# Patient Record
Sex: MALE | Race: WHITE | NOT HISPANIC OR LATINO | Employment: FULL TIME | URBAN - NONMETROPOLITAN AREA
[De-identification: names, ages, dates, MRNs, and addresses within clinical notes are randomized per-mention and may not be internally consistent; named-entity substitution may affect disease eponyms.]

---

## 2017-02-06 DIAGNOSIS — M25.562 ACUTE PAIN OF BOTH KNEES: Primary | ICD-10-CM

## 2017-02-06 DIAGNOSIS — M25.561 ACUTE PAIN OF BOTH KNEES: Primary | ICD-10-CM

## 2017-02-07 ENCOUNTER — CONSULT (OUTPATIENT)
Dept: ORTHOPEDIC SURGERY | Facility: CLINIC | Age: 52
End: 2017-02-07

## 2017-02-07 VITALS
WEIGHT: 227 LBS | DIASTOLIC BLOOD PRESSURE: 84 MMHG | HEIGHT: 74 IN | SYSTOLIC BLOOD PRESSURE: 126 MMHG | BODY MASS INDEX: 29.13 KG/M2

## 2017-02-07 DIAGNOSIS — M17.12 PRIMARY OSTEOARTHRITIS OF LEFT KNEE: Primary | ICD-10-CM

## 2017-02-07 PROCEDURE — 99213 OFFICE O/P EST LOW 20 MIN: CPT | Performed by: ORTHOPAEDIC SURGERY

## 2017-02-07 RX ORDER — MELOXICAM 15 MG/1
15 TABLET ORAL DAILY
Qty: 30 TABLET | Refills: 3 | Status: SHIPPED | OUTPATIENT
Start: 2017-02-07 | End: 2018-01-31

## 2017-02-07 RX ORDER — MELOXICAM 7.5 MG/1
15 TABLET ORAL DAILY
Status: DISCONTINUED | OUTPATIENT
Start: 2017-02-07 | End: 2017-02-07

## 2017-02-07 NOTE — PROGRESS NOTES
"Subjective   Lalo Jones is a 51 y.o. male. With a history of left knee arthritis.      History of Present Illness patient has a long standing history of osteoarthritis of the left knee with history of previous ACL reconstruction in [place] 12  Years ago .  He has had increased pain and  Difficulty walking.    The following portions of the patient's history were reviewed and updated as appropriate:   He  has no past medical history on file.  He  does not have any pertinent problems on file.  He  has a past surgical history that includes Tennis Elbow Release (Right, 04/14/2011) and Anterior cruciate ligament repair (Left).  His family history is not on file.  He  reports that he has been smoking Cigarettes.  He started smoking about 30 years ago. He has a 15.00 pack-year smoking history. He does not have any smokeless tobacco history on file. His alcohol and drug histories are not on file.  Current Outpatient Prescriptions   Medication Sig Dispense Refill   • amoxicillin (AMOXIL) 500 MG capsule Take 500 mg by mouth 2 (two) times a day.     • Hydrocodone-Acetaminophen (LORTAB PO) Take 10 mg by mouth as needed.       No current facility-administered medications for this visit.      Current Outpatient Prescriptions on File Prior to Visit   Medication Sig   • amoxicillin (AMOXIL) 500 MG capsule Take 500 mg by mouth 2 (two) times a day.   • Hydrocodone-Acetaminophen (LORTAB PO) Take 10 mg by mouth as needed.     No current facility-administered medications on file prior to visit.      He is allergic to contrast dye..    Review of Systems  Visit Vitals   • /84   • Ht 74\" (188 cm)   • Wt 227 lb (103 kg)   • BMI 29.15 kg/m2       Social History     Social History   • Marital status:      Spouse name: N/A   • Number of children: N/A   • Years of education: N/A     Occupational History   • Not on file.     Social History Main Topics   • Smoking status: Current Every Day Smoker     Packs/day: 1.00    "  Years: 15.00     Types: Cigarettes     Start date: 2/7/1987   • Smokeless tobacco: Not on file   • Alcohol use Not on file   • Drug use: Not on file   • Sexual activity: Not on file     Other Topics Concern   • Not on file     Social History Narrative       HEENT: Normocephalic.  PERRLA.  TM's clear bilaterally.  Oropharynx: Clear.  Neck: Supple, with no adenopathy.  Chest: Equal bilateral expansion.  Clear to auscultation and percussion.  Heart: Regular sinus rhythm, S1 and S2 normal.  No murmurs or extra heart sounds heard.  Abdomen: Soft, nontender, and no organomegaly.  Neurological: cranial nerves II-XII normal Vascular: pulses are present  Dermatological: no rashes  or blemishes, or any abnormality of the skin.    REVIEW OF SYSTEMS:  Negative, other than presenting complaint.  HEENT: No headaches, diplopia, blurred vision, tinnitus, vertigo, epistaxis, hoarseness or sore throat.  Pulmonary: No cough, sputum, hemoptysis, dyspnea, wheezing, or chest pain.  Cardiac: No chest pain, palpitations, orthopnea, paroxysmal nocturnal dyspnea, shortness of breath, or pedal edema.  Gastrointestinal: No diarrhea, melena, or constipation.  Genitourinary: No dysuria, hematuria, nocturia, frequency, bladder or bowel incontinence.  Hematology: No history of any anemia, fatigue, fever, or chills or night sweats.  Dermatology: No rashes, pruritus, or increased pigmentation changes of the skin.   Objective   Physical Exam x rays of the left knee  Show severe  Narrowing of the medial knee joint the tract of the previous  ACL reconstruction  Can be seen on the x rays . Positive  lachman sign .  Quads:4-/5 hamstrings 4/5 neuro intact.      Assessment/Plan  osteoarthritis of the left knee  With  Weak quads and ACL insufficiency.  Plan: PT synvisc injection and meloxicam , see after pre-cert for synvisc.    Lalo was seen today for pain.    Diagnoses and all orders for this visit:    Primary osteoarthritis of left knee

## 2017-02-09 DIAGNOSIS — M17.12 PRIMARY OSTEOARTHRITIS OF LEFT KNEE: Primary | ICD-10-CM

## 2017-02-21 ENCOUNTER — CLINICAL SUPPORT (OUTPATIENT)
Dept: ORTHOPEDIC SURGERY | Facility: CLINIC | Age: 52
End: 2017-02-21

## 2017-02-21 VITALS
SYSTOLIC BLOOD PRESSURE: 128 MMHG | BODY MASS INDEX: 28.88 KG/M2 | DIASTOLIC BLOOD PRESSURE: 84 MMHG | WEIGHT: 225 LBS | HEIGHT: 74 IN

## 2017-02-21 DIAGNOSIS — M17.12 PRIMARY OSTEOARTHRITIS OF LEFT KNEE: Primary | ICD-10-CM

## 2017-02-21 PROCEDURE — 20610 DRAIN/INJ JOINT/BURSA W/O US: CPT | Performed by: ORTHOPAEDIC SURGERY

## 2017-02-21 NOTE — PROGRESS NOTES
Subjective   Lalo Jones is a 51 y.o. male. 1965- Synvisc ONE injection to the left knee- APPROVED      History of Present Illness   Patient is here for a Synvisc ONE injection to the left knee. Patient has been approved with his insurance to receive the injection. Patient states that he can tell that the Meloxicam does help relieve the pain slightly. Patient states that regardless of placing the PT orders during his last office visit, he has yet to have been contacted. Our office will schedule him with [facility] before the end of his office visit. Patient is ready to proceed with the injection to the left knee.     The following portions of the patient's history were reviewed and updated as appropriate:   He  has a past medical history of S/P ACL reconstruction.  He  does not have any pertinent problems on file.  He  has a past surgical history that includes Tennis Elbow Release (Right, 04/14/2011) and Anterior cruciate ligament repair (Left).  His family history is not on file.  He  reports that he has been smoking Cigarettes.  He started smoking about 30 years ago. He has a 15.00 pack-year smoking history. He has never used smokeless tobacco. He reports that he does not drink alcohol or use illicit drugs.  Current Outpatient Prescriptions   Medication Sig Dispense Refill   • amoxicillin (AMOXIL) 500 MG capsule Take 500 mg by mouth 2 (two) times a day.     • Hydrocodone-Acetaminophen (LORTAB PO) Take 10 mg by mouth as needed.     • meloxicam (MOBIC) 15 MG tablet Take 1 tablet by mouth Daily. Take with meal daily 30 tablet 3     No current facility-administered medications for this visit.      Current Outpatient Prescriptions on File Prior to Visit   Medication Sig   • amoxicillin (AMOXIL) 500 MG capsule Take 500 mg by mouth 2 (two) times a day.   • Hydrocodone-Acetaminophen (LORTAB PO) Take 10 mg by mouth as needed.   • meloxicam (MOBIC) 15 MG tablet Take 1 tablet by mouth  "Daily. Take with meal daily     No current facility-administered medications on file prior to visit.      He is allergic to contrast dye..    Review of Systems  Visit Vitals   • /84 (BP Location: Left arm, Patient Position: Sitting)   • Ht 74\" (188 cm)   • Wt 225 lb (102 kg)   • BMI 28.89 kg/m2       Social History     Social History   • Marital status:      Spouse name: N/A   • Number of children: N/A   • Years of education: N/A     Occupational History   • Not on file.     Social History Main Topics   • Smoking status: Current Every Day Smoker     Packs/day: 1.00     Years: 15.00     Types: Cigarettes     Start date: 2/7/1987   • Smokeless tobacco: Never Used   • Alcohol use No   • Drug use: No   • Sexual activity: Defer     Other Topics Concern   • Not on file     Social History Narrative       HEENT: Normocephalic.  PERRLA.  TM's clear bilaterally.  Oropharynx: Clear.  Neck: Supple, with no adenopathy.  Chest: Equal bilateral expansion.  Clear to auscultation and percussion.  Heart: Regular sinus rhythm, S1 and S2 normal.  No murmurs or extra heart sounds heard.  Abdomen: Soft, nontender, and no organomegaly.  Neurological: cranial nerves II-XII normal Vascular: pulses are present  Dermatological: no rashes  or blemishes, or any abnormality of the skin.        Objective   Physical Exam  REVIEW OF SYSTEMS:  Negative, other than presenting complaint.  HEENT: No headaches, diplopia, blurred vision, tinnitus, vertigo, epistaxis, hoarseness or sore throat.  Pulmonary: No cough, sputum, hemoptysis, dyspnea, wheezing, or chest pain.  Cardiac: No chest pain, palpitations, orthopnea, paroxysmal nocturnal dyspnea, shortness of breath, or pedal edema.  Gastrointestinal: No diarrhea, melena, or constipation.  Genitourinary: No dysuria, hematuria, nocturia, frequency, bladder or bowel incontinence.  Hematology: No history of any anemia, fatigue, fever, or chills or night sweats.  Dermatology: No rashes, " pruritus, or increased pigmentation changes of the skin.       Patient is ready to proceed with a SYNVISC ONE injection to his left knee.     Patient was placed in exam shorts. Allergies were verified. The left knee was prepped in a sterile manner. SYNVISC ONE was injected into the left knee without difficulty. Patient was instructed to ice and elevate his left knee for the next 48 hours. Patient will return to my office in 3 months.     Assessment/Plan   Problems Addressed this Visit        Musculoskeletal and Integument    Primary osteoarthritis of left knee - Primary    Relevant Medications    hylan (SYNVISC ONE) injection 48 mg (Completed) (Start on 2/21/2017  3:00 PM)

## 2017-04-13 ENCOUNTER — OFFICE VISIT (OUTPATIENT)
Dept: ORTHOPEDIC SURGERY | Facility: CLINIC | Age: 52
End: 2017-04-13

## 2017-04-13 VITALS — WEIGHT: 227 LBS | HEIGHT: 74 IN | BODY MASS INDEX: 29.13 KG/M2

## 2017-04-13 DIAGNOSIS — M17.11 PRIMARY OSTEOARTHRITIS OF RIGHT KNEE: ICD-10-CM

## 2017-04-13 DIAGNOSIS — M25.561 ANTERIOR KNEE PAIN, RIGHT: Primary | ICD-10-CM

## 2017-04-13 PROCEDURE — 99213 OFFICE O/P EST LOW 20 MIN: CPT | Performed by: ORTHOPAEDIC SURGERY

## 2017-04-13 RX ORDER — GABAPENTIN 800 MG/1
800 TABLET ORAL 3 TIMES DAILY
COMMUNITY

## 2017-04-13 NOTE — PROGRESS NOTES
"Subjective   Lalo Jones is a 51 y.o. male.     History of Present Illness Patient here today for right knee pain.  He had xrays done here today.    The following portions of the patient's history were reviewed and updated as appropriate: allergies, current medications, past family history, past medical history, past social history, past surgical history and problem list.    Review of Systems REVIEW OF SYSTEMS:  Negative, other than presenting complaint.  HEENT: No headaches, diplopia, blurred vision, tinnitus, vertigo, epistaxis, hoarseness or sore throat.  Pulmonary: No cough, sputum, hemoptysis, dyspnea, wheezing, or chest pain.  Cardiac: No chest pain, palpitations, orthopnea, paroxysmal nocturnal dyspnea, shortness of breath, or pedal edema.  Gastrointestinal: No diarrhea, melena, or constipation.  Genitourinary: No dysuria, hematuria, nocturia, frequency, bladder or bowel incontinence.  Hematology: No history of any anemia, fatigue, fever, or chills or night sweats.  Dermatology: No rashes, pruritus, or increased pigmentation changes of the skin.       Objective   Physical Exam   Ht 74\" (188 cm)  Wt 227 lb (103 kg)  BMI 29.15 kg/m2    Social History     Social History   • Marital status:      Spouse name: N/A   • Number of children: N/A   • Years of education: N/A     Occupational History   • Not on file.     Social History Main Topics   • Smoking status: Current Every Day Smoker     Packs/day: 1.00     Years: 15.00     Types: Cigarettes     Start date: 2/7/1987   • Smokeless tobacco: Never Used   • Alcohol use No   • Drug use: No   • Sexual activity: Defer     Other Topics Concern   • Not on file     Social History Narrative       HEENT: Normocephalic.  PERRLA.  TM's clear bilaterally.  Oropharynx: Clear.  Neck: Supple, with no adenopathy.  Chest: Equal bilateral expansion.  Clear to auscultation and percussion.  Heart: Regular sinus rhythm, S1 and S2 normal.  No murmurs or extra heart " sounds heard.  Abdomen: Soft, nontender, and no organomegaly.  Neurological: cranial nerves II-XII normal Vascular: pulses are present  Dermatological: no rashes  or blemishes, or any abnormality of the skin.     exam shows tenderness of medial joint line crepitus negative [facility]. Negative lachman. Quads4/5  Neuro intact. s rays show arthritis of medial joint line and patella.       Assessment/Plan  osteoarthritis of the rt knee.  Acute  Exacerbation. Plan: synvisc injection , ice  Elevation ibuprofen see back  After pre-certing of synvisc injection  Problems Addressed this Visit        Musculoskeletal and Integument    Primary osteoarthritis of right knee      Other Visit Diagnoses     Anterior knee pain, right    -  Primary    Relevant Orders    XR Knee 4+ View Right    XR Knee Standing Right

## 2017-04-21 DIAGNOSIS — M17.11 PRIMARY OSTEOARTHRITIS OF RIGHT KNEE: Primary | ICD-10-CM

## 2017-04-21 DIAGNOSIS — M25.561 ACUTE PAIN OF RIGHT KNEE: ICD-10-CM

## 2018-01-31 ENCOUNTER — OFFICE VISIT (OUTPATIENT)
Dept: ORTHOPEDIC SURGERY | Facility: CLINIC | Age: 53
End: 2018-01-31

## 2018-01-31 VITALS — WEIGHT: 227 LBS | HEIGHT: 74 IN | BODY MASS INDEX: 29.13 KG/M2

## 2018-01-31 DIAGNOSIS — M25.561 CHRONIC PAIN OF BOTH KNEES: ICD-10-CM

## 2018-01-31 DIAGNOSIS — M17.12 PRIMARY OSTEOARTHRITIS OF LEFT KNEE: ICD-10-CM

## 2018-01-31 DIAGNOSIS — M25.562 CHRONIC PAIN OF BOTH KNEES: ICD-10-CM

## 2018-01-31 DIAGNOSIS — G89.29 CHRONIC PAIN OF BOTH KNEES: ICD-10-CM

## 2018-01-31 DIAGNOSIS — M17.11 PRIMARY OSTEOARTHRITIS OF RIGHT KNEE: Primary | ICD-10-CM

## 2018-01-31 PROCEDURE — 99214 OFFICE O/P EST MOD 30 MIN: CPT | Performed by: NURSE PRACTITIONER

## 2018-01-31 PROCEDURE — 20610 DRAIN/INJ JOINT/BURSA W/O US: CPT | Performed by: NURSE PRACTITIONER

## 2018-01-31 RX ORDER — TRIAMCINOLONE ACETONIDE 40 MG/ML
40 INJECTION, SUSPENSION INTRA-ARTICULAR; INTRAMUSCULAR
Status: COMPLETED | OUTPATIENT
Start: 2018-01-31 | End: 2018-01-31

## 2018-01-31 RX ORDER — IBUPROFEN 800 MG/1
800 TABLET ORAL EVERY 8 HOURS PRN
COMMUNITY

## 2018-01-31 RX ORDER — MELOXICAM 15 MG/1
15 TABLET ORAL DAILY
Qty: 30 TABLET | Refills: 1 | Status: SHIPPED | OUTPATIENT
Start: 2018-01-31 | End: 2018-03-02

## 2018-01-31 RX ORDER — LIDOCAINE HYDROCHLORIDE 20 MG/ML
2 INJECTION, SOLUTION INFILTRATION; PERINEURAL
Status: COMPLETED | OUTPATIENT
Start: 2018-01-31 | End: 2018-01-31

## 2018-01-31 RX ADMIN — TRIAMCINOLONE ACETONIDE 40 MG: 40 INJECTION, SUSPENSION INTRA-ARTICULAR; INTRAMUSCULAR at 10:35

## 2018-01-31 RX ADMIN — LIDOCAINE HYDROCHLORIDE 2 ML: 20 INJECTION, SOLUTION INFILTRATION; PERINEURAL at 10:34

## 2018-01-31 RX ADMIN — LIDOCAINE HYDROCHLORIDE 2 ML: 20 INJECTION, SOLUTION INFILTRATION; PERINEURAL at 10:35

## 2018-01-31 RX ADMIN — TRIAMCINOLONE ACETONIDE 40 MG: 40 INJECTION, SUSPENSION INTRA-ARTICULAR; INTRAMUSCULAR at 10:34

## 2018-01-31 NOTE — PROGRESS NOTES
"Lalo Jones is a 52 y.o. male returns for     Chief Complaint   Patient presents with   • Left Knee - Follow-up   • Right Knee - Follow-up       HISTORY OF PRESENT ILLNESS: Patient presents to office for follow-up of her bilateral knee pain related to osteoarthritis.  Patient was previously seen by Dr. Bangura for his chronic knee pain, with last office visit being on 4/13/2017.  Patient reports that his chronic knee pain continues to progressively worsen.  Patient describes pain as aching and grinding in nature with intermittent swelling of his knees.  Patient reports pain is worse with deep knee bends, both climbing and descending stairs and walking.  Patient received Synvisc one injection into the left knee per Dr. Bangura on 2/21/2017 with some improvement in his pain.  Patient is interested in repeating Synvisc one injections.    CONCURRENT MEDICAL HISTORY:    Past Medical History:   Diagnosis Date   • S/P ACL reconstruction        Allergies   Allergen Reactions   • Contrast Dye Diarrhea and Nausea And Vomiting         Current Outpatient Prescriptions:   •  gabapentin (NEURONTIN) 600 MG tablet, Take 600 mg by mouth 3 (Three) Times a Day., Disp: , Rfl:   •  ibuprofen (ADVIL,MOTRIN) 600 MG tablet, Take 600 mg by mouth Every 6 (Six) Hours As Needed for Mild Pain ., Disp: , Rfl:   •  meloxicam (MOBIC) 15 MG tablet, Take 1 tablet by mouth Daily for 30 days., Disp: 30 tablet, Rfl: 1    Past Surgical History:   Procedure Laterality Date   • KNEE ACL RECONSTRUCTION Left    • TENNIS ELBOW RELEASE Right 04/14/2011       ROS  No fevers or chills.  No chest pain or shortness of air.  No GI or  disturbances. Left knee pain. Right knee pain.     PHYSICAL EXAMINATION:       Ht 188 cm (74\")  Wt 103 kg (227 lb)  BMI 29.15 kg/m2    Physical Exam   Constitutional: He is oriented to person, place, and time. Vital signs are normal. He appears well-developed and well-nourished.   HENT:   Head: Normocephalic. "   Pulmonary/Chest: Effort normal. No respiratory distress.   Abdominal: Soft. He exhibits no distension.   Musculoskeletal:        Right knee: He exhibits no effusion.        Left knee: He exhibits no effusion.   Neurological: He is alert and oriented to person, place, and time. GCS eye subscore is 4. GCS verbal subscore is 5. GCS motor subscore is 6.   Skin: Skin is warm, dry and intact.   Psychiatric: He has a normal mood and affect. His speech is normal and behavior is normal. Judgment and thought content normal. Cognition and memory are normal.   Vitals reviewed.      GAIT:     []  Normal  [x]  Antalgic    Assistive device: [x]  None  []  Walker     []  Crutches  []  Cane     []  Wheelchair  []  Stretcher    Right Knee Exam     Tenderness   The patient is experiencing tenderness in the medial joint line and patella.    Range of Motion   Extension: 0   Flexion: 120     Muscle Strength     The patient has normal right knee strength.    Tests   Shari:  Medial - negative Lateral - negative  Varus: negative  Valgus: negative    Other   Erythema: absent  Sensation: normal  Pulse: present  Swelling: none  Other tests: no effusion present    Comments:  Pain with range of motion.       Left Knee Exam     Tenderness   The patient is experiencing tenderness in the medial joint line and patella.    Range of Motion   Extension: 0   Flexion: 120     Muscle Strength     The patient has normal left knee strength.    Tests   Shari:  Medial - negative Lateral - negative  Varus: negative  Valgus: negative    Other   Erythema: absent  Sensation: normal  Pulse: present  Swelling: none  Effusion: no effusion present    Comments:  Pain with range of motion.               ASSESSMENT:Large Joint Arthrocentesis  Date/Time: 1/31/2018 10:34 AM  Consent given by: patient  Timeout: Immediately prior to procedure a time out was called to verify the correct patient, procedure, equipment, support staff and site/side marked as required    Supporting Documentation  Indications: pain   Procedure Details  Location: knee - R knee  Needle size: 22 G  Approach: anterolateral  Medications administered: 2 mL lidocaine 2%; 40 mg triamcinolone acetonide 40 MG/ML  Patient tolerance: patient tolerated the procedure well with no immediate complications    Large Joint Arthrocentesis  Date/Time: 1/31/2018 10:35 AM  Consent given by: patient  Timeout: Immediately prior to procedure a time out was called to verify the correct patient, procedure, equipment, support staff and site/side marked as required   Supporting Documentation  Indications: pain and diagnostic evaluation   Procedure Details  Location: knee - L knee  Needle size: 22 G  Approach: anterolateral  Medications administered: 2 mL lidocaine 2%; 40 mg triamcinolone acetonide 40 MG/ML  Patient tolerance: patient tolerated the procedure well with no immediate complications        Diagnoses and all orders for this visit:    Primary osteoarthritis of right knee  -     Large Joint Arthrocentesis  -     Large Joint Arthrocentesis    Primary osteoarthritis of left knee  -     Large Joint Arthrocentesis  -     Large Joint Arthrocentesis    Chronic pain of both knees  -     Large Joint Arthrocentesis  -     Large Joint Arthrocentesis    Other orders  -     meloxicam (MOBIC) 15 MG tablet; Take 1 tablet by mouth Daily for 30 days.    PLAN    Recommend intra-articular injections of bilateral knees today with steroid for pain.  Recommend Synvisc one injections bilaterally.  I will order these today and start the pre-certifcation process.  Discussed with patient changing his medication to a prescription strength NSAID that he can take once daily.  Meloxicam is prescribed today.  Patient reports he takes a significant amount of Ibuprofen for pain control, sometimes up to 3200 mg daily.  Discussed with patient the risk of overuse of NSAIDs, including renal injury, hypertension and increased for cardiovascular events.   Discussed with patient that he cannot take Ibuprofen and Meloxicam together.  Instructed patient to start the Meloxicam and if it is not sufficiently helping his joint pain, he may go back to Ibuprofen but should be taking 800 mg 3 times daily.  Recommend activity modification as tolerated based on his pain.  Recommend ice therapy to bilateral knees intermittently 3 times daily for 20 minutes at a time as needed for pain/swelling. Follow up in 1-2 weeks for Synvisc One injections to bilateral knees, pending insurance approval.     Return 1-2 weeks for Synvisc One injections.      This document has been electronically signed by MADDY Vides on January 31, 2018 12:19 PM      MADDY Vides

## 2019-10-10 NOTE — PROGRESS NOTES
Subjective    Mr. Jones is 53 y.o. male    Chief Complaint: Prostate cancer    History of Present Illness   52yo male established patient followup for prostate cancer found on biopsy done 2/25/19 for rise of PSA to 9.1 on 2/11/19. path showed 4/12 cores positive, all on right. Consuelo 3+4=7 in 35% of 1 core, other 3 cores low volume [place] 6. trus vol= 21cc. denies luts, hematuria, stones, or family history of prostate cancer. Onctoype DX returned unfavorable intermediate risk with GPS of 24. PSA done 7/22/19 down to 6.6 from 7.0 on 4/19/19 but PSA done 10/17/2019 up slightly to 7.8.  He has new complaints today of erectile dysfunction not previously treated.  Quality painless.  Severity loss of function.  Timing constant.  Onset gradual.  Associated symptoms no penile pain or curvature.    The following portions of the patient's history were reviewed and updated as appropriate: allergies, current medications, past family history, past medical history, past social history, past surgical history and problem list.    Review of Systems   Constitutional: Negative for chills and fever.   Gastrointestinal: Negative for abdominal pain, anal bleeding and blood in stool.   Genitourinary: Negative for dysuria, hematuria and urgency.   All other systems reviewed and are negative.        Current Outpatient Medications:   •  gabapentin (NEURONTIN) 600 MG tablet, Take 600 mg by mouth 3 (Three) Times a Day., Disp: , Rfl:   •  ibuprofen (ADVIL,MOTRIN) 600 MG tablet, Take 600 mg by mouth Every 6 (Six) Hours As Needed for Mild Pain ., Disp: , Rfl:   •  sildenafil (VIAGRA) 100 MG tablet, Take 1/2 to 1 tab by mouth 1 hour prior to intercourse, Disp: 10 tablet, Rfl: 11    Past Medical History:   Diagnosis Date   • S/P ACL reconstruction        Past Surgical History:   Procedure Laterality Date   • KNEE ACL RECONSTRUCTION Left    • TENNIS ELBOW RELEASE Right 04/14/2011       Social History     Socioeconomic History   • Marital status:  "     Spouse name: Not on file   • Number of children: Not on file   • Years of education: Not on file   • Highest education level: Not on file   Tobacco Use   • Smoking status: Current Every Day Smoker     Packs/day: 1.00     Years: 15.00     Pack years: 15.00     Types: Cigarettes     Start date: 2/7/1987   • Smokeless tobacco: Never Used   Substance and Sexual Activity   • Alcohol use: No   • Drug use: No   • Sexual activity: Defer       History reviewed. No pertinent family history.    Objective    Temp 98.1 °F (36.7 °C)   Ht 188 cm (74\")   Wt 95.7 kg (211 lb)   BMI 27.09 kg/m²     Physical Exam  Constitutional: Well nourished, Well developed; No apparent distress; Vital reviewed as above  Psychiatric: Appropriate affect; Alert and oriented  Eyes: Unremarkable  Musculoskeletal: Normal gait and station  GI: Abdomen is soft, non-tender  Respiratory: No distress; Unlabored movement; No accessory musculature needed with symmetric movements  Skin: No pallor or diaphoresis  Lymphatic: No adenopathy neck or groin      Results for orders placed or performed in visit on 10/21/19   POC Urinalysis Dipstick, Multipro   Result Value Ref Range    Color Yellow Yellow, Straw, Dark Yellow, Beryl    Clarity, UA Clear Clear    Glucose, UA Negative Negative, 1000 mg/dL (3+) mg/dL    Bilirubin Negative Negative    Ketones, UA Negative Negative    Specific Gravity  1.005 1.005 - 1.030    Blood, UA Negative Negative    pH, Urine 6.0 5.0 - 8.0    Protein, POC Negative Negative mg/dL    Urobilinogen, UA Normal Normal    Nitrite, UA Negative Negative    Leukocytes Negative Negative     Patient's Body mass index is 27.09 kg/m². BMI is above normal parameters. Recommendations include: educational material.    Assessment and Plan    Diagnoses and all orders for this visit:    Prostate cancer (CMS/[facility])  -     POC Urinalysis Dipstick, Multipro  -     PSA DIAGNOSTIC; Future    Erectile dysfunction due to diseases classified " elsewhere  -     sildenafil (VIAGRA) 100 MG tablet; Take 1/2 to 1 tab by mouth 1 hour prior to intercourse      Doing well with overall stable PSA.  He would like to remain on active surveillance for his clinically localized prostate cancer.  We discussed options for his erectile dysfunction and he would like a trial of PDE inhibitor therapy.  He understands no nitrates.  Follow-up with me in 3 months with pre-clinic PSA at which time will discuss timing of his surveillance biopsy.

## 2019-10-17 DIAGNOSIS — R97.20 ELEVATED PROSTATE SPECIFIC ANTIGEN (PSA): Primary | ICD-10-CM

## 2019-10-17 DIAGNOSIS — C61 PROSTATE CANCER (HCC): Primary | ICD-10-CM

## 2019-10-21 ENCOUNTER — OFFICE VISIT (OUTPATIENT)
Dept: UROLOGY | Facility: CLINIC | Age: 54
End: 2019-10-21

## 2019-10-21 VITALS — WEIGHT: 211 LBS | TEMPERATURE: 98.1 F | HEIGHT: 74 IN | BODY MASS INDEX: 27.08 KG/M2

## 2019-10-21 DIAGNOSIS — C61 PROSTATE CANCER (HCC): Primary | ICD-10-CM

## 2019-10-21 DIAGNOSIS — N52.1 ERECTILE DYSFUNCTION DUE TO DISEASES CLASSIFIED ELSEWHERE: ICD-10-CM

## 2019-10-21 LAB
BILIRUB BLD-MCNC: NEGATIVE MG/DL
CLARITY, POC: CLEAR
COLOR UR: YELLOW
GLUCOSE UR STRIP-MCNC: NEGATIVE MG/DL
KETONES UR QL: NEGATIVE
LEUKOCYTE EST, POC: NEGATIVE
NITRITE UR-MCNC: NEGATIVE MG/ML
PH UR: 6 [PH] (ref 5–8)
PROT UR STRIP-MCNC: NEGATIVE MG/DL
RBC # UR STRIP: NEGATIVE /UL
SP GR UR: 1 (ref 1–1.03)
UROBILINOGEN UR QL: NORMAL

## 2019-10-21 PROCEDURE — 99214 OFFICE O/P EST MOD 30 MIN: CPT | Performed by: UROLOGY

## 2019-10-21 RX ORDER — SILDENAFIL 100 MG/1
TABLET, FILM COATED ORAL
Qty: 10 TABLET | Refills: 11 | Status: SHIPPED | OUTPATIENT
Start: 2019-10-21 | End: 2020-07-17 | Stop reason: SDUPTHER

## 2019-10-21 NOTE — PATIENT INSTRUCTIONS

## 2019-10-22 ENCOUNTER — RESULTS ENCOUNTER (OUTPATIENT)
Dept: UROLOGY | Facility: CLINIC | Age: 54
End: 2019-10-22

## 2019-10-22 DIAGNOSIS — C61 PROSTATE CANCER (HCC): ICD-10-CM

## 2020-01-13 DIAGNOSIS — C61 PROSTATE CANCER (HCC): ICD-10-CM

## 2020-01-14 ENCOUNTER — APPOINTMENT (OUTPATIENT)
Dept: LAB | Facility: HOSPITAL | Age: 55
End: 2020-01-14

## 2020-01-14 LAB — PSA SERPL-MCNC: 8.39 NG/ML (ref 0–4)

## 2020-01-14 PROCEDURE — 84153 ASSAY OF PSA TOTAL: CPT | Performed by: UROLOGY

## 2020-01-17 NOTE — PROGRESS NOTES
Subjective    Mr. Jones is 54 y.o. male    Chief Complaint: Prostate Cancer    History of Present Illness    55yo male established patient followup for prostate cancer.  Severity PSA on 1/14/2020 overall stable at 8.39.  Context found on biopsy done 2/25/19 for rise of PSA to 9.1 on 2/11/19. path showed 4/12 cores positive, all on right. Evans 3+4=7 in 35% of 1 core, other 3 cores low volume Evans 6. trus vol= 21cc. denies luts, hematuria, stones, or family history of prostate cancer. Onctoype DX returned unfavorable intermediate risk with GPS of 24. PSA done 7/22/19 down to 6.6 from 7.0 on 4/19/19 but PSA done 10/17/2019 up slightly to 7.8.    Associated symptoms he is using sildenafil for ED.  Quality painless.  Timing constant.  Onset gradual.      Lab Results   Component Value Date    PSA 8.390 (H) 01/14/2020       The following portions of the patient's history were reviewed and updated as appropriate: allergies, current medications, past family history, past medical history, past social history, past surgical history and problem list.    Review of Systems   Constitutional: Negative for chills and fever.   Gastrointestinal: Negative for abdominal pain, anal bleeding and blood in stool.   Genitourinary: Negative for dysuria, frequency, hematuria and urgency.   All other systems reviewed and are negative.        Current Outpatient Medications:   •  gabapentin (NEURONTIN) 600 MG tablet, Take 600 mg by mouth 3 (Three) Times a Day., Disp: , Rfl:   •  ibuprofen (ADVIL,MOTRIN) 600 MG tablet, Take 600 mg by mouth Every 6 (Six) Hours As Needed for Mild Pain ., Disp: , Rfl:   •  levoFLOXacin (LEVAQUIN) 500 MG tablet, 1 tab by mouth the night prior to biopsy, Disp: 1 tablet, Rfl: 0  •  sildenafil (VIAGRA) 100 MG tablet, Take 1/2 to 1 tab by mouth 1 hour prior to intercourse, Disp: 10 tablet, Rfl: 11  •  sodium phosphate (FLEET) 7-19 GM/118ML enema, Use rectally the evening prior to biopsy, Disp: 1 enema, Rfl:  "0    Past Medical History:   Diagnosis Date   • S/P ACL reconstruction        Past Surgical History:   Procedure Laterality Date   • KNEE ACL RECONSTRUCTION Left    • TENNIS ELBOW RELEASE Right 04/14/2011       Social History     Socioeconomic History   • Marital status:      Spouse name: Not on file   • Number of children: Not on file   • Years of education: Not on file   • Highest education level: Not on file   Tobacco Use   • Smoking status: Current Every Day Smoker     Packs/day: 1.00     Years: 15.00     Pack years: 15.00     Types: Cigarettes     Start date: 2/7/1987   • Smokeless tobacco: Never Used   Substance and Sexual Activity   • Alcohol use: No   • Drug use: No   • Sexual activity: Defer       No family history on file.    Objective    Temp 98.2 °F (36.8 °C)   Ht 182.9 cm (72\")   Wt 97.5 kg (215 lb)   BMI 29.16 kg/m²     Physical Exam  Constitutional: Well nourished, Well developed; No apparent distress; Vital reviewed as above  Psychiatric: Appropriate affect; Alert and oriented  Eyes: Unremarkable  Musculoskeletal: Normal gait and station  GI: Abdomen is soft, non-tender  Respiratory: No distress; Unlabored movement; No accessory musculature needed with symmetric movements  Skin: No pallor or diaphoresis  Lymphatic: No adenopathy neck or groin      Results for orders placed or performed in visit on 01/23/20   POC Urinalysis Dipstick, Multipro   Result Value Ref Range    Color Yellow Yellow, Straw, Dark Yellow, Beryl    Clarity, UA Clear Clear    Glucose, UA Negative Negative, 1000 mg/dL (3+) mg/dL    Bilirubin Negative Negative    Ketones, UA Negative Negative    Specific Gravity  1.025 1.005 - 1.030    Blood, UA Negative Negative    pH, Urine 7.0 5.0 - 8.0    Protein, POC Negative Negative mg/dL    Urobilinogen, UA Normal Normal    Nitrite, UA Negative Negative    Leukocytes Negative Negative     Patient's Body mass index is 29.16 kg/m². BMI is above normal parameters. Recommendations " include: educational material.    Assessment and Plan    Diagnoses and all orders for this visit:    Prostate cancer (CMS/HCC)  -     POC Urinalysis Dipstick, Multipro  -     MRI Pelvis With & Without Contrast; Future  -     levoFLOXacin (LEVAQUIN) 500 MG tablet; 1 tab by mouth the night prior to biopsy  -     sodium phosphate (FLEET) 7-19 GM/118ML enema; Use rectally the evening prior to biopsy  -     Case Request; Standing  -     ECG 12 Lead; Future  -     Case Request    Erectile dysfunction due to diseases classified elsewhere    Other orders  -     Follow Anesthesia Guidelines / Standing Orders; Future  -     Obtain Informed Consent; Future  -     Provide NPO Instructions to Patient; Future  -     Chlorhexidine Skin Prep; Future      Active surveillance for intermediate risk prostate cancer.  He is due for a surveillance biopsy.  Recommended he get a preoperative prostate MRI and schedule for [facility] MRI ultrasound fusion biopsy on 2/12/2020.  We discussed risk of biopsy including infection, bleeding, possible finding/not finding cancer, need for additional procedures, complications of anesthesia.  Patient voiced understanding and provided informed consent to proceed.

## 2020-01-23 ENCOUNTER — OFFICE VISIT (OUTPATIENT)
Dept: UROLOGY | Facility: CLINIC | Age: 55
End: 2020-01-23

## 2020-01-23 VITALS — HEIGHT: 72 IN | WEIGHT: 215 LBS | BODY MASS INDEX: 29.12 KG/M2 | TEMPERATURE: 98.2 F

## 2020-01-23 DIAGNOSIS — N52.1 ERECTILE DYSFUNCTION DUE TO DISEASES CLASSIFIED ELSEWHERE: ICD-10-CM

## 2020-01-23 DIAGNOSIS — C61 PROSTATE CANCER (HCC): Primary | ICD-10-CM

## 2020-01-23 LAB
BILIRUB BLD-MCNC: NEGATIVE MG/DL
CLARITY, POC: CLEAR
COLOR UR: YELLOW
GLUCOSE UR STRIP-MCNC: NEGATIVE MG/DL
KETONES UR QL: NEGATIVE
LEUKOCYTE EST, POC: NEGATIVE
NITRITE UR-MCNC: NEGATIVE MG/ML
PH UR: 7 [PH] (ref 5–8)
PROT UR STRIP-MCNC: NEGATIVE MG/DL
RBC # UR STRIP: NEGATIVE /UL
SP GR UR: 1.02 (ref 1–1.03)
UROBILINOGEN UR QL: NORMAL

## 2020-01-23 PROCEDURE — 99214 OFFICE O/P EST MOD 30 MIN: CPT | Performed by: UROLOGY

## 2020-01-23 RX ORDER — LEVOFLOXACIN 500 MG/1
TABLET, FILM COATED ORAL
Qty: 1 TABLET | Refills: 0 | Status: SHIPPED | OUTPATIENT
Start: 2020-01-23 | End: 2020-02-05

## 2020-01-23 RX ORDER — MAGNESIUM HYDROXIDE 1200 MG/15ML
LIQUID ORAL
Qty: 1 ENEMA | Refills: 0 | Status: ON HOLD | OUTPATIENT
Start: 2020-01-23 | End: 2020-06-07

## 2020-01-23 NOTE — PATIENT INSTRUCTIONS

## 2020-02-05 ENCOUNTER — APPOINTMENT (OUTPATIENT)
Dept: PREADMISSION TESTING | Facility: HOSPITAL | Age: 55
End: 2020-02-05

## 2020-02-05 ENCOUNTER — HOSPITAL ENCOUNTER (OUTPATIENT)
Dept: MRI IMAGING | Facility: HOSPITAL | Age: 55
Discharge: HOME | End: 2020-02-05
Admitting: UROLOGY

## 2020-02-05 VITALS
RESPIRATION RATE: 18 BRPM | HEART RATE: 71 BPM | OXYGEN SATURATION: 95 % | HEIGHT: 74 IN | BODY MASS INDEX: 27.93 KG/M2 | DIASTOLIC BLOOD PRESSURE: 78 MMHG | SYSTOLIC BLOOD PRESSURE: 136 MMHG | WEIGHT: 217.59 LBS

## 2020-02-05 DIAGNOSIS — C61 PROSTATE CANCER (HCC): ICD-10-CM

## 2020-02-05 LAB
ANION GAP SERPL CALCULATED.3IONS-SCNC: 8 MMOL/L (ref 5–15)
BUN BLD-MCNC: 10 MG/DL (ref 6–20)
BUN/CREAT SERPL: 10.2 (ref 7–25)
CALCIUM SPEC-SCNC: 9.5 MG/DL (ref 8.6–10.5)
CHLORIDE SERPL-SCNC: 104 MMOL/L (ref 98–107)
CO2 SERPL-SCNC: 31 MMOL/L (ref 22–29)
CREAT BLD-MCNC: 0.98 MG/DL (ref 0.76–1.27)
DEPRECATED RDW RBC AUTO: 44.7 FL (ref 37–54)
ERYTHROCYTE [DISTWIDTH] IN BLOOD BY AUTOMATED COUNT: 14 % (ref 12.3–15.4)
GFR SERPL CREATININE-BSD FRML MDRD: 80 ML/MIN/1.73
GLUCOSE BLD-MCNC: 99 MG/DL (ref 65–99)
HCT VFR BLD AUTO: 45.8 % (ref 37.5–51)
HGB BLD-MCNC: 15.4 G/DL (ref 13–17.7)
MCH RBC QN AUTO: 29.2 PG (ref 26.6–33)
MCHC RBC AUTO-ENTMCNC: 33.6 G/DL (ref 31.5–35.7)
MCV RBC AUTO: 86.7 FL (ref 79–97)
PLATELET # BLD AUTO: 178 10*3/MM3 (ref 140–450)
PMV BLD AUTO: 9.9 FL (ref 6–12)
POTASSIUM BLD-SCNC: 4.3 MMOL/L (ref 3.5–5.2)
RBC # BLD AUTO: 5.28 10*6/MM3 (ref 4.14–5.8)
SODIUM BLD-SCNC: 143 MMOL/L (ref 136–145)
WBC NRBC COR # BLD: 6.36 10*3/MM3 (ref 3.4–10.8)

## 2020-02-05 PROCEDURE — 72197 MRI PELVIS W/O & W/DYE: CPT

## 2020-02-05 PROCEDURE — 80048 BASIC METABOLIC PNL TOTAL CA: CPT | Performed by: UROLOGY

## 2020-02-05 PROCEDURE — 82565 ASSAY OF CREATININE: CPT

## 2020-02-05 PROCEDURE — 36415 COLL VENOUS BLD VENIPUNCTURE: CPT

## 2020-02-05 PROCEDURE — 93010 ELECTROCARDIOGRAM REPORT: CPT | Performed by: INTERNAL MEDICINE

## 2020-02-05 PROCEDURE — 93005 ELECTROCARDIOGRAM TRACING: CPT

## 2020-02-05 PROCEDURE — A9577 INJ MULTIHANCE: HCPCS | Performed by: UROLOGY

## 2020-02-05 PROCEDURE — 85027 COMPLETE CBC AUTOMATED: CPT | Performed by: UROLOGY

## 2020-02-05 PROCEDURE — 0 GADOBENATE DIMEGLUMINE 529 MG/ML SOLUTION: Performed by: UROLOGY

## 2020-02-05 RX ADMIN — GADOBENATE DIMEGLUMINE 20 ML: 529 INJECTION, SOLUTION INTRAVENOUS at 16:23

## 2020-02-05 NOTE — DISCHARGE INSTRUCTIONS
DAY OF SURGERY INSTRUCTIONS        YOUR SURGEON: LYNDSAY CHILDRESS    PROCEDURE: PROSTATE ULTRASOUND BIOPSY MRI FUSION WITH URONAV POSSIBLE ULTRASOUND GUIDED PROSTATE BIOPSY    DATE OF SURGERY: 2/12/20    ARRIVAL TIME: AS DIRECTED BY OFFICE    YOU MAY TAKE THE FOLLOWING MEDICATION(S) THE MORNING OF SURGERY WITH A SIP OF WATER: 0    ALL OTHER HOME MEDICATIONS CHECK WITH YOUR DOCTOR              MANAGING PAIN AFTER SURGERY    We know you are probably wondering what your pain will be like after surgery.  Following surgery it is unrealistic to expect you will not have pain.   Pain is how our bodies let us know that something is wrong or cautions us to be careful.  That said, our goal is to make your pain tolerable.    Methods we may use to treat your pain include (oral or IV medications, PCAs, epidurals, nerve blocks, etc.)   While some procedures require IV pain medications for a short time after surgery, transitioning to pain medications by mouth allows for better management of pain.   Your nurse will encourage you to take oral pain medications whenever possible.  IV medications work almost immediately, but only last a short while.  Taking medications by mouth allows for a more constant level of medication in your blood stream for a longer period of time.      Once your pain is out of control it is harder to get back under control.  It is important you are aware when your next dose of pain medication is due.  If you are admitted, your nurse may write the time of your next dose on the white board in your room to help you remember.      We are interested in your pain and encourage you to inform us about aggravating factors during your visit.   Many times a simple repositioning every few hours can make a big difference.    If your physician says it is okay, do not let your pain prevent you from getting out of bed. Be sure to call your nurse for assistance prior to getting up so you do not fall.      Before surgery, please  decide your tolerable pain goal.  These faces help describe the pain ratings we use on a 0-10 scale.   Be prepared to tell us your goal and whether or not you take pain or anxiety medications at home.      BEFORE YOU COME TO THE HOSPITAL  (Pre-op instructions)  • Do not eat, drink, smoke or chew gum after midnight the night before surgery.  This also includes no mints.  • Morning of surgery take only the medicines you have been instructed with a sip of water unless otherwise instructed  by your physician.  • Do not shave, wear makeup or dark nail polish.  • Remove all jewelry including rings.  • Leave anything you consider valuable at home.  • Leave your suitcase in the car until after your surgery.  • Bring the following with you if applicable:  o Picture ID and insurance, Medicare or Medicaid cards  o Co-pay/deductible required by insurance (cash, check, credit card)  o Copy of advance directive, living will or power-of- documents if not brought to PAT  o CPAP or BIPAP mask and tubing  o Relaxation aids ( book, magazine), etc.  o Hearing aids                                 ON THE DAY OF SURGERY  · On the day of surgery check in at registration located at the main entrance of the hospital.   ? You will be registered and given a beeper with instructions where to wait in the main lobby.  ? When your beeper lights up and vibrates a member of the Outpatient Surgery staff will meet you at the double doors under the stair steps and escort you to your preoperative room.   · You may have cloth compression devices placed on your legs. These help to prevent blood clots and reduce swelling in your legs.  · An IV may be inserted into one of your veins.  · In the operating room, you may be given one or more of the following:  ? A medicine to help you relax (sedative).  ? A medicine to numb the area (local anesthetic).  ? A medicine to make you fall asleep (general anesthetic).  ? A medicine that is injected into an  "area of your body to numb everything below the injection site (regional anesthetic).  · Your surgical site will be marked or identified.  · You may be given an antibiotic through your IV to help prevent infection.  Contact a health care provider if you:  · Develop a fever of more than 100.4°F (38°C) or other feelings of illness during the 48 hours before your surgery.  · Have symptoms that get worse.  Have questions or concerns about your surgery    General Anesthesia/Surgery, Adult  General anesthesia is the use of medicines to make a person \"go to sleep\" (unconscious) for a medical procedure. General anesthesia must be used for certain procedures, and is often recommended for procedures that:  · Last a long time.  · Require you to be still or in an unusual position.  · Are major and can cause blood loss.  The medicines used for general anesthesia are called general anesthetics. As well as making you unconscious for a certain amount of time, these medicines:  · Prevent pain.  · Control your blood pressure.  · Relax your muscles.  Tell a health care provider about:  · Any allergies you have.  · All medicines you are taking, including vitamins, herbs, eye drops, creams, and over-the-counter medicines.  · Any problems you or family members have had with anesthetic medicines.  · Types of anesthetics you have had in the past.  · Any blood disorders you have.  · Any surgeries you have had.  · Any medical conditions you have.  · Any recent upper respiratory, chest, or ear infections.  · Any history of:  ? Heart or lung conditions, such as heart failure, sleep apnea, asthma, or chronic obstructive pulmonary disease (COPD).  ?  service.  ? Depression or anxiety.  · Any tobacco or drug use, including marijuana or alcohol use.  · Whether you are pregnant or may be pregnant.  What are the risks?  Generally, this is a safe procedure. However, problems may occur, including:  · Allergic reaction.  · Lung and heart " problems.  · Inhaling food or liquid from the stomach into the lungs (aspiration).  · Nerve injury.  · Air in the bloodstream, which can lead to stroke.  · Extreme agitation or confusion (delirium) when you wake up from the anesthetic.  · Waking up during your procedure and being unable to move. This is rare.  These problems are more likely to develop if you are having a major surgery or if you have an advanced or serious medical condition. You can prevent some of these complications by answering all of your health care provider's questions thoroughly and by following all instructions before your procedure.  General anesthesia can cause side effects, including:  · Nausea or vomiting.  · A sore throat from the breathing tube.  · Hoarseness.  · Wheezing or coughing.  · Shaking chills.  · Tiredness.  · Body aches.  · Anxiety.  · Sleepiness or drowsiness.  · Confusion or agitation.  RISKS AND COMPLICATIONS OF SURGERY  Your health care provider will discuss possible risks and complications with you before surgery. Common risks and complications include:    · Problems due to the use of anesthetics.  · Blood loss and replacement (does not apply to minor surgical procedures).  · Temporary increase in pain due to surgery.  · Uncorrected pain or problems that the surgery was meant to correct.  · Infection.  · New damage.    What happens before the procedure?    Medicines  Ask your health care provider about:  · Changing or stopping your regular medicines. This is especially important if you are taking diabetes medicines or blood thinners.  · Taking medicines such as aspirin and ibuprofen. These medicines can thin your blood. Do not take these medicines unless your health care provider tells you to take them.  · Taking over-the-counter medicines, vitamins, herbs, and supplements. Do not take these during the week before your procedure unless your health care provider approves them.  General instructions  · Starting 3-6 weeks  before the procedure, do not use any products that contain nicotine or tobacco, such as cigarettes and e-cigarettes. If you need help quitting, ask your health care provider.  · If you brush your teeth on the morning of the procedure, make sure to spit out all of the toothpaste.  · Tell your health care provider if you become ill or develop a cold, cough, or fever.  · If instructed by your health care provider, bring your sleep apnea device with you on the day of your surgery (if applicable).  · Ask your health care provider if you will be going home the same day, the following day, or after a longer hospital stay.  ? Plan to have someone take you home from the hospital or clinic.  ? Plan to have a responsible adult care for you for at least 24 hours after you leave the hospital or clinic. This is important.  What happens during the procedure?  · You will be given anesthetics through both of the following:  ? A mask placed over your nose and mouth.  ? An IV in one of your veins.  · You may receive a medicine to help you relax (sedative).  · After you are unconscious, a breathing tube may be inserted down your throat to help you breathe. This will be removed before you wake up.  · An anesthesia specialist will stay with you throughout your procedure. He or she will:  ? Keep you comfortable and safe by continuing to give you medicines and adjusting the amount of medicine that you get.  ? Monitor your blood pressure, pulse, and oxygen levels to make sure that the anesthetics do not cause any problems.  The procedure may vary among health care providers and hospitals.  What happens after the procedure?  · Your blood pressure, temperature, heart rate, breathing rate, and blood oxygen level will be monitored until the medicines you were given have worn off.  · You will wake up in a recovery area. You may wake up slowly.  · If you feel anxious or agitated, you may be given medicine to help you calm down.  · If you will be  going home the same day, your health care provider may check to make sure you can walk, drink, and urinate.  · Your health care provider will treat any pain or side effects you have before you go home.  · Do not drive for 24 hours if you were given a sedative.  Summary  · General anesthesia is used to keep you still and prevent pain during a procedure.  · It is important to tell your healthcare provider about your medical history and any surgeries you have had, and previous experience with anesthesia.  · Follow your healthcare provider’s instructions about when to stop eating, drinking, or taking certain medicines before your procedure.  · Plan to have someone take you home from the hospital or clinic.  This information is not intended to replace advice given to you by your health care provider. Make sure you discuss any questions you have with your health care provider.  Document Released: 03/26/2009 Document Revised: 08/03/2018 Document Reviewed: 08/03/2018  [employer] Patient Education © 2019 [employer]      Fall Prevention in Hospitals, Adult  As a hospital patient, your condition and the treatments you receive can increase your risk for falls. Some additional risk factors for falls in a hospital include:  · Being in an unfamiliar environment.  · Being on bed rest.  · Your surgery.  · Taking certain medicines.  · Your tubing requirements, such as intravenous (IV) therapy or catheters.  It is important that you learn how to decrease fall risks while at the hospital. Below are important tips that can help prevent falls.  SAFETY TIPS FOR PREVENTING FALLS  Talk about your risk of falling.  · Ask your health care provider why you are at risk for falling. Is it your medicine, illness, tubing placement, or something else?  · Make a plan with your health care provider to keep you safe from falls.  · Ask your health care provider or pharmacist about side effects of your medicines. Some medicines can make you  dizzy or affect your coordination.  Ask for help.  · Ask for help before getting out of bed. You may need to press your call button.  · Ask for assistance in getting safely to the toilet.  · Ask for a walker or cane to be put at your bedside. Ask that most of the side rails on your bed be placed up before your health care provider leaves the room.  · Ask family or friends to sit with you.  · Ask for things that are out of your reach, such as your glasses, hearing aids, telephone, bedside table, or call button.  Follow these tips to avoid falling:  · Stay lying or seated, rather than standing, while waiting for help.  · Wear rubber-soled slippers or shoes whenever you walk in the hospital.  · Avoid quick, sudden movements.  ¨ Change positions slowly.  ¨ Sit on the side of your bed before standing.  ¨ Stand up slowly and wait before you start to walk.  · Let your health care provider know if there is a spill on the floor.  · Pay careful attention to the medical equipment, electrical cords, and tubes around you.  · When you need help, use your call button by your bed or in the bathroom. Wait for one of your health care providers to help you.  · If you feel dizzy or unsure of your footing, return to bed and wait for assistance.  · Avoid being distracted by the TV, telephone, or another person in your room.  · Do not lean or support yourself on rolling objects, such as IV poles or bedside tables.     This information is not intended to replace advice given to you by your health care provider. Make sure you discuss any questions you have with your health care provider.     Document Released: 12/15/2001 Document Revised: 01/08/2016 Document Reviewed: 08/25/2013  [employer] Patient Education ©2016 [employer]            PATIENT/FAMILY/RESPONSIBLE PARTY VERBALIZES UNDERSTANDING OF ABOVE EDUCATION.  COPY OF PAIN SCALE GIVEN AND REVIEWED WITH VERBALIZED UNDERSTANDING.

## 2020-02-06 LAB — CREAT BLDA-MCNC: 0.5 MG/DL (ref 0.6–1.3)

## 2020-02-12 ENCOUNTER — PREP FOR SURGERY (OUTPATIENT)
Dept: OTHER | Facility: HOSPITAL | Age: 55
End: 2020-02-12

## 2020-02-12 ENCOUNTER — ANESTHESIA EVENT (OUTPATIENT)
Dept: PERIOP | Facility: HOSPITAL | Age: 55
End: 2020-02-12

## 2020-02-12 ENCOUNTER — HOSPITAL ENCOUNTER (OUTPATIENT)
Facility: HOSPITAL | Age: 55
Setting detail: HOSPITAL OUTPATIENT SURGERY
Discharge: HOME | End: 2020-02-12
Attending: UROLOGY | Admitting: UROLOGY

## 2020-02-12 ENCOUNTER — ANESTHESIA (OUTPATIENT)
Dept: PERIOP | Facility: HOSPITAL | Age: 55
End: 2020-02-12

## 2020-02-12 ENCOUNTER — TELEPHONE (OUTPATIENT)
Dept: UROLOGY | Facility: CLINIC | Age: 55
End: 2020-02-12

## 2020-02-12 VITALS
DIASTOLIC BLOOD PRESSURE: 82 MMHG | SYSTOLIC BLOOD PRESSURE: 126 MMHG | RESPIRATION RATE: 16 BRPM | HEART RATE: 85 BPM | OXYGEN SATURATION: 94 % | TEMPERATURE: 97.6 F

## 2020-02-12 DIAGNOSIS — C61 PROSTATE CANCER (HCC): ICD-10-CM

## 2020-02-12 DIAGNOSIS — C61 PROSTATE CANCER (HCC): Primary | ICD-10-CM

## 2020-02-12 PROCEDURE — G0463 HOSPITAL OUTPT CLINIC VISIT: HCPCS | Performed by: UROLOGY

## 2020-02-12 RX ORDER — LEVOFLOXACIN 500 MG/1
TABLET, FILM COATED ORAL
Qty: 1 TABLET | Refills: 0 | Status: ON HOLD | OUTPATIENT
Start: 2020-02-12 | End: 2020-02-18

## 2020-02-12 RX ORDER — SODIUM CHLORIDE, SODIUM LACTATE, POTASSIUM CHLORIDE, CALCIUM CHLORIDE 600; 310; 30; 20 MG/100ML; MG/100ML; MG/100ML; MG/100ML
1000 INJECTION, SOLUTION INTRAVENOUS CONTINUOUS
Status: DISCONTINUED | OUTPATIENT
Start: 2020-02-12 | End: 2020-02-12 | Stop reason: HOSPADM

## 2020-02-12 RX ORDER — SODIUM CHLORIDE 0.9 % (FLUSH) 0.9 %
3 SYRINGE (ML) INJECTION AS NEEDED
Status: DISCONTINUED | OUTPATIENT
Start: 2020-02-12 | End: 2020-02-12 | Stop reason: HOSPADM

## 2020-02-12 RX ORDER — LIDOCAINE HYDROCHLORIDE 10 MG/ML
0.5 INJECTION, SOLUTION EPIDURAL; INFILTRATION; INTRACAUDAL; PERINEURAL ONCE AS NEEDED
Status: DISCONTINUED | OUTPATIENT
Start: 2020-02-12 | End: 2020-02-12 | Stop reason: HOSPADM

## 2020-02-12 RX ORDER — MAGNESIUM HYDROXIDE 1200 MG/15ML
LIQUID ORAL
Qty: 1 ENEMA | Refills: 0 | Status: SHIPPED | OUTPATIENT
Start: 2020-02-12 | End: 2020-02-17 | Stop reason: SDUPTHER

## 2020-02-12 RX ADMIN — SODIUM CHLORIDE, POTASSIUM CHLORIDE, SODIUM LACTATE AND CALCIUM CHLORIDE 1000 ML: 600; 310; 30; 20 INJECTION, SOLUTION INTRAVENOUS at 06:45

## 2020-02-12 NOTE — TELEPHONE ENCOUNTER
Was scheduled for a uronav biopsy 02/12/2020.  He was sent to the office to reschedule per patient.   unavailable told patient we would call him with a new appointment.

## 2020-02-12 NOTE — ANESTHESIA PREPROCEDURE EVALUATION
Anesthesia Evaluation     Patient summary reviewed   no history of anesthetic complications:  NPO Solid Status: > 8 hours  NPO Liquid Status: > 4 hours           Airway   Dental      Pulmonary    (+) a smoker Current Smoked day of surgery,   (-) COPD, asthma, sleep apnea  Cardiovascular   Exercise tolerance: good (4-7 METS)    (-) hypertension, past MI, CAD, cardiac stents, hyperlipidemia      Neuro/Psych- negative ROS  (-) seizures, TIA, CVA  GI/Hepatic/Renal/Endo    (-) liver disease, no renal disease, diabetes    Musculoskeletal     Abdominal    Substance History      OB/GYN          Other      history of cancer (prostate)                    Anesthesia Plan    ASA 2     general   (Case cancelled as patient had taken viagra 10 hours prior to presenting to OR)  intravenous induction     Anesthetic plan, all risks, benefits, and alternatives have been provided, discussed and informed consent has been obtained with: patient.

## 2020-02-12 NOTE — INTERVAL H&P NOTE
H&P updated. The patient was examined and the following changes are noted:  Patient took Viagra last night.  Anesthesia  [place] it was safest to cancel his case today.  He will be rescheduled.

## 2020-02-17 RX ORDER — ALBUTEROL SULFATE 90 UG/1
2 AEROSOL, METERED RESPIRATORY (INHALATION) EVERY 4 HOURS PRN
COMMUNITY
End: 2023-04-04 | Stop reason: SDUPTHER

## 2020-02-18 ENCOUNTER — ANESTHESIA EVENT (OUTPATIENT)
Dept: PERIOP | Facility: HOSPITAL | Age: 55
End: 2020-02-18

## 2020-02-18 ENCOUNTER — HOSPITAL ENCOUNTER (OUTPATIENT)
Facility: HOSPITAL | Age: 55
Setting detail: HOSPITAL OUTPATIENT SURGERY
Discharge: HOME | End: 2020-02-18
Attending: UROLOGY | Admitting: UROLOGY

## 2020-02-18 ENCOUNTER — ANESTHESIA (OUTPATIENT)
Dept: PERIOP | Facility: HOSPITAL | Age: 55
End: 2020-02-18

## 2020-02-18 VITALS
HEIGHT: 74 IN | HEART RATE: 81 BPM | TEMPERATURE: 97.9 F | SYSTOLIC BLOOD PRESSURE: 116 MMHG | OXYGEN SATURATION: 95 % | RESPIRATION RATE: 18 BRPM | DIASTOLIC BLOOD PRESSURE: 74 MMHG | BODY MASS INDEX: 27.59 KG/M2 | WEIGHT: 214.95 LBS

## 2020-02-18 DIAGNOSIS — C61 PROSTATE CANCER (HCC): ICD-10-CM

## 2020-02-18 PROCEDURE — 76942 ECHO GUIDE FOR BIOPSY: CPT | Performed by: UROLOGY

## 2020-02-18 PROCEDURE — 25010000002 MIDAZOLAM PER 1 MG: Performed by: ANESTHESIOLOGY

## 2020-02-18 PROCEDURE — G0416 PROSTATE BIOPSY, ANY MTHD: HCPCS | Performed by: UROLOGY

## 2020-02-18 PROCEDURE — 55700 PR PROSTATE NEEDLE BIOPSY ANY APPROACH: CPT | Performed by: UROLOGY

## 2020-02-18 PROCEDURE — 25010000002 DEXAMETHASONE PER 1 MG: Performed by: ANESTHESIOLOGY

## 2020-02-18 PROCEDURE — 25010000002 GENTAMICIN PER 80 MG: Performed by: UROLOGY

## 2020-02-18 PROCEDURE — 25010000002 FENTANYL CITRATE (PF) 100 MCG/2ML SOLUTION: Performed by: NURSE ANESTHETIST, CERTIFIED REGISTERED

## 2020-02-18 PROCEDURE — 25010000002 PROPOFOL 10 MG/ML EMULSION: Performed by: NURSE ANESTHETIST, CERTIFIED REGISTERED

## 2020-02-18 RX ORDER — MIDAZOLAM HYDROCHLORIDE 1 MG/ML
2 INJECTION INTRAMUSCULAR; INTRAVENOUS
Status: DISCONTINUED | OUTPATIENT
Start: 2020-02-18 | End: 2020-02-18 | Stop reason: HOSPADM

## 2020-02-18 RX ORDER — NALOXONE HCL 0.4 MG/ML
0.4 VIAL (ML) INJECTION AS NEEDED
Status: DISCONTINUED | OUTPATIENT
Start: 2020-02-18 | End: 2020-02-18 | Stop reason: HOSPADM

## 2020-02-18 RX ORDER — LABETALOL HYDROCHLORIDE 5 MG/ML
5 INJECTION, SOLUTION INTRAVENOUS
Status: DISCONTINUED | OUTPATIENT
Start: 2020-02-18 | End: 2020-02-18 | Stop reason: HOSPADM

## 2020-02-18 RX ORDER — DEXTROSE MONOHYDRATE 25 G/50ML
12.5 INJECTION, SOLUTION INTRAVENOUS AS NEEDED
Status: DISCONTINUED | OUTPATIENT
Start: 2020-02-18 | End: 2020-02-18 | Stop reason: HOSPADM

## 2020-02-18 RX ORDER — MIDAZOLAM HYDROCHLORIDE 1 MG/ML
1 INJECTION INTRAMUSCULAR; INTRAVENOUS
Status: DISCONTINUED | OUTPATIENT
Start: 2020-02-18 | End: 2020-02-18 | Stop reason: HOSPADM

## 2020-02-18 RX ORDER — SODIUM CHLORIDE 0.9 % (FLUSH) 0.9 %
10 SYRINGE (ML) INJECTION AS NEEDED
Status: DISCONTINUED | OUTPATIENT
Start: 2020-02-18 | End: 2020-02-18 | Stop reason: HOSPADM

## 2020-02-18 RX ORDER — IPRATROPIUM BROMIDE AND ALBUTEROL SULFATE 2.5; .5 MG/3ML; MG/3ML
3 SOLUTION RESPIRATORY (INHALATION) ONCE AS NEEDED
Status: DISCONTINUED | OUTPATIENT
Start: 2020-02-18 | End: 2020-02-18 | Stop reason: HOSPADM

## 2020-02-18 RX ORDER — IBUPROFEN 600 MG/1
600 TABLET ORAL ONCE AS NEEDED
Status: DISCONTINUED | OUTPATIENT
Start: 2020-02-18 | End: 2020-02-18 | Stop reason: HOSPADM

## 2020-02-18 RX ORDER — DEXAMETHASONE SODIUM PHOSPHATE 4 MG/ML
4 INJECTION, SOLUTION INTRA-ARTICULAR; INTRALESIONAL; INTRAMUSCULAR; INTRAVENOUS; SOFT TISSUE ONCE AS NEEDED
Status: COMPLETED | OUTPATIENT
Start: 2020-02-18 | End: 2020-02-18

## 2020-02-18 RX ORDER — SODIUM CHLORIDE 0.9 % (FLUSH) 0.9 %
10 SYRINGE (ML) INJECTION EVERY 12 HOURS SCHEDULED
Status: DISCONTINUED | OUTPATIENT
Start: 2020-02-18 | End: 2020-02-18 | Stop reason: HOSPADM

## 2020-02-18 RX ORDER — SODIUM CHLORIDE, SODIUM LACTATE, POTASSIUM CHLORIDE, CALCIUM CHLORIDE 600; 310; 30; 20 MG/100ML; MG/100ML; MG/100ML; MG/100ML
9 INJECTION, SOLUTION INTRAVENOUS CONTINUOUS
Status: DISCONTINUED | OUTPATIENT
Start: 2020-02-18 | End: 2020-02-18 | Stop reason: HOSPADM

## 2020-02-18 RX ORDER — FENTANYL CITRATE 50 UG/ML
25 INJECTION, SOLUTION INTRAMUSCULAR; INTRAVENOUS AS NEEDED
Status: DISCONTINUED | OUTPATIENT
Start: 2020-02-18 | End: 2020-02-18 | Stop reason: HOSPADM

## 2020-02-18 RX ORDER — ONDANSETRON 2 MG/ML
4 INJECTION INTRAMUSCULAR; INTRAVENOUS ONCE AS NEEDED
Status: DISCONTINUED | OUTPATIENT
Start: 2020-02-18 | End: 2020-02-18 | Stop reason: HOSPADM

## 2020-02-18 RX ORDER — OXYCODONE AND ACETAMINOPHEN 10; 325 MG/1; MG/1
1 TABLET ORAL ONCE AS NEEDED
Status: COMPLETED | OUTPATIENT
Start: 2020-02-18 | End: 2020-02-18

## 2020-02-18 RX ORDER — SODIUM CHLORIDE, SODIUM LACTATE, POTASSIUM CHLORIDE, CALCIUM CHLORIDE 600; 310; 30; 20 MG/100ML; MG/100ML; MG/100ML; MG/100ML
1000 INJECTION, SOLUTION INTRAVENOUS CONTINUOUS
Status: DISCONTINUED | OUTPATIENT
Start: 2020-02-18 | End: 2020-02-18 | Stop reason: HOSPADM

## 2020-02-18 RX ORDER — LIDOCAINE HYDROCHLORIDE 10 MG/ML
0.5 INJECTION, SOLUTION EPIDURAL; INFILTRATION; INTRACAUDAL; PERINEURAL ONCE AS NEEDED
Status: DISCONTINUED | OUTPATIENT
Start: 2020-02-18 | End: 2020-02-18 | Stop reason: HOSPADM

## 2020-02-18 RX ORDER — FENTANYL CITRATE 50 UG/ML
INJECTION, SOLUTION INTRAMUSCULAR; INTRAVENOUS AS NEEDED
Status: DISCONTINUED | OUTPATIENT
Start: 2020-02-18 | End: 2020-02-18 | Stop reason: SURG

## 2020-02-18 RX ORDER — OXYCODONE AND ACETAMINOPHEN 7.5; 325 MG/1; MG/1
2 TABLET ORAL EVERY 4 HOURS PRN
Status: DISCONTINUED | OUTPATIENT
Start: 2020-02-18 | End: 2020-02-18 | Stop reason: HOSPADM

## 2020-02-18 RX ORDER — ULTRASOUND COUPLING MEDIUM
GEL (GRAM) TOPICAL AS NEEDED
Status: DISCONTINUED | OUTPATIENT
Start: 2020-02-18 | End: 2020-02-18 | Stop reason: HOSPADM

## 2020-02-18 RX ORDER — SODIUM CHLORIDE 0.9 % (FLUSH) 0.9 %
3 SYRINGE (ML) INJECTION AS NEEDED
Status: DISCONTINUED | OUTPATIENT
Start: 2020-02-18 | End: 2020-02-18 | Stop reason: HOSPADM

## 2020-02-18 RX ORDER — FENTANYL CITRATE 50 UG/ML
25 INJECTION, SOLUTION INTRAMUSCULAR; INTRAVENOUS
Status: DISCONTINUED | OUTPATIENT
Start: 2020-02-18 | End: 2020-02-18 | Stop reason: HOSPADM

## 2020-02-18 RX ORDER — VARENICLINE TARTRATE 1 MG/1
1 TABLET, FILM COATED ORAL 2 TIMES DAILY
Status: ON HOLD | COMMUNITY
End: 2020-06-07

## 2020-02-18 RX ADMIN — OXYCODONE HYDROCHLORIDE AND ACETAMINOPHEN 1 TABLET: 10; 325 TABLET ORAL at 13:01

## 2020-02-18 RX ADMIN — MIDAZOLAM 2 MG: 1 INJECTION INTRAMUSCULAR; INTRAVENOUS at 11:37

## 2020-02-18 RX ADMIN — GENTAMICIN SULFATE 410 MG: 40 INJECTION, SOLUTION INTRAMUSCULAR; INTRAVENOUS at 11:27

## 2020-02-18 RX ADMIN — PROPOFOL 150 MCG/KG/MIN: 10 INJECTION, EMULSION INTRAVENOUS at 11:59

## 2020-02-18 RX ADMIN — LIDOCAINE HYDROCHLORIDE 100 MG: 20 INJECTION, SOLUTION INTRAVENOUS at 11:59

## 2020-02-18 RX ADMIN — FENTANYL CITRATE 100 MCG: 50 INJECTION, SOLUTION INTRAMUSCULAR; INTRAVENOUS at 11:59

## 2020-02-18 RX ADMIN — DEXAMETHASONE SODIUM PHOSPHATE 4 MG: 4 INJECTION, SOLUTION INTRAMUSCULAR; INTRAVENOUS at 11:29

## 2020-02-18 RX ADMIN — SODIUM CHLORIDE, POTASSIUM CHLORIDE, SODIUM LACTATE AND CALCIUM CHLORIDE 1000 ML: 600; 310; 30; 20 INJECTION, SOLUTION INTRAVENOUS at 09:38

## 2020-02-18 NOTE — ANESTHESIA PREPROCEDURE EVALUATION
Anesthesia Evaluation     Patient summary reviewed   no history of anesthetic complications:  NPO Solid Status: > 8 hours             Airway   Mallampati: II  TM distance: >3 FB  Neck ROM: full  Dental          Pulmonary    (+) a smoker,   (-) COPD, asthma, sleep apnea  Cardiovascular   Exercise tolerance: excellent (>7 METS)    ECG reviewed    (-) pacemaker, past MI, angina, cardiac stents      Neuro/Psych  (-) seizures, TIA, CVA  GI/Hepatic/Renal/Endo    (-) GERD, liver disease, no renal disease, diabetes    Musculoskeletal     Abdominal    Substance History      OB/GYN          Other                        Anesthesia Plan    ASA 2     general     intravenous induction     Anesthetic plan, all risks, benefits, and alternatives have been provided, discussed and informed consent has been obtained with: patient.

## 2020-02-18 NOTE — DISCHARGE INSTRUCTIONS
YOUR NEXT PAIN MEDICATION IS DUE AT______________        Moderate Conscious Sedation, Adult, Care After  Refer to this sheet in the next few weeks. These instructions provide you with information on caring for yourself after your procedure. Your health care provider may also give you more specific instructions. Your treatment has been planned according to current medical practices, but problems sometimes occur. Call your health care provider if you have any problems or questions after your procedure.  WHAT TO EXPECT AFTER THE PROCEDURE    After your procedure:  · You may feel sleepy, clumsy, and have poor balance for several hours.  · Vomiting may occur if you eat too soon after the procedure.  HOME CARE INSTRUCTIONS  · Do not participate in any activities where you could become injured for at least 24 hours. Do not:  ¨ Drive.  ¨ Swim.  ¨ Ride a bicycle.  ¨ Operate heavy machinery.  ¨ Cook.  ¨ Use power tools.  ¨ Climb ladders.  ¨ Work from a high place.  · Do not make important decisions or sign legal documents until you are improved.  · If you vomit, drink water, juice, or soup when you can drink without vomiting. Make sure you have little or no nausea before eating solid foods.  · Only take over-the-counter or prescription medicines for pain, discomfort, or fever as directed by your health care provider.  · Make sure you and your family fully understand everything about the medicines given to you, including what side effects may occur.  · You should not drink alcohol, take sleeping pills, or take medicines that cause drowsiness for at least 24 hours.  · If you smoke, do not smoke without supervision.  · If you are feeling better, you may resume normal activities 24 hours after you were sedated.  · Keep all appointments with your health care provider.  SEEK MEDICAL CARE IF:  · Your skin is pale or bluish in color.  · You continue to feel nauseous or vomit.  · Your pain is getting worse and is not helped by  medicine.  · You have bleeding or swelling.  · You are still sleepy or feeling clumsy after 24 hours.  SEEK IMMEDIATE MEDICAL CARE IF:  · You develop a rash.  · You have difficulty breathing.  · You develop any type of allergic problem.  · You have a fever.  MAKE SURE YOU:  · Understand these instructions.  · Will watch your condition.  · Will get help right away if you are not doing well or get worse.     This information is not intended to replace advice given to you by your health care provider. Make sure you discuss any questions you have with your health care provider.     Document Released: 10/08/2014 Document Revised: 01/08/2016 Document Reviewed: 10/08/2014  [employer] Interactive Patient Education ©2016 [employer]         CALL YOUR PHYSICIAN IF YOU EXPERIENCE  INCREASED PAIN NOT HELPED BY YOUR PAIN MEDICATION.        Fall Prevention in the Home      Falls can cause injuries. They can happen to people of all ages. There are many things you can do to make your home safe and to help prevent falls.    WHAT CAN I DO ON THE OUTSIDE OF MY HOME?  · Regularly fix the edges of walkways and driveways and fix any cracks.  · Remove anything that might make you trip as you walk through a door, such as a raised step or threshold.  · Trim any bushes or trees on the path to your home.  · Use bright outdoor lighting.  · Clear any walking paths of anything that might make someone trip, such as rocks or tools.  · Regularly check to see if handrails are loose or broken. Make sure that both sides of any steps have handrails.  · Any raised decks and porches should have guardrails on the edges.  · Have any leaves, snow, or ice cleared regularly.  · Use sand or salt on walking paths during winter.  · Clean up any spills in your garage right away. This includes oil or grease spills.  WHAT CAN I DO IN THE BATHROOM?    · Use night lights.  · Install grab bars by the toilet and in the tub and shower. Do not use towel bars as grab  bars.  · Use non-skid mats or decals in the tub or shower.  · If you need to sit down in the shower, use a plastic, non-slip stool.  · Keep the floor dry. Clean up any water that spills on the floor as soon as it happens.  · Remove soap buildup in the tub or shower regularly.  · Attach bath mats securely with double-sided non-slip rug tape.  · Do not have throw rugs and other things on the floor that can make you trip.  WHAT CAN I DO IN THE BEDROOM?  · Use night lights.  · Make sure that you have a light by your bed that is easy to reach.  · Do not use any sheets or blankets that are too big for your bed. They should not hang down onto the floor.  · Have a firm chair that has side arms. You can use this for support while you get dressed.  · Do not have throw rugs and other things on the floor that can make you trip.  WHAT CAN I DO IN THE KITCHEN?  · Clean up any spills right away.  · Avoid walking on wet floors.  · Keep items that you use a lot in easy-to-reach places.  · If you need to reach something above you, use a strong step stool that has a grab bar.  · Keep electrical cords out of the way.  · Do not use floor polish or wax that makes floors slippery. If you must use wax, use non-skid floor wax.  · Do not have throw rugs and other things on the floor that can make you trip.  WHAT CAN I DO WITH MY STAIRS?  · Do not leave any items on the stairs.  · Make sure that there are handrails on both sides of the stairs and use them. Fix handrails that are broken or loose. Make sure that handrails are as long as the stairways.  · Check any carpeting to make sure that it is firmly attached to the stairs. Fix any carpet that is loose or worn.  · Avoid having throw rugs at the top or bottom of the stairs. If you do have throw rugs, attach them to the floor with carpet tape.  · Make sure that you have a light switch at the top of the stairs and the bottom of the stairs. If you do not have them, ask someone to add them for  you.  WHAT ELSE CAN I DO TO HELP PREVENT FALLS?  · Wear shoes that:  ¨ Do not have high heels.  ¨ Have rubber bottoms.  ¨ Are comfortable and fit you well.  ¨ Are closed at the toe. Do not wear sandals.  · If you use a stepladder:  ¨ Make sure that it is fully opened. Do not climb a closed stepladder.  ¨ Make sure that both sides of the stepladder are locked into place.  ¨ Ask someone to hold it for you, if possible.  · Clearly goldie and make sure that you can see:  ¨ Any grab bars or handrails.  ¨ First and last steps.  ¨ Where the edge of each step is.  · Use tools that help you move around (mobility aids) if they are needed. These include:  ¨ Canes.  ¨ Walkers.  ¨ Scooters.  ¨ Crutches.  · Turn on the lights when you go into a dark area. Replace any light bulbs as soon as they burn out.  · Set up your furniture so you have a clear path. Avoid moving your furniture around.  · If any of your floors are uneven, fix them.  · If there are any pets around you, be aware of where they are.  · Review your medicines with your doctor. Some medicines can make you feel dizzy. This can increase your chance of falling.  Ask your doctor what other things that you can do to help prevent falls.     This information is not intended to replace advice given to you by your health care provider. Make sure you discuss any questions you have with your health care provider.     Document Released: 10/14/2010 Document Revised: 05/03/2016 Document Reviewed: 01/22/2016  [employer]vier Interactive Patient Education ©2016 [employer]     PATIENT/FAMILY/RESPONSIBLE PARTY VERBALIZES UNDERSTANDING OF ABOVE EDUCATION.  COPY OF PAIN SCALE GIVEN AND REVIEWED WITH VERBALIZED UNDERSTANDING.

## 2020-02-18 NOTE — OP NOTE
"Operative Summary    Lalo Jones  Date of Procedure: 2/18/2020    Pre-op Diagnosis:   Prostate cancer (CMS/HCC) [C61]    Post-op Diagnosis:     Post-Op Diagnosis Codes:     * Prostate cancer (CMS/HCC) [C61]    Procedure/CPT® Codes:      Procedure(s):  PROSTATE  BIOPSY    Surgeon(s):  Trae Clark MD    Anesthesia: Monitored Anesthesia Care with [facility]    Staff:   Circulator: Carissa Enciso RN  Scrub Person: Siddharth More; Bar Cabrrea    Indications for procedure:  54-year-old male on active surveillance of prostate cancer presenting for his annual surveillance biopsy    Findings:   Height (mm): 25.1  Width (mm): 30.5  Length (mm): 43  Volume (cc):  22.2  PSA density:  0.38        Procedure details:  Patient is taken the operating room were he is given IV sedation.  He is then placed in left lateral decubitus position.  The prostate was visualized both the sagittal and axial plane and measurements were taken.  Prostate measured 22.2 cm³.  Ultrasound-guided \"random \"biopsies were performed again using ultrasound guidance with the saggital setting of the multiplanar probe.  I used a standard 12 core template and labeled the biopsy with respect to location.  Specimens were then placed in formalin.    The systematic of both the random biopsies and those of the region of interest(s) were reviewed and felt to be appropriate sampling of each.  The probe was removed.    Estimated Blood Loss: minimal    Specimens:                Specimens     ID Source Type Tests Collected By Collected At Frozen?      A Prostate Tissue · TISSUE PATHOLOGY EXAM   Trae Clark MD 2/18/20 1146      Description: RIGHT APEX     B Prostate Tissue · TISSUE PATHOLOGY EXAM   Trae Clark MD 2/18/20 1146      Description: RIGHT BASE    C Prostate Tissue · TISSUE PATHOLOGY EXAM   Trae Clark MD 2/18/20 1147      Description: RIGHT MID    D Prostate Tissue · TISSUE PATHOLOGY EXAM   Trae Clark MD " 2/18/20 1147      Description: LEFT APEX    E Prostate Tissue · TISSUE PATHOLOGY EXAM   Trae Clark MD 2/18/20 1147      Description: LEFT BASE    F Prostate Tissue · TISSUE PATHOLOGY EXAM   Trae Clark MD 2/18/20 1147      Description: LEFT MID            Drains: * No LDAs found *    Complications: none    Plan: Follow-up with me in clinic next week for results.    Trae Clark MD     Date: 2/18/2020  Time: 12:20 PM

## 2020-02-18 NOTE — ANESTHESIA POSTPROCEDURE EVALUATION
"Patient: Lalo Jones    Procedure Summary     Date:  02/18/20 Room / Location:   [facility] 02 /  [facility]    Anesthesia Start:  1157 Anesthesia Stop:  1213    Procedure:  PROSTATE  BIOPSY (N/A ) Diagnosis:       Prostate cancer (CMS/HCC)      (Prostate cancer (CMS/HCC) [C61])    Surgeon:  Trae Clark MD Provider:  Damian Vargas CRNA    Anesthesia Type:  general ASA Status:  2          Anesthesia Type: general    Vitals  Vitals Value Taken Time   BP 96/61 2/18/2020 12:36 PM   Temp 97.9 °F (36.6 °C) 2/18/2020 12:35 PM   Pulse 69 2/18/2020 12:38 PM   Resp 14 2/18/2020 12:35 PM   SpO2 95 % 2/18/2020 12:38 PM   Vitals shown include unvalidated device data.        Post Anesthesia Care and Evaluation    Patient location during evaluation: PACU  Patient participation: complete - patient participated  Level of consciousness: awake and alert  Pain management: adequate  Airway patency: patent  Anesthetic complications: No anesthetic complications  PONV Status: controlled  Cardiovascular status: acceptable and hemodynamically stable  Respiratory status: acceptable  Hydration status: acceptable    Comments: Patient discharged from PACU prior to anesthesia evaluation based on Artemio Score.  For details, see RN note.     /74   Pulse 81   Temp 97.9 °F (36.6 °C) (Temporal)   Resp 18   Ht 188 cm (74.02\")   Wt 97.5 kg (214 lb 15.2 oz)   SpO2 95%   BMI 27.59 kg/m²       "

## 2020-02-19 LAB
LAB AP CASE REPORT: NORMAL
PATH REPORT.FINAL DX SPEC: NORMAL
PATH REPORT.GROSS SPEC: NORMAL

## 2020-02-20 ENCOUNTER — DOCUMENTATION (OUTPATIENT)
Dept: UROLOGY | Facility: CLINIC | Age: 55
End: 2020-02-20

## 2020-02-20 ENCOUNTER — APPOINTMENT (OUTPATIENT)
Dept: GENERAL RADIOLOGY | Facility: HOSPITAL | Age: 55
End: 2020-02-20

## 2020-02-20 ENCOUNTER — HOSPITAL ENCOUNTER (EMERGENCY)
Facility: HOSPITAL | Age: 55
Discharge: HOME | End: 2020-02-20
Admitting: EMERGENCY MEDICINE

## 2020-02-20 ENCOUNTER — TELEPHONE (OUTPATIENT)
Dept: UROLOGY | Facility: CLINIC | Age: 55
End: 2020-02-20

## 2020-02-20 ENCOUNTER — APPOINTMENT (OUTPATIENT)
Dept: CT IMAGING | Facility: HOSPITAL | Age: 55
End: 2020-02-20

## 2020-02-20 VITALS
HEART RATE: 78 BPM | DIASTOLIC BLOOD PRESSURE: 77 MMHG | OXYGEN SATURATION: 93 % | BODY MASS INDEX: 27.98 KG/M2 | RESPIRATION RATE: 14 BRPM | SYSTOLIC BLOOD PRESSURE: 112 MMHG | WEIGHT: 218 LBS | TEMPERATURE: 98 F | HEIGHT: 74 IN

## 2020-02-20 DIAGNOSIS — N41.0 ACUTE PROSTATITIS: Primary | ICD-10-CM

## 2020-02-20 DIAGNOSIS — R50.9 FEVER AND CHILLS: Primary | ICD-10-CM

## 2020-02-20 LAB
ALBUMIN SERPL-MCNC: 3.9 G/DL (ref 3.5–5.2)
ALBUMIN/GLOB SERPL: 1.4 G/DL
ALP SERPL-CCNC: 101 U/L (ref 39–117)
ALT SERPL W P-5'-P-CCNC: 19 U/L (ref 1–41)
ANION GAP SERPL CALCULATED.3IONS-SCNC: 11 MMOL/L (ref 5–15)
AST SERPL-CCNC: 18 U/L (ref 1–40)
BACTERIA UR QL AUTO: ABNORMAL /HPF
BASOPHILS # BLD AUTO: 0.05 10*3/MM3 (ref 0–0.2)
BASOPHILS NFR BLD AUTO: 0.5 % (ref 0–1.5)
BILIRUB SERPL-MCNC: 0.3 MG/DL (ref 0.2–1.2)
BILIRUB UR QL STRIP: NEGATIVE
BUN BLD-MCNC: 15 MG/DL (ref 6–20)
BUN/CREAT SERPL: 15.2 (ref 7–25)
CALCIUM SPEC-SCNC: 9.2 MG/DL (ref 8.6–10.5)
CHLORIDE SERPL-SCNC: 105 MMOL/L (ref 98–107)
CLARITY UR: CLEAR
CO2 SERPL-SCNC: 25 MMOL/L (ref 22–29)
COLOR UR: YELLOW
CREAT BLD-MCNC: 0.99 MG/DL (ref 0.76–1.27)
D-LACTATE SERPL-SCNC: 0.9 MMOL/L (ref 0.5–2)
DEPRECATED RDW RBC AUTO: 44.9 FL (ref 37–54)
EOSINOPHIL # BLD AUTO: 0.14 10*3/MM3 (ref 0–0.4)
EOSINOPHIL NFR BLD AUTO: 1.4 % (ref 0.3–6.2)
ERYTHROCYTE [DISTWIDTH] IN BLOOD BY AUTOMATED COUNT: 14.2 % (ref 12.3–15.4)
GFR SERPL CREATININE-BSD FRML MDRD: 79 ML/MIN/1.73
GLOBULIN UR ELPH-MCNC: 2.7 GM/DL
GLUCOSE BLD-MCNC: 108 MG/DL (ref 65–99)
GLUCOSE UR STRIP-MCNC: NEGATIVE MG/DL
HCT VFR BLD AUTO: 43.9 % (ref 37.5–51)
HGB BLD-MCNC: 14.9 G/DL (ref 13–17.7)
HGB UR QL STRIP.AUTO: ABNORMAL
HYALINE CASTS UR QL AUTO: ABNORMAL /LPF
IMM GRANULOCYTES # BLD AUTO: 0.05 10*3/MM3 (ref 0–0.05)
IMM GRANULOCYTES NFR BLD AUTO: 0.5 % (ref 0–0.5)
KETONES UR QL STRIP: NEGATIVE
LEUKOCYTE ESTERASE UR QL STRIP.AUTO: ABNORMAL
LYMPHOCYTES # BLD AUTO: 1.52 10*3/MM3 (ref 0.7–3.1)
LYMPHOCYTES NFR BLD AUTO: 14.7 % (ref 19.6–45.3)
MCH RBC QN AUTO: 29.2 PG (ref 26.6–33)
MCHC RBC AUTO-ENTMCNC: 33.9 G/DL (ref 31.5–35.7)
MCV RBC AUTO: 86.1 FL (ref 79–97)
MONOCYTES # BLD AUTO: 0.98 10*3/MM3 (ref 0.1–0.9)
MONOCYTES NFR BLD AUTO: 9.5 % (ref 5–12)
NEUTROPHILS # BLD AUTO: 7.63 10*3/MM3 (ref 1.7–7)
NEUTROPHILS NFR BLD AUTO: 73.4 % (ref 42.7–76)
NITRITE UR QL STRIP: NEGATIVE
NRBC BLD AUTO-RTO: 0 /100 WBC (ref 0–0.2)
PH UR STRIP.AUTO: 5.5 [PH] (ref 5–8)
PLATELET # BLD AUTO: 182 10*3/MM3 (ref 140–450)
PMV BLD AUTO: 9.6 FL (ref 6–12)
POTASSIUM BLD-SCNC: 4 MMOL/L (ref 3.5–5.2)
PROCALCITONIN SERPL-MCNC: 0.26 NG/ML (ref 0.1–0.25)
PROT SERPL-MCNC: 6.6 G/DL (ref 6–8.5)
PROT UR QL STRIP: NEGATIVE
RBC # BLD AUTO: 5.1 10*6/MM3 (ref 4.14–5.8)
RBC # UR: ABNORMAL /HPF
REF LAB TEST METHOD: ABNORMAL
SODIUM BLD-SCNC: 141 MMOL/L (ref 136–145)
SP GR UR STRIP: 1.01 (ref 1–1.03)
SQUAMOUS #/AREA URNS HPF: ABNORMAL /HPF
UROBILINOGEN UR QL STRIP: ABNORMAL
WBC NRBC COR # BLD: 10.37 10*3/MM3 (ref 3.4–10.8)
WBC UR QL AUTO: ABNORMAL /HPF

## 2020-02-20 PROCEDURE — 96365 THER/PROPH/DIAG IV INF INIT: CPT

## 2020-02-20 PROCEDURE — 84145 PROCALCITONIN (PCT): CPT | Performed by: PHYSICIAN ASSISTANT

## 2020-02-20 PROCEDURE — 81001 URINALYSIS AUTO W/SCOPE: CPT | Performed by: PHYSICIAN ASSISTANT

## 2020-02-20 PROCEDURE — 87086 URINE CULTURE/COLONY COUNT: CPT | Performed by: PHYSICIAN ASSISTANT

## 2020-02-20 PROCEDURE — 71046 X-RAY EXAM CHEST 2 VIEWS: CPT

## 2020-02-20 PROCEDURE — 99283 EMERGENCY DEPT VISIT LOW MDM: CPT | Performed by: UROLOGY

## 2020-02-20 PROCEDURE — 80053 COMPREHEN METABOLIC PANEL: CPT | Performed by: PHYSICIAN ASSISTANT

## 2020-02-20 PROCEDURE — 96367 TX/PROPH/DG ADDL SEQ IV INF: CPT

## 2020-02-20 PROCEDURE — 99284 EMERGENCY DEPT VISIT MOD MDM: CPT

## 2020-02-20 PROCEDURE — 74176 CT ABD & PELVIS W/O CONTRAST: CPT

## 2020-02-20 PROCEDURE — 83605 ASSAY OF LACTIC ACID: CPT | Performed by: PHYSICIAN ASSISTANT

## 2020-02-20 PROCEDURE — 36415 COLL VENOUS BLD VENIPUNCTURE: CPT

## 2020-02-20 PROCEDURE — 87040 BLOOD CULTURE FOR BACTERIA: CPT | Performed by: PHYSICIAN ASSISTANT

## 2020-02-20 PROCEDURE — 25010000002 GENTAMICIN PER 80 MG: Performed by: PHYSICIAN ASSISTANT

## 2020-02-20 PROCEDURE — 25010000002 CEFTRIAXONE PER 250 MG: Performed by: PHYSICIAN ASSISTANT

## 2020-02-20 PROCEDURE — 85025 COMPLETE CBC W/AUTO DIFF WBC: CPT | Performed by: PHYSICIAN ASSISTANT

## 2020-02-20 RX ORDER — SODIUM CHLORIDE 9 MG/ML
125 INJECTION, SOLUTION INTRAVENOUS CONTINUOUS
Status: DISCONTINUED | OUTPATIENT
Start: 2020-02-20 | End: 2020-02-20 | Stop reason: HOSPADM

## 2020-02-20 RX ORDER — SULFAMETHOXAZOLE AND TRIMETHOPRIM 800; 160 MG/1; MG/1
1 TABLET ORAL 2 TIMES DAILY
Qty: 60 TABLET | Refills: 0 | Status: SHIPPED | OUTPATIENT
Start: 2020-02-20 | End: 2020-03-21

## 2020-02-20 RX ADMIN — SODIUM CHLORIDE 1000 ML: 9 INJECTION, SOLUTION INTRAVENOUS at 10:19

## 2020-02-20 RX ADMIN — CEFTRIAXONE SODIUM 2 G: 2 INJECTION, POWDER, FOR SOLUTION INTRAMUSCULAR; INTRAVENOUS at 11:24

## 2020-02-20 RX ADMIN — GENTAMICIN SULFATE 440 MG: 40 INJECTION, SOLUTION INTRAMUSCULAR; INTRAVENOUS at 12:46

## 2020-02-20 RX ADMIN — SODIUM CHLORIDE 125 ML/HR: 9 INJECTION, SOLUTION INTRAVENOUS at 11:24

## 2020-02-20 NOTE — TELEPHONE ENCOUNTER
Er called and said this pt called urology yesterday and spoke to someone about post op problems and the person he spoke with told him to go to the er and the er nurse called and said the pt was told to have them call dr laureano to let him know that he is in the er at this time.

## 2020-02-20 NOTE — ED PROVIDER NOTES
Subjective   History of Present Illness    Patient is a pleasant 54-year-old male who presents to ED with wife.  Chief complaint is fever and not feeling well.  The patient describes a history of known prostate cancer diagnosed 1 year ago.  He is monitored closely by his urologist.  Recently, he completed an MRI of the pelvis and later completed a biopsy of his prostate 2 days ago.  Suddenly last evening at about 3 AM, he spiked a temperature measured at 102.5. He had taken an ibuprofen at 6:00 this morning.  He has not felt well.  He is complained of chills.  He contacted his urologist and was advised come to the ER to be further evaluated.    Patient denies any recent cough or congestion.  He did have left-sided pneumonia several months ago.  He denies any leg pain or swelling.  He denies any urinary complaints whatsoever.  He has had minimal bowel movement since his biopsy 2 days ago.  His last normal bowel movements prior to surgery.  He denies any increased pain or swelling in his abdomen or pelvic region.  He denies any rash.  He denies any neck pain or stiffness.  He does complain of a headache.  He denies any respiratory complaints.      Review of Systems   Constitutional: Positive for activity change, chills and fever.   HENT: Negative.    Eyes: Negative.    Respiratory: Negative.    Cardiovascular: Negative.    Gastrointestinal: Positive for constipation. Negative for nausea and vomiting.   Genitourinary: Negative.  Negative for decreased urine volume, difficulty urinating, discharge, dysuria, hematuria, penile pain, penile swelling and scrotal swelling.   Musculoskeletal: Negative.  Negative for back pain, neck pain and neck stiffness.   Skin: Negative.  Negative for rash.   Neurological: Negative.    Psychiatric/Behavioral: Negative.        Past Medical History:   Diagnosis Date   • Arthritis    • Bronchitis    • Cancer (CMS/HCC)     PROSTATE   • S/P ACL reconstruction        Allergies   Allergen  Reactions   • Contrast Dye Diarrhea and Nausea And Vomiting       Past Surgical History:   Procedure Laterality Date   • ANKLE SURGERY Right    • BACK SURGERY      X2   • KNEE ACL RECONSTRUCTION Left    • NASAL SEPTUM SURGERY     • PROSTATE ULTRASOUND BIOPSY N/A 2/18/2020    Procedure: PROSTATE  BIOPSY;  Surgeon: Trae Clark MD;  Location: [facility] OR;  Service: Urology;  Laterality: N/A;   • SHOULDER SURGERY Left     X 4   • TENNIS ELBOW RELEASE Right 04/14/2011       History reviewed. No pertinent family history.    Social History     Socioeconomic History   • Marital status:      Spouse name: Not on file   • Number of children: Not on file   • Years of education: Not on file   • Highest education level: Not on file   Tobacco Use   • Smoking status: Current Every Day Smoker     Packs/day: 1.00     Years: 15.00     Pack years: 15.00     Types: Cigarettes     Start date: 2/7/1987   • Smokeless tobacco: Never Used   • Tobacco comment: chnatix trying to quit   Substance and Sexual Activity   • Alcohol use: No   • Drug use: No   • Sexual activity: Defer       Prior to Admission medications    Medication Sig Start Date End Date Taking? Authorizing Provider   albuterol sulfate  (90 Base) MCG/ACT inhaler Inhale 2 puffs Every 4 (Four) Hours As Needed for Wheezing or Shortness of Air.    ProviderAmber MD   gabapentin (NEURONTIN) 800 MG tablet Take 800 mg by mouth 3 (Three) Times a Day.    Amber Taylor MD   ibuprofen (ADVIL,MOTRIN) 800 MG tablet Take 800 mg by mouth Every 6 (Six) Hours As Needed for Mild Pain .    Amber Taylor MD   sildenafil (VIAGRA) 100 MG tablet Take 1/2 to 1 tab by mouth 1 hour prior to intercourse 10/21/19   Trae Clark MD   sodium phosphate (FLEET) 7-19 GM/118ML enema Use rectally the evening prior to biopsy 1/23/20   Trae Clark MD   varenicline (CHANTIX) 1 MG tablet Take 1 mg by mouth 2 (Two) Times a Day.    Amber Taylor MD  "      Medications   sodium chloride 0.9 % bolus 1,000 mL (0 mL Intravenous Stopped 2/20/20 1123)   cefTRIAXone (ROCEPHIN) 2 g/100 mL 0.9% NS VTB (TG) (0 g Intravenous Stopped 2/20/20 1248)   gentamicin (GARAMYCIN) 440 mg in sodium chloride 0.9 % IVPB (0 mg/kg × 88.9 kg (Adjusted) Intravenous Stopped 2/20/20 1316)       /77   Pulse 78   Temp 98 °F (36.7 °C)   Resp 14   Ht 188 cm (74\")   Wt 98.9 kg (218 lb)   SpO2 93%   BMI 27.99 kg/m²       Objective   Physical Exam   Constitutional: He is oriented to person, place, and time. He appears well-developed and well-nourished.   HENT:   Head: Normocephalic and atraumatic.   Right Ear: External ear normal.   Left Ear: External ear normal.   Nose: Nose normal.   Mouth/Throat: Oropharynx is clear and moist.   Eyes: Pupils are equal, round, and reactive to light. Conjunctivae and EOM are normal.   Neck: Trachea normal, normal range of motion and full passive range of motion without pain. Neck supple. No spinous process tenderness and no muscular tenderness present. No tracheal deviation and normal range of motion present.   Cardiovascular: Normal rate, regular rhythm, normal heart sounds and intact distal pulses. Exam reveals no gallop and no friction rub.   No murmur heard.  Pulmonary/Chest: Effort normal and breath sounds normal. No respiratory distress. He has no wheezes. He has no rales. He exhibits no tenderness.   Abdominal: Soft. Bowel sounds are normal. He exhibits no distension and no mass. There is no tenderness. There is no rebound and no guarding.   Musculoskeletal: Normal range of motion. He exhibits no edema, tenderness or deformity.   Lymphadenopathy:     He has no cervical adenopathy.   Neurological: He is alert and oriented to person, place, and time. He has normal reflexes. He exhibits normal muscle tone. Coordination normal.   Skin: Skin is warm and dry. Capillary refill takes more than 3 seconds. No rash noted. No erythema. No pallor. "   Psychiatric: He has a normal mood and affect. His behavior is normal. Judgment and thought content normal.   Vitals reviewed.      Procedures         Lab Results (last 24 hours)     ** No results found for the last 24 hours. **          Ct Abdomen Pelvis Without Contrast    Result Date: 2/20/2020  Narrative: EXAM: CT ABDOMEN PELVIS WO CONTRAST- - 2/20/2020 11:36 AM CST  HISTORY: fever post prostate biopsy.   COMPARISON: 02/05/2020.  DOSE LENGTH PRODUCT: 478 mGy cm. Automatic exposure control was utilized to make radiation dose as low as reasonably achievable.  TECHNIQUE: Unenhanced axial images of the abdomen and pelvis obtained with coronal and sagittal reformats.  FINDINGS: Evaluation limited secondary to lack of intravenous contrast agent.  VISUALIZED CHEST: Minimal dependent groundglass opacities, likely microatelectasis. No pleural or pericardial effusion.  LIVER: Normal hepatic contour.  BILIARY: No calcified gallstone. No intrahepatic or extrahepatic bile duct dilation.  PANCREAS: Normal pancreas contour.  SPLEEN: Normal size and contour.  ADRENAL: Normal appearance of the bilateral adrenal glands.  GENITOURINARY: No hydronephrosis or urolithiasis. Urinary bladder collapsed, which limits evaluation of the bladder wall. Normal prostate size. The left seminal vesicle is slightly larger than the right, which is stable compared to prebiopsy MRI pelvis 02/05/2020.  PERITONEUM: No free air or ascites.  GI TRACT: Normal configuration of the stomach and duodenum.  Numerous colonic diverticula. No evidence of acute diverticulitis. Normal appendix on axial images 64-76.  VESSELS: Aorta normal in course and caliber with calcified atherosclerosis. Limited evaluation of vasculature without contrast.  RETROPERITONEUM: No lymphadenopathy.  SOFT TISSUES: Small bilateral fat-containing groin hernias. The overlying soft tissue otherwise appears within normal limits.  BONES: There are 6 nonrib-bearing vertebral bodies.  Fixation hardware at the lower lumbar spine with discectomy. No focal acute or suspicious bony finding.       Impression: 1. No evidence of complication after prostate biopsy. 2. Numerous colonic diverticula. No evidence of acute diverticulitis. 3. Small bilateral fat-containing groin hernias. This report was finalized on 02/20/2020 12:04 by Dr Roberta Napier MD.    Xr Chest 2 View    Result Date: 2/20/2020  Narrative: XR CHEST 2 VW- 2/20/2020 9:45 AM CST  HISTORY: fever rhonchi   COMPARISON: None.  FINDINGS: Upright frontal and lateral radiographs of the chest were obtained.  The lungs are clear. The cardiomediastinal silhouette and pulmonary vascularity are within normal limits. The osseous structures and surrounding soft tissues demonstrate no acute abnormality.      Impression: 1. No radiographic evidence of acute cardiopulmonary process.   This report was finalized on 02/20/2020 10:56 by Dr. Panchito Palafox MD.    Mri Pelvis With & Without Contrast    Result Date: 2/5/2020  Narrative: Multisequence multiplanar MRI of the pelvis without and with IV contrast utilizing prostate protocol. 3-D postprocessing was performed on a separate workstation.  Indication: 54-year-old with PSA of 8. Biopsy-proven tumor Micro Focus in the right lateral prostate base (too small to moderate [place] grade)  Comparison: None available  Findings:  Prostate volume: 22.3 cc  The prostate was assessed using the PI-RADS 2 scoring system (http://www.acr.org/Quality-Safety/Resources/PIRADS).  There are no suspicious lesions in the prostate (PI-RADS 3 or greater).  Slight atrophy of the right seminal vesicle is likely sequela of prior infectious/inflammatory insult. No suspicious focal bone lesion the pelvis. Partially imaged multilevel degenerative change of the lumbar spine and posterior fusion hardware. No pelvic lymphadenopathy. Small bilateral fat-containing inguinal hernias. Sigmoid diverticulosis without evidence of diverticulitis.  No ascites or free pelvic fluid. No pelvic mass or organized pelvic collection. Major vasculature of the pelvis appears unremarkable.      Impression:  1. No suspicious lesions in the prostate (PI-RADS 3 or greater). 2. No pelvic lymphadenopathy or suspicious pelvic bone lesions.  This report was finalized on 02/05/2020 16:42 by Dr. Harpreet Koenig MD.      ED Course  ED Course as of Feb 24 0712   Thu Feb 20, 2020   1119 Dr. Clark, the patient's urologist, was notified that the patient is here.  He has called back and will come to the ER to see the patient.  He requested patient be given Rocephin 2 g as well as gentamicin 5mg/kg IV.    [TK]   1324 Dr. Clark, the patient's urologist, came to see the patient.  The patient requests to go home.  Dr. Clark has called in Bactrim for the patient.  He will follow-up in his office.    [TK]      ED Course User Index  [TK] Erma Deng PA          [facility]    Final diagnoses:   Fever and chills          Erma Deng PA  02/24/20 2613

## 2020-02-20 NOTE — CONSULTS
Urology    Mr. Jones is 54 y.o. male    REASON FOR CONSULT/CHIEF COMPLAINT: Fever after prostate biopsy    HPI  54-year-old male patient on whom I performed a prostate biopsy 2/18/2020 as part of his active surveillance for prostate cancer.  He was given prebiopsy prophylaxis consisting of Levaquin the night before and IV gentamicin day of the biopsy.  He called this morning reporting fever to 102 and malaise.  He was instructed go to the emergency room this morning.  His fever has resolved after ibuprofen.  He was found to be hemodynamically normal with normal white count.  Cultures were taken.  He has been given Rocephin and another dose of gentamicin 5 mg/kg.  He denies nausea.  No abdominal pain.  No dysuria.  No bothersome LUTS.  Severity improved.  Quality is achy.  Location prostate and head.  Context as above.    The following portions of the patient's history were reviewed and updated as appropriate: allergies, current medications, past family history, past medical history, past social history, past surgical history and problem list.    Review of Systems   Constitutional: Positive for appetite change and fever.   All other systems reviewed and are negative.        (Not in a hospital admission)      Current Facility-Administered Medications:   •  sodium chloride 0.9 % infusion, 125 mL/hr, Intravenous, Continuous, Erma Deng PA, Last Rate: 125 mL/hr at 02/20/20 1124, 125 mL/hr at 02/20/20 1124    Current Outpatient Medications:   •  albuterol sulfate  (90 Base) MCG/ACT inhaler, Inhale 2 puffs Every 4 (Four) Hours As Needed for Wheezing or Shortness of Air., Disp: , Rfl:   •  gabapentin (NEURONTIN) 800 MG tablet, Take 800 mg by mouth 3 (Three) Times a Day., Disp: , Rfl:   •  ibuprofen (ADVIL,MOTRIN) 800 MG tablet, Take 800 mg by mouth Every 6 (Six) Hours As Needed for Mild Pain ., Disp: , Rfl:   •  sildenafil (VIAGRA) 100 MG tablet, Take 1/2 to 1 tab by mouth 1 hour prior to intercourse,  "Disp: 10 tablet, Rfl: 11  •  sodium phosphate (FLEET) 7-19 GM/118ML enema, Use rectally the evening prior to biopsy, Disp: 1 enema, Rfl: 0  •  sulfamethoxazole-trimethoprim (BACTRIM DS,SEPTRA DS) 800-160 MG per tablet, Take 1 tablet by mouth 2 (Two) Times a Day for 30 days., Disp: 60 tablet, Rfl: 0  •  varenicline (CHANTIX) 1 MG tablet, Take 1 mg by mouth 2 (Two) Times a Day., Disp: , Rfl:     Past Medical History:   Diagnosis Date   • Arthritis    • Bronchitis    • Cancer (CMS/HCC)     PROSTATE   • S/P ACL reconstruction        Past Surgical History:   Procedure Laterality Date   • ANKLE SURGERY Right    • BACK SURGERY      X2   • KNEE ACL RECONSTRUCTION Left    • NASAL SEPTUM SURGERY     • PROSTATE ULTRASOUND BIOPSY N/A 2/18/2020    Procedure: PROSTATE  BIOPSY;  Surgeon: Trae Clark MD;  Location: [facility];  Service: Urology;  Laterality: N/A;   • SHOULDER SURGERY Left     X 4   • TENNIS ELBOW RELEASE Right 04/14/2011       Social History     Socioeconomic History   • Marital status:      Spouse name: Not on file   • Number of children: Not on file   • Years of education: Not on file   • Highest education level: Not on file   Tobacco Use   • Smoking status: Current Every Day Smoker     Packs/day: 1.00     Years: 15.00     Pack years: 15.00     Types: Cigarettes     Start date: 2/7/1987   • Smokeless tobacco: Never Used   • Tobacco comment: chnatix trying to quit   Substance and Sexual Activity   • Alcohol use: No   • Drug use: No   • Sexual activity: Defer       History reviewed. No pertinent family history.    /75   Pulse 76   Temp 98.1 °F (36.7 °C) (Oral)   Resp 14   Ht 188 cm (74\")   Wt 98.9 kg (218 lb)   SpO2 93%   BMI 27.99 kg/m²     Physical Exam  Constitutional: Well nourished, Well developed; No apparent distress; Vital reviewed as above  Psychiatric: Appropriate affect; Alert and oriented  Eyes: Unremarkable  Musculoskeletal: Normal gait and station  GI: Abdomen is soft, " non-tender  Respiratory: No distress; Unlabored movement; No accessory musculature needed with symmetric movements  Skin: No pallor or diaphoresis  Lymphatic: No adenopathy neck or groin    Lab Results   Component Value Date    GLUCOSE 108 (H) 02/20/2020    BUN 15 02/20/2020    CREATININE 0.99 02/20/2020    EGFRIFNONA 79 02/20/2020    BCR 15.2 02/20/2020    CO2 25.0 02/20/2020    CALCIUM 9.2 02/20/2020    ALBUMIN 3.90 02/20/2020    AST 18 02/20/2020    ALT 19 02/20/2020     Lab Results   Component Value Date    GLUCOSE 108 (H) 02/20/2020    CALCIUM 9.2 02/20/2020     02/20/2020    K 4.0 02/20/2020    CO2 25.0 02/20/2020     02/20/2020    BUN 15 02/20/2020    CREATININE 0.99 02/20/2020    EGFRIFNONA 79 02/20/2020    BCR 15.2 02/20/2020    ANIONGAP 11.0 02/20/2020     Lab Results   Component Value Date    WBC 10.37 02/20/2020    HGB 14.9 02/20/2020    HCT 43.9 02/20/2020    MCV 86.1 02/20/2020     02/20/2020     Lab Results   Component Value Date    PSA 8.390 (H) 01/14/2020     No results found for: URINECX  Brief Urine Lab Results  (Last result in the past 365 days)      Color   Clarity   Blood   Leuk Est   Nitrite   Protein   CREAT   Urine HCG        02/20/20 1009 Yellow Clear Moderate (2+) Trace Negative Negative               Imaging Results (Last 7 Days)     Procedure Component Value Units Date/Time    CT Abdomen Pelvis Without Contrast [[id number]] Collected:  02/20/20 1159     Updated:  02/20/20 1215    Narrative:       EXAM: CT ABDOMEN PELVIS WO CONTRAST- - 2/20/2020 11:36 AM CST     HISTORY: fever post prostate biopsy.       COMPARISON: 02/05/2020.      DOSE LENGTH PRODUCT: 478 mGy cm. Automatic exposure control was utilized  to make radiation dose as low as reasonably achievable.     TECHNIQUE: Unenhanced axial images of the abdomen and pelvis obtained  with coronal and sagittal reformats.     FINDINGS: Evaluation limited secondary to lack of intravenous contrast  agent.      VISUALIZED  CHEST: Minimal dependent groundglass opacities, likely  microatelectasis. No pleural or pericardial effusion.     LIVER: Normal hepatic contour.     BILIARY: No calcified gallstone. No intrahepatic or extrahepatic bile  duct dilation.      PANCREAS: Normal pancreas contour.     SPLEEN: Normal size and contour.      ADRENAL: Normal appearance of the bilateral adrenal glands.     GENITOURINARY:   No hydronephrosis or urolithiasis.   Urinary bladder collapsed, which limits evaluation of the bladder wall.   Normal prostate size. The left seminal vesicle is slightly larger than  the right, which is stable compared to prebiopsy MRI pelvis 02/05/2020.     PERITONEUM: No free air or ascites.     GI TRACT: Normal configuration of the stomach and duodenum.  Numerous  colonic diverticula. No evidence of acute diverticulitis. Normal  appendix on axial images 64-76.     VESSELS: Aorta normal in course and caliber with calcified  atherosclerosis. Limited evaluation of vasculature without contrast.     RETROPERITONEUM: No lymphadenopathy.     SOFT TISSUES: Small bilateral fat-containing groin hernias. The  overlying soft tissue otherwise appears within normal limits.     BONES: There are 6 nonrib-bearing vertebral bodies. Fixation hardware at  the lower lumbar spine with discectomy. No focal acute or suspicious  bony finding.          Impression:       1. No evidence of complication after prostate biopsy.  2. Numerous colonic diverticula. No evidence of acute diverticulitis.  3. Small bilateral fat-containing groin hernias.  This report was finalized on 02/20/2020 12:04 by Dr Roberta Napier MD.    XR Chest 2 View [[id number]] Collected:  02/20/20 1056     Updated:  02/20/20 1059    Narrative:       XR CHEST 2 VW- 2/20/2020 9:45 AM CST     HISTORY: fever rhonchi       COMPARISON: None.     FINDINGS:   Upright frontal and lateral radiographs of the chest were obtained.     The lungs are clear. The cardiomediastinal silhouette and  pulmonary  vascularity are within normal limits. The osseous structures and  surrounding soft tissues demonstrate no acute abnormality.       Impression:       1. No radiographic evidence of acute cardiopulmonary process.        This report was finalized on 02/20/2020 10:56 by Dr. Panchito Palafox MD.            Assessment and Plan  Acute prostatitis after prostate biopsy.  I offered the patient admission to the hospital for IV antibiotics but he would like to try to go home on oral antibiotics at this time.  He was given IV gentamicin and Rocephin in the ER.  He was encouraged to increase his fluid intake and take Tylenol Motrin as needed discomfort.  He was prescribed 30 days of Bactrim.  Cultures are pending.  Precautions to return to the emergency room for clinical worsening and/or refractory nausea and/or chills and rigors given to the patient and his spouse.  They voiced understanding would let me know tomorrow morning if he is feeling worse in which case he would need to be admitted.      (Please note that portions of this note were completed with a voice recognition program.)  Trae Clark MD  02/20/20  1:16 PM

## 2020-02-21 LAB — BACTERIA SPEC AEROBE CULT: ABNORMAL

## 2020-02-25 LAB
BACTERIA SPEC AEROBE CULT: NORMAL
BACTERIA SPEC AEROBE CULT: NORMAL

## 2020-02-26 NOTE — PROGRESS NOTES
Subjective    Mr. Jones is 54 y.o. male    Chief Complaint: Prostate Cancer    History of Present Illness    53yo male established patient followup for prostate cancer and to review results of his most recent surveillance prostate biopsy done on 2/18/2020 showing stable Consuelo 3+4 = 7 disease involving 1 core right base, 50% of that core.  Context patient was seen in the ER 2 days after biopsy for prostatitis.  He defervesced and felt better with IV antibiotics and is completing a 30-day course of antibiotics.  Severity improved.  Quality painless.    Most recent PSA on 1/14/2020 overall stable at 8.39.  Context found on biopsy done 2/25/19 for rise of PSA to 9.1 on 2/11/19.  Original path showed 4/12 cores positive, all on right. [place] 3+4=7 in 35% of 1 core, other 3 cores low volume [place] 6. trus vol= 21cc. denies luts, hematuria, stones, or family history of prostate cancer. Onctoype DX returned unfavorable intermediate risk with GPS of 24. PSA done 7/22/19 down to 6.6 from 7.0 on 4/19/19 but PSA done 10/17/2019 up slightly to 7.8.    Associated symptoms he is using sildenafil for ED.  Quality painless.  Timing constant.  Onset gradual.   No more fever since leaving the emergency room.    The following portions of the patient's history were reviewed and updated as appropriate: allergies, current medications, past family history, past medical history, past social history, past surgical history and problem list.    Review of Systems   Constitutional: Positive for fever. Negative for chills.        On and off low grade    Respiratory: Negative for cough, shortness of breath and wheezing.    Gastrointestinal: Positive for constipation and nausea. Negative for abdominal pain, anal bleeding, blood in stool and vomiting.        Just in the mornings the past few days   Genitourinary: Negative for difficulty urinating, dysuria, flank pain, hematuria and urgency.         Current Outpatient Medications:   •  albuterol  sulfate  (90 Base) MCG/ACT inhaler, Inhale 2 puffs Every 4 (Four) Hours As Needed for Wheezing or Shortness of Air., Disp: , Rfl:   •  gabapentin (NEURONTIN) 800 MG tablet, Take 800 mg by mouth 3 (Three) Times a Day., Disp: , Rfl:   •  ibuprofen (ADVIL,MOTRIN) 800 MG tablet, Take 800 mg by mouth Every 6 (Six) Hours As Needed for Mild Pain ., Disp: , Rfl:   •  sildenafil (VIAGRA) 100 MG tablet, Take 1/2 to 1 tab by mouth 1 hour prior to intercourse, Disp: 10 tablet, Rfl: 11  •  sodium phosphate (FLEET) 7-19 GM/118ML enema, Use rectally the evening prior to biopsy, Disp: 1 enema, Rfl: 0  •  sulfamethoxazole-trimethoprim (BACTRIM DS,SEPTRA DS) 800-160 MG per tablet, Take 1 tablet by mouth 2 (Two) Times a Day for 30 days., Disp: 60 tablet, Rfl: 0  •  varenicline (CHANTIX) 1 MG tablet, Take 1 mg by mouth 2 (Two) Times a Day., Disp: , Rfl:     Past Medical History:   Diagnosis Date   • Arthritis    • Bronchitis    • Cancer (CMS/HCC)     PROSTATE   • S/P ACL reconstruction        Past Surgical History:   Procedure Laterality Date   • ANKLE SURGERY Right    • BACK SURGERY      X2   • KNEE ACL RECONSTRUCTION Left    • NASAL SEPTUM SURGERY     • PROSTATE ULTRASOUND BIOPSY N/A 2/18/2020    Procedure: PROSTATE  BIOPSY;  Surgeon: Trae Clark MD;  Location: [facility];  Service: Urology;  Laterality: N/A;   • SHOULDER SURGERY Left     X 4   • TENNIS ELBOW RELEASE Right 04/14/2011       Social History     Socioeconomic History   • Marital status:      Spouse name: Not on file   • Number of children: Not on file   • Years of education: Not on file   • Highest education level: Not on file   Tobacco Use   • Smoking status: Current Every Day Smoker     Packs/day: 1.00     Years: 15.00     Pack years: 15.00     Types: Cigarettes     Start date: 2/7/1987   • Smokeless tobacco: Never Used   • Tobacco comment: chnatix trying to quit   Substance and Sexual Activity   • Alcohol use: No   • Drug use: No   • Sexual  activity: Defer       No family history on file.    Objective    There were no vitals taken for this visit.    Physical Exam  Constitutional: Well nourished, Well developed; No apparent distress; Vital reviewed as above  Psychiatric: Appropriate affect; Alert and oriented  Eyes: Unremarkable  Musculoskeletal: Normal gait and station  GI: Abdomen is soft, non-tender  Respiratory: No distress; Unlabored movement; No accessory musculature needed with symmetric movements  Skin: No pallor or diaphoresis  Lymphatic: No adenopathy neck or groin      Results for orders placed or performed during the hospital encounter of 02/20/20   Blood Culture - Blood, Arm, Left   Result Value Ref Range    Blood Culture No growth at 5 days    Blood Culture - Blood, Arm, Right   Result Value Ref Range    Blood Culture No growth at 5 days    Urine Culture - Urine, Urine, Clean Catch   Result Value Ref Range    Urine Culture <10,000 CFU/mL Gram Negative Bacilli (A)    Comprehensive Metabolic Panel   Result Value Ref Range    Glucose 108 (H) 65 - 99 mg/dL    BUN 15 6 - 20 mg/dL    Creatinine 0.99 0.76 - 1.27 mg/dL    Sodium 141 136 - 145 mmol/L    Potassium 4.0 3.5 - 5.2 mmol/L    Chloride 105 98 - 107 mmol/L    CO2 25.0 22.0 - 29.0 mmol/L    Calcium 9.2 8.6 - 10.5 mg/dL    Total Protein 6.6 6.0 - 8.5 g/dL    Albumin 3.90 3.50 - 5.20 g/dL    ALT (SGPT) 19 1 - 41 U/L    AST (SGOT) 18 1 - 40 U/L    Alkaline Phosphatase 101 39 - 117 U/L    Total Bilirubin 0.3 0.2 - 1.2 mg/dL    eGFR Non African Amer 79 >60 mL/min/1.73    Globulin 2.7 gm/dL    A/G Ratio 1.4 g/dL    BUN/Creatinine Ratio 15.2 7.0 - 25.0    Anion Gap 11.0 5.0 - 15.0 mmol/L   Lactic Acid, Plasma   Result Value Ref Range    Lactate 0.9 0.5 - 2.0 mmol/L   Procalcitonin   Result Value Ref Range    Procalcitonin 0.26 (H) 0.10 - 0.25 ng/mL   Urinalysis With Culture If Indicated - Urine, Clean Catch   Result Value Ref Range    Color, UA Yellow Yellow, Straw    Appearance, UA Clear Clear     pH, UA 5.5 5.0 - 8.0    Specific Gravity, UA 1.014 1.005 - 1.030    Glucose, UA Negative Negative    Ketones, UA Negative Negative    Bilirubin, UA Negative Negative    Blood, UA Moderate (2+) (A) Negative    Protein, UA Negative Negative    Leuk Esterase, UA Trace (A) Negative    Nitrite, UA Negative Negative    Urobilinogen, UA 0.2 E.U./dL 0.2 - 1.0 E.U./dL   CBC Auto Differential   Result Value Ref Range    WBC 10.37 3.40 - 10.80 10*3/mm3    RBC 5.10 4.14 - 5.80 10*6/mm3    Hemoglobin 14.9 13.0 - 17.7 g/dL    Hematocrit 43.9 37.5 - 51.0 %    MCV 86.1 79.0 - 97.0 fL    MCH 29.2 26.6 - 33.0 pg    MCHC 33.9 31.5 - 35.7 g/dL    RDW 14.2 12.3 - 15.4 %    RDW-SD 44.9 37.0 - 54.0 fl    MPV 9.6 6.0 - 12.0 fL    Platelets 182 140 - 450 10*3/mm3    Neutrophil % 73.4 42.7 - 76.0 %    Lymphocyte % 14.7 (L) 19.6 - 45.3 %    Monocyte % 9.5 5.0 - 12.0 %    Eosinophil % 1.4 0.3 - 6.2 %    Basophil % 0.5 0.0 - 1.5 %    Immature Grans % 0.5 0.0 - 0.5 %    Neutrophils, Absolute 7.63 (H) 1.70 - 7.00 10*3/mm3    Lymphocytes, Absolute 1.52 0.70 - 3.10 10*3/mm3    Monocytes, Absolute 0.98 (H) 0.10 - 0.90 10*3/mm3    Eosinophils, Absolute 0.14 0.00 - 0.40 10*3/mm3    Basophils, Absolute 0.05 0.00 - 0.20 10*3/mm3    Immature Grans, Absolute 0.05 0.00 - 0.05 10*3/mm3    nRBC 0.0 0.0 - 0.2 /100 WBC   Urinalysis, Microscopic Only - Urine, Clean Catch   Result Value Ref Range    RBC, UA 13-20 (A) None Seen /HPF    WBC, UA 6-12 (A) None Seen /HPF    Bacteria, UA None Seen None Seen /HPF    Squamous Epithelial Cells, UA None Seen None Seen, 0-2 /HPF    Hyaline Casts, UA None Seen None Seen /LPF    Methodology Automated Microscopy      Assessment and Plan    Lalo was seen today for follow-up.    Diagnoses and all orders for this visit:    Prostate cancer (CMS/[facility])  -     POC Urinalysis Dipstick, Multipro    Acute prostatitis  -     Urine Culture - Urine, Urine, Clean Catch; Future  -     PSA DIAGNOSTIC; Future  -     Urine Culture - Urine,  Urine, Clean Catch    Erectile dysfunction due to diseases classified elsewhere      Completing a course of antibiotics for prostatitis after most recent surveillance biopsy.  He was given a copy of his pathology report today.  We will send today's urine for culture.  Complete antibiotics.  Follow-up with me in 3 months with repeat pre-clinic PSA.  Given his Consuelo 7 disease and desire to avoid further prostatitis complications involved with surveillance prostate biopsy, patient is leaning toward radical prostatectomy.  Will discuss further next visit.

## 2020-02-27 ENCOUNTER — OFFICE VISIT (OUTPATIENT)
Dept: UROLOGY | Facility: CLINIC | Age: 55
End: 2020-02-27

## 2020-02-27 VITALS — WEIGHT: 215 LBS | TEMPERATURE: 98.6 F | HEIGHT: 74 IN | BODY MASS INDEX: 27.59 KG/M2

## 2020-02-27 DIAGNOSIS — C61 PROSTATE CANCER (HCC): Primary | ICD-10-CM

## 2020-02-27 DIAGNOSIS — N52.1 ERECTILE DYSFUNCTION DUE TO DISEASES CLASSIFIED ELSEWHERE: ICD-10-CM

## 2020-02-27 DIAGNOSIS — N41.0 ACUTE PROSTATITIS: ICD-10-CM

## 2020-02-27 LAB
BILIRUB BLD-MCNC: NEGATIVE MG/DL
CLARITY, POC: CLEAR
COLOR UR: YELLOW
GLUCOSE UR STRIP-MCNC: NEGATIVE MG/DL
KETONES UR QL: NEGATIVE
LEUKOCYTE EST, POC: NEGATIVE
NITRITE UR-MCNC: NEGATIVE MG/ML
PH UR: 6 [PH] (ref 5–8)
PROT UR STRIP-MCNC: NEGATIVE MG/DL
RBC # UR STRIP: ABNORMAL /UL
SP GR UR: 1.01 (ref 1–1.03)
UROBILINOGEN UR QL: NORMAL

## 2020-02-27 PROCEDURE — 99213 OFFICE O/P EST LOW 20 MIN: CPT | Performed by: UROLOGY

## 2020-02-27 PROCEDURE — 87086 URINE CULTURE/COLONY COUNT: CPT | Performed by: UROLOGY

## 2020-02-28 LAB — BACTERIA SPEC AEROBE CULT: NO GROWTH

## 2020-03-04 ENCOUNTER — TELEPHONE (OUTPATIENT)
Dept: UROLOGY | Facility: CLINIC | Age: 55
End: 2020-03-04

## 2020-03-04 NOTE — TELEPHONE ENCOUNTER
Patient said he is experiencing pain with urination but not necessarily burning. He doesn't know if he is getting an uti or not. He said that it feels different when he urinates. He is taking Bactrim that Dr. Clark had prescribed. I offered an appointment with Liat but he wanted me to check with nurse to see if he needs to come in. He lives in [place] and didn't want to travel if necessary.

## 2020-03-04 NOTE — TELEPHONE ENCOUNTER
Called pt and left message that I was trying to get symptoms the painful urination I wasn't sure what was going on. Offered him a appt with dann and told him to call us back.

## 2020-04-24 ENCOUNTER — TELEPHONE (OUTPATIENT)
Dept: UROLOGY | Facility: CLINIC | Age: 55
End: 2020-04-24

## 2020-04-24 NOTE — TELEPHONE ENCOUNTER
Dr. Clark's patient     Patient has some questions regarding procedure that he discussed with Dr. Clark. Patient does not know the exact name of these procedures.

## 2020-05-18 ENCOUNTER — LAB (OUTPATIENT)
Dept: LAB | Facility: HOSPITAL | Age: 55
End: 2020-05-18

## 2020-05-18 DIAGNOSIS — C61 PROSTATE CANCER (HCC): ICD-10-CM

## 2020-05-18 PROCEDURE — 84153 ASSAY OF PSA TOTAL: CPT

## 2020-05-19 LAB — PSA SERPL-MCNC: 11.5 NG/ML (ref 0–4)

## 2020-05-21 ENCOUNTER — TELEPHONE (OUTPATIENT)
Dept: UROLOGY | Facility: CLINIC | Age: 55
End: 2020-05-21

## 2020-05-21 ENCOUNTER — RESULTS ENCOUNTER (OUTPATIENT)
Dept: UROLOGY | Facility: CLINIC | Age: 55
End: 2020-05-21

## 2020-05-21 DIAGNOSIS — N41.0 ACUTE PROSTATITIS: ICD-10-CM

## 2020-05-21 NOTE — TELEPHONE ENCOUNTER
Dr. Eller's patient     Patient is wanting Dr. Clark or nurse to call him back regarding elevated PSA as soon as possible.

## 2020-05-26 NOTE — PROGRESS NOTES
Subjective    Mr. Jones is 54 y.o. male    Chief Complaint: Prostate Cancer    History of Present Illness    53yo male established patient followup on active surveillance for [place] 3+4 = 7 prostate cancer.  His most recent surveillance biopsy complicated by prostatitis.  We have previously discussed his management options and he has opted for radical prostatectomy. Results of his most recent surveillance prostate biopsy done on 2/18/2020 showed stable [place] 3+4 = 7 disease involving 1 core right base, 50% of that core.    Severity PSA worsened to 11.5.  Timing drawn 5/18/2020. Quality painless.  He had a CT in the ER for his prostatitis showing no adenopathy.    Context found on biopsy done 2/25/19 for rise of PSA to 9.1 on 2/11/19.  Original path showed 4/12 cores positive, all on right. [place] 3+4=7 in 35% of 1 core, other 3 cores low volume [place] 6. trus vol= 21cc. denies luts, hematuria, stones, or family history of prostate cancer. Onctoype DX returned unfavorable intermediate risk with GPS of 24. PSA done 7/22/19 down to 6.6 from 7.0 on 4/19/19 but PSA done 10/17/2019 up slightly to 7.8.    Associated symptoms he is using sildenafil for ED.        Lab Results   Component Value Date    PSA 11.5 (H) 05/18/2020    PSA 8.390 (H) 01/14/2020       The following portions of the patient's history were reviewed and updated as appropriate: allergies, current medications, past family history, past medical history, past social history, past surgical history and problem list.    Review of Systems   Constitutional: Negative for chills and fever.   Gastrointestinal: Negative for abdominal pain, anal bleeding and blood in stool.   Genitourinary: Negative for dysuria, frequency, hematuria and urgency.   All other systems reviewed and are negative.        Current Outpatient Medications:   •  albuterol sulfate  (90 Base) MCG/ACT inhaler, Inhale 2 puffs Every 4 (Four) Hours As Needed for Wheezing or Shortness of  "Air., Disp: , Rfl:   •  gabapentin (NEURONTIN) 800 MG tablet, Take 800 mg by mouth 3 (Three) Times a Day., Disp: , Rfl:   •  ibuprofen (ADVIL,MOTRIN) 800 MG tablet, Take 800 mg by mouth Every 6 (Six) Hours As Needed for Mild Pain ., Disp: , Rfl:   •  sildenafil (VIAGRA) 100 MG tablet, Take 1/2 to 1 tab by mouth 1 hour prior to intercourse, Disp: 10 tablet, Rfl: 11  •  sodium phosphate (FLEET) 7-19 GM/118ML enema, Use rectally the evening prior to biopsy, Disp: 1 enema, Rfl: 0  •  varenicline (CHANTIX) 1 MG tablet, Take 1 mg by mouth 2 (Two) Times a Day., Disp: , Rfl:     Past Medical History:   Diagnosis Date   • Arthritis    • Bronchitis    • Cancer (CMS/HCC)     PROSTATE   • S/P ACL reconstruction        Past Surgical History:   Procedure Laterality Date   • ANKLE SURGERY Right    • BACK SURGERY      X2   • KNEE ACL RECONSTRUCTION Left    • NASAL SEPTUM SURGERY     • PROSTATE ULTRASOUND BIOPSY N/A 2/18/2020    Procedure: PROSTATE  BIOPSY;  Surgeon: Trae Clark MD;  Location[facility];  Service: Urology;  Laterality: N/A;   • SHOULDER SURGERY Left     X 4   • TENNIS ELBOW RELEASE Right 04/14/2011       Social History     Socioeconomic History   • Marital status:      Spouse name: Not on file   • Number of children: Not on file   • Years of education: Not on file   • Highest education level: Not on file   Tobacco Use   • Smoking status: Current Every Day Smoker     Packs/day: 1.00     Years: 15.00     Pack years: 15.00     Types: Cigarettes     Start date: 2/7/1987   • Smokeless tobacco: Never Used   • Tobacco comment: chnatix trying to quit   Substance and Sexual Activity   • Alcohol use: No   • Drug use: No   • Sexual activity: Defer       History reviewed. No pertinent family history.    Objective    Temp 98.5 °F (36.9 °C)   Ht 188 cm (74\")   Wt 100 kg (221 lb 6.4 oz)   BMI 28.43 kg/m²     Physical Exam  Constitutional: Well nourished, Well developed; No apparent distress; Vital reviewed as " above  Psychiatric: Appropriate affect; Alert and oriented  Eyes: Unremarkable  Musculoskeletal: Normal gait and station  GI: Abdomen is soft, non-tender  Respiratory: No distress; Unlabored movement; No accessory musculature needed with symmetric movements  Skin: No pallor or diaphoresis  Lymphatic: No adenopathy neck or groin      Results for orders placed or performed in visit on 05/28/20   POC Urinalysis Dipstick, Multipro   Result Value Ref Range    Color Yellow Yellow, Straw, Dark Yellow, Beryl    Clarity, UA Clear Clear    Glucose, UA Negative Negative, 1000 mg/dL (3+) mg/dL    Bilirubin Negative Negative    Ketones, UA Negative Negative    Specific Gravity  1.010 1.005 - 1.030    Blood, UA Negative Negative    pH, Urine 5.5 5.0 - 8.0    Protein, POC Negative Negative mg/dL    Urobilinogen, UA Normal Normal    Nitrite, UA Negative Negative    Leukocytes Negative Negative     Assessment and Plan    Diagnoses and all orders for this visit:    Prostate cancer (CMS/[facility])  -     POC Urinalysis Dipstick, Multipro  -     NM bone scan whole body; Future    Erectile dysfunction due to diseases classified elsewhere      Unfavorable intermediate risk [place] 7 prostate cancer with history of prostatitis on most recent surveillance biopsy for which he desires definitive therapy with radical prostatectomy.  I recommended getting a bone scan.  Patient will contact me back regarding the schedule as to when he would like to have surgery done at which time we will arrange preop and surgery date.    More than half of this 25 minute visit was spent on counseling/coordinating care for  the patient about the following:   -Prostate cancer         The entire time allotted was spent as face to face time with the patient. .               This document has been signed by DESHAUN Clark MD on May 28, 2020 17:29

## 2020-05-26 NOTE — TELEPHONE ENCOUNTER
CERTIFICATE OF WORK    September 13, 2017      Re: Debo Tariq  [address] [facility] [id number]      This is to certify that Debo Tariq has been under my care from 9/13/2017 and can return to regular work on 9/15/17.     RESTRICTIONS: none          SIGNATURE:___________________________________________,   9/13/2017      Maria Victoria Barajas MD    Internal Medicine  [facility]  [address] [place] [place]  [address]  [phone]         Called pt and let him know that the  Results and options would be discussed on Thursday at his appt.

## 2020-05-28 ENCOUNTER — OFFICE VISIT (OUTPATIENT)
Dept: UROLOGY | Facility: CLINIC | Age: 55
End: 2020-05-28

## 2020-05-28 VITALS — WEIGHT: 221.4 LBS | TEMPERATURE: 98.5 F | HEIGHT: 74 IN | BODY MASS INDEX: 28.42 KG/M2

## 2020-05-28 DIAGNOSIS — C61 PROSTATE CANCER (HCC): Primary | ICD-10-CM

## 2020-05-28 DIAGNOSIS — N52.1 ERECTILE DYSFUNCTION DUE TO DISEASES CLASSIFIED ELSEWHERE: ICD-10-CM

## 2020-05-28 LAB
BILIRUB BLD-MCNC: NEGATIVE MG/DL
CLARITY, POC: CLEAR
COLOR UR: YELLOW
GLUCOSE UR STRIP-MCNC: NEGATIVE MG/DL
KETONES UR QL: NEGATIVE
LEUKOCYTE EST, POC: NEGATIVE
NITRITE UR-MCNC: NEGATIVE MG/ML
PH UR: 5.5 [PH] (ref 5–8)
PROT UR STRIP-MCNC: NEGATIVE MG/DL
RBC # UR STRIP: NEGATIVE /UL
SP GR UR: 1.01 (ref 1–1.03)
UROBILINOGEN UR QL: NORMAL

## 2020-05-28 PROCEDURE — 99214 OFFICE O/P EST MOD 30 MIN: CPT | Performed by: UROLOGY

## 2020-05-28 NOTE — PATIENT INSTRUCTIONS

## 2020-06-02 ENCOUNTER — HOSPITAL ENCOUNTER (OUTPATIENT)
Dept: NUCLEAR MEDICINE | Facility: HOSPITAL | Age: 55
Discharge: HOME | End: 2020-06-02

## 2020-06-02 DIAGNOSIS — C61 PROSTATE CANCER (HCC): ICD-10-CM

## 2020-06-02 PROCEDURE — 78306 BONE IMAGING WHOLE BODY: CPT

## 2020-06-02 PROCEDURE — 0 TECHNETIUM OXIDRONATE KIT: Performed by: UROLOGY

## 2020-06-02 PROCEDURE — A9561 TC99M OXIDRONATE: HCPCS | Performed by: UROLOGY

## 2020-06-02 RX ADMIN — TECHNETIUM TC 99M OXIDRONATE 1 DOSE: 3.15 INJECTION, POWDER, LYOPHILIZED, FOR SOLUTION INTRAVENOUS at 10:54

## 2020-06-03 ENCOUNTER — TELEPHONE (OUTPATIENT)
Dept: UROLOGY | Facility: CLINIC | Age: 55
End: 2020-06-03

## 2020-06-03 DIAGNOSIS — C61 PROSTATE CANCER (HCC): Primary | ICD-10-CM

## 2020-06-03 NOTE — TELEPHONE ENCOUNTER
I called patient and informed him of the small lesion possibly related to the right renal upper pole on his bone scan.  No bony metastasis identified.  He did have a normal noncontrast CT abdomen pelvis in the ER earlier this year.  I recommended him getting a renal ultrasound.  Patient would like to have radical prostatectomy done but would like to wait until later this summer.  Patient needs to be set up with an appointment with Dr. Jj in August with pre-clinic bilateral renal ultrasound and repeat PSA.  Patient voices understanding of possible risks of waiting on cancer treatment.

## 2020-06-05 ENCOUNTER — TELEPHONE (OUTPATIENT)
Dept: UROLOGY | Facility: CLINIC | Age: 55
End: 2020-06-05

## 2020-06-07 ENCOUNTER — APPOINTMENT (OUTPATIENT)
Dept: CT IMAGING | Facility: HOSPITAL | Age: 55
End: 2020-06-07

## 2020-06-07 ENCOUNTER — HOSPITAL ENCOUNTER (OUTPATIENT)
Facility: HOSPITAL | Age: 55
Setting detail: OBSERVATION
Discharge: HOME | End: 2020-06-09
Attending: INTERNAL MEDICINE | Admitting: INTERNAL MEDICINE

## 2020-06-07 DIAGNOSIS — R10.13 EPIGASTRIC PAIN: ICD-10-CM

## 2020-06-07 DIAGNOSIS — D72.819 LEUKOPENIA, UNSPECIFIED TYPE: Primary | ICD-10-CM

## 2020-06-07 DIAGNOSIS — C61 PROSTATE CANCER (HCC): ICD-10-CM

## 2020-06-07 DIAGNOSIS — D72.825 BANDEMIA: ICD-10-CM

## 2020-06-07 PROBLEM — Z72.0 TOBACCO USE: Status: ACTIVE | Noted: 2020-06-07

## 2020-06-07 PROBLEM — D69.6 THROMBOCYTOPENIA: Status: ACTIVE | Noted: 2020-06-07

## 2020-06-07 PROBLEM — R74.01 TRANSAMINITIS: Status: ACTIVE | Noted: 2020-06-07

## 2020-06-07 PROBLEM — R50.9 FEVER: Status: ACTIVE | Noted: 2020-06-07

## 2020-06-07 LAB
ALBUMIN SERPL-MCNC: 3.9 G/DL (ref 3.5–5.2)
ALBUMIN/GLOB SERPL: 1.5 G/DL
ALP SERPL-CCNC: 110 U/L (ref 39–117)
ALT SERPL W P-5'-P-CCNC: 61 U/L (ref 1–41)
AMYLASE SERPL-CCNC: 15 U/L (ref 28–100)
ANION GAP SERPL CALCULATED.3IONS-SCNC: 13 MMOL/L (ref 5–15)
AST SERPL-CCNC: 81 U/L (ref 1–40)
BACTERIA UR QL AUTO: ABNORMAL /HPF
BILIRUB SERPL-MCNC: 0.5 MG/DL (ref 0.2–1.2)
BILIRUB UR QL STRIP: ABNORMAL
BUN BLD-MCNC: 14 MG/DL (ref 6–20)
BUN/CREAT SERPL: 12.8 (ref 7–25)
CALCIUM SPEC-SCNC: 8.8 MG/DL (ref 8.6–10.5)
CHLORIDE SERPL-SCNC: 103 MMOL/L (ref 98–107)
CLARITY UR: ABNORMAL
CO2 SERPL-SCNC: 22 MMOL/L (ref 22–29)
COLOR UR: ABNORMAL
CREAT BLD-MCNC: 1.09 MG/DL (ref 0.76–1.27)
D-LACTATE SERPL-SCNC: 0.9 MMOL/L (ref 0.5–2)
DEPRECATED RDW RBC AUTO: 42.2 FL (ref 37–54)
ERYTHROCYTE [DISTWIDTH] IN BLOOD BY AUTOMATED COUNT: 13.5 % (ref 12.3–15.4)
GFR SERPL CREATININE-BSD FRML MDRD: 71 ML/MIN/1.73
GLOBULIN UR ELPH-MCNC: 2.6 GM/DL
GLUCOSE BLD-MCNC: 126 MG/DL (ref 65–99)
GLUCOSE UR STRIP-MCNC: NEGATIVE MG/DL
HCT VFR BLD AUTO: 41.2 % (ref 37.5–51)
HGB BLD-MCNC: 14.3 G/DL (ref 13–17.7)
HGB UR QL STRIP.AUTO: NEGATIVE
HOLD SPECIMEN: NORMAL
HYALINE CASTS UR QL AUTO: ABNORMAL /LPF
KETONES UR QL STRIP: ABNORMAL
LEUKOCYTE ESTERASE UR QL STRIP.AUTO: ABNORMAL
LIPASE SERPL-CCNC: 18 U/L (ref 13–60)
LYMPHOCYTES # BLD MANUAL: 0.32 10*3/MM3 (ref 0.7–3.1)
LYMPHOCYTES NFR BLD MANUAL: 11 % (ref 19.6–45.3)
LYMPHOCYTES NFR BLD MANUAL: 2 % (ref 5–12)
MCH RBC QN AUTO: 29.7 PG (ref 26.6–33)
MCHC RBC AUTO-ENTMCNC: 34.7 G/DL (ref 31.5–35.7)
MCV RBC AUTO: 85.5 FL (ref 79–97)
MONOCYTES # BLD AUTO: 0.06 10*3/MM3 (ref 0.1–0.9)
NEUTROPHILS # BLD AUTO: 2.52 10*3/MM3 (ref 1.7–7)
NEUTROPHILS NFR BLD MANUAL: 72 % (ref 42.7–76)
NEUTS BAND NFR BLD MANUAL: 15 % (ref 0–5)
NITRITE UR QL STRIP: NEGATIVE
PH UR STRIP.AUTO: 5.5 [PH] (ref 5–8)
PLATELET # BLD AUTO: 92 10*3/MM3 (ref 140–450)
PMV BLD AUTO: 10.2 FL (ref 6–12)
POTASSIUM BLD-SCNC: 3.5 MMOL/L (ref 3.5–5.2)
PROCALCITONIN SERPL-MCNC: 0.24 NG/ML (ref 0.1–0.25)
PROT SERPL-MCNC: 6.5 G/DL (ref 6–8.5)
PROT UR QL STRIP: ABNORMAL
RBC # BLD AUTO: 4.82 10*6/MM3 (ref 4.14–5.8)
RBC # UR: ABNORMAL /HPF
RBC MORPH BLD: NORMAL
REF LAB TEST METHOD: ABNORMAL
SMALL PLATELETS BLD QL SMEAR: ABNORMAL
SODIUM BLD-SCNC: 138 MMOL/L (ref 136–145)
SP GR UR STRIP: >1.03 (ref 1–1.03)
SQUAMOUS #/AREA URNS HPF: ABNORMAL /HPF
UROBILINOGEN UR QL STRIP: ABNORMAL
WBC MORPH BLD: NORMAL
WBC NRBC COR # BLD: 2.9 10*3/MM3 (ref 3.4–10.8)
WBC UR QL AUTO: ABNORMAL /HPF
WHOLE BLOOD HOLD SPECIMEN: NORMAL

## 2020-06-07 PROCEDURE — 25010000002 IOPAMIDOL 61 % SOLUTION: Performed by: PHYSICIAN ASSISTANT

## 2020-06-07 PROCEDURE — 80053 COMPREHEN METABOLIC PANEL: CPT | Performed by: PHYSICIAN ASSISTANT

## 2020-06-07 PROCEDURE — 84145 PROCALCITONIN (PCT): CPT | Performed by: PHYSICIAN ASSISTANT

## 2020-06-07 PROCEDURE — 51798 US URINE CAPACITY MEASURE: CPT

## 2020-06-07 PROCEDURE — 96376 TX/PRO/DX INJ SAME DRUG ADON: CPT

## 2020-06-07 PROCEDURE — 81001 URINALYSIS AUTO W/SCOPE: CPT | Performed by: PHYSICIAN ASSISTANT

## 2020-06-07 PROCEDURE — 96361 HYDRATE IV INFUSION ADD-ON: CPT

## 2020-06-07 PROCEDURE — 87798 DETECT AGENT NOS DNA AMP: CPT | Performed by: INTERNAL MEDICINE

## 2020-06-07 PROCEDURE — 85025 COMPLETE CBC W/AUTO DIFF WBC: CPT | Performed by: PHYSICIAN ASSISTANT

## 2020-06-07 PROCEDURE — G0378 HOSPITAL OBSERVATION PER HR: HCPCS

## 2020-06-07 PROCEDURE — 83605 ASSAY OF LACTIC ACID: CPT | Performed by: PHYSICIAN ASSISTANT

## 2020-06-07 PROCEDURE — 85007 BL SMEAR W/DIFF WBC COUNT: CPT | Performed by: PHYSICIAN ASSISTANT

## 2020-06-07 PROCEDURE — 87040 BLOOD CULTURE FOR BACTERIA: CPT | Performed by: PHYSICIAN ASSISTANT

## 2020-06-07 PROCEDURE — 25010000002 PROMETHAZINE PER 50 MG: Performed by: PHYSICIAN ASSISTANT

## 2020-06-07 PROCEDURE — 96375 TX/PRO/DX INJ NEW DRUG ADDON: CPT

## 2020-06-07 PROCEDURE — 85060 BLOOD SMEAR INTERPRETATION: CPT | Performed by: INTERNAL MEDICINE

## 2020-06-07 PROCEDURE — 36415 COLL VENOUS BLD VENIPUNCTURE: CPT

## 2020-06-07 PROCEDURE — 96365 THER/PROPH/DIAG IV INF INIT: CPT

## 2020-06-07 PROCEDURE — 74176 CT ABD & PELVIS W/O CONTRAST: CPT

## 2020-06-07 PROCEDURE — 96366 THER/PROPH/DIAG IV INF ADDON: CPT

## 2020-06-07 PROCEDURE — 82150 ASSAY OF AMYLASE: CPT | Performed by: PHYSICIAN ASSISTANT

## 2020-06-07 PROCEDURE — 83690 ASSAY OF LIPASE: CPT | Performed by: PHYSICIAN ASSISTANT

## 2020-06-07 PROCEDURE — 99285 EMERGENCY DEPT VISIT HI MDM: CPT

## 2020-06-07 PROCEDURE — 25010000002 MORPHINE SULFATE (PF) 2 MG/ML SOLUTION: Performed by: EMERGENCY MEDICINE

## 2020-06-07 RX ORDER — NICOTINE 21 MG/24HR
1 PATCH, TRANSDERMAL 24 HOURS TRANSDERMAL
Status: DISCONTINUED | OUTPATIENT
Start: 2020-06-07 | End: 2020-06-09 | Stop reason: HOSPADM

## 2020-06-07 RX ORDER — MORPHINE SULFATE 2 MG/ML
1 INJECTION, SOLUTION INTRAMUSCULAR; INTRAVENOUS ONCE
Status: COMPLETED | OUTPATIENT
Start: 2020-06-07 | End: 2020-06-07

## 2020-06-07 RX ORDER — OXYCODONE HYDROCHLORIDE 5 MG/1
5 TABLET ORAL EVERY 6 HOURS PRN
Status: DISCONTINUED | OUTPATIENT
Start: 2020-06-07 | End: 2020-06-09 | Stop reason: HOSPADM

## 2020-06-07 RX ORDER — SODIUM CHLORIDE 0.9 % (FLUSH) 0.9 %
10 SYRINGE (ML) INJECTION AS NEEDED
Status: DISCONTINUED | OUTPATIENT
Start: 2020-06-07 | End: 2020-06-09 | Stop reason: HOSPADM

## 2020-06-07 RX ORDER — ALBUTEROL SULFATE 2.5 MG/3ML
2.5 SOLUTION RESPIRATORY (INHALATION) EVERY 6 HOURS PRN
Status: DISCONTINUED | OUTPATIENT
Start: 2020-06-07 | End: 2020-06-09 | Stop reason: HOSPADM

## 2020-06-07 RX ORDER — SODIUM CHLORIDE 9 MG/ML
125 INJECTION, SOLUTION INTRAVENOUS CONTINUOUS
Status: DISCONTINUED | OUTPATIENT
Start: 2020-06-07 | End: 2020-06-09 | Stop reason: HOSPADM

## 2020-06-07 RX ORDER — ACETAMINOPHEN 325 MG/1
650 TABLET ORAL EVERY 4 HOURS PRN
Status: DISCONTINUED | OUTPATIENT
Start: 2020-06-07 | End: 2020-06-09 | Stop reason: HOSPADM

## 2020-06-07 RX ORDER — PROMETHAZINE HYDROCHLORIDE 25 MG/ML
12.5 INJECTION, SOLUTION INTRAMUSCULAR; INTRAVENOUS ONCE
Status: COMPLETED | OUTPATIENT
Start: 2020-06-07 | End: 2020-06-07

## 2020-06-07 RX ORDER — ONDANSETRON 2 MG/ML
4 INJECTION INTRAMUSCULAR; INTRAVENOUS EVERY 6 HOURS PRN
Status: DISCONTINUED | OUTPATIENT
Start: 2020-06-07 | End: 2020-06-09 | Stop reason: HOSPADM

## 2020-06-07 RX ORDER — SODIUM CHLORIDE 0.9 % (FLUSH) 0.9 %
10 SYRINGE (ML) INJECTION EVERY 12 HOURS SCHEDULED
Status: DISCONTINUED | OUTPATIENT
Start: 2020-06-07 | End: 2020-06-09 | Stop reason: HOSPADM

## 2020-06-07 RX ADMIN — SODIUM CHLORIDE 1000 ML: 9 INJECTION, SOLUTION INTRAVENOUS at 08:41

## 2020-06-07 RX ADMIN — ACETAMINOPHEN 650 MG: 325 TABLET, FILM COATED ORAL at 20:35

## 2020-06-07 RX ADMIN — DOXYCYCLINE 100 MG: 100 INJECTION, POWDER, LYOPHILIZED, FOR SOLUTION INTRAVENOUS at 14:05

## 2020-06-07 RX ADMIN — NICOTINE 1 PATCH: 14 PATCH TRANSDERMAL at 14:13

## 2020-06-07 RX ADMIN — SODIUM CHLORIDE 125 ML/HR: 9 INJECTION, SOLUTION INTRAVENOUS at 20:01

## 2020-06-07 RX ADMIN — MORPHINE SULFATE 1 MG: 2 INJECTION, SOLUTION INTRAMUSCULAR; INTRAVENOUS at 12:04

## 2020-06-07 RX ADMIN — ACETAMINOPHEN 650 MG: 325 TABLET, FILM COATED ORAL at 16:16

## 2020-06-07 RX ADMIN — MORPHINE SULFATE 1 MG: 2 INJECTION, SOLUTION INTRAMUSCULAR; INTRAVENOUS at 09:25

## 2020-06-07 RX ADMIN — IOPAMIDOL 50 ML: 612 INJECTION, SOLUTION INTRAVENOUS at 09:10

## 2020-06-07 RX ADMIN — SODIUM CHLORIDE 125 ML/HR: 9 INJECTION, SOLUTION INTRAVENOUS at 13:13

## 2020-06-07 RX ADMIN — DOXYCYCLINE 100 MG: 100 INJECTION, POWDER, LYOPHILIZED, FOR SOLUTION INTRAVENOUS at 20:01

## 2020-06-07 RX ADMIN — SODIUM CHLORIDE 125 ML/HR: 9 INJECTION, SOLUTION INTRAVENOUS at 20:02

## 2020-06-07 RX ADMIN — PROMETHAZINE HYDROCHLORIDE 12.5 MG: 25 INJECTION INTRAMUSCULAR; INTRAVENOUS at 08:41

## 2020-06-07 RX ADMIN — OXYCODONE HYDROCHLORIDE 5 MG: 5 TABLET ORAL at 18:03

## 2020-06-07 NOTE — H&P
[facility]  HISTORY AND PHYSICAL    Date of Admission: 6/7/2020  Primary Care Physician: Flex Sheikh MD    Subjective     Chief Complaint: fever and body aches    History of Present Illness  Patient is a 54-year-old male with a history of tobacco use and prostate cancer.  Was scheduled for prostatectomy coming up here in the next couple weeks.  Story of 4+ days of fever and body aches.  Some intermittent nausea, vomiting x1.  Nonbloody.  No diarrhea.  No focal weakness.  No dysuria.  Denies any chest pain, shortness of breath, cough, sputum.  Patient states he went to [facility] for this 4 days ago and work-up there was negative including COVID.  Negative chest x-ray.  Normal urinalysis.  They sent him home on levofloxacin although patient is not clear why as he states they told him it was for possible COPD but again he had no respiratory symptoms.  Fevers persisted at home up to 103 per patient and significant other.  States they are happening frequently and having take Motrin and Tylenol every 4 hours keep the fevers down.  On arrival here he is actually afebrile.  CT abdomen pelvis performed which was negative for acute disease.    Of note patient has sunburn.  Asked him about his activities recently he has been out quading and outdoors frequently.  Admits to having ticks on him couple times in the last few days although he is not aware of any actually sticking to him and biting him.  States they were the tick with the white spot on their back.        Review of Systems   Otherwise complete ROS reviewed and negative except as mentioned in the HPI.    Past Medical History:   Past Medical History:   Diagnosis Date   • Arthritis    • Bronchitis    • Cancer (CMS/HCC)     PROSTATE   • S/P ACL reconstruction      Past Surgical History:  Past Surgical History:   Procedure Laterality Date   • ANKLE SURGERY Right    • BACK SURGERY      X2   • KNEE ACL  "RECONSTRUCTION Left    • NASAL SEPTUM SURGERY     • PROSTATE ULTRASOUND BIOPSY N/A 2/18/2020    Procedure: PROSTATE  BIOPSY;  Surgeon: Trae Clark MD;  Location: [facility] OR;  Service: Urology;  Laterality: N/A;   • SHOULDER SURGERY Left     X 4   • TENNIS ELBOW RELEASE Right 04/14/2011     Social History:  reports that he has been smoking cigarettes. He started smoking about 33 years ago. He has a 15.00 pack-year smoking history. He has never used smokeless tobacco. He reports that he does not drink alcohol or use drugs.    Family History: Reviewed and non-contributory     Allergies:  Allergies   Allergen Reactions   • Contrast Dye Diarrhea and Nausea And Vomiting     Medications:  Prior to Admission medications    Medication Sig Start Date End Date Taking? Authorizing Provider   albuterol sulfate  (90 Base) MCG/ACT inhaler Inhale 2 puffs Every 4 (Four) Hours As Needed for Wheezing or Shortness of Air.    Amber Taylor MD   gabapentin (NEURONTIN) 800 MG tablet Take 800 mg by mouth 3 (Three) Times a Day.    Amber Taylor MD   ibuprofen (ADVIL,MOTRIN) 800 MG tablet Take 800 mg by mouth Every 6 (Six) Hours As Needed for Mild Pain .    Amber Taylor MD   sildenafil (VIAGRA) 100 MG tablet Take 1/2 to 1 tab by mouth 1 hour prior to intercourse 10/21/19   Trae Clark MD   sodium phosphate (FLEET) 7-19 GM/118ML enema Use rectally the evening prior to biopsy 1/23/20   Trae Clark MD   varenicline (CHANTIX) 1 MG tablet Take 1 mg by mouth 2 (Two) Times a Day.    ProviderAmber MD     Objective     Vital Signs: /71   Pulse 86   Temp 98.6 °F (37 °C)   Resp 15   Ht 188 cm (74\")   Wt 99.8 kg (220 lb)   SpO2 92%   BMI 28.25 kg/m²   Physical Exam  GEN: Awake, alert, interactive, in NAD, looks fatigued  HEENT: PERRLA, EOMI, Anicteric, Trachea midline  Lungs: CTAB, no wheezing/rales/rhonchi  Heart: RRR, +S1/s2, no rub  ABD: soft, nt/nd, +BS, no " guarding/rebound  Extremities: atraumatic, no cyanosis, no edema  Skin: no obvious rashes or bites. Some LE excoriations w/o drainage  Neuro: AAOx3, no focal deficits  Psych: normal mood & affect        Results Reviewed:  Lab Results (last 24 hours)     Procedure Component Value Units Date/Time    [place] Draw [[id number]] Collected:  06/07/20 0825    Specimen:  Blood Updated:  06/07/20 0931    Narrative:       The following orders were created for panel order [place] Draw.  Procedure                               Abnormality         Status                     ---------                               -----------         ------                     Light Blue Top[[id number]]                                   Final result               Red Top[[id number]]                                          Final result                 Please view results for these tests on the individual orders.    Light Blue Top [[id number]] Collected:  06/07/20 0825    Specimen:  Blood Updated:  06/07/20 0931     Extra Tube hold for add-on     Comment: Auto resulted       Red Top [[id number]] Collected:  06/07/20 0825    Specimen:  Blood Updated:  06/07/20 0931     Extra Tube Hold for add-ons.     Comment: Auto resulted.       Urinalysis, Microscopic Only - Urine, Clean Catch [[id number]]  (Abnormal) Collected:  06/07/20 0915    Specimen:  Urine, Clean Catch Updated:  06/07/20 0927     RBC, UA 3-5 /HPF      WBC, UA 0-2 /HPF      Bacteria, UA None Seen /HPF      Squamous Epithelial Cells, UA 0-2 /HPF      Hyaline Casts, UA 0-2 /LPF      Methodology Automated Microscopy    Urinalysis With Culture If Indicated - Urine, Clean Catch [[id number]]  (Abnormal) Collected:  06/07/20 0915    Specimen:  Urine, Clean Catch Updated:  06/07/20 0927     Color, UA Dark Yellow     Appearance, UA Cloudy     pH, UA 5.5     Specific Gravity, UA >1.030     Glucose, UA Negative     Ketones, UA 15 mg/dL (1+)     Bilirubin, UA Small (1+)     Blood, UA Negative     Protein, UA  100 mg/dL (2+)     Leuk Esterase, UA Trace     Nitrite, UA Negative     Urobilinogen, UA 1.0 E.U./dL    CBC & Differential [[id number]] Collected:  06/07/20 0825    Specimen:  Blood, Venous Line Updated:  06/07/20 0915    Narrative:       The following orders were created for panel order CBC & Differential.  Procedure                               Abnormality         Status                     ---------                               -----------         ------                     CBC Auto Differential[[id number]]        Abnormal            Final result                 Please view results for these tests on the individual orders.    CBC Auto Differential [[id number]]  (Abnormal) Collected:  06/07/20 0825    Specimen:  Blood, Venous Line Updated:  06/07/20 0915     WBC 2.90 10*3/mm3      RBC 4.82 10*6/mm3      Hemoglobin 14.3 g/dL      Hematocrit 41.2 %      MCV 85.5 fL      MCH 29.7 pg      MCHC 34.7 g/dL      RDW 13.5 %      RDW-SD 42.2 fl      MPV 10.2 fL      Platelets 92 10*3/mm3     Manual Differential [[id number]]  (Abnormal) Collected:  06/07/20 0825    Specimen:  Blood, Venous Line Updated:  06/07/20 0915     Neutrophil % 72.0 %      Lymphocyte % 11.0 %      Monocyte % 2.0 %      Bands %  15.0 %      Neutrophils Absolute 2.52 10*3/mm3      Lymphocytes Absolute 0.32 10*3/mm3      Monocytes Absolute 0.06 10*3/mm3      RBC Morphology Normal     WBC Morphology Normal     Platelet Estimate Decreased    Procalcitonin [[id number]]  (Normal) Collected:  06/07/20 0825    Specimen:  Blood, Venous Line Updated:  06/07/20 0911     Procalcitonin 0.24 ng/mL     Narrative:       As a Marker for Sepsis (Non-Neonates):   1. <0.5 ng/mL represents a low risk of severe sepsis and/or septic shock.  1. >2 ng/mL represents a high risk of severe sepsis and/or septic shock.    As a Marker for Lower Respiratory Tract Infections that require antibiotic therapy:  PCT on Admission     Antibiotic Therapy             6-12 Hrs later  > 0.5         "        Strongly Recommended            >0.25 - <0.5         Recommended  0.1 - 0.25           Discouraged                   Remeasure/reassess PCT  <0.1                 Strongly Discouraged          Remeasure/reassess PCT      As 28 day mortality risk marker: \"Change in Procalcitonin Result\" (> 80 % or <=80 %) if Day 0 (or Day 1) and Day 4 values are available. Refer to http://www.[facility] Livepct-calculator.com/   Change in PCT <=80 %   A decrease of PCT levels below or equal to 80 % defines a positive change in PCT test result representing a higher risk for 28-day all-cause mortality of patients diagnosed with severe sepsis or septic shock.  Change in PCT > 80 %   A decrease of PCT levels of more than 80 % defines a negative change in PCT result representing a lower risk for 28-day all-cause mortality of patients diagnosed with severe sepsis or septic shock.                Results may be falsely decreased if patient taking Biotin.     Comprehensive Metabolic Panel [[id number]]  (Abnormal) Collected:  06/07/20 0825    Specimen:  Blood, Venous Line Updated:  06/07/20 0905     Glucose 126 mg/dL      BUN 14 mg/dL      Creatinine 1.09 mg/dL      Sodium 138 mmol/L      Potassium 3.5 mmol/L      Chloride 103 mmol/L      CO2 22.0 mmol/L      Calcium 8.8 mg/dL      Total Protein 6.5 g/dL      Albumin 3.90 g/dL      ALT (SGPT) 61 U/L      AST (SGOT) 81 U/L      Alkaline Phosphatase 110 U/L      Total Bilirubin 0.5 mg/dL      eGFR Non African Amer 71 mL/min/1.73      Globulin 2.6 gm/dL      A/G Ratio 1.5 g/dL      BUN/Creatinine Ratio 12.8     Anion Gap 13.0 mmol/L     Narrative:       GFR Normal >60  Chronic Kidney Disease <60  Kidney Failure <15      Blood Culture - Blood, Arm, Left [[id number]] Collected:  06/07/20 0825    Specimen:  Blood from Arm, Left Updated:  06/07/20 0905    Blood Culture - Blood, Arm, Right [[id number]] Collected:  06/07/20 0830    Specimen:  Blood from Arm, Right Updated:  06/07/20 0905    Lactic Acid, " Plasma [[id number]]  (Normal) Collected:  06/07/20 0825    Specimen:  Blood, Venous Line Updated:  06/07/20 0903     Lactate 0.9 mmol/L     Amylase [[id number]]  (Abnormal) Collected:  06/07/20 0825    Specimen:  Blood, Venous Line Updated:  06/07/20 0902     Amylase 15 U/L     Lipase [[id number]]  (Normal) Collected:  06/07/20 0825    Specimen:  Blood, Venous Line Updated:  06/07/20 0900     Lipase 18 U/L         Imaging Results (Last 24 Hours)     Procedure Component Value Units Date/Time    CT Abdomen Pelvis Without Contrast [[id number]] Collected:  06/07/20 1118     Updated:  06/07/20 1124    Narrative:       EXAMINATION: CT ABDOMEN PELVIS WO CONTRAST-      6/7/2020 11:08 AM CDT     HISTORY: Abdominal pain, unspecified     In order to have a CT radiation dose as low as reasonably achievable  Automated Exposure Control was utilized for adjustment of the mA and/or  KV according to patient size.     DLP in mGycm= 349.     Abdomen pelvis CT with oral contrast only.     Comparison is made with 2/20/2020.     Normal heart size.  No acute abnormality at the lung bases.     Normal noncontrast appearance of the liver, gallbladder, pancreas,  spleen, adrenal glands, and kidneys.  Oral contrast is seen within the small bowel and within the colon.  Sigmoid diverticula.  No diverticulitis.  No colitis.  No appendicitis.     No pelvic mass or fluid.  Lumbar spine postsurgical changes at L4-5.     Summary:  1. No acute abnormality is seen.                                         This report was finalized on 06/07/2020 11:21 by Dr. Joon Agosto MD.        I have personally reviewed and interpreted the radiology studies and ECG obtained at time of admission.     Assessment / Plan     Assessment:   Active Hospital Problems    Diagnosis   • **Leukopenia   • Fever   • Thrombocytopenia (CMS/HCC)   • Tobacco use   • Prostate cancer (CMS/HCC)     Added automatically from request for surgery [id number]           Plan:   #1 FUO - Patient is  afebrile here but reports fever up to 103 for 4 days at home and in general not feeling well.  High suspicion for ehrlichiosis given the fact that patient has leukopenia, thrombocytopenia, transaminitis, high fever and recent [place] tick exposure over last 2 weeks.  We will get no Ehrlichia PCR and peripheral blood smear at this time.  Start doxycycline 100 IV twice daily for now.  If able to take p.o. can switch to oral tomorrow.    #2 intermittent nausea and vomiting -supportive care.  IV fluid resuscitation.  Zofran as needed.  Okay to start diet and monitor.    #3 history tobacco use -discuss cessation.  Nicotine patch.    #4 thrombocytopenia -as above.  Suspect tickborne illness.  No signs of bleeding.  No need to be transfused.  Monitor and recheck in the morning.    #5 history of prostate cancer -not on any chemotherapy.  Not immunosuppressive.  Had upcoming plans for prostatectomy.  No emergent need for neurology evaluation at this time.  Monitor urine output.      Code Status: Full code     I discussed the patient's findings and my recommendations with the patient directly as well as ER provider    Estimated length of stay 1-2 days    Patient seen and examined by me on 6/7/2020 at 12:25 PM in ER 11.    Nuno Corea DO   06/07/20   12:40

## 2020-06-07 NOTE — PLAN OF CARE
Problem: Patient Care Overview  Goal: Plan of Care Review  Outcome: Ongoing (interventions implemented as appropriate)  Flowsheets (Taken 6/7/2020 1620)  Progress: no change  Plan of Care Reviewed With: patient  Outcome Summary: Pt admitted with leukopenia and thrombocytopenia. Dr. Corea believing pt has erlichiosis per H&P. Pt spiked temp of 102.2, Dr. Corea notified, no new orders at this time except to give the prn tylenol. Pt having no c/o pain thus far. Voiding. will cont to monitor.

## 2020-06-07 NOTE — ED PROVIDER NOTES
Subjective   Patient is a pleasant 54-year-old male who presents with c/o fever, generalized weakness, abdominal pain and possible dehydration.  He reports that he started to feel ill on Thursday with low grade fever. History of known prostate cancer diagnosed 1 year ago.  He is monitored closely by his urologist (Dr. Clark).  Completed an MRI of the pelvis and biopsy of his prostate earlier this year.  They contacted his urologist and were advised that if his temp got over 102 to report to ER for eval.  They were out of town and presented to [facility] on Friday.  Per pt/spouse he had labs and xray/CT of his chest.  Given Levaquin and DC'd.  No abdominal imaging completed.  After ER discharge he developed vomiting.  Since then he has had decreased PO intake, increased epigastric abdominal pain with radiation throughout his abdomen and into his back/flank area. C/o abdominal bloating. Decreased urine output but he attributes to decreased intake.  Decreased BM, he suspects constipation.  His temp has been controlled with tylenol/motrin but per pt/spouse it comes back ever 4-5 hours measuring over 102.  Last temp was 102.5 around 5 AM today, given ibuprofen.  He denies similar symptoms previously.     He also has a remote hx of L side pneumonia.  Per patient, imaging was clear at [facility] Friday- tested for COVID and negative. He denies any CP, palpitations, SOA, cough, wheezing, known COVID exposure/exposure to anyone being tested/quarantined for COVID.  Denies weakness/paresthesias in his upper/lower extremities.  Denies musculoskeletal back pain.       History provided by:  Patient      Review of Systems   Constitutional: Positive for appetite change, chills, fatigue and fever. Negative for diaphoresis.   HENT: Negative for trouble swallowing and voice change.    Eyes: Negative for visual disturbance.   Respiratory: Negative for cough, choking, chest tightness, shortness of breath and wheezing.    Cardiovascular: Negative  for chest pain, palpitations and leg swelling.   Gastrointestinal: Positive for abdominal pain, constipation, nausea and vomiting. Negative for blood in stool.        Abdominal bloating; denies hematemesis   Genitourinary: Positive for decreased urine volume and flank pain. Negative for dysuria, hematuria, scrotal swelling, testicular pain and urgency.        + prostate cancer   Musculoskeletal: Negative for arthralgias, back pain, gait problem, joint swelling, neck pain and neck stiffness.   Skin: Negative for color change, pallor and rash.   Neurological: Positive for weakness. Negative for tremors, syncope, facial asymmetry, speech difficulty, numbness and headaches.   Hematological: Negative for adenopathy. Does not bruise/bleed easily.   Psychiatric/Behavioral: Negative for confusion and decreased concentration.       Past Medical History:   Diagnosis Date   • Arthritis    • Bronchitis    • Cancer (CMS/HCC)     PROSTATE   • S/P ACL reconstruction        Allergies   Allergen Reactions   • Contrast Dye Diarrhea and Nausea And Vomiting       Past Surgical History:   Procedure Laterality Date   • ANKLE SURGERY Right    • BACK SURGERY      X2   • KNEE ACL RECONSTRUCTION Left    • NASAL SEPTUM SURGERY     • PROSTATE ULTRASOUND BIOPSY N/A 2/18/2020    Procedure: PROSTATE  BIOPSY;  Surgeon: Trae Clark MD;  Location: [facility];  Service: Urology;  Laterality: N/A;   • SHOULDER SURGERY Left     X 4   • TENNIS ELBOW RELEASE Right 04/14/2011       No family history on file.    Social History     Socioeconomic History   • Marital status:      Spouse name: Not on file   • Number of children: Not on file   • Years of education: Not on file   • Highest education level: Not on file   Tobacco Use   • Smoking status: Current Every Day Smoker     Packs/day: 1.00     Years: 15.00     Pack years: 15.00     Types: Cigarettes     Start date: 2/7/1987   • Smokeless tobacco: Never Used   • Tobacco comment: chnatjoaquin trying  to quit   Substance and Sexual Activity   • Alcohol use: No   • Drug use: No   • Sexual activity: Defer         Objective   Physical Exam   Constitutional: He is oriented to person, place, and time. He appears well-developed and well-nourished.  Non-toxic appearance. No distress.   Appears tired but nontoxic   HENT:   Head: Normocephalic and atraumatic.   Eyes: Pupils are equal, round, and reactive to light. EOM are normal. No scleral icterus.   Neck: Normal range of motion. Neck supple.   Cardiovascular: Normal rate, regular rhythm, normal heart sounds and intact distal pulses.   No murmur heard.  Pulses:       Radial pulses are 2+ on the right side, and 2+ on the left side.        Dorsalis pedis pulses are 2+ on the right side, and 2+ on the left side.        Posterior tibial pulses are 2+ on the right side, and 2+ on the left side.   Pulmonary/Chest: Effort normal and breath sounds normal. No stridor. No respiratory distress. He has no wheezes. He has no rhonchi. He has no rales. He exhibits no tenderness.   Abdominal: Soft. Normal appearance. He exhibits no fluid wave, no ascites and no mass. Bowel sounds are decreased. There is no rigidity, no rebound and no tenderness at McBurney's point. No hernia.   Generalized tenderness with palpation but worse in epigastric area   Musculoskeletal: Normal range of motion. He exhibits no edema.   Lymphadenopathy:     He has no cervical adenopathy.   Neurological: He is alert and oriented to person, place, and time. He displays normal reflexes. No sensory deficit. He exhibits normal muscle tone. Coordination normal.   CN normal as tested   Skin: Skin is warm and dry. Capillary refill takes less than 2 seconds. No rash noted. He is not diaphoretic. No pallor.   Psychiatric: He has a normal mood and affect. His behavior is normal.   Nursing note and vitals reviewed.      Procedures           ED Course  ED Course as of Jun 07 1449   Sun Jun 07, 2020   0821 Requested records  "from [facility]    [DC]   0903 Records reviewed from [facility]- CXR showed minimal linear scarring/atelectasis L base; no evidence of pneumonia, failure or effusion. GFR >60, Cr 1.22, Lactic 0.80. WBC 3.5, RBC 4.89, Platelet 145.  Per records, he was given Levaquin due to fever and hx of COPD as precaution    [DC]   0938 Pt stable, resting.  BP improving. Labs reviewed- lactic, procalcitonin normal, renal function WNL.  CT pending- tolerated PO contrast.    [DC]   1155 Pt stable, BP improving.  Concerning due to decreasing WBC, bandemia with hx of prostate cancer- he is not currently on any chemo/radiation tx for this. Discussed with Dr. Guadarrama who has also reviewed case.  Will contact urology as concern for underlying prostatitis given his hx- not responding to PO levaquin    [DC]   1205 Discussed with Dr. Castillo due to prostate cancer - will consult if needed but recommends admission to hospitalist due to leukopenia, bandemia.    SpO2: 93 % [DC]   1216 Discussed case with Dr. Corea- will accept pt for obs/further eval.  Dr. Corea has requested to hold Invanz for now but will monitor closely.  Pt to be admitted.    [DC]      ED Course User Index  [DC] Castleman, Danna D, PA      /64 (BP Location: Right arm, Patient Position: Lying)   Pulse 89   Temp 99.7 °F (37.6 °C) (Oral)   Resp 20   Ht 188 cm (74\")   Wt 99.8 kg (220 lb)   SpO2 98%   BMI 28.25 kg/m²                                MDM  Number of Diagnoses or Management Options  Bandemia: new and requires workup  Epigastric pain:   Leukopenia, unspecified type: new and requires workup  Prostate cancer (CMS/HCC):      Amount and/or Complexity of Data Reviewed  Clinical lab tests: reviewed  Tests in the radiology section of CPT®: reviewed  Decide to obtain previous medical records or to obtain history from someone other than the patient: yes  Review and summarize past medical records: yes  Discuss the patient with other providers: yes    Risk of Complications, " Morbidity, and/or Mortality  Presenting problems: moderate    Patient Progress  Patient progress: stable      Final diagnoses:   Leukopenia, unspecified type   Bandemia   Epigastric pain   Prostate cancer (CMS/HCC)            Castleman, Danna D, PA  06/07/20 2919

## 2020-06-08 LAB
ALBUMIN SERPL-MCNC: 3.1 G/DL (ref 3.5–5.2)
ALBUMIN/GLOB SERPL: 1.4 G/DL
ALP SERPL-CCNC: 108 U/L (ref 39–117)
ALT SERPL W P-5'-P-CCNC: 56 U/L (ref 1–41)
ANION GAP SERPL CALCULATED.3IONS-SCNC: 12 MMOL/L (ref 5–15)
AST SERPL-CCNC: 58 U/L (ref 1–40)
BILIRUB SERPL-MCNC: 0.5 MG/DL (ref 0.2–1.2)
BUN BLD-MCNC: 11 MG/DL (ref 6–20)
BUN/CREAT SERPL: 12.4 (ref 7–25)
CALCIUM SPEC-SCNC: 8.1 MG/DL (ref 8.6–10.5)
CHLORIDE SERPL-SCNC: 108 MMOL/L (ref 98–107)
CO2 SERPL-SCNC: 20 MMOL/L (ref 22–29)
CREAT BLD-MCNC: 0.89 MG/DL (ref 0.76–1.27)
DEPRECATED RDW RBC AUTO: 43.5 FL (ref 37–54)
ERYTHROCYTE [DISTWIDTH] IN BLOOD BY AUTOMATED COUNT: 13.6 % (ref 12.3–15.4)
GFR SERPL CREATININE-BSD FRML MDRD: 90 ML/MIN/1.73
GLOBULIN UR ELPH-MCNC: 2.2 GM/DL
GLUCOSE BLD-MCNC: 108 MG/DL (ref 65–99)
HCT VFR BLD AUTO: 38.4 % (ref 37.5–51)
HGB BLD-MCNC: 12.9 G/DL (ref 13–17.7)
LYMPHOCYTES # BLD MANUAL: 0.97 10*3/MM3 (ref 0.7–3.1)
LYMPHOCYTES NFR BLD MANUAL: 29 % (ref 19.6–45.3)
LYMPHOCYTES NFR BLD MANUAL: 3 % (ref 5–12)
MAGNESIUM SERPL-MCNC: 1.6 MG/DL (ref 1.6–2.6)
MCH RBC QN AUTO: 29.3 PG (ref 26.6–33)
MCHC RBC AUTO-ENTMCNC: 33.6 G/DL (ref 31.5–35.7)
MCV RBC AUTO: 87.3 FL (ref 79–97)
MONOCYTES # BLD AUTO: 0.1 10*3/MM3 (ref 0.1–0.9)
NEUTROPHILS # BLD AUTO: 2.22 10*3/MM3 (ref 1.7–7)
NEUTROPHILS NFR BLD MANUAL: 62 % (ref 42.7–76)
NEUTS BAND NFR BLD MANUAL: 4 % (ref 0–5)
PHOSPHATE SERPL-MCNC: 1.8 MG/DL (ref 2.5–4.5)
PHOSPHATE SERPL-MCNC: 2.8 MG/DL (ref 2.5–4.5)
PLATELET # BLD AUTO: 84 10*3/MM3 (ref 140–450)
PMV BLD AUTO: 10.5 FL (ref 6–12)
POTASSIUM BLD-SCNC: 3.7 MMOL/L (ref 3.5–5.2)
PROT SERPL-MCNC: 5.3 G/DL (ref 6–8.5)
RBC # BLD AUTO: 4.4 10*6/MM3 (ref 4.14–5.8)
RBC MORPH BLD: NORMAL
SMALL PLATELETS BLD QL SMEAR: ABNORMAL
SODIUM BLD-SCNC: 140 MMOL/L (ref 136–145)
VARIANT LYMPHS NFR BLD MANUAL: 2 % (ref 0–5)
WBC MORPH BLD: NORMAL
WBC NRBC COR # BLD: 3.36 10*3/MM3 (ref 3.4–10.8)

## 2020-06-08 PROCEDURE — 96366 THER/PROPH/DIAG IV INF ADDON: CPT

## 2020-06-08 PROCEDURE — 83735 ASSAY OF MAGNESIUM: CPT | Performed by: INTERNAL MEDICINE

## 2020-06-08 PROCEDURE — 80053 COMPREHEN METABOLIC PANEL: CPT | Performed by: INTERNAL MEDICINE

## 2020-06-08 PROCEDURE — 25010000002 ONDANSETRON PER 1 MG: Performed by: INTERNAL MEDICINE

## 2020-06-08 PROCEDURE — 96375 TX/PRO/DX INJ NEW DRUG ADDON: CPT

## 2020-06-08 PROCEDURE — 96361 HYDRATE IV INFUSION ADD-ON: CPT

## 2020-06-08 PROCEDURE — G0378 HOSPITAL OBSERVATION PER HR: HCPCS

## 2020-06-08 PROCEDURE — 85025 COMPLETE CBC W/AUTO DIFF WBC: CPT | Performed by: INTERNAL MEDICINE

## 2020-06-08 PROCEDURE — 84100 ASSAY OF PHOSPHORUS: CPT | Performed by: INTERNAL MEDICINE

## 2020-06-08 PROCEDURE — 63710000001 DIPHENHYDRAMINE PER 50 MG: Performed by: INTERNAL MEDICINE

## 2020-06-08 RX ORDER — DIPHENHYDRAMINE HCL 25 MG
25 CAPSULE ORAL NIGHTLY PRN
Status: DISCONTINUED | OUTPATIENT
Start: 2020-06-08 | End: 2020-06-09 | Stop reason: HOSPADM

## 2020-06-08 RX ADMIN — OXYCODONE HYDROCHLORIDE 5 MG: 5 TABLET ORAL at 06:39

## 2020-06-08 RX ADMIN — POTASSIUM & SODIUM PHOSPHATES POWDER PACK 280-160-250 MG 2 PACKET: 280-160-250 PACK at 10:20

## 2020-06-08 RX ADMIN — DIPHENHYDRAMINE HYDROCHLORIDE 25 MG: 25 CAPSULE ORAL at 21:07

## 2020-06-08 RX ADMIN — DOXYCYCLINE 100 MG: 100 INJECTION, POWDER, LYOPHILIZED, FOR SOLUTION INTRAVENOUS at 21:07

## 2020-06-08 RX ADMIN — SODIUM CHLORIDE 125 ML/HR: 9 INJECTION, SOLUTION INTRAVENOUS at 14:54

## 2020-06-08 RX ADMIN — OXYCODONE HYDROCHLORIDE 5 MG: 5 TABLET ORAL at 14:54

## 2020-06-08 RX ADMIN — SODIUM CHLORIDE, PRESERVATIVE FREE 10 ML: 5 INJECTION INTRAVENOUS at 09:03

## 2020-06-08 RX ADMIN — OXYCODONE HYDROCHLORIDE 5 MG: 5 TABLET ORAL at 21:07

## 2020-06-08 RX ADMIN — SODIUM CHLORIDE 125 ML/HR: 9 INJECTION, SOLUTION INTRAVENOUS at 05:04

## 2020-06-08 RX ADMIN — DOXYCYCLINE 100 MG: 100 INJECTION, POWDER, LYOPHILIZED, FOR SOLUTION INTRAVENOUS at 09:02

## 2020-06-08 RX ADMIN — ONDANSETRON HYDROCHLORIDE 4 MG: 2 SOLUTION INTRAMUSCULAR; INTRAVENOUS at 06:37

## 2020-06-08 RX ADMIN — NICOTINE 1 PATCH: 14 PATCH TRANSDERMAL at 09:02

## 2020-06-08 NOTE — PLAN OF CARE
Problem: Patient Care Overview  Goal: Plan of Care Review  Outcome: Ongoing (interventions implemented as appropriate)  Flowsheets (Taken 6/8/2020 0845)  Outcome Summary: Pt has been afebrile this shift. Up ad lanette in room. IV fluids and abx continued. Pt states he feels much better today. Phosphorus was 1.8 and po phosphorus given and to be rechecked at 1600.

## 2020-06-08 NOTE — PROGRESS NOTES
[facility] Medicine Services  INPATIENT PROGRESS NOTE    Patient Name: Lalo Jones  Date of Admission: 6/7/2020  Today's Date: 06/08/20  Length of Stay: 0  Primary Care Physician: Flex Sheikh MD    Subjective   Chief Complaint: Follow-up  HPI   Patient's states that he is 100% better.  He had epigastric pain which after 3-4 episodes of loose bowel movement had improved.  He was recently on Levaquin.  He is now on doxycycline for concern of tick related illness  He denies any rash  He states the body aches similar to having flu are improved  Review of Systems     All pertinent negatives and positives are as above. All other systems have been reviewed and are negative unless otherwise stated.     Objective    Temp:  [98.1 °F (36.7 °C)-100.4 °F (38 °C)] 98.4 °F (36.9 °C)  Heart Rate:  [72-85] 74  Resp:  [16-18] 18  BP: ()/(51-66) 121/62  Physical Exam   Constitutional: He is oriented to person, place, and time. He appears well-developed and well-nourished. No distress.   HENT:   Head: Normocephalic and atraumatic.   Right Ear: External ear normal.   Left Ear: External ear normal.   Nose: Nose normal.   Mouth/Throat: Oropharynx is clear and moist.   Eyes: Pupils are equal, round, and reactive to light. Conjunctivae and EOM are normal.   Neck: Normal range of motion. Neck supple. No tracheal deviation present. No thyromegaly present.   Pulmonary/Chest: Effort normal and breath sounds normal. No stridor. No respiratory distress. He has no rales.   Abdominal: Soft. Bowel sounds are normal. He exhibits no distension. There is no tenderness. There is no guarding.   Musculoskeletal: Normal range of motion. He exhibits no edema.   Neurological: He is alert and oriented to person, place, and time. No cranial nerve deficit. He exhibits normal muscle tone.   Skin: Skin is warm and dry. Capillary refill takes less than 2 seconds. He is not diaphoretic. No erythema.    Psychiatric: He has a normal mood and affect. His behavior is normal. Judgment and thought content normal.   Vitals reviewed.          Results Review:  I have reviewed the labs, radiology results, and diagnostic studies.    Laboratory Data:   Results from last 7 days   Lab Units 20  0607 20  0825   WBC 10*3/mm3 3.36* 2.90*   HEMOGLOBIN g/dL 12.9* 14.3   HEMATOCRIT % 38.4 41.2   PLATELETS 10*3/mm3 84* 92*        Results from last 7 days   Lab Units 20  0607 20  0825   SODIUM mmol/L 140 138   POTASSIUM mmol/L 3.7 3.5   CHLORIDE mmol/L 108* 103   CO2 mmol/L 20.0* 22.0   BUN mg/dL 11 14   CREATININE mg/dL 0.89 1.09   CALCIUM mg/dL 8.1* 8.8   BILIRUBIN mg/dL 0.5 0.5   ALK PHOS U/L 108 110   ALT (SGPT) U/L 56* 61*   AST (SGOT) U/L 58* 81*   GLUCOSE mg/dL 108* 126*       Culture Data:   Blood Culture   Date Value Ref Range Status   2020 No growth at 24 hours  Preliminary   2020 No growth at 24 hours  Preliminary       Radiology Data:   Imaging Results (Last 24 Hours)     ** No results found for the last 24 hours. **          I have reviewed the patient's current medications.     Assessment/Plan     Active Hospital Problems    Diagnosis   • **Leukopenia   • Fever   • Thrombocytopenia (CMS/HCC)   • Tobacco use   • Transaminitis   • Prostate cancer (CMS/HCC)     Added automatically from request for surgery [id number]         Leukopenia and thrombocytopenia improving  Body aches improving  Transaminitis slightly improved    Clinically patient is doing better.  Patient had 3 episodes of loose bowel movement.  Will monitor volume status and chemistry      Discharge Plannin-2 days  Chuy Orellana MD   20   17:26

## 2020-06-08 NOTE — PLAN OF CARE
Problem: Patient Care Overview  Goal: Plan of Care Review  Outcome: Ongoing (interventions implemented as appropriate)  Flowsheets (Taken 6/8/2020 0315)  Progress: no change  Plan of Care Reviewed With: patient  Outcome Summary: Max temp 100.2 this shift.  No requests from patient for pain or nausea medication.  Provided patient with reading material from [employer] regarding Ehrlichiosis.  IVF and IV abx given as ordered.  Pt has slept well between care.

## 2020-06-09 VITALS
RESPIRATION RATE: 16 BRPM | BODY MASS INDEX: 28.23 KG/M2 | HEIGHT: 74 IN | HEART RATE: 71 BPM | WEIGHT: 220 LBS | TEMPERATURE: 98 F | OXYGEN SATURATION: 94 % | SYSTOLIC BLOOD PRESSURE: 117 MMHG | DIASTOLIC BLOOD PRESSURE: 65 MMHG

## 2020-06-09 LAB
ALBUMIN SERPL-MCNC: 3.2 G/DL (ref 3.5–5.2)
ALBUMIN/GLOB SERPL: 1.5 G/DL
ALP SERPL-CCNC: 113 U/L (ref 39–117)
ALT SERPL W P-5'-P-CCNC: 51 U/L (ref 1–41)
ANION GAP SERPL CALCULATED.3IONS-SCNC: 10 MMOL/L (ref 5–15)
AST SERPL-CCNC: 46 U/L (ref 1–40)
BASOPHILS # BLD MANUAL: 0.05 10*3/MM3 (ref 0–0.2)
BASOPHILS NFR BLD AUTO: 1 % (ref 0–1.5)
BILIRUB SERPL-MCNC: 0.3 MG/DL (ref 0.2–1.2)
BUN BLD-MCNC: 8 MG/DL (ref 6–20)
BUN/CREAT SERPL: 10.7 (ref 7–25)
CALCIUM SPEC-SCNC: 8.1 MG/DL (ref 8.6–10.5)
CHLORIDE SERPL-SCNC: 109 MMOL/L (ref 98–107)
CO2 SERPL-SCNC: 22 MMOL/L (ref 22–29)
CREAT BLD-MCNC: 0.75 MG/DL (ref 0.76–1.27)
CYTOLOGIST CVX/VAG CYTO: NORMAL
DEPRECATED RDW RBC AUTO: 43.7 FL (ref 37–54)
EOSINOPHIL # BLD MANUAL: 0.17 10*3/MM3 (ref 0–0.4)
EOSINOPHIL NFR BLD MANUAL: 3.1 % (ref 0.3–6.2)
ERYTHROCYTE [DISTWIDTH] IN BLOOD BY AUTOMATED COUNT: 13.7 % (ref 12.3–15.4)
GFR SERPL CREATININE-BSD FRML MDRD: 109 ML/MIN/1.73
GIANT PLATELETS: ABNORMAL
GLOBULIN UR ELPH-MCNC: 2.2 GM/DL
GLUCOSE BLD-MCNC: 98 MG/DL (ref 65–99)
HCT VFR BLD AUTO: 38 % (ref 37.5–51)
HGB BLD-MCNC: 13.1 G/DL (ref 13–17.7)
LYMPHOCYTES # BLD MANUAL: 1.33 10*3/MM3 (ref 0.7–3.1)
LYMPHOCYTES NFR BLD MANUAL: 24.5 % (ref 19.6–45.3)
LYMPHOCYTES NFR BLD MANUAL: 7.1 % (ref 5–12)
MCH RBC QN AUTO: 29.9 PG (ref 26.6–33)
MCHC RBC AUTO-ENTMCNC: 34.5 G/DL (ref 31.5–35.7)
MCV RBC AUTO: 86.8 FL (ref 79–97)
MONOCYTES # BLD AUTO: 0.38 10*3/MM3 (ref 0.1–0.9)
NEUTROPHILS # BLD AUTO: 3.04 10*3/MM3 (ref 1.7–7)
NEUTROPHILS NFR BLD MANUAL: 55.1 % (ref 42.7–76)
NEUTS BAND NFR BLD MANUAL: 1 % (ref 0–5)
PATH INTERP BLD-IMP: NORMAL
PLASMA CELL PREC NFR BLD MANUAL: 2 % (ref 0–0)
PLATELET # BLD AUTO: 88 10*3/MM3 (ref 140–450)
PMV BLD AUTO: 11.2 FL (ref 6–12)
POTASSIUM BLD-SCNC: 3.7 MMOL/L (ref 3.5–5.2)
PROT SERPL-MCNC: 5.4 G/DL (ref 6–8.5)
RBC # BLD AUTO: 4.38 10*6/MM3 (ref 4.14–5.8)
RBC MORPH BLD: NORMAL
SMALL PLATELETS BLD QL SMEAR: ABNORMAL
SMUDGE CELLS BLD QL SMEAR: ABNORMAL
SODIUM BLD-SCNC: 141 MMOL/L (ref 136–145)
VARIANT LYMPHS NFR BLD MANUAL: 6.1 % (ref 0–5)
WBC NRBC COR # BLD: 5.42 10*3/MM3 (ref 3.4–10.8)

## 2020-06-09 PROCEDURE — 96366 THER/PROPH/DIAG IV INF ADDON: CPT

## 2020-06-09 PROCEDURE — 80053 COMPREHEN METABOLIC PANEL: CPT | Performed by: INTERNAL MEDICINE

## 2020-06-09 PROCEDURE — G0378 HOSPITAL OBSERVATION PER HR: HCPCS

## 2020-06-09 PROCEDURE — 85025 COMPLETE CBC W/AUTO DIFF WBC: CPT | Performed by: INTERNAL MEDICINE

## 2020-06-09 PROCEDURE — 85007 BL SMEAR W/DIFF WBC COUNT: CPT | Performed by: INTERNAL MEDICINE

## 2020-06-09 RX ORDER — ACETAMINOPHEN 325 MG/1
650 TABLET ORAL EVERY 4 HOURS PRN
Start: 2020-06-09 | End: 2020-09-15

## 2020-06-09 RX ORDER — DOXYCYCLINE 100 MG/1
100 CAPSULE ORAL 2 TIMES DAILY
Qty: 16 CAPSULE | Refills: 0 | Status: SHIPPED | OUTPATIENT
Start: 2020-06-09 | End: 2020-06-17

## 2020-06-09 RX ORDER — NICOTINE 21 MG/24HR
1 PATCH, TRANSDERMAL 24 HOURS TRANSDERMAL
Qty: 28 PATCH | Refills: 0 | Status: SHIPPED | OUTPATIENT
Start: 2020-06-10 | End: 2022-03-11

## 2020-06-09 RX ADMIN — DOXYCYCLINE 100 MG: 100 INJECTION, POWDER, LYOPHILIZED, FOR SOLUTION INTRAVENOUS at 09:18

## 2020-06-09 RX ADMIN — SODIUM CHLORIDE 125 ML/HR: 9 INJECTION, SOLUTION INTRAVENOUS at 00:15

## 2020-06-09 RX ADMIN — NICOTINE 1 PATCH: 14 PATCH TRANSDERMAL at 09:19

## 2020-06-09 RX ADMIN — SODIUM CHLORIDE, PRESERVATIVE FREE 10 ML: 5 INJECTION INTRAVENOUS at 09:21

## 2020-06-09 RX ADMIN — OXYCODONE HYDROCHLORIDE 5 MG: 5 TABLET ORAL at 03:39

## 2020-06-09 NOTE — PLAN OF CARE
Problem: Patient Care Overview  Goal: Plan of Care Review  Flowsheets (Taken 6/9/2020 0118)  Progress: improving  Plan of Care Reviewed With: patient  Note:   Ivf/ iv abx cont. Some abd pain, effective relief with po pain med. Tolerating diet. Voiding without difficulty. Afeb so far this shift. Hoping to go home later today. No acute distress noted. Cont to monitor.

## 2020-06-09 NOTE — DISCHARGE SUMMARY
[facility] Medicine Services  DISCHARGE SUMMARY       Date of Admission: 6/7/2020  Date of Discharge:  6/9/2020  Primary Care Physician: Flex Sheikh MD    Discharge Diagnoses:  Active Hospital Problems    Diagnosis   • **Leukopenia   • Fever   • Thrombocytopenia (CMS/HCC)   • Tobacco use   • Transaminitis   • Prostate cancer (CMS/HCC)     Added automatically from request for surgery [id number]           Presenting Problem/History of Present Illness:  Leukopenia, unspecified type [D72.819]         Hospital Course  He is a 54-year-old man who carries history of tobacco abuse and prostate cancer who presented in the emergency room with fever and body aches of 4 days duration.  This was accompanied by intermittent nausea and vomiting.  He went to [facility] and had negative COVID-19 tests.  He had a negative chest x-ray, normal urinalysis.  He was sent home on levofloxacin for unclear reason.  Fever reportedly persisted as high as 103.  Patient been taking Motrin and Tylenol prior to admission.      On initial evaluation urinalysis showed specific gravity greater than 1.030, he has ketonuria, trace leukocyte esterase, leukopenia with bandemia, thrombocytopenia.  Lipase is  normal so is his procalcitonin.  He had hypophosphatemia which was corrected.  Peripheral blood smear reported to have decreased platelets estimate and increased atypical lymphocytes consistent with CBC findings.  There is no Ehrlichia organism identified.  He had complained of epigastric pain described as hunger-like.  Noted that he had CT of the abdomen and pelvis on June 7 interpreted by radiologist as no acute abnormality seen.  He has been found with sigmoid diverticula without diverticulitis, colitis or appendicitis.  Liver, gallbladder, pancreas, spleen, adrenal glands and kidneys reportedly normal in this noncontrast exam.    By the time I saw him yesterday he said he is 100% better.  He had 3-4  episodes of loose stools which he had not had any recurrence since then.  Feels significantly better.  He has not complained of any nausea or vomiting.  He is tolerating his meal.  His abdominal discomfort has resolved since he had bowel movement.  He has not been febrile.  Body aches significantly improved.  White count is improving.  Culture showed no growth to date.  Ehrlichia DNA test is pending.  It is felt to be at his best condition and medically appropriate for discharge.  He will be discharged on doxycycline.  Instructed him to follow-up with his primary care provider for the pending tests prior to his discharge and this follow through CBC/CMP.      Procedures Performed:  None    Consults: none      Pertinent Test Results:  Lab Results (last 72 hours)     Procedure Component Value Units Date/Time    Manual Differential [[id number]]  (Abnormal) Collected:  06/09/20 0524    Specimen:  Blood Updated:  06/09/20 0617     Neutrophil % 55.1 %      Lymphocyte % 24.5 %      Monocyte % 7.1 %      Eosinophil % 3.1 %      Basophil % 1.0 %      Bands %  1.0 %      Atypical Lymphocyte % 6.1 %      Plasma Cells % 2.0 %      Neutrophils Absolute 3.04 10*3/mm3      Lymphocytes Absolute 1.33 10*3/mm3      Monocytes Absolute 0.38 10*3/mm3      Eosinophils Absolute 0.17 10*3/mm3      Basophils Absolute 0.05 10*3/mm3      RBC Morphology Normal     Smudge Cells Mod/2+     Platelet Estimate Decreased     Giant Platelets Mod/2+    CBC & Differential [[id number]] Collected:  06/09/20 0524    Specimen:  Blood Updated:  06/09/20 0617    Narrative:       The following orders were created for panel order CBC & Differential.  Procedure                               Abnormality         Status                     ---------                               -----------         ------                     CBC Auto Differential[[id number]]        Abnormal            Final result                 Please view results for these tests on the individual  orders.    CBC Auto Differential [[id number]]  (Abnormal) Collected:  06/09/20 0524    Specimen:  Blood Updated:  06/09/20 0617     WBC 5.42 10*3/mm3      RBC 4.38 10*6/mm3      Hemoglobin 13.1 g/dL      Hematocrit 38.0 %      MCV 86.8 fL      MCH 29.9 pg      MCHC 34.5 g/dL      RDW 13.7 %      RDW-SD 43.7 fl      MPV 11.2 fL      Platelets 88 10*3/mm3     Comprehensive Metabolic Panel [[id number]]  (Abnormal) Collected:  06/09/20 0524    Specimen:  Blood Updated:  06/09/20 0613     Glucose 98 mg/dL      BUN 8 mg/dL      Creatinine 0.75 mg/dL      Sodium 141 mmol/L      Potassium 3.7 mmol/L      Chloride 109 mmol/L      CO2 22.0 mmol/L      Calcium 8.1 mg/dL      Total Protein 5.4 g/dL      Albumin 3.20 g/dL      ALT (SGPT) 51 U/L      AST (SGOT) 46 U/L      Alkaline Phosphatase 113 U/L      Total Bilirubin 0.3 mg/dL      eGFR Non African Amer 109 mL/min/1.73      Globulin 2.2 gm/dL      A/G Ratio 1.5 g/dL      BUN/Creatinine Ratio 10.7     Anion Gap 10.0 mmol/L     Narrative:       GFR Normal >60  Chronic Kidney Disease <60  Kidney Failure <15      Phosphorus [[id number]]  (Normal) Collected:  06/08/20 1544    Specimen:  Blood Updated:  06/08/20 1629     Phosphorus 2.8 mg/dL     Blood Culture - Blood, Arm, Right [[id number]] Collected:  06/07/20 0830    Specimen:  Blood from Arm, Right Updated:  06/08/20 0915     Blood Culture No growth at 24 hours    Blood Culture - Blood, Arm, Left [[id number]] Collected:  06/07/20 0825    Specimen:  Blood from Arm, Left Updated:  06/08/20 0915     Blood Culture No growth at 24 hours    CBC Auto Differential [[id number]]  (Abnormal) Collected:  06/08/20 0607    Specimen:  Blood Updated:  06/08/20 0732     WBC 3.36 10*3/mm3      RBC 4.40 10*6/mm3      Hemoglobin 12.9 g/dL      Hematocrit 38.4 %      MCV 87.3 fL      MCH 29.3 pg      MCHC 33.6 g/dL      RDW 13.6 %      RDW-SD 43.5 fl      MPV 10.5 fL      Platelets 84 10*3/mm3     Manual Differential [[id number]]  (Abnormal) Collected:   06/08/20 0607    Specimen:  Blood Updated:  06/08/20 0732     Neutrophil % 62.0 %      Lymphocyte % 29.0 %      Monocyte % 3.0 %      Bands %  4.0 %      Atypical Lymphocyte % 2.0 %      Neutrophils Absolute 2.22 10*3/mm3      Lymphocytes Absolute 0.97 10*3/mm3      Monocytes Absolute 0.10 10*3/mm3      RBC Morphology Normal     WBC Morphology Normal     Platelet Estimate Decreased    Phosphorus [[id number]]  (Abnormal) Collected:  06/08/20 0607    Specimen:  Blood Updated:  06/08/20 0654     Phosphorus 1.8 mg/dL     Comprehensive Metabolic Panel [[id number]]  (Abnormal) Collected:  06/08/20 0607    Specimen:  Blood Updated:  06/08/20 0648     Glucose 108 mg/dL      BUN 11 mg/dL      Creatinine 0.89 mg/dL      Sodium 140 mmol/L      Potassium 3.7 mmol/L      Chloride 108 mmol/L      CO2 20.0 mmol/L      Calcium 8.1 mg/dL      Total Protein 5.3 g/dL      Albumin 3.10 g/dL      ALT (SGPT) 56 U/L      AST (SGOT) 58 U/L      Alkaline Phosphatase 108 U/L      Total Bilirubin 0.5 mg/dL      eGFR Non African Amer 90 mL/min/1.73      Globulin 2.2 gm/dL      A/G Ratio 1.4 g/dL      BUN/Creatinine Ratio 12.4     Anion Gap 12.0 mmol/L     Narrative:       GFR Normal >60  Chronic Kidney Disease <60  Kidney Failure <15      Magnesium [[id number]]  (Normal) Collected:  06/08/20 0607    Specimen:  Blood Updated:  06/08/20 0648     Magnesium 1.6 mg/dL     Ehrlichia Profile DNA PCR [[id number]] Collected:  06/07/20 1327    Specimen:  Blood Updated:  06/07/20 1349    Peripheral Blood Smear [[id number]] Collected:  06/07/20 1327    Specimen:  Blood Updated:  06/07/20 1349    Freeman Draw [[id number]] Collected:  06/07/20 0825    Specimen:  Blood Updated:  06/07/20 0931    Narrative:       The following orders were created for panel order Freeman Draw.  Procedure                               Abnormality         Status                     ---------                               -----------         ------                     Light Blue  Top[[id number]]                                   Final result               Red Top[[id number]]                                          Final result                 Please view results for these tests on the individual orders.    Light Blue Top [[id number]] Collected:  06/07/20 0825    Specimen:  Blood Updated:  06/07/20 0931     Extra Tube hold for add-on     Comment: Auto resulted       Red Top [[id number]] Collected:  06/07/20 0825    Specimen:  Blood Updated:  06/07/20 0931     Extra Tube Hold for add-ons.     Comment: Auto resulted.       Urinalysis, Microscopic Only - Urine, Clean Catch [[id number]]  (Abnormal) Collected:  06/07/20 0915    Specimen:  Urine, Clean Catch Updated:  06/07/20 0927     RBC, UA 3-5 /HPF      WBC, UA 0-2 /HPF      Bacteria, UA None Seen /HPF      Squamous Epithelial Cells, UA 0-2 /HPF      Hyaline Casts, UA 0-2 /LPF      Methodology Automated Microscopy    Urinalysis With Culture If Indicated - Urine, Clean Catch [[id number]]  (Abnormal) Collected:  06/07/20 0915    Specimen:  Urine, Clean Catch Updated:  06/07/20 0927     Color, UA Dark Yellow     Appearance, UA Cloudy     pH, UA 5.5     Specific Gravity, UA >1.030     Glucose, UA Negative     Ketones, UA 15 mg/dL (1+)     Bilirubin, UA Small (1+)     Blood, UA Negative     Protein,  mg/dL (2+)     Leuk Esterase, UA Trace     Nitrite, UA Negative     Urobilinogen, UA 1.0 E.U./dL    CBC & Differential [[id number]] Collected:  06/07/20 0825    Specimen:  Blood, Venous Line Updated:  06/07/20 0915    Narrative:       The following orders were created for panel order CBC & Differential.  Procedure                               Abnormality         Status                     ---------                               -----------         ------                     CBC Auto Differential[[id number]]        Abnormal            Final result                 Please view results for these tests on the individual orders.    CBC Auto Differential  "[[id number]]  (Abnormal) Collected:  06/07/20 0825    Specimen:  Blood, Venous Line Updated:  06/07/20 0915     WBC 2.90 10*3/mm3      RBC 4.82 10*6/mm3      Hemoglobin 14.3 g/dL      Hematocrit 41.2 %      MCV 85.5 fL      MCH 29.7 pg      MCHC 34.7 g/dL      RDW 13.5 %      RDW-SD 42.2 fl      MPV 10.2 fL      Platelets 92 10*3/mm3     Manual Differential [[id number]]  (Abnormal) Collected:  06/07/20 0825    Specimen:  Blood, Venous Line Updated:  06/07/20 0915     Neutrophil % 72.0 %      Lymphocyte % 11.0 %      Monocyte % 2.0 %      Bands %  15.0 %      Neutrophils Absolute 2.52 10*3/mm3      Lymphocytes Absolute 0.32 10*3/mm3      Monocytes Absolute 0.06 10*3/mm3      RBC Morphology Normal     WBC Morphology Normal     Platelet Estimate Decreased    Procalcitonin [[id number]]  (Normal) Collected:  06/07/20 0825    Specimen:  Blood, Venous Line Updated:  06/07/20 0911     Procalcitonin 0.24 ng/mL     Narrative:       As a Marker for Sepsis (Non-Neonates):   1. <0.5 ng/mL represents a low risk of severe sepsis and/or septic shock.  1. >2 ng/mL represents a high risk of severe sepsis and/or septic shock.    As a Marker for Lower Respiratory Tract Infections that require antibiotic therapy:  PCT on Admission     Antibiotic Therapy             6-12 Hrs later  > 0.5                Strongly Recommended            >0.25 - <0.5         Recommended  0.1 - 0.25           Discouraged                   Remeasure/reassess PCT  <0.1                 Strongly Discouraged          Remeasure/reassess PCT      As 28 day mortality risk marker: \"Change in Procalcitonin Result\" (> 80 % or <=80 %) if Day 0 (or Day 1) and Day 4 values are available. Refer to http://www.[employer]-pct-calculator.com/   Change in PCT <=80 %   A decrease of PCT levels below or equal to 80 % defines a positive change in PCT test result representing a higher risk for 28-day all-cause mortality of patients diagnosed with severe sepsis or septic shock.  Change in " PCT > 80 %   A decrease of PCT levels of more than 80 % defines a negative change in PCT result representing a lower risk for 28-day all-cause mortality of patients diagnosed with severe sepsis or septic shock.                Results may be falsely decreased if patient taking Biotin.     Comprehensive Metabolic Panel [[id number]]  (Abnormal) Collected:  06/07/20 0825    Specimen:  Blood, Venous Line Updated:  06/07/20 0905     Glucose 126 mg/dL      BUN 14 mg/dL      Creatinine 1.09 mg/dL      Sodium 138 mmol/L      Potassium 3.5 mmol/L      Chloride 103 mmol/L      CO2 22.0 mmol/L      Calcium 8.8 mg/dL      Total Protein 6.5 g/dL      Albumin 3.90 g/dL      ALT (SGPT) 61 U/L      AST (SGOT) 81 U/L      Alkaline Phosphatase 110 U/L      Total Bilirubin 0.5 mg/dL      eGFR Non African Amer 71 mL/min/1.73      Globulin 2.6 gm/dL      A/G Ratio 1.5 g/dL      BUN/Creatinine Ratio 12.8     Anion Gap 13.0 mmol/L     Narrative:       GFR Normal >60  Chronic Kidney Disease <60  Kidney Failure <15      Lactic Acid, Plasma [[id number]]  (Normal) Collected:  06/07/20 0825    Specimen:  Blood, Venous Line Updated:  06/07/20 0903     Lactate 0.9 mmol/L     Amylase [[id number]]  (Abnormal) Collected:  06/07/20 0825    Specimen:  Blood, Venous Line Updated:  06/07/20 0902     Amylase 15 U/L     Lipase [[id number]]  (Normal) Collected:  06/07/20 0825    Specimen:  Blood, Venous Line Updated:  06/07/20 0900     Lipase 18 U/L         Imaging Results (Last 72 Hours)     Procedure Component Value Units Date/Time    CT Abdomen Pelvis Without Contrast [[id number]] Collected:  06/07/20 1118     Updated:  06/07/20 1124    Narrative:       EXAMINATION: CT ABDOMEN PELVIS WO CONTRAST-      6/7/2020 11:08 AM CDT     HISTORY: Abdominal pain, unspecified     In order to have a CT radiation dose as low as reasonably achievable  Automated Exposure Control was utilized for adjustment of the mA and/or  KV according to patient size.     DLP in mGycm=  "349.     Abdomen pelvis CT with oral contrast only.     Comparison is made with 2/20/2020.     Normal heart size.  No acute abnormality at the lung bases.     Normal noncontrast appearance of the liver, gallbladder, pancreas,  spleen, adrenal glands, and kidneys.  Oral contrast is seen within the small bowel and within the colon.  Sigmoid diverticula.  No diverticulitis.  No colitis.  No appendicitis.     No pelvic mass or fluid.  Lumbar spine postsurgical changes at L4-5.     Summary:  1. No acute abnormality is seen.                                         This report was finalized on 06/07/2020 11:21 by Dr. Joon Agosto MD.          Condition on Discharge: Stable  Physical Exam on Discharge:  /65 (BP Location: Left arm, Patient Position: Lying)   Pulse 71   Temp 98 °F (36.7 °C) (Oral)   Resp 16   Ht 188 cm (74\")   Wt 99.8 kg (220 lb)   SpO2 94%   BMI 28.25 kg/m²   Physical Exam     Constitutional: He is oriented to person, place, and time. He appears well-developed and well-nourished. No distress.   HENT:   Head: Normocephalic and atraumatic.   Right Ear: External ear normal.   Left Ear: External ear normal.   Nose: Nose normal.   Eyes: Pupils are equal, round; Conjunctivae and EOM are normal.   Neck: Normal range of motion. Neck supple. No tracheal deviation present. No thyromegaly present.   Pulmonary/Chest: Effort normal and breath sounds normal. No stridor. No respiratory distress. He has no rales.   Abdominal: Soft. Bowel sounds are normal. He exhibits no distension. There is no tenderness. There is no guarding.   Musculoskeletal: Normal range of motion. He exhibits no edema.   Neurological: He is alert and oriented to person, place, and time. No cranial nerve deficit. He exhibits normal muscle tone.   Skin: Skin is warm and dry. Capillary refill takes less than 2 seconds. He is not diaphoretic. No erythema.   Psychiatric: He has a normal mood and affect. His behavior is normal. Judgment and " thought content normal.   Vitals reviewed.     Discharge Disposition:  Home or Self Care    Discharge Medications:     Discharge Medications      New Medications      Instructions Start Date   acetaminophen 325 MG tablet  Commonly known as:  TYLENOL   650 mg, Oral, Every 4 Hours PRN      doxycycline 100 MG capsule  Commonly known as:  MONODOX   100 mg, Oral, 2 Times Daily      nicotine 14 MG/24HR patch  Commonly known as:  NICODERM CQ   1 patch, Transdermal, Every 24 Hours Scheduled   Start Date:  Debby 10, 2020        Continue These Medications      Instructions Start Date   albuterol sulfate  (90 Base) MCG/ACT inhaler  Commonly known as:  PROVENTIL HFA;VENTOLIN HFA;PROAIR HFA   2 puffs, Inhalation, Every 4 Hours PRN      gabapentin 800 MG tablet  Commonly known as:  NEURONTIN   800 mg, Oral, 3 Times Daily      ibuprofen 800 MG tablet  Commonly known as:  ADVIL,MOTRIN   800 mg, Oral, Every 6 Hours PRN      sildenafil 100 MG tablet  Commonly known as:  VIAGRA   Take 1/2 to 1 tab by mouth 1 hour prior to intercourse             Discharge Diet:   Diet Instructions     Diet: Regular      Discharge Diet:  Regular          Discharge Care Plan / Instructions:  avoid sun exposure while on Doxycycline    Activity at Discharge:   Activity Instructions     Gradually Increase Activity Until at Pre-Hospitalization Level      May be up for the next couple of days          Follow-up Appointments:   PCP within 1 wk; needs follow up CBC, CMP on visit to follow through abnormalities in blood work; follow through pending test as below  Test Results Pending at Discharge:    Order Current Status    Ehrlichia Profile DNA PCR In process    Blood Culture - Blood, Arm, Left Preliminary result    Blood Culture - Blood, Arm, Right Preliminary result           Chuy Orellana MD  06/09/20  10:05    Time: 25 mins      Part of this note may be an electronic transcription/translation of spoken language to printed text using the  [employer] Dict[employer] System.

## 2020-06-12 LAB
BACTERIA SPEC AEROBE CULT: NORMAL
BACTERIA SPEC AEROBE CULT: NORMAL

## 2020-06-17 LAB
A PHAGOCYTOPH DNA BLD QL NAA+PROBE: NEGATIVE
E CHAFFEENSIS DNA BLD QL NAA+PROBE: POSITIVE

## 2020-07-14 NOTE — PROGRESS NOTES
-+  Patient:  Becky Ochoa  YOB: 1965  Date of Service: 7/15/2020  MRN: [id number]   Primary Care Physician: Ignacio Easton  Advance Directive:  No   Referring Provider: Lan Dobson    Chief Complaint   Patient presents with    Prostate Cancer       Patient Seen, Chart, Consults notes, Labs, Radiology studies reviewed. Subjective:   Becky Ochoa is a 65-year-old  gentleman who is seen for opinion regarding a diagnosis of treatment naïve prostate cancer. TUMOR HISTORY: Prostate cancer  Nicky Still was seen in initial oncology consultation on 7/15/2020 referred by his PCP, Dr. Ignacio Easton in [facility] for medical oncology opinion on his diagnosis of prostate cancer. Nicky Still relates that he was found to have a minimally increased PSA of between 4.5 and 5 on a routine physical examination. This led to work-up and eventual prostate biopsy leading to the diagnosis of prostate cancer. Prostate biopsy on 11/1/2018 documented the following:  Right lateral base-Microfocus of prostatic carcinoma   Prostate biopsy on 2/25/2019 documented the following:  Right lateral base-adenocarcinoma: [place] 3+4 = 7  Right lateral mid- adenocarcinoma: [place] 3+3 = 6  Prostate biopsy 2/18/2020 documented the following:  Right lateral base-adenocarcinoma: [place] 3+4 = 7    PSA levels by date as follows:  2/19/2019-9.1  4/19/2019-7.0  7/22/2019-6.6  10/17/2019-7.8  114 2020-8.39  5/18/2020- 11.5    MRI pelvis with and without contrast on 2/5/2020 at [facility] documented:  · No suspicious lesions in the prostate (PI-RADS 3 or greater)  · No pelvis lymphadenopathy or suspicious pelvic bone lesions    Prostate biopsy was performed on 2/18/2020 by Dr. Juma Starks at [facility].   Pathology revealed:  Prostate, right apex, needle biopsy:  · Benign prostate tissue  Prostate, right base, needle biopsy:  · Adenocarcinoma, acinar type, [place] grade 3+4=7, grade group 2, discontinuously involving 10/20 mm or 50% of core (3 foci measuring 4,3, and 1 mm), 30% of tumor is [place] pattern 4  Prostate, right mid, needle biopsy:  · Benign prostate tissue  Prostate, left apex, needle biopsy:  · Benign prostate tissue  Prostate, left base, needle biopsy  · Benign prostate tissue  Prostate, left mid, needle biopsy:  · Benign prostate tissue      CT abdomen and pelvis without contrast on 2/20/2020 at [facility] documented:  · No evidence of complication after prostate biopsy  · Numerous colonic diverticula with no evidence of acute diverticulitis  · Small bilateral fat-containing groin hernias    Bone scan on 6/2/2020 at [facility] documented:  · Abnormal uptake at the right dorsal tissues at the level of T12 and right upper kidney. This could represent a bone lesion or renal lesion. Recommend CT abdomen with contrast for further evaluation if patient's renal function permits. If low renal function, MRI abdomen without contrast would be an alternative for evaluation    CT abdomen and pelvis without contrast on 6/7/2020 at [facility] documented no acute abnormality. CBC today (7/15/2020) reveals a WBC of 6.73. Hgb is 15.5 with an MCV of 93.5 and platelet count of 844,374. Maikol Evans has been under evaluation and monitoring in the urology department by Dr. Megan Wellington at [facility]. At his last visit on 5/28/2020 Mr. Juanis Valencia was leaning toward a radical prostatectomy to address the issue of his prostate cancer. Medical oncology consultation was requested for opinion. The 2 best options at this juncture include a radical prostatectomy which is most definitive followed by radiation therapy which gives similar statistical long-term results but with the potential for long-term radiation therapy associated side effects. I have encouraged him that a radical prostatectomy is appropriate and if he is considering this that I would support that completely. He has an appointment with Dr. Jose Clark on 8/21/2022 discussed robotic prostatectomy.     Recommendation Social Needs    Financial resource strain: Not on file    Food insecurity     Worry: Not on file     Inability: Not on file    Transportation needs     Medical: Not on file     Non-medical: Not on file   Tobacco Use    Smoking status: Current Every Day Smoker    Smokeless tobacco: Never Used   Substance and Sexual Activity    Alcohol use: Not on file    Drug use: Not on file    Sexual activity: Not on file   Lifestyle    Physical activity     Days per week: Not on file     Minutes per session: Not on file    Stress: Not on file   Relationships    Social connections     Talks on phone: Not on file     Gets together: Not on file     Attends [employer] service: Not on file     Active member of club or organization: Not on file     Attends meetings of clubs or organizations: Not on file     Relationship status: Not on file    Intimate partner violence     Fear of current or ex partner: Not on file     Emotionally abused: Not on file     Physically abused: Not on file     Forced sexual activity: Not on file   Other Topics Concern    Not on file   Social History Narrative    Not on file         Review of Systems:  Constitutional: Negative for chills, fatigue, fever or significant weight loss. HENT: Negative for congestion, hearing loss, nosebleeds or sore throat. Eyes: Negative for photophobia, pain, discharge, redness and visual disturbance. Respiratory: Negative for cough, shortness of breath, or wheezing. Cardiovascular: Negative for chest pain, palpitations or leg swelling. Gastrointestinal: Negative for abdominal pain, blood in stool, constipation, diarrhea, nausea or vomiting. Genitourinary: Negative for dysuria, flank pain, frequency, hematuria or urgency. Musculoskeletal: Negative for back pain, joint swelling, myalgias or neck pain. Skin: Negative for rash or petechiae. Neurological: Negative for tremors, seizures, syncope, weakness or headaches.    Hematological: No active for opinion. The 2 best options at this juncture include a radical prostatectomy which is most definitive followed by radiation therapy which gives similar statistical long-term results but with the potential for long-term radiation therapy associated side effects. I have encouraged him that a radical prostatectomy is appropriate and if he is considering this that I would support that completely. He has an appointment with Dr. Adore Maldonado on 8/21/2022 discussed robotic prostatectomy. Recommendation prior to his August appointment with urology is as follows:  · [facility] genetic testing drawn today  · PSA  · CMP  · MRI of the thoracic spine W&WO to evaluate the T12 area and any other areas of concern    Anticipating a robotic prostatectomy procedure around the first of September, I have made Mr. Dewayne Oneill an appointment to see me in December 2020. He knows he can call should there be any reason to be evaluated sooner. Jennifer Gibbons was seen today for prostate cancer. Diagnoses and all orders for this visit:    Prostate cancer ([facility] [address]  -     Psa screening; Future  -     Comprehensive Metabolic Panel; Future  -     Miscellaneous Sendout 1; Future  -     Cancel: MRI THORACIC SPINE W CONTRAST; Future  -     MRI THORACIC SPINE W WO CONTRAST; Future        Return in about 5 months (around 12/1/2020) for F/U WITH DR Lincoln Marie. Orders Placed This Encounter   Procedures    MRI THORACIC SPINE W WO CONTRAST    Psa screening    Comprehensive Metabolic Panel    Miscellaneous Sendout 1       No orders of the defined types were placed in this encounter. Komal Romano am scribing for Destiney Ramírez MD. Electronically signed by [facility] CARLOS MANUEL Spicer on 4/67/2647 at 8:03 PM     I, Dr. Destinee Chatterjee, personally performed the services described in this documentation as scribed by [facility] CARLOS MANUEL in my presence, and it is both accurate and complete.           Thank you for the consult, we appreciate the opportunity to provide care to your patients. Feel free to contact me if I can be of any further assistance.

## 2020-07-15 ENCOUNTER — TELEPHONE (OUTPATIENT)
Dept: HEMATOLOGY | Age: 55
End: 2020-07-15

## 2020-07-15 ENCOUNTER — OFFICE VISIT (OUTPATIENT)
Dept: HEMATOLOGY | Age: 55
End: 2020-07-15
Payer: MEDICAID

## 2020-07-15 ENCOUNTER — HOSPITAL ENCOUNTER (OUTPATIENT)
Dept: INFUSION THERAPY | Age: 55
Discharge: HOME | End: 2020-07-15
Payer: MEDICAID

## 2020-07-15 ENCOUNTER — TRANSCRIBE ORDERS (OUTPATIENT)
Dept: ADMINISTRATIVE | Facility: HOSPITAL | Age: 55
End: 2020-07-15

## 2020-07-15 VITALS
HEIGHT: 74 IN | OXYGEN SATURATION: 96 % | DIASTOLIC BLOOD PRESSURE: 74 MMHG | HEART RATE: 83 BPM | BODY MASS INDEX: 26.95 KG/M2 | SYSTOLIC BLOOD PRESSURE: 126 MMHG | TEMPERATURE: 98.7 F | WEIGHT: 210 LBS

## 2020-07-15 DIAGNOSIS — M89.9 BONE LESION: Primary | ICD-10-CM

## 2020-07-15 DIAGNOSIS — C61 PROSTATE CANCER (HCC): ICD-10-CM

## 2020-07-15 DIAGNOSIS — D64.9 ANEMIA, UNSPECIFIED TYPE: ICD-10-CM

## 2020-07-15 LAB
ALBUMIN SERPL-MCNC: 4.4 G/DL (ref 3.5–5.2)
ALP BLD-CCNC: 106 U/L (ref 40–130)
ALT SERPL-CCNC: 25 U/L (ref 21–72)
ANION GAP SERPL CALCULATED.3IONS-SCNC: 7 MMOL/L (ref 7–19)
AST SERPL-CCNC: 31 U/L (ref 17–59)
BASOPHILS ABSOLUTE: 0.03 K/UL (ref 0.01–0.08)
BASOPHILS RELATIVE PERCENT: 0.4 % (ref 0.1–1.2)
BILIRUB SERPL-MCNC: 0.5 MG/DL (ref 0.2–1.3)
BUN BLDV-MCNC: 13 MG/DL (ref 9–20)
CALCIUM SERPL-MCNC: 10 MG/DL (ref 8.4–10.2)
CHLORIDE BLD-SCNC: 109 MMOL/L (ref 98–111)
CO2: 29 MMOL/L (ref 22–29)
CREAT SERPL-MCNC: 1.1 MG/DL (ref 0.6–1.2)
EOSINOPHILS ABSOLUTE: 0.27 K/UL (ref 0.04–0.54)
EOSINOPHILS RELATIVE PERCENT: 4 % (ref 0.7–7)
GFR NON-AFRICAN AMERICAN: >60
GLOBULIN: 2.6 G/DL
GLUCOSE BLD-MCNC: 90 MG/DL (ref 74–106)
HCT VFR BLD CALC: 47.1 % (ref 40.1–51)
HEMOGLOBIN: 15.5 G/DL (ref 13.7–17.5)
LYMPHOCYTES ABSOLUTE: 1.49 K/UL (ref 1.18–3.74)
LYMPHOCYTES RELATIVE PERCENT: 22.1 % (ref 19.3–53.1)
MCH RBC QN AUTO: 30.8 PG (ref 25.7–32.2)
MCHC RBC AUTO-ENTMCNC: 32.9 G/DL (ref 32.3–36.5)
MCV RBC AUTO: 93.5 FL (ref 79–92.2)
MONOCYTES ABSOLUTE: 0.71 K/UL (ref 0.24–0.82)
MONOCYTES RELATIVE PERCENT: 10.5 % (ref 4.7–12.5)
NEUTROPHILS ABSOLUTE: 4.23 K/UL (ref 1.56–6.13)
NEUTROPHILS RELATIVE PERCENT: 63 % (ref 34–71.1)
PDW BLD-RTO: 13.8 % (ref 11.6–14.4)
PLATELET # BLD: 147 K/UL (ref 163–337)
PMV BLD AUTO: 10.3 FL (ref 7.4–10.4)
POTASSIUM SERPL-SCNC: 4.5 MMOL/L (ref 3.5–5.1)
RBC # BLD: 5.04 M/UL (ref 4.63–6.08)
SODIUM BLD-SCNC: 145 MMOL/L (ref 137–145)
TOTAL PROTEIN: 7 G/DL (ref 6.3–8.2)
WBC # BLD: 6.73 K/UL (ref 4.23–9.07)

## 2020-07-15 PROCEDURE — 99205 OFFICE O/P NEW HI 60 MIN: CPT | Performed by: INTERNAL MEDICINE

## 2020-07-15 PROCEDURE — 85025 COMPLETE CBC W/AUTO DIFF WBC: CPT

## 2020-07-15 PROCEDURE — 99201 HC NEW PT, E/M LEVEL 1: CPT

## 2020-07-15 PROCEDURE — 80053 COMPREHEN METABOLIC PANEL: CPT

## 2020-07-15 RX ORDER — OMEPRAZOLE 40 MG/1
40 CAPSULE, DELAYED RELEASE ORAL DAILY
COMMUNITY

## 2020-07-15 RX ORDER — TRAZODONE HYDROCHLORIDE 50 MG/1
50 TABLET ORAL NIGHTLY
COMMUNITY
End: 2020-07-15

## 2020-07-15 RX ORDER — GABAPENTIN 800 MG/1
800 TABLET ORAL 3 TIMES DAILY
COMMUNITY

## 2020-07-15 RX ORDER — SILDENAFIL 100 MG/1
100 TABLET, FILM COATED ORAL DAILY
COMMUNITY

## 2020-07-15 RX ORDER — IBUPROFEN 800 MG/1
800 TABLET ORAL EVERY 6 HOURS PRN
COMMUNITY

## 2020-07-15 NOTE — TELEPHONE ENCOUNTER
Scheduled MRI @ [facility] per patients request for 07/21/20 @ 10:30    Gave to gonzalez for pre cert she will fax order over with info

## 2020-07-17 DIAGNOSIS — N52.1 ERECTILE DYSFUNCTION DUE TO DISEASES CLASSIFIED ELSEWHERE: ICD-10-CM

## 2020-07-17 RX ORDER — SILDENAFIL 100 MG/1
TABLET, FILM COATED ORAL
Qty: 10 TABLET | Refills: 11 | Status: SHIPPED | OUTPATIENT
Start: 2020-07-17 | End: 2021-07-01 | Stop reason: SDUPTHER

## 2020-07-21 ENCOUNTER — HOSPITAL ENCOUNTER (OUTPATIENT)
Dept: MRI IMAGING | Facility: HOSPITAL | Age: 55
Discharge: HOME | End: 2020-07-21
Admitting: INTERNAL MEDICINE

## 2020-07-21 LAB — CREAT BLDA-MCNC: 1.1 MG/DL (ref 0.6–1.3)

## 2020-07-21 PROCEDURE — 0 GADOBENATE DIMEGLUMINE 529 MG/ML SOLUTION: Performed by: INTERNAL MEDICINE

## 2020-07-21 PROCEDURE — 82565 ASSAY OF CREATININE: CPT

## 2020-07-21 PROCEDURE — 72157 MRI CHEST SPINE W/O & W/DYE: CPT

## 2020-07-21 PROCEDURE — A9577 INJ MULTIHANCE: HCPCS | Performed by: INTERNAL MEDICINE

## 2020-07-21 RX ADMIN — GADOBENATE DIMEGLUMINE 20 ML: 529 INJECTION, SOLUTION INTRAVENOUS at 11:18

## 2020-08-10 ENCOUNTER — HOSPITAL ENCOUNTER (OUTPATIENT)
Dept: GENERAL RADIOLOGY | Facility: HOSPITAL | Age: 55
Discharge: HOME | End: 2020-08-10
Admitting: FAMILY MEDICINE

## 2020-08-10 ENCOUNTER — HOSPITAL ENCOUNTER (OUTPATIENT)
Dept: GENERAL RADIOLOGY | Facility: HOSPITAL | Age: 55
Discharge: HOME | End: 2020-08-10

## 2020-08-10 ENCOUNTER — TRANSCRIBE ORDERS (OUTPATIENT)
Dept: ADMINISTRATIVE | Facility: HOSPITAL | Age: 55
End: 2020-08-10

## 2020-08-10 DIAGNOSIS — M25.562 PAIN IN BOTH KNEES, UNSPECIFIED CHRONICITY: ICD-10-CM

## 2020-08-10 DIAGNOSIS — M25.561 PAIN IN BOTH KNEES, UNSPECIFIED CHRONICITY: Primary | ICD-10-CM

## 2020-08-10 DIAGNOSIS — M25.561 PAIN IN BOTH KNEES, UNSPECIFIED CHRONICITY: ICD-10-CM

## 2020-08-10 DIAGNOSIS — M25.562 PAIN IN BOTH KNEES, UNSPECIFIED CHRONICITY: Primary | ICD-10-CM

## 2020-08-10 PROCEDURE — 73562 X-RAY EXAM OF KNEE 3: CPT

## 2020-08-21 ENCOUNTER — HOSPITAL ENCOUNTER (OUTPATIENT)
Dept: ULTRASOUND IMAGING | Facility: HOSPITAL | Age: 55
Discharge: HOME | End: 2020-08-21
Admitting: UROLOGY

## 2020-08-21 ENCOUNTER — OFFICE VISIT (OUTPATIENT)
Dept: UROLOGY | Facility: CLINIC | Age: 55
End: 2020-08-21

## 2020-08-21 VITALS — BODY MASS INDEX: 27.39 KG/M2 | TEMPERATURE: 97.3 F | HEIGHT: 74 IN | WEIGHT: 213.4 LBS

## 2020-08-21 DIAGNOSIS — C61 PROSTATE CANCER (HCC): Primary | ICD-10-CM

## 2020-08-21 DIAGNOSIS — C61 PROSTATE CANCER (HCC): ICD-10-CM

## 2020-08-21 LAB
BILIRUB BLD-MCNC: NEGATIVE MG/DL
CLARITY, POC: CLEAR
COLOR UR: YELLOW
GLUCOSE UR STRIP-MCNC: NEGATIVE MG/DL
KETONES UR QL: NEGATIVE
LEUKOCYTE EST, POC: NEGATIVE
NITRITE UR-MCNC: NEGATIVE MG/ML
PH UR: 5.5 [PH] (ref 5–8)
PROT UR STRIP-MCNC: NEGATIVE MG/DL
RBC # UR STRIP: NEGATIVE /UL
SP GR UR: 1 (ref 1–1.03)
UROBILINOGEN UR QL: NORMAL

## 2020-08-21 PROCEDURE — 99215 OFFICE O/P EST HI 40 MIN: CPT | Performed by: UROLOGY

## 2020-08-21 PROCEDURE — 76775 US EXAM ABDO BACK WALL LIM: CPT

## 2020-08-21 RX ORDER — SODIUM CHLORIDE 9 MG/ML
100 INJECTION, SOLUTION INTRAVENOUS CONTINUOUS
Status: CANCELLED | OUTPATIENT
Start: 2020-08-21

## 2020-08-21 NOTE — PROGRESS NOTES
Subjective    Mr. Jones is 54 y.o. male    Chief Complaint: Prostate Cancer    History of Present Illness     He is here today to discuss his newly diagnosed prostate cancer.  His biopsy was done 10 month(s) ago. This was done in the context of Elevated PSA. Severity best described as adenocarcinoma in 1/6 cores with the maximum belia grade of 3+4. Imaging for today's visit included  bone scan and CT scan abdomen and pelvis. Symptoms include none.  The patients potency status can be defined as Difficulty obtaining an erection.  PSA-11.0 (7/15/2020)    The following portions of the patient's history were reviewed and updated as appropriate: allergies, current medications, past family history, past medical history, past social history, past surgical history and problem list.    Review of Systems   Constitutional: Negative for appetite change and fever.   HENT: Negative for hearing loss and sore throat.    Eyes: Negative for pain and redness.   Respiratory: Negative for cough and shortness of breath.    Cardiovascular: Negative for chest pain and leg swelling.   Gastrointestinal: Negative for anal bleeding, nausea and vomiting.   Endocrine: Negative for cold intolerance and heat intolerance.   Genitourinary: Negative for dysuria, flank pain, frequency, hematuria and urgency.   Musculoskeletal: Negative for joint swelling and myalgias.   Skin: Negative for color change and rash.   Allergic/Immunologic: Negative for immunocompromised state.   Neurological: Negative for dizziness and speech difficulty.   Hematological: Negative for adenopathy. Does not bruise/bleed easily.   Psychiatric/Behavioral: Negative for dysphoric mood and suicidal ideas.         Current Outpatient Medications:   •  acetaminophen (TYLENOL) 325 MG tablet, Take 2 tablets by mouth Every 4 (Four) Hours As Needed for Mild Pain  or Fever., Disp: , Rfl:   •  albuterol sulfate  (90 Base) MCG/ACT inhaler, Inhale 2 puffs Every 4 (Four) Hours As  "Needed for Wheezing or Shortness of Air., Disp: , Rfl:   •  gabapentin (NEURONTIN) 800 MG tablet, Take 800 mg by mouth 3 (Three) Times a Day., Disp: , Rfl:   •  ibuprofen (ADVIL,MOTRIN) 800 MG tablet, Take 800 mg by mouth Every 6 (Six) Hours As Needed for Mild Pain ., Disp: , Rfl:   •  nicotine (NICODERM CQ) 14 MG/24HR patch, Place 1 patch on the skin as directed by provider Daily., Disp: 28 patch, Rfl: 0  •  sildenafil (VIAGRA) 100 MG tablet, Take 1/2 to 1 tab by mouth 1 hour prior to intercourse, Disp: 10 tablet, Rfl: 11    Past Medical History:   Diagnosis Date   • Arthritis    • Bronchitis    • Cancer (CMS/HCC)     PROSTATE   • S/P ACL reconstruction        Past Surgical History:   Procedure Laterality Date   • ANKLE SURGERY Right    • BACK SURGERY      X2   • KNEE ACL RECONSTRUCTION Left    • NASAL SEPTUM SURGERY     • PROSTATE ULTRASOUND BIOPSY N/A 2/18/2020    Procedure: PROSTATE  BIOPSY;  Surgeon: Trae Clark MD;  Location: [facility] OR;  Service: Urology;  Laterality: N/A;   • SHOULDER SURGERY Left     X 4   • TENNIS ELBOW RELEASE Right 04/14/2011       Social History     Socioeconomic History   • Marital status:      Spouse name: Not on file   • Number of children: Not on file   • Years of education: Not on file   • Highest education level: Not on file   Tobacco Use   • Smoking status: Current Every Day Smoker     Packs/day: 1.00     Years: 15.00     Pack years: 15.00     Types: Cigarettes     Start date: 2/7/1987   • Smokeless tobacco: Never Used   • Tobacco comment: chnatix trying to quit   Substance and Sexual Activity   • Alcohol use: No   • Drug use: No   • Sexual activity: Defer       History reviewed. No pertinent family history.    Objective    Temp 97.3 °F (36.3 °C) (Temporal)   Ht 188 cm (74\")   Wt 96.8 kg (213 lb 6.4 oz)   BMI 27.40 kg/m²     Physical Exam   Constitutional: He is oriented to person, place, and time. He appears well-developed and well-nourished. No distress. "   Pulmonary/Chest: Effort normal.   Abdominal: Soft. He exhibits no distension and no mass. There is no tenderness. There is no rebound and no guarding. No hernia.   Neurological: He is alert and oriented to person, place, and time.   Skin: Skin is warm and dry. He is not diaphoretic.   Psychiatric: He has a normal mood and affect.   Vitals reviewed.    Renal ultrasound independent review    The renal ultrasound is available for me to review.  Treatment recommendations require an independent review.  This film has been reviewed by the radiologist to determine any non urologic abnormalities that are presents.  However, I very closely inspected the kidneys for size, symmetry, contour, parenchymal thickness, perinephric reaction, presence of calcifications, and intrarenal dilation of the collecting system.       The right kidney appears normal on this ultrasound.  The renal parenchymal is normal in thickness.  There are no solid masses or cysts.  There is no hydronephrosis.  There are no stones.      The left kidney appears normal on this ultrasound.  The renal parenchymal is normal in thickness.  There are no solid masses or cysts.  There is no hydronephrosis.  There are no stones.      The bladder appears normal on thisultrsaound.  The bladder appears normal in thickness.  There no masses or stones seen on this exam.           Results for orders placed or performed in visit on 08/21/20   POC Urinalysis Dipstick, Multipro   Result Value Ref Range    Color Yellow Yellow, Straw, Dark Yellow, Beryl    Clarity, UA Clear Clear    Glucose, UA Negative Negative, 1000 mg/dL (3+) mg/dL    Bilirubin Negative Negative    Ketones, UA Negative Negative    Specific Gravity  1.005 1.005 - 1.030    Blood, UA Negative Negative    pH, Urine 5.5 5.0 - 8.0    Protein, POC Negative Negative mg/dL    Urobilinogen, UA Normal Normal    Nitrite, UA Negative Negative    Leukocytes Negative Negative     Assessment and Plan    Diagnoses and all  orders for this visit:    Prostate cancer (CMS/[facility])  -     POC Urinalysis Dipstick, Multipro  -     Case Request; Standing  -     Case Request    Other orders  -     Follow Anesthesia Guidelines / Standing Orders; Future  -     Obtain informed consent  -     Provide NPO Instructions to Patient; Future  -     Chlorhexidine Skin Prep; Future          I had a long discussion with the patient regarding his intermediate risk prostate cancer.      I discussed robotic-assisted lap prostatectomy with bilateral pelvic lymph node dissection.  I discussed side effects including stress urinary continence and erectile dysfunction.  We discussed timing and rate of return of continence and erections.  I discussed that most patients have a catheter in for 1 week.  I discussed that most patients spend t one night following the procedure in the hospital.  We discussed risks including but not limited to anastomotic leak, bladder neck contracture, rectal injury, symptomatic lymphocele,  permanent erectile dysfunction, stress urinary incontinence requiring further surgery and other risks associated with anesthesia.    I also discussed brachytherapy.  I discussed with him that this would not adequately treat his cancer.    I discussed external beam radiation therapy.  I discussed he would need 6 months of neoadjuvant hormonal ablation as this as a survival advantage compared to radiation alone.  I discussed that we do IMRT here.  I discussed there is no evidence to support proton therapy over IMRT.  I discussed the worsening of voiding symptoms including frequency urgency and nocturia as well as weak urinary stream.  We discussed issues related to proctitis.      I briefly discussed that HIFU is not considered standard of care for treatment of prostate cancer.  I briefly discussed the cryosurgery result in significant side effects.      AUA symptom score 5/35.  He has good erections.  He was initially diagnosed with adenocarcinoma  prostate in October 2019 and placed on active surveillance.  He had a surveillance biopsy in February 2020 which showed [place] 3+4 equal 7 in 1 out of 6 cores at the right base.  This was complicated by prostatitis.  Truss volume was 22.2 cc.  Plan for RALP and pelvic lymph node dissection.

## 2020-09-10 ENCOUNTER — TELEPHONE (OUTPATIENT)
Dept: UROLOGY | Facility: CLINIC | Age: 55
End: 2020-09-10

## 2020-09-10 NOTE — TELEPHONE ENCOUNTER
PATIENT CALLED TO SEE IF WE HAD RECEIVED HIS FMLA PAPER WORK FROM HIS EMPLOYER. IF NOT PLEASE CALL HIM BACK [phone] IT IS FOR HIS SURGERY THAT IS SCHEDULED ON 9/22 WITH DR DE LOS SANTOS. THANKS.

## 2020-09-15 ENCOUNTER — APPOINTMENT (OUTPATIENT)
Dept: PREADMISSION TESTING | Facility: HOSPITAL | Age: 55
End: 2020-09-15

## 2020-09-15 VITALS
HEIGHT: 74 IN | DIASTOLIC BLOOD PRESSURE: 78 MMHG | HEART RATE: 75 BPM | BODY MASS INDEX: 26.62 KG/M2 | SYSTOLIC BLOOD PRESSURE: 139 MMHG | OXYGEN SATURATION: 95 % | WEIGHT: 207.45 LBS | RESPIRATION RATE: 18 BRPM

## 2020-09-15 LAB
ANION GAP SERPL CALCULATED.3IONS-SCNC: 10 MMOL/L (ref 5–15)
BUN SERPL-MCNC: 11 MG/DL (ref 6–20)
BUN/CREAT SERPL: 11.2 (ref 7–25)
CALCIUM SPEC-SCNC: 9.6 MG/DL (ref 8.6–10.5)
CHLORIDE SERPL-SCNC: 102 MMOL/L (ref 98–107)
CO2 SERPL-SCNC: 29 MMOL/L (ref 22–29)
CREAT SERPL-MCNC: 0.98 MG/DL (ref 0.76–1.27)
DEPRECATED RDW RBC AUTO: 43.6 FL (ref 37–54)
ERYTHROCYTE [DISTWIDTH] IN BLOOD BY AUTOMATED COUNT: 13.6 % (ref 12.3–15.4)
GFR SERPL CREATININE-BSD FRML MDRD: 80 ML/MIN/1.73
GLUCOSE SERPL-MCNC: 119 MG/DL (ref 65–99)
HCT VFR BLD AUTO: 47.2 % (ref 37.5–51)
HGB BLD-MCNC: 15.5 G/DL (ref 13–17.7)
MCH RBC QN AUTO: 28.9 PG (ref 26.6–33)
MCHC RBC AUTO-ENTMCNC: 32.8 G/DL (ref 31.5–35.7)
MCV RBC AUTO: 87.9 FL (ref 79–97)
PLATELET # BLD AUTO: 186 10*3/MM3 (ref 140–450)
PMV BLD AUTO: 10.3 FL (ref 6–12)
POTASSIUM SERPL-SCNC: 4.4 MMOL/L (ref 3.5–5.2)
RBC # BLD AUTO: 5.37 10*6/MM3 (ref 4.14–5.8)
SODIUM SERPL-SCNC: 141 MMOL/L (ref 136–145)
WBC # BLD AUTO: 7.79 10*3/MM3 (ref 3.4–10.8)

## 2020-09-15 PROCEDURE — 36415 COLL VENOUS BLD VENIPUNCTURE: CPT

## 2020-09-15 PROCEDURE — 85027 COMPLETE CBC AUTOMATED: CPT | Performed by: UROLOGY

## 2020-09-15 PROCEDURE — 93005 ELECTROCARDIOGRAM TRACING: CPT

## 2020-09-15 PROCEDURE — 80048 BASIC METABOLIC PNL TOTAL CA: CPT | Performed by: UROLOGY

## 2020-09-15 PROCEDURE — 93010 ELECTROCARDIOGRAM REPORT: CPT | Performed by: INTERNAL MEDICINE

## 2020-09-15 NOTE — DISCHARGE INSTRUCTIONS
DAY OF SURGERY INSTRUCTIONS        YOUR SURGEON: ***ADRY DE LOS SANTOS    PROCEDURE: ***PROSTATECTOMY    DATE OF SURGERY: ***September 22,2020    ARRIVAL TIME: AS DIRECTED BY OFFICE    YOU MAY TAKE THE FOLLOWING MEDICATION(S) THE MORNING OF SURGERY WITH A SIP OF WATER: ***NONE      ALL OTHER HOME MEDICATION CHECK WITH YOUR PHYSICIAN      DO NOT TAKE ANY ERECTILE DYSFUNCTION MEDICATIONS (EX: CIALIS, VIAGRA) 24 HOURS PRIOR TO SURGERY                      MANAGING PAIN AFTER SURGERY    We know you are probably wondering what your pain will be like after surgery.  Following surgery it is unrealistic to expect you will not have pain.   Pain is how our bodies let us know that something is wrong or cautions us to be careful.  That said, our goal is to make your pain tolerable.    Methods we may use to treat your pain include (oral or IV medications, PCAs, epidurals, nerve blocks, etc.)   While some procedures require IV pain medications for a short time after surgery, transitioning to pain medications by mouth allows for better management of pain.   Your nurse will encourage you to take oral pain medications whenever possible.  IV medications work almost immediately, but only last a short while.  Taking medications by mouth allows for a more constant level of medication in your blood stream for a longer period of time.      Once your pain is out of control it is harder to get back under control.  It is important you are aware when your next dose of pain medication is due.  If you are admitted, your nurse may write the time of your next dose on the white board in your room to help you remember.      We are interested in your pain and encourage you to inform us about aggravating factors during your visit.   Many times a simple repositioning every few hours can make a big difference.    If your physician says it is okay, do not let your pain prevent you from getting out of bed. Be sure to call your nurse for assistance prior to  getting up so you do not fall.      Before surgery, please decide your tolerable pain goal.  These faces help describe the pain ratings we use on a 0-10 scale.   Be prepared to tell us your goal and whether or not you take pain or anxiety medications at home.          BEFORE YOU COME TO THE HOSPITAL  (Pre-op instructions)  • Do not eat, drink, smoke or chew gum after midnight the night before surgery.  This also includes no mints.  • Morning of surgery take only the medicines you have been instructed with a sip of water unless otherwise instructed  by your physician.  • Do not shave, wear makeup or dark nail polish.  • Remove all jewelry including rings.  • Leave anything you consider valuable at home.  • Leave your suitcase in the car until after your surgery.  • Bring the following with you if applicable:  o Picture ID and insurance, Medicare or Medicaid cards  o Co-pay/deductible required by insurance (cash, check, credit card)  o Copy of advance directive, living will or power-of- documents if not brought to PAT  o CPAP or BIPAP mask and tubing  o Relaxation aids ( book, magazine), etc.  o Hearing aids                        ON THE DAY OF SURGERY  · On the day of surgery check in at registration located at the main entrance of the hospital.   ? You will be registered and given a beeper with instructions where to wait in the main lobby.  ? When your beeper lights up and vibrates a member of the Outpatient Surgery staff will meet you at the double doors under the stair steps and escort you to your preoperative room.   · You may have cloth compression devices placed on your legs. These help to prevent blood clots and reduce swelling in your legs.  · An IV may be inserted into one of your veins.  · In the operating room, you may be given one or more of the following:  ? A medicine to help you relax (sedative).  ? A medicine to numb the area (local anesthetic).  ? A medicine to make you fall asleep (general  "anesthetic).  ? A medicine that is injected into an area of your body to numb everything below the injection site (regional anesthetic).  · Your surgical site will be marked or identified.  · You may be given an antibiotic through your IV to help prevent infection.  Contact a health care provider if you:  · Develop a fever of more than 100.4°F (38°C) or other feelings of illness during the 48 hours before your surgery.  · Have symptoms that get worse.  Have questions or concerns about your surgery    General Anesthesia/Surgery, Adult  General anesthesia is the use of medicines to make a person \"go to sleep\" (unconscious) for a medical procedure. General anesthesia must be used for certain procedures, and is often recommended for procedures that:  · Last a long time.  · Require you to be still or in an unusual position.  · Are major and can cause blood loss.  The medicines used for general anesthesia are called general anesthetics. As well as making you unconscious for a certain amount of time, these medicines:  · Prevent pain.  · Control your blood pressure.  · Relax your muscles.  Tell a health care provider about:  · Any allergies you have.  · All medicines you are taking, including vitamins, herbs, eye drops, creams, and over-the-counter medicines.  · Any problems you or family members have had with anesthetic medicines.  · Types of anesthetics you have had in the past.  · Any blood disorders you have.  · Any surgeries you have had.  · Any medical conditions you have.  · Any recent upper respiratory, chest, or ear infections.  · Any history of:  ? Heart or lung conditions, such as heart failure, sleep apnea, asthma, or chronic obstructive pulmonary disease (COPD).  ?  service.  ? Depression or anxiety.  · Any tobacco or drug use, including marijuana or alcohol use.  · Whether you are pregnant or may be pregnant.  What are the risks?  Generally, this is a safe procedure. However, problems may occur, " including:  · Allergic reaction.  · Lung and heart problems.  · Inhaling food or liquid from the stomach into the lungs (aspiration).  · Nerve injury.  · Air in the bloodstream, which can lead to stroke.  · Extreme agitation or confusion (delirium) when you wake up from the anesthetic.  · Waking up during your procedure and being unable to move. This is rare.  These problems are more likely to develop if you are having a major surgery or if you have an advanced or serious medical condition. You can prevent some of these complications by answering all of your health care provider's questions thoroughly and by following all instructions before your procedure.  General anesthesia can cause side effects, including:  · Nausea or vomiting.  · A sore throat from the breathing tube.  · Hoarseness.  · Wheezing or coughing.  · Shaking chills.  · Tiredness.  · Body aches.  · Anxiety.  · Sleepiness or drowsiness.  · Confusion or agitation.  RISKS AND COMPLICATIONS OF SURGERY  Your health care provider will discuss possible risks and complications with you before surgery. Common risks and complications include:    · Problems due to the use of anesthetics.  · Blood loss and replacement (does not apply to minor surgical procedures).  · Temporary increase in pain due to surgery.  · Uncorrected pain or problems that the surgery was meant to correct.  · Infection.  · New damage.    What happens before the procedure?    Medicines  Ask your health care provider about:  · Changing or stopping your regular medicines. This is especially important if you are taking diabetes medicines or blood thinners.  · Taking medicines such as aspirin and ibuprofen. These medicines can thin your blood. Do not take these medicines unless your health care provider tells you to take them.  · Taking over-the-counter medicines, vitamins, herbs, and supplements. Do not take these during the week before your procedure unless your health care provider approves  them.  General instructions  · Starting 3-6 weeks before the procedure, do not use any products that contain nicotine or tobacco, such as cigarettes and e-cigarettes. If you need help quitting, ask your health care provider.  · If you brush your teeth on the morning of the procedure, make sure to spit out all of the toothpaste.  · Tell your health care provider if you become ill or develop a cold, cough, or fever.  · If instructed by your health care provider, bring your sleep apnea device with you on the day of your surgery (if applicable).  · Ask your health care provider if you will be going home the same day, the following day, or after a longer hospital stay.  ? Plan to have someone take you home from the hospital or clinic.  ? Plan to have a responsible adult care for you for at least 24 hours after you leave the hospital or clinic. This is important.  What happens during the procedure?  · You will be given anesthetics through both of the following:  ? A mask placed over your nose and mouth.  ? An IV in one of your veins.  · You may receive a medicine to help you relax (sedative).  · After you are unconscious, a breathing tube may be inserted down your throat to help you breathe. This will be removed before you wake up.  · An anesthesia specialist will stay with you throughout your procedure. He or she will:  ? Keep you comfortable and safe by continuing to give you medicines and adjusting the amount of medicine that you get.  ? Monitor your blood pressure, pulse, and oxygen levels to make sure that the anesthetics do not cause any problems.  The procedure may vary among health care providers and hospitals.  What happens after the procedure?  · Your blood pressure, temperature, heart rate, breathing rate, and blood oxygen level will be monitored until the medicines you were given have worn off.  · You will wake up in a recovery area. You may wake up slowly.  · If you feel anxious or agitated, you may be given  medicine to help you calm down.  · If you will be going home the same day, your health care provider may check to make sure you can walk, drink, and urinate.  · Your health care provider will treat any pain or side effects you have before you go home.  · Do not drive for 24 hours if you were given a sedative.  Summary  · General anesthesia is used to keep you still and prevent pain during a procedure.  · It is important to tell your healthcare provider about your medical history and any surgeries you have had, and previous experience with anesthesia.  · Follow your healthcare provider’s instructions about when to stop eating, drinking, or taking certain medicines before your procedure.  · Plan to have someone take you home from the hospital or clinic.  This information is not intended to replace advice given to you by your health care provider. Make sure you discuss any questions you have with your health care provider.  Document Released: 03/26/2009 Document Revised: 08/03/2018 Document Reviewed: 08/03/2018  [employer] Interactive Patient Education © 2019 [employer]       Fall Prevention in Hospitals, Adult  As a hospital patient, your condition and the treatments you receive can increase your risk for falls. Some additional risk factors for falls in a hospital include:  · Being in an unfamiliar environment.  · Being on bed rest.  · Your surgery.  · Taking certain medicines.  · Your tubing requirements, such as intravenous (IV) therapy or catheters.  It is important that you learn how to decrease fall risks while at the hospital. Below are important tips that can help prevent falls.  SAFETY TIPS FOR PREVENTING FALLS  Talk about your risk of falling.  · Ask your health care provider why you are at risk for falling. Is it your medicine, illness, tubing placement, or something else?  · Make a plan with your health care provider to keep you safe from falls.  · Ask your health care provider or pharmacist about side  effects of your medicines. Some medicines can make you dizzy or affect your coordination.  Ask for help.  · Ask for help before getting out of bed. You may need to press your call button.  · Ask for assistance in getting safely to the toilet.  · Ask for a walker or cane to be put at your bedside. Ask that most of the side rails on your bed be placed up before your health care provider leaves the room.  · Ask family or friends to sit with you.  · Ask for things that are out of your reach, such as your glasses, hearing aids, telephone, bedside table, or call button.  Follow these tips to avoid falling:  · Stay lying or seated, rather than standing, while waiting for help.  · Wear rubber-soled slippers or shoes whenever you walk in the hospital.  · Avoid quick, sudden movements.  ¨ Change positions slowly.  ¨ Sit on the side of your bed before standing.  ¨ Stand up slowly and wait before you start to walk.  · Let your health care provider know if there is a spill on the floor.  · Pay careful attention to the medical equipment, electrical cords, and tubes around you.  · When you need help, use your call button by your bed or in the bathroom. Wait for one of your health care providers to help you.  · If you feel dizzy or unsure of your footing, return to bed and wait for assistance.  · Avoid being distracted by the TV, telephone, or another person in your room.  · Do not lean or support yourself on rolling objects, such as IV poles or bedside tables.     This information is not intended to replace advice given to you by your health care provider. Make sure you discuss any questions you have with your health care provider.     Document Released: 12/15/2001 Document Revised: 01/08/2016 Document Reviewed: 08/25/2013  [employer] Interactive Patient Education ©2016 [employer]       [facility]  CHG 4% Patient Instruction Sheet    Chlorhexidine Before Surgery  Chlorhexidine gluconate (CHG) is a germ-killing  (antiseptic) solution that is used to clean the skin. It gets rid of the bacteria that normally live on the skin. Cleaning your skin with CHG before surgery helps lower the risk for infection after surgery.    How to use CHG solution  · You will take 2 showers, one shower the night before surgery, the second shower the morning of surgery before coming to the hospital.  · Use CHG only as told by your health care provider, and follow the instructions on the label.  · Use CHG solution while taking a shower. Follow these steps when using CHG solution (unless your health care provider gives you different instructions):  1. Start the shower.  2. Use your normal soap and shampoo to wash your face and hair.  3. Turn off the shower or move out of the shower stream.  4. Pour the CHG onto a clean washcloth. Do not use any type of brush or rough-edged sponge.  5. Starting at your neck, lather your body down to your toes. Make sure you:  6. Pay special attention to the part of your body where you will be having surgery. Scrub this area for at least 1 minute.  7. Use the full amount of CHG as directed. Usually, this is one half bottle for each shower.  8. Do not use CHG on your head or face. If the solution gets into your ears or eyes, rinse them well with water.  9. Avoid your genital area.  10. Avoid any areas of skin that have broken skin, cuts, or scrapes.  11. Scrub your back and under your arms. Make sure to wash skin folds.  12. Let the lather sit on your skin for 1-2 minutes or as long as told by your health care  provider.  13. Thoroughly rinse your entire body in the shower. Make sure that all body creases and crevices are rinsed well.  14. Dry off with a clean towel. Do not put any substances on your body afterward, such as powder, lotion, or perfume.  15. Put on clean clothes or pajamas.  16. If it is the night before your surgery, sleep in clean sheets.    What are the risks?  Risks of using CHG include:  · A skin  reaction.  · Hearing loss, if CHG gets in your ears.  · Eye injury, if CHG gets in your eyes and is not rinsed out.  · The CHG product catching fire.  Make sure that you avoid smoking and flames after applying CHG to your skin.  Do not use CHG:  · If you have a chlorhexidine allergy or have previously reacted to chlorhexidine.  · On babies younger than 2 months of age.      On the day of surgery, when you are taken to your room in Outpatient Surgery you will be given a CHG prepackaged cloth to wipe the site for your surgery.  How to use CHG prepackaged cloths  · Follow the instructions on the label.  · Use the CHG cloth on clean, dry skin. Follow these steps when using a CHG cloth (unless your health care provider gives you different instructions):  1. Using the CHG cloth, vigorously scrub the part of your body where you will be having surgery. Scrub using a back-and-forth motion for 3 minutes. The area on your body should be completely wet with CHG when you are finished scrubbing.  2. Do not rinse. Discard the cloth and let the area air-dry for 1 minute. Do not put any substances on your body afterward, such as powder, lotion, or perfume.  Contact a health care provider if:  · Your skin gets irritated after scrubbing.  · You have questions about using your solution or cloth.  Get help right away if:  · Your eyes become very red or swollen.  · Your eyes itch badly.  · Your skin itches badly and is red or swollen.  · Your hearing changes.  · You have trouble seeing.  · You have swelling or tingling in your mouth or throat.  · You have trouble breathing.  · You swallow any chlorhexidine.  Summary  · Chlorhexidine gluconate (CHG) is a germ-killing (antiseptic) solution that is used to clean the skin. Cleaning your skin with CHG before surgery helps lower the risk for infection after surgery.  · You may be given CHG to use at home. It may be in a bottle or in a prepackaged cloth to use on your skin. Carefully follow  your health care provider's instructions and the instructions on the product label.  · Do not use CHG if you have a chlorhexidine allergy.  · Contact your health care provider if your skin gets irritated after scrubbing.  This information is not intended to replace advice given to you by your health care provider. Make sure you discuss any questions you have with your health care provider.  Document Released: 09/11/2013 Document Revised: 11/15/2018 Document Reviewed: 11/15/2018  [employer] Interactive Patient Education © 2019 [employer]          PATIENT/FAMILY/RESPONSIBLE PARTY VERBALIZES UNDERSTANDING OF ABOVE EDUCATION.  COPY OF PAIN SCALE GIVEN AND REVIEWED WITH VERBALIZED UNDERSTANDING.

## 2020-09-16 ENCOUNTER — TRANSCRIBE ORDERS (OUTPATIENT)
Dept: ADMINISTRATIVE | Facility: HOSPITAL | Age: 55
End: 2020-09-16

## 2020-09-16 DIAGNOSIS — Z11.59 SCREENING FOR VIRAL DISEASE: Primary | ICD-10-CM

## 2020-09-19 ENCOUNTER — LAB (OUTPATIENT)
Dept: LAB | Facility: HOSPITAL | Age: 55
End: 2020-09-19

## 2020-09-19 PROCEDURE — C9803 HOPD COVID-19 SPEC COLLECT: HCPCS | Performed by: UROLOGY

## 2020-09-19 PROCEDURE — U0003 INFECTIOUS AGENT DETECTION BY NUCLEIC ACID (DNA OR RNA); SEVERE ACUTE RESPIRATORY SYNDROME CORONAVIRUS 2 (SARS-COV-2) (CORONAVIRUS DISEASE [COVID-19]), AMPLIFIED PROBE TECHNIQUE, MAKING USE OF HIGH THROUGHPUT TECHNOLOGIES AS DESCRIBED BY CMS-2020-01-R: HCPCS | Performed by: UROLOGY

## 2020-09-20 LAB
COVID LABCORP PRIORITY: NORMAL
SARS-COV-2 RNA RESP QL NAA+PROBE: NOT DETECTED

## 2020-09-22 ENCOUNTER — ANESTHESIA EVENT (OUTPATIENT)
Dept: PERIOP | Facility: HOSPITAL | Age: 55
End: 2020-09-22

## 2020-09-22 ENCOUNTER — ANESTHESIA (OUTPATIENT)
Dept: PERIOP | Facility: HOSPITAL | Age: 55
End: 2020-09-22

## 2020-09-22 ENCOUNTER — HOSPITAL ENCOUNTER (INPATIENT)
Facility: HOSPITAL | Age: 55
LOS: 1 days | Discharge: HOME | End: 2020-09-23
Attending: UROLOGY | Admitting: UROLOGY

## 2020-09-22 DIAGNOSIS — C61 PROSTATE CANCER (HCC): ICD-10-CM

## 2020-09-22 LAB
ABO GROUP BLD: NORMAL
BASOPHILS # BLD AUTO: 0.05 10*3/MM3 (ref 0–0.2)
BASOPHILS NFR BLD AUTO: 0.6 % (ref 0–1.5)
BLD GP AB SCN SERPL QL: NEGATIVE
DEPRECATED RDW RBC AUTO: 44.1 FL (ref 37–54)
EOSINOPHIL # BLD AUTO: 0.05 10*3/MM3 (ref 0–0.4)
EOSINOPHIL NFR BLD AUTO: 0.6 % (ref 0.3–6.2)
ERYTHROCYTE [DISTWIDTH] IN BLOOD BY AUTOMATED COUNT: 13.8 % (ref 12.3–15.4)
HCT VFR BLD AUTO: 45.8 % (ref 37.5–51)
HGB BLD-MCNC: 15.6 G/DL (ref 13–17.7)
IMM GRANULOCYTES # BLD AUTO: 0.03 10*3/MM3 (ref 0–0.05)
IMM GRANULOCYTES NFR BLD AUTO: 0.4 % (ref 0–0.5)
LYMPHOCYTES # BLD AUTO: 0.63 10*3/MM3 (ref 0.7–3.1)
LYMPHOCYTES NFR BLD AUTO: 7.6 % (ref 19.6–45.3)
MCH RBC QN AUTO: 30 PG (ref 26.6–33)
MCHC RBC AUTO-ENTMCNC: 34.1 G/DL (ref 31.5–35.7)
MCV RBC AUTO: 88.1 FL (ref 79–97)
MONOCYTES # BLD AUTO: 0.18 10*3/MM3 (ref 0.1–0.9)
MONOCYTES NFR BLD AUTO: 2.2 % (ref 5–12)
NEUTROPHILS NFR BLD AUTO: 7.32 10*3/MM3 (ref 1.7–7)
NEUTROPHILS NFR BLD AUTO: 88.6 % (ref 42.7–76)
NRBC BLD AUTO-RTO: 0 /100 WBC (ref 0–0.2)
PLATELET # BLD AUTO: 156 10*3/MM3 (ref 140–450)
PMV BLD AUTO: 10.1 FL (ref 6–12)
RBC # BLD AUTO: 5.2 10*6/MM3 (ref 4.14–5.8)
RH BLD: POSITIVE
T&S EXPIRATION DATE: NORMAL
WBC # BLD AUTO: 8.26 10*3/MM3 (ref 3.4–10.8)

## 2020-09-22 PROCEDURE — 25010000002 MIDAZOLAM PER 1 MG: Performed by: ANESTHESIOLOGY

## 2020-09-22 PROCEDURE — 25010000002 ONDANSETRON PER 1 MG: Performed by: NURSE ANESTHETIST, CERTIFIED REGISTERED

## 2020-09-22 PROCEDURE — 25010000002 DEXAMETHASONE PER 1 MG: Performed by: NURSE ANESTHETIST, CERTIFIED REGISTERED

## 2020-09-22 PROCEDURE — 25010000002 CEFAZOLIN PER 500 MG: Performed by: UROLOGY

## 2020-09-22 PROCEDURE — 38571 LAPAROSCOPY LYMPHADENECTOMY: CPT | Performed by: UROLOGY

## 2020-09-22 PROCEDURE — 25010000002 PROPOFOL 10 MG/ML EMULSION: Performed by: NURSE ANESTHETIST, CERTIFIED REGISTERED

## 2020-09-22 PROCEDURE — 25010000002 HYDROMORPHONE 1 MG/ML SOLUTION: Performed by: NURSE ANESTHETIST, CERTIFIED REGISTERED

## 2020-09-22 PROCEDURE — 25010000002 DEXAMETHASONE PER 1 MG: Performed by: ANESTHESIOLOGY

## 2020-09-22 PROCEDURE — 86901 BLOOD TYPING SEROLOGIC RH(D): CPT | Performed by: ANESTHESIOLOGY

## 2020-09-22 PROCEDURE — 94799 UNLISTED PULMONARY SVC/PX: CPT

## 2020-09-22 PROCEDURE — 55866 LAPS SURG PRST8ECT RPBIC RAD: CPT | Performed by: UROLOGY

## 2020-09-22 PROCEDURE — 25010000002 KETOROLAC TROMETHAMINE PER 15 MG: Performed by: UROLOGY

## 2020-09-22 PROCEDURE — 88307 TISSUE EXAM BY PATHOLOGIST: CPT | Performed by: UROLOGY

## 2020-09-22 PROCEDURE — 85025 COMPLETE CBC W/AUTO DIFF WBC: CPT | Performed by: UROLOGY

## 2020-09-22 PROCEDURE — 86900 BLOOD TYPING SEROLOGIC ABO: CPT | Performed by: ANESTHESIOLOGY

## 2020-09-22 PROCEDURE — 25010000002 PHENYLEPHRINE HCL 0.8 MG/10ML SOLUTION PREFILLED SYRINGE: Performed by: NURSE ANESTHETIST, CERTIFIED REGISTERED

## 2020-09-22 PROCEDURE — 86850 RBC ANTIBODY SCREEN: CPT | Performed by: ANESTHESIOLOGY

## 2020-09-22 PROCEDURE — 88309 TISSUE EXAM BY PATHOLOGIST: CPT | Performed by: UROLOGY

## 2020-09-22 DEVICE — CLIP LIG HEMOLOK PA LG 6CT PRP: Type: IMPLANTABLE DEVICE | Status: FUNCTIONAL

## 2020-09-22 DEVICE — DEV CONTRL TISS STRATAFIX SPIRAL PGA PGL 3/0RB 16X16: Type: IMPLANTABLE DEVICE | Status: FUNCTIONAL

## 2020-09-22 DEVICE — SEAL HEMO SURG ARISTA/AH ABS/PWDR 3GM: Type: IMPLANTABLE DEVICE | Status: FUNCTIONAL

## 2020-09-22 RX ORDER — BUPIVACAINE HCL/0.9 % NACL/PF 0.1 %
2 PLASTIC BAG, INJECTION (ML) EPIDURAL EVERY 8 HOURS
Status: COMPLETED | OUTPATIENT
Start: 2020-09-22 | End: 2020-09-22

## 2020-09-22 RX ORDER — ONDANSETRON 4 MG/1
4 TABLET, FILM COATED ORAL EVERY 6 HOURS PRN
Status: DISCONTINUED | OUTPATIENT
Start: 2020-09-22 | End: 2020-09-23 | Stop reason: HOSPADM

## 2020-09-22 RX ORDER — LIDOCAINE HYDROCHLORIDE 10 MG/ML
0.5 INJECTION, SOLUTION EPIDURAL; INFILTRATION; INTRACAUDAL; PERINEURAL ONCE AS NEEDED
Status: DISCONTINUED | OUTPATIENT
Start: 2020-09-22 | End: 2020-09-22 | Stop reason: HOSPADM

## 2020-09-22 RX ORDER — MAGNESIUM HYDROXIDE 1200 MG/15ML
LIQUID ORAL AS NEEDED
Status: DISCONTINUED | OUTPATIENT
Start: 2020-09-22 | End: 2020-09-22 | Stop reason: HOSPADM

## 2020-09-22 RX ORDER — DOCUSATE SODIUM 100 MG/1
100 CAPSULE, LIQUID FILLED ORAL 2 TIMES DAILY PRN
Status: DISCONTINUED | OUTPATIENT
Start: 2020-09-22 | End: 2020-09-23 | Stop reason: HOSPADM

## 2020-09-22 RX ORDER — GABAPENTIN 400 MG/1
800 CAPSULE ORAL EVERY 8 HOURS SCHEDULED
Status: DISCONTINUED | OUTPATIENT
Start: 2020-09-22 | End: 2020-09-23 | Stop reason: HOSPADM

## 2020-09-22 RX ORDER — ACETAMINOPHEN 325 MG/1
650 TABLET ORAL EVERY 6 HOURS SCHEDULED
Status: DISCONTINUED | OUTPATIENT
Start: 2020-09-22 | End: 2020-09-23 | Stop reason: HOSPADM

## 2020-09-22 RX ORDER — LABETALOL HYDROCHLORIDE 5 MG/ML
5 INJECTION, SOLUTION INTRAVENOUS
Status: DISCONTINUED | OUTPATIENT
Start: 2020-09-22 | End: 2020-09-22 | Stop reason: HOSPADM

## 2020-09-22 RX ORDER — SODIUM CHLORIDE 0.9 % (FLUSH) 0.9 %
10 SYRINGE (ML) INJECTION EVERY 12 HOURS SCHEDULED
Status: DISCONTINUED | OUTPATIENT
Start: 2020-09-22 | End: 2020-09-22 | Stop reason: HOSPADM

## 2020-09-22 RX ORDER — NALOXONE HCL 0.4 MG/ML
0.04 VIAL (ML) INJECTION AS NEEDED
Status: DISCONTINUED | OUTPATIENT
Start: 2020-09-22 | End: 2020-09-22 | Stop reason: HOSPADM

## 2020-09-22 RX ORDER — SODIUM CHLORIDE 9 MG/ML
100 INJECTION, SOLUTION INTRAVENOUS CONTINUOUS
Status: DISCONTINUED | OUTPATIENT
Start: 2020-09-22 | End: 2020-09-22 | Stop reason: HOSPADM

## 2020-09-22 RX ORDER — MIDAZOLAM HYDROCHLORIDE 1 MG/ML
1 INJECTION INTRAMUSCULAR; INTRAVENOUS
Status: DISCONTINUED | OUTPATIENT
Start: 2020-09-22 | End: 2020-09-22 | Stop reason: HOSPADM

## 2020-09-22 RX ORDER — SODIUM CHLORIDE, SODIUM LACTATE, POTASSIUM CHLORIDE, CALCIUM CHLORIDE 600; 310; 30; 20 MG/100ML; MG/100ML; MG/100ML; MG/100ML
1000 INJECTION, SOLUTION INTRAVENOUS CONTINUOUS
Status: DISCONTINUED | OUTPATIENT
Start: 2020-09-22 | End: 2020-09-22 | Stop reason: HOSPADM

## 2020-09-22 RX ORDER — FENTANYL CITRATE 50 UG/ML
25 INJECTION, SOLUTION INTRAMUSCULAR; INTRAVENOUS
Status: DISCONTINUED | OUTPATIENT
Start: 2020-09-22 | End: 2020-09-22 | Stop reason: HOSPADM

## 2020-09-22 RX ORDER — FAMOTIDINE 10 MG/ML
20 INJECTION, SOLUTION INTRAVENOUS
Status: DISCONTINUED | OUTPATIENT
Start: 2020-09-22 | End: 2020-09-22 | Stop reason: HOSPADM

## 2020-09-22 RX ORDER — SODIUM CHLORIDE 0.9 % (FLUSH) 0.9 %
10 SYRINGE (ML) INJECTION AS NEEDED
Status: DISCONTINUED | OUTPATIENT
Start: 2020-09-22 | End: 2020-09-22 | Stop reason: HOSPADM

## 2020-09-22 RX ORDER — PROPOFOL 10 MG/ML
VIAL (ML) INTRAVENOUS AS NEEDED
Status: DISCONTINUED | OUTPATIENT
Start: 2020-09-22 | End: 2020-09-22 | Stop reason: SURG

## 2020-09-22 RX ORDER — HYDROCODONE BITARTRATE AND ACETAMINOPHEN 7.5; 325 MG/1; MG/1
1 TABLET ORAL EVERY 4 HOURS PRN
Status: DISCONTINUED | OUTPATIENT
Start: 2020-09-22 | End: 2020-09-23 | Stop reason: HOSPADM

## 2020-09-22 RX ORDER — BUPIVACAINE HCL/0.9 % NACL/PF 0.1 %
2 PLASTIC BAG, INJECTION (ML) EPIDURAL ONCE
Status: COMPLETED | OUTPATIENT
Start: 2020-09-22 | End: 2020-09-22

## 2020-09-22 RX ORDER — ONDANSETRON 2 MG/ML
4 INJECTION INTRAMUSCULAR; INTRAVENOUS EVERY 6 HOURS PRN
Status: DISCONTINUED | OUTPATIENT
Start: 2020-09-22 | End: 2020-09-23 | Stop reason: HOSPADM

## 2020-09-22 RX ORDER — DEXMEDETOMIDINE HYDROCHLORIDE 100 UG/ML
INJECTION, SOLUTION INTRAVENOUS AS NEEDED
Status: DISCONTINUED | OUTPATIENT
Start: 2020-09-22 | End: 2020-09-22 | Stop reason: SURG

## 2020-09-22 RX ORDER — ONDANSETRON 2 MG/ML
4 INJECTION INTRAMUSCULAR; INTRAVENOUS ONCE AS NEEDED
Status: DISCONTINUED | OUTPATIENT
Start: 2020-09-22 | End: 2020-09-22 | Stop reason: HOSPADM

## 2020-09-22 RX ORDER — ONDANSETRON 2 MG/ML
INJECTION INTRAMUSCULAR; INTRAVENOUS AS NEEDED
Status: DISCONTINUED | OUTPATIENT
Start: 2020-09-22 | End: 2020-09-22 | Stop reason: SURG

## 2020-09-22 RX ORDER — HYDROCODONE BITARTRATE AND ACETAMINOPHEN 7.5; 325 MG/1; MG/1
2 TABLET ORAL EVERY 4 HOURS PRN
Status: DISCONTINUED | OUTPATIENT
Start: 2020-09-22 | End: 2020-09-23 | Stop reason: HOSPADM

## 2020-09-22 RX ORDER — KETAMINE HYDROCHLORIDE 50 MG/ML
INJECTION, SOLUTION, CONCENTRATE INTRAMUSCULAR; INTRAVENOUS AS NEEDED
Status: DISCONTINUED | OUTPATIENT
Start: 2020-09-22 | End: 2020-09-22 | Stop reason: SURG

## 2020-09-22 RX ORDER — KETOROLAC TROMETHAMINE 15 MG/ML
15 INJECTION, SOLUTION INTRAMUSCULAR; INTRAVENOUS EVERY 6 HOURS SCHEDULED
Status: DISCONTINUED | OUTPATIENT
Start: 2020-09-22 | End: 2020-09-23 | Stop reason: HOSPADM

## 2020-09-22 RX ORDER — MORPHINE SULFATE 2 MG/ML
2 INJECTION, SOLUTION INTRAMUSCULAR; INTRAVENOUS
Status: DISCONTINUED | OUTPATIENT
Start: 2020-09-22 | End: 2020-09-22 | Stop reason: HOSPADM

## 2020-09-22 RX ORDER — SODIUM CHLORIDE 0.9 % (FLUSH) 0.9 %
3 SYRINGE (ML) INJECTION AS NEEDED
Status: DISCONTINUED | OUTPATIENT
Start: 2020-09-22 | End: 2020-09-22 | Stop reason: HOSPADM

## 2020-09-22 RX ORDER — DEXAMETHASONE SODIUM PHOSPHATE 4 MG/ML
INJECTION, SOLUTION INTRA-ARTICULAR; INTRALESIONAL; INTRAMUSCULAR; INTRAVENOUS; SOFT TISSUE AS NEEDED
Status: DISCONTINUED | OUTPATIENT
Start: 2020-09-22 | End: 2020-09-22 | Stop reason: SURG

## 2020-09-22 RX ORDER — NALBUPHINE HCL 10 MG/ML
2.5 AMPUL (ML) INJECTION EVERY 4 HOURS PRN
Status: DISCONTINUED | OUTPATIENT
Start: 2020-09-22 | End: 2020-09-22 | Stop reason: HOSPADM

## 2020-09-22 RX ORDER — OXYCODONE AND ACETAMINOPHEN 10; 325 MG/1; MG/1
1 TABLET ORAL ONCE AS NEEDED
Status: DISCONTINUED | OUTPATIENT
Start: 2020-09-22 | End: 2020-09-22 | Stop reason: HOSPADM

## 2020-09-22 RX ORDER — NICOTINE 21 MG/24HR
1 PATCH, TRANSDERMAL 24 HOURS TRANSDERMAL
Status: DISCONTINUED | OUTPATIENT
Start: 2020-09-22 | End: 2020-09-23 | Stop reason: HOSPADM

## 2020-09-22 RX ORDER — DEXTROSE MONOHYDRATE 25 G/50ML
12.5 INJECTION, SOLUTION INTRAVENOUS AS NEEDED
Status: DISCONTINUED | OUTPATIENT
Start: 2020-09-22 | End: 2020-09-22 | Stop reason: HOSPADM

## 2020-09-22 RX ORDER — NEOSTIGMINE METHYLSULFATE 5 MG/5 ML
SYRINGE (ML) INTRAVENOUS AS NEEDED
Status: DISCONTINUED | OUTPATIENT
Start: 2020-09-22 | End: 2020-09-22 | Stop reason: SURG

## 2020-09-22 RX ORDER — DEXAMETHASONE SODIUM PHOSPHATE 4 MG/ML
4 INJECTION, SOLUTION INTRA-ARTICULAR; INTRALESIONAL; INTRAMUSCULAR; INTRAVENOUS; SOFT TISSUE ONCE AS NEEDED
Status: COMPLETED | OUTPATIENT
Start: 2020-09-22 | End: 2020-09-22

## 2020-09-22 RX ORDER — SODIUM CHLORIDE, SODIUM LACTATE, POTASSIUM CHLORIDE, CALCIUM CHLORIDE 600; 310; 30; 20 MG/100ML; MG/100ML; MG/100ML; MG/100ML
9 INJECTION, SOLUTION INTRAVENOUS CONTINUOUS
Status: DISCONTINUED | OUTPATIENT
Start: 2020-09-22 | End: 2020-09-22 | Stop reason: HOSPADM

## 2020-09-22 RX ORDER — ONDANSETRON 2 MG/ML
4 INJECTION INTRAMUSCULAR; INTRAVENOUS AS NEEDED
Status: DISCONTINUED | OUTPATIENT
Start: 2020-09-22 | End: 2020-09-22 | Stop reason: HOSPADM

## 2020-09-22 RX ORDER — ROCURONIUM BROMIDE 10 MG/ML
INJECTION, SOLUTION INTRAVENOUS AS NEEDED
Status: DISCONTINUED | OUTPATIENT
Start: 2020-09-22 | End: 2020-09-22 | Stop reason: SURG

## 2020-09-22 RX ORDER — FLUMAZENIL 0.1 MG/ML
0.2 INJECTION INTRAVENOUS AS NEEDED
Status: DISCONTINUED | OUTPATIENT
Start: 2020-09-22 | End: 2020-09-22 | Stop reason: HOSPADM

## 2020-09-22 RX ORDER — SODIUM CHLORIDE 9 MG/ML
125 INJECTION, SOLUTION INTRAVENOUS CONTINUOUS
Status: DISCONTINUED | OUTPATIENT
Start: 2020-09-22 | End: 2020-09-23 | Stop reason: HOSPADM

## 2020-09-22 RX ORDER — PHENYLEPHRINE HCL IN 0.9% NACL 0.8MG/10ML
SYRINGE (ML) INTRAVENOUS AS NEEDED
Status: DISCONTINUED | OUTPATIENT
Start: 2020-09-22 | End: 2020-09-22 | Stop reason: SURG

## 2020-09-22 RX ORDER — BUPIVACAINE HYDROCHLORIDE 5 MG/ML
INJECTION, SOLUTION EPIDURAL; INTRACAUDAL AS NEEDED
Status: DISCONTINUED | OUTPATIENT
Start: 2020-09-22 | End: 2020-09-22 | Stop reason: SURG

## 2020-09-22 RX ORDER — HYOSCYAMINE SULFATE 0.125 MG
125 TABLET,DISINTEGRATING ORAL EVERY 6 HOURS PRN
Status: DISCONTINUED | OUTPATIENT
Start: 2020-09-22 | End: 2020-09-23 | Stop reason: HOSPADM

## 2020-09-22 RX ORDER — OXYCODONE AND ACETAMINOPHEN 7.5; 325 MG/1; MG/1
2 TABLET ORAL ONCE AS NEEDED
Status: DISCONTINUED | OUTPATIENT
Start: 2020-09-22 | End: 2020-09-22 | Stop reason: HOSPADM

## 2020-09-22 RX ADMIN — DEXMEDETOMIDINE HYDROCHLORIDE 100 MCG: 100 INJECTION, SOLUTION, CONCENTRATE INTRAVENOUS at 07:42

## 2020-09-22 RX ADMIN — CEFAZOLIN SODIUM 2 G: 10 INJECTION, POWDER, FOR SOLUTION INTRAVENOUS at 22:54

## 2020-09-22 RX ADMIN — SODIUM CHLORIDE 125 ML/HR: 9 INJECTION, SOLUTION INTRAVENOUS at 15:13

## 2020-09-22 RX ADMIN — ROCURONIUM BROMIDE 50 MG: 10 INJECTION INTRAVENOUS at 07:08

## 2020-09-22 RX ADMIN — DEXAMETHASONE SODIUM PHOSPHATE 4 MG: 4 INJECTION, SOLUTION INTRAMUSCULAR; INTRAVENOUS at 06:40

## 2020-09-22 RX ADMIN — DEXMEDETOMIDINE HYDROCHLORIDE 20 MCG: 100 INJECTION, SOLUTION, CONCENTRATE INTRAVENOUS at 09:58

## 2020-09-22 RX ADMIN — CEFAZOLIN SODIUM 2 G: 10 INJECTION, POWDER, FOR SOLUTION INTRAVENOUS at 15:13

## 2020-09-22 RX ADMIN — ONDANSETRON HYDROCHLORIDE 4 MG: 2 SOLUTION INTRAMUSCULAR; INTRAVENOUS at 10:01

## 2020-09-22 RX ADMIN — Medication 4 MG: at 10:19

## 2020-09-22 RX ADMIN — KETAMINE HYDROCHLORIDE 25 MG: 50 INJECTION, SOLUTION INTRAMUSCULAR; INTRAVENOUS at 09:12

## 2020-09-22 RX ADMIN — KETAMINE HYDROCHLORIDE 25 MG: 50 INJECTION, SOLUTION INTRAMUSCULAR; INTRAVENOUS at 07:04

## 2020-09-22 RX ADMIN — FAMOTIDINE 20 MG: 10 INJECTION INTRAVENOUS at 06:40

## 2020-09-22 RX ADMIN — HYDROMORPHONE HYDROCHLORIDE 900 MCG: 1 INJECTION, SOLUTION INTRAMUSCULAR; INTRAVENOUS; SUBCUTANEOUS at 07:34

## 2020-09-22 RX ADMIN — Medication 160 MCG: at 10:05

## 2020-09-22 RX ADMIN — SODIUM CHLORIDE, POTASSIUM CHLORIDE, SODIUM LACTATE AND CALCIUM CHLORIDE 1000 ML: 600; 310; 30; 20 INJECTION, SOLUTION INTRAVENOUS at 06:15

## 2020-09-22 RX ADMIN — SODIUM CHLORIDE, POTASSIUM CHLORIDE, SODIUM LACTATE AND CALCIUM CHLORIDE 1000 ML: 600; 310; 30; 20 INJECTION, SOLUTION INTRAVENOUS at 06:16

## 2020-09-22 RX ADMIN — SODIUM CHLORIDE 125 ML/HR: 9 INJECTION, SOLUTION INTRAVENOUS at 22:54

## 2020-09-22 RX ADMIN — KETAMINE HYDROCHLORIDE 25 MG: 50 INJECTION, SOLUTION INTRAMUSCULAR; INTRAVENOUS at 07:08

## 2020-09-22 RX ADMIN — BUPIVACAINE HYDROCHLORIDE 2.5 ML: 5 INJECTION, SOLUTION EPIDURAL; INTRACAUDAL at 07:05

## 2020-09-22 RX ADMIN — KETAMINE HYDROCHLORIDE 25 MG: 50 INJECTION, SOLUTION INTRAMUSCULAR; INTRAVENOUS at 09:57

## 2020-09-22 RX ADMIN — GABAPENTIN 800 MG: 400 CAPSULE ORAL at 22:54

## 2020-09-22 RX ADMIN — PROPOFOL 150 MG: 10 INJECTION, EMULSION INTRAVENOUS at 07:08

## 2020-09-22 RX ADMIN — ACETAMINOPHEN 650 MG: 325 TABLET, FILM COATED ORAL at 16:34

## 2020-09-22 RX ADMIN — LIDOCAINE HYDROCHLORIDE 100 MG: 20 INJECTION, SOLUTION INTRAVENOUS at 07:08

## 2020-09-22 RX ADMIN — ACETAMINOPHEN 650 MG: 325 TABLET, FILM COATED ORAL at 23:00

## 2020-09-22 RX ADMIN — ROCURONIUM BROMIDE 20 MG: 10 INJECTION INTRAVENOUS at 08:46

## 2020-09-22 RX ADMIN — KETOROLAC TROMETHAMINE 15 MG: 15 INJECTION, SOLUTION INTRAMUSCULAR; INTRAVENOUS at 18:03

## 2020-09-22 RX ADMIN — KETOROLAC TROMETHAMINE 15 MG: 15 INJECTION, SOLUTION INTRAMUSCULAR; INTRAVENOUS at 23:00

## 2020-09-22 RX ADMIN — NICOTINE 1 PATCH: 14 PATCH, EXTENDED RELEASE TRANSDERMAL at 15:29

## 2020-09-22 RX ADMIN — HYDROCODONE BITARTRATE AND ACETAMINOPHEN 1 TABLET: 7.5; 325 TABLET ORAL at 20:03

## 2020-09-22 RX ADMIN — GABAPENTIN 800 MG: 400 CAPSULE ORAL at 15:29

## 2020-09-22 RX ADMIN — MIDAZOLAM HYDROCHLORIDE 2 MG: 2 INJECTION, SOLUTION INTRAMUSCULAR; INTRAVENOUS at 06:49

## 2020-09-22 RX ADMIN — HYDROMORPHONE HYDROCHLORIDE 100 MCG: 1 INJECTION, SOLUTION INTRAMUSCULAR; INTRAVENOUS; SUBCUTANEOUS at 07:05

## 2020-09-22 RX ADMIN — SODIUM CHLORIDE, POTASSIUM CHLORIDE, SODIUM LACTATE AND CALCIUM CHLORIDE 1000 ML: 600; 310; 30; 20 INJECTION, SOLUTION INTRAVENOUS at 12:24

## 2020-09-22 RX ADMIN — DEXAMETHASONE SODIUM PHOSPHATE 4 MG: 4 INJECTION, SOLUTION INTRAMUSCULAR; INTRAVENOUS at 10:01

## 2020-09-22 RX ADMIN — Medication 2 G: at 07:11

## 2020-09-22 RX ADMIN — DEXMEDETOMIDINE HYDROCHLORIDE 30 MCG: 100 INJECTION, SOLUTION, CONCENTRATE INTRAVENOUS at 10:24

## 2020-09-22 RX ADMIN — DEXMEDETOMIDINE HYDROCHLORIDE 50 MCG: 100 INJECTION, SOLUTION, CONCENTRATE INTRAVENOUS at 09:32

## 2020-09-22 RX ADMIN — GLYCOPYRROLATE 0.4 MG: 0.2 INJECTION, SOLUTION INTRAMUSCULAR; INTRAVENOUS at 10:18

## 2020-09-22 NOTE — ANESTHESIA POSTPROCEDURE EVALUATION
Patient: Lalo Jones    Procedure Summary     Date: 09/22/20 Room / Location: [facility] OR  /  [facility] OR    Anesthesia Start: 0659 Anesthesia Stop: 1058    Procedure: RADICAL PROSTATECTOMY LAPAROSCOPIC WITH DAVINCI ROBOT (N/A Abdomen) Diagnosis:       Prostate cancer (CMS/HCC)      (Prostate cancer (CMS/HCC) [C61])    Surgeon: Nuno Jj MD Provider: Freddie Gay CRNA    Anesthesia Type: general ASA Status: 2          Anesthesia Type: general    Vitals  Vitals Value Taken Time   /65 09/22/20 1405   Temp 97.4 °F (36.3 °C) 09/22/20 1345   Pulse 60 09/22/20 1410   Resp 16 09/22/20 1405   SpO2 96 % 09/22/20 1410   Vitals shown include unvalidated device data.        Post Anesthesia Care and Evaluation    Patient location during evaluation: PACU  Patient participation: complete - patient participated  Level of consciousness: awake and awake and alert  Pain score: 0  Pain management: adequate  Airway patency: patent  Anesthetic complications: No anesthetic complications  PONV Status: none  Cardiovascular status: acceptable  Respiratory status: acceptable  Hydration status: acceptable    Comments: Patient discharged according to acceptable Artemio score per RN assessment. See nursing records for further information.     Blood pressure 111/65, pulse 62, temperature 97.4 °F (36.3 °C), temperature source Oral, resp. rate 16, SpO2 97 %.

## 2020-09-22 NOTE — ANESTHESIA PROCEDURE NOTES
Airway  Urgency: elective    Airway not difficult    General Information and Staff    Patient location during procedure: OR  CRNA: Freddie Gay CRNA    Indications and Patient Condition  Indications for airway management: airway protection    Preoxygenated: yes  MILS maintained throughout  Mask difficulty assessment: 1 - vent by mask    Final Airway Details  Final airway type: endotracheal airway      Successful airway: ETT  Cuffed: yes   Successful intubation technique: direct laryngoscopy  Endotracheal tube insertion site: oral  Blade: Bhatia  Blade size: 2  ETT size (mm): 7.5  Cormack-Lehane Classification: grade I - full view of glottis  Placement verified by: chest auscultation and capnometry   Cuff volume (mL): 5  Measured from: lips  ETT/EBT  to lips (cm): 22  Number of attempts at approach: 1  Assessment: lips, teeth, and gum same as pre-op and atraumatic intubation

## 2020-09-22 NOTE — ANESTHESIA PREPROCEDURE EVALUATION
Anesthesia Evaluation     Patient summary reviewed   no history of anesthetic complications:  NPO Solid Status: > 8 hours             Airway   Mallampati: II  TM distance: >3 FB  Neck ROM: full  Dental      Pulmonary    (+) a smoker,   (-) COPD, asthma, sleep apnea  Cardiovascular   Exercise tolerance: excellent (>7 METS)    ECG reviewed    (-) pacemaker, past MI, angina, cardiac stents      Neuro/Psych  (-) seizures, TIA, CVA  GI/Hepatic/Renal/Endo    (-) GERD, liver disease, no renal disease, diabetes    Musculoskeletal     Abdominal    Substance History      OB/GYN          Other                        Anesthesia Plan    ASA 2     general and spinal   (Pt agrees to spinal opioid)  intravenous induction     Anesthetic plan, all risks, benefits, and alternatives have been provided, discussed and informed consent has been obtained with: patient.

## 2020-09-22 NOTE — ANESTHESIA PROCEDURE NOTES
Spinal Block      Patient location during procedure: OR  Indication:at surgeon's request and post-op pain management  Performed By  CRNA: Freddie Gay CRNA  Preanesthetic Checklist  Completed: patient identified, site marked, surgical consent, pre-op evaluation, timeout performed, IV checked, risks and benefits discussed and monitors and equipment checked  Spinal Block Prep:  Sterile Tech:cap, gloves and sterile barriers  Patient Monitoring:EKG, continuous pulse oximetry and blood pressure monitoring  Spinal Block Procedure  Approach:midline  Guidance:palpation technique  Location:L4-L5  Needle Type:Sprotte  Needle Gauge:25 G  Placement of Spinal needle event:cerebrospinal fluid aspirated  Paresthesia: no  Fluid Appearance:clear     Post Assessment  Patient Tolerance:patient tolerated the procedure well with no apparent complications  Complications no

## 2020-09-23 VITALS
TEMPERATURE: 97.9 F | SYSTOLIC BLOOD PRESSURE: 113 MMHG | DIASTOLIC BLOOD PRESSURE: 65 MMHG | WEIGHT: 207.45 LBS | HEIGHT: 74 IN | HEART RATE: 73 BPM | OXYGEN SATURATION: 94 % | BODY MASS INDEX: 26.62 KG/M2 | RESPIRATION RATE: 16 BRPM

## 2020-09-23 LAB
ANION GAP SERPL CALCULATED.3IONS-SCNC: 9 MMOL/L (ref 5–15)
BUN SERPL-MCNC: 18 MG/DL (ref 6–20)
BUN/CREAT SERPL: 16.5 (ref 7–25)
CALCIUM SPEC-SCNC: 7.8 MG/DL (ref 8.6–10.5)
CHLORIDE SERPL-SCNC: 101 MMOL/L (ref 98–107)
CO2 SERPL-SCNC: 26 MMOL/L (ref 22–29)
CREAT FLD-MCNC: 1.1 MG/DL
CREAT SERPL-MCNC: 1.09 MG/DL (ref 0.76–1.27)
CYTO UR: NORMAL
DEPRECATED RDW RBC AUTO: 43.7 FL (ref 37–54)
ERYTHROCYTE [DISTWIDTH] IN BLOOD BY AUTOMATED COUNT: 13.4 % (ref 12.3–15.4)
GFR SERPL CREATININE-BSD FRML MDRD: 71 ML/MIN/1.73
GLUCOSE SERPL-MCNC: 126 MG/DL (ref 65–99)
HCT VFR BLD AUTO: 37.3 % (ref 37.5–51)
HGB BLD-MCNC: 12.4 G/DL (ref 13–17.7)
LAB AP CASE REPORT: NORMAL
LAB AP SYNOPTIC CHECKLIST: NORMAL
MCH RBC QN AUTO: 29.5 PG (ref 26.6–33)
MCHC RBC AUTO-ENTMCNC: 33.2 G/DL (ref 31.5–35.7)
MCV RBC AUTO: 88.6 FL (ref 79–97)
PATH REPORT.FINAL DX SPEC: NORMAL
PATH REPORT.GROSS SPEC: NORMAL
PLATELET # BLD AUTO: 152 10*3/MM3 (ref 140–450)
PMV BLD AUTO: 10.1 FL (ref 6–12)
POTASSIUM SERPL-SCNC: 4.3 MMOL/L (ref 3.5–5.2)
RBC # BLD AUTO: 4.21 10*6/MM3 (ref 4.14–5.8)
SODIUM SERPL-SCNC: 136 MMOL/L (ref 136–145)
WBC # BLD AUTO: 10.14 10*3/MM3 (ref 3.4–10.8)

## 2020-09-23 PROCEDURE — 0VT04ZZ RESECTION OF PROSTATE, PERCUTANEOUS ENDOSCOPIC APPROACH: ICD-10-PCS | Performed by: UROLOGY

## 2020-09-23 PROCEDURE — 25010000002 KETOROLAC TROMETHAMINE PER 15 MG: Performed by: UROLOGY

## 2020-09-23 PROCEDURE — 8E0W4CZ ROBOTIC ASSISTED PROCEDURE OF TRUNK REGION, PERCUTANEOUS ENDOSCOPIC APPROACH: ICD-10-PCS | Performed by: UROLOGY

## 2020-09-23 PROCEDURE — 85027 COMPLETE CBC AUTOMATED: CPT | Performed by: UROLOGY

## 2020-09-23 PROCEDURE — 07BC4ZX EXCISION OF PELVIS LYMPHATIC, PERCUTANEOUS ENDOSCOPIC APPROACH, DIAGNOSTIC: ICD-10-PCS | Performed by: UROLOGY

## 2020-09-23 PROCEDURE — 80048 BASIC METABOLIC PNL TOTAL CA: CPT | Performed by: UROLOGY

## 2020-09-23 PROCEDURE — 82570 ASSAY OF URINE CREATININE: CPT | Performed by: UROLOGY

## 2020-09-23 RX ORDER — CEPHALEXIN 500 MG/1
500 CAPSULE ORAL 3 TIMES DAILY
Qty: 9 CAPSULE | Refills: 0 | Status: SHIPPED | OUTPATIENT
Start: 2020-09-23 | End: 2020-12-02

## 2020-09-23 RX ORDER — HYDROCODONE BITARTRATE AND ACETAMINOPHEN 7.5; 325 MG/1; MG/1
1 TABLET ORAL EVERY 6 HOURS PRN
Qty: 16 TABLET | Refills: 0 | Status: SHIPPED | OUTPATIENT
Start: 2020-09-23 | End: 2020-12-02

## 2020-09-23 RX ORDER — HYOSCYAMINE SULFATE 0.12 MG/1
0.12 TABLET SUBLINGUAL EVERY 4 HOURS PRN
Qty: 21 EACH | Refills: 1 | Status: SHIPPED | OUTPATIENT
Start: 2020-09-23 | End: 2020-12-02

## 2020-09-23 RX ORDER — DOCUSATE SODIUM 100 MG/1
100 CAPSULE, LIQUID FILLED ORAL 2 TIMES DAILY
Qty: 60 CAPSULE | Refills: 0 | Status: SHIPPED | OUTPATIENT
Start: 2020-09-23 | End: 2020-12-02

## 2020-09-23 RX ADMIN — SODIUM CHLORIDE 125 ML/HR: 9 INJECTION, SOLUTION INTRAVENOUS at 05:55

## 2020-09-23 RX ADMIN — HYDROCODONE BITARTRATE AND ACETAMINOPHEN 2 TABLET: 7.5; 325 TABLET ORAL at 10:52

## 2020-09-23 RX ADMIN — NICOTINE 1 PATCH: 14 PATCH, EXTENDED RELEASE TRANSDERMAL at 08:56

## 2020-09-23 RX ADMIN — KETOROLAC TROMETHAMINE 15 MG: 15 INJECTION, SOLUTION INTRAMUSCULAR; INTRAVENOUS at 10:52

## 2020-09-23 RX ADMIN — ACETAMINOPHEN 650 MG: 325 TABLET, FILM COATED ORAL at 05:55

## 2020-09-23 RX ADMIN — GABAPENTIN 800 MG: 400 CAPSULE ORAL at 05:55

## 2020-09-23 RX ADMIN — KETOROLAC TROMETHAMINE 15 MG: 15 INJECTION, SOLUTION INTRAMUSCULAR; INTRAVENOUS at 05:55

## 2020-09-29 ENCOUNTER — TELEPHONE (OUTPATIENT)
Dept: UROLOGY | Facility: CLINIC | Age: 55
End: 2020-09-29

## 2020-09-30 ENCOUNTER — PROCEDURE VISIT (OUTPATIENT)
Dept: UROLOGY | Facility: CLINIC | Age: 55
End: 2020-09-30

## 2020-09-30 DIAGNOSIS — C61 PROSTATE CANCER (HCC): ICD-10-CM

## 2020-09-30 PROCEDURE — 99024 POSTOP FOLLOW-UP VISIT: CPT | Performed by: UROLOGY

## 2020-09-30 NOTE — PROGRESS NOTES
Patient is here today to have their catheter removed. The patient was seen by Dr Jj on 09/22/2020. The balloon to the catheter was deflated using a 10cc syringe. The Catheter was then removed without difficulty. The patient was advised to report back to the emergency room and or urgent care if they can not void after 6 hours. I urged the patient to drink plenty of fluids. Patient denied any fever, N&V, or chills. Patient acknowledged and verbalized understanding. Dr Jj was in the office at the time of the procedure. LAKSHMI Nava

## 2020-10-12 ENCOUNTER — HOSPITAL ENCOUNTER (EMERGENCY)
Facility: HOSPITAL | Age: 55
Discharge: HOME | End: 2020-10-12
Admitting: EMERGENCY MEDICINE

## 2020-10-12 ENCOUNTER — TELEPHONE (OUTPATIENT)
Dept: UROLOGY | Facility: CLINIC | Age: 55
End: 2020-10-12

## 2020-10-12 ENCOUNTER — APPOINTMENT (OUTPATIENT)
Dept: CT IMAGING | Facility: HOSPITAL | Age: 55
End: 2020-10-12

## 2020-10-12 VITALS
HEART RATE: 72 BPM | DIASTOLIC BLOOD PRESSURE: 70 MMHG | BODY MASS INDEX: 26.95 KG/M2 | SYSTOLIC BLOOD PRESSURE: 113 MMHG | TEMPERATURE: 98.3 F | OXYGEN SATURATION: 97 % | RESPIRATION RATE: 18 BRPM | WEIGHT: 210 LBS | HEIGHT: 74 IN

## 2020-10-12 DIAGNOSIS — J18.9 PNEUMONIA OF RIGHT LOWER LOBE DUE TO INFECTIOUS ORGANISM: ICD-10-CM

## 2020-10-12 DIAGNOSIS — Z90.79 S/P PROSTATECTOMY: ICD-10-CM

## 2020-10-12 DIAGNOSIS — N39.0 URINARY TRACT INFECTION WITHOUT HEMATURIA, SITE UNSPECIFIED: Primary | ICD-10-CM

## 2020-10-12 LAB
ALBUMIN SERPL-MCNC: 4.6 G/DL (ref 3.5–5.2)
ALBUMIN/GLOB SERPL: 1.5 G/DL
ALP SERPL-CCNC: 91 U/L (ref 39–117)
ALT SERPL W P-5'-P-CCNC: 17 U/L (ref 1–41)
ANION GAP SERPL CALCULATED.3IONS-SCNC: 11 MMOL/L (ref 5–15)
AST SERPL-CCNC: 16 U/L (ref 1–40)
BACTERIA UR QL AUTO: ABNORMAL /HPF
BASOPHILS # BLD AUTO: 0.07 10*3/MM3 (ref 0–0.2)
BASOPHILS NFR BLD AUTO: 0.9 % (ref 0–1.5)
BILIRUB SERPL-MCNC: 0.3 MG/DL (ref 0–1.2)
BILIRUB UR QL STRIP: NEGATIVE
BUN SERPL-MCNC: 11 MG/DL (ref 6–20)
BUN/CREAT SERPL: 11.7 (ref 7–25)
CALCIUM SPEC-SCNC: 10.1 MG/DL (ref 8.6–10.5)
CHLORIDE SERPL-SCNC: 104 MMOL/L (ref 98–107)
CLARITY UR: CLEAR
CO2 SERPL-SCNC: 28 MMOL/L (ref 22–29)
COLOR UR: YELLOW
CREAT SERPL-MCNC: 0.94 MG/DL (ref 0.76–1.27)
DEPRECATED RDW RBC AUTO: 45.6 FL (ref 37–54)
EOSINOPHIL # BLD AUTO: 0.24 10*3/MM3 (ref 0–0.4)
EOSINOPHIL NFR BLD AUTO: 3.1 % (ref 0.3–6.2)
ERYTHROCYTE [DISTWIDTH] IN BLOOD BY AUTOMATED COUNT: 14 % (ref 12.3–15.4)
GFR SERPL CREATININE-BSD FRML MDRD: 84 ML/MIN/1.73
GLOBULIN UR ELPH-MCNC: 3 GM/DL
GLUCOSE SERPL-MCNC: 108 MG/DL (ref 65–99)
GLUCOSE UR STRIP-MCNC: NEGATIVE MG/DL
HCT VFR BLD AUTO: 46.3 % (ref 37.5–51)
HGB BLD-MCNC: 15.2 G/DL (ref 13–17.7)
HGB UR QL STRIP.AUTO: ABNORMAL
HOLD SPECIMEN: NORMAL
HYALINE CASTS UR QL AUTO: ABNORMAL /LPF
IMM GRANULOCYTES # BLD AUTO: 0.02 10*3/MM3 (ref 0–0.05)
IMM GRANULOCYTES NFR BLD AUTO: 0.3 % (ref 0–0.5)
KETONES UR QL STRIP: NEGATIVE
LEUKOCYTE ESTERASE UR QL STRIP.AUTO: ABNORMAL
LYMPHOCYTES # BLD AUTO: 1.61 10*3/MM3 (ref 0.7–3.1)
LYMPHOCYTES NFR BLD AUTO: 20.7 % (ref 19.6–45.3)
MCH RBC QN AUTO: 29.2 PG (ref 26.6–33)
MCHC RBC AUTO-ENTMCNC: 32.8 G/DL (ref 31.5–35.7)
MCV RBC AUTO: 89 FL (ref 79–97)
MONOCYTES # BLD AUTO: 0.5 10*3/MM3 (ref 0.1–0.9)
MONOCYTES NFR BLD AUTO: 6.4 % (ref 5–12)
NEUTROPHILS NFR BLD AUTO: 5.34 10*3/MM3 (ref 1.7–7)
NEUTROPHILS NFR BLD AUTO: 68.6 % (ref 42.7–76)
NITRITE UR QL STRIP: NEGATIVE
NRBC BLD AUTO-RTO: 0 /100 WBC (ref 0–0.2)
PH UR STRIP.AUTO: 5.5 [PH] (ref 5–8)
PLATELET # BLD AUTO: 236 10*3/MM3 (ref 140–450)
PMV BLD AUTO: 9.8 FL (ref 6–12)
POTASSIUM SERPL-SCNC: 3.8 MMOL/L (ref 3.5–5.2)
PROT SERPL-MCNC: 7.6 G/DL (ref 6–8.5)
PROT UR QL STRIP: NEGATIVE
RBC # BLD AUTO: 5.2 10*6/MM3 (ref 4.14–5.8)
RBC # UR: ABNORMAL /HPF
REF LAB TEST METHOD: ABNORMAL
SODIUM SERPL-SCNC: 143 MMOL/L (ref 136–145)
SP GR UR STRIP: 1.01 (ref 1–1.03)
SQUAMOUS #/AREA URNS HPF: ABNORMAL /HPF
UROBILINOGEN UR QL STRIP: ABNORMAL
WBC # BLD AUTO: 7.78 10*3/MM3 (ref 3.4–10.8)
WBC UR QL AUTO: ABNORMAL /HPF
WHOLE BLOOD HOLD SPECIMEN: NORMAL
WHOLE BLOOD HOLD SPECIMEN: NORMAL

## 2020-10-12 PROCEDURE — 25010000002 ONDANSETRON PER 1 MG: Performed by: PHYSICIAN ASSISTANT

## 2020-10-12 PROCEDURE — 85025 COMPLETE CBC W/AUTO DIFF WBC: CPT | Performed by: PHYSICIAN ASSISTANT

## 2020-10-12 PROCEDURE — 87186 SC STD MICRODIL/AGAR DIL: CPT | Performed by: PHYSICIAN ASSISTANT

## 2020-10-12 PROCEDURE — 25010000002 MORPHINE PER 10 MG: Performed by: EMERGENCY MEDICINE

## 2020-10-12 PROCEDURE — 96365 THER/PROPH/DIAG IV INF INIT: CPT

## 2020-10-12 PROCEDURE — 99283 EMERGENCY DEPT VISIT LOW MDM: CPT

## 2020-10-12 PROCEDURE — 87086 URINE CULTURE/COLONY COUNT: CPT | Performed by: PHYSICIAN ASSISTANT

## 2020-10-12 PROCEDURE — 74176 CT ABD & PELVIS W/O CONTRAST: CPT

## 2020-10-12 PROCEDURE — 80053 COMPREHEN METABOLIC PANEL: CPT | Performed by: PHYSICIAN ASSISTANT

## 2020-10-12 PROCEDURE — 81001 URINALYSIS AUTO W/SCOPE: CPT | Performed by: PHYSICIAN ASSISTANT

## 2020-10-12 PROCEDURE — 25010000002 CEFTRIAXONE PER 250 MG: Performed by: PHYSICIAN ASSISTANT

## 2020-10-12 PROCEDURE — 96375 TX/PRO/DX INJ NEW DRUG ADDON: CPT

## 2020-10-12 PROCEDURE — 87077 CULTURE AEROBIC IDENTIFY: CPT | Performed by: PHYSICIAN ASSISTANT

## 2020-10-12 RX ORDER — ONDANSETRON 2 MG/ML
4 INJECTION INTRAMUSCULAR; INTRAVENOUS ONCE
Status: COMPLETED | OUTPATIENT
Start: 2020-10-12 | End: 2020-10-12

## 2020-10-12 RX ORDER — AZITHROMYCIN 250 MG/1
TABLET, FILM COATED ORAL
Qty: 6 TABLET | Refills: 0 | Status: SHIPPED | OUTPATIENT
Start: 2020-10-12 | End: 2022-01-10

## 2020-10-12 RX ORDER — OXYCODONE AND ACETAMINOPHEN 7.5; 325 MG/1; MG/1
1 TABLET ORAL ONCE
Status: COMPLETED | OUTPATIENT
Start: 2020-10-12 | End: 2020-10-12

## 2020-10-12 RX ORDER — CIPROFLOXACIN 500 MG/1
500 TABLET, FILM COATED ORAL 2 TIMES DAILY
Qty: 6 TABLET | Refills: 0 | Status: SHIPPED | OUTPATIENT
Start: 2020-10-12 | End: 2020-10-15

## 2020-10-12 RX ORDER — AZITHROMYCIN 250 MG/1
500 TABLET, FILM COATED ORAL ONCE
Status: COMPLETED | OUTPATIENT
Start: 2020-10-12 | End: 2020-10-12

## 2020-10-12 RX ORDER — SODIUM CHLORIDE 0.9 % (FLUSH) 0.9 %
10 SYRINGE (ML) INJECTION AS NEEDED
Status: DISCONTINUED | OUTPATIENT
Start: 2020-10-12 | End: 2020-10-12 | Stop reason: HOSPADM

## 2020-10-12 RX ADMIN — AZITHROMYCIN 500 MG: 250 TABLET, FILM COATED ORAL at 20:12

## 2020-10-12 RX ADMIN — MORPHINE SULFATE 4 MG: 4 INJECTION, SOLUTION INTRAMUSCULAR; INTRAVENOUS at 17:55

## 2020-10-12 RX ADMIN — CEFTRIAXONE SODIUM 1 G: 1 INJECTION, POWDER, FOR SOLUTION INTRAMUSCULAR; INTRAVENOUS at 18:59

## 2020-10-12 RX ADMIN — ONDANSETRON HYDROCHLORIDE 4 MG: 2 SOLUTION INTRAMUSCULAR; INTRAVENOUS at 17:55

## 2020-10-12 RX ADMIN — SODIUM CHLORIDE 1000 ML: 9 INJECTION, SOLUTION INTRAVENOUS at 17:55

## 2020-10-12 RX ADMIN — OXYCODONE HYDROCHLORIDE AND ACETAMINOPHEN 1 TABLET: 7.5; 325 TABLET ORAL at 20:17

## 2020-10-12 NOTE — TELEPHONE ENCOUNTER
Patient called today and told me he was running a 103 fever and having left sided pain. He is a 2 week post op for a prostatectomy. I told him he needed to go to the er and he agreed.

## 2020-10-13 ENCOUNTER — TELEPHONE (OUTPATIENT)
Dept: UROLOGY | Facility: CLINIC | Age: 55
End: 2020-10-13

## 2020-10-13 NOTE — TELEPHONE ENCOUNTER
Patient called stating he was seen in the ER yesterday for a UTI and pneumonia. He states he is feeling much better today with no fever noted. Dr Jj reviewed the ER notes and imaging and wants to see him next week after finishing the antibiotic. Patient needs to follow up with PCP this week regarding pneumonia. All questions answered and he voiced understanding

## 2020-10-13 NOTE — DISCHARGE INSTRUCTIONS
Laparoscopic Prostatectomy    Laparoscopic prostatectomy is a surgery to remove the entire prostate gland and seminal vesicles. The surgery is performed using a thin, pencil-sized instrument (laparoscope) with a light and camera on the end to help the surgeon see inside the abdomen. This surgery may be done to treat prostate cancer or an enlarged prostate gland (benign prostatic hyperplasia).  Laparoscopic prostatectomy is less invasive than other types of surgeries for removing the prostate gland. During this procedure, 4-5 small incisions are made in the abdomen. The laparoscope and other surgical instruments are placed through the incisions and the prostate gland is removed.  Tell a health care provider about:  · Any allergies you have.  · All medicines you are taking, including vitamins, herbs, eye drops, creams, and over-the-counter medicines.  · Any problems you or family members have had with anesthetic medicines.  · Any blood disorders you have.  · Any surgeries you have had.  · Any medical conditions you have.  What are the risks?  Generally, this is a safe procedure. However, problems may occur, including:  · Infection.  · Bleeding and the risk that you may require donated blood (transfusion).  · Allergic reactions to medicines.  · Damage to other structures or organs, such as the rectum, bladder, or small bowel.  · Blockage in the intestines or bowel.  · Scarring (stricture) that causes problems with the flow of urine.  · Inability to control when you urinate (incontinence).  · Inability to get or keep an erection (erectile dysfunction).  What happens before the procedure?  Staying hydrated  Follow instructions from your health care provider about hydration, which may include:  · Up to 2 hours before the procedure - you may continue to drink clear liquids, such as water, clear fruit juice, black coffee, and plain tea.  Eating and drinking restrictions  Follow instructions from your health care  provider about eating and drinking, which may include:  · 8 hours before the procedure - stop eating heavy meals or foods such as meat, fried foods, or fatty foods.  · 6 hours before the procedure - stop eating light meals or foods, such as toast or cereal.  · 6 hours before the procedure - stop drinking milk or drinks that contain milk.  · 2 hours before the procedure - stop drinking clear liquids.  General instructions  · Ask your health care provider about:  ? Changing or stopping your regular medicines. This is especially important if you are taking diabetes medicines or blood thinners.  ? Taking medicines such as aspirin and ibuprofen. These medicines can thin your blood. Do not take these medicines before your procedure if your health care provider instructs you not to.  · Follow your health care provider's instructions about cleansing your bowels.  · Do not use any products that contain nicotine or tobacco, such as cigarettes and e-cigarettes. If you need help quitting, ask your health care provider.  · Practice any breathing exercises as told by your health care provider.  · Plan to have someone take you home from the hospital or clinic.  · If you will be going home right after the procedure, plan to have someone with you for 24 hours.  What happens during the procedure?  · To reduce your risk of infection:  ? Your health care team will wash or sanitize their hands.  ? Your skin will be washed with soap.  ? Hair may be removed from the surgical area.  · An IV tube will be inserted into one of your veins.  · You will be given one or more of the following:  ? A medicine to help you relax (sedative).  ? A medicine to make you fall asleep (general anesthetic).  · A tube (Joseph catheter) will be inserted into your urethra to drain urine from your bladder.  · An incision will be made in your abdomen at your belly button.  · A laparoscope will be inserted into your abdomen through the incision.  · Four or more  small incisions will be made.  · Surgical instruments will be inserted into the incisions and used to remove your prostate and seminal vesicles. Your urethra will be disconnected from your bladder.  · Your urethra will be reconnected to the group of muscles that help push urine through the urethra (bladder neck).  · The laparoscope and other surgical instruments will be removed.  · Your incisions will be closed with stitches (sutures).  The procedure may vary among health care providers and hospitals.  What happens after the procedure?  · Your blood pressure, heart rate, breathing rate, and blood oxygen level will be monitored until the medicines you were given have worn off.  · You may continue to receive fluids and medicines through an IV tube. You may be given antibiotics, or medicine to help relieve pain or nausea.  · You will be encouraged to walk as soon as possible. You will also use a device or do breathing exercises to keep your lungs clear.  · You may continue to have a Joseph catheter draining your urine. You will be instructed on how to care for this at home.  · Do not drive for 24 hours if you received a sedative.  Summary  · Laparoscopic prostatectomy is a surgery to remove the entire prostate gland and seminal vesicles. The surgery is performed using a thin, pencil-sized instrument (laparoscope) with a light and camera on the end to help the surgeon see inside the abdomen.  · You may continue to receive fluids and medicines through an IV tube. You may be given antibiotics, or medicine to help relieve pain or nausea.  · You will continue to have a Joseph catheter draining your urine. You will be instructed on how to care for this at home.  · Plan to have someone take you home from the hospital or clinic.  This information is not intended to replace advice given to you by your health care provider. Make sure you discuss any questions you have with your health care provider.  Document Released: 12/18/2006  Document Revised: 11/30/2018 Document Reviewed: 12/04/2017  Elsevier Patient Education © 2020 [employer]

## 2020-10-14 LAB — BACTERIA SPEC AEROBE CULT: ABNORMAL

## 2020-10-15 NOTE — ED PROVIDER NOTES
Subjective   History of Present Illness    Patient is a 54-year-old male presenting to ED with flank pain postoperatively.  Patient reported that on 9/22/2020 he had a laparoscopic prostatectomy performed by Dr. Jj.  Patient reported that since that time he has had pain at his left lateralmost incision site which radiates towards his left flank region.  Patient stated that since yesterday the pain has worsened and he feels the area is swollen.  Patient reported that he also developed a fever yesterday which improved with doses of Tylenol.  Patient denies any emesis, diarrhea, right-sided flank pain.  Patient reported that he has felt nauseous today.  Patient noted that he has had no postoperative follow-up appointments and he is concerned that there is a complication in his left kidney.  Patient denies chills, diaphoresis, emesis, dysuria, abnormal penile discharge, penile pain, testicular pain, or scrotal swelling.  Patient reported that all of the laparoscopic incision sites externally are healing well with no evidence of cellulitis including erythema, wound dehiscence, or warmth to the touch.  Patient denies any medication use prior to arrival today.  Patient reported that he feels slightly short of breath due to the pain on the left side.  Patient denies any chest pain, cough, or other URI symptoms.  Patient denies any known sick contact.  Patient denies any falls or injuries since the surgery.  Patient reported in the past week he has been returning to his normal activity and is concerned that he has herniated the left lateral incision site.  Patient noted that for 1 week after the surgery he did have a Joseph catheter in place and was on Keflex however after the Joseph catheter was removed he has not been on any antibiotics.    Patient has known allergies to contrast dye.  Patient has a medical history positive for ACL reconstruction, arthritis, chronic pain, acute prostatitis, previous septic shock due to a  urinary tract source, and prostate cancer.  Patient has a medical history positive for right tennis elbow release, left ACL repair, right ankle surgery, left shoulder surgery x4, back surgery x2, nasal septum surgery, ultrasound-guided prostate biopsy, as well as the prostatectomy on 9/22/2020.  Patient is a current 1 pack/day cigarette smoker.  Patient denies use of alcohol, marijuana, further tobacco products, or any IV/recreational/illicit drugs.    Records reviewed show patient was admitted from 9/22/2020 until 9/23/2020 for a radical prostatectomy laparoscopic with the da Gerard robot performed by Dr. Jj.  Patient did well postoperatively with no complications and was discharged the following day.  Patient was sent home with a prescription for Keflex, Colace, Norco, as well as Levsin.  Patient called the urology office earlier today reporting a fever running as high as 103 with left-sided pain.  Patient was advised to go to the ED for further evaluation.    Review of Systems   Constitutional: Positive for fever (Measured 102.9 orally at highest; resolved since Tylenol yesterday). Negative for chills and diaphoresis.   HENT: Negative.    Eyes: Negative.    Respiratory: Positive for shortness of breath (Due to left-sided pain).    Cardiovascular: Negative for chest pain.   Gastrointestinal: Positive for abdominal pain (LLQ over incisional site) and nausea. Negative for diarrhea and vomiting.   Genitourinary: Positive for flank pain (Left-sided). Negative for difficulty urinating, discharge, dysuria, hematuria, penile pain, scrotal swelling and testicular pain.   Musculoskeletal: Negative for myalgias.   Skin: Positive for wound (Healing laparoscopic surgical incisions to abdomen).   Neurological: Negative.    Psychiatric/Behavioral: Negative.    All other systems reviewed and are negative.      Past Medical History:   Diagnosis Date   • Arthritis    • Bronchitis    • Cancer (CMS/HCC)     PROSTATE   • Chronic  pain    • Pneumonia    • Prostatitis, acute    • S/P ACL reconstruction    • Septic shock due to urinary tract infection (CMS/HCC)    • Strep throat    • UTI (urinary tract infection)        Allergies   Allergen Reactions   • Contrast Dye Diarrhea and Nausea And Vomiting       Past Surgical History:   Procedure Laterality Date   • ANKLE SURGERY Right    • BACK SURGERY      X2   • KNEE ACL RECONSTRUCTION Left    • NASAL SEPTUM SURGERY     • PROSTATE ULTRASOUND BIOPSY N/A 2/18/2020    Procedure: PROSTATE  BIOPSY;  Surgeon: Trae Clark MD;  Location:  [facility] OR;  Service: Urology;  Laterality: N/A;   • PROSTATECTOMY N/A 9/22/2020    Procedure: RADICAL PROSTATECTOMY LAPAROSCOPIC WITH DAVINCI ROBOT;  Surgeon: Nuno Jj MD;  [facility]  [facility] OR;  [facility];  Laterality: N/A;   • SHOULDER SURGERY Left     X 4   • TENNIS ELBOW RELEASE Right 04/14/2011       History reviewed. No pertinent family history.    Social History     Socioeconomic History   • Marital status:      Spouse name: Not on file   • Number of children: Not on file   • Years of education: Not on file   • Highest education level: Not on file   Tobacco Use   • Smoking status: Current Every Day Smoker     Packs/day: 1.00     Years: 15.00     Pack years: 15.00     Types: Cigarettes     Start date: 2/7/1987   • Smokeless tobacco: Never Used   • Tobacco comment: chnatix trying to quit   Substance and Sexual Activity   • Alcohol use: No   • Drug use: No   • Sexual activity: Defer           Objective   Physical Exam  Vitals signs and nursing note reviewed.   Constitutional:       General: He is not in acute distress.     Appearance: Normal appearance. He is normal weight. He is not ill-appearing or diaphoretic.   HENT:      Head: Normocephalic.      Mouth/Throat:      Mouth: Mucous membranes are moist.      Pharynx: Oropharynx is clear.   Eyes:      Conjunctiva/sclera: Conjunctivae normal.      Pupils: Pupils are equal, round, and  reactive to light.   Neck:      Musculoskeletal: Normal range of motion.   Cardiovascular:      Rate and Rhythm: Regular rhythm. Tachycardia present.      Pulses: Normal pulses.      Heart sounds: Normal heart sounds.   Pulmonary:      Effort: Pulmonary effort is normal. No respiratory distress.      Breath sounds: Normal breath sounds. No wheezing.   Chest:      Chest wall: No tenderness (No reproducible tenderness to palpitation of chest wall).   Abdominal:      General: A surgical scar is present. Bowel sounds are normal.      Palpations: Abdomen is soft.      Tenderness: There is abdominal tenderness (Over left lower lateral incisional site). There is left CVA tenderness. There is no right CVA tenderness or guarding.      Hernia: No hernia is present.          Comments: Patient's laparoscopic incisional sites are all very well healing with no evidence of wound dehiscence, wound discharge.  No evidence of surrounding erythema, streaking erythema, or cellulitis to any of the wounds.  Patient with reproducible tenderness to the left lateral lower incision with slight swelling noted.  No evidence of herniation underneath this incisional site.    Remainder of abdomen with no acute findings.   Musculoskeletal:      Right lower leg: No edema.      Left lower leg: No edema.   Skin:     General: Skin is warm and dry.      Capillary Refill: Capillary refill takes less than 2 seconds.      Coloration: Skin is not jaundiced or pale.      Findings: No erythema.   Neurological:      Mental Status: He is alert and oriented to person, place, and time.   Psychiatric:         Attention and Perception: Attention normal.         Mood and Affect: Mood and affect normal.         Speech: Speech normal.         Behavior: Behavior normal. Behavior is cooperative.         Procedures           ED Course  ED Course as of Oct 15 1122   Mon Oct 12, 2020   1726 CBC with no acute findings including no leukocytosis/leukopenia.Remainder of labs  and urinalysis pending.CT pending results of creatinine.    [JS]   1749 CMP with no acute findings including normal renal functions, normal LFTs, no anion gap.    Urinalysis with moderate leukocytes, nitrite negative, 0-2 RBC, 21-30 WBC, no bacteria, no squamous epithelium.CT pending transportation to department.    [JS]   1835 CT pending dictation.    Antibiotics continuing to be pending administration.    [JS]   1910 CT abd/pel shows: 1.  Right lower lobe and lingular findings are concerning for infection/inflammatory process.  2.  Bilateral fluid signal along the pelvic wall, well-circumscribed, most concerning for lymphoceles, given recent surgery.  3.  Wall thickening of the bladder, for which correlation for acute cystitis is recommended.  4.  Colonic diverticulosis, without evidence of acute diverticulitis.    [JS]   1913 Discussed case at this time with Dr. Rajinder Bernal.  Dr. Bernal in agreement with culture being sent for further determination of urinary tract infection.  Feels patient's findings are also more consistent with atelectasis and patient is safe and stable for continued outpatient treatment and follow-up through urology.  Dr. Bernal with no further recommendations at this time.    [JS]   1939 Upon reevaluation at this time discussed with patient findings suspicious for urinary tract infection as well as possible pneumonia.  Discussed with patient need for antibiotic treatment.  Discussed with patient importance of continued rest, hydration, and over-the-counter appropriate anti-inflammatory medication use, as well as need to contact urology to set up a follow-up appointment.  Discussed need for follow-up through the urologist or primary care provider within the next 24 to 48 hours to assure continued improvement if not resolution of symptoms.  Discussed with patient strict return precautions and need for immediate return to ED should he develop any new or worsening symptoms.  Patient with  no further questions, concerns, or needs at this time and is stable for discharge.    [JS]      ED Course User Index  [JS] Albin Mcdonald PA-C                                           MDM  Number of Diagnoses or Management Options  Pneumonia of right lower lobe due to infectious organism:   S/P prostatectomy:   Urinary tract infection without hematuria, site unspecified:      Amount and/or Complexity of Data Reviewed  Clinical lab tests: reviewed and ordered  Tests in the radiology section of CPT®: reviewed and ordered  Tests in the medicine section of CPT®: ordered and reviewed  Decide to obtain previous medical records or to obtain history from someone other than the patient: yes  Review and summarize past medical records: yes  Discuss the patient with other providers: yes (Dr. Bernal (attending))    Patient Progress  Patient progress: improved      Final diagnoses:   Urinary tract infection without hematuria, site unspecified   S/P prostatectomy   Pneumonia of right lower lobe due to infectious organism            Albin Mcdonald PA-C  10/15/20 1122

## 2020-10-20 NOTE — PROGRESS NOTES
Subjective    Mr. Jones is 54 y.o. male    Chief Complaint: Prostate Cancer    History of Present Illness    Sp RALP  9/22/20    The following portions of the patient's history were reviewed and updated as appropriate: allergies, current medications, past family history, past medical history, past social history, past surgical history and problem list.    Review of Systems   Constitutional: Negative for chills and fever.   Gastrointestinal: Negative for abdominal pain, anal bleeding and blood in stool.   Genitourinary: Negative for dysuria, frequency, hematuria and urgency.         Current Outpatient Medications:   •  albuterol sulfate  (90 Base) MCG/ACT inhaler, Inhale 2 puffs Every 4 (Four) Hours As Needed for Wheezing or Shortness of Air., Disp: , Rfl:   •  gabapentin (NEURONTIN) 800 MG tablet, Take 800 mg by mouth 3 (Three) Times a Day., Disp: , Rfl:   •  ibuprofen (ADVIL,MOTRIN) 800 MG tablet, Take 800 mg by mouth Every 8 (Eight) Hours As Needed for Mild Pain ., Disp: , Rfl:   •  sildenafil (VIAGRA) 100 MG tablet, Take 1/2 to 1 tab by mouth 1 hour prior to intercourse, Disp: 10 tablet, Rfl: 11  •  azithromycin (ZITHROMAX) 250 MG tablet, Take 2 tablets the first day, then 1 tablet daily for 4 days., Disp: 6 tablet, Rfl: 0  •  cephalexin (KEFLEX) 500 MG capsule, Take 1 capsule by mouth 3 (Three) Times a Day. Start day prior to cath removal, Disp: 9 capsule, Rfl: 0  •  docusate sodium (COLACE) 100 MG capsule, Take 1 capsule by mouth 2 (Two) Times a Day., Disp: 60 capsule, Rfl: 0  •  HYDROcodone-acetaminophen (NORCO) 7.5-325 MG per tablet, Take 1 tablet by mouth Every 6 (Six) Hours As Needed for Moderate Pain ., Disp: 16 tablet, Rfl: 0  •  HYDROcodone-acetaminophen (NORCO) 7.5-325 MG per tablet, Take 1 tablet by mouth Every 6 (Six) Hours As Needed for Moderate Pain ., Disp: 16 tablet, Rfl: 0  •  Hyoscyamine Sulfate SL (Levsin/SL) 0.125 MG sublingual tablet, Place one tablet on the tongue Every 4 (Four)  "Hours As Needed for bladder spasms., Disp: 21 each, Rfl: 1  •  nicotine (NICODERM CQ) 14 MG/24HR patch, Place 1 patch on the skin as directed by provider Daily., Disp: 28 patch, Rfl: 0    Past Medical History:   Diagnosis Date   • Arthritis    • Bronchitis    • Cancer (CMS/HCC)     PROSTATE   • Chronic pain    • Pneumonia    • Prostatitis, acute    • S/P ACL reconstruction    • Septic shock due to urinary tract infection (CMS/HCC)    • Strep throat    • UTI (urinary tract infection)        Past Surgical History:   Procedure Laterality Date   • ANKLE SURGERY Right    • BACK SURGERY      X2   • KNEE ACL RECONSTRUCTION Left    • NASAL SEPTUM SURGERY     • PROSTATE ULTRASOUND BIOPSY N/A 2/18/2020    Procedure: PROSTATE  BIOPSY;  Surgeon: Trae Clark MD;  [facility];  Service: Urology;  Laterality: N/A;   • PROSTATECTOMY N/A 9/22/2020    Procedure: RADICAL PROSTATECTOMY LAPAROSCOPIC WITH DAVINCI ROBOT;  Surgeon: Nuno Jj MD;  Location:  [facility] OR;  Service: [facility];  Laterality: N/A;   • SHOULDER SURGERY Left     X 4   • TENNIS ELBOW RELEASE Right 04/14/2011       Social History     Socioeconomic History   • Marital status:      Spouse name: Not on file   • Number of children: Not on file   • Years of education: Not on file   • Highest education level: Not on file   Tobacco Use   • Smoking status: Current Every Day Smoker     Packs/day: 1.00     Years: 15.00     Pack years: 15.00     Types: Cigarettes     Start date: 2/7/1987   • Smokeless tobacco: Never Used   • Tobacco comment: chnatix trying to quit   Substance and Sexual Activity   • Alcohol use: No   • Drug use: No   • Sexual activity: Defer       History reviewed. No pertinent family history.    Objective    Temp 97.2 °F (36.2 °C) (Temporal)   Ht 188 cm (74\")   Wt 93.9 kg (207 lb)   BMI 26.58 kg/m²     Physical Exam  Vitals signs reviewed.   Constitutional:       General: He is not in acute distress.     Appearance: He is " well-developed. He is not diaphoretic.   Pulmonary:      Effort: Pulmonary effort is normal.   Abdominal:      General: There is no distension.      Palpations: Abdomen is soft. There is no mass.      Tenderness: There is no abdominal tenderness. There is no guarding or rebound.      Hernia: No hernia is present.   Skin:     General: Skin is warm and dry.   Neurological:      Mental Status: He is alert and oriented to person, place, and time.             Results for orders placed or performed in visit on 10/21/20   POC Urinalysis Dipstick, Multipro    Specimen: Urine   Result Value Ref Range    Color Yellow Yellow, Straw, Dark Yellow, Beryl    Clarity, UA Clear Clear    Glucose, UA Negative Negative, 1000 mg/dL (3+) mg/dL    Bilirubin Negative Negative    Ketones, UA Negative Negative    Specific Gravity  1.010 1.005 - 1.030    Blood, UA Negative Negative    pH, Urine 5.5 5.0 - 8.0    Protein, POC Negative Negative mg/dL    Urobilinogen, UA Normal Normal    Nitrite, UA Negative Negative    Leukocytes Trace (A) Negative     Assessment and Plan    Diagnoses and all orders for this visit:    1. Prostate cancer (CMS/[facility]) (Primary)  -     POC Urinalysis Dipstick, Multipro    2. Impotence of organic origin    3. Male stress incontinence    4. Lower urinary tract symptoms (LUTS)        Status post robotic assisted lap prostatectomy 9/22/2020.  He had Nara Visa 3+4 = 7+ surgical margin [id number] adenocarcinoma of the prostate.    He did have a Proteus UTI which is resolved with Keflex.    He is having some LUTS which are bothersome to him we will see how this settles out.  He will continue Kegel's exercises for stress incontinence.  He will continue the Viagra for ED.    Follow-up in December with PSA prior.

## 2020-10-21 ENCOUNTER — OFFICE VISIT (OUTPATIENT)
Dept: UROLOGY | Facility: CLINIC | Age: 55
End: 2020-10-21

## 2020-10-21 VITALS — HEIGHT: 74 IN | BODY MASS INDEX: 26.56 KG/M2 | TEMPERATURE: 97.2 F | WEIGHT: 207 LBS

## 2020-10-21 DIAGNOSIS — N39.3 MALE STRESS INCONTINENCE: ICD-10-CM

## 2020-10-21 DIAGNOSIS — R39.9 LOWER URINARY TRACT SYMPTOMS (LUTS): ICD-10-CM

## 2020-10-21 DIAGNOSIS — N52.9 IMPOTENCE OF ORGANIC ORIGIN: ICD-10-CM

## 2020-10-21 DIAGNOSIS — C61 PROSTATE CANCER (HCC): Primary | ICD-10-CM

## 2020-10-21 LAB
BILIRUB BLD-MCNC: NEGATIVE MG/DL
CLARITY, POC: CLEAR
COLOR UR: YELLOW
GLUCOSE UR STRIP-MCNC: NEGATIVE MG/DL
KETONES UR QL: NEGATIVE
LEUKOCYTE EST, POC: ABNORMAL
NITRITE UR-MCNC: NEGATIVE MG/ML
PH UR: 5.5 [PH] (ref 5–8)
PROT UR STRIP-MCNC: NEGATIVE MG/DL
RBC # UR STRIP: NEGATIVE /UL
SP GR UR: 1.01 (ref 1–1.03)
UROBILINOGEN UR QL: NORMAL

## 2020-10-21 PROCEDURE — 99024 POSTOP FOLLOW-UP VISIT: CPT | Performed by: UROLOGY

## 2020-10-21 PROCEDURE — 81003 URINALYSIS AUTO W/O SCOPE: CPT | Performed by: UROLOGY

## 2020-11-17 ENCOUNTER — TELEPHONE (OUTPATIENT)
Dept: UROLOGY | Facility: CLINIC | Age: 55
End: 2020-11-17

## 2020-11-25 ENCOUNTER — TELEPHONE (OUTPATIENT)
Dept: UROLOGY | Facility: CLINIC | Age: 55
End: 2020-11-25

## 2020-11-25 DIAGNOSIS — C61 PROSTATE CANCER (HCC): ICD-10-CM

## 2020-11-25 LAB — PSA SERPL-MCNC: <0.014 NG/ML (ref 0–4)

## 2020-12-02 ENCOUNTER — OFFICE VISIT (OUTPATIENT)
Dept: UROLOGY | Facility: CLINIC | Age: 55
End: 2020-12-02

## 2020-12-02 ENCOUNTER — HOSPITAL ENCOUNTER (OUTPATIENT)
Dept: INFUSION THERAPY | Age: 55
End: 2020-12-02
Payer: MEDICAID

## 2020-12-02 VITALS — TEMPERATURE: 97.4 F | BODY MASS INDEX: 27 KG/M2 | WEIGHT: 210.4 LBS | HEIGHT: 74 IN

## 2020-12-02 DIAGNOSIS — R39.9 LOWER URINARY TRACT SYMPTOMS (LUTS): ICD-10-CM

## 2020-12-02 DIAGNOSIS — N39.3 MALE STRESS INCONTINENCE: ICD-10-CM

## 2020-12-02 DIAGNOSIS — C61 PROSTATE CANCER (HCC): Primary | ICD-10-CM

## 2020-12-02 DIAGNOSIS — N52.9 IMPOTENCE OF ORGANIC ORIGIN: ICD-10-CM

## 2020-12-02 LAB
BILIRUB BLD-MCNC: NEGATIVE MG/DL
CLARITY, POC: CLEAR
COLOR UR: YELLOW
GLUCOSE UR STRIP-MCNC: NEGATIVE MG/DL
KETONES UR QL: NEGATIVE
LEUKOCYTE EST, POC: NEGATIVE
NITRITE UR-MCNC: NEGATIVE MG/ML
PH UR: 5.5 [PH] (ref 5–8)
PROT UR STRIP-MCNC: NEGATIVE MG/DL
RBC # UR STRIP: NEGATIVE /UL
SP GR UR: 1.02 (ref 1–1.03)
UROBILINOGEN UR QL: NORMAL

## 2020-12-02 PROCEDURE — 99024 POSTOP FOLLOW-UP VISIT: CPT | Performed by: UROLOGY

## 2020-12-02 PROCEDURE — 81003 URINALYSIS AUTO W/O SCOPE: CPT | Performed by: UROLOGY

## 2020-12-29 ENCOUNTER — TELEPHONE (OUTPATIENT)
Dept: UROLOGY | Facility: CLINIC | Age: 55
End: 2020-12-29

## 2021-02-24 DIAGNOSIS — C61 PROSTATE CANCER (HCC): ICD-10-CM

## 2021-02-25 LAB — PSA SERPL-MCNC: <0.014 NG/ML (ref 0–4)

## 2021-03-02 ENCOUNTER — TELEPHONE (OUTPATIENT)
Dept: UROLOGY | Facility: CLINIC | Age: 56
End: 2021-03-02

## 2021-03-02 NOTE — TELEPHONE ENCOUNTER
PT CALLED, HIS SHORT TERM DISABILITY IS STILL WAITING ON HIS RETURN TO WORK FROM OVER A MONTH AGO. SAID HE DROPPED OFF THE FAX NUMBER AND ALL INFO WITH YOU IN JAN.

## 2021-03-03 ENCOUNTER — OFFICE VISIT (OUTPATIENT)
Dept: UROLOGY | Facility: CLINIC | Age: 56
End: 2021-03-03

## 2021-03-03 VITALS — HEIGHT: 74 IN | WEIGHT: 207 LBS | TEMPERATURE: 98 F | BODY MASS INDEX: 26.56 KG/M2

## 2021-03-03 DIAGNOSIS — N52.31 ERECTILE DYSFUNCTION AFTER RADICAL PROSTATECTOMY: Primary | ICD-10-CM

## 2021-03-03 DIAGNOSIS — Z85.46 HISTORY OF PROSTATE CANCER: ICD-10-CM

## 2021-03-03 LAB
BILIRUB BLD-MCNC: NEGATIVE MG/DL
CLARITY, POC: CLEAR
COLOR UR: YELLOW
GLUCOSE UR STRIP-MCNC: NEGATIVE MG/DL
KETONES UR QL: NEGATIVE
LEUKOCYTE EST, POC: NEGATIVE
NITRITE UR-MCNC: NEGATIVE MG/ML
PH UR: 6 [PH] (ref 5–8)
PROT UR STRIP-MCNC: ABNORMAL MG/DL
RBC # UR STRIP: NEGATIVE /UL
SP GR UR: 1.02 (ref 1–1.03)
UROBILINOGEN UR QL: NORMAL

## 2021-03-03 PROCEDURE — 99214 OFFICE O/P EST MOD 30 MIN: CPT | Performed by: UROLOGY

## 2021-03-03 NOTE — PATIENT INSTRUCTIONS
"BMI for Adults  What is BMI?  Body mass index (BMI) is a number that is calculated from a person's weight and height. BMI can help estimate how much of a person's weight is composed of fat. BMI does not measure body fat directly. Rather, it is an alternative to procedures that directly measure body fat, which can be difficult and expensive.  BMI can help identify people who may be at higher risk for certain medical problems.  What are BMI measurements used for?  BMI is used as a screening tool to identify possible weight problems. It helps determine whether a person is obese, overweight, a healthy weight, or underweight.  BMI is useful for:  · Identifying a weight problem that may be related to a medical condition or may increase the risk for medical problems.  · Promoting changes, such as changes in diet and exercise, to help reach a healthy weight. BMI screening can be repeated to see if these changes are working.  How is BMI calculated?  BMI involves measuring your weight in relation to your height. Both height and weight are measured, and the BMI is calculated from those numbers. This can be done either in English (U.S.) or metric measurements. Note that charts and online BMI calculators are available to help you find your BMI quickly and easily without having to do these calculations yourself.  To calculate your BMI in English (U.S.) measurements:    1. Measure your weight in pounds (lb).  2. Multiply the number of pounds by 703.  ? For example, for a person who weighs 180 lb, multiply that number by 703, which equals 126,540.  3. Measure your height in inches. Then multiply that number by itself to get a measurement called \"inches squared.\"  ? For example, for a person who is 70 inches tall, the \"inches squared\" measurement is 70 inches x 70 inches, which equals 4,900 inches squared.  4. Divide the total from step 2 (number of lb x 703) by the total from step 3 (inches squared): 126,540 ÷ 4,900 = 25.8. This is " "your BMI.  To calculate your BMI in metric measurements:  1. Measure your weight in kilograms (kg).  2. Measure your height in meters (m). Then multiply that number by itself to get a measurement called \"meters squared.\"  ? For example, for a person who is 1.75 m tall, the \"meters squared\" measurement is 1.75 m x 1.75 m, which is equal to 3.1 meters squared.  3. Divide the number of kilograms (your weight) by the meters squared number. In this example: 70 ÷ 3.1 = 22.6. This is your BMI.  What do the results mean?  BMI charts are used to identify whether you are underweight, normal weight, overweight, or obese. The following guidelines will be used:  · Underweight: BMI less than 18.5.  · Normal weight: BMI between 18.5 and 24.9.  · Overweight: BMI between 25 and 29.9.  · Obese: BMI of 30 or above.  Keep these notes in mind:  · Weight includes both fat and muscle, so someone with a muscular build, such as an athlete, may have a BMI that is higher than 24.9. In cases like these, BMI is not an accurate measure of body fat.  · To determine if excess body fat is the cause of a BMI of 25 or higher, further assessments may need to be done by a health care provider.  · BMI is usually interpreted in the same way for men and women.  Where to find more information  For more information about BMI, including tools to quickly calculate your BMI, go to these websites:  · Centers for Disease Control and Prevention: www.cdc.gov  · American Heart Association: www.heart.org  · National Heart, Lung, and Blood [place]: www.nhlbi.nih.gov  Summary  · Body mass index (BMI) is a number that is calculated from a person's weight and height.  · BMI may help estimate how much of a person's weight is composed of fat. BMI can help identify those who may be at higher risk for certain medical problems.  · BMI can be measured using English measurements or metric measurements.  · BMI charts are used to identify whether you are underweight, normal " weight, overweight, or obese.  This information is not intended to replace advice given to you by your health care provider. Make sure you discuss any questions you have with your health care provider.  Document Revised: 09/09/2020 Document Reviewed: 07/17/2020  Elsevier Patient Education © 2020 Elsevier Inc.

## 2021-07-01 DIAGNOSIS — N52.1 ERECTILE DYSFUNCTION DUE TO DISEASES CLASSIFIED ELSEWHERE: ICD-10-CM

## 2021-07-01 RX ORDER — SILDENAFIL 100 MG/1
TABLET, FILM COATED ORAL
Qty: 10 TABLET | Refills: 11 | Status: SHIPPED | OUTPATIENT
Start: 2021-07-01 | End: 2022-03-15 | Stop reason: HOSPADM

## 2021-07-01 NOTE — TELEPHONE ENCOUNTER
sildenafil (VIAGRA) 100 MG tablet      Patient would like this medication sent to [facility].

## 2021-07-30 ENCOUNTER — CLINICAL SUPPORT (OUTPATIENT)
Dept: INTERNAL MEDICINE | Facility: CLINIC | Age: 56
End: 2021-07-30

## 2021-07-30 DIAGNOSIS — Z85.46 PERSONAL HISTORY OF MALIGNANT NEOPLASM OF PROSTATE: Primary | ICD-10-CM

## 2021-07-30 PROCEDURE — 36415 COLL VENOUS BLD VENIPUNCTURE: CPT | Performed by: NURSE PRACTITIONER

## 2021-07-30 NOTE — PROGRESS NOTES
Venipuncture Blood Specimen Collection  Venipuncture performed in the right ac by Tasia Catherine MA with good hemostasis. Patient tolerated the procedure well without complications.   07/30/21   Tasia Catherine MA

## 2021-07-31 LAB — PSA SERPL-MCNC: <0.1 NG/ML (ref 0–4)

## 2021-08-05 ENCOUNTER — OFFICE VISIT (OUTPATIENT)
Dept: UROLOGY | Facility: CLINIC | Age: 56
End: 2021-08-05

## 2021-08-05 VITALS — WEIGHT: 201.6 LBS | TEMPERATURE: 97.2 F | HEIGHT: 74 IN | BODY MASS INDEX: 25.87 KG/M2

## 2021-08-05 DIAGNOSIS — Z85.46 HISTORY OF PROSTATE CANCER: Primary | ICD-10-CM

## 2021-08-05 DIAGNOSIS — N52.31 ERECTILE DYSFUNCTION AFTER RADICAL PROSTATECTOMY: ICD-10-CM

## 2021-08-05 PROCEDURE — 99214 OFFICE O/P EST MOD 30 MIN: CPT | Performed by: UROLOGY

## 2021-08-05 NOTE — PATIENT INSTRUCTIONS
"BMI for Adults  What is BMI?  Body mass index (BMI) is a number that is calculated from a person's weight and height. BMI can help estimate how much of a person's weight is composed of fat. BMI does not measure body fat directly. Rather, it is an alternative to procedures that directly measure body fat, which can be difficult and expensive.  BMI can help identify people who may be at higher risk for certain medical problems.  What are BMI measurements used for?  BMI is used as a screening tool to identify possible weight problems. It helps determine whether a person is obese, overweight, a healthy weight, or underweight.  BMI is useful for:  · Identifying a weight problem that may be related to a medical condition or may increase the risk for medical problems.  · Promoting changes, such as changes in diet and exercise, to help reach a healthy weight. BMI screening can be repeated to see if these changes are working.  How is BMI calculated?  BMI involves measuring your weight in relation to your height. Both height and weight are measured, and the BMI is calculated from those numbers. This can be done either in English (U.S.) or metric measurements. Note that charts and online BMI calculators are available to help you find your BMI quickly and easily without having to do these calculations yourself.  To calculate your BMI in English (U.S.) measurements:    1. Measure your weight in pounds (lb).  2. Multiply the number of pounds by 703.  ? For example, for a person who weighs 180 lb, multiply that number by 703, which equals 126,540.  3. Measure your height in inches. Then multiply that number by itself to get a measurement called \"inches squared.\"  ? For example, for a person who is 70 inches tall, the \"inches squared\" measurement is 70 inches x 70 inches, which equals 4,900 inches squared.  4. Divide the total from step 2 (number of lb x 703) by the total from step 3 (inches squared): 126,540 ÷ 4,900 = 25.8. This is " "your BMI.  To calculate your BMI in metric measurements:  1. Measure your weight in kilograms (kg).  2. Measure your height in meters (m). Then multiply that number by itself to get a measurement called \"meters squared.\"  ? For example, for a person who is 1.75 m tall, the \"meters squared\" measurement is 1.75 m x 1.75 m, which is equal to 3.1 meters squared.  3. Divide the number of kilograms (your weight) by the meters squared number. In this example: 70 ÷ 3.1 = 22.6. This is your BMI.  What do the results mean?  BMI charts are used to identify whether you are underweight, normal weight, overweight, or obese. The following guidelines will be used:  · Underweight: BMI less than 18.5.  · Normal weight: BMI between 18.5 and 24.9.  · Overweight: BMI between 25 and 29.9.  · Obese: BMI of 30 or above.  Keep these notes in mind:  · Weight includes both fat and muscle, so someone with a muscular build, such as an athlete, may have a BMI that is higher than 24.9. In cases like these, BMI is not an accurate measure of body fat.  · To determine if excess body fat is the cause of a BMI of 25 or higher, further assessments may need to be done by a health care provider.  · BMI is usually interpreted in the same way for men and women.  Where to find more information  For more information about BMI, including tools to quickly calculate your BMI, go to these websites:  · Centers for Disease Control and Prevention: www.cdc.gov  · American Heart Association: www.heart.org  · National Heart, Lung, and Blood [place]: www.nhlbi.nih.gov  Summary  · Body mass index (BMI) is a number that is calculated from a person's weight and height.  · BMI may help estimate how much of a person's weight is composed of fat. BMI can help identify those who may be at higher risk for certain medical problems.  · BMI can be measured using English measurements or metric measurements.  · BMI charts are used to identify whether you are underweight, normal " weight, overweight, or obese.  This information is not intended to replace advice given to you by your health care provider. Make sure you discuss any questions you have with your health care provider.  Document Revised: 09/09/2020 Document Reviewed: 07/17/2020  Elsevier Patient Education © 2021 Elsevier Inc.

## 2021-08-22 ENCOUNTER — APPOINTMENT (OUTPATIENT)
Dept: GENERAL RADIOLOGY | Facility: HOSPITAL | Age: 56
End: 2021-08-22

## 2021-08-22 ENCOUNTER — HOSPITAL ENCOUNTER (EMERGENCY)
Facility: HOSPITAL | Age: 56
Discharge: HOME | End: 2021-08-22
Attending: EMERGENCY MEDICINE | Admitting: EMERGENCY MEDICINE

## 2021-08-22 VITALS
RESPIRATION RATE: 18 BRPM | BODY MASS INDEX: 25.15 KG/M2 | OXYGEN SATURATION: 97 % | WEIGHT: 196 LBS | DIASTOLIC BLOOD PRESSURE: 92 MMHG | HEART RATE: 72 BPM | TEMPERATURE: 98.3 F | HEIGHT: 74 IN | SYSTOLIC BLOOD PRESSURE: 129 MMHG

## 2021-08-22 DIAGNOSIS — R50.9 FEVER, UNSPECIFIED FEVER CAUSE: ICD-10-CM

## 2021-08-22 DIAGNOSIS — R09.81 NASAL CONGESTION: ICD-10-CM

## 2021-08-22 DIAGNOSIS — R06.02 SHORTNESS OF BREATH: ICD-10-CM

## 2021-08-22 DIAGNOSIS — R05.9 COUGH: Primary | ICD-10-CM

## 2021-08-22 LAB
ALBUMIN SERPL-MCNC: 4.4 G/DL (ref 3.5–5.2)
ALBUMIN/GLOB SERPL: 1.7 G/DL
ALP SERPL-CCNC: 127 U/L (ref 39–117)
ALT SERPL W P-5'-P-CCNC: 40 U/L (ref 1–41)
ANION GAP SERPL CALCULATED.3IONS-SCNC: 4 MMOL/L (ref 5–15)
AST SERPL-CCNC: 20 U/L (ref 1–40)
BASOPHILS # BLD AUTO: 0.04 10*3/MM3 (ref 0–0.2)
BASOPHILS NFR BLD AUTO: 0.5 % (ref 0–1.5)
BILIRUB SERPL-MCNC: 0.4 MG/DL (ref 0–1.2)
BUN SERPL-MCNC: 14 MG/DL (ref 6–20)
BUN/CREAT SERPL: 16.7 (ref 7–25)
CALCIUM SPEC-SCNC: 9.7 MG/DL (ref 8.6–10.5)
CHLORIDE SERPL-SCNC: 104 MMOL/L (ref 98–107)
CO2 SERPL-SCNC: 29 MMOL/L (ref 22–29)
CREAT SERPL-MCNC: 0.84 MG/DL (ref 0.76–1.27)
D DIMER PPP FEU-MCNC: 0.33 MG/L (FEU) (ref 0–0.5)
DEPRECATED RDW RBC AUTO: 45.6 FL (ref 37–54)
EOSINOPHIL # BLD AUTO: 0.21 10*3/MM3 (ref 0–0.4)
EOSINOPHIL NFR BLD AUTO: 2.4 % (ref 0.3–6.2)
ERYTHROCYTE [DISTWIDTH] IN BLOOD BY AUTOMATED COUNT: 13.6 % (ref 12.3–15.4)
FLUAV RNA RESP QL NAA+PROBE: NOT DETECTED
FLUBV RNA RESP QL NAA+PROBE: NOT DETECTED
GFR SERPL CREATININE-BSD FRML MDRD: 96 ML/MIN/1.73
GLOBULIN UR ELPH-MCNC: 2.6 GM/DL
GLUCOSE SERPL-MCNC: 109 MG/DL (ref 65–99)
HCT VFR BLD AUTO: 46.3 % (ref 37.5–51)
HGB BLD-MCNC: 15.6 G/DL (ref 13–17.7)
IMM GRANULOCYTES # BLD AUTO: 0.04 10*3/MM3 (ref 0–0.05)
IMM GRANULOCYTES NFR BLD AUTO: 0.5 % (ref 0–0.5)
LYMPHOCYTES # BLD AUTO: 1.54 10*3/MM3 (ref 0.7–3.1)
LYMPHOCYTES NFR BLD AUTO: 17.8 % (ref 19.6–45.3)
MCH RBC QN AUTO: 30.5 PG (ref 26.6–33)
MCHC RBC AUTO-ENTMCNC: 33.7 G/DL (ref 31.5–35.7)
MCV RBC AUTO: 90.6 FL (ref 79–97)
MONOCYTES # BLD AUTO: 0.96 10*3/MM3 (ref 0.1–0.9)
MONOCYTES NFR BLD AUTO: 11.1 % (ref 5–12)
NEUTROPHILS NFR BLD AUTO: 5.86 10*3/MM3 (ref 1.7–7)
NEUTROPHILS NFR BLD AUTO: 67.7 % (ref 42.7–76)
NRBC BLD AUTO-RTO: 0 /100 WBC (ref 0–0.2)
NT-PROBNP SERPL-MCNC: <5 PG/ML (ref 0–900)
PLATELET # BLD AUTO: 165 10*3/MM3 (ref 140–450)
PMV BLD AUTO: 9.4 FL (ref 6–12)
POTASSIUM SERPL-SCNC: 4.5 MMOL/L (ref 3.5–5.2)
PROT SERPL-MCNC: 7 G/DL (ref 6–8.5)
RBC # BLD AUTO: 5.11 10*6/MM3 (ref 4.14–5.8)
RSV RNA NPH QL NAA+NON-PROBE: NOT DETECTED
SARS-COV-2 RNA PNL SPEC NAA+PROBE: NOT DETECTED
SARS-COV-2 RNA RESP QL NAA+PROBE: NOT DETECTED
SODIUM SERPL-SCNC: 137 MMOL/L (ref 136–145)
TROPONIN T SERPL-MCNC: <0.01 NG/ML (ref 0–0.03)
WBC # BLD AUTO: 8.65 10*3/MM3 (ref 3.4–10.8)

## 2021-08-22 PROCEDURE — 84484 ASSAY OF TROPONIN QUANT: CPT | Performed by: EMERGENCY MEDICINE

## 2021-08-22 PROCEDURE — 93010 ELECTROCARDIOGRAM REPORT: CPT | Performed by: INTERNAL MEDICINE

## 2021-08-22 PROCEDURE — 36415 COLL VENOUS BLD VENIPUNCTURE: CPT

## 2021-08-22 PROCEDURE — 71045 X-RAY EXAM CHEST 1 VIEW: CPT

## 2021-08-22 PROCEDURE — 99283 EMERGENCY DEPT VISIT LOW MDM: CPT

## 2021-08-22 PROCEDURE — 87637 SARSCOV2&INF A&B&RSV AMP PRB: CPT | Performed by: EMERGENCY MEDICINE

## 2021-08-22 PROCEDURE — 80053 COMPREHEN METABOLIC PANEL: CPT | Performed by: EMERGENCY MEDICINE

## 2021-08-22 PROCEDURE — 85025 COMPLETE CBC W/AUTO DIFF WBC: CPT | Performed by: EMERGENCY MEDICINE

## 2021-08-22 PROCEDURE — 85379 FIBRIN DEGRADATION QUANT: CPT | Performed by: EMERGENCY MEDICINE

## 2021-08-22 PROCEDURE — 93005 ELECTROCARDIOGRAM TRACING: CPT | Performed by: EMERGENCY MEDICINE

## 2021-08-22 PROCEDURE — 83880 ASSAY OF NATRIURETIC PEPTIDE: CPT | Performed by: EMERGENCY MEDICINE

## 2021-08-22 PROCEDURE — 87635 SARS-COV-2 COVID-19 AMP PRB: CPT | Performed by: EMERGENCY MEDICINE

## 2021-08-22 NOTE — ED PROVIDER NOTES
Emergency Medicine Provider Note    Subjective:    HISTORY OF PRESENT ILLNESS     This is a very pleasant 55 y.o. male with a past medical history of prostate cancer in remission who presents to the emergency department today with a chief complaint of cough.  Gradual in onset 5 days ago.  Constant.  Moderate.  No exacerbating relieving factors.  Associated with body aches and unmeasured fevers.  He has chest pain after prolonged coughing episodes.  He denies any abdominal pain, nausea, or vomiting.  He initially states he has a headache but then states it is more like a sinus pressure.  He denies any diffuse headache.  He has no neck pain or stiffness.  No back pain.  No numbness, weakness, or paresthesias.  He has no rashes.  He has no dysuria or hematuria.  Tested negative for COVID-19 2 days ago.          History is obtained from the patient.       Review of Systems: All other systems are reviewed and are negative other than noted in the HPI.    Past Medical History:  Past Medical History:   Diagnosis Date   • Arthritis    • Bronchitis    • Cancer (CMS/HCC)     PROSTATE   • Chronic pain    • Pneumonia    • Prostatitis, acute    • S/P ACL reconstruction    • Septic shock due to urinary tract infection (CMS/HCC)    • Strep throat    • UTI (urinary tract infection)        Allergies:  Allergies   Allergen Reactions   • Contrast Dye Diarrhea and Nausea And Vomiting       Past Surgical History:  Past Surgical History:   Procedure Laterality Date   • ANKLE SURGERY Right    • BACK SURGERY      X2   • KNEE ACL RECONSTRUCTION Left    • NASAL SEPTUM SURGERY     • PROSTATE ULTRASOUND BIOPSY N/A 2/18/2020    Procedure: PROSTATE  BIOPSY;  Surgeon: Trae Clark MD;  Location: [facility] OR;  Service: Urology;  Laterality: N/A;   • PROSTATECTOMY N/A 9/22/2020    Procedure: RADICAL PROSTATECTOMY LAPAROSCOPIC WITH DAVINCI ROBOT;  Surgeon: Nuno Jj MD;  Location: [facility] OR;  Service: DaVinci;  Laterality: N/A;   •  SHOULDER SURGERY Left     X 4   • TENNIS ELBOW RELEASE Right 04/14/2011       Family History:  No family history on file.    Social History:  Social History     Socioeconomic History   • Marital status:      Spouse name: Not on file   • Number of children: Not on file   • Years of education: Not on file   • Highest education level: Not on file   Tobacco Use   • Smoking status: Current Every Day Smoker     Packs/day: 1.00     Years: 15.00     Pack years: 15.00     Types: Cigarettes     Start date: 2/7/1987   • Smokeless tobacco: Never Used   • Tobacco comment: chnatix trying to quit   Vaping Use   • Vaping Use: Former   Substance and Sexual Activity   • Alcohol use: No   • Drug use: No   • Sexual activity: Defer       Home Medications:  Prior to Admission medications    Medication Sig Start Date End Date Taking? Authorizing Provider   albuterol sulfate  (90 Base) MCG/ACT inhaler Inhale 2 puffs Every 4 (Four) Hours As Needed for Wheezing or Shortness of Air.    ProviderAmber MD   azithromycin (ZITHROMAX) 250 MG tablet Take 2 tablets the first day, then 1 tablet daily for 4 days. 10/12/20   Albin Mcdonald PA-C   gabapentin (NEURONTIN) 800 MG tablet Take 800 mg by mouth 3 (Three) Times a Day.    ProviderAmber MD   ibuprofen (ADVIL,MOTRIN) 800 MG tablet Take 800 mg by mouth Every 8 (Eight) Hours As Needed for Mild Pain .    ProviderAmber MD   nicotine (NICODERM CQ) 14 MG/24HR patch Place 1 patch on the skin as directed by provider Daily. 6/10/20   Chuy Orellana MD   sildenafil (VIAGRA) 100 MG tablet Take 1/2 to 1 tab by mouth 1 hour prior to intercourse 7/1/21   Trae Clark MD         Objective:    PHYSICAL EXAM     Vitals:   Vitals:    08/22/21 1416   BP: 129/92   Pulse: 72   Resp:    Temp:    SpO2: 97%     GENERAL: Well appearing, in no acute distress.   HEENT: Moist mucous membranes, oropharynx clear without lesions, exudates, thrush.   EYES: No scleral  icterus, conjunctivae clear.   NECK: No cervical lymphadenopathy, no stiffness, able to put chin to chest and look up to ceiling without any pain or limitation.  CARDIAC: Normal rate, regular rhythm, no murmurs, 2+ peripheral pulses in all four extremities, normal capillary refill.   PULMONARY: Normal work of breathing on room air, lungs are clear to auscultation bilaterally without wheezes, crackles, rhonchi.  ABDOMINAL: Normal bowel sounds, abdomen is soft, non-tender, non-distended, no hepatomegaly or splenomegaly.   MUSCULOSKELETAL: Normal range of motion, no lower extremity edema.  NEUROLOGIC: Alert and oriented x 3, EOM grossly intact and moves all four extremities with normal strength.  SKIN: Warm and dry without rashes.   PSYCHIATRIC: Mood and affect are normal.     PROCEDURES     Procedures    LAB AND RADIOLOGY RESULTS     Lab Results (last 24 hours)     Procedure Component Value Units Date/Time    COVID-19,[facility] IN-HOUSE,Nasal Swab No Transport Media 3-4 HR TAT - Swab, Nasal Cavity [[id number]]  (Normal) Collected: 08/22/21 1307    Specimen: Swab from Nasal Cavity Updated: 08/22/21 1346     COVID19 Not Detected    Narrative:      Fact sheet for providers: https://www.fda.gov/media/764791/download     Fact sheet for patients: https://www.fda.gov/media/265239/download    Test performed by PCR.    Consider negative results in combination with clinical observations, patient history, and epidemiological information.    CBC & Differential [[id number]]  (Abnormal) Collected: 08/22/21 1324    Specimen: Blood Updated: 08/22/21 1333    Narrative:      The following orders were created for panel order CBC & Differential.  Procedure                               Abnormality         Status                     ---------                               -----------         ------                     CBC Auto Differential[[id number]]        Abnormal            Final result                 Please view results for these tests  on the individual orders.    Comprehensive Metabolic Panel [[id number]]  (Abnormal) Collected: 08/22/21 1324    Specimen: Blood Updated: 08/22/21 1357     Glucose 109 mg/dL      BUN 14 mg/dL      Creatinine 0.84 mg/dL      Sodium 137 mmol/L      Potassium 4.5 mmol/L      Chloride 104 mmol/L      CO2 29.0 mmol/L      Calcium 9.7 mg/dL      Total Protein 7.0 g/dL      Albumin 4.40 g/dL      ALT (SGPT) 40 U/L      AST (SGOT) 20 U/L      Alkaline Phosphatase 127 U/L      Total Bilirubin 0.4 mg/dL      eGFR Non African Amer 96 mL/min/1.73      Globulin 2.6 gm/dL      A/G Ratio 1.7 g/dL      BUN/Creatinine Ratio 16.7     Anion Gap 4.0 mmol/L     Narrative:      GFR Normal >60  Chronic Kidney Disease <60  Kidney Failure <15      BNP [[id number]]  (Normal) Collected: 08/22/21 1324    Specimen: Blood Updated: 08/22/21 1400     proBNP <5.0 pg/mL     Narrative:      Among patients with dyspnea, NT-proBNP is highly sensitive for the detection of acute congestive heart failure. In addition NT-proBNP of <300 pg/ml effectively rules out acute congestive heart failure with 99% negative predictive value.    Results may be falsely decreased if patient taking Biotin.      Troponin [[id number]]  (Normal) Collected: 08/22/21 1324    Specimen: Blood Updated: 08/22/21 1355     Troponin T <0.010 ng/mL     Narrative:      Troponin T Reference Range:  <= 0.03 ng/mL-   Negative for AMI  >0.03 ng/mL-     Abnormal for myocardial necrosis.  Clinicians would have to utilize clinical acumen, EKG, Troponin and serial changes to determine if it is an Acute Myocardial Infarction or myocardial injury due to an underlying chronic condition.       Results may be falsely decreased if patient taking Biotin.      D-dimer, Quantitative [[id number]]  (Normal) Collected: 08/22/21 1324    Specimen: Blood Updated: 08/22/21 1343     D-Dimer, Quantitative 0.33 mg/L (FEU)     Narrative:      Reference Range is 0-0.50 mg/L FEU. However, results <0.50 mg/L FEU tends to  rule out DVT or PE. Results >0.50 mg/L FEU are not useful in predicting absence or presence of DVT or PE.      CBC Auto Differential [[id number]]  (Abnormal) Collected: 08/22/21 1324    Specimen: Blood Updated: 08/22/21 1333     WBC 8.65 10*3/mm3      RBC 5.11 10*6/mm3      Hemoglobin 15.6 g/dL      Hematocrit 46.3 %      MCV 90.6 fL      MCH 30.5 pg      MCHC 33.7 g/dL      RDW 13.6 %      RDW-SD 45.6 fl      MPV 9.4 fL      Platelets 165 10*3/mm3      Neutrophil % 67.7 %      Lymphocyte % 17.8 %      Monocyte % 11.1 %      Eosinophil % 2.4 %      Basophil % 0.5 %      Immature Grans % 0.5 %      Neutrophils, Absolute 5.86 10*3/mm3      Lymphocytes, Absolute 1.54 10*3/mm3      Monocytes, Absolute 0.96 10*3/mm3      Eosinophils, Absolute 0.21 10*3/mm3      Basophils, Absolute 0.04 10*3/mm3      Immature Grans, Absolute 0.04 10*3/mm3      nRBC 0.0 /100 WBC     COVID-19, FLU A/B, RSV PCR - Swab, Nasopharynx [[id number]]  (Normal) Collected: 08/22/21 1325    Specimen: Swab from Nasopharynx Updated: 08/22/21 1452     COVID19 Not Detected     Influenza A PCR Not Detected     Influenza B PCR Not Detected     RSV, PCR Not Detected    Narrative:      Fact sheet for providers: https://www.fda.gov/media/524387/download    Fact sheet for patients: https://www.fda.gov/media/952402/download    Test performed by PCR.          XR Chest 1 View    Result Date: 8/22/2021  Narrative: EXAMINATION: XR CHEST 1 VW- 8/22/2021 1:57 PM CDT  HISTORY: cough; R05-Cough; R06.02-Shortness of breath; R09.81-Nasal congestion; R50.9-Fever, unspecified.  REPORT: A frontal view of the chest was obtained.  COMPARISON: Chest x-ray 2/20/2020.  The lungs are mildly hyperinflated, there is mild interstitial prominence as before, with no acute infiltrate identified. Heart size is normal. No pneumothorax or pleural effusion is seen. The osseous structures and upper abdomen appear unremarkable.      Impression: No acute cardiopulmonary abnormality. This report  was finalized on 08/22/2021 13:59 by Dr. Damian Rutledge MD.      ED course:    Medications - No data to display       Amount and/or complexity of data reviewed:    • Clinical lab tests ordered and reviewed.  • Tests in the radiology section ordered and reviewed.  • Independent visualization of imaging, tracing, or specimen is remarkable for an EKG with normal sinus rhythm at a rate of 79 with no ST elevation or depression concerning for acute ischemia.      Risk of significant complications, morbidity, and/or mortality.    •  Presenting problem: high  •  Diagnostic procedures: low  •  Management options: high        MEDICAL DECISION MAKING     Patient presents with fever, cough, shortness of breath, nasal congestion. Upon arrival to the Emergency Department the patient is in no acute distress.  IV access is obtained and labs are sent.Labs are remarkable for negative COVID-19 test, CMP with no gross electrolyte abnormalities, no signs of kidney injury, no elevation anion gap, BMP is undetectable, flu is undetectable, troponin undetectable, D-dimer within normal limits, CBC is unremarkable, chest x-ray shows no acute findings.  Low concern for meningitis as he has had symptoms for several days.  Has no rashes.  Is extremely well-appearing on exam.  He has no meningismus on exam.  Low concern for pneumonia with no physical exam or radiographic findings to suggest this.  Low concern for myocarditis with reassuring troponin ECG.  Low concern for acute coronary syndrome with a heart score of 2 and reassuring troponin ECG after days of symptoms.  Low concern for pulmonary embolism as he is low risk Via Wells criteria and has negative D-dimer.  Low concern for aortic dissection with no tearing or ripping pain, no back pain, no widened mediastinum on chest x-ray, no pulse deficit, low concern for intra-abdominal infection with no abdominal pain or tenderness.  Low concern for urinary tract infection no dysuria or hematuria.   Low concern for cellulitis no physical exam findings to suggest this.  Patient is extremely well-appearing on examination, low concern for bacteremia.  He has no indwelling lines.  He is not an IV drug user. I discussed all of the findings with the patient and he verbalized understanding. I explained that there is always diagnostic uncertainty in the ER and this could be an early presentation of a process not detected at this time so he should return for any new or worsening symptoms and should follow up rapidly with his primary care provider for further evaluation and management. he is discharged in good condition with reassuring vitals and is given commonsense return precautions which he verbalizes understanding of.          Diagnosis:    Final diagnoses:   Cough   Shortness of breath   Nasal congestion   Fever, unspecified fever cause         ED Disposition:     ED Disposition     ED Disposition Condition Comment    Discharge Stable           Flex Sheikh MD  [address] [place] [address]  [phone]    Schedule an appointment as soon as possible for a visit in 2 days           Medication List      No changes were made to your prescriptions during this visit.              Kemal Matos MD  08/23/21 [id number]

## 2021-08-22 NOTE — ED NOTES
Patient presents to the ED with the CC cough, body aches, fever X 5 days. Tested for covid-19 2 days and was negative. Patient c/o of increase pain when coughing motrin X1 hour ago.      Katie Laws RN  08/22/21 3073

## 2021-08-24 LAB
QT INTERVAL: 338 MS
QTC INTERVAL: 387 MS

## 2021-11-01 ENCOUNTER — CLINICAL SUPPORT (OUTPATIENT)
Dept: INTERNAL MEDICINE | Facility: CLINIC | Age: 56
End: 2021-11-01

## 2021-11-01 DIAGNOSIS — Z85.46 HISTORY OF PROSTATE CANCER: ICD-10-CM

## 2021-11-01 PROCEDURE — 36415 COLL VENOUS BLD VENIPUNCTURE: CPT | Performed by: INTERNAL MEDICINE

## 2021-11-01 NOTE — PROGRESS NOTES
Venipuncture Blood Specimen Collection  Venipuncture performed in right arm by Lynn Barrett CMA with good hemostasis. Patient tolerated the procedure well without complications.   11/01/21   Lynn Barrett CMA

## 2021-11-02 LAB — PSA SERPL-MCNC: <0.1 NG/ML (ref 0–4)

## 2021-11-04 ENCOUNTER — OFFICE VISIT (OUTPATIENT)
Dept: UROLOGY | Facility: CLINIC | Age: 56
End: 2021-11-04

## 2021-11-04 VITALS — TEMPERATURE: 97.4 F | WEIGHT: 200 LBS | BODY MASS INDEX: 25.67 KG/M2 | HEIGHT: 74 IN

## 2021-11-04 DIAGNOSIS — N52.31 ERECTILE DYSFUNCTION AFTER RADICAL PROSTATECTOMY: ICD-10-CM

## 2021-11-04 DIAGNOSIS — Z85.46 HISTORY OF PROSTATE CANCER: Primary | ICD-10-CM

## 2021-11-04 LAB
BILIRUB BLD-MCNC: NEGATIVE MG/DL
CLARITY, POC: CLEAR
COLOR UR: YELLOW
GLUCOSE UR STRIP-MCNC: NEGATIVE MG/DL
KETONES UR QL: NEGATIVE
LEUKOCYTE EST, POC: NEGATIVE
NITRITE UR-MCNC: NEGATIVE MG/ML
PH UR: 6 [PH] (ref 5–8)
PROT UR STRIP-MCNC: NEGATIVE MG/DL
RBC # UR STRIP: NEGATIVE /UL
SP GR UR: 1.03 (ref 1–1.03)
UROBILINOGEN UR QL: NORMAL

## 2021-11-04 PROCEDURE — 99214 OFFICE O/P EST MOD 30 MIN: CPT | Performed by: UROLOGY

## 2022-01-10 ENCOUNTER — ANESTHESIA EVENT (OUTPATIENT)
Dept: PERIOP | Facility: HOSPITAL | Age: 57
End: 2022-01-10

## 2022-01-10 ENCOUNTER — HOSPITAL ENCOUNTER (EMERGENCY)
Facility: HOSPITAL | Age: 57
Discharge: HOME | End: 2022-01-10
Attending: INTERNAL MEDICINE | Admitting: INTERNAL MEDICINE

## 2022-01-10 ENCOUNTER — APPOINTMENT (OUTPATIENT)
Dept: GENERAL RADIOLOGY | Facility: HOSPITAL | Age: 57
End: 2022-01-10

## 2022-01-10 ENCOUNTER — ANESTHESIA (OUTPATIENT)
Dept: PERIOP | Facility: HOSPITAL | Age: 57
End: 2022-01-10

## 2022-01-10 VITALS
HEART RATE: 74 BPM | SYSTOLIC BLOOD PRESSURE: 141 MMHG | BODY MASS INDEX: 23.61 KG/M2 | RESPIRATION RATE: 15 BRPM | TEMPERATURE: 97.7 F | DIASTOLIC BLOOD PRESSURE: 87 MMHG | OXYGEN SATURATION: 96 % | HEIGHT: 74 IN | WEIGHT: 184 LBS

## 2022-01-10 DIAGNOSIS — T18.108A ESOPHAGEAL FOREIGN BODY, INITIAL ENCOUNTER: Primary | ICD-10-CM

## 2022-01-10 LAB
ALBUMIN SERPL-MCNC: 4.6 G/DL (ref 3.5–5.2)
ALBUMIN/GLOB SERPL: 1.5 G/DL
ALP SERPL-CCNC: 120 U/L (ref 39–117)
ALT SERPL W P-5'-P-CCNC: 23 U/L (ref 1–41)
ANION GAP SERPL CALCULATED.3IONS-SCNC: 14 MMOL/L (ref 5–15)
AST SERPL-CCNC: 34 U/L (ref 1–40)
BASOPHILS # BLD AUTO: 0.03 10*3/MM3 (ref 0–0.2)
BASOPHILS NFR BLD AUTO: 0.5 % (ref 0–1.5)
BILIRUB SERPL-MCNC: 1 MG/DL (ref 0–1.2)
BUN SERPL-MCNC: 20 MG/DL (ref 6–20)
BUN/CREAT SERPL: 20.4 (ref 7–25)
CALCIUM SPEC-SCNC: 10.1 MG/DL (ref 8.6–10.5)
CHLORIDE SERPL-SCNC: 107 MMOL/L (ref 98–107)
CO2 SERPL-SCNC: 24 MMOL/L (ref 22–29)
CREAT SERPL-MCNC: 0.98 MG/DL (ref 0.76–1.27)
DEPRECATED RDW RBC AUTO: 42.5 FL (ref 37–54)
EOSINOPHIL # BLD AUTO: 0.08 10*3/MM3 (ref 0–0.4)
EOSINOPHIL NFR BLD AUTO: 1.3 % (ref 0.3–6.2)
ERYTHROCYTE [DISTWIDTH] IN BLOOD BY AUTOMATED COUNT: 12.5 % (ref 12.3–15.4)
GFR SERPL CREATININE-BSD FRML MDRD: 80 ML/MIN/1.73
GLOBULIN UR ELPH-MCNC: 3 GM/DL
GLUCOSE SERPL-MCNC: 134 MG/DL (ref 65–99)
HCT VFR BLD AUTO: 42.3 % (ref 37.5–51)
HGB BLD-MCNC: 13.7 G/DL (ref 13–17.7)
IMM GRANULOCYTES # BLD AUTO: 0.03 10*3/MM3 (ref 0–0.05)
IMM GRANULOCYTES NFR BLD AUTO: 0.5 % (ref 0–0.5)
LYMPHOCYTES # BLD AUTO: 0.88 10*3/MM3 (ref 0.7–3.1)
LYMPHOCYTES NFR BLD AUTO: 14 % (ref 19.6–45.3)
MCH RBC QN AUTO: 30.4 PG (ref 26.6–33)
MCHC RBC AUTO-ENTMCNC: 32.4 G/DL (ref 31.5–35.7)
MCV RBC AUTO: 93.8 FL (ref 79–97)
MONOCYTES # BLD AUTO: 0.37 10*3/MM3 (ref 0.1–0.9)
MONOCYTES NFR BLD AUTO: 5.9 % (ref 5–12)
NEUTROPHILS NFR BLD AUTO: 4.9 10*3/MM3 (ref 1.7–7)
NEUTROPHILS NFR BLD AUTO: 77.8 % (ref 42.7–76)
NRBC BLD AUTO-RTO: 0.3 /100 WBC (ref 0–0.2)
PLATELET # BLD AUTO: 195 10*3/MM3 (ref 140–450)
PMV BLD AUTO: 9.8 FL (ref 6–12)
POTASSIUM SERPL-SCNC: 3.9 MMOL/L (ref 3.5–5.2)
PROT SERPL-MCNC: 7.6 G/DL (ref 6–8.5)
RBC # BLD AUTO: 4.51 10*6/MM3 (ref 4.14–5.8)
SARS-COV-2 RNA PNL SPEC NAA+PROBE: DETECTED
SODIUM SERPL-SCNC: 145 MMOL/L (ref 136–145)
WBC NRBC COR # BLD: 6.29 10*3/MM3 (ref 3.4–10.8)

## 2022-01-10 PROCEDURE — 25010000002 ONDANSETRON PER 1 MG: Performed by: INTERNAL MEDICINE

## 2022-01-10 PROCEDURE — C9803 HOPD COVID-19 SPEC COLLECT: HCPCS

## 2022-01-10 PROCEDURE — 43247 EGD REMOVE FOREIGN BODY: CPT | Performed by: INTERNAL MEDICINE

## 2022-01-10 PROCEDURE — 99284 EMERGENCY DEPT VISIT MOD MDM: CPT | Performed by: INTERNAL MEDICINE

## 2022-01-10 PROCEDURE — 25010000002 PROPOFOL 10 MG/ML EMULSION: Performed by: NURSE ANESTHETIST, CERTIFIED REGISTERED

## 2022-01-10 PROCEDURE — 80053 COMPREHEN METABOLIC PANEL: CPT | Performed by: INTERNAL MEDICINE

## 2022-01-10 PROCEDURE — 96374 THER/PROPH/DIAG INJ IV PUSH: CPT

## 2022-01-10 PROCEDURE — 96372 THER/PROPH/DIAG INJ SC/IM: CPT

## 2022-01-10 PROCEDURE — 99284 EMERGENCY DEPT VISIT MOD MDM: CPT

## 2022-01-10 PROCEDURE — 85025 COMPLETE CBC W/AUTO DIFF WBC: CPT | Performed by: INTERNAL MEDICINE

## 2022-01-10 PROCEDURE — 87635 SARS-COV-2 COVID-19 AMP PRB: CPT | Performed by: INTERNAL MEDICINE

## 2022-01-10 PROCEDURE — 71045 X-RAY EXAM CHEST 1 VIEW: CPT

## 2022-01-10 PROCEDURE — 25010000002 GLUCAGON (HUMAN RECOMBINANT) 1 MG RECONSTITUTED SOLUTION: Performed by: INTERNAL MEDICINE

## 2022-01-10 PROCEDURE — 36415 COLL VENOUS BLD VENIPUNCTURE: CPT

## 2022-01-10 PROCEDURE — 43239 EGD BIOPSY SINGLE/MULTIPLE: CPT | Performed by: INTERNAL MEDICINE

## 2022-01-10 RX ORDER — SODIUM CHLORIDE 0.9 % (FLUSH) 0.9 %
10 SYRINGE (ML) INJECTION EVERY 12 HOURS SCHEDULED
Status: CANCELLED | OUTPATIENT
Start: 2022-01-10

## 2022-01-10 RX ORDER — LIDOCAINE HYDROCHLORIDE 20 MG/ML
INJECTION, SOLUTION EPIDURAL; INFILTRATION; INTRACAUDAL; PERINEURAL AS NEEDED
Status: DISCONTINUED | OUTPATIENT
Start: 2022-01-10 | End: 2022-01-10 | Stop reason: SURG

## 2022-01-10 RX ORDER — OMEPRAZOLE 40 MG/1
40 CAPSULE, DELAYED RELEASE ORAL DAILY
Qty: 90 CAPSULE | Refills: 1
Start: 2022-01-10

## 2022-01-10 RX ORDER — SODIUM CHLORIDE 0.9 % (FLUSH) 0.9 %
10 SYRINGE (ML) INJECTION AS NEEDED
Status: CANCELLED | OUTPATIENT
Start: 2022-01-10

## 2022-01-10 RX ORDER — PROPOFOL 10 MG/ML
VIAL (ML) INTRAVENOUS AS NEEDED
Status: DISCONTINUED | OUTPATIENT
Start: 2022-01-10 | End: 2022-01-10 | Stop reason: SURG

## 2022-01-10 RX ORDER — SODIUM CHLORIDE, SODIUM LACTATE, POTASSIUM CHLORIDE, CALCIUM CHLORIDE 600; 310; 30; 20 MG/100ML; MG/100ML; MG/100ML; MG/100ML
INJECTION, SOLUTION INTRAVENOUS CONTINUOUS PRN
Status: DISCONTINUED | OUTPATIENT
Start: 2022-01-10 | End: 2022-01-10 | Stop reason: SURG

## 2022-01-10 RX ORDER — SODIUM CHLORIDE 9 MG/ML
100 INJECTION, SOLUTION INTRAVENOUS CONTINUOUS
Status: CANCELLED | OUTPATIENT
Start: 2022-01-10

## 2022-01-10 RX ORDER — SODIUM CHLORIDE 9 MG/ML
125 INJECTION, SOLUTION INTRAVENOUS CONTINUOUS
Status: DISCONTINUED | OUTPATIENT
Start: 2022-01-10 | End: 2022-01-10 | Stop reason: HOSPADM

## 2022-01-10 RX ORDER — SODIUM CHLORIDE 0.9 % (FLUSH) 0.9 %
3 SYRINGE (ML) INJECTION EVERY 12 HOURS SCHEDULED
Status: CANCELLED | OUTPATIENT
Start: 2022-01-10

## 2022-01-10 RX ORDER — SUCCINYLCHOLINE/SOD CL,ISO/PF 200MG/10ML
SYRINGE (ML) INTRAVENOUS AS NEEDED
Status: DISCONTINUED | OUTPATIENT
Start: 2022-01-10 | End: 2022-01-10 | Stop reason: SURG

## 2022-01-10 RX ORDER — ONDANSETRON 2 MG/ML
4 INJECTION INTRAMUSCULAR; INTRAVENOUS ONCE
Status: COMPLETED | OUTPATIENT
Start: 2022-01-10 | End: 2022-01-10

## 2022-01-10 RX ORDER — ROCURONIUM BROMIDE 10 MG/ML
INJECTION, SOLUTION INTRAVENOUS AS NEEDED
Status: DISCONTINUED | OUTPATIENT
Start: 2022-01-10 | End: 2022-01-10 | Stop reason: SURG

## 2022-01-10 RX ADMIN — SODIUM CHLORIDE, POTASSIUM CHLORIDE, SODIUM LACTATE AND CALCIUM CHLORIDE: 600; 310; 30; 20 INJECTION, SOLUTION INTRAVENOUS at 12:53

## 2022-01-10 RX ADMIN — PROPOFOL 200 MG: 10 INJECTION, EMULSION INTRAVENOUS at 13:00

## 2022-01-10 RX ADMIN — Medication 200 MG: at 13:00

## 2022-01-10 RX ADMIN — LIDOCAINE HYDROCHLORIDE 100 MG: 20 INJECTION, SOLUTION EPIDURAL; INFILTRATION; INTRACAUDAL; PERINEURAL at 13:00

## 2022-01-10 RX ADMIN — SODIUM CHLORIDE 125 ML/HR: 9 INJECTION, SOLUTION INTRAVENOUS at 08:49

## 2022-01-10 RX ADMIN — ROCURONIUM BROMIDE 10 MG: 10 INJECTION INTRAVENOUS at 13:00

## 2022-01-10 RX ADMIN — GLUCAGON HYDROCHLORIDE 1 MG: KIT at 05:55

## 2022-01-10 RX ADMIN — SODIUM CHLORIDE 1000 ML: 9 INJECTION, SOLUTION INTRAVENOUS at 05:50

## 2022-01-10 RX ADMIN — ONDANSETRON 4 MG: 2 INJECTION INTRAMUSCULAR; INTRAVENOUS at 05:55

## 2022-01-10 NOTE — CONSULTS
[facility] Inpatient Gastroenterology Service     Request for GI consult received.    Chart reviewed.    Case discussed with ED physician Dr. Cardozo.    Consultation in progress.        IMPRESSION:  --Dysphagia with esophageal obstruction due to food bolus.  --Positive for COVID-19.    RECOMMEND:  --EGD with removal of esophageal food bolus.  This will need to be done under General Endotracheal Anesthesia  --IV fluids sufficient for volume resuscitation and maintenance of adequate hydration.      Full report to follow completion of consultation.      Thank you for allowing us to participate in the care of this patient.        Tucker Bright M.D., F.A.C.P., F.A.C.G.    [facility] Inpatient Gastroenterology Service

## 2022-01-10 NOTE — ANESTHESIA PREPROCEDURE EVALUATION
Anesthesia Evaluation     Patient summary reviewed   no history of anesthetic complications:  NPO Solid Status: Waived due to emergency  NPO Liquid Status: Waived due to emergency           Airway   Mallampati: II  TM distance: >3 FB  Neck ROM: full  Dental      Pulmonary    (+) a smoker,   (-) COPD, asthma, sleep apnea  Cardiovascular   Exercise tolerance: excellent (>7 METS)    ECG reviewed    (-) pacemaker, past MI, angina, cardiac stents      Neuro/Psych  (-) seizures, TIA, CVA  GI/Hepatic/Renal/Endo    (-) GERD, liver disease, no renal disease, diabetes    Musculoskeletal     Abdominal    Substance History      OB/GYN          Other   arthritis,    history of cancer                      Anesthesia Plan    ASA 2 - emergent     general   (Food bolus; covid + isolation )  intravenous induction

## 2022-01-10 NOTE — ED NOTES
Pt states eating roast on Saturday, feels like its stuck about the area above belly button, right at the tip of my ribs (sternum). Denies fever, pain 10/10. Pt states he has taken (not at once) gas relief pill, prilosec, and drank hydrogen peroxide with no relief.      Nancy Duncan, LEON  01/10/22 2363

## 2022-01-10 NOTE — ED NOTES
Gave 8 oz water per pt request and approved by Dr Cardozo. Pt drank about 3-4 oz and vomited it back up. Notified Dr Cardozo.      Nancy Duncan, RN  01/10/22 [id number]

## 2022-01-10 NOTE — ED PROVIDER NOTES
Subjective   Patient is a 56-year-old male presents hospital with nausea and inability to hold anything down.  He states that on Saturday evening he was eating a roast he felt like something got hung up in his throat but he thought it would pass.  He states he dealt with it all day yesterday he states that he cannot swallow food he cannot swallow drink he cannot swallow even his own spit.  He is has pain on the lower area of his ribs and feels like just something is hung up there he denies fevers chills or recent sick contacts.          Review of Systems   Constitutional: Negative for chills and fever.   HENT: Negative for congestion, ear pain and sinus pressure.    Eyes: Negative for photophobia, pain and visual disturbance.   Respiratory: Negative for cough, chest tightness, shortness of breath and wheezing.    Cardiovascular: Negative for chest pain and palpitations.   Gastrointestinal: Positive for abdominal pain, nausea and vomiting. Negative for diarrhea.   Endocrine: Negative for cold intolerance and heat intolerance.   Genitourinary: Negative for difficulty urinating and urgency.   Musculoskeletal: Negative for arthralgias, joint swelling and myalgias.   Skin: Negative for color change and wound.   Neurological: Negative for dizziness and headaches.   Hematological: Negative for adenopathy. Does not bruise/bleed easily.   Psychiatric/Behavioral: Negative for agitation, behavioral problems, confusion and decreased concentration.       Past Medical History:   Diagnosis Date   • Arthritis    • Bronchitis    • Cancer (HCC)     PROSTATE   • Chronic pain    • Pneumonia    • Prostatitis, acute    • S/P ACL reconstruction    • Septic shock due to urinary tract infection (HCC)    • Strep throat    • UTI (urinary tract infection)        Allergies   Allergen Reactions   • Contrast Dye Diarrhea and Nausea And Vomiting       Past Surgical History:   Procedure Laterality Date   • ANKLE SURGERY Right    • BACK SURGERY       X2   • KNEE ACL RECONSTRUCTION Left    • NASAL SEPTUM SURGERY     • PROSTATE ULTRASOUND BIOPSY N/A 2/18/2020    Procedure: PROSTATE  BIOPSY;  Surgeon: Trae Clark MD;  Location:  [facility] OR;  Service: Urology;  Laterality: N/A;   • PROSTATECTOMY N/A 9/22/2020    Procedure: RADICAL PROSTATECTOMY LAPAROSCOPIC WITH DAVINCI ROBOT;  Surgeon: Nuno Jj MD;  Location:  [facility] OR;  Service: [facility];  Laterality: N/A;   • SHOULDER SURGERY Left     X 4   • TENNIS ELBOW RELEASE Right 04/14/2011       History reviewed. No pertinent family history.    Social History     Socioeconomic History   • Marital status:    Tobacco Use   • Smoking status: Current Every Day Smoker     Packs/day: 1.00     Years: 15.00     Pack years: 15.00     Types: Cigarettes     Start date: 2/7/1987   • Smokeless tobacco: Never Used   • Tobacco comment: chnatix trying to quit   Vaping Use   • Vaping Use: Former   Substance and Sexual Activity   • Alcohol use: No   • Drug use: No   • Sexual activity: Defer           Objective   Physical Exam  Vitals and nursing note reviewed.   Constitutional:       Appearance: Normal appearance. He is well-developed.   HENT:      Head: Normocephalic and atraumatic.   Eyes:      Extraocular Movements: Extraocular movements intact.      Conjunctiva/sclera: Conjunctivae normal.      Pupils: Pupils are equal, round, and reactive to light.   Cardiovascular:      Rate and Rhythm: Normal rate and regular rhythm.      Heart sounds: Normal heart sounds.   Pulmonary:      Effort: Pulmonary effort is normal.      Breath sounds: Normal breath sounds.   Abdominal:      General: Bowel sounds are normal.      Palpations: Abdomen is soft.      Tenderness: There is abdominal tenderness ( Diffuse without rebound or guarding).   Musculoskeletal:         General: Normal range of motion.      Cervical back: Normal range of motion and neck supple.   Skin:     General: Skin is warm and dry.      Findings: No rash.    Neurological:      General: No focal deficit present.      Mental Status: He is alert and oriented to person, place, and time.      Cranial Nerves: No cranial nerve deficit.      Deep Tendon Reflexes: Reflexes are normal and symmetric.   Psychiatric:         Behavior: Behavior normal.         Thought Content: Thought content normal.         Procedures           ED Course                                                 MDM    Final diagnoses:   Esophageal foreign body, initial encounter       ED Disposition  ED Disposition     ED Disposition Condition Comment    Send to Specialty Department  Patient needs endoscopy performed by Tucker Pride MD  [address]  [facility] [id number] [phone]    Schedule an appointment as soon as possible for a visit in 1 month(s)        Flex Sheikh MD  [address] [id number] [phone]               Medication List      New Prescriptions    omeprazole 40 MG capsule  Commonly known as: priLOSEC  Take 1 capsule by mouth Daily.           Where to Get Your Medications      Information about where to get these medications is not yet available    Ask your nurse or doctor about these medications  · omeprazole 40 MG capsule       I took over the care of this patient awaiting lab results.  We also tried to p.o. challenge the patient 1 more time which he failed.  I discussed the case with Dr. Bright who kindly agreed to take the patient to endoscopy for definitive treatment.  The patient is currently stable in the ED     Sagar Mayo MD  01/10/22 1914

## 2022-01-10 NOTE — CONSULTS
.  [facility]enterology  Initial Inpatient Consult Note    Referring Provider: No ref. provider found    Reason for Consultation: Probable Food Bolus    Subjective     History of present illness:      Lalo Jones is a 56 y.o. male that presented to [facility] ER with complaint of sudden onset in difficulty swallowing that occurred about 1800 hrs on Saturday, 01/08/2022.  It occurred early in the medial, and he stopped eating after that.  It feels like it is lodged in his lower chest beneath the tip of his breastbone.  It has been painful since, and he has been unable to swallow his own saliva.  He did not present until early this morning.  GI was called to evaluate for probable food bolus.  He is unable to tolerate anything by mouth at this time.  No alleviating factors.  He began having mild intermittent dysphagia to solid foods a couple years ago.  This is complicated by dental problems that have required multiple surgeries, with more to go.  As a result he is unable to chew his food as well.  He recalls a time 10 or more years ago when he had severe reflux for a few years, but in recent years it is not been a problem.  He has only mild infrequent reflux, and occasionally takes omeprazole OTC for this.  He has never seen a doctor for this, has never seen a gastroenterologist, and has never undergone upper GI endoscopy.  He has no prior history of peptic ulcer disease. GI review of systems was otherwise negative or noncontributory.     He was tested for COVID as a routine preop, and this came back positive.  He is not symptomatic from it at this time.,      Past Medical History:  Past Medical History:   Diagnosis Date   • Arthritis    • Bronchitis    • Cancer (HCC)     PROSTATE   • Chronic pain    • Pneumonia    • Prostatitis, acute    • S/P ACL reconstruction    • Septic shock due to urinary tract infection (HCC)    • Strep throat    • UTI (urinary tract infection)        Past Surgical  History:  Past Surgical History:   Procedure Laterality Date   • ANKLE SURGERY Right    • BACK SURGERY      X2   • KNEE ACL RECONSTRUCTION Left    • NASAL SEPTUM SURGERY     • PROSTATE ULTRASOUND BIOPSY N/A 2/18/2020    Procedure: PROSTATE  BIOPSY;  Surgeon: Trae Clark MD;  Location:  [facility] OR;  Service: Urology;  Laterality: N/A;   • PROSTATECTOMY N/A 9/22/2020    Procedure: RADICAL PROSTATECTOMY LAPAROSCOPIC WITH DAVINCI ROBOT;  Surgeon: Nuno Jj MD;  Location:  [facility] OR;  Service: [facility];  Laterality: N/A;   • SHOULDER SURGERY Left     X 4   • TENNIS ELBOW RELEASE Right 04/14/2011        Social History:   Social History     Tobacco Use   • Smoking status: Current Every Day Smoker     Packs/day: 1.00     Years: 15.00     Pack years: 15.00     Types: Cigarettes     Start date: 2/7/1987   • Smokeless tobacco: Never Used   • Tobacco comment: chnatix trying to quit   Substance Use Topics   • Alcohol use: No        Family History:  History reviewed. No pertinent family history.    Home Meds:  (Not in a hospital admission)    Current Meds:     Current Facility-Administered Medications:   •  sodium chloride 0.9 % infusion, 125 mL/hr, Intravenous, Continuous, Xander Cardozo MD, Last Rate: 125 mL/hr at 01/10/22 0849, 125 mL/hr at 01/10/22 0849    Current Outpatient Medications:   •  albuterol sulfate  (90 Base) MCG/ACT inhaler, Inhale 2 puffs Every 4 (Four) Hours As Needed for Wheezing or Shortness of Air., Disp: , Rfl:   •  gabapentin (NEURONTIN) 800 MG tablet, Take 800 mg by mouth 3 (Three) Times a Day., Disp: , Rfl:   •  ibuprofen (ADVIL,MOTRIN) 800 MG tablet, Take 800 mg by mouth Every 8 (Eight) Hours As Needed for Mild Pain ., Disp: , Rfl:   •  nicotine (NICODERM CQ) 14 MG/24HR patch, Place 1 patch on the skin as directed by provider Daily., Disp: 28 patch, Rfl: 0  •  sildenafil (VIAGRA) 100 MG tablet, Take 1/2 to 1 tab by mouth 1 hour prior to intercourse, Disp: 10 tablet, Rfl:  11    Allergies:  Allergies   Allergen Reactions   • Contrast Dye Diarrhea and Nausea And Vomiting       Vital Signs  Temp:  [98 °F (36.7 °C)] 98 °F (36.7 °C)  Heart Rate:  [71-93] 84  Resp:  [22] 22  BP: (103-143)/(66-84) 126/84      Review of Systems     Constitution:  negative for chills, fatigue and fevers  Eyes:  negative for blurriness and change of vision  ENT:   Bad teeth.  Negative for sore throat and voice change  Respiratory: negative for  cough and shortness of air  Cardiovascular:  Negative for chest pain or palpitations  Gastrointestinal:  negative for  See HPI  Genitourinary:  negativefor  blood in urine and painful urination  Integument: negative for  rash and redness  Hematologic / Lymphatic: negative for  excessive bleeding and easy bruising  Musculoskeletal: negative for  joint pain and joint stiffness out of the ordinary  Neurological:  negative for  seizures and speech abnormal  Behavioral/Psych:  negative for  anxiety and depression out of the ordinary  Endocrine: negative for  diabetes:and weight loss, unintended  Allergies / Immunologic:  negative for  anaphylaxis and rash      Objective     Physical Exam:  General: Alert, oriented, no acute distress  Skin: No pallor, icterus or stigmata of chronic liver disease  HEENT: Normocephalic, atraumatic, conjunctiva pale, sclerae anicteric, no conjunctival injection, mucous membranes moist  Neck: Trachea midline, no mass  Pulm: Unlabored, no audible wheezing  CV: Regular rhythm   GI: No visible distention, denies tenderness  Musculoskeletal: No edema, cyanosis or clubbing  Neuro: Grossly intact         Results Review:    I have reviewed all of the patients current test results  Results from last 7 days   Lab Units 01/10/22  0543   WBC 10*3/mm3 6.29   HEMOGLOBIN g/dL 13.7   HEMATOCRIT % 42.3   PLATELETS 10*3/mm3 195       Results from last 7 days   Lab Units 01/10/22  0543   SODIUM mmol/L 145   POTASSIUM mmol/L 3.9   BUN mg/dL 20   CREATININE mg/dL  0.98               Assessment/Plan     Patient Active Problem List   Diagnosis Code   • Primary osteoarthritis of left knee M17.12   • Primary osteoarthritis of right knee M17.11   • Chronic pain of both knees M25.561, M25.562, G89.29   • Erectile dysfunction due to diseases classified elsewhere N52.1   • Prostate cancer (HCC) C61   • Leukopenia D72.819   • Fever R50.9   • Thrombocytopenia (HCC) D69.6   • Tobacco use Z72.0   • Transaminitis R74.01   • Esophageal foreign body, initial encounter T18.108A       ASSESSMENT:  --Esophageal obstruction due to food bolus impaction.  --History of dysphagia with no prior evaluation by a Gastroenterologist.  --History of esophageal reflux, significant 10+ years ago but occasional and mild now.  -- He was tested for COVID as a routine preop, and this came back positive.  He is not symptomatic from it at this time.,    RECOMMEND:   --Urgent EGD with removal of food bolus, and possible biopsy and/or esophageal dilation.  I have counseled the patient regarding the nature of this procedure including its risks (perforation, bleeding, infection, adverse reaction of medication, need for emergency surgery should a complication occur, unable to remove bolus), benefits (remove food bolus, possible esophageal dilation), and alternate means of treatment (watchful waiting with empiric treatment).  Questions were entertained.  Patient understands that guarantees cannot be made regarding outcome or risks and he wishes to proceed.  --Procedure will be done in OR under General Endotrachial Anesthesia (GETA) to protect airway.  --Further recommendations to follow procedure.        Tucker Bright MD  01/10/22  12:29 CST

## 2022-01-10 NOTE — DISCHARGE INSTRUCTIONS

## 2022-01-10 NOTE — ANESTHESIA POSTPROCEDURE EVALUATION
"Patient: Lalo Jones    Procedure Summary     Date: 01/10/22 Room / Location:  [facility] OR 02 /  [facility] OR    Anesthesia Start: 1245 Anesthesia Stop: 1341    Procedure: ESOPHAGOGASTRODUODENOSCOPY WITH ANESTHESIA (N/A ) Diagnosis:       Esophageal foreign body, initial encounter      (Esophageal foreign body, initial encounter [T18.108A])    Surgeons: Tucker Bright MD Provider: Freddie Marie CRNA    Anesthesia Type: general ASA Status: 2 - Emergent          Anesthesia Type: general    Vitals  Vitals Value Taken Time   /87 01/10/22 1355   Temp 97.7 °F (36.5 °C) 01/10/22 1338   Pulse 74 01/10/22 1355   Resp 15 01/10/22 1355   SpO2 96 % 01/10/22 1450           Post Anesthesia Care and Evaluation    Patient location during evaluation: PACU  Patient participation: complete - patient participated  Level of consciousness: awake and alert  Pain management: adequate  Airway patency: patent  Anesthetic complications: No anesthetic complications    Cardiovascular status: acceptable  Respiratory status: acceptable  Hydration status: acceptable    Comments: Blood pressure 141/87, pulse 74, temperature 97.7 °F (36.5 °C), temperature source Temporal, resp. rate 15, height 188 cm (74\"), weight 83.5 kg (184 lb), SpO2 96 %.    Pt discharged from PACU based on brien score >8      "

## 2022-01-10 NOTE — ANESTHESIA PROCEDURE NOTES
Airway  Urgency: elective    Date/Time: 1/10/2022 1:00 PM  Airway not difficult    General Information and Staff    Patient location during procedure: OR  CRNA: Beto López CRNA    Indications and Patient Condition  Indications for airway management: airway protection    Preoxygenated: yes  Mask difficulty assessment: 1 - vent by mask    Final Airway Details  Final airway type: endotracheal airway      Successful airway: ETT  Cuffed: yes   Successful intubation technique: direct laryngoscopy  Endotracheal tube insertion site: oral  Blade: Alvares  Blade size: 3  ETT size (mm): 7.5  Cormack-Lehane Classification: grade I - full view of glottis  Placement verified by: capnometry   Measured from: lips  ETT/EBT  to lips (cm): 22  Number of attempts at approach: 1  Assessment: lips, teeth, and gum same as pre-op and atraumatic intubation

## 2022-02-08 ENCOUNTER — CLINICAL SUPPORT (OUTPATIENT)
Dept: INTERNAL MEDICINE | Facility: CLINIC | Age: 57
End: 2022-02-08

## 2022-02-08 DIAGNOSIS — Z85.46 HISTORY OF PROSTATE CANCER: ICD-10-CM

## 2022-02-08 PROCEDURE — 36415 COLL VENOUS BLD VENIPUNCTURE: CPT | Performed by: NURSE PRACTITIONER

## 2022-02-08 NOTE — PROGRESS NOTES
Venipuncture Blood Specimen Collection  Venipuncture performed in Right arm by Lynn Barrett CMA with good hemostasis. Patient tolerated the procedure well without complications.   02/08/22   Lynn Barrett CMA

## 2022-02-09 LAB — PSA SERPL-MCNC: <0.1 NG/ML (ref 0–4)

## 2022-02-14 NOTE — PROGRESS NOTES
Subjective    Mr. Jones is 56 y.o. male    Chief Complaint: History of prostate cancer    History of Present Illness    56-year-old male established patient follow-up for history of prostate cancer and post-prostatectomy erectile dysfunction.  He had robotic assisted laparoscopic prostatectomy by Dr. Jj September 2020 for PT2N0 Ranger 7 disease with tumor approaching the right posterior margin.  His most recent PSA remains undetectable.  He is unable to obtain any erections with PDE inhibitor therapy. He also has difficulty maintaining erections.    Minimal DEJA only uses a backup pad daily.    He denies bothersome LUTS.  UA today clear.    Lab Results   Component Value Date    PSA <0.1 02/08/2022    PSA <0.1 11/01/2021    PSA <0.1 07/30/2021       The following portions of the patient's history were reviewed and updated as appropriate: allergies, current medications, past family history, past medical history, past social history, past surgical history and problem list.    Review of Systems      Current Outpatient Medications:   •  albuterol sulfate  (90 Base) MCG/ACT inhaler, Inhale 2 puffs Every 4 (Four) Hours As Needed for Wheezing or Shortness of Air., Disp: , Rfl:   •  gabapentin (NEURONTIN) 800 MG tablet, Take 800 mg by mouth 3 (Three) Times a Day., Disp: , Rfl:   •  ibuprofen (ADVIL,MOTRIN) 800 MG tablet, Take 800 mg by mouth Every 8 (Eight) Hours As Needed for Mild Pain ., Disp: , Rfl:   •  nicotine (NICODERM CQ) 14 MG/24HR patch, Place 1 patch on the skin as directed by provider Daily., Disp: 28 patch, Rfl: 0  •  omeprazole (priLOSEC) 40 MG capsule, Take 1 capsule by mouth Daily., Disp: 90 capsule, Rfl: 1  •  sildenafil (VIAGRA) 100 MG tablet, Take 1/2 to 1 tab by mouth 1 hour prior to intercourse, Disp: 10 tablet, Rfl: 11    Past Medical History:   Diagnosis Date   • Arthritis    • Bronchitis    • Cancer (HCC)     PROSTATE   • Chronic pain    • Pneumonia    • Prostatitis, acute    • S/P ACL  "reconstruction    • Septic shock due to urinary tract infection (HCC)    • Strep throat    • UTI (urinary tract infection)        Past Surgical History:   Procedure Laterality Date   • ANKLE SURGERY Right    • BACK SURGERY      X2   • ENDOSCOPY N/A 1/10/2022    Procedure: ESOPHAGOGASTRODUODENOSCOPY WITH ANESTHESIA;  Surgeon: Tucker Bright MD;  Location:  [facility] OR;  Service: Gastroenterology;  Laterality: N/A;  pre food bolus  post food bolus     • KNEE ACL RECONSTRUCTION Left    • NASAL SEPTUM SURGERY     • PROSTATE ULTRASOUND BIOPSY N/A 2/18/2020    Procedure: PROSTATE  BIOPSY;  Surgeon: Trae Clark MD;  Location:  [facility] OR;  Service: Urology;  Laterality: N/A;   • PROSTATECTOMY N/A 9/22/2020    Procedure: RADICAL PROSTATECTOMY LAPAROSCOPIC WITH DAVINCI ROBOT;  Surgeon: Nuno Jj MD;  Location: [facility] OR;  Service: [facility];  Laterality: N/A;   • SHOULDER SURGERY Left     X 4   • TENNIS ELBOW RELEASE Right 04/14/2011       Social History     Socioeconomic History   • Marital status:    Tobacco Use   • Smoking status: Current Every Day Smoker     Packs/day: 1.00     Years: 15.00     Pack years: 15.00     Types: Cigarettes     Start date: 2/7/1987   • Smokeless tobacco: Never Used   • Tobacco comment: chnatix trying to quit   Vaping Use   • Vaping Use: Former   Substance and Sexual Activity   • Alcohol use: No   • Drug use: No   • Sexual activity: Defer       History reviewed. No pertinent family history.    Objective    Temp 98.2 °F (36.8 °C)   Ht 188 cm (74\")   Wt 90.2 kg (198 lb 12.8 oz)   BMI 25.52 kg/m²     Physical Exam  Penis and testicles are normal. No inguinal hernia appreciable.      Results for orders placed or performed in visit on 02/17/22   POC Urinalysis Dipstick, Multipro    Specimen: Urine   Result Value Ref Range    Color Yellow Yellow, Straw, Dark Yellow, Beryl    Clarity, UA Clear Clear    Glucose, UA Negative Negative, 1000 mg/dL (3+) mg/dL    Bilirubin " Negative Negative    Ketones, UA Negative Negative    Specific Gravity  1.025 1.005 - 1.030    Blood, UA Negative Negative    pH, Urine 6.0 5.0 - 8.0    Protein, POC Negative Negative mg/dL    Urobilinogen, UA Normal Normal    Nitrite, UA Negative Negative    Leukocytes Negative Negative     Assessment and Plan    Diagnoses and all orders for this visit:    1. History of prostate cancer (Primary)  -     POC Urinalysis Dipstick, Multipro    2. Erectile dysfunction after radical prostatectomy      Doing well MARGARITO with undetectable PSA status post prostatectomy.   We discussed other options for his post prostatectomy erectile dysfunction including penile injection therapy, intraurethral medications, vacuum erection device, and penile implant.  Patient does not want to do penile injections.  Patient would like to proceed with three-piece inflatable penile implant.  We discussed risks of the procedure including but not limited to infection, bleeding, need for additional procedures, injury to urethra and/or adjacent structures, chronic pain, device malfunction, device migration, complications of anesthesia. We also discussed that penile length with an implant would be no longer than current flaccid stretched penile length. Patient voices understanding and would like to proceed with Coloplast Titan touch three-piece inflatable penile implant next month.       This document has been signed by DESHAUN Clark MD on February 17, 2022 14:01 CST

## 2022-02-17 ENCOUNTER — OFFICE VISIT (OUTPATIENT)
Dept: UROLOGY | Facility: CLINIC | Age: 57
End: 2022-02-17

## 2022-02-17 VITALS — BODY MASS INDEX: 25.51 KG/M2 | HEIGHT: 74 IN | TEMPERATURE: 98.2 F | WEIGHT: 198.8 LBS

## 2022-02-17 DIAGNOSIS — N52.31 ERECTILE DYSFUNCTION AFTER RADICAL PROSTATECTOMY: ICD-10-CM

## 2022-02-17 DIAGNOSIS — Z85.46 HISTORY OF PROSTATE CANCER: Primary | ICD-10-CM

## 2022-02-17 LAB
BILIRUB BLD-MCNC: NEGATIVE MG/DL
CLARITY, POC: CLEAR
COLOR UR: YELLOW
GLUCOSE UR STRIP-MCNC: NEGATIVE MG/DL
KETONES UR QL: NEGATIVE
LEUKOCYTE EST, POC: NEGATIVE
NITRITE UR-MCNC: NEGATIVE MG/ML
PH UR: 6 [PH] (ref 5–8)
PROT UR STRIP-MCNC: NEGATIVE MG/DL
RBC # UR STRIP: NEGATIVE /UL
SP GR UR: 1.02 (ref 1–1.03)
UROBILINOGEN UR QL: NORMAL

## 2022-02-17 PROCEDURE — 99213 OFFICE O/P EST LOW 20 MIN: CPT | Performed by: UROLOGY

## 2022-02-18 PROBLEM — N52.31 ERECTILE DYSFUNCTION AFTER RADICAL PROSTATECTOMY: Status: ACTIVE | Noted: 2022-02-18

## 2022-03-11 ENCOUNTER — PRE-ADMISSION TESTING (OUTPATIENT)
Dept: PREADMISSION TESTING | Facility: HOSPITAL | Age: 57
End: 2022-03-11

## 2022-03-11 ENCOUNTER — TELEPHONE (OUTPATIENT)
Dept: UROLOGY | Facility: CLINIC | Age: 57
End: 2022-03-11

## 2022-03-11 VITALS
HEART RATE: 88 BPM | SYSTOLIC BLOOD PRESSURE: 127 MMHG | DIASTOLIC BLOOD PRESSURE: 71 MMHG | WEIGHT: 201.06 LBS | BODY MASS INDEX: 25.8 KG/M2 | RESPIRATION RATE: 18 BRPM | HEIGHT: 74 IN | OXYGEN SATURATION: 96 %

## 2022-03-11 PROCEDURE — 93005 ELECTROCARDIOGRAM TRACING: CPT | Performed by: UROLOGY

## 2022-03-11 PROCEDURE — 93010 ELECTROCARDIOGRAM REPORT: CPT | Performed by: EMERGENCY MEDICINE

## 2022-03-11 PROCEDURE — 80048 BASIC METABOLIC PNL TOTAL CA: CPT | Performed by: UROLOGY

## 2022-03-11 PROCEDURE — 85027 COMPLETE CBC AUTOMATED: CPT | Performed by: UROLOGY

## 2022-03-11 NOTE — DISCHARGE INSTRUCTIONS
Before you come to the hospital      Do not eat, drink, smoke, or chew gum after midnight the night before surgery. This includes no mints.    Arrival time: AS DIRECTED BY OFFICE     Only one family member or friend will be allowed per patient      YOU MAY TAKE THE FOLLOWING MEDICATION(S) THE MORNING OF SURGERY WITH A SIP OF WATER: gabapentin          ALL OTHER HOME MEDICATION CHECK WITH YOUR PHYSICIAN                            (especially if you are taking diabetes medicines or blood thinners)     Do not take any Erectile Dysfunction medications (EX: CIALIS, VIAGRA) 24 hours prior to surgery      If you were given and instructed to use a germ- killing soap, use as directed the night before surgery and the morning of surgery before coming to the hospital.                 MANAGING PAIN AFTER SURGERY    We know you are probably wondering what your pain will be like after surgery.  Following surgery it is unrealistic to expect you will not have pain.   Pain is how our bodies let us know that something is wrong or cautions us to be careful.  That said, our goal is to make your pain tolerable.    Methods we may use to treat your pain include (oral or IV medications, PCAs, epidurals, nerve blocks, etc.)   While some procedures require IV pain medications for a short time after surgery, transitioning to pain medications by mouth allows for better management of pain.   Your nurse will encourage you to take oral pain medications whenever possible.  IV medications work almost immediately, but only last a short while.  Taking medications by mouth allows for a more constant level of medication in your blood stream for a longer period of time.      Once your pain is out of control it is harder to get back under control.  It is important you are aware when your next dose of pain medication is due.  If you are admitted, your nurse may write the time of your next dose on the white board in your room to help you remember.      We  are interested in your pain and encourage you to inform us about aggravating factors during your visit.   Many times a simple repositioning every few hours can make a big difference.    If your physician says it is okay, do not let your pain prevent you from getting out of bed. Be sure to call your nurse for assistance prior to getting up so you do not fall.      Before surgery, please decide your tolerable pain goal.  These faces help describe the pain ratings we use on a 0-10 scale.   Be prepared to tell us your goal and whether or not you take pain or anxiety medications at home.

## 2022-03-13 LAB
QT INTERVAL: 356 MS
QTC INTERVAL: 400 MS

## 2022-03-14 ENCOUNTER — LAB (OUTPATIENT)
Dept: LAB | Facility: HOSPITAL | Age: 57
End: 2022-03-14

## 2022-03-14 PROCEDURE — U0004 COV-19 TEST NON-CDC HGH THRU: HCPCS | Performed by: UROLOGY

## 2022-03-15 ENCOUNTER — ANESTHESIA EVENT (OUTPATIENT)
Dept: PERIOP | Facility: HOSPITAL | Age: 57
End: 2022-03-15

## 2022-03-15 ENCOUNTER — HOSPITAL ENCOUNTER (OUTPATIENT)
Facility: HOSPITAL | Age: 57
Setting detail: HOSPITAL OUTPATIENT SURGERY
Discharge: HOME | End: 2022-03-15
Attending: UROLOGY | Admitting: UROLOGY

## 2022-03-15 ENCOUNTER — ANESTHESIA (OUTPATIENT)
Dept: PERIOP | Facility: HOSPITAL | Age: 57
End: 2022-03-15

## 2022-03-15 VITALS
RESPIRATION RATE: 18 BRPM | OXYGEN SATURATION: 96 % | TEMPERATURE: 97.5 F | HEART RATE: 77 BPM | SYSTOLIC BLOOD PRESSURE: 120 MMHG | DIASTOLIC BLOOD PRESSURE: 76 MMHG

## 2022-03-15 DIAGNOSIS — N52.31 ERECTILE DYSFUNCTION AFTER RADICAL PROSTATECTOMY: ICD-10-CM

## 2022-03-15 PROCEDURE — 54405 INSERT MULTI-COMP PENIS PROS: CPT | Performed by: UROLOGY

## 2022-03-15 PROCEDURE — 25010000002 FENTANYL CITRATE (PF) 50 MCG/ML SOLUTION: Performed by: ANESTHESIOLOGY

## 2022-03-15 PROCEDURE — 25010000002 ONDANSETRON PER 1 MG: Performed by: ANESTHESIOLOGY

## 2022-03-15 PROCEDURE — 25010000002 CEFAZOLIN PER 500 MG: Performed by: NURSE ANESTHETIST, CERTIFIED REGISTERED

## 2022-03-15 PROCEDURE — 25010000002 DEXAMETHASONE PER 1 MG: Performed by: NURSE ANESTHETIST, CERTIFIED REGISTERED

## 2022-03-15 PROCEDURE — C1889 IMPLANT/INSERT DEVICE, NOC: HCPCS | Performed by: UROLOGY

## 2022-03-15 PROCEDURE — C1813 PROSTHESIS, PENILE, INFLATAB: HCPCS | Performed by: UROLOGY

## 2022-03-15 PROCEDURE — 25010000002 GENTAMICIN PER 80 MG: Performed by: UROLOGY

## 2022-03-15 PROCEDURE — 25010000002 DEXAMETHASONE PER 1 MG: Performed by: ANESTHESIOLOGY

## 2022-03-15 PROCEDURE — 25010000002 ONDANSETRON PER 1 MG: Performed by: NURSE ANESTHETIST, CERTIFIED REGISTERED

## 2022-03-15 PROCEDURE — 25010000002 PROPOFOL 10 MG/ML EMULSION: Performed by: NURSE ANESTHETIST, CERTIFIED REGISTERED

## 2022-03-15 PROCEDURE — 25010000002 VANCOMYCIN 1 G RECONSTITUTED SOLUTION 1 EACH VIAL: Performed by: UROLOGY

## 2022-03-15 PROCEDURE — 25010000002 MIDAZOLAM PER 1 MG: Performed by: ANESTHESIOLOGY

## 2022-03-15 PROCEDURE — 25010000002 FENTANYL CITRATE (PF) 100 MCG/2ML SOLUTION: Performed by: NURSE ANESTHETIST, CERTIFIED REGISTERED

## 2022-03-15 DEVICE — TITAN® TOUCH SCROTAL ZERO DEGREE ANGLE CYLINDER SET WITH PUMP
Type: IMPLANTABLE DEVICE | Site: PENIS | Status: FUNCTIONAL
Brand: TITAN

## 2022-03-15 DEVICE — ASSEMBLY KIT
Type: IMPLANTABLE DEVICE | Site: PENIS | Status: FUNCTIONAL
Brand: TITAN

## 2022-03-15 DEVICE — CL RESERVOIR
Type: IMPLANTABLE DEVICE | Site: PENIS | Status: FUNCTIONAL
Brand: TITAN

## 2022-03-15 RX ORDER — BACITRACIN ZINC 500 [USP'U]/G
OINTMENT TOPICAL AS NEEDED
Status: DISCONTINUED | OUTPATIENT
Start: 2022-03-15 | End: 2022-03-15 | Stop reason: HOSPADM

## 2022-03-15 RX ORDER — LIDOCAINE HYDROCHLORIDE 20 MG/ML
INJECTION, SOLUTION EPIDURAL; INFILTRATION; INTRACAUDAL; PERINEURAL AS NEEDED
Status: DISCONTINUED | OUTPATIENT
Start: 2022-03-15 | End: 2022-03-15 | Stop reason: SURG

## 2022-03-15 RX ORDER — LIDOCAINE HYDROCHLORIDE 10 MG/ML
0.5 INJECTION, SOLUTION EPIDURAL; INFILTRATION; INTRACAUDAL; PERINEURAL ONCE AS NEEDED
Status: DISCONTINUED | OUTPATIENT
Start: 2022-03-15 | End: 2022-03-15 | Stop reason: SDUPTHER

## 2022-03-15 RX ORDER — DROPERIDOL 2.5 MG/ML
0.62 INJECTION, SOLUTION INTRAMUSCULAR; INTRAVENOUS ONCE AS NEEDED
Status: DISCONTINUED | OUTPATIENT
Start: 2022-03-15 | End: 2022-03-15 | Stop reason: HOSPADM

## 2022-03-15 RX ORDER — LABETALOL HYDROCHLORIDE 5 MG/ML
5 INJECTION, SOLUTION INTRAVENOUS
Status: DISCONTINUED | OUTPATIENT
Start: 2022-03-15 | End: 2022-03-15 | Stop reason: HOSPADM

## 2022-03-15 RX ORDER — ACETAMINOPHEN 500 MG
1000 TABLET ORAL ONCE
Status: COMPLETED | OUTPATIENT
Start: 2022-03-15 | End: 2022-03-15

## 2022-03-15 RX ORDER — ONDANSETRON 4 MG/1
4 TABLET, ORALLY DISINTEGRATING ORAL EVERY 6 HOURS PRN
Qty: 6 TABLET | Refills: 1 | Status: SHIPPED | OUTPATIENT
Start: 2022-03-15 | End: 2022-04-21

## 2022-03-15 RX ORDER — ONDANSETRON 2 MG/ML
INJECTION INTRAMUSCULAR; INTRAVENOUS AS NEEDED
Status: DISCONTINUED | OUTPATIENT
Start: 2022-03-15 | End: 2022-03-15 | Stop reason: SURG

## 2022-03-15 RX ORDER — BUPIVACAINE HYDROCHLORIDE 5 MG/ML
INJECTION, SOLUTION PERINEURAL AS NEEDED
Status: DISCONTINUED | OUTPATIENT
Start: 2022-03-15 | End: 2022-03-15 | Stop reason: HOSPADM

## 2022-03-15 RX ORDER — DOCUSATE SODIUM 100 MG/1
100 CAPSULE, LIQUID FILLED ORAL 2 TIMES DAILY
Qty: 60 CAPSULE | Refills: 1 | Status: SHIPPED | OUTPATIENT
Start: 2022-03-15 | End: 2022-04-21

## 2022-03-15 RX ORDER — NALOXONE HCL 0.4 MG/ML
0.4 VIAL (ML) INJECTION AS NEEDED
Status: DISCONTINUED | OUTPATIENT
Start: 2022-03-15 | End: 2022-03-15 | Stop reason: HOSPADM

## 2022-03-15 RX ORDER — PROPOFOL 10 MG/ML
VIAL (ML) INTRAVENOUS AS NEEDED
Status: DISCONTINUED | OUTPATIENT
Start: 2022-03-15 | End: 2022-03-15 | Stop reason: SURG

## 2022-03-15 RX ORDER — EPHEDRINE SULFATE 50 MG/ML
INJECTION, SOLUTION INTRAVENOUS AS NEEDED
Status: DISCONTINUED | OUTPATIENT
Start: 2022-03-15 | End: 2022-03-15 | Stop reason: SURG

## 2022-03-15 RX ORDER — OXYCODONE AND ACETAMINOPHEN 7.5; 325 MG/1; MG/1
2 TABLET ORAL EVERY 4 HOURS PRN
Status: DISCONTINUED | OUTPATIENT
Start: 2022-03-15 | End: 2022-03-15 | Stop reason: HOSPADM

## 2022-03-15 RX ORDER — FLUMAZENIL 0.1 MG/ML
0.2 INJECTION INTRAVENOUS AS NEEDED
Status: DISCONTINUED | OUTPATIENT
Start: 2022-03-15 | End: 2022-03-15 | Stop reason: HOSPADM

## 2022-03-15 RX ORDER — CEFAZOLIN SODIUM 1 G/3ML
INJECTION, POWDER, FOR SOLUTION INTRAMUSCULAR; INTRAVENOUS AS NEEDED
Status: DISCONTINUED | OUTPATIENT
Start: 2022-03-15 | End: 2022-03-15 | Stop reason: SURG

## 2022-03-15 RX ORDER — SODIUM CHLORIDE, SODIUM LACTATE, POTASSIUM CHLORIDE, CALCIUM CHLORIDE 600; 310; 30; 20 MG/100ML; MG/100ML; MG/100ML; MG/100ML
100 INJECTION, SOLUTION INTRAVENOUS CONTINUOUS
Status: DISCONTINUED | OUTPATIENT
Start: 2022-03-15 | End: 2022-03-15 | Stop reason: HOSPADM

## 2022-03-15 RX ORDER — SODIUM CHLORIDE 0.9 % (FLUSH) 0.9 %
3 SYRINGE (ML) INJECTION AS NEEDED
Status: DISCONTINUED | OUTPATIENT
Start: 2022-03-15 | End: 2022-03-15 | Stop reason: HOSPADM

## 2022-03-15 RX ORDER — SODIUM CHLORIDE, SODIUM LACTATE, POTASSIUM CHLORIDE, CALCIUM CHLORIDE 600; 310; 30; 20 MG/100ML; MG/100ML; MG/100ML; MG/100ML
1000 INJECTION, SOLUTION INTRAVENOUS CONTINUOUS
Status: DISCONTINUED | OUTPATIENT
Start: 2022-03-15 | End: 2022-03-15 | Stop reason: HOSPADM

## 2022-03-15 RX ORDER — NAPROXEN 500 MG/1
500 TABLET ORAL 2 TIMES DAILY WITH MEALS
Qty: 60 TABLET | Refills: 0 | Status: SHIPPED | OUTPATIENT
Start: 2022-03-15 | End: 2022-04-21

## 2022-03-15 RX ORDER — DEXAMETHASONE SODIUM PHOSPHATE 4 MG/ML
INJECTION, SOLUTION INTRA-ARTICULAR; INTRALESIONAL; INTRAMUSCULAR; INTRAVENOUS; SOFT TISSUE AS NEEDED
Status: DISCONTINUED | OUTPATIENT
Start: 2022-03-15 | End: 2022-03-15 | Stop reason: SURG

## 2022-03-15 RX ORDER — NEOSTIGMINE METHYLSULFATE 5 MG/5 ML
SYRINGE (ML) INTRAVENOUS AS NEEDED
Status: DISCONTINUED | OUTPATIENT
Start: 2022-03-15 | End: 2022-03-15 | Stop reason: SURG

## 2022-03-15 RX ORDER — SODIUM CHLORIDE 0.9 % (FLUSH) 0.9 %
3 SYRINGE (ML) INJECTION EVERY 12 HOURS SCHEDULED
Status: DISCONTINUED | OUTPATIENT
Start: 2022-03-15 | End: 2022-03-15 | Stop reason: HOSPADM

## 2022-03-15 RX ORDER — SODIUM CHLORIDE 0.9 % (FLUSH) 0.9 %
3-10 SYRINGE (ML) INJECTION AS NEEDED
Status: DISCONTINUED | OUTPATIENT
Start: 2022-03-15 | End: 2022-03-15 | Stop reason: HOSPADM

## 2022-03-15 RX ORDER — ONDANSETRON 2 MG/ML
4 INJECTION INTRAMUSCULAR; INTRAVENOUS ONCE AS NEEDED
Status: COMPLETED | OUTPATIENT
Start: 2022-03-15 | End: 2022-03-15

## 2022-03-15 RX ORDER — FENTANYL CITRATE 50 UG/ML
25 INJECTION, SOLUTION INTRAMUSCULAR; INTRAVENOUS
Status: DISCONTINUED | OUTPATIENT
Start: 2022-03-15 | End: 2022-03-15 | Stop reason: HOSPADM

## 2022-03-15 RX ORDER — OXYCODONE AND ACETAMINOPHEN 10; 325 MG/1; MG/1
1 TABLET ORAL ONCE AS NEEDED
Status: DISCONTINUED | OUTPATIENT
Start: 2022-03-15 | End: 2022-03-15 | Stop reason: HOSPADM

## 2022-03-15 RX ORDER — MIDAZOLAM HYDROCHLORIDE 1 MG/ML
2 INJECTION INTRAMUSCULAR; INTRAVENOUS
Status: DISCONTINUED | OUTPATIENT
Start: 2022-03-15 | End: 2022-03-15 | Stop reason: HOSPADM

## 2022-03-15 RX ORDER — MAGNESIUM HYDROXIDE 1200 MG/15ML
LIQUID ORAL AS NEEDED
Status: DISCONTINUED | OUTPATIENT
Start: 2022-03-15 | End: 2022-03-15 | Stop reason: HOSPADM

## 2022-03-15 RX ORDER — FENTANYL CITRATE 50 UG/ML
INJECTION, SOLUTION INTRAMUSCULAR; INTRAVENOUS AS NEEDED
Status: DISCONTINUED | OUTPATIENT
Start: 2022-03-15 | End: 2022-03-15 | Stop reason: SURG

## 2022-03-15 RX ORDER — ONDANSETRON 2 MG/ML
4 INJECTION INTRAMUSCULAR; INTRAVENOUS ONCE AS NEEDED
Status: DISCONTINUED | OUTPATIENT
Start: 2022-03-15 | End: 2022-03-15 | Stop reason: HOSPADM

## 2022-03-15 RX ORDER — OXYCODONE AND ACETAMINOPHEN 7.5; 325 MG/1; MG/1
1 TABLET ORAL ONCE AS NEEDED
Status: DISCONTINUED | OUTPATIENT
Start: 2022-03-15 | End: 2022-03-15 | Stop reason: HOSPADM

## 2022-03-15 RX ORDER — ROCURONIUM BROMIDE 10 MG/ML
INJECTION, SOLUTION INTRAVENOUS AS NEEDED
Status: DISCONTINUED | OUTPATIENT
Start: 2022-03-15 | End: 2022-03-15 | Stop reason: SURG

## 2022-03-15 RX ORDER — KETAMINE HCL IN NACL, ISO-OSM 100MG/10ML
SYRINGE (ML) INJECTION AS NEEDED
Status: DISCONTINUED | OUTPATIENT
Start: 2022-03-15 | End: 2022-03-15 | Stop reason: SURG

## 2022-03-15 RX ORDER — DEXAMETHASONE SODIUM PHOSPHATE 4 MG/ML
4 INJECTION, SOLUTION INTRA-ARTICULAR; INTRALESIONAL; INTRAMUSCULAR; INTRAVENOUS; SOFT TISSUE ONCE AS NEEDED
Status: COMPLETED | OUTPATIENT
Start: 2022-03-15 | End: 2022-03-15

## 2022-03-15 RX ORDER — SULFAMETHOXAZOLE AND TRIMETHOPRIM 800; 160 MG/1; MG/1
1 TABLET ORAL 2 TIMES DAILY
Qty: 28 TABLET | Refills: 0 | Status: SHIPPED | OUTPATIENT
Start: 2022-03-15 | End: 2022-03-29

## 2022-03-15 RX ORDER — OXYCODONE AND ACETAMINOPHEN 7.5; 325 MG/1; MG/1
1 TABLET ORAL EVERY 6 HOURS PRN
Qty: 18 TABLET | Refills: 0 | Status: SHIPPED | OUTPATIENT
Start: 2022-03-15 | End: 2022-04-21

## 2022-03-15 RX ORDER — LIDOCAINE HYDROCHLORIDE 10 MG/ML
0.5 INJECTION, SOLUTION EPIDURAL; INFILTRATION; INTRACAUDAL; PERINEURAL ONCE AS NEEDED
Status: DISCONTINUED | OUTPATIENT
Start: 2022-03-15 | End: 2022-03-15 | Stop reason: HOSPADM

## 2022-03-15 RX ADMIN — Medication 2.5 MG: at 10:20

## 2022-03-15 RX ADMIN — FENTANYL CITRATE 100 MCG: 50 INJECTION, SOLUTION INTRAMUSCULAR; INTRAVENOUS at 09:21

## 2022-03-15 RX ADMIN — ACETAMINOPHEN 1000 MG: 500 TABLET, FILM COATED ORAL at 09:06

## 2022-03-15 RX ADMIN — MIDAZOLAM 2 MG: 1 INJECTION INTRAMUSCULAR; INTRAVENOUS at 09:06

## 2022-03-15 RX ADMIN — ONDANSETRON 4 MG: 2 INJECTION INTRAMUSCULAR; INTRAVENOUS at 11:03

## 2022-03-15 RX ADMIN — FENTANYL CITRATE 25 MCG: 50 INJECTION INTRAMUSCULAR; INTRAVENOUS at 11:10

## 2022-03-15 RX ADMIN — Medication 25 MG: at 09:36

## 2022-03-15 RX ADMIN — CEFAZOLIN 2 G: 330 INJECTION, POWDER, FOR SOLUTION INTRAMUSCULAR; INTRAVENOUS at 09:34

## 2022-03-15 RX ADMIN — LIDOCAINE HYDROCHLORIDE 100 MG: 20 INJECTION, SOLUTION EPIDURAL; INFILTRATION; INTRACAUDAL; PERINEURAL at 09:22

## 2022-03-15 RX ADMIN — Medication 15 MG: at 09:47

## 2022-03-15 RX ADMIN — OXYCODONE HYDROCHLORIDE AND ACETAMINOPHEN 2 TABLET: 7.5; 325 TABLET ORAL at 11:01

## 2022-03-15 RX ADMIN — GLYCOPYRROLATE 0.4 MG: 0.2 INJECTION, SOLUTION INTRAMUSCULAR; INTRAVENOUS at 10:20

## 2022-03-15 RX ADMIN — FENTANYL CITRATE 25 MCG: 50 INJECTION INTRAMUSCULAR; INTRAVENOUS at 10:55

## 2022-03-15 RX ADMIN — DEXAMETHASONE SODIUM PHOSPHATE 4 MG: 4 INJECTION, SOLUTION INTRA-ARTICULAR; INTRALESIONAL; INTRAMUSCULAR; INTRAVENOUS; SOFT TISSUE at 09:06

## 2022-03-15 RX ADMIN — GENTAMICIN SULFATE 580 MG: 40 INJECTION, SOLUTION INTRAMUSCULAR; INTRAVENOUS at 09:06

## 2022-03-15 RX ADMIN — ONDANSETRON 4 MG: 2 INJECTION INTRAMUSCULAR; INTRAVENOUS at 09:54

## 2022-03-15 RX ADMIN — PROPOFOL 160 MG: 10 INJECTION, EMULSION INTRAVENOUS at 09:22

## 2022-03-15 RX ADMIN — FENTANYL CITRATE 25 MCG: 50 INJECTION INTRAMUSCULAR; INTRAVENOUS at 11:05

## 2022-03-15 RX ADMIN — FENTANYL CITRATE 25 MCG: 50 INJECTION INTRAMUSCULAR; INTRAVENOUS at 11:00

## 2022-03-15 RX ADMIN — DEXAMETHASONE SODIUM PHOSPHATE 4 MG: 4 INJECTION, SOLUTION INTRA-ARTICULAR; INTRALESIONAL; INTRAMUSCULAR; INTRAVENOUS; SOFT TISSUE at 09:54

## 2022-03-15 RX ADMIN — ROCURONIUM BROMIDE 30 MG: 10 INJECTION INTRAVENOUS at 09:23

## 2022-03-15 RX ADMIN — SODIUM CHLORIDE, POTASSIUM CHLORIDE, SODIUM LACTATE AND CALCIUM CHLORIDE 1000 ML: 600; 310; 30; 20 INJECTION, SOLUTION INTRAVENOUS at 08:45

## 2022-03-15 RX ADMIN — EPHEDRINE SULFATE 10 MG: 50 INJECTION INTRAVENOUS at 10:04

## 2022-03-15 NOTE — OP NOTE
PENILE PROSTHESIS PLACEMENT  Procedure Note    Lalo Jones  3/15/2022    Pre-op Diagnosis:   Erectile dysfunction after radical prostatectomy [N52.31]    Post-op Diagnosis:     Post-Op Diagnosis Codes:     * Erectile dysfunction after radical prostatectomy [N52.31]    Procedure/CPT® Codes:  3 piece inflatable penile prosthesis placement    Procedure(s):  3-PIECE INFLATABLE PENILE PROSTHESIS PLACEMENT (COLOPLAST TITAN TOUCH)    Surgeon(s):  Trae Clark MD    Anesthesia: General    Staff:   Circulator: Carissa Enciso RN  Scrub Person: Bar Cabrera; Michelle Perez    Indications for procedure:  56-year-old male with post prostatectomy erectile dysfunction refractory to all medical therapy presenting for three-piece inflatable penile implant    Procedure details:  After the patient was correct identified, he was given IV antibiotics and brought to the operating placed on the operative table.  After adequate induction of general anesthesia, he is placed into the supine frog-leg position and his genitalia were sterilely prepped and draped.  A final timeout was performed.  I placed a Joseph catheter.  I made a high transverse scrotal incision and a retractor was placed.  Corpora cavernosa were identified bilaterally.  Stay sutures of 2-0 PDS were placed vertically between which corporotomies were made with a knife and sequentially dilated starting with Metzenbaum scissors followed by Spence dilators 8 mm to 12 mm.  Measurements were taken both in the left and right side.  The left and right corpora measured 21 cm bilaterally.   Corpora were irrigated and there was no evidence of urethral injury.  I placed 20 cc of 0.5% bupivacaine for bilateral corporal and penile block. I then used the 125 cc reservoir.  This was prepared in the standard fashion dipped in antibiotic solution.  I placed the reservoir in the left retropubic space via the penoscrotal incision on the left.  Transversalis fascia was  pierced and a space behind the pubic bone was created bluntly.  The reservoir was placed in the space and filled with 100 cc of filling solution.  The Coloplast Titan touch penoscrotal preconnect 20 cm cylinders were opened on the field.  A 1 cm rear-tip extender was used bilaterally.   Components were prepared in the standard fashion all air bubbles were removed.  The components were dipped in antibiotic solution.  I placed the cylinders in the left corpora followed by the right corpora in the standard fashion using the Nina insertion tool.  A dartos pouch was created for the scrotal pump in the anterior scrotum.  The cylinders were seated properly and a test inflation was performed showing excellent positioning and good straight rigidity.  Cylinders were deflated and corporotomies were closed using the previously placed stay sutures of 2-0 PDS.  Tubing connections were made using the tubing connectors.  The pump was placed in the scrotal dartos pouch.  The wound was copiously irrigated.  Dartos was closed in 2 layers using running 3-0 Vicryl.  Skin was closed using interrupted 3-0 Monocryl.  The bladder is drained.  Sterile dressing consisting of bacitracin, Telfa, and Kerlix mummy wrap dressing with compressive tape was applied.  All sponge and needle counts were correct.  The Joseph catheter was removed and the patient was taken to the recovery room in stable condition.      Estimated Blood Loss: 100ml    Specimens:                None      Drains: None  [REMOVED] Urethral Catheter Double-lumen; Silicone 16 Fr. (Removed)       Complications: none

## 2022-03-15 NOTE — ANESTHESIA PROCEDURE NOTES
Airway  Urgency: elective    Date/Time: 3/15/2022 9:25 AM  Airway not difficult    General Information and Staff    Patient location during procedure: OR  CRNA: Carlos Díaz CRNA    Indications and Patient Condition  Indications for airway management: airway protection    Preoxygenated: yes  Mask difficulty assessment: 2 - vent by mask + OA or adjuvant +/- NMBA    Final Airway Details  Final airway type: endotracheal airway      Successful airway: ETT  Cuffed: yes   Successful intubation technique: video laryngoscopy  Endotracheal tube insertion site: oral  Blade: Alvares  Blade size: 4  ETT size (mm): 8.0  Cormack-Lehane Classification: grade IIa - partial view of glottis  Placement verified by: capnometry and palpation of cuff   Cuff volume (mL): 10  Measured from: lips  ETT/EBT  to lips (cm): 22  Number of attempts at approach: 1  Assessment: lips, teeth, and gum same as pre-op and atraumatic intubation    Additional Comments  Atraumatic intubation

## 2022-03-15 NOTE — ANESTHESIA POSTPROCEDURE EVALUATION
Patient: Lalo Jones    Procedure Summary     Date: 03/15/22 Room / Location:  [facility]  /  [facility]    Anesthesia Start: 0916 Anesthesia Stop: 1034    Procedure: 3-PIECE INFLATABLE PENILE PROSTHESIS PLACEMENT (N/A Penis) Diagnosis:       Erectile dysfunction after radical prostatectomy      (Erectile dysfunction after radical prostatectomy [N52.31])    Surgeons: Trae Clark MD Provider: Carlos Díaz CRNA    Anesthesia Type: general ASA Status: 2          Anesthesia Type: general    Vitals  Vitals Value Taken Time   /63 03/15/22 1032   Temp     Pulse 55 03/15/22 1034   Resp     SpO2 98 % 03/15/22 1034   Vitals shown include unvalidated device data.        Post Anesthesia Care and Evaluation    Patient location during evaluation: PACU  Patient participation: waiting for patient participation  Level of consciousness: awake and awake and alert  Pain score: 0  Pain management: adequate  Airway patency: patent  Anesthetic complications: No anesthetic complications  PONV Status: none  Cardiovascular status: acceptable and stable  Respiratory status: acceptable, face mask and oral airway  Hydration status: acceptable    Comments: Blood pressure 119/84, pulse 69, temperature 97.5 °F (36.4 °C), temperature source Temporal, resp. rate 16, SpO2 98 %.

## 2022-03-15 NOTE — ANESTHESIA PREPROCEDURE EVALUATION
Anesthesia Evaluation     Patient summary reviewed   no history of anesthetic complications:  NPO Solid Status: > 8 hours  NPO Liquid Status: > 8 hours           Airway   Mallampati: I  TM distance: >3 FB  Neck ROM: full  Dental      Pulmonary    (+) a smoker Current Smoked day of surgery,   (-) COPD, asthma, sleep apnea  Cardiovascular   Exercise tolerance: excellent (>7 METS)    ECG reviewed    (-) pacemaker, hypertension, past MI, angina, cardiac stents, hyperlipidemia      Neuro/Psych  (-) seizures, TIA, CVA  GI/Hepatic/Renal/Endo    (+)  GERD,    (-) liver disease, no renal disease, diabetes    Musculoskeletal     Abdominal    Substance History      OB/GYN          Other   arthritis,    history of cancer (s/p prostatectomy 2020) remission                      Anesthesia Plan    ASA 2     general     intravenous induction     Anesthetic plan, all risks, benefits, and alternatives have been provided, discussed and informed consent has been obtained with: patient.

## 2022-03-18 ENCOUNTER — TELEPHONE (OUTPATIENT)
Dept: UROLOGY | Facility: CLINIC | Age: 57
End: 2022-03-18

## 2022-03-18 NOTE — PROGRESS NOTES
Subjective    Mr. Jones is 56 y.o. male    Chief Complaint: Postoperative visit    History of Present Illness    56-year-old male established patient follow-up after placement of Coloplast Titan touch three-piece inflatable penile implant 3/15/2022 for post-prostatectomy erectile dysfunction.  He had robotic assisted laparoscopic prostatectomy by Dr. Jj September 2020 for PT2N0 [place] 7 disease with tumor approaching the right posterior margin.  His most recent PSA remains undetectable.      The following portions of the patient's history were reviewed and updated as appropriate: allergies, current medications, past family history, past medical history, past social history, past surgical history and problem list.    Review of Systems      Current Outpatient Medications:   •  albuterol sulfate  (90 Base) MCG/ACT inhaler, Inhale 2 puffs Every 4 (Four) Hours As Needed for Wheezing or Shortness of Air., Disp: , Rfl:   •  docusate sodium (Colace) 100 MG capsule, Take 1 capsule by mouth 2 (Two) Times a Day., Disp: 60 capsule, Rfl: 1  •  gabapentin (NEURONTIN) 800 MG tablet, Take 800 mg by mouth 3 (Three) Times a Day., Disp: , Rfl:   •  ibuprofen (ADVIL,MOTRIN) 800 MG tablet, Take 800 mg by mouth Every 8 (Eight) Hours As Needed for Mild Pain ., Disp: , Rfl:   •  naproxen (Naprosyn) 500 MG tablet, Take 1 tablet by mouth 2 (Two) Times a Day With Meals., Disp: 60 tablet, Rfl: 0  •  omeprazole (priLOSEC) 40 MG capsule, Take 1 capsule by mouth Daily. (Patient taking differently: Take 40 mg by mouth Daily As Needed.), Disp: 90 capsule, Rfl: 1  •  ondansetron ODT (Zofran ODT) 4 MG disintegrating tablet, Place 1 tablet on the tongue Every 6 (Six) Hours As Needed for Nausea., Disp: 6 tablet, Rfl: 1  •  oxyCODONE-acetaminophen (PERCOCET) 7.5-325 MG per tablet, Take 1 tablet by mouth Every 6 (Six) Hours As Needed for Severe Pain  (postop pain)., Disp: 18 tablet, Rfl: 0  •  sulfamethoxazole-trimethoprim (BACTRIM DS,SEPTRA  DS) 800-160 MG per tablet, Take 1 tablet by mouth 2 (Two) Times a Day for 14 days., Disp: 28 tablet, Rfl: 0    Past Medical History:   Diagnosis Date   • Arthritis    • Bronchitis    • Cancer (HCC)     prostate   • Chronic pain    • Pneumonia    • Prostatitis, acute    • S/P ACL reconstruction    • Septic shock due to urinary tract infection (HCC)    • Strep throat    • UTI (urinary tract infection)        Past Surgical History:   Procedure Laterality Date   • ANKLE SURGERY Right    • BACK SURGERY      X2   • ENDOSCOPY N/A 01/10/2022    Procedure: ESOPHAGOGASTRODUODENOSCOPY WITH ANESTHESIA;  Surgeon: Tucker Bright MD;  Location:  [facility];  Service: Gastroenterology;  Laterality: N/A;  pre food bolus  post food bolus     • KNEE ACL RECONSTRUCTION Left    • NASAL SEPTUM SURGERY     • PENILE PROSTHESIS IMPLANT N/A 3/15/2022    Procedure: 3-PIECE INFLATABLE PENILE PROSTHESIS PLACEMENT;  Surgeon: Trae Clark MD;  Location:  [facility];  Service: Urology;  Laterality: N/A;   • PROSTATE ULTRASOUND BIOPSY N/A 02/18/2020    Procedure: PROSTATE  BIOPSY;  Surgeon: Trae Clark MD;  Location:  [facility];  Service: Urology;  Laterality: N/A;   • PROSTATECTOMY N/A 09/22/2020    Procedure: RADICAL PROSTATECTOMY LAPAROSCOPIC WITH DAVINCI ROBOT;  Surgeon: Nuno Jj MD;  Location:  [facility];  Service: Da[facility]inci;  Laterality: N/A;   • SHOULDER SURGERY Left     X 4   • TENNIS ELBOW RELEASE Bilateral 04/14/2011       Social History     Socioeconomic History   • Marital status:    Tobacco Use   • Smoking status: Current Every Day Smoker     Packs/day: 1.00     Years: 15.00     Pack years: 15.00     Types: Cigarettes     Start date: 2/7/1987   • Smokeless tobacco: Never Used   • Tobacco comment: chnatix trying to quit   Vaping Use   • Vaping Use: Former   Substance and Sexual Activity   • Alcohol use: No   • Drug use: No   • Sexual activity: Defer       History reviewed. No pertinent family  history.    Objective    There were no vitals taken for this visit.    Physical Exam  Scrotal incision clean dry and intact, no signs of infection.  Penile implant in appropriate position.  His implant was completely deflated today.      Results for orders placed or performed in visit on 03/11/22   ECG 12 Lead   Result Value Ref Range    QT Interval 356 ms    QTC Interval 400 ms     Assessment and Plan    Diagnoses and all orders for this visit:    1. Postoperative visit (Primary)      He is completing his course of antibiotics.  He is healing well.  He will follow-up with me in 4 to 6 weeks for device teaching.      This document has been signed by DESHAUN Clark MD on March 25, 2022 15:30 CDT

## 2022-03-18 NOTE — TELEPHONE ENCOUNTER
Patient called wanting to know if he was ok to go back to work or if he needed to wait until the post op appointment to be released. He has an IPP placed with dr laureano on 03/15. I spoke with dr laureano medical assistant and she advised that he needed to keep ice rotating and to keep resting and that he will need to be off until his post op apt. The patient stated he would need an excuse to be off and he would just get it at the post op apt. He stated he wasn't having any other problems at this time.

## 2022-03-24 ENCOUNTER — OFFICE VISIT (OUTPATIENT)
Dept: UROLOGY | Facility: CLINIC | Age: 57
End: 2022-03-24

## 2022-03-24 DIAGNOSIS — Z48.89 POSTOPERATIVE VISIT: Primary | ICD-10-CM

## 2022-03-24 PROCEDURE — 99024 POSTOP FOLLOW-UP VISIT: CPT | Performed by: UROLOGY

## 2022-04-14 NOTE — PROGRESS NOTES
Subjective    Mr. Jones is 56 y.o. male    Chief Complaint: Postoperative visit    History of Present Illness    56-year-old male established patient follow-up after placement of Coloplast Titan touch three-piece inflatable penile implant 3/15/2022 for post-prostatectomy erectile dysfunction.  He had robotic assisted laparoscopic prostatectomy by Dr. Jj September 2020 for PT2N0 [place] 7 disease with tumor approaching the right posterior margin.  His most recent PSA remains undetectable.    The following portions of the patient's history were reviewed and updated as appropriate: allergies, current medications, past family history, past medical history, past social history, past surgical history and problem list.    Review of Systems      Current Outpatient Medications:   •  albuterol sulfate  (90 Base) MCG/ACT inhaler, Inhale 2 puffs Every 4 (Four) Hours As Needed for Wheezing or Shortness of Air., Disp: , Rfl:   •  docusate sodium (Colace) 100 MG capsule, Take 1 capsule by mouth 2 (Two) Times a Day., Disp: 60 capsule, Rfl: 1  •  gabapentin (NEURONTIN) 800 MG tablet, Take 800 mg by mouth 3 (Three) Times a Day., Disp: , Rfl:   •  ibuprofen (ADVIL,MOTRIN) 800 MG tablet, Take 800 mg by mouth Every 8 (Eight) Hours As Needed for Mild Pain ., Disp: , Rfl:   •  naproxen (Naprosyn) 500 MG tablet, Take 1 tablet by mouth 2 (Two) Times a Day With Meals., Disp: 60 tablet, Rfl: 0  •  omeprazole (priLOSEC) 40 MG capsule, Take 1 capsule by mouth Daily. (Patient taking differently: Take 40 mg by mouth Daily As Needed.), Disp: 90 capsule, Rfl: 1  •  ondansetron ODT (Zofran ODT) 4 MG disintegrating tablet, Place 1 tablet on the tongue Every 6 (Six) Hours As Needed for Nausea., Disp: 6 tablet, Rfl: 1  •  oxyCODONE-acetaminophen (PERCOCET) 7.5-325 MG per tablet, Take 1 tablet by mouth Every 6 (Six) Hours As Needed for Severe Pain  (postop pain)., Disp: 18 tablet, Rfl: 0    Past Medical History:   Diagnosis Date   • Arthritis     • Bronchitis    • Cancer (HCC)     prostate   • Chronic pain    • Pneumonia    • Prostatitis, acute    • S/P ACL reconstruction    • Septic shock due to urinary tract infection (HCC)    • Strep throat    • UTI (urinary tract infection)        Past Surgical History:   Procedure Laterality Date   • ANKLE SURGERY Right    • BACK SURGERY      X2   • ENDOSCOPY N/A 01/10/2022    Procedure: ESOPHAGOGASTRODUODENOSCOPY WITH ANESTHESIA;  Surgeon: Tucker Bright MD;  Location:  [facility];  Service: Gastroenterology;  Laterality: N/A;  pre food bolus  post food bolus  DrYandy   • KNEE ACL RECONSTRUCTION Left    • NASAL SEPTUM SURGERY     • PENILE PROSTHESIS IMPLANT N/A 3/15/2022    Procedure: 3-PIECE INFLATABLE PENILE PROSTHESIS PLACEMENT;  Surgeon: Trae Clark MD;  Location:  [facility];  Service: Urology;  Laterality: N/A;   • PROSTATE ULTRASOUND BIOPSY N/A 02/18/2020    Procedure: PROSTATE  BIOPSY;  Surgeon: Trae Clark MD;  Location:  [facility];  Service: Urology;  Laterality: N/A;   • PROSTATECTOMY N/A 09/22/2020    Procedure: RADICAL PROSTATECTOMY LAPAROSCOPIC WITH DAVINCI ROBOT;  Surgeon: Nuno Jj MD;  Location:  [facility];  Service: Da[facility]inci;  Laterality: N/A;   • SHOULDER SURGERY Left     X 4   • TENNIS ELBOW RELEASE Bilateral 04/14/2011       Social History     Socioeconomic History   • Marital status:    Tobacco Use   • Smoking status: Current Every Day Smoker     Packs/day: 1.00     Years: 15.00     Pack years: 15.00     Types: Cigarettes     Start date: 2/7/1987   • Smokeless tobacco: Never Used   • Tobacco comment: chnatix trying to quit   Vaping Use   • Vaping Use: Former   Substance and Sexual Activity   • Alcohol use: No   • Drug use: No   • Sexual activity: Defer       No family history on file.    Objective    There were no vitals taken for this visit.    Physical Exam  Scrotal incision well-healed.  His penile implant is in excellent position with cylinder tips at mid glans.  His  implant inflates and deflates normally with excellent result.      Results for orders placed or performed in visit on 03/11/22   ECG 12 Lead   Result Value Ref Range    QT Interval 356 ms    QTC Interval 400 ms     Assessment and Plan    Diagnoses and all orders for this visit:    1. Postoperative visit (Primary)    2. History of prostate cancer  -     PSA DIAGNOSTIC; Future      Doing well after three-piece inflatable penile implant.  He was instructed on his penile implant today.  He will follow-up with me in July with pre-clinic PSA in my [facility] or sooner as needed.      This document has been signed by DESHAUN Clark MD on April 22, 2022 14:02 CDT

## 2022-04-21 ENCOUNTER — OFFICE VISIT (OUTPATIENT)
Dept: UROLOGY | Facility: CLINIC | Age: 57
End: 2022-04-21

## 2022-04-21 DIAGNOSIS — Z48.89 POSTOPERATIVE VISIT: Primary | ICD-10-CM

## 2022-04-21 DIAGNOSIS — Z85.46 HISTORY OF PROSTATE CANCER: ICD-10-CM

## 2022-04-21 PROCEDURE — 99024 POSTOP FOLLOW-UP VISIT: CPT | Performed by: UROLOGY

## 2022-05-09 ENCOUNTER — TRANSCRIBE ORDERS (OUTPATIENT)
Dept: ADMINISTRATIVE | Facility: HOSPITAL | Age: 57
End: 2022-05-09

## 2022-05-09 DIAGNOSIS — Z87.891 HISTORY OF SMOKING: Primary | ICD-10-CM

## 2022-05-17 ENCOUNTER — HOSPITAL ENCOUNTER (OUTPATIENT)
Dept: CT IMAGING | Facility: HOSPITAL | Age: 57
End: 2022-05-17

## 2022-05-19 ENCOUNTER — HOSPITAL ENCOUNTER (OUTPATIENT)
Dept: CT IMAGING | Facility: HOSPITAL | Age: 57
Discharge: HOME | End: 2022-05-19
Admitting: FAMILY MEDICINE

## 2022-05-19 DIAGNOSIS — Z87.891 HISTORY OF SMOKING: ICD-10-CM

## 2022-05-19 PROCEDURE — 71271 CT THORAX LUNG CANCER SCR C-: CPT

## 2022-05-20 ENCOUNTER — DOCUMENTATION (OUTPATIENT)
Dept: CT IMAGING | Facility: HOSPITAL | Age: 57
End: 2022-05-20

## 2022-07-25 NOTE — PROGRESS NOTES
Subjective    Mr. Jones is 56 y.o. male    Chief Complaint: History of prostate cancer    History of Present Illness    56-year-old male established patient follow-up for history of prostate cancer after robotic assisted laparoscopic prostatectomy by Dr. Jj September 2020 for PT2N0 [place] 7 disease with tumor approaching the right posterior margin.  He had Coloplast Titan touch three-piece inflatable penile implant 3/15/2022 for post-prostatectomy erectile dysfunction.  His most recent PSA remains undetectable.    Lab Results   Component Value Date    PSA <0.1 07/26/2022    PSA <0.1 02/08/2022    PSA <0.1 11/01/2021       The following portions of the patient's history were reviewed and updated as appropriate: allergies, current medications, past family history, past medical history, past social history, past surgical history and problem list.    Review of Systems      Current Outpatient Medications:   •  albuterol sulfate  (90 Base) MCG/ACT inhaler, Inhale 2 puffs Every 4 (Four) Hours As Needed for Wheezing or Shortness of Air., Disp: , Rfl:   •  gabapentin (NEURONTIN) 800 MG tablet, Take 800 mg by mouth 3 (Three) Times a Day., Disp: , Rfl:   •  ibuprofen (ADVIL,MOTRIN) 800 MG tablet, Take 800 mg by mouth Every 8 (Eight) Hours As Needed for Mild Pain ., Disp: , Rfl:   •  omeprazole (priLOSEC) 40 MG capsule, Take 1 capsule by mouth Daily. (Patient taking differently: Take 40 mg by mouth Daily As Needed.), Disp: 90 capsule, Rfl: 1    Past Medical History:   Diagnosis Date   • Arthritis    • Bronchitis    • Cancer (HCC)     prostate   • Chronic pain    • Pneumonia    • Prostatitis, acute    • S/P ACL reconstruction    • Septic shock due to urinary tract infection (HCC)    • Strep throat    • UTI (urinary tract infection)        Past Surgical History:   Procedure Laterality Date   • ANKLE SURGERY Right    • BACK SURGERY      X2   • ENDOSCOPY N/A 01/10/2022    Procedure: ESOPHAGOGASTRODUODENOSCOPY WITH  "ANESTHESIA;  Surgeon: Tucker Bright MD;  Location:  [facility];  Service: Gastroenterology;  Laterality: N/A;  pre food bolus  post food bolus     • KNEE ACL RECONSTRUCTION Left    • NASAL SEPTUM SURGERY     • PENILE PROSTHESIS IMPLANT N/A 3/15/2022    Procedure: 3-PIECE INFLATABLE PENILE PROSTHESIS PLACEMENT;  Surgeon: Trae Clark MD;  Location:  [facility];  Service: Urology;  Laterality: N/A;   • PROSTATE ULTRASOUND BIOPSY N/A 02/18/2020    Procedure: PROSTATE  BIOPSY;  Surgeon: Trae Clark MD;  Location:  [facility];  Service: Urology;  Laterality: N/A;   • PROSTATECTOMY N/A 09/22/2020    Procedure: RADICAL PROSTATECTOMY LAPAROSCOPIC WITH DAVINCI ROBOT;  Surgeon: Nuno Jj MD;  Location:  [facility];  Service: DaVinci;  Laterality: N/A;   • SHOULDER SURGERY Left     X 4   • TENNIS ELBOW RELEASE Bilateral 04/14/2011       Social History     Socioeconomic History   • Marital status:    Tobacco Use   • Smoking status: Current Every Day Smoker     Packs/day: 1.00     Years: 15.00     Pack years: 15.00     Types: Cigarettes     Start date: 2/7/1987   • Smokeless tobacco: Never Used   • Tobacco comment: chnatix trying to quit   Vaping Use   • Vaping Use: Former   Substance and Sexual Activity   • Alcohol use: No   • Drug use: No   • Sexual activity: Defer       No family history on file.    Objective    Temp 98.2 °F (36.8 °C)   Ht 188 cm (74\")   Wt 95.3 kg (210 lb)   BMI 26.96 kg/m²     Physical Exam        Results for orders placed or performed in visit on 07/26/22   PSA DIAGNOSTIC    Specimen: Blood   Result Value Ref Range    PSA <0.1 0.0 - 4.0 ng/mL     Assessment and Plan    Diagnoses and all orders for this visit:    1. History of prostate cancer (Primary)  -     POC Urinalysis Dipstick, Multipro  -     PSA DIAGNOSTIC; Future      Doing well MARGARITO with undetectable PSA.  Followup with me in 6 months with preclinic PSA or sooner as needed.       This document has been signed by DESHAUN " Sarthak Clark MD on July 28, 2022 08:22 CDT

## 2022-07-26 ENCOUNTER — LAB (OUTPATIENT)
Dept: INTERNAL MEDICINE | Facility: CLINIC | Age: 57
End: 2022-07-26
Payer: MEDICAID

## 2022-07-26 DIAGNOSIS — Z85.46 PERSONAL HISTORY OF MALIGNANT NEOPLASM OF PROSTATE: Primary | ICD-10-CM

## 2022-07-27 LAB — PSA SERPL-MCNC: <0.1 NG/ML (ref 0–4)

## 2022-07-28 ENCOUNTER — OFFICE VISIT (OUTPATIENT)
Dept: UROLOGY | Facility: CLINIC | Age: 57
End: 2022-07-28

## 2022-07-28 VITALS — TEMPERATURE: 98.2 F | WEIGHT: 210 LBS | HEIGHT: 74 IN | BODY MASS INDEX: 26.95 KG/M2

## 2022-07-28 DIAGNOSIS — Z85.46 HISTORY OF PROSTATE CANCER: Primary | ICD-10-CM

## 2022-07-28 LAB
BILIRUB BLD-MCNC: NEGATIVE MG/DL
CLARITY, POC: CLEAR
COLOR UR: YELLOW
GLUCOSE UR STRIP-MCNC: NEGATIVE MG/DL
KETONES UR QL: NEGATIVE
LEUKOCYTE EST, POC: NEGATIVE
NITRITE UR-MCNC: NEGATIVE MG/ML
PH UR: 5.5 [PH] (ref 5–8)
PROT UR STRIP-MCNC: ABNORMAL MG/DL
RBC # UR STRIP: NEGATIVE /UL
SP GR UR: 1.03 (ref 1–1.03)
UROBILINOGEN UR QL: NORMAL

## 2022-07-28 PROCEDURE — 99213 OFFICE O/P EST LOW 20 MIN: CPT | Performed by: UROLOGY

## 2022-10-24 ENCOUNTER — OFFICE VISIT (OUTPATIENT)
Dept: PULMONOLOGY | Facility: CLINIC | Age: 57
End: 2022-10-24

## 2022-10-24 VITALS
HEART RATE: 77 BPM | BODY MASS INDEX: 27.21 KG/M2 | OXYGEN SATURATION: 98 % | HEIGHT: 74 IN | WEIGHT: 212 LBS | SYSTOLIC BLOOD PRESSURE: 126 MMHG | DIASTOLIC BLOOD PRESSURE: 74 MMHG

## 2022-10-24 DIAGNOSIS — C61 PROSTATE CANCER: ICD-10-CM

## 2022-10-24 DIAGNOSIS — J44.9 CHRONIC OBSTRUCTIVE PULMONARY DISEASE, UNSPECIFIED COPD TYPE: ICD-10-CM

## 2022-10-24 DIAGNOSIS — Z87.01 HISTORY OF PNEUMONIA: ICD-10-CM

## 2022-10-24 DIAGNOSIS — Z72.0 TOBACCO USE: ICD-10-CM

## 2022-10-24 DIAGNOSIS — R91.1 LUNG NODULE: Primary | ICD-10-CM

## 2022-10-24 DIAGNOSIS — R06.83 SNORING: ICD-10-CM

## 2022-10-24 DIAGNOSIS — G47.33 OSA (OBSTRUCTIVE SLEEP APNEA): ICD-10-CM

## 2022-10-24 DIAGNOSIS — Z86.16 HISTORY OF COVID-19: ICD-10-CM

## 2022-10-24 PROCEDURE — 99204 OFFICE O/P NEW MOD 45 MIN: CPT | Performed by: INTERNAL MEDICINE

## 2022-10-24 PROCEDURE — 99406 BEHAV CHNG SMOKING 3-10 MIN: CPT | Performed by: INTERNAL MEDICINE

## 2022-10-24 RX ORDER — CETIRIZINE HYDROCHLORIDE 10 MG/1
1 TABLET ORAL DAILY
COMMUNITY
Start: 2022-10-05 | End: 2023-04-04 | Stop reason: SDUPTHER

## 2022-10-24 RX ORDER — HYDROXYZINE PAMOATE 50 MG/1
1 CAPSULE ORAL DAILY
COMMUNITY
Start: 2022-10-05

## 2022-10-24 RX ORDER — MONTELUKAST SODIUM 10 MG/1
10 TABLET ORAL NIGHTLY
Qty: 90 TABLET | Refills: 3 | Status: SHIPPED | OUTPATIENT
Start: 2022-10-24 | End: 2023-04-04 | Stop reason: SDUPTHER

## 2022-10-24 RX ORDER — FLUTICASONE PROPIONATE 50 MCG
2 SPRAY, SUSPENSION (ML) NASAL DAILY
Qty: 16 G | Refills: 11 | Status: SHIPPED | OUTPATIENT
Start: 2022-10-24 | End: 2023-04-04 | Stop reason: SDUPTHER

## 2022-10-24 RX ORDER — FLUTICASONE PROPIONATE AND SALMETEROL 250; 50 UG/1; UG/1
1 POWDER RESPIRATORY (INHALATION) 2 TIMES DAILY
Qty: 1 EACH | Refills: 11 | Status: SHIPPED | OUTPATIENT
Start: 2022-10-24 | End: 2023-04-04 | Stop reason: SDUPTHER

## 2022-10-24 NOTE — PROGRESS NOTES
RESPIRATORY DISEASE CLINIC NEW CONSULT NOTE     Patient: Lalo Jones      :  1965   Age: 56 y.o.    Date of Service: 2022      Subjective:   Requesting Physician: Flex Sheikh MD     Reason for Consultation:    Diagnosis Plan   1. Lung nodule  CT Chest Without Contrast Diagnostic    NM PET/CT Skull Base to Mid Thigh      2. Chronic obstructive pulmonary disease, unspecified COPD type (HCC)  Pulmonary Function Test      3. Tobacco use  Pulmonary Function Test      4. History of pneumonia        5. FABIEN (obstructive sleep apnea)  Polysomnography 4 or More Parameters      6. Snoring  Polysomnography 4 or More Parameters      7. History of COVID-19        8. Prostate cancer (HCC)             Chief Complaint:     Chief Complaint   Patient presents with   • Abnormal Imaging   • Lung Nodule     Scans in system; did not bring CD        History of present illness: Lalo Jones is a 56 y.o. male who presents to the office today to be seen for    Diagnosis Plan   1. Lung nodule  CT Chest Without Contrast Diagnostic    NM PET/CT Skull Base to Mid Thigh      2. Chronic obstructive pulmonary disease, unspecified COPD type (HCC)  Pulmonary Function Test      3. Tobacco use  Pulmonary Function Test      4. History of pneumonia        5. FABIEN (obstructive sleep apnea)  Polysomnography 4 or More Parameters      6. Snoring  Polysomnography 4 or More Parameters      7. History of COVID-19        8. Prostate cancer (HCC)           Other problems per record.  Patient is a very pleasant middle aged  gentleman who was seen in the pulmonary clinic as a new consult today.    He was referred to the pulmonary clinic for lung nodule and shortness of breath with history of tobacco abuse.  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  He started smoking in teenage years and continues to smoke for almost 40 years.  He had a  CT scan of done in [facility] earlier this year followed by another CT scan done in the [facility] in May 2022 and the last 1 was done again in [facility] in [place] earlier this month.  In reviewing the recent imaging studies it appears he had a low-dose CT scan of the chest done in May available in the [facility] epic which showed 4 mm left lower lobe lung nodule and a right anterior chest wall 3.3 x 1.3 cm pleural density which looks like a thickening or mass.  He had a follow-up CT scan done in the [facility] in [place] a few weeks ago.      Unfortunately patient did not bring the recent CT scan of the chest done last week in [place] in the CD-ROM and I talked to the radiologist Dr. Brown in [facility] in [place] and he updated me with the last CT scan results.  The current CT scan showed the presence of the pleural-based mass in the right upper chest close to chest wall which is slightly larger compared to the last 1 done in May 2022 and left upper lobe lung nodule is present.  No new nodules or masses are noted according to the radiologist Dr. Brown.  Requesting to send a copy of the CT scan results and the CD-ROM to my office.    Patient reported he is short of breath on minimal to moderate exertion and he also has allergy problems with nasal drainage cough.  He has occasional wheezing.  He is not on home oxygen and did not have any pulmonary function test done.  He has albuterol rescue inhaler which he uses more frequently lately.  He had tried to quit smoking in the past but unable to do so.  His wife is a former smoker and did quit smoking.  Patient had some sleep disturbances as well and reported having snoring and feeling tired and fatigued during the daytime.    Patient is vaccinated for COVID with [employer]'s COVID-vaccine and a booster dose of Moderna COVID-vaccine but had COVID a few months ago for  which he did not need any hospitalization.  He had no recent hospitalizations any ER visit or urgent care visit or any other new complaints.  He denies any weight loss.  He did not have any hemoptysis or night sweats but has some cough with clear expectoration and has postnasal drainage    Past History  Past Medical History:   Diagnosis Date   • Allergic rhinitis    • Arthritis    • Bronchiectasis (HCC)    • Bronchitis    • Cancer (HCC)     prostate   • Chronic bronchitis (HCC)    • Chronic pain    • GERD (gastroesophageal reflux disease)    • Pneumonia    • Prostatitis, acute    • S/P ACL reconstruction    • Septic shock due to urinary tract infection (HCC)    • Sinusitis    • Strep throat    • UTI (urinary tract infection)      Past Surgical History:   Procedure Laterality Date   • ANKLE SURGERY Right    • BACK SURGERY      X2   • ENDOSCOPY N/A 01/10/2022    Procedure: ESOPHAGOGASTRODUODENOSCOPY WITH ANESTHESIA;  Surgeon: Tucker Bright MD;  Location:  [facility] OR;  Service: Gastroenterology;  Laterality: N/A;  pre food bolus  post food bolus  Dr.   • KNEE ACL RECONSTRUCTION Left    • NASAL SEPTUM SURGERY     • PENILE PROSTHESIS IMPLANT N/A 3/15/2022    Procedure: 3-PIECE INFLATABLE PENILE PROSTHESIS PLACEMENT;  Surgeon: Trae Clark MD;  Location:  [facility] OR;  Service: Urology;  Laterality: N/A;   • PROSTATE ULTRASOUND BIOPSY N/A 02/18/2020    Procedure: PROSTATE  BIOPSY;  Surgeon: Trae Clark MD;  Location:  [facility] OR;  Service: Urology;  Laterality: N/A;   • PROSTATECTOMY N/A 09/22/2020    Procedure: RADICAL PROSTATECTOMY LAPAROSCOPIC WITH DAVINCI ROBOT;  Surgeon: Nuno Jj MD;  Location:  [facility] OR;  Service: DaVinci;  Laterality: N/A;   • SHOULDER SURGERY Left     X 4   • TENNIS ELBOW RELEASE Bilateral 04/14/2011     Allergies   Allergen Reactions   • Contrast Dye Diarrhea and Nausea And Vomiting     Current Outpatient Medications   Medication Sig Dispense Refill   • albuterol sulfate   (90 Base) MCG/ACT inhaler Inhale 2 puffs Every 4 (Four) Hours As Needed for Wheezing or Shortness of Air.     • cetirizine (zyrTEC) 10 MG tablet 1 tablet Daily.     • gabapentin (NEURONTIN) 800 MG tablet Take 800 mg by mouth 3 (Three) Times a Day.     • hydrOXYzine pamoate (VISTARIL) 50 MG capsule 1 capsule Daily.     • ibuprofen (ADVIL,MOTRIN) 800 MG tablet Take 800 mg by mouth Every 8 (Eight) Hours As Needed for Mild Pain .     • fluticasone (Flonase Allergy Relief) 50 MCG/ACT nasal spray 2 sprays into the nostril(s) as directed by provider Daily. 16 g 11   • Fluticasone-Salmeterol (Wixela Inhub) 250-50 MCG/ACT DISKUS Inhale 1 puff 2 (Two) Times a Day. 1 each 11   • omeprazole (priLOSEC) 40 MG capsule Take 1 capsule by mouth Daily. (Patient taking differently: Take 1 capsule by mouth Daily As Needed.) 90 capsule 1     No current facility-administered medications for this visit.     Social History     Socioeconomic History   • Marital status:    Tobacco Use   • Smoking status: Every Day     Packs/day: 1.00     Years: 35.00     Pack years: 35.00     Types: Cigarettes     Start date: 2/7/1987   • Smokeless tobacco: Never   • Tobacco comments:     chnatix trying to quit   Vaping Use   • Vaping Use: Former   Substance and Sexual Activity   • Alcohol use: No   • Drug use: Not Currently     Types: Hydrocodone, Oxycodone   • Sexual activity: Yes     Partners: Female     Birth control/protection: None     Family History   Problem Relation Age of Onset   • Cancer Mother      Immunization History   Administered Date(s) Administered   • COVID-19 (JOSE) 03/19/2021   • COVID-19 (MODERNA) 1st, 2nd, 3rd Dose Only 04/19/2022   • Hepatitis A 06/12/2019, 01/30/2020       Review of Systems  A complete review of systems is performed and all other systems were reviewed and negative except as noted above in the HPI.  Review of Systems   Constitutional: Positive for fatigue and unexpected weight gain.   HENT: Positive  "for congestion, postnasal drip and sinus pressure.    Eyes: Negative.    Respiratory: Positive for cough and shortness of breath.    Cardiovascular: Negative.    Gastrointestinal: Negative.    Endocrine: Negative.    Genitourinary: Negative.    Musculoskeletal: Positive for arthralgias.   Skin: Negative.    Allergic/Immunologic: Positive for environmental allergies.   Neurological: Negative.    Hematological: Negative.    Psychiatric/Behavioral: Negative.          Objective:     Vital Signs:  /74   Pulse 77   Ht 188 cm (74\")   Wt 96.2 kg (212 lb)   SpO2 98%   BMI 27.22 kg/m²     Pulmonary Functions Testing Results:    No results found for this or any previous visit.        Physical Exam  General:  Patient is a 56 y.o. middle aged  male. Looks states age. Appears to be in no acute distress.  Eyes:  EOMI.  PERRLA.  Vision intact.  No scleral icterus.    Ear, Nose, Mouth and Throat:  External inspection of the ears and nose appear to be normal.  No obvious scars or lesions.  Hearing is grossly intact.  No leukoplakia, pharyngitis, stomatitis or thrush. Swollen nasal mucosa with post nasal drip.   Neck:  Range of motion of neck normal.  No thyromegaly or masses. Malanpati Class 3, no jugular venous distention, no thyroid swelling  Respiratory:  Clear to auscultation bilaterally.  No use of accessory muscles. Decreased breath sounds.      Cardiovascular:  Regularly regular rhythm without S3, S4 or murmur.  No hepatojugular reflux nor carotid bruits.  Pulses intact in four extremities.  No obvious signs of edema.    Gastrointestinal:  Nontender, nondistended, soft.  Bowel sounds positive in all four quadrants.  No organomegaly or masses nor bruit.    Lymphatic:  No significant adenopathy appreciated in the neck, axilla or elsewhere.    Musculoskeletal:  Gait was grossly intact.  Range of motion, strength and sensitivity of all four extremities appear to be intact.  Distal tendon reflexes intact. "   Skin:  No obvious rashes, lesions, ulcers or large amount of bruising.    Neurological:  Refer to Eye, Ear, Nose and Throat and musculoskeletal exams for additional details.  Distal tendon reflexes intact.  No clonus or Babinski.  Sensation to touch is grossly intact.    Psychiatric:  Patient is alert and oriented to person, place and time.  Recent and remote memory appear to be intact.  Patient does not show outward signs of depression.      Diagnostic Findings:   Pertinent findings noted and abnormal findings also noted. Reviewed.     Chest Imaging    Study Result  Narrative & Impression   EXAM/TECHNIQUE: CT chest without contrast, low-dose protocol     INDICATION: Z87.891; Z87.891-Personal history of nicotine dependence     COMPARISON: None available.     DLP: 54.9 mGy cm. Automated exposure control was also utilized to  decrease patient radiation dose.     FINDINGS:     4 mm LEFT lower lobe pulmonary nodule in superior segment on image 96.  No other pulmonary nodule identified. 3.3 x 1.3 cm masslike area of  pleural thickening in the RIGHT anterior upper chest on image 54. No  consolidation or pleural effusion. Central airways are clear. Moderate  centrilobular and paraseptal emphysema.     No enlarged thoracic lymph nodes. Main pulmonary artery is nondilated.  Thoracic aorta is nonaneurysmal. No pericardial effusion. Mild coronary  artery calcification.     No acute chest wall soft tissue abnormality. No acute finding in the  partially imaged upper abdomen. No acute osseous finding.     IMPRESSION:     1.  4 mm LEFT lower lobe pulmonary nodule.  2.  Moderate emphysema.  3.  3.3 x 1.3 cm masslike area of pleural thickening in the RIGHT  anterior upper chest. Recommend a follow-up chest CT in 3 months to  ensure stability.     Lung-RADS 2S-- Nodules with a very low likelihood of becoming a  clinically active cancer due to size or lack of growth.    - Continue annual screening with low dose CT in 12 months.    -  3 month follow-up chest CT recommended to evaluate for stability of  RIGHT anterior upper chest masslike pleural thickening.    This report was finalized on 05/19/2022 15:58 by Dr. Harpreet Koenig MD.       Assessment      1. Lung nodule    2. Chronic obstructive pulmonary disease, unspecified COPD type (HCC)    3. Tobacco use    4. History of pneumonia    5. FABIEN (obstructive sleep apnea)    6. Snoring    7. History of COVID-19    8. Prostate cancer (HCC)        Plan/Recommendations     1.  I explained the abnormal CT scan results from May which is done in the [facility] to the patient and also discussed with the radiologist from [facility] in [place] who reviewed the current CT scan of the chest done earlier this month.  This shows the right upper lobe pleural-based thickening or mass and left upper lobe nodule.  Due to his smoking history the possibility of malignancy cannot be ruled out.  2.  Discussing different options I ordered a PET scan and a follow-up CT scan of the chest in 3 months time.  If the PET scan is positive a CT-guided needle biopsy would be best option for the right upper lobe pleural-based lung mass which is not reachable by bronchoscopy.  If PET scan is negative will wait for the neck CT scan of the chest and make further recommendations.  Patient is agreeable with the plan.  In the meantime I will try to get the CD from from the outside hospital with the latest CT scan of the chest for further comparison.  3.  Patient definitely has chronic obstructive pulmonary disease and he was started on Wixela 250/50 1 puff twice a day and albuterol rescue inhaler will be continued.  4.  His nasal allergy he was started on fluticasone nasal spray and Singulair.  He told me he is already using Zyrtec mostly over-the-counter which will be continued.  All medications were sent to the pharmacy.  5. Lalo Nunez Robert  reports that he has been smoking cigarettes. He started smoking  about 35 years ago. He has a 35.00 pack-year smoking history. He has never used smokeless tobacco.. I have educated him on the risk of diseases from using tobacco products such as cancer, COPD and heart disease.   I advised him to quit and he is willing to quit. We have discussed the following method/s for tobacco cessation:  Counseling.  Together we have set a quit date for 2 weeks from today.  He will follow up with me in 3 months or sooner to check on his progress.I spent 7 minutes counseling the patient.  6.  Patient is advised to have a sleep study due to sleep disturbances and most likely have sleep apnea and may need his CPAP.  He is also advised to get a pulmonary function test before the next clinic visit for his underlying COPD in addition to the PET scan and CT scan which are already ordered.  He will return to the pulm clinic for follow-up visit in 3 months time or earlier if needed.    Follow Up    3 months    Time Spent   45 minutes      I appreciate the opportunity of participating in this patients care. I would like to thank the PCP for the referral. Please feel free to contact me with any other questions.       Russel Santana MD   Pulmonologist/Intensivist     14:03 CDT

## 2022-10-28 ENCOUNTER — TELEPHONE (OUTPATIENT)
Dept: PULMONOLOGY | Facility: CLINIC | Age: 57
End: 2022-10-28

## 2022-10-28 NOTE — TELEPHONE ENCOUNTER
[place] Sleepiness Scale    Situation Chance of Dozing or Sleeping   • Sitting and reading 3 - high chance of dosing or sleeping   • Watching TV 2 - moderate chance of dosing or sleeping   • Sitting inactive in a public place 1 - slight chance of dosing or sleeping   • Being a passenger in a motor vehicle for an hour or more 2 - moderate chance of dosing or sleeping   • Lying down in the afternoon 3 - high chance of dosing or sleeping   • Sitting and talking to someone 0 - would never dose or sleep   • Sitting quietly after lunch (no alcohol) 3 - high chance of dosing or sleeping   • Stopped for a few minutes in traffic while driving 0 - would never dose or sleep   Total score (add the scores up) 14

## 2022-11-01 ENCOUNTER — HOSPITAL ENCOUNTER (OUTPATIENT)
Dept: CT IMAGING | Facility: HOSPITAL | Age: 57
Discharge: HOME | End: 2022-11-01

## 2022-11-01 DIAGNOSIS — R91.1 LUNG NODULE: ICD-10-CM

## 2022-11-01 PROCEDURE — 78815 PET IMAGE W/CT SKULL-THIGH: CPT

## 2022-11-01 PROCEDURE — 0 FLUDEOXYGLUCOSE F18 SOLUTION: Performed by: INTERNAL MEDICINE

## 2022-11-01 PROCEDURE — A9552 F18 FDG: HCPCS | Performed by: INTERNAL MEDICINE

## 2022-11-01 RX ADMIN — FLUDEOXYGLUCOSE F18 1 DOSE: 300 INJECTION INTRAVENOUS at 08:16

## 2022-11-02 ENCOUNTER — TELEPHONE (OUTPATIENT)
Dept: PULMONOLOGY | Facility: CLINIC | Age: 57
End: 2022-11-02

## 2022-11-07 DIAGNOSIS — R91.1 NODULE OF UPPER LOBE OF RIGHT LUNG: Primary | ICD-10-CM

## 2022-11-08 ENCOUNTER — PRE-ADMISSION TESTING (OUTPATIENT)
Dept: PREADMISSION TESTING | Facility: HOSPITAL | Age: 57
End: 2022-11-08

## 2022-11-08 VITALS
HEART RATE: 95 BPM | WEIGHT: 217.37 LBS | BODY MASS INDEX: 28.81 KG/M2 | RESPIRATION RATE: 18 BRPM | OXYGEN SATURATION: 97 % | DIASTOLIC BLOOD PRESSURE: 71 MMHG | HEIGHT: 73 IN | SYSTOLIC BLOOD PRESSURE: 123 MMHG

## 2022-11-08 LAB
DEPRECATED RDW RBC AUTO: 41.1 FL (ref 37–54)
ERYTHROCYTE [DISTWIDTH] IN BLOOD BY AUTOMATED COUNT: 12 % (ref 12.3–15.4)
HCT VFR BLD AUTO: 45.9 % (ref 37.5–51)
HGB BLD-MCNC: 15.1 G/DL (ref 13–17.7)
MCH RBC QN AUTO: 30.4 PG (ref 26.6–33)
MCHC RBC AUTO-ENTMCNC: 32.9 G/DL (ref 31.5–35.7)
MCV RBC AUTO: 92.5 FL (ref 79–97)
PLATELET # BLD AUTO: 192 10*3/MM3 (ref 140–450)
PMV BLD AUTO: 9.5 FL (ref 6–12)
RBC # BLD AUTO: 4.96 10*6/MM3 (ref 4.14–5.8)
WBC NRBC COR # BLD: 6.64 10*3/MM3 (ref 3.4–10.8)

## 2022-11-08 PROCEDURE — 36415 COLL VENOUS BLD VENIPUNCTURE: CPT

## 2022-11-08 PROCEDURE — 85027 COMPLETE CBC AUTOMATED: CPT

## 2022-11-08 NOTE — DISCHARGE INSTRUCTIONS
Before you come to the hospital        Arrival time: AS DIRECTED BY OFFICE     YOU MAY TAKE THE FOLLOWING MEDICATION(S) THE MORNING OF SURGERY WITH A SIP OF WATER: Neurontin (gabapentin)            ALL OTHER HOME MEDICATION CHECK WITH YOUR PHYSICIAN (especially if you are taking diabetes medicines or blood thinners)    Do not take any Erectile Dysfunction medications (EX: CIALIS, VIAGRA) 24 hours prior to surgery.      If you were given and instructed to use a germ- killing soap, use as directed the night before surgery and again the morning of surgery or as directed by your surgeon.    (See attached information for How to Use Chlorhexidine for Bathing if applicable.)            Eating and drinking restrictions prior to scheduled arrival time    2 Hours before arrival time STOP   Drinking Clear liquids (water, apple juice-no pulp)     6 Hours before arrival time STOP   Milk or drinks that contain milk, full liquids    6 Hours before arrival time STOP   Light meals or foods, such as toast or cereal    8 Hours before arrival time STOP   Heavy foods, such as meat, fried foods, or fatty foods    (It is extremely important that you follow these guidelines to prevent delay or cancelation of your procedure)     Clear Liquids  Water and flavored water                                                                      Clear Fruit juices, such as cranberry juice and apple juice.  Black coffee (NO cream of any kind, including powdered).  Plain tea  Clear bouillon or broth.  Flavored gelatin.  Soda.  Gatorade or Powerade.  Full liquid examples  Juices that have pulp.  Frozen ice pops that contain fruit pieces.  Coffee with creamer  Milk.  Yogurt.                MANAGING PAIN AFTER SURGERY    We know you are probably wondering what your pain will be like after surgery.  Following surgery it is unrealistic to expect you will not have pain.   Pain is how our bodies let us know that something is wrong or cautions us to be  careful.  That said, our goal is to make your pain tolerable.    Methods we may use to treat your pain include (oral or IV medications, PCAs, epidurals, nerve blocks, etc.)   While some procedures require IV pain medications for a short time after surgery, transitioning to pain medications by mouth allows for better management of pain.   Your nurse will encourage you to take oral pain medications whenever possible.  IV medications work almost immediately, but only last a short while.  Taking medications by mouth allows for a more constant level of medication in your blood stream for a longer period of time.      Once your pain is out of control it is harder to get back under control.  It is important you are aware when your next dose of pain medication is due.  If you are admitted, your nurse may write the time of your next dose on the white board in your room to help you remember.      We are interested in your pain and encourage you to inform us about aggravating factors during your visit.   Many times a simple repositioning every few hours can make a big difference.    If your physician says it is okay, do not let your pain prevent you from getting out of bed. Be sure to call your nurse for assistance prior to getting up so you do not fall.      Before surgery, please decide your tolerable pain goal.  These faces help describe the pain ratings we use on a 0-10 scale.   Be prepared to tell us your goal and whether or not you take pain or anxiety medications at home.          Preparing for Surgery  Preparing for surgery is an important part of your care. It can make things go more smoothly and help you avoid complications. The steps leading up to surgery may vary among hospitals. Follow all instructions given to you by your health care providers. Ask questions if you do not understand something. Talk about any concerns that you have.  Here are some questions to consider asking before your surgery:  If my surgery is  not an emergency (is elective), when would be the best time to have the surgery?  What arrangements do I need to make for work, home, or school?  What will my recovery be like? How long will it be before I can return to normal activities?  Will I need to prepare my home? Will I need to arrange care for me or my children?  Should I expect to have pain after surgery? What are my pain management options? Are there nonmedical options that I can try for pain?  Tell a health care provider about:  Any allergies you have.  All medicines you are taking, including vitamins, herbs, eye drops, creams, and over-the-counter medicines.  Any problems you or family members have had with anesthetic medicines.  Any blood disorders you have.  Any surgeries you have had.  Any medical conditions you have.  Whether you are pregnant or may be pregnant.  What are the risks?  The risks and complications of surgery depend on the specific procedure that you have. Discuss all the risks with your health care providers before your surgery. Ask about common surgical complications, which may include:  Infection.  Bleeding or a need for blood replacement (transfusion).  Allergic reactions to medicines.  Damage to surrounding nerves, tissues, or structures.  A blood clot.  Scarring.  Failure of the surgery to correct the problem.  Follow these instructions before the procedure:  Several days or weeks before your procedure  You may have a physical exam by your primary health care provider to make sure it is safe for you to have surgery.  You may have testing. This may include a chest X-ray, blood and urine tests, electrocardiogram (ECG), or other testing.  Ask your health care provider about:  Changing or stopping your regular medicines. This is especially important if you are taking diabetes medicines or blood thinners.  Taking medicines such as aspirin and ibuprofen. These medicines can thin your blood. Do not take these medicines unless your health  care provider tells you to take them.  Taking over-the-counter medicines, vitamins, herbs, and supplements.  Do not use any products that contain nicotine or tobacco, such as cigarettes and e-cigarettes. If you need help quitting, ask your health care provider.  Avoid alcohol.  Ask your health care provider if there are exercises you can do to prepare for surgery.  Eat a healthy diet.   Plan to have someone take you home from the hospital or clinic.  Plan to have a responsible adult care for you for at least 24 hours after you leave the hospital or clinic. This is important.  The day before your procedure  You may be given antibiotic medicine to take by mouth to help prevent infection. Take it as told by your health care provider.  You may be asked to shower with a germ-killing soap.  Follow instructions from your health care provider about eating and drinking restrictions. This includes gum, mints and hard candy.  Pack comfortable clothes according to your procedure.   The day of your procedure  You may need to take another shower with a germ-killing soap before you leave home in the morning.  With a small sip of water, take only the medicines that you are told to take.  Remove all jewelry including rings.   Leave anything you consider valuable at home except hearing aids if needed.  Do not wear any makeup, nail polish, powder, deodorant, lotion, hair accessories, or anything on your skin or body except your clothes.  If you will be staying in the hospital, bring a case to hold your glasses, contacts, or dentures. You may also want to bring your robe and non-skid footwear.  If you wear oxygen at home, bring it with you the day of surgery.  If instructed by your health care provider, bring your sleep apnea device with you on the day of your surgery (if this applies to you).  You may want to leave your suitcase and sleep apnea device in the car until after surgery.   Arrive at the hospital as scheduled.  Bring a  friend or family member with you who can help to answer questions and be present while you meet with your health care provider.  At the hospital  When you arrive at the hospital:  Go to registration located at the main entrance of the hospital. You will be registered and given a beeper and a sticker sheet. Take the stickers to the Outpatient nurses desk and place in the black tray. This is to notify staff that you have arrived. Then return to the lobby to wait.   When your beeper lights up and vibrates proceed through the double doors, under the stairs, and a member of the Outpatient Surgery staff will escort you to your preoperative room.  You may have to wear compression sleeves. These help to prevent blood clots and reduce swelling in your legs.  An IV may be inserted into one of your veins.              In the operating room, you may be given one or more of the following:        A medicine to help you relax (sedative).        A medicine to numb the area (local anesthetic).        A medicine to make you fall asleep (general anesthetic).        A medicine that is injected into an area of your body to numb everything below the                      injection site (regional anesthetic).  You may be given an antibiotic through your IV to help prevent infection.  Your surgical site will be marked or identified.    Contact a health care provider if you:  Develop a fever of more than 100.4°F (38°C) or other feelings of illness during the 48 hours before your surgery.  Have symptoms that get worse.  Have questions or concerns about your surgery.  Summary  Preparing for surgery can make the procedure go more smoothly and lower your risk of complications.  Before surgery, make a list of questions and concerns to discuss with your surgeon. Ask about the risks and possible complications.  In the days or weeks before your surgery, follow all instructions from your health care provider. You may need to stop smoking, avoid  alcohol, follow eating restrictions, and change or stop your regular medicines.  Contact your surgeon if you develop a fever or other signs of illness during the few days before your surgery.  This information is not intended to replace advice given to you by your health care provider. Make sure you discuss any questions you have with your health care provider.  Document Revised: 12/21/2018 Document Reviewed: 10/23/2018  [employer] Patient Education © 2021 [employer]

## 2022-11-16 ENCOUNTER — HOSPITAL ENCOUNTER (OUTPATIENT)
Dept: GENERAL RADIOLOGY | Facility: HOSPITAL | Age: 57
Discharge: HOME | End: 2022-11-16

## 2022-11-16 ENCOUNTER — HOSPITAL ENCOUNTER (OUTPATIENT)
Dept: CT IMAGING | Facility: HOSPITAL | Age: 57
Discharge: HOME | End: 2022-11-16

## 2022-11-16 VITALS
SYSTOLIC BLOOD PRESSURE: 111 MMHG | RESPIRATION RATE: 16 BRPM | HEIGHT: 74 IN | BODY MASS INDEX: 27.44 KG/M2 | WEIGHT: 213.8 LBS | HEART RATE: 67 BPM | DIASTOLIC BLOOD PRESSURE: 74 MMHG | OXYGEN SATURATION: 97 % | TEMPERATURE: 98.2 F

## 2022-11-16 DIAGNOSIS — R91.1 NODULE OF UPPER LOBE OF RIGHT LUNG: ICD-10-CM

## 2022-11-16 LAB
APTT PPP: 31.3 SECONDS (ref 24.1–35)
INR PPP: 0.93 (ref 0.91–1.09)
PLATELET # BLD AUTO: 161 10*3/MM3 (ref 140–450)
PROTHROMBIN TIME: 12.1 SECONDS (ref 11.9–14.6)

## 2022-11-16 PROCEDURE — 85610 PROTHROMBIN TIME: CPT | Performed by: RADIOLOGY

## 2022-11-16 PROCEDURE — 71045 X-RAY EXAM CHEST 1 VIEW: CPT

## 2022-11-16 PROCEDURE — 88172 CYTP DX EVAL FNA 1ST EA SITE: CPT | Performed by: INTERNAL MEDICINE

## 2022-11-16 PROCEDURE — 85049 AUTOMATED PLATELET COUNT: CPT | Performed by: RADIOLOGY

## 2022-11-16 PROCEDURE — 88341 IMHCHEM/IMCYTCHM EA ADD ANTB: CPT | Performed by: INTERNAL MEDICINE

## 2022-11-16 PROCEDURE — 88342 IMHCHEM/IMCYTCHM 1ST ANTB: CPT | Performed by: INTERNAL MEDICINE

## 2022-11-16 PROCEDURE — 88305 TISSUE EXAM BY PATHOLOGIST: CPT | Performed by: INTERNAL MEDICINE

## 2022-11-16 PROCEDURE — 88177 CYTP FNA EVAL EA ADDL: CPT | Performed by: INTERNAL MEDICINE

## 2022-11-16 PROCEDURE — 85730 THROMBOPLASTIN TIME PARTIAL: CPT | Performed by: RADIOLOGY

## 2022-11-16 RX ORDER — SODIUM CHLORIDE 0.9 % (FLUSH) 0.9 %
10 SYRINGE (ML) INJECTION AS NEEDED
Status: DISCONTINUED | OUTPATIENT
Start: 2022-11-16 | End: 2022-11-17 | Stop reason: HOSPADM

## 2022-11-16 RX ORDER — MULTIPLE VITAMINS W/ MINERALS TAB 9MG-400MCG
1 TAB ORAL DAILY
COMMUNITY

## 2022-11-16 RX ORDER — HYDROCODONE BITARTRATE AND ACETAMINOPHEN 7.5; 325 MG/1; MG/1
1 TABLET ORAL ONCE AS NEEDED
Status: COMPLETED | OUTPATIENT
Start: 2022-11-16 | End: 2022-11-16

## 2022-11-16 RX ORDER — SODIUM CHLORIDE 0.9 % (FLUSH) 0.9 %
3 SYRINGE (ML) INJECTION EVERY 12 HOURS SCHEDULED
Status: DISCONTINUED | OUTPATIENT
Start: 2022-11-16 | End: 2022-11-17 | Stop reason: HOSPADM

## 2022-11-16 RX ORDER — HYDROCODONE BITARTRATE AND ACETAMINOPHEN 7.5; 325 MG/1; MG/1
TABLET ORAL
Status: COMPLETED
Start: 2022-11-16 | End: 2022-11-16

## 2022-11-16 RX ADMIN — HYDROCODONE BITARTRATE AND ACETAMINOPHEN 1 TABLET: 7.5; 325 TABLET ORAL at 10:12

## 2022-11-16 NOTE — INTERVAL H&P NOTE
H&P reviewed. The patient was examined and there are no changes to the H&P.    Risk, Benefits, and Alternatives discussed with the patient.  History and Physical reviewed, and no changes.  Plan is for local anesthesia but moderate sedation if needed and the patient agrees to proceed with procedure.    An immediate assessment was done prior to the administration of moderate sedation.

## 2022-11-21 DIAGNOSIS — C34.91 PRIMARY LUNG CANCER, RIGHT: Primary | ICD-10-CM

## 2022-11-22 NOTE — PROGRESS NOTES
Russel Santana MD Colson, Robin, BENIGNO  Please call the patient regarding his abnormal result.  Please let the patient know the biopsy results are positive for primary lung cancer and I made a referral for Dr. Uribe for further work-up and management.  I will see him back in the office in March as scheduled.  Thank you.

## 2022-11-29 LAB
BEAKER LAB AP INTRAOPERATIVE CONSULTATION: NORMAL
CYTO UR: NORMAL
LAB AP CASE REPORT: NORMAL
LAB AP CLINICAL INFORMATION: NORMAL
Lab: NORMAL
PATH REPORT.FINAL DX SPEC: NORMAL
PATH REPORT.GROSS SPEC: NORMAL

## 2022-12-01 ENCOUNTER — TELEPHONE (OUTPATIENT)
Dept: PULMONOLOGY | Facility: CLINIC | Age: 57
End: 2022-12-01

## 2022-12-01 NOTE — TELEPHONE ENCOUNTER
DISREGARD MESSAGE--REFERRAL NOT NEEDED  Patient left message stating that he would prefer to see Dr. Tadeo instead of Dr. Rodriguez as he has seen Dr. Tadeo in the past.      I called patient back and left message for him to cancel his Dr. Rodriguez appointment and that once you place new referral I will fax to Dr. Tadeo's office to schedule.

## 2022-12-06 DIAGNOSIS — C61 PROSTATE CANCER: Primary | ICD-10-CM

## 2022-12-07 DIAGNOSIS — C34.11 PRIMARY ADENOCARCINOMA OF UPPER LOBE OF RIGHT LUNG (HCC): Primary | ICD-10-CM

## 2022-12-07 NOTE — PROGRESS NOTES
Patient:  Sam Arias  YOB: 1965  Date of Service: 12/18/2022  MRN: [id number]    Primary Care Physician: Gilbert Brooke    Chief Complaint   Patient presents with    Follow-up     New diagnosis of adenocarcinoma of pulmonary origin       Patient Seen, Chart, Consults notes, Labs, Radiology studies reviewed. Subjective:  Sam Arias is a 44-year-old  gentleman with primary and secondary diagnoses as follows:  Robotic assisted laparoscopic prostatectomy by Dr. Armando Horton September 2020 for PT2N0 Wyatt 7 disease with tumor approaching the right posterior margin  New diagnosis of 4.7 cm x 4 cm x 1.6 cm  adenocarcinoma of the RUL of the lung    Man De was last seen on 7/15/2020 for opinion regarding a diagnosis of resected prostate cancer. He was lost to follow-up after that visit. He lives in [place], and his prostate cancer is managed by Dr. Armando Horton with urology at [facility]. [facility] on 7/15/2020 documented a VUS:  MEN1, heterozygous, for c.  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 Kayden Landrum with a new diagnosis of a 4.7 x 4 x 1.6 cm adenocarcinoma of the RUL of the lung for management. #1 TUMOR HISTORY: Adenocarcinoma of Pulmonary Origin 11/16/2022  New Currie was seen in initial oncology consultation on 7/15/2020 referred by his PCP, Dr. Carmencita Lindsey in [facility] for medical oncology opinion regarding a diagnosis of prostate cancer. Mr. Alejandro Carvajal was again seen on 12/8/2022 re-referred by Dr. Kayden Landrum for new diagnosis of adenocarcinoma of the lung, diagnosed on 11/16/2022 by CT Guided needle Biopsy. Man De is a longtime cigarette smoker of 1 pack/day since age 27. He quit smoking on 12/1/2022 after the diagnosis of lung cancer. Naman's PCP, Dr. Carmencita Lindsey ordered a low-dose screening CT scan of the chest for monitoring.     CT chest without contrast, low-dose protocol at [facility] on 5/19/2022:  4 mm LEFT lower lobe pulmonary nodule in superior segment   3.3 x 1.3 cm mass like area of pleural thickening in the RIGHT anterior upper chest   Moderate centrilobular and paraseptal emphysema. Lung-RADS 2S-- Nodules with a very low likelihood of becoming a clinically active cancer due to size or lack of growth. Continue annual screening with low dose CT in 12 months. 3 month follow-up chest CT recommended to evaluate for stability of RIGHT anterior upper chest masslike pleural thickening    Dr. Iris Talavera repeated the low-dose CT scan because of the findings in the right anterior upper chest    CT chest without contrast, low-dose protocol at [facility] on 10/17/2022:  4.7 cm x 4 cm x 1.6 cm abnormal area of pleural thickening in the periphery of the RUL with slightly irregular margins  Moderate paraseptal emphysematous changes with scattered bullae in both lungs  Lung-RADS - 4B      Initial consult with Dr. Mariola Martinez at [facility] on 10/24/2022:  Explained the abnormal CT scan results from May which is done in the [facility] to the patient and also discussed with the radiologist from [facility] in [place] who reviewed the current CT scan of the chest done earlier this month. This shows the right upper lobe pleural-based thickening or mass and left upper lobe nodule. Due to his smoking history the possibility of malignancy cannot be ruled out. Discussing different options I ordered a PET scan and a follow-up CT scan of the chest in 3 months time. If the PET scan is positive a CT-guided needle biopsy would be best option for the right upper lobe pleural-based lung mass which is not reachable by bronchoscopy. If PET scan is negative will wait for the neck CT scan of the chest and make further recommendations.       NM PET/CT SKULL BASE TO MID THIGH at [facility] on 11/1/2022:Comparison: Chest CT 5/19/2022  Background right hepatic lobe metabolic activity measures max SUV 2.7.  4.3 x 1.9 cm RIGHT anterior upper chest pleural thickening which is hypermetabolic with a maximum SUV of 12.0  1.7 cm LEFT upper abdomen retroperitoneal lymph node hypermetabolicactivity, max SUV 5.3. This nodule/node closely approximates the LEFT adrenal gland. 9 mm retroaortic upper abdominal lymph node is mildly hypermetabolic with a max SUV of 4.0.   7 mm aortocaval lymph node is mildly hypermetabolic with a max SUV of 3.0. CT Needle Biopsy Lung at \[facility]\"" on 11/16/2022:  Successful CT guided biopsy of the right anterior pleural mass  Final Diagnosis  Right upper chest/pleural mass, core biopsies:  Adenocarcinoma of pulmonary origin. [employer] on 11/16/2022:  ALK(D5F3)- NEGATIVE  FISH Analysis ROS1- NEGATIVE  EGFR Exon 18-Not Detected  EGFR Exon 19-Not Detected  EGFR Exon 20 [id number] Detected  EGFR Exon 20 (other Mutations)-Not Detected  EGFR Exon 21- Not Detected  BRAF Mutation-Not Detected  FISH MET-No evidence of a MET amplification    Medical oncology consultation requested        #2   TUMOR HISTORY: Prostate cancer-  Delmer De La Cruz was seen in initial oncology consultation on 7/15/2020 referred by his PCP, Dr. Li Tomlinson in [facility] for medical oncology opinion on his diagnosis of prostate cancer. Delmer De La Cruz relates that he was found to have a minimally increased PSA of between 4.5 and 5 on a routine physical examination. This led to work-up and eventual prostate biopsy leading to the diagnosis of prostate cancer.      Prostate biopsy on 11/1/2018 documented the following:  Right lateral base-Microfocus of prostatic carcinoma   Prostate biopsy on 2/25/2019 documented the following:  Right lateral base-adenocarcinoma: Wyatt 3+4 = 7  Right lateral mid- adenocarcinoma: [place] 3+3 = 6  Prostate biopsy 2/18/2020 documented the following:  Right lateral base-adenocarcinoma: [place] 3+4 = 7     PSA levels by date as follows:  2/19/2019-9.1  4/19/2019-7.0  7/22/2019-6.6  10/17/2019-7.8  114 2020-8.39  5/18/2020- 11.5     MRI pelvis with and without contrast on 2/5/2020 at [facility] documented:  No suspicious lesions in the prostate (PI-RADS 3 or greater)  No pelvis lymphadenopathy or suspicious pelvic bone lesions     Prostate biopsy was performed on 2/18/2020 by Dr. Kip Redman at [facility]. Pathology revealed:  Prostate, right apex, needle biopsy:  Benign prostate tissue  Prostate, right base, needle biopsy:  Adenocarcinoma, acinar type, Preston grade 3+4=7, grade group 2, discontinuously involving 10/20 mm or 50% of core (3 foci measuring 4,3, and 1 mm), 30% of tumor is Wyatt pattern 4  Prostate, right mid, needle biopsy:  Benign prostate tissue  Prostate, left apex, needle biopsy:  Benign prostate tissue  Prostate, left base, needle biopsy  Benign prostate tissue  Prostate, left mid, needle biopsy:  Benign prostate tissue        CT abdomen and pelvis without contrast on 2/20/2020 at [facility] documented:  No evidence of complication after prostate biopsy  Numerous colonic diverticula with no evidence of acute diverticulitis  Small bilateral fat-containing groin hernias     Bone scan on 6/2/2020 at [facility] documented:  Abnormal uptake at the right dorsal tissues at the level of T12 and right upper kidney. This could represent a bone lesion or renal lesion. Recommend CT abdomen with contrast for further evaluation if patient's renal function permits. If low renal function, MRI abdomen without contrast would be an alternative for evaluation     CT abdomen and pelvis without contrast on 6/7/2020 at [facility] documented no acute abnormality. CBC today (7/15/2020) reveals a WBC of 6.73. Hgb is 15.5 with an MCV of 93.5 and platelet count of 731,131. Derian Abreu has been under evaluation and monitoring in the urology department by Dr. Katie Webster at [facility]. At his last visit on 5/28/2020 Mr. Reagan May was leaning toward a radical prostatectomy to address the issue of his prostate cancer. Medical oncology consultation was requested for opinion.   The 2 best options at this juncture include a radical prostatectomy which is most definitive followed by radiation therapy which gives similar statistical long-term results but with the potential for long-term radiation therapy associated side effects. I have encouraged him that a radical prostatectomy is appropriate and if he is considering this that I would support that completely. He has an appointment with Dr. April Izaguirre on 8/21/2022 discussed robotic prostatectomy. Recommendation prior to his August appointment with urology is as follows: [facility] genetic testing drawn today  PSA  CMP  MRI of the thoracic spine W&WO to evaluate the T12 area and any other areas of concern     He had robotic assisted laparoscopic prostatectomy by Dr. Alexandra Don September 2020 for PT2N0 Wyatt 7 disease with tumor approaching the right posterior margin. TREATMENT SUMMARY:  Robotic assisted laparoscopic prostatectomy by Dr. Alexandra Don September 2020 for PT2N0 Wyatt 7 disease with tumor approaching the right posterior margin        Allergies:  Patient has no known allergies. Medicines:  Current Outpatient Medications   Medication Sig Dispense Refill    hydrOXYzine pamoate (VISTARIL) 50 MG capsule       gabapentin (NEURONTIN) 800 MG tablet Take 800 mg by mouth 3 times daily. ibuprofen (ADVIL;MOTRIN) 800 MG tablet Take 800 mg by mouth every 6 hours as needed for Pain      Cetirizine HCl 10 MG CAPS Take 1 capsule by mouth daily      omeprazole (PRILOSEC) 40 MG delayed release capsule Take 40 mg by mouth daily      Albuterol Sulfate (VENTOLIN HFA IN) Inhale 2 puffs into the lungs every 4 hours      sildenafil (VIAGRA) 100 MG tablet Take 100 mg by mouth daily (Patient not taking: Reported on 12/8/2022)       No current facility-administered medications for this visit.        Past Medical History:      Diagnosis Date    Arthritis     Cancer [facility])     Prostate    Carcinoma in situ of prostate     Thrombocytopenia [facility])         Past Surgical History:      Procedure Laterality Date    ANKLE SURGERY Right     ANTERIOR CRUCIATE LIGAMENT REPAIR Left     BACK SURGERY      X2    ELBOW SURGERY Right 2011    Tennis elbow release    NASAL SEPTUM SURGERY      TX SONO GUIDE NEEDLE BIOPSY  2020    Dr. Alejandro Joshi @ [facility]    SHOULDER SURGERY Left     x4        Family History:  No family history on file. Social History  Social History     Tobacco Use    Smoking status: Former     Packs/day: 1.00     Years: 15.00     Pack years: 15.00     Types: Cigarettes     Start date: 1987     Quit date: 2022     Years since quittin.0    Smokeless tobacco: Never    Tobacco comments: On Chantix. ..trying to quit   Vaping Use    Vaping Use: Former    Substances: Nicotine    Devices: Pre-filled or refillable cartridge, Refillable tank   Substance Use Topics    Alcohol use: Not Currently    Drug use: Never              Wt Readings from Last 3 Encounters:   22 212 lb 8 oz (96.4 kg)   07/15/20 210 lb (95.3 kg)        Objective:  Vital Signs: Blood pressure 96/76, pulse 89, height 6' 2\" (1.88 m), weight 212 lb 8 oz (96.4 kg), SpO2 95 %. Labs:  BMP: No results for input(s): NA, K, CL, CO2, PHOS, BUN, CREATININE, CALCIUM in the last 72 hours. CBC:   No results for input(s): WBC, HGB, HCT, MCV, PLT in the last 72 hours. PT/INR: No results for input(s): PROTIME, INR in the last 72 hours. APTT: No results for input(s): APTT in the last 72 hours. Magnesium:No results for input(s): MG in the last 72 hours. Phosphorus:No results for input(s): PHOS in the last 72 hours. Hepatic: No results for input(s): ALKPHOS, ALT, AST, PROT, BILITOT, BILIDIR, LABALBU in the last 72 hours. Cultures:   No results for input(s): CULTURE in the last 72 hours. Radiology reports as per the Radiologist  Radiology: No results found.      ASSESSMENT AND PLAN:  Teresa Porras was seen in initial oncology consultation on 7/15/2020 for opinion on prostate cancer, has be re-referred by Dr. Dorothy Lombardo for new diagnosis of adenocarcinoma of pulmonary origin. diagnosed on 11/16/2022 by CT Guided needle Biopsy. #1  Adenocarcinoma of Pulmonary Origin-11/16/2022    Pepe Donis is a 32-year-old  gentleman with primary and secondary diagnoses as follows:  Robotic assisted laparoscopic prostatectomy by Dr. Gabriel Hill September 2020 for PT2N0 Wyatt 7 disease with tumor approaching the right posterior margin  New diagnosis of 4.7 cm x 4 cm x 1.6 cm  adenocarcinoma of the RUL of the lung    Eleni Kennedy was last seen on 7/15/2020 for opinion regarding a diagnosis of resected prostate cancer. He was lost to follow-up after that visit. He lives in [place], and his prostate cancer is managed by Dr. Gabriel Hill with urology at [facility]. 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 Dorothy Lombardo with a new diagnosis of a 4.7 x 4 x 1.6 cm adenocarcinoma of the RUL of the lung for management. Eleni Kennedy is a longtime cigarette smoker of 1 pack/day since age 27. He quit smoking on 12/1/2022 after the diagnosis of lung cancer. Naman's PCP, Dr. Leim Shone ordered a low-dose screening CT scan of the chest for monitoring because he is a pack a day smoker since 1987    CT chest without contrast, low-dose protocol at [facility] on 5/19/2022:  4 mm LEFT lower lobe pulmonary nodule in superior segment   3.3 x 1.3 cm mass like area of pleural thickening in the RIGHT anterior upper chest   Moderate centrilobular and paraseptal emphysema. Lung-RADS 2S-- Nodules with a very low likelihood of becoming a clinically active cancer due to size or lack of growth. Continue annual screening with low dose CT in 12 months.    3 month follow-up chest CT recommended to evaluate for stability of RIGHT anterior upper chest masslike pleural thickening    Dr. Faye Whitley repeated the low-dose CT scan because of the findings in the right anterior upper chest    CT chest without contrast, low-dose protocol at [facility] on 10/17/2022:  4.7 cm x 4 cm x 1.6 cm abnormal area of pleural thickening in the periphery of the RUL with slightly irregular margins  Moderate paraseptal emphysematous changes with scattered bullae in both lungs  Lung-RADS - 4B    Initial consult with Dr. Marylen Meals at [facility] on 10/24/2022:  Explained the abnormal CT scan results from May which is done in the [facility] to the patient and also discussed with the radiologist from [facility] in [place] who reviewed the current CT scan of the chest done earlier this month. This shows the right upper lobe pleural-based thickening or mass and left upper lobe nodule. Due to his smoking history the possibility of malignancy cannot be ruled out. Discussing different options I ordered a PET scan and a follow-up CT scan of the chest in 3 months time. If the PET scan is positive a CT-guided needle biopsy would be best option for the right upper lobe pleural-based lung mass which is not reachable by bronchoscopy. If PET scan is negative will wait for the neck CT scan of the chest and make further recommendations. NM PET/CT SKULL BASE TO MID THIGH at [facility] on 11/1/2022:Comparison: Chest CT 5/19/2022  Background right hepatic lobe metabolic activity measures max SUV 2.7.  4.3 x 1.9 cm RIGHT anterior upper chest pleural thickening which is hypermetabolic with a maximum SUV of 12.0  1.7 cm LEFT upper abdomen retroperitoneal lymph node hypermetabolicactivity, max SUV 5.3. This nodule/node closely approximates the LEFT adrenal gland. 9 mm retroaortic upper abdominal lymph node is mildly hypermetabolic with a max SUV of 4.0.   7 mm aortocaval lymph node is mildly hypermetabolic with a max SUV of 3.0.       CT Needle Biopsy Lung at [facility] on 11/16/2022:  Successful CT guided biopsy of the right anterior pleural mass  Final Diagnosis  Right upper chest/pleural mass, core biopsies:  Adenocarcinoma of pulmonary origin. [facility] on 11/16/2022:  ALK(D5F3)- NEGATIVE  FISH Analysis ROS1- NEGATIVE  EGFR Exon 18-Not Detected  EGFR Exon 19-Not Detected  EGFR Exon 20 [id number] Detected  EGFR Exon 20 (other Mutations)-Not Detected  EGFR Exon 21- Not Detected  BRAF Mutation-Not Detected  FISH MET-No evidence of a MET amplification    Medical oncology consultation requested      BP 96/76   Pulse 89   Ht 6' 2\" (1.88 m)   Wt 212 lb 8 oz (96.4 kg)   SpO2 95%   BMI 27.28 kg/m²      Examination today, 12/8/2022, is without evidence of palpable supraclavicular or infraclavicular lymphadenopathy. Lungs reveal bilateral rales in upper and lower lung fields but without signs of consolidation. Heart regular rate and rhythm normal S1 and S2, no S3  Abdomen is soft and benign without organomegaly or masses  Extremities no cyanosis clubbing or edema  Neurological exam is grossly intact. CBC today 12/8/2022  reveals a WBC of  8.53 Hgb is 15.5 with an MCV of 89.4 and platelet count of 904,562. Concern on the work-up is that in addition to the 4.7 cm primary RUL adenocarcinoma of the lung there are upper abdominal retroperitoneal and aortocaval lymph nodes positive on the PET scan. If the upper abdominal lymph nodes were not a concern, Tez Negrete could be treated with neoadjuvant Opdivo Alimta Keytruda followed by resection. Intense interdepartmental consultations are being undertaken to give Tez Negrete the most aggressive approach possible. All of the plans outlined below were discussed at length with Tez Negrete and his wife Javier Daniels.     RECOMMEND:  Guardant 360  CT scan of the abdomen and pelvis  Bone scan  MRI of the abdomen  Thoracic surgery consultation with Dr. Jeronimo Cuevas  Call placed and spoke to Dr. Jeronimo Cuevas at length  Radiation therapy consult for intradepartmental consultation  Consult surgical group at [facility] for port placement and consideration of a diagnostic laparoscopy for lymph node sampling. Follow-up with me upon completion of scans and for further patient and family conference        #2  TUMOR HISTORY: Prostate cancer  Yajaira Elliott was seen in initial oncology consultation on 7/15/2020 referred by his PCP, Dr. Jessie Rabago in [facility] for medical oncology opinion on his diagnosis of prostate cancer. Salem Delay relates that he was found to have a minimally increased PSA of between 4.5 and 5 on a routine physical examination. This led to work-up and eventual prostate biopsy leading to the diagnosis of prostate cancer. Prostate biopsy on 11/1/2018 documented the following:  Right lateral base-Microfocus of prostatic carcinoma   Prostate biopsy on 2/25/2019 documented the following:  Right lateral base-adenocarcinoma: [place] 3+4 = 7  Right lateral mid- adenocarcinoma: Wyatt 3+3 = 6  Prostate biopsy 2/18/2020 documented the following:  Right lateral base-adenocarcinoma: [place] 3+4 = 7     PSA levels by date as follows:  2/19/2019-9.1  4/19/2019-7.0  7/22/2019-6.6  10/17/2019-7.8  114 2020-8.39  5/18/2020- 11.5     MRI pelvis with and without contrast on 2/5/2020 at [facility] documented:  No suspicious lesions in the prostate (PI-RADS 3 or greater)  No pelvis lymphadenopathy or suspicious pelvic bone lesions     Prostate biopsy was performed on 2/18/2020 by Dr. Violet Garland at [facility].   Pathology revealed:  Prostate, right apex, needle biopsy:  Benign prostate tissue  Prostate, right base, needle biopsy:  Adenocarcinoma, acinar type, [place] grade 3+4=7, grade group 2, discontinuously involving 10/20 mm or 50% of core (3 foci measuring 4,3, and 1 mm), 30% of tumor is Wyatt pattern 4  Prostate, right mid, needle biopsy:  Benign prostate tissue  Prostate, left apex, needle biopsy:  Benign prostate tissue  Prostate, left base, needle biopsy  Benign prostate tissue  Prostate, left mid, needle biopsy:  Benign prostate tissue        CT abdomen and pelvis without contrast on 2/20/2020 at [facility] documented:  No evidence of complication after prostate biopsy  Numerous colonic diverticula with no evidence of acute diverticulitis  Small bilateral fat-containing groin hernias     Bone scan on 6/2/2020 at [facility] documented:  Abnormal uptake at the right dorsal tissues at the level of T12 and right upper kidney. This could represent a bone lesion or renal lesion. Recommend CT abdomen with contrast for further evaluation if patient's renal function permits. If low renal function, MRI abdomen without contrast would be an alternative for evaluation     CT abdomen and pelvis without contrast on 6/7/2020 at [facility] documented no acute abnormality. Judith Angel has been under evaluation and monitoring in the urology department by Dr. Roslyn Kehr at [facility]. At his last visit on 5/28/2020 Mr. Candace Carlson was leaning toward a radical prostatectomy to address the issue of his prostate cancer. Medical oncology consultation was requested for opinion. The 2 best options at this juncture include a radical prostatectomy which is most definitive followed by radiation therapy which gives similar statistical long-term results but with the potential for long-term radiation therapy associated side effects. I have encouraged him that a radical prostatectomy is appropriate and if he is considering this that I would support that completely. He has an appointment with Dr. Olivia Nunn on 8/21/2022 discussed robotic prostatectomy. Recommendation prior to his August appointment with urology is as follows: [facility] genetic testing drawn today  PSA  CMP  MRI of the thoracic spine W&WO to evaluate the T12 area and any other areas of concern     He had robotic assisted laparoscopic prostatectomy by Dr. Brandyn Matute September 2020 for PT2N0 Wyatt 7 disease with tumor approaching the right posterior margin.      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 Diagnoses and all orders for this visit:    Adenocarcinoma of right lung ([address]  -     CT ABDOMEN PELVIS W IV CONTRAST Additional Contrast? Oral; Future  -     NM BONE SCAN WHOLE BODY; Future  -     Comprehensive Metabolic Panel; Future  -     CEA; Future  -     MRI ABDOMEN W WO CONTRAST; Future  -     Miscellaneous Sendout; Future    Enlarged lymph nodes  -     CT ABDOMEN PELVIS W IV CONTRAST Additional Contrast? 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 Oral     Standing Status:   Future     Standing Expiration Date:   12/8/2023     Scheduling Instructions:      At [facility]     Order Specific Question:   Additional Contrast?     Answer:   Oral     Order Specific Question:   STAT Creatinine as needed:     Answer:   No     Order Specific Question:   Reason for exam:     Answer:   abnormal lymph nodes on PET    NM BONE SCAN WHOLE BODY     Standing Status:   Future     Standing Expiration Date:   12/8/2023     Scheduling Instructions:      At [facility]     Order Specific Question:   Reason for exam:     Answer:   New diagnosis of metastatic lung cancer    MRI ABDOMEN W WO CONTRAST     Standing Status:   Future     Standing Expiration Date:   12/8/2023     Scheduling Instructions:      At [facility]     Order Specific Question:   STAT Creatinine as needed:     Answer:    No Order Specific Question:   Reason for exam:     Answer:   upper abdominal retroperitoneal lymphadenopathy     Order Specific Question:   What is the sedation requirement? Answer:   None     Order Specific Question:   Specify organ? Answer:   Diaphragm     Comments:   abdominal lymph nodes    Comprehensive Metabolic Panel     Standing Status:   Future     Number of Occurrences:   1     Standing Expiration Date:   12/8/2023    CEA     Standing Status:   Future     Number of Occurrences:   1     Standing Expiration Date:   12/8/2023    Miscellaneous Sendout     Standing Status:   Future     Standing Expiration Date:   12/8/2023     Order Specific Question:   Specify Req. Test (1 Test/Order)     Answer:   guardant [id number]         Return for Follow Up with Dolan scans prior.

## 2022-12-08 ENCOUNTER — HOSPITAL ENCOUNTER (OUTPATIENT)
Dept: INFUSION THERAPY | Age: 57
Discharge: HOME | End: 2022-12-08
Payer: MEDICAID

## 2022-12-08 ENCOUNTER — TRANSCRIBE ORDERS (OUTPATIENT)
Dept: ADMINISTRATIVE | Facility: HOSPITAL | Age: 57
End: 2022-12-08

## 2022-12-08 ENCOUNTER — OFFICE VISIT (OUTPATIENT)
Dept: HEMATOLOGY | Age: 57
End: 2022-12-08
Payer: MEDICAID

## 2022-12-08 VITALS
OXYGEN SATURATION: 95 % | SYSTOLIC BLOOD PRESSURE: 96 MMHG | WEIGHT: 212.5 LBS | HEART RATE: 89 BPM | HEIGHT: 74 IN | BODY MASS INDEX: 27.27 KG/M2 | DIASTOLIC BLOOD PRESSURE: 76 MMHG

## 2022-12-08 DIAGNOSIS — R59.9 ENLARGED LYMPH NODES: ICD-10-CM

## 2022-12-08 DIAGNOSIS — C34.91 ADENOCARCINOMA OF RIGHT LUNG (HCC): Primary | ICD-10-CM

## 2022-12-08 DIAGNOSIS — C77.2 METASTASIS TO RETROPERITONEAL LYMPH NODE: ICD-10-CM

## 2022-12-08 DIAGNOSIS — C34.91 ADENOCARCINOMA OF LUNG, STAGE 2, RIGHT: Primary | ICD-10-CM

## 2022-12-08 DIAGNOSIS — C34.11 PRIMARY ADENOCARCINOMA OF UPPER LOBE OF RIGHT LUNG (HCC): ICD-10-CM

## 2022-12-08 DIAGNOSIS — C77.2 METASTASIS TO RETROPERITONEAL LYMPH NODE (HCC): ICD-10-CM

## 2022-12-08 DIAGNOSIS — C34.91 ADENOCARCINOMA OF RIGHT LUNG (HCC): ICD-10-CM

## 2022-12-08 LAB
ALBUMIN SERPL-MCNC: 4.5 G/DL (ref 3.5–5.2)
ALP BLD-CCNC: 106 U/L (ref 40–130)
ALT SERPL-CCNC: 36 U/L (ref 21–72)
ANION GAP SERPL CALCULATED.3IONS-SCNC: 7 MMOL/L (ref 7–19)
AST SERPL-CCNC: 34 U/L (ref 17–59)
BASOPHILS ABSOLUTE: 0.06 K/UL (ref 0.01–0.08)
BASOPHILS RELATIVE PERCENT: 0.7 % (ref 0.1–1.2)
BILIRUB SERPL-MCNC: 0.5 MG/DL (ref 0.2–1.3)
BUN BLDV-MCNC: 18 MG/DL (ref 9–20)
CALCIUM SERPL-MCNC: 9.9 MG/DL (ref 8.4–10.2)
CEA: 6.1 NG/ML (ref 0–4.7)
CHLORIDE BLD-SCNC: 107 MMOL/L (ref 98–111)
CO2: 27 MMOL/L (ref 22–29)
CREAT SERPL-MCNC: 1 MG/DL (ref 0.6–1.2)
EOSINOPHILS ABSOLUTE: 0.21 K/UL (ref 0.04–0.54)
EOSINOPHILS RELATIVE PERCENT: 2.5 % (ref 0.7–7)
GFR SERPL CREATININE-BSD FRML MDRD: >60 ML/MIN/{1.73_M2}
GLOBULIN: 3 G/DL
GLUCOSE BLD-MCNC: 99 MG/DL (ref 74–106)
HCT VFR BLD CALC: 47.4 % (ref 40.1–51)
HEMOGLOBIN: 15.5 G/DL (ref 13.7–17.5)
LYMPHOCYTES ABSOLUTE: 1.57 K/UL (ref 1.18–3.74)
LYMPHOCYTES RELATIVE PERCENT: 18.4 % (ref 19.3–53.1)
MCH RBC QN AUTO: 29.2 PG (ref 25.7–32.2)
MCHC RBC AUTO-ENTMCNC: 32.7 G/DL (ref 32.3–36.5)
MCV RBC AUTO: 89.4 FL (ref 79–92.2)
MONOCYTES ABSOLUTE: 0.71 K/UL (ref 0.24–0.82)
MONOCYTES RELATIVE PERCENT: 8.3 % (ref 4.7–12.5)
NEUTROPHILS ABSOLUTE: 5.95 K/UL (ref 1.56–6.13)
NEUTROPHILS RELATIVE PERCENT: 69.7 % (ref 34–71.1)
PDW BLD-RTO: 12.5 % (ref 11.6–14.4)
PLATELET # BLD: 227 K/UL (ref 163–337)
PMV BLD AUTO: 9.1 FL (ref 7.4–10.4)
POTASSIUM SERPL-SCNC: 4.4 MMOL/L (ref 3.5–5.1)
RBC # BLD: 5.3 M/UL (ref 4.63–6.08)
SODIUM BLD-SCNC: 141 MMOL/L (ref 137–145)
TOTAL PROTEIN: 7.6 G/DL (ref 6.3–8.2)
WBC # BLD: 8.53 K/UL (ref 4.23–9.07)

## 2022-12-08 PROCEDURE — 80053 COMPREHEN METABOLIC PANEL: CPT

## 2022-12-08 PROCEDURE — 99215 OFFICE O/P EST HI 40 MIN: CPT | Performed by: INTERNAL MEDICINE

## 2022-12-08 PROCEDURE — 99212 OFFICE O/P EST SF 10 MIN: CPT

## 2022-12-08 PROCEDURE — 85025 COMPLETE CBC W/AUTO DIFF WBC: CPT

## 2022-12-08 PROCEDURE — 36415 COLL VENOUS BLD VENIPUNCTURE: CPT

## 2022-12-08 RX ORDER — HYDROXYZINE PAMOATE 50 MG/1
CAPSULE ORAL
COMMUNITY
Start: 2022-11-02

## 2022-12-08 ASSESSMENT — PROMIS GLOBAL HEALTH SCALE
IN GENERAL, HOW WOULD YOU RATE YOUR SATISFACTION WITH YOUR SOCIAL ACTIVITIES AND RELATIONSHIPS [ON A SCALE OF 1 (POOR) TO 5 (EXCELLENT)]?: 5
IN GENERAL, WOULD YOU SAY YOUR QUALITY OF LIFE IS...[ON A SCALE OF 1 (POOR) TO 5 (EXCELLENT)]: 5
IN GENERAL, HOW WOULD YOU RATE YOUR PHYSICAL HEALTH [ON A SCALE OF 1 (POOR) TO 5 (EXCELLENT)]?: 3
TO WHAT EXTENT ARE YOU ABLE TO CARRY OUT YOUR EVERYDAY PHYSICAL ACTIVITIES SUCH AS WALKING, CLIMBING STAIRS, CARRYING GROCERIES, OR MOVING A CHAIR [ON A SCALE OF 1 (NOT AT ALL) TO 5 (COMPLETELY)]?: 5
SUM OF RESPONSES TO QUESTIONS 3, 6, 7, & 8: 17
IN THE PAST 7 DAYS, HOW WOULD YOU RATE YOUR PAIN ON AVERAGE [ON A SCALE FROM 0 (NO PAIN) TO 10 (WORST IMAGINABLE PAIN)]?: 6
IN GENERAL, PLEASE RATE HOW WELL YOU CARRY OUT YOUR USUAL SOCIAL ACTIVITIES (INCLUDES ACTIVITIES AT HOME, AT WORK, AND IN YOUR COMMUNITY, AND RESPONSIBILITIES AS A PARENT, CHILD, SPOUSE, EMPLOYEE, FRIEND, ETC) [ON A SCALE OF 1 (POOR) TO 5 (EXCELLENT)]?: 5
IN GENERAL, WOULD YOU SAY YOUR HEALTH IS...[ON A SCALE OF 1 (POOR) TO 5 (EXCELLENT)]: 3
IN THE PAST 7 DAYS, HOW WOULD YOU RATE YOUR FATIGUE ON AVERAGE [ON A SCALE FROM 1 (NONE) TO 5 (VERY SEVERE)]?: 3
IN GENERAL, HOW WOULD YOU RATE YOUR MENTAL HEALTH, INCLUDING YOUR MOOD AND YOUR ABILITY TO THINK [ON A SCALE OF 1 (POOR) TO 5 (EXCELLENT)]?: 5
SUM OF RESPONSES TO QUESTIONS 2, 4, 5, & 10: 19
IN THE PAST 7 DAYS, HOW OFTEN HAVE YOU BEEN BOTHERED BY EMOTIONAL PROBLEMS, SUCH AS FEELING ANXIOUS, DEPRESSED, OR IRRITABLE [ON A SCALE FROM 1 (NEVER) TO 5 (ALWAYS)]?: 4

## 2022-12-14 ENCOUNTER — TRANSCRIBE ORDERS (OUTPATIENT)
Dept: ADMINISTRATIVE | Facility: HOSPITAL | Age: 57
End: 2022-12-14

## 2022-12-14 DIAGNOSIS — R59.9 LYMPH NODE ENLARGEMENT: Primary | ICD-10-CM

## 2022-12-14 DIAGNOSIS — C34.91 ADENOCARCINOMA OF RIGHT LUNG (HCC): Primary | ICD-10-CM

## 2022-12-14 DIAGNOSIS — Z85.46 HISTORY OF PROSTATE CANCER: ICD-10-CM

## 2022-12-14 DIAGNOSIS — C34.11 PRIMARY ADENOCARCINOMA OF UPPER LOBE OF RIGHT LUNG (HCC): ICD-10-CM

## 2022-12-14 DIAGNOSIS — C77.2 METASTASIS TO RETROPERITONEAL LYMPH NODE (HCC): ICD-10-CM

## 2022-12-14 DIAGNOSIS — R59.9 ENLARGED LYMPH NODES: ICD-10-CM

## 2022-12-15 ENCOUNTER — HOSPITAL ENCOUNTER (OUTPATIENT)
Dept: NUCLEAR MEDICINE | Facility: HOSPITAL | Age: 57
Discharge: HOME | End: 2022-12-15

## 2022-12-15 ENCOUNTER — HOSPITAL ENCOUNTER (OUTPATIENT)
Dept: CT IMAGING | Facility: HOSPITAL | Age: 57
Discharge: HOME | End: 2022-12-15

## 2022-12-15 ENCOUNTER — APPOINTMENT (OUTPATIENT)
Dept: CT IMAGING | Facility: HOSPITAL | Age: 57
End: 2022-12-15

## 2022-12-15 ENCOUNTER — HOSPITAL ENCOUNTER (OUTPATIENT)
Dept: MRI IMAGING | Facility: HOSPITAL | Age: 57
Discharge: HOME | End: 2022-12-15

## 2022-12-15 DIAGNOSIS — R59.9 LYMPH NODE ENLARGEMENT: ICD-10-CM

## 2022-12-15 LAB — CREAT BLDA-MCNC: 1.1 MG/DL (ref 0.6–1.3)

## 2022-12-15 PROCEDURE — 0 GADOBENATE DIMEGLUMINE 529 MG/ML SOLUTION: Performed by: INTERNAL MEDICINE

## 2022-12-15 PROCEDURE — 78306 BONE IMAGING WHOLE BODY: CPT

## 2022-12-15 PROCEDURE — 82565 ASSAY OF CREATININE: CPT

## 2022-12-15 PROCEDURE — 0 TECHNETIUM MEDRONATE KIT: Performed by: INTERNAL MEDICINE

## 2022-12-15 PROCEDURE — A9503 TC99M MEDRONATE: HCPCS | Performed by: INTERNAL MEDICINE

## 2022-12-15 PROCEDURE — 74176 CT ABD & PELVIS W/O CONTRAST: CPT

## 2022-12-15 PROCEDURE — A9577 INJ MULTIHANCE: HCPCS | Performed by: INTERNAL MEDICINE

## 2022-12-15 PROCEDURE — 74183 MRI ABD W/O CNTR FLWD CNTR: CPT

## 2022-12-15 RX ORDER — TC 99M MEDRONATE 20 MG/10ML
23.5 INJECTION, POWDER, LYOPHILIZED, FOR SOLUTION INTRAVENOUS
Status: COMPLETED | OUTPATIENT
Start: 2022-12-15 | End: 2022-12-15

## 2022-12-15 RX ADMIN — GADOBENATE DIMEGLUMINE 20 ML: 529 INJECTION, SOLUTION INTRAVENOUS at 09:49

## 2022-12-15 RX ADMIN — TECHNETIUM TC 99M MEDRONATE 23.5 MILLICURIE: 25 INJECTION, POWDER, FOR SOLUTION INTRAVENOUS at 10:34

## 2022-12-21 NOTE — PROGRESS NOTES
Patient:  Misael Maravilla  YOB: 1965  Date of Service: 12/22/2022  MRN: [id number]    Primary Care Physician: Gilbert Brooke    Chief Complaint   Patient presents with    Follow-up     Adenocarcinoma of right lung [facility]         Patient Seen, Chart, Consults notes, Labs, Radiology studies reviewed. Subjective:  Misael Maravilla is a 77-year-old  gentleman with primary and secondary diagnoses as follows:  Robotic assisted laparoscopic prostatectomy by Dr. Chau Schneider September 2020 for PT2N0 [place] 7 disease with tumor approaching the right posterior margin  New diagnosis of 4.7 cm x 4 cm x 1.6 cm  adenocarcinoma of the RUL of the lung    Rosalinda Suggs was last seen on 7/15/2020 for opinion regarding a diagnosis of resected prostate cancer. He was lost to follow-up after that visit. He lives in [place], and his prostate cancer is managed by Dr. Chau Schneider with urology at \[facility]\"". 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 Luciano Evans with a new diagnosis of a 4.7 x 4 x 1.6 cm adenocarcinoma of the RUL of the lung for management. #1 TUMOR HISTORY: Adenocarcinoma of Pulmonary Origin 11/16/2022  Alycia Chandler was seen in initial oncology consultation on 7/15/2020 referred by his PCP, Dr. Aleyda Dickson in [facility] for medical oncology opinion regarding a diagnosis of prostate cancer. Mr. Yady Ponce was again seen on 12/8/2022 re-referred by Dr. Luciano Evans for new diagnosis of adenocarcinoma of the lung, diagnosed on 11/16/2022 by CT Guided needle Biopsy. Rosalinda Suggs is a longtime cigarette smoker of 1 pack/day since age 27. He quit smoking on 12/1/2022 after the diagnosis of lung cancer. Naman's PCP, Dr. Aleyda Dickson ordered a low-dose screening CT scan of the chest for monitoring.     CT chest without contrast, low-dose protocol at \[facility]\"" on 5/19/2022:  4 mm LEFT lower lobe pulmonary nodule in superior segment hypermetabolic with a maximum SUV of 12.0  1.7 cm LEFT upper abdomen retroperitoneal lymph node hypermetabolicactivity, max SUV 5.3. This nodule/node closely approximates the LEFT adrenal gland. 9 mm retroaortic upper abdominal lymph node is mildly hypermetabolic with a max SUV of 4.0.   7 mm aortocaval lymph node is mildly hypermetabolic with a max SUV of 3.0. CT Needle Biopsy Lung at [facility] on 11/16/2022:  Successful CT guided biopsy of the right anterior pleural mass  Final Diagnosis  Right upper chest/pleural mass, core biopsies:  Adenocarcinoma of pulmonary origin. [facility] on 11/16/2022:  ALK(D5F3)- NEGATIVE  FISH Analysis ROS1- NEGATIVE  EGFR Exon 18-Not Detected  EGFR Exon 19-Not Detected  EGFR Exon 20 [id number]-Tjo Detected  EGFR Exon 20 (other Mutations)-Not Detected  EGFR Exon 21- Not Detected  BRAF Mutation-Not Detected  FISH MET-No evidence of a MET amplification                      #2   TUMOR HISTORY: Prostate cancer-  Anabell Parra was seen in initial oncology consultation on 7/15/2020 referred by his PCP, Dr. Amna Cedillo in [facility] for medical oncology opinion on his diagnosis of prostate cancer. Anabell Parra relates that he was found to have a minimally increased PSA of between 4.5 and 5 on a routine physical examination. This led to work-up and eventual prostate biopsy leading to the diagnosis of prostate cancer.      Prostate biopsy on 11/1/2018 documented the following:  Right lateral base-Microfocus of prostatic carcinoma   Prostate biopsy on 2/25/2019 documented the following:  Right lateral base-adenocarcinoma: [place] 3+4 = 7  Right lateral mid- adenocarcinoma: [place] 3+3 = 6  Prostate biopsy 2/18/2020 documented the following:  Right lateral base-adenocarcinoma: [place] 3+4 = 7     PSA levels by date as follows:  2/19/2019-9.1  4/19/2019-7.0  7/22/2019-6.6  10/17/2019-7.8  114 2020-8.39  5/18/2020- 11.5     MRI pelvis with and without contrast on 2/5/2020 at [facility] documented:  No suspicious lesions in the prostate (PI-RADS 3 or greater)  No pelvis lymphadenopathy or suspicious pelvic bone lesions     Prostate biopsy was performed on 2/18/2020 by Dr. Kishan Sheffield at [facility]. Pathology revealed:  Prostate, right apex, needle biopsy:  Benign prostate tissue  Prostate, right base, needle biopsy:  Adenocarcinoma, acinar type, [place] grade 3+4=7, grade group 2, discontinuously involving 10/20 mm or 50% of core (3 foci measuring 4,3, and 1 mm), 30% of tumor is [place] pattern 4  Prostate, right mid, needle biopsy:  Benign prostate tissue  Prostate, left apex, needle biopsy:  Benign prostate tissue  Prostate, left base, needle biopsy  Benign prostate tissue  Prostate, left mid, needle biopsy:  Benign prostate tissue        CT abdomen and pelvis without contrast on 2/20/2020 at [facility] documented:  No evidence of complication after prostate biopsy  Numerous colonic diverticula with no evidence of acute diverticulitis  Small bilateral fat-containing groin hernias     Bone scan on 6/2/2020 at [facility] documented:  Abnormal uptake at the right dorsal tissues at the level of T12 and right upper kidney. This could represent a bone lesion or renal lesion. Recommend CT abdomen with contrast for further evaluation if patient's renal function permits. If low renal function, MRI abdomen without contrast would be an alternative for evaluation     CT abdomen and pelvis without contrast on 6/7/2020 at [facility] documented no acute abnormality. CBC today (7/15/2020) reveals a WBC of 6.73. Hgb is 15.5 with an MCV of 93.5 and platelet count of 525,562. Nathalia Minor has been under evaluation and monitoring in the urology department by Dr. Beltran Goodwin at [facility]. At his last visit on 5/28/2020 Mr. Jacqueline Tirado was leaning toward a radical prostatectomy to address the issue of his prostate cancer. Medical oncology consultation was requested for opinion.   The 2 best options at this juncture include a radical prostatectomy which is most definitive followed by radiation therapy which gives similar statistical long-term results but with the potential for long-term radiation therapy associated side effects. I have encouraged him that a radical prostatectomy is appropriate and if he is considering this that I would support that completely. He has an appointment with Dr. Cristiana Cuevas on 8/21/2022 discussed robotic prostatectomy. Recommendation prior to his August appointment with urology is as follows: [employer] genetic testing drawn today  PSA  CMP  MRI of the thoracic spine W&WO to evaluate the T12 area and any other areas of concern     He had robotic assisted laparoscopic prostatectomy by Dr. Lui Charles September 2020 for PT2N0 [place] 7 disease with tumor approaching the right posterior margin. TREATMENT SUMMARY:  Robotic assisted laparoscopic prostatectomy by Dr. Lui Charles September 2020 for PT2N0 [place] 7 disease with tumor approaching the right posterior margin    Concern on the work-up is that in addition to the 4.7 cm primary RUL adenocarcinoma of the lung there are upper abdominal retroperitoneal and aortocaval lymph nodes positive on the PET scan. If the upper abdominal lymph nodes were not a concern, Anabell Parra could be treated with neoadjuvant Opdivo Alimta Keytruda followed by resection. Intense interdepartmental consultations are being undertaken to give Anabell Parra the most aggressive approach possible. All of the plans outlined below were discussed at length with Anabell Parra and his wife Joss Whelan. MRI abdomen without and with IV contrast  at [facility] on 12/15/2022:COMPARISON: PET/CT 11/01/2022    9 mm RIGHT liver cyst  15.2 cm spleen  9 mm upper abdominal retroperitoneal lymph node,unchanged      CT ABDOMEN PELVIS WO CONTRAST at [facility] on 12/15/2022:COMPARISON: PET/CT 11/01/2022  Penile prosthesis. Penile pump implanted within the left anterolateral pelvic wall.    There is prominent wall thickening of the distal sigmoid colon with multiple regional diverticuli. There are small fatty containing umbilical hernia. Fatty containing inguinal hernias  7 mm left periaortic and aortocaval lymph nodes demonstrating increased FDG uptake comparison PET   exam remains     NM BONE SCAN WHOLE BODY at [facility] on 12/15/2022:Comparison is made with abdomen/pelvis CT imaging from earlier today. Comparison is made with a PET/CT from 11/01/2022. Comparison is made with a bone scan from 02/20/2020. No evidence of bone metastasis. An extended discussion in conference was undertaken with Babatunde King and his wife explaining the plan as outlined. Schedules need to be coordinated with Karrie Castellanos wife who is here and assisted accordingly. I called and spoke to Dr. Mine West and called and spoke to Dr. Agnes Stewart in anticipation of potential coordination with Dr. Robyn Hodges to document status of the abdominal lymph nodes with robotic intervention. Plan is as follows. RECOMMEND:  Thoracic surgery consultation with Dr. Mine West anticipating neoadjuvant chemoimmunotherapy  Call placed and spoke to Dr. Mine West at length  Radiation therapy consult for intradepartmental consultation, called and spoke to Dr. Agnes Stewart today. Consult Dr. Robyn Hodges with surgical group at [facility] for consideration of a diagnostic robotic laparoscopy for lymph node sampling, and a port placement. Follow-up with me in 2 weeks      Allergies:  Patient has no known allergies. Medicines:  Current Outpatient Medications   Medication Sig Dispense Refill    hydrOXYzine pamoate (VISTARIL) 50 MG capsule       gabapentin (NEURONTIN) 800 MG tablet Take 800 mg by mouth 3 times daily.       ibuprofen (ADVIL;MOTRIN) 800 MG tablet Take 800 mg by mouth every 6 hours as needed for Pain      Cetirizine HCl 10 MG CAPS Take 1 capsule by mouth daily      omeprazole (PRILOSEC) 40 MG delayed release capsule Take 40 mg by mouth daily      sildenafil (VIAGRA) 100 MG tablet Take 100 mg by mouth daily      Albuterol Sulfate (VENTOLIN HFA IN) Inhale 2 puffs into the lungs every 4 hours       No current facility-administered medications for this visit. Past Medical History:      Diagnosis Date    Adenocarcinoma, lung, right ([facility] [address] 2022    Arthritis     Cancer ([facility] [address]     Prostate    Carcinoma in situ of prostate     Thrombocytopenia ([facility] [address]         Past Surgical History:      Procedure Laterality Date    ANKLE SURGERY Right     ANTERIOR CRUCIATE LIGAMENT REPAIR Left     BACK SURGERY      X2    ELBOW SURGERY Right 2011    Tennis elbow release    NASAL SEPTUM SURGERY      FL SONO GUIDE NEEDLE BIOPSY  2020    Dr. Kishan Sheffield @ [facility]    SHOULDER SURGERY Left     x4        Family History:  No family history on file. Social History  Social History     Tobacco Use    Smoking status: Former     Packs/day: 1.00     Years: 15.00     Pack years: 15.00     Types: Cigarettes     Start date: 1987     Quit date: 2022     Years since quittin.0    Smokeless tobacco: Never    Tobacco comments: On Chantix. ..trying to quit   Vaping Use    Vaping Use: Former    Substances: Nicotine    Devices: Pre-filled or refillable cartridge, Refillable tank   Substance Use Topics    Alcohol use: Not Currently    Drug use: Never              Wt Readings from Last 3 Encounters:   22 216 lb 6.4 oz (98.2 kg)   22 212 lb 8 oz (96.4 kg)   07/15/20 210 lb (95.3 kg)        Objective:  Vital Signs: Blood pressure 130/74, pulse 77, weight 216 lb 6.4 oz (98.2 kg), SpO2 96 %. Labs:  BMP: No results for input(s): NA, K, CL, CO2, PHOS, BUN, CREATININE, CALCIUM in the last 72 hours. CBC:   Recent Labs     22  1125   WBC 6.42   HGB 14.2   HCT 43.1   MCV 88.5          PT/INR: No results for input(s): PROTIME, INR in the last 72 hours. APTT: No results for input(s): APTT in the last 72 hours. Magnesium:No results for input(s): MG in the last 72 hours.   Phosphorus:No results for input(s): PHOS in the last 72 hours. Hepatic: No results for input(s): ALKPHOS, ALT, AST, PROT, BILITOT, BILIDIR, LABALBU in the last 72 hours. Cultures:   No results for input(s): CULTURE in the last 72 hours. Radiology reports as per the Radiologist  Radiology: No results found. ASSESSMENT AND PLAN:    #1  Adenocarcinoma of Pulmonary Origin-11/16/2022    Rach Parks is a 73-year-old  gentleman with primary and secondary diagnoses as follows:  Robotic assisted laparoscopic prostatectomy by Dr. Monroe Sanchez September 2020 for PT2N0 [place] 7 disease with tumor approaching the right posterior margin  New diagnosis of 4.7 cm x 4 cm x 1.6 cm  adenocarcinoma of the RUL of the lung    Chetan Prakash was last seen on 7/15/2020 for opinion regarding a diagnosis of resected prostate cancer. He was lost to follow-up after that visit. He lives in [place], and his prostate cancer is managed by Dr. Monroe Sanchez with urology at [facility]. 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 Karolina Toscano with a new diagnosis of a 4.7 x 4 x 1.6 cm adenocarcinoma of the RUL of the lung for management. Chetan Prakash is a longtime cigarette smoker of 1 pack/day since age 27. He quit smoking on 12/1/2022 after the diagnosis of lung cancer. Naman's PCP, Dr. Edwardo Chaudhari ordered a low-dose screening CT scan of the chest for monitoring because he is a pack a day smoker since 1987    CT chest without contrast, low-dose protocol at [facility] on 5/19/2022:  4 mm LEFT lower lobe pulmonary nodule in superior segment   3.3 x 1.3 cm mass like area of pleural thickening in the RIGHT anterior upper chest   Moderate centrilobular and paraseptal emphysema. Lung-RADS 2S-- Nodules with a very low likelihood of becoming a clinically active cancer due to size or lack of growth. Continue annual screening with low dose CT in 12 months.    3 month follow-up chest CT recommended to evaluate for stability of RIGHT anterior upper chest masslike pleural thickening    Dr. Solomon Aschoff repeated the low-dose CT scan because of the findings in the right anterior upper chest    CT chest without contrast, low-dose protocol at [facility] on 10/17/2022:  4.7 cm x 4 cm x 1.6 cm abnormal area of pleural thickening in the periphery of the RUL with slightly irregular margins  Moderate paraseptal emphysematous changes with scattered bullae in both lungs  Lung-RADS - 4B    Initial consult with Dr. Ellyn Morelos at [facility] on 10/24/2022:  Explained the abnormal CT scan results from May which is done in the [facility] to the patient and also discussed with the radiologist from [facility] in [place] who reviewed the current CT scan of the chest done earlier this month. This shows the right upper lobe pleural-based thickening or mass and left upper lobe nodule. Due to his smoking history the possibility of malignancy cannot be ruled out. Discussing different options I ordered a PET scan and a follow-up CT scan of the chest in 3 months time. If the PET scan is positive a CT-guided needle biopsy would be best option for the right upper lobe pleural-based lung mass which is not reachable by bronchoscopy. If PET scan is negative will wait for the neck CT scan of the chest and make further recommendations. NM PET/CT SKULL BASE TO MID THIGH at [facility] on 11/1/2022:Comparison: Chest CT 5/19/2022  Background right hepatic lobe metabolic activity measures max SUV 2.7.  4.3 x 1.9 cm RIGHT anterior upper chest pleural thickening which is hypermetabolic with a maximum SUV of 12.0  1.7 cm LEFT upper abdomen retroperitoneal lymph node hypermetabolicactivity, max SUV 5.3. This nodule/node closely approximates the LEFT adrenal gland.    9 mm retroaortic upper abdominal lymph node is mildly hypermetabolic with a max SUV of 4.0.   7 mm aortocaval lymph node is mildly hypermetabolic with a max SUV of 3. 0.      CT Needle Biopsy Lung at [facility] on 11/16/2022:  Successful CT guided biopsy of the right anterior pleural mass  Final Diagnosis  Right upper chest/pleural mass, core biopsies:  Adenocarcinoma of pulmonary origin. [facility] on 11/16/2022:  ALK(D5F3)- NEGATIVE  FISH Analysis ROS1- NEGATIVE  EGFR Exon 18-Not Detected  EGFR Exon 19-Not Detected  EGFR Exon 20 [id number]-Gll Detected  EGFR Exon 20 (other Mutations)-Not Detected  EGFR Exon 21- Not Detected  BRAF Mutation-Not Detected  FISH MET-No evidence of a MET amplification    Medical oncology consultation requested      /74   Pulse 77   Wt 216 lb 6.4 oz (98.2 kg)   SpO2 96%   BMI 27.78 kg/m²      Examination today, 12/22/2022, is without evidence of palpable supraclavicular or infraclavicular lymphadenopathy. Lungs reveal bilateral rales in upper and lower lung fields but without signs of consolidation. Heart regular rate and rhythm normal S1 and S2, no S3  Abdomen is soft and benign without organomegaly or masses  Extremities no cyanosis clubbing or edema  Neurological exam is grossly intact. CBC today 12/22/2022  reveals a WBC of  Hgb is with an MCV of  and platelet count of . Concern on the work-up is that in addition to the 4.7 cm primary RUL adenocarcinoma of the lung there are upper abdominal retroperitoneal and aortocaval lymph nodes positive on the PET scan. If the upper abdominal lymph nodes were not a concern, Dmitri Espinoza could be treated with neoadjuvant Opdivo Alimta Keytruda followed by resection. Intense interdepartmental consultations are being undertaken to give Dmitri Espinoza the most aggressive approach possible. All of the plans outlined below were discussed at length with Dmitri Espinoza and his wife Massimo Li.               MRI abdomen without and with IV contrast  at [facility] on 12/15/2022:COMPARISON: PET/CT 11/01/2022    9 mm RIGHT liver cyst  15.2 cm spleen  9 mm upper abdominal retroperitoneal lymph node,unchanged      CT ABDOMEN PELVIS WO CONTRAST at hospitals on 12/15/2022:COMPARISON: PET/CT 11/01/2022  Penile prosthesis. Penile pump implanted within the left anterolateral pelvic wall. There is prominent wall thickening of the distal sigmoid colon with multiple regional diverticuli. There are small fatty containing umbilical hernia. Fatty containing inguinal hernias  7 mm left periaortic and aortocaval lymph nodes demonstrating increased FDG uptake comparison PET   exam remains     NM BONE SCAN WHOLE BODY at hospitals on 12/15/2022:Comparison is made with abdomen/pelvis CT imaging from earlier today. Comparison is made with a PET/CT from 11/01/2022. Comparison is made with a bone scan from 02/20/2020. No evidence of bone metastasis. Eleni Kennedy and his wife bring up the fact that since his last visit, after coming home after a series of imaging studies, he completely passed out and was unarousable for short period of time. He does not have any focal findings on neurological exam.  MRI of the brain will be requested    Additionally, he states that since his last visit he has had episodic shooting pains in RUQ. This is not related to foods. Certainly not related to fatty foods. It can be exacerbated by bending over. Ultrasound of the RUQ requested to rule out gallbladder disease    An extended discussion in conference was undertaken with Nimisha Barney and his wife explaining the plan as outlined. Schedules need to be coordinated with Garret Shaffer wife who is here and assisted accordingly. I called and spoke to Dr. Tim Watkins and called and spoke to Dr. Alona Finley in anticipation of potential coordination with Dr. Robin Power to document status of the abdominal lymph nodes with robotic intervention. Plan is as follows.     RECOMMEND:  Thoracic surgery consultation with Dr. Tim Watkins anticipating neoadjuvant chemoimmunotherapy  Call placed and spoke to Dr. Tim Watkins at length  Radiation therapy consult for intradepartmental consultation, called and spoke to Dr. Davis Greek today. Consult Dr. Petty Lee with surgical group at [facility] for consideration of a diagnostic robotic laparoscopy for lymph node sampling, and a port placement. Ultrasound of the RUQ to rule out gallbladder disease  MRI of the brain with and without contrast to evaluate for CNS deposits given syncopal episodes he had at home since he was last here. Follow-up with me in 2 weeks              #2  TUMOR HISTORY: Prostate cancer  Cierra Thomas was seen in initial oncology consultation on 7/15/2020 referred by his PCP, Dr. Neda Urbano in [facility] for medical oncology opinion on his diagnosis of prostate cancer. Cierra Thomas relates that he was found to have a minimally increased PSA of between 4.5 and 5 on a routine physical examination. This led to work-up and eventual prostate biopsy leading to the diagnosis of prostate cancer. Prostate biopsy on 11/1/2018 documented the following:  Right lateral base-Microfocus of prostatic carcinoma   Prostate biopsy on 2/25/2019 documented the following:  Right lateral base-adenocarcinoma: [place] 3+4 = 7  Right lateral mid- adenocarcinoma: [place] 3+3 = 6  Prostate biopsy 2/18/2020 documented the following:  Right lateral base-adenocarcinoma: [place] 3+4 = 7     PSA levels by date as follows:  2/19/2019-9.1  4/19/2019-7.0  7/22/2019-6.6  10/17/2019-7.8  114 2020-8.39  5/18/2020- 11.5     MRI pelvis with and without contrast on 2/5/2020 at [facility] documented:  No suspicious lesions in the prostate (PI-RADS 3 or greater)  No pelvis lymphadenopathy or suspicious pelvic bone lesions     Prostate biopsy was performed on 2/18/2020 by Dr. Jose Russell at [facility].   Pathology revealed:  Prostate, right apex, needle biopsy:  Benign prostate tissue  Prostate, right base, needle biopsy:  Adenocarcinoma, acinar type, [place] grade 3+4=7, grade group 2, discontinuously involving 10/20 mm or 50% of core (3 foci measuring 4,3, and 1 mm), 30% of tumor is [place] pattern 4  Prostate, right mid, needle biopsy:  Benign prostate tissue  Prostate, left apex, needle biopsy:  Benign prostate tissue  Prostate, left base, needle biopsy  Benign prostate tissue  Prostate, left mid, needle biopsy:  Benign prostate tissue        CT abdomen and pelvis without contrast on 2/20/2020 at [facility] documented:  No evidence of complication after prostate biopsy  Numerous colonic diverticula with no evidence of acute diverticulitis  Small bilateral fat-containing groin hernias     Bone scan on 6/2/2020 at [facility] documented:  Abnormal uptake at the right dorsal tissues at the level of T12 and right upper kidney. This could represent a bone lesion or renal lesion. Recommend CT abdomen with contrast for further evaluation if patient's renal function permits. If low renal function, MRI abdomen without contrast would be an alternative for evaluation     CT abdomen and pelvis without contrast on 6/7/2020 at [facility] documented no acute abnormality. Harshil De La Paz has been under evaluation and monitoring in the urology department by Dr. Kamryn Lambert at [facility]. At his last visit on 5/28/2020 Mr. Estelle Herrera was leaning toward a radical prostatectomy to address the issue of his prostate cancer. Medical oncology consultation was requested for opinion. The 2 best options at this juncture include a radical prostatectomy which is most definitive followed by radiation therapy which gives similar statistical long-term results but with the potential for long-term radiation therapy associated side effects. I have encouraged him that a radical prostatectomy is appropriate and if he is considering this that I would support that completely. He has an appointment with Dr. Annie Carty on 8/21/2022 discussed robotic prostatectomy. Recommendation prior to his August appointment with urology is as follows:   [facility] genetic testing drawn today  PSA  CMP  MRI of the thoracic spine W&WO to evaluate the T12 area and any other areas of concern     He had robotic assisted laparoscopic prostatectomy by Dr. Valentine Ba September 2020 for PT2N0 Wyatt 7 disease with tumor approaching the right posterior margin. 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 Diagnoses and all orders for this visit:    Adenocarcinoma of right lung [facility])  -     External Referral To Cardiothoracic Surgery  -     Amb External Referral To Radiation Oncology  -     MRI BRAIN W WO CONTRAST; Future  -     Cancel: Amb External Referral To General Surgery  -     Amb External Referral To General Surgery    History of prostate cancer  -     MRI BRAIN W WO CONTRAST; Future    RUQ pain  -     US ABDOMEN LIMITED; Future    Enlarged lymph nodes  -     Cancel: Amb External Referral To General Surgery  -     MRI BRAIN W WO CONTRAST; Future  -     Cancel: Amb External Referral To General Surgery  -     Amb External Referral To General Surgery        Orders Placed This Encounter   Procedures    US ABDOMEN LIMITED     This procedure can be scheduled via [facility] [email or web address]). Access your [facility] [email or web address]) account by visiting [email or web address]. Standing Status:   Future     Standing Expiration Date:   12/22/2023     Order Specific Question:   Reason for exam:     Answer:   evaluate gallbladder     Order Specific Question:   Specify organ?      Answer:   GALLBLADDER    MRI BRAIN W WO CONTRAST     Standing Status:   Future Standing Expiration Date:   12/22/2023     Order Specific Question:   STAT Creatinine as needed:     Answer:   No     Order Specific Question:   Reason for exam:     Answer:   metastatic workup of adenocarcinoma lung     Order Specific Question:   What is the sedation requirement? Answer:   None    External Referral To Cardiothoracic Surgery     Referral Priority:   Routine     Referral Type:   Eval and Treat     Referral Reason:   Specialty Services Required     Referred to Provider:   Pallavi Harris MD     Requested Specialty:   Cardiothoracic Surgery     Number of Visits Requested:   1    Amb External Referral To Radiation Oncology     Referral Priority:   Routine     Referral Reason:   Specialty Services Required     Referred to Provider:   Hailey Foster     Requested Specialty:   Radiology     Number of Visits Requested:   1    Amb External Referral To General Surgery     Referral Priority:   Routine     Referral Type:   Consult for Advice and Opinion     Referral Reason:   Specialty Services Required     Referred to Provider:   Meryle Grit, MD     Requested Specialty:   General Surgery     Number of Visits Requested:   1           Return for follow-up with .

## 2022-12-22 ENCOUNTER — HOSPITAL ENCOUNTER (OUTPATIENT)
Dept: RADIATION ONCOLOGY | Facility: HOSPITAL | Age: 57
Setting detail: RADIATION/ONCOLOGY SERIES
End: 2022-12-22
Payer: MEDICAID

## 2022-12-22 ENCOUNTER — DOCUMENTATION (OUTPATIENT)
Dept: RADIATION ONCOLOGY | Facility: HOSPITAL | Age: 57
End: 2022-12-22

## 2022-12-22 ENCOUNTER — TRANSCRIBE ORDERS (OUTPATIENT)
Dept: ADMINISTRATIVE | Facility: HOSPITAL | Age: 57
End: 2022-12-22

## 2022-12-22 ENCOUNTER — OFFICE VISIT (OUTPATIENT)
Dept: HEMATOLOGY | Age: 57
End: 2022-12-22
Payer: MEDICAID

## 2022-12-22 ENCOUNTER — CONSULT (OUTPATIENT)
Dept: RADIATION ONCOLOGY | Facility: HOSPITAL | Age: 57
End: 2022-12-22

## 2022-12-22 ENCOUNTER — HOSPITAL ENCOUNTER (OUTPATIENT)
Dept: INFUSION THERAPY | Age: 57
Discharge: HOME | End: 2022-12-22
Payer: MEDICAID

## 2022-12-22 VITALS
DIASTOLIC BLOOD PRESSURE: 74 MMHG | SYSTOLIC BLOOD PRESSURE: 130 MMHG | OXYGEN SATURATION: 96 % | BODY MASS INDEX: 27.78 KG/M2 | WEIGHT: 216.4 LBS | HEART RATE: 77 BPM

## 2022-12-22 VITALS
DIASTOLIC BLOOD PRESSURE: 87 MMHG | BODY MASS INDEX: 27.72 KG/M2 | HEIGHT: 74 IN | WEIGHT: 216 LBS | SYSTOLIC BLOOD PRESSURE: 155 MMHG

## 2022-12-22 DIAGNOSIS — R10.11 RIGHT UPPER QUADRANT PAIN: ICD-10-CM

## 2022-12-22 DIAGNOSIS — C34.91 MALIGNANT NEOPLASM OF RIGHT LUNG, UNSPECIFIED PART OF LUNG: Primary | ICD-10-CM

## 2022-12-22 DIAGNOSIS — Z85.46 HISTORY OF PROSTATE CANCER: ICD-10-CM

## 2022-12-22 DIAGNOSIS — C34.11 MALIGNANT NEOPLASM OF UPPER LOBE OF RIGHT LUNG: Primary | ICD-10-CM

## 2022-12-22 DIAGNOSIS — C34.91 ADENOCARCINOMA OF RIGHT LUNG (HCC): Primary | ICD-10-CM

## 2022-12-22 DIAGNOSIS — R59.9 ENLARGED LYMPH NODES: ICD-10-CM

## 2022-12-22 DIAGNOSIS — C34.11 PRIMARY ADENOCARCINOMA OF UPPER LOBE OF RIGHT LUNG (HCC): ICD-10-CM

## 2022-12-22 DIAGNOSIS — C34.91 ADENOCARCINOMA, LUNG, RIGHT (HCC): ICD-10-CM

## 2022-12-22 DIAGNOSIS — J44.9 CHRONIC OBSTRUCTIVE PULMONARY DISEASE, UNSPECIFIED COPD TYPE: ICD-10-CM

## 2022-12-22 DIAGNOSIS — C34.91 ADENOCARCINOMA, LUNG, RIGHT: Primary | ICD-10-CM

## 2022-12-22 DIAGNOSIS — Z87.891 FORMER SMOKER: ICD-10-CM

## 2022-12-22 DIAGNOSIS — R10.11 RUQ PAIN: ICD-10-CM

## 2022-12-22 LAB
BASOPHILS ABSOLUTE: 0.07 K/UL (ref 0.01–0.08)
BASOPHILS RELATIVE PERCENT: 1.1 % (ref 0.1–1.2)
EOSINOPHILS ABSOLUTE: 0.21 K/UL (ref 0.04–0.54)
EOSINOPHILS RELATIVE PERCENT: 3.3 % (ref 0.7–7)
HCT VFR BLD CALC: 43.1 % (ref 40.1–51)
HEMOGLOBIN: 14.2 G/DL (ref 13.7–17.5)
LYMPHOCYTES ABSOLUTE: 1.6 K/UL (ref 1.18–3.74)
LYMPHOCYTES RELATIVE PERCENT: 24.9 % (ref 19.3–53.1)
MCH RBC QN AUTO: 29.2 PG (ref 25.7–32.2)
MCHC RBC AUTO-ENTMCNC: 32.9 G/DL (ref 32.3–36.5)
MCV RBC AUTO: 88.5 FL (ref 79–92.2)
MONOCYTES ABSOLUTE: 0.56 K/UL (ref 0.24–0.82)
MONOCYTES RELATIVE PERCENT: 8.7 % (ref 4.7–12.5)
NEUTROPHILS ABSOLUTE: 3.95 K/UL (ref 1.56–6.13)
NEUTROPHILS RELATIVE PERCENT: 61.5 % (ref 34–71.1)
PDW BLD-RTO: 13.2 % (ref 11.6–14.4)
PLATELET # BLD: 174 K/UL (ref 163–337)
PMV BLD AUTO: 9.7 FL (ref 7.4–10.4)
RBC # BLD: 4.87 M/UL (ref 4.63–6.08)
WBC # BLD: 6.42 K/UL (ref 4.23–9.07)

## 2022-12-22 PROCEDURE — 99215 OFFICE O/P EST HI 40 MIN: CPT | Performed by: INTERNAL MEDICINE

## 2022-12-22 PROCEDURE — 85025 COMPLETE CBC W/AUTO DIFF WBC: CPT

## 2022-12-22 PROCEDURE — G0463 HOSPITAL OUTPT CLINIC VISIT: HCPCS | Performed by: RADIOLOGY

## 2022-12-22 PROCEDURE — 36415 COLL VENOUS BLD VENIPUNCTURE: CPT

## 2022-12-22 PROCEDURE — 99213 OFFICE O/P EST LOW 20 MIN: CPT

## 2022-12-22 NOTE — PROGRESS NOTES
I spoke with Dr. Tadeo regarding this patient today.  He has a very unusual pattern of malignancy with a right upper lobe peripheral adenocarcinoma which was biopsied with CT-guided biopsy.  He also has intra-abdominal hypermetabolic lymphadenopathy which is clinically suspicious for neoplasm.    This would be an unusual metastatic site from a primary lung carcinoma.  The patient also has previous history of prostate carcinoma.    I have discussed this with Dr. Tadeo and I believe if it is possible perhaps robotic biopsy of the periaortic lymphadenopathy could be considered.  We will also discussed with Dr. Goldy Carlin for consideration of definitive treatment of the primary right upper lobe lung tumor.    We will see the patient in consultation today and coordinate with Dr. Tadeo and other physicians to develop a coordinated treatment plan.

## 2022-12-22 NOTE — PROGRESS NOTES
[facility]   Anton Kinney MD      Naif Jones APRN  ____________________________________________________________               [facility]  Department of Radiation Oncology  [address] [place] [id number]  Office:  [phone]  Fax: [phone]    DATE: 12/22/2022  PATIENT: Lalo Jones 1965   Medical record #: [id number]                                                  REASON FOR CONSULTATION:   Chief Complaint   Patient presents with   • Lung Cancer     Lalo Jones is a 57 y.o. male that has been referred to our clinic to be evaluated for consideration of radiotherapy to the lung. Reports right chest pain. Denies activity change, appetite change, unexpected weight change, nausea/vomiting, diarrhea, light-headedness, weakness, and headaches. He follows .            HISTORY OF PRESENT ILLNESS:   Right upper lobe peripheral adenocarcinoma lung     05/19/2022 - CT Chest Low Dose:  • 4 mm LEFT lower lobe pulmonary nodule.  • Moderate emphysema.  • 3.3 x 1.3 cm masslike area of pleural thickening in the RIGHT anterior upper chest. Recommend a follow-up chest CT in 3 months to ensure stability.    10/17/2022 - CT chest without contrast, low-dose protocol at [facility]:  • 4.7 cm x 4 cm x 1.6 cm abnormal area of pleural thickening in the periphery of the RUL with slightly irregular margins  • Moderate paraseptal emphysematous changes with scattered bullae in both lungs  • Lung-RADS - 4B    10/24/2022 - Appointment with Russel Santana MD:   • I explained the abnormal CT scan results from May which is done in the [facility] to the patient and also discussed with the radiologist from [facility] in [place] who reviewed the current CT scan of the chest done earlier this month.  This shows the right upper lobe pleural-based thickening or mass and left upper lobe nodule.  Due to his smoking history the possibility of  malignancy cannot be ruled out.  • Discussing different options I ordered a PET scan and a follow-up CT scan of the chest in 3 months time.  If the PET scan is positive a CT-guided needle biopsy would be best option for the right upper lobe pleural-based lung mass which is not reachable by bronchoscopy.  If PET scan is negative will wait for the neck CT scan of the chest and make further recommendations.  Patient is agreeable with the plan.  In the meantime I will try to get the CD from from the outside hospital with the latest CT scan of the chest for further comparison.  • Patient definitely has chronic obstructive pulmonary disease and he was started on Wixela 250/50 1 puff twice a day and albuterol rescue inhaler will be continued.  • His nasal allergy he was started on fluticasone nasal spray and Singulair.  He told me he is already using Zyrtec mostly over-the-counter which will be continued.  All medications were sent to the pharmacy.  • Lalo Jones  reports that he has been smoking cigarettes. He started smoking about 35 years ago. He has a 35.00 pack-year smoking history. He has never used smokeless tobacco.. I have educated him on the risk of diseases from using tobacco products such as cancer, COPD and heart disease.   • I advised him to quit and he is willing to quit. We have discussed the following method/s for tobacco cessation:  Counseling.  Together we have set a quit date for 2 weeks from today.  He will follow up with me in 3 months or sooner to check on his progress.I spent 7 minutes counseling the patient  • Patient is advised to have a sleep study due to sleep disturbances and most likely have sleep apnea and may need his CPAP.  He is also advised to get a pulmonary function test before the next clinic visit for his underlying COPD in addition to the PET scan and CT scan which are already ordered.  He will return to the pulm clinic for follow-up visit in 3 months time or earlier if  needed  Follow up:  • 3 months     11/01/2022 - PET Scan:  • Slight increase in size of 4.3 x 1.9 cm RIGHT anterior upper chest masslike pleural thickening which is markedly hypermetabolic. Favor this to represent a neoplastic process with differential including lymphoma. Primary pleural neoplasm and metastatic disease are also in the differential.  • Hypermetabolic upper abdominal retroperitoneal lymphadenopathy. Suspect these lymph nodes related to the same process as the RIGHT anterior pleural lesion.  • No other abnormal hypermetabolic activity.    11/16/2022 - Right upper chest/pleural mass, core biopsies:  • Adenocarcinoma of pulmonary origin.    12/08/2022 - Appointment with :  Adenocarcinoma of Pulmonary Origin-11/16/2022  • Lalo Jones is a 57-year-old  gentleman with primary and secondary diagnoses as follows:  Robotic assisted laparoscopic prostatectomy by Dr. Jj September 2020 for PT2N0 Consuelo 7 disease with tumor approaching the right posterior margin  • New diagnosis of 4.7 cm x 4 cm x 1.6 cm adenocarcinoma of the RUL of the lung  • Nino was last seen on 7/15/2020 for opinion regarding a diagnosis of resected prostate cancer. He was lost to follow-up after that visit. He lives in [place], and his prostate cancer is managed by Dr. Jj with urology at [facility].  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 Russel Santana with a new diagnosis of a 4.7 x 4 x 1.6 cm adenocarcinoma of the RUL of the lung for management.  • Nino is a longtime cigarette smoker of 1 pack/day since age 20. He quit smoking on 12/1/2022 after the diagnosis of lung cancer.  • Nino's PCP, Dr. Flex Sheikh ordered a low-dose screening CT scan of the chest for monitoring because he is a pack a day smoker since 1987  • CT chest without contrast, low-dose protocol at [facility] on 5/19/2022:  4 mm LEFT lower lobe pulmonary  nodule in superior segment   3.3 x 1.3 cm mass like area of pleural thickening in the RIGHT anterior upper chest   Moderate centrilobular and paraseptal emphysema.   Lung-RADS 2S-- Nodules with a very low likelihood of becoming a clinically active cancer due to size or lack of growth.   Continue annual screening with low dose CT in 12 months.   3 month follow-up chest CT recommended to evaluate for stability of RIGHT anterior upper chest masslike pleural thickening  • Dr. Flex French repeated the low-dose CT scan because of the findings in the right anterior upper chest  • CT chest without contrast, low-dose protocol at [facility] on 10/17/2022:  4.7 cm x 4 cm x 1.6 cm abnormal area of pleural thickening in the periphery of the RUL with slightly irregular margins  Moderate paraseptal emphysematous changes with scattered bullae in both lungs  Lung-RADS - 4B  • Initial consult with Dr. Russel Santana at [facility] on 10/24/2022:  • Explained the abnormal CT scan results from May which is done in the [facility] to the patient and also discussed with the radiologist from [facility] in [place] who reviewed the current CT scan of the chest done earlier this month. This shows the right upper lobe pleural-based thickening or mass and left upper lobe nodule. Due to his smoking history the possibility of malignancy cannot be ruled out.  • Discussing different options I ordered a PET scan and a follow-up CT scan of the chest in 3 months time. If the PET scan is positive a CT-guided needle biopsy would be best option for the right upper lobe pleural-based lung mass which is not reachable by bronchoscopy. If PET scan is negative will wait for the neck CT scan of the chest and make further recommendations.   • NM PET/CT SKULL BASE TO MID THIGH at [facility] on 11/1/2022:Comparison: Chest CT 5/19/2022  Background right hepatic lobe metabolic activity measures max SUV 2.7.  4.3 x 1.9 cm RIGHT anterior upper chest  pleural thickening which is hypermetabolic with a maximum SUV of 12.0  1.7 cm LEFT upper abdomen retroperitoneal lymph node hypermetabolicactivity, max SUV 5.3. This nodule/node closely approximates the LEFT adrenal gland.   9 mm retroaortic upper abdominal lymph node is mildly hypermetabolic with a max SUV of 4.0.   7 mm aortocaval lymph node is mildly hypermetabolic with a max SUV of 3.0.  • CT Needle Biopsy Lung at [facility] on 11/16/2022:  Successful CT guided biopsy of the right anterior pleural mass  Final Diagnosis  Right upper chest/pleural mass, core biopsies:  Adenocarcinoma of pulmonary origin.  • [facility] on 11/16/2022:  ALK(D5F3)- NEGATIVE  FISH Analysis ROS1- NEGATIVE  EGFR Exon 18-Not Detected  EGFR Exon 19-Not Detected  EGFR Exon 20 N009D-Dgg Detected  EGFR Exon 20 (other Mutations)-Not Detected  EGFR Exon 21- Not Detected  BRAF Mutation-Not Detected  FISH MET-No evidence of a MET amplification  • Medical oncology consultation requested  • Concern on the work-up is that in addition to the 4.7 cm primary RUL adenocarcinoma of the lung there are upper abdominal retroperitoneal and aortocaval lymph nodes positive on the PET scan.  • If the upper abdominal lymph nodes were not a concern, Lalo could be treated with neoadjuvant Opdivo Alimta Keytruda followed by resection.  • Intense interdepartmental consultations are being undertaken to give Lalo the most aggressive approach possible.  • All of the plans outlined below were discussed at length with Lalo and his wife Joann.  RECOMMEND:  • Guardant 360  • CT scan of the abdomen and pelvis  • Bone scan  • MRI of the abdomen  • Thoracic surgery consultation with Dr. Goldy Carlin  • Call placed and spoke to Dr. Goldy Carlin at length  • Radiation therapy consult for intradepartmental consultation  • Consult surgical group at [facility] for port placement and consideration of a diagnostic laparoscopy for lymph node sampling.  • Follow-up with me upon completion of  scans and for further patient and family conference  • Return for Follow Up with [facility] scans prior.    12/15/2022 - MRI Abdomen with and without contrast:  • No change in borderline prominent 9 mm short axis dimension upper abdominal retroperitoneal lymph node, similar to prior PET/CT were it was hypermetabolic. No new or enlarging lymph nodes in the abdomen.  • Splenomegaly.    12/15/2022 - Bone Scan:  • No evidence of bone metastasis.    12/15/2022 - CT Abdomen/Pelvis without contrast:  • There is abnormal wall thickening of the distal sigmoid colon which does contain multiple diverticuli. Findings may be seen with diverticulitis, however a regional neoplastic process also considered. Follow-up sigmoidoscopy might be considered for further evaluation.  • No morphologically pathologic lymphadenopathy identified. The two hypermetabolic retroperitoneal lymph nodes within the upper abdomen on the PET exam of 11/01/2022 remain similar in size measuring only 7 mm in short axis.    12/22/2022 - Appointment with :  • Note pending    12/22/2022 - Documentation per :  • I spoke with Dr. Tadeo regarding this patient today.  He has a very unusual pattern of malignancy with a right upper lobe peripheral adenocarcinoma which was biopsied with CT-guided biopsy.  He also has intra-abdominal hypermetabolic lymphadenopathy which is clinically suspicious for neoplasm.  • This would be an unusual metastatic site from a primary lung carcinoma.  The patient also has previous history of prostate carcinoma.  • I have discussed this with Dr. Tadeo and I believe if it is possible perhaps robotic biopsy of the periaortic lymphadenopathy could be considered.  We will also discussed with Dr. Goldy Carlin for consideration of definitive treatment of the primary right upper lobe lung tumor.  • We will see the patient in consultation today and coordinate with Dr. Tadeo and other physicians to develop a coordinated  treatment plan.    History obtained from  PATIENT, FAMILY, and CHART    PAST MEDICAL HISTORY  Past Medical History:   Diagnosis Date   • Abnormal PET scan of lung     2022   • Allergic rhinitis    • Arthritis    • Bronchiectasis (HCC)    • Bronchitis    • Cancer (HCC)     prostate   • Chronic bronchitis (HCC)    • Chronic pain    • GERD (gastroesophageal reflux disease)    • Pneumonia    • Prostatitis, acute    • S/P ACL reconstruction    • Septic shock due to urinary tract infection (HCC)    • Sinusitis    • Strep throat    • UTI (urinary tract infection)       PAST SURGICAL HISTORY  Past Surgical History:   Procedure Laterality Date   • ANKLE SURGERY Right    • BACK SURGERY      X2   • ENDOSCOPY N/A 01/10/2022    Procedure: ESOPHAGOGASTRODUODENOSCOPY WITH ANESTHESIA;  Surgeon: Tucker Bright MD;  Location:  [facility] OR;  Service: Gastroenterology;  Laterality: N/A;  pre food bolus  post food bolus  DrYandy   • KNEE ACL RECONSTRUCTION Left    • NASAL SEPTUM SURGERY     • PENILE PROSTHESIS IMPLANT N/A 3/15/2022    Procedure: 3-PIECE INFLATABLE PENILE PROSTHESIS PLACEMENT;  Surgeon: Trae Clark MD;  Location:  [facility] OR;  Service: Urology;  Laterality: N/A;   • PROSTATE ULTRASOUND BIOPSY N/A 2020    Procedure: PROSTATE  BIOPSY;  Surgeon: Trae Clark MD;  Location:  [facility] OR;  Service: Urology;  Laterality: N/A;   • PROSTATECTOMY N/A 2020    Procedure: RADICAL PROSTATECTOMY LAPAROSCOPIC WITH DAVINCI ROBOT;  Surgeon: Nuno Jj MD;  Location:  [facility] OR;  Service: DaVinci;  Laterality: N/A;   • SHOULDER SURGERY Left     X 4   • TENNIS ELBOW RELEASE Bilateral 2011      FAMILY HISTORY  family history includes Cancer in his mother.     SOCIAL HISTORY  Social History     Tobacco Use   • Smoking status: Former     Packs/day: 1.00     Years: 35.00     Pack years: 35.00     Types: Cigarettes     Start date: 1987     Quit date: 2022     Years since quittin.0   • Smokeless  tobacco: Never   Vaping Use   • Vaping Use: Former   Substance Use Topics   • Alcohol use: No   • Drug use: Not Currently      ALLERGIES  Contrast dye     MEDICATIONS  Current Outpatient Medications   Medication Sig Dispense Refill   • albuterol sulfate  (90 Base) MCG/ACT inhaler Inhale 2 puffs Every 4 (Four) Hours As Needed for Wheezing or Shortness of Air.     • cetirizine (zyrTEC) 10 MG tablet 1 tablet Daily.     • fluticasone (Flonase Allergy Relief) 50 MCG/ACT nasal spray 2 sprays into the nostril(s) as directed by provider Daily. 16 g 11   • Fluticasone-Salmeterol (Wixela Inhub) 250-50 MCG/ACT DISKUS Inhale 1 puff 2 (Two) Times a Day. 1 each 11   • gabapentin (NEURONTIN) 800 MG tablet Take 800 mg by mouth 3 (Three) Times a Day.     • hydrOXYzine pamoate (VISTARIL) 50 MG capsule 1 capsule Daily.     • ibuprofen (ADVIL,MOTRIN) 800 MG tablet Take 800 mg by mouth Every 8 (Eight) Hours As Needed for Mild Pain .     • montelukast (SINGULAIR) 10 MG tablet Take 1 tablet by mouth Every Night. 90 tablet 3   • multivitamin with minerals tablet tablet Take 1 tablet by mouth Daily.     • omeprazole (priLOSEC) 40 MG capsule Take 1 capsule by mouth Daily. (Patient taking differently: Take 40 mg by mouth Daily As Needed.) 90 capsule 1     No current facility-administered medications for this visit.     The following portions of the patient's history were reviewed and updated as appropriate: allergies, current medications, past family history, past medical history, past social history, past surgical history and problem list.    REVIEW OF SYSTEMS  Review of Systems   Constitutional: Negative.  Negative for appetite change and fatigue.   HENT:  Negative.    Respiratory: Negative.  Negative for hemoptysis and shortness of breath.    Cardiovascular: Negative.    Gastrointestinal: Negative.    Genitourinary: Negative.     Musculoskeletal: Negative.    Skin: Negative.    Neurological: Negative.    Hematological: Negative.   "Negative for adenopathy.   Psychiatric/Behavioral: Negative.    All other systems reviewed and are negative.      I have reviewed and confirmed the accuracy of the ROS as documented by the MA/LPN/RN Anton Kinney III, MD    PHYSICAL EXAM  VITAL SIGNS:   Vitals:    12/22/22 1340   BP: 155/87   Weight: 98 kg (216 lb)   Height: 188 cm (74\")   PainSc:   2   PainLoc: Chest  Comment: right     Physical Exam  Vitals reviewed.   Constitutional:       Appearance: Normal appearance.   HENT:      Head: Normocephalic.   Eyes:      Pupils: Pupils are equal, round, and reactive to light.   Cardiovascular:      Rate and Rhythm: Normal rate and regular rhythm.      Pulses: Normal pulses.      Heart sounds: Normal heart sounds. No murmur heard.  Pulmonary:      Effort: Pulmonary effort is normal.      Breath sounds: Normal breath sounds.   Abdominal:      General: Bowel sounds are normal.   Musculoskeletal:         General: Normal range of motion.      Cervical back: Normal range of motion and neck supple.   Lymphadenopathy:      Cervical: No cervical adenopathy.   Skin:     General: Skin is warm and dry.      Capillary Refill: Capillary refill takes less than 2 seconds.   Neurological:      Mental Status: He is alert and oriented to person, place, and time. Mental status is at baseline.   Psychiatric:         Mood and Affect: Mood normal.         Behavior: Behavior normal.           Performance Status: ECOG (1) Restricted in physically strenuous activity, ambulatory and able to do work of light nature    Clinical Quality Measures  -Pain Documented by Standardized Tool, FPS Lalo Jones reports a pain score of 2.  Given his pain assessment as noted, treatment options were discussed and the following options were decided upon as a follow-up plan to address the patient's pain: continuation of current treatment plan for pain and use of non-medical modalities (ice, heat, stretching and/or behavior modifications).  Pain " Medications             gabapentin (NEURONTIN) 800 MG tablet Take 800 mg by mouth 3 (Three) Times a Day.    ibuprofen (ADVIL,MOTRIN) 800 MG tablet Take 800 mg by mouth Every 8 (Eight) Hours As Needed for Mild Pain .        -Advanced Care Planning   Advance Care Planning   ACP discussion was held with the patient during this visit. Patient does not have an advance directive, information provided.     -Body Mass Index Screening and Follow-Up Plan BMI is >= 25 and <30. (Overweight) The following options were offered after discussion;: none (medical contraindication)     -Tobacco Use: Screening and Cessation Intervention Social History    Tobacco Use      Smoking status: Former        Packs/day: 1.00        Years: 35.00        Pack years: 35        Types: Cigarettes        Start date: 1987        Quit date: 2022        Years since quittin.0      Smokeless tobacco: Never    ASSESSMENT AND PLAN  1. Malignant neoplasm of right lung, unspecified part of lung (HCC)    2. Chronic obstructive pulmonary disease, unspecified COPD type (HCC)    3. Former smoker      No orders of the defined types were placed in this encounter.    RECOMMENDATIONS: Lalo Jones was diagnosed with adenocarcinoma of the right upper lobe of lung and intra-abdominal lymphadenopathy.    The indications and rationale of lung stereotactic/external beam radiation therapy according to the NCCN Guidelines has been discussed today. I have extensively reviewed the risks, benefits and alternatives of therapy with this diagnosis. The risks of radiation therapy includes but is not limited to radiation induced pulmonary fibrosis, progression of disease in spite of therapy with either local or systemic failure. I have seen, examined and reviewed this patient's medication list, appropriate labs and imaging studies as well as other physician notes. We discussed the goals and plans of care with the patient and family and answered all questions.      Following this discussion and in consideration of the diagnostic data/evaluation of the patient, I recommended referral to CT surgery for surgical considerations of the lung nodule. He has been referred to Dr. Carlin. He has also been referred to general surgery for biopsy considerations of the intra-abdominal lymph nodes.      Continue ongoing management per primary care physician and other specialists. Thank you for allowing me to assist in this patients care.     Patient Instructions   1) See Dr Carlin and Dr. Bonilla    Time Spent: I spent 56 minutes caring for Lalo on this date of service. This time includes time spent by me in the following activities: preparing for the visit, reviewing tests, obtaining and/or reviewing a separately obtained history, performing a medically appropriate examination and/or evaluation, counseling and educating the patient/family/caregiver, ordering medications, tests, or procedures, referring and communicating with other health care professionals and documenting information in the medical record.   Anton Kinney III, MD  12/22/2022

## 2022-12-22 NOTE — PROGRESS NOTES
I spoke with Dr. Carlin this afternoon and he has had a chance to review the radiographs on this patient.  He believes that there is likely chest wall invasion and recommends neoadjuvant chemoradiation prior to definitive surgery of the right upper lobe tumor.    I believe we can treat this lesion with tangential portals and avoid any significant dose to the bronchus, thereby avoiding any potential issues with healing.    I would anticipate a dose of 5040 cGy in 28 treatment fractions to the lung lesion as neoadjuvant treatment along with concomitant chemotherapy and/or immunotherapy.    In addition, we will determine if Dr. Kim Bonilla is able to sample the upper abdominal lymph nodes to determine the etiology of that finding on the PET scan.

## 2022-12-23 ENCOUNTER — TELEPHONE (OUTPATIENT)
Dept: RADIATION ONCOLOGY | Facility: HOSPITAL | Age: 57
End: 2022-12-23

## 2022-12-23 NOTE — TELEPHONE ENCOUNTER
I spoke with Dr. Tadeo today and reviewed my conversation with Dr. Carlin from last night.    We will plan to proceed with neoadjuvant chemoradiation to the lung lesion.  This will be somewhat dependent upon the potential for Dr. Bonilla to biopsy the upper abdominal lymph nodes.    However, I do believe we will be able to treat the chest wall lesion for surgical resection and if necessary we can come back and treat mediastinal and upper abdominal lymph nodes if we have evidence of metastatic disease.    We will plan to see the patient back on Tuesday, December 27 at 11:00 for simulation.

## 2022-12-25 RX ORDER — CYANOCOBALAMIN 1000 UG/ML
1000 INJECTION, SOLUTION INTRAMUSCULAR; SUBCUTANEOUS ONCE
Status: CANCELLED | OUTPATIENT
Start: 2022-12-25 | End: 2022-12-25

## 2022-12-27 ENCOUNTER — HOSPITAL ENCOUNTER (OUTPATIENT)
Dept: RADIATION ONCOLOGY | Facility: HOSPITAL | Age: 57
Discharge: HOME | End: 2022-12-27

## 2022-12-27 ENCOUNTER — OFFICE VISIT (OUTPATIENT)
Dept: RADIATION ONCOLOGY | Facility: HOSPITAL | Age: 57
End: 2022-12-27
Payer: MEDICAID

## 2022-12-27 VITALS
BODY MASS INDEX: 28.49 KG/M2 | HEIGHT: 74 IN | SYSTOLIC BLOOD PRESSURE: 157 MMHG | WEIGHT: 222 LBS | DIASTOLIC BLOOD PRESSURE: 89 MMHG

## 2022-12-27 DIAGNOSIS — Z87.891 FORMER SMOKER: ICD-10-CM

## 2022-12-27 DIAGNOSIS — C34.11 MALIGNANT NEOPLASM OF UPPER LOBE OF RIGHT LUNG: Primary | ICD-10-CM

## 2022-12-27 DIAGNOSIS — C34.91 MALIGNANT NEOPLASM OF RIGHT LUNG, UNSPECIFIED PART OF LUNG: ICD-10-CM

## 2022-12-27 DIAGNOSIS — J44.9 CHRONIC OBSTRUCTIVE PULMONARY DISEASE, UNSPECIFIED COPD TYPE: ICD-10-CM

## 2022-12-27 PROCEDURE — G0463 HOSPITAL OUTPT CLINIC VISIT: HCPCS | Performed by: RADIOLOGY

## 2022-12-27 PROCEDURE — 77334 RADIATION TREATMENT AID(S): CPT | Performed by: RADIOLOGY

## 2022-12-27 PROCEDURE — 77290 THER RAD SIMULAJ FIELD CPLX: CPT | Performed by: RADIOLOGY

## 2022-12-27 NOTE — PROGRESS NOTES
[facility]  Anton Kinney MD      Naif Jones APRN  ____________________________________________________________  [facility]  Department of Radiation Oncology  [address] [place] [id number]  Office:  [phone]  Fax: [phone]    DATE:  12/27/2022  PATIENT: Lalo Jones  1965                         MEDICAL RECORD #:  [id number]                 Chief Complaint   Patient presents with   • Lung Cancer     Reason for Visit: Lalo Jones is a very pleasant 57 y.o. that has returned to the clinic today for CT simulation to the right upper lobe of the lung. Denies activity change, appetite change, unexpected weight change, nausea/vomiting, diarrhea, light-headedness, weakness, and headaches.     History of Present Illness:  05/19/2022 - CT Chest Low Dose:  • 4 mm LEFT lower lobe pulmonary nodule.  • Moderate emphysema.  • 3.3 x 1.3 cm masslike area of pleural thickening in the RIGHT anterior upper chest. Recommend a follow-up chest CT in 3 months to ensure stability.    10/17/2022 - CT chest without contrast, low-dose protocol at [facility]:  • 4.7 cm x 4 cm x 1.6 cm abnormal area of pleural thickening in the periphery of the RUL with slightly irregular margins  • Moderate paraseptal emphysematous changes with scattered bullae in both lungs  • Lung-RADS - 4B    10/24/2022 - Appointment with Russel Santana MD:   • I explained the abnormal CT scan results from May which is done in the [facility] to the patient and also discussed with the radiologist from [facility] in [place] who reviewed the current CT scan of the chest done earlier this month.  This shows the right upper lobe pleural-based thickening or mass and left upper lobe nodule.  Due to his smoking history the possibility of malignancy cannot be ruled out.  • Discussing different options I ordered a PET scan and a follow-up CT scan of the chest in 3 months time.   If the PET scan is positive a CT-guided needle biopsy would be best option for the right upper lobe pleural-based lung mass which is not reachable by bronchoscopy.  If PET scan is negative will wait for the neck CT scan of the chest and make further recommendations.  Patient is agreeable with the plan.  In the meantime I will try to get the CD from from the outside hospital with the latest CT scan of the chest for further comparison.  • Patient definitely has chronic obstructive pulmonary disease and he was started on Wixela 250/50 1 puff twice a day and albuterol rescue inhaler will be continued.  • His nasal allergy he was started on fluticasone nasal spray and Singulair.  He told me he is already using Zyrtec mostly over-the-counter which will be continued.  All medications were sent to the pharmacy.  • Lalo Jones  reports that he has been smoking cigarettes. He started smoking about 35 years ago. He has a 35.00 pack-year smoking history. He has never used smokeless tobacco.. I have educated him on the risk of diseases from using tobacco products such as cancer, COPD and heart disease.   • I advised him to quit and he is willing to quit. We have discussed the following method/s for tobacco cessation:  Counseling.  Together we have set a quit date for 2 weeks from today.  He will follow up with me in 3 months or sooner to check on his progress.I spent 7 minutes counseling the patient  • Patient is advised to have a sleep study due to sleep disturbances and most likely have sleep apnea and may need his CPAP.  He is also advised to get a pulmonary function test before the next clinic visit for his underlying COPD in addition to the PET scan and CT scan which are already ordered.  He will return to the pulm clinic for follow-up visit in 3 months time or earlier if needed  Follow up:  • 3 months     11/01/2022 - PET Scan:  • Slight increase in size of 4.3 x 1.9 cm RIGHT anterior upper chest masslike  pleural thickening which is markedly hypermetabolic. Favor this to represent a neoplastic process with differential including lymphoma. Primary pleural neoplasm and metastatic disease are also in the differential.  • Hypermetabolic upper abdominal retroperitoneal lymphadenopathy. Suspect these lymph nodes related to the same process as the RIGHT anterior pleural lesion.  • No other abnormal hypermetabolic activity.    11/16/2022 - Right upper chest/pleural mass, core biopsies:  • Adenocarcinoma of pulmonary origin.    12/08/2022 - Appointment with :  Adenocarcinoma of Pulmonary Origin-11/16/2022  • Lalo Jones is a 57-year-old  gentleman with primary and secondary diagnoses as follows:  Robotic assisted laparoscopic prostatectomy by Dr. Jj September 2020 for PT2N0 [place] 7 disease with tumor approaching the right posterior margin  • New diagnosis of 4.7 cm x 4 cm x 1.6 cm adenocarcinoma of the RUL of the lung  • Nion was last seen on 7/15/2020 for opinion regarding a diagnosis of resected prostate cancer. He was lost to follow-up after that visit. He lives in [place], and his prostate cancer is managed by Dr. Jj with urology at [facility].  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 Russel Santana with a new diagnosis of a 4.7 x 4 x 1.6 cm adenocarcinoma of the RUL of the lung for management.  • Nino is a longtime cigarette smoker of 1 pack/day since age 20. He quit smoking on 12/1/2022 after the diagnosis of lung cancer.  • Nino's PCP, Dr. Flex Sheikh ordered a low-dose screening CT scan of the chest for monitoring because he is a pack a day smoker since 1987  • CT chest without contrast, low-dose protocol at [facility] on 5/19/2022:  4 mm LEFT lower lobe pulmonary nodule in superior segment   3.3 x 1.3 cm mass like area of pleural thickening in the RIGHT anterior upper chest   Moderate centrilobular and  paraseptal emphysema.   Lung-RADS 2S-- Nodules with a very low likelihood of becoming a clinically active cancer due to size or lack of growth.   Continue annual screening with low dose CT in 12 months.   3 month follow-up chest CT recommended to evaluate for stability of RIGHT anterior upper chest masslike pleural thickening  • Dr. Flex French repeated the low-dose CT scan because of the findings in the right anterior upper chest  • CT chest without contrast, low-dose protocol at [facility] on 10/17/2022:  4.7 cm x 4 cm x 1.6 cm abnormal area of pleural thickening in the periphery of the RUL with slightly irregular margins  Moderate paraseptal emphysematous changes with scattered bullae in both lungs  Lung-RADS - 4B  • Initial consult with Dr. Russel Santana at [facility] on 10/24/2022:  • Explained the abnormal CT scan results from May which is done in the [facility] to the patient and also discussed with the radiologist from [facility] in [place] who reviewed the current CT scan of the chest done earlier this month. This shows the right upper lobe pleural-based thickening or mass and left upper lobe nodule. Due to his smoking history the possibility of malignancy cannot be ruled out.  • Discussing different options I ordered a PET scan and a follow-up CT scan of the chest in 3 months time. If the PET scan is positive a CT-guided needle biopsy would be best option for the right upper lobe pleural-based lung mass which is not reachable by bronchoscopy. If PET scan is negative will wait for the neck CT scan of the chest and make further recommendations.   • NM PET/CT SKULL BASE TO MID THIGH at [facility] on 11/1/2022:Comparison: Chest CT 5/19/2022  Background right hepatic lobe metabolic activity measures max SUV 2.7.  4.3 x 1.9 cm RIGHT anterior upper chest pleural thickening which is hypermetabolic with a maximum SUV of 12.0  1.7 cm LEFT upper abdomen retroperitoneal lymph node hypermetabolicactivity,  max SUV 5.3. This nodule/node closely approximates the LEFT adrenal gland.   9 mm retroaortic upper abdominal lymph node is mildly hypermetabolic with a max SUV of 4.0.   7 mm aortocaval lymph node is mildly hypermetabolic with a max SUV of 3.0.  • CT Needle Biopsy Lung at [facility] on 11/16/2022:  Successful CT guided biopsy of the right anterior pleural mass  Final Diagnosis  Right upper chest/pleural mass, core biopsies:  Adenocarcinoma of pulmonary origin.  • [facility] on 11/16/2022:  ALK(D5F3)- NEGATIVE  FISH Analysis ROS1- NEGATIVE  EGFR Exon 18-Not Detected  EGFR Exon 19-Not Detected  EGFR Exon 20 [id number]-Fyb Detected  EGFR Exon 20 (other Mutations)-Not Detected  EGFR Exon 21- Not Detected  BRAF Mutation-Not Detected  FISH MET-No evidence of a MET amplification  • Medical oncology consultation requested  • Concern on the work-up is that in addition to the 4.7 cm primary RUL adenocarcinoma of the lung there are upper abdominal retroperitoneal and aortocaval lymph nodes positive on the PET scan.  • If the upper abdominal lymph nodes were not a concern, Lalo could be treated with neoadjuvant Opdivo Alimta Keytruda followed by resection.  • Intense interdepartmental consultations are being undertaken to give Lalo the most aggressive approach possible.  • All of the plans outlined below were discussed at length with Lalo and his wife Joann.  RECOMMEND:  • Guardant 360  • CT scan of the abdomen and pelvis  • Bone scan  • MRI of the abdomen  • Thoracic surgery consultation with Dr. Goldy Carlin  • Call placed and spoke to Dr. Goldy Carlin at length  • Radiation therapy consult for intradepartmental consultation  • Consult surgical group at [facility] for port placement and consideration of a diagnostic laparoscopy for lymph node sampling.  • Follow-up with me upon completion of scans and for further patient and family conference  • Return for Follow Up with Tadeo scans prior.    12/15/2022 - MRI Abdomen with and without  contrast:  • No change in borderline prominent 9 mm short axis dimension upper abdominal retroperitoneal lymph node, similar to prior PET/CT were it was hypermetabolic. No new or enlarging lymph nodes in the abdomen.  • Splenomegaly.    12/15/2022 - Bone Scan:  • No evidence of bone metastasis.    12/15/2022 - CT Abdomen/Pelvis without contrast:  • There is abnormal wall thickening of the distal sigmoid colon which does contain multiple diverticuli. Findings may be seen with diverticulitis, however a regional neoplastic process also considered. Follow-up sigmoidoscopy might be considered for further evaluation.  • No morphologically pathologic lymphadenopathy identified. The two hypermetabolic retroperitoneal lymph nodes within the upper abdomen on the PET exam of 11/01/2022 remain similar in size measuring only 7 mm in short axis.    12/22/2022 - Appointment with :  • Note pending    12/22/2022 - Documentation per :  • I spoke with Dr. Tadeo regarding this patient today.  He has a very unusual pattern of malignancy with a right upper lobe peripheral adenocarcinoma which was biopsied with CT-guided biopsy.  He also has intra-abdominal hypermetabolic lymphadenopathy which is clinically suspicious for neoplasm.  • This would be an unusual metastatic site from a primary lung carcinoma.  The patient also has previous history of prostate carcinoma.  • I have discussed this with Dr. Tadeo and I believe if it is possible perhaps robotic biopsy of the periaortic lymphadenopathy could be considered.  We will also discussed with Dr. Goldy Carlin for consideration of definitive treatment of the primary right upper lobe lung tumor.  • We will see the patient in consultation today and coordinate with Dr. Tadeo and other physicians to develop a coordinated treatment plan.    12/22/2022 - Consult with :  • Following this discussion and in consideration of the diagnostic data/evaluation of the  patient, I recommended referral to CT surgery for surgical considerations of the lung nodule. He has been referred to Dr. Carlin. He has also been referred to general surgery for biopsy considerations of the intra-abdominal lymph nodes.    • Continue ongoing management per primary care physician and other specialists. Thank you for allowing me to assist in this patients care.   Plan:  • See Dr Carlin and Dr. Bonilla    12/22/2022 - Documentation per :  · I spoke with Dr. Carlin this afternoon and he has had a chance to review the radiographs on this patient.  He believes that there is likely chest wall invasion and recommends neoadjuvant chemoradiation prior to definitive surgery of the right upper lobe tumor.  · I believe we can treat this lesion with tangential portals and avoid any significant dose to the bronchus, thereby avoiding any potential issues with healing.  · I would anticipate a dose of 5040 cGy in 28 treatment fractions to the lung lesion as neoadjuvant treatment along with concomitant chemotherapy and/or immunotherapy.  · In addition, we will determine if Dr. Kim Bonilla is able to sample the upper abdominal lymph nodes to determine the etiology of that finding on the PET scan.    12/23/2022 - Telephone encounter with :  · I spoke with Dr. Tadeo today and reviewed my conversation with Dr. Carlin from last night.  · We will plan to proceed with neoadjuvant chemoradiation to the lung lesion.  This will be somewhat dependent upon the potential for Dr. Bonilla to biopsy the upper abdominal lymph nodes.  · However, I do believe we will be able to treat the chest wall lesion for surgical resection and if necessary we can come back and treat mediastinal and upper abdominal lymph nodes if we have evidence of metastatic disease.  · We will plan to see the patient back on Tuesday, December 27 at 11:00 for simulation.    History obtained from  PATIENT, FAMILY, and CHART    PAST MEDICAL  HISTORY  Past Medical History:   Diagnosis Date   • Abnormal PET scan of lung     Nov 2022   • Allergic rhinitis    • Arthritis    • Bronchiectasis (HCC)    • Bronchitis    • Cancer (HCC)     prostate   • Chronic bronchitis (HCC)    • Chronic pain    • GERD (gastroesophageal reflux disease)    • Pneumonia    • Prostatitis, acute    • S/P ACL reconstruction    • Septic shock due to urinary tract infection (HCC)    • Sinusitis    • Strep throat    • UTI (urinary tract infection)       PAST SURGICAL HISTORY  Past Surgical History:   Procedure Laterality Date   • ANKLE SURGERY Right    • BACK SURGERY      X2   • ENDOSCOPY N/A 01/10/2022    Procedure: ESOPHAGOGASTRODUODENOSCOPY WITH ANESTHESIA;  Surgeon: Tucker Bright MD;  Location:  [facility] OR;  Service: Gastroenterology;  Laterality: N/A;  pre food bolus  post food bolus  DrYandy   • KNEE ACL RECONSTRUCTION Left    • NASAL SEPTUM SURGERY     • PENILE PROSTHESIS IMPLANT N/A 3/15/2022    Procedure: 3-PIECE INFLATABLE PENILE PROSTHESIS PLACEMENT;  Surgeon: Trae Clark MD;  Location:  [facility] OR;  Service: Urology;  Laterality: N/A;   • PROSTATE ULTRASOUND BIOPSY N/A 02/18/2020    Procedure: PROSTATE  BIOPSY;  Surgeon: Trae Clark MD;  Location:  [facility] OR;  Service: Urology;  Laterality: N/A;   • PROSTATECTOMY N/A 09/22/2020    Procedure: RADICAL PROSTATECTOMY LAPAROSCOPIC WITH DAVINCI ROBOT;  Surgeon: Nuno Jj MD;  Location:  [facility] OR;  Service: DaVinci;  Laterality: N/A;   • SHOULDER SURGERY Left     X 4   • TENNIS ELBOW RELEASE Bilateral 04/14/2011   • VENOUS ACCESS DEVICE (PORT) INSERTION N/A 12/30/2022    Procedure: Single Lumen Port-a-cath insertion with flouroscopy;  Surgeon: Kim Bonilla MD;  Location:  [facility] OR;  Service: General;  Laterality: N/A;      FAMILY HISTORY  family history includes Cancer in his mother.     SOCIAL HISTORY  Social History     Tobacco Use   • Smoking status: Former     Packs/day: 1.00     Years: 35.00      Pack years: 35.00     Types: Cigarettes     Start date: 1987     Quit date: 2022     Years since quittin.0   • Smokeless tobacco: Never   Vaping Use   • Vaping Use: Former   Substance Use Topics   • Alcohol use: No   • Drug use: Not Currently     ALLERGIES  Patient has no known allergies.      MEDICATIONS    Current Outpatient Medications:   •  albuterol sulfate  (90 Base) MCG/ACT inhaler, Inhale 2 puffs Every 4 (Four) Hours As Needed for Wheezing or Shortness of Air., Disp: , Rfl:   •  cetirizine (zyrTEC) 10 MG tablet, 1 tablet Daily., Disp: , Rfl:   •  fluticasone (Flonase Allergy Relief) 50 MCG/ACT nasal spray, 2 sprays into the nostril(s) as directed by provider Daily., Disp: 16 g, Rfl: 11  •  Fluticasone-Salmeterol (Wixela Inhub) 250-50 MCG/ACT DISKUS, Inhale 1 puff 2 (Two) Times a Day., Disp: 1 each, Rfl: 11  •  gabapentin (NEURONTIN) 800 MG tablet, Take 800 mg by mouth 3 (Three) Times a Day., Disp: , Rfl:   •  hydrOXYzine pamoate (VISTARIL) 50 MG capsule, 1 capsule Daily., Disp: , Rfl:   •  ibuprofen (ADVIL,MOTRIN) 800 MG tablet, Take 800 mg by mouth Every 8 (Eight) Hours As Needed for Mild Pain ., Disp: , Rfl:   •  montelukast (SINGULAIR) 10 MG tablet, Take 1 tablet by mouth Every Night., Disp: 90 tablet, Rfl: 3  •  multivitamin with minerals tablet tablet, Take 1 tablet by mouth Daily., Disp: , Rfl:   •  omeprazole (priLOSEC) 40 MG capsule, Take 1 capsule by mouth Daily., Disp: 90 capsule, Rfl: 1  •  acetaminophen (Tylenol) 325 MG tablet, Take 3 tablets by mouth Every 8 (Eight) Hours. Take every 8 hours for 3 days then take prn as needed., Disp: 100 tablet, Rfl: 2  •  ibuprofen (Motrin IB) 200 MG tablet, Take 3 tablets by mouth Every 8 (Eight) Hours. Take every 8 hours for three days then take as needed., Disp: 100 tablet, Rfl: 2  •  ondansetron (Zofran) 4 MG tablet, Take 1 tablet by mouth Every 8 (Eight) Hours As Needed for Nausea or Vomiting., Disp: 15 tablet, Rfl: 0  •  oxyCODONE  (Roxicodone) 5 MG immediate release tablet, Take 1 tablet by mouth Every 8 (Eight) Hours As Needed for Severe Pain., Disp: 5 tablet, Rfl: 0    Current outpatient and discharge medications have been reconciled for the patient.  Reviewed by: Anton Kinney III, MD    The following portions of the patient's history were reviewed and updated as appropriate: allergies, current medications, past family history, past medical history, past social history, past surgical history and problem list.    REVIEW OF SYSTEMS  Review of Systems   Constitutional: Negative.  Negative for activity change, fatigue and unexpected weight change.   HENT: Negative.    Respiratory: Negative.  Negative for cough, shortness of breath and wheezing.    Cardiovascular: Negative.  Negative for chest pain and palpitations.   Gastrointestinal: Negative.    Genitourinary: Negative.    Musculoskeletal: Negative.    Skin: Negative.    Allergic/Immunologic: Negative.    Neurological: Negative.    Hematological: Negative.  Negative for adenopathy.   Psychiatric/Behavioral: Negative.    All other systems reviewed and are negative.    I have reviewed and confirmed the accuracy of the ROS as documented by the MA/LPN/RN Anton Kinney III, MD    PHYSICAL EXAM  VITAL SIGNS:   Vitals:    12/27/22 1110   BP: 157/89   Weight: 101 kg (222 lb)   Height: 188 cm (74\")   PainSc:   2   PainLoc: Chest  Comment: right      Physical Exam  Constitutional:       Appearance: Normal appearance.   HENT:      Head: Normocephalic.   Cardiovascular:      Rate and Rhythm: Normal rate and regular rhythm.      Pulses: Normal pulses.      Heart sounds: Normal heart sounds.   Pulmonary:      Effort: Pulmonary effort is normal.      Breath sounds: Normal breath sounds.   Abdominal:      General: Bowel sounds are normal.   Musculoskeletal:         General: Normal range of motion.      Cervical back: Normal range of motion and neck supple.   Skin:     General: Skin is warm and dry.       Capillary Refill: Capillary refill takes less than 2 seconds.   Neurological:      General: No focal deficit present.      Mental Status: He is alert and oriented to person, place, and time.         Performance Status: ECOG (0) Fully active, able to carry on all predisease performance without restriction    Clinical Quality Measures  -Pain Documented by Standardized Tool, FPS Lalo Jones reports a pain score of 2. Given his pain assessment as noted, treatment options were discussed and the following options were decided upon as a follow-up plan to address the patient's pain: continuation of current treatment plan for pain and use of non-medical modalities (ice, heat, stretching and/or behavior modifications)   Pain Medications             gabapentin (NEURONTIN) 800 MG tablet Take 800 mg by mouth 3 (Three) Times a Day.    ibuprofen (ADVIL,MOTRIN) 800 MG tablet Take 800 mg by mouth Every 8 (Eight) Hours As Needed for Mild Pain .        -Advanced Care Planning Advance Care Planning  ACP discussion was held with the patient during this visit. Patient does not have an advance directive, information provided.     -Body Mass Index Screening and Follow-Up Plan BMI is >= 25 and <30. (Overweight) The following options were offered after discussion;: none (medical contraindication)    -Tobacco Use: Screening and Cessation Intervention Social History    Tobacco Use      Smoking status: Former        Packs/day: 1.00        Years: 35.00        Pack years: 35        Types: Cigarettes        Start date: 1987        Quit date: 2022        Years since quittin.0      Smokeless tobacco: Never     ASSESSMENT AND PLAN  1. Malignant neoplasm of upper lobe of right lung (HCC)    2. Malignant neoplasm of right lung, unspecified part of lung (HCC)    3. Chronic obstructive pulmonary disease, unspecified COPD type (HCC)    4. Former smoker      RECOMMENDATIONS:  Lalo Jones returned to the clinic today for CT  simulation to the right upper lobe of the lung.     He met with Dr. Carlin for surgical consirations of the right upper lobe lesion. He believes that there is likely chest wall invasion and recommends neoadjuvant chemoradiation prior to definitive surgery of the right upper lobe tumor. I anticipate a dose of 5040 cGy in 28 treatment fractions to the lung lesion as neoadjuvant treatment along with concomitant chemotherapy and/or immunotherapy.    In addition, we will determine if Dr. Bonilla is able to sample the upper abdominal lymph nodes to determine the etiology of that finding on the PET scan. He is scheduled to see her later this week.    There are no Patient Instructions on file for this visit.    Anton Kinney III, MD  12/27/2022

## 2022-12-29 ENCOUNTER — PATIENT ROUNDING (BHMG ONLY) (OUTPATIENT)
Dept: SURGERY | Facility: CLINIC | Age: 57
End: 2022-12-29

## 2022-12-29 ENCOUNTER — OFFICE VISIT (OUTPATIENT)
Dept: SURGERY | Facility: CLINIC | Age: 57
End: 2022-12-29

## 2022-12-29 VITALS
DIASTOLIC BLOOD PRESSURE: 70 MMHG | WEIGHT: 222.66 LBS | SYSTOLIC BLOOD PRESSURE: 145 MMHG | BODY MASS INDEX: 28.58 KG/M2 | HEART RATE: 60 BPM | HEIGHT: 74 IN

## 2022-12-29 DIAGNOSIS — E66.3 OVERWEIGHT WITH BODY MASS INDEX (BMI) OF 28 TO 28.9 IN ADULT: ICD-10-CM

## 2022-12-29 DIAGNOSIS — C34.91 MALIGNANT NEOPLASM OF RIGHT LUNG, UNSPECIFIED PART OF LUNG: Primary | ICD-10-CM

## 2022-12-29 DIAGNOSIS — R59.0 RETROPERITONEAL LYMPHADENOPATHY: ICD-10-CM

## 2022-12-29 PROCEDURE — 99204 OFFICE O/P NEW MOD 45 MIN: CPT | Performed by: STUDENT IN AN ORGANIZED HEALTH CARE EDUCATION/TRAINING PROGRAM

## 2022-12-29 NOTE — PROGRESS NOTES
December 29, 2022    Hello, may I speak with Lalo Jones?    My name is Celia    I am  with [facility]  [facility] GENER[facility] SUR[facility]  [address]  [place] [address] [phone].    Before we get started may I verify your date of birth? 1965    I am calling to officially welcome you to our practice and ask about your recent visit. Is this a good time to talk? yes    Tell me about your visit with us. What things went well?  Everything was ok, Dr. Bonilla is nice.       We're always looking for ways to make our patients' experiences even better. Do you have recommendations on ways we may improve?  no    Overall were you satisfied with your first visit to our practice? yes       I appreciate you taking the time to speak with me today. Is there anything else I can do for you? no      Thank you, and have a great day.

## 2022-12-29 NOTE — H&P (VIEW-ONLY)
Office New Patient History and Physical:     Referring Provider: Enrique Tadeo*    Chief Complaint   Patient presents with   • port placement       Subjective .     History of present illness:  Lalo Jones is a 57 y.o. 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 He has never had a port nor central line before. Dr. Kinney did contact me to consider a biopsy of a retroperitoneal lymph node.     He is a non-smoker. His BMI is 28.5. He is not on blood thinners.     History  Past Medical History:   Diagnosis Date   • Abnormal PET scan of lung     Nov 2022   • Allergic rhinitis    • Arthritis    • Bronchiectasis (HCC)    • Bronchitis    • Cancer (HCC)     prostate   • Chronic bronchitis (HCC)    • Chronic pain    • GERD (gastroesophageal reflux disease)    • Pneumonia    • Prostatitis, acute    • S/P ACL reconstruction    • Septic shock due to urinary tract infection (HCC)    • Sinusitis    • Strep throat    • UTI (urinary tract infection)    ,   Past Surgical History:   Procedure Laterality Date   • ANKLE SURGERY Right    • BACK SURGERY      X2   • ENDOSCOPY N/A 01/10/2022    Procedure: ESOPHAGOGASTRODUODENOSCOPY WITH ANESTHESIA;  Surgeon: Tucker Bright MD;  Location: [facility] OR;  Service: Gastroenterology;  Laterality: N/A;  pre food bolus  post food bolus     • KNEE ACL RECONSTRUCTION Left    • NASAL SEPTUM SURGERY     • PENILE PROSTHESIS IMPLANT N/A 3/15/2022    Procedure: 3-PIECE INFLATABLE PENILE PROSTHESIS PLACEMENT;  Surgeon: Trae Clark MD;  Location:  [facility] OR;  Service: Urology;  Laterality: N/A;   • PROSTATE ULTRASOUND BIOPSY N/A 02/18/2020    Procedure: PROSTATE  BIOPSY;  Surgeon: Trae Clark MD;  Location:  [facility] OR;  Service: Urology;  Laterality: N/A;   • PROSTATECTOMY N/A 09/22/2020    Procedure: RADICAL PROSTATECTOMY LAPAROSCOPIC WITH DAVINCI ROBOT;  Surgeon: Nuno Jj MD;  Location:   [facility] OR;  Service: [facility];  Laterality: N/A;   • SHOULDER SURGERY Left     X 4   • TENNIS ELBOW RELEASE Bilateral 2011   ,   Family History   Problem Relation Age of Onset   • Cancer Mother    ,   Social History     Tobacco Use   • Smoking status: Former     Packs/day: 1.00     Years: 35.00     Pack years: 35.00     Types: Cigarettes     Start date: 1987     Quit date: 2022     Years since quittin.0   • Smokeless tobacco: Never   Vaping Use   • Vaping Use: Former   Substance Use Topics   • Alcohol use: No   • Drug use: Not Currently   , (Not in a hospital admission)   and Allergies:  Contrast dye    Current Outpatient Medications:   •  albuterol sulfate  (90 Base) MCG/ACT inhaler, Inhale 2 puffs Every 4 (Four) Hours As Needed for Wheezing or Shortness of Air., Disp: , Rfl:   •  cetirizine (zyrTEC) 10 MG tablet, 1 tablet Daily., Disp: , Rfl:   •  fluticasone (Flonase Allergy Relief) 50 MCG/ACT nasal spray, 2 sprays into the nostril(s) as directed by provider Daily., Disp: 16 g, Rfl: 11  •  Fluticasone-Salmeterol (Wixela Inhub) 250-50 MCG/ACT DISKUS, Inhale 1 puff 2 (Two) Times a Day., Disp: 1 each, Rfl: 11  •  gabapentin (NEURONTIN) 800 MG tablet, Take 800 mg by mouth 3 (Three) Times a Day., Disp: , Rfl:   •  hydrOXYzine pamoate (VISTARIL) 50 MG capsule, 1 capsule Daily., Disp: , Rfl:   •  ibuprofen (ADVIL,MOTRIN) 800 MG tablet, Take 800 mg by mouth Every 8 (Eight) Hours As Needed for Mild Pain ., Disp: , Rfl:   •  montelukast (SINGULAIR) 10 MG tablet, Take 1 tablet by mouth Every Night., Disp: 90 tablet, Rfl: 3  •  multivitamin with minerals tablet tablet, Take 1 tablet by mouth Daily., Disp: , Rfl:   •  omeprazole (priLOSEC) 40 MG capsule, Take 1 capsule by mouth Daily. (Patient taking differently: Take 40 mg by mouth Daily As Needed.), Disp: 90 capsule, Rfl: 1    Objective     Vital Signs   /70 (BP Location: Left arm, Patient Position: Sitting, Cuff Size: Adult)   Pulse 60   Ht  188 cm (74.02\")   Wt 101 kg (222 lb 10.6 oz)   BMI 28.58 kg/m²      Physical Exam:  General appearance - alert, well appearing, and in no distress  Mental status - alert, oriented to person, place, and time  Eyes - pupils equal and reactive, extraocular eye movements intact  Neck - supple, no significant adenopathy  Chest - no tachypnea, retractions or cyanosis  Heart - normal rate and regular rhythm  Abdomen - soft, nontender, nondistended, no masses or organomegaly  Neurological - alert, oriented, normal speech, no focal findings or movement disorder noted  Musculoskeletal - no joint tenderness, deformity or swelling    Results Review:    The following data was reviewed by: Kim Bonilla MD on 12/29/2022:  CBC WITH AUTO DIFFERENTIAL (12/22/2022 12:25 EST)  COMPREHENSIVE METABOLIC PANEL (12/08/2022 13:43 EST)  GENERAL SURGERY - SCAN - NOTES FROM DR MACARIO (12/28/2022)  Progress Notes by Anton Kinney III, MD (12/22/2022 13:30)  XR Chest 1 View (11/16/2022 12:29)  ECG 12 Lead (03/11/2022 11:36)    Assessment & Plan       Diagnoses and all orders for this visit:    1. Malignant neoplasm of right lung, unspecified part of lung (HCC) (Primary)  -     Case Request; Standing  -     MRSA Screen Culture (Outpatient) - Swab, Nares; Future  -     ECG 12 Lead; Future  -     XR chest 1 vw; Future  -     ceFAZolin (ANCEF) 2 g in sodium chloride 0.9 % 100 mL IVPB  -     Case Request    2. Overweight with body mass index (BMI) of 28 to 28.9 in adult    3. Retroperitoneal lymphadenopathy    Other orders  -     Follow Anesthesia Guidelines / Protocol; Future  -     Obtain Informed Consent; Future  -     Provide NPO Instructions to Patient; Future  -     Chlorhexidine Skin Prep; Future  -     Follow Anesthesia Guidelines / Protocol; Standing  -     Verify / Perform Chlorhexidine Skin Prep; Standing  -     Verify / Perform Chlorhexidine Skin Prep if Indicated (If Not Already Completed); Standing  -     Instructions on  coughing, deep breathing, and incentive spirometry.; Standing  -     Oxygen Therapy-; Standing  -     Notify physician (specify); Standing         Lalo Jones is a 57 y.o. male with a need for port placement for chemotherapy for lung cancer. After a discussion of risks (including bleeding, port infection with need for removal, damage to surrounding structures including the arteries, pneumothorax and possible port malfunction) and benefits, the patient wishes to proceed with single lumen port placement with fluoroscopy. The patient is currently scheduled for this procedure on 12/30/22. He is at increased risk of perioperative complications 2/2 his elevated BMI.     In terms of his retroperitoneal lymphadenopathy, I do not recommend a robotic retroperitoneal lymph node biopsy as the node is only 0.7cm and I believe it would be unlikely we would be able to identify that node without guidance. It would be an extremely high risk procedure as it the node is very close to the vasculature. I discussed this with Dr. Kinney personally. This is a new undiagnosed problem with an uncertain prognosis.     I have personally reviewed the oncology note, labs, and PET scan above. I have personally discussed the patient and the care plan with Dr. Kinney and at tumor board.     I also discussed with the patient post-operative pain management including multimodal pain control utilizing Tylenol, ibuprofen, and tramadol for breakthrough pain. I will plan to given the patient 5 tabs of 50mg Ultram post-operatively for break through pain.     BMI is >= 25 and <30. (Overweight) The following options were offered after discussion;: weight loss educational material (shared in after visit summary)      Kim Bonilla MD  12/29/22  20:22 CST

## 2022-12-29 NOTE — PATIENT INSTRUCTIONS

## 2022-12-29 NOTE — PROGRESS NOTES
Office New Patient History and Physical:     Referring Provider: Enrique Tadeo*    Chief Complaint   Patient presents with   • port placement       Subjective .     History of present illness:  Lalo Jones is a 57 y.o. 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 He has never had a port nor central line before. Dr. Kinney did contact me to consider a biopsy of a retroperitoneal lymph node.     He is a non-smoker. His BMI is 28.5. He is not on blood thinners.     History  Past Medical History:   Diagnosis Date   • Abnormal PET scan of lung     Nov 2022   • Allergic rhinitis    • Arthritis    • Bronchiectasis (HCC)    • Bronchitis    • Cancer (HCC)     prostate   • Chronic bronchitis (HCC)    • Chronic pain    • GERD (gastroesophageal reflux disease)    • Pneumonia    • Prostatitis, acute    • S/P ACL reconstruction    • Septic shock due to urinary tract infection (HCC)    • Sinusitis    • Strep throat    • UTI (urinary tract infection)    ,   Past Surgical History:   Procedure Laterality Date   • ANKLE SURGERY Right    • BACK SURGERY      X2   • ENDOSCOPY N/A 01/10/2022    Procedure: ESOPHAGOGASTRODUODENOSCOPY WITH ANESTHESIA;  Surgeon: Tucker Bright MD;  Location: [facility] OR;  Service: Gastroenterology;  Laterality: N/A;  pre food bolus  post food bolus     • KNEE ACL RECONSTRUCTION Left    • NASAL SEPTUM SURGERY     • PENILE PROSTHESIS IMPLANT N/A 3/15/2022    Procedure: 3-PIECE INFLATABLE PENILE PROSTHESIS PLACEMENT;  Surgeon: Trae Clark MD;  Location:  [facility] OR;  Service: Urology;  Laterality: N/A;   • PROSTATE ULTRASOUND BIOPSY N/A 02/18/2020    Procedure: PROSTATE  BIOPSY;  Surgeon: Trae Clark MD;  Location:  [facility] OR;  Service: Urology;  Laterality: N/A;   • PROSTATECTOMY N/A 09/22/2020    Procedure: RADICAL PROSTATECTOMY LAPAROSCOPIC WITH DAVINCI ROBOT;  Surgeon: Nuno Jj MD;  Location:   [facility] OR;  Service: [facility];  Laterality: N/A;   • SHOULDER SURGERY Left     X 4   • TENNIS ELBOW RELEASE Bilateral 2011   ,   Family History   Problem Relation Age of Onset   • Cancer Mother    ,   Social History     Tobacco Use   • Smoking status: Former     Packs/day: 1.00     Years: 35.00     Pack years: 35.00     Types: Cigarettes     Start date: 1987     Quit date: 2022     Years since quittin.0   • Smokeless tobacco: Never   Vaping Use   • Vaping Use: Former   Substance Use Topics   • Alcohol use: No   • Drug use: Not Currently   , (Not in a hospital admission)   and Allergies:  Contrast dye    Current Outpatient Medications:   •  albuterol sulfate  (90 Base) MCG/ACT inhaler, Inhale 2 puffs Every 4 (Four) Hours As Needed for Wheezing or Shortness of Air., Disp: , Rfl:   •  cetirizine (zyrTEC) 10 MG tablet, 1 tablet Daily., Disp: , Rfl:   •  fluticasone (Flonase Allergy Relief) 50 MCG/ACT nasal spray, 2 sprays into the nostril(s) as directed by provider Daily., Disp: 16 g, Rfl: 11  •  Fluticasone-Salmeterol (Wixela Inhub) 250-50 MCG/ACT DISKUS, Inhale 1 puff 2 (Two) Times a Day., Disp: 1 each, Rfl: 11  •  gabapentin (NEURONTIN) 800 MG tablet, Take 800 mg by mouth 3 (Three) Times a Day., Disp: , Rfl:   •  hydrOXYzine pamoate (VISTARIL) 50 MG capsule, 1 capsule Daily., Disp: , Rfl:   •  ibuprofen (ADVIL,MOTRIN) 800 MG tablet, Take 800 mg by mouth Every 8 (Eight) Hours As Needed for Mild Pain ., Disp: , Rfl:   •  montelukast (SINGULAIR) 10 MG tablet, Take 1 tablet by mouth Every Night., Disp: 90 tablet, Rfl: 3  •  multivitamin with minerals tablet tablet, Take 1 tablet by mouth Daily., Disp: , Rfl:   •  omeprazole (priLOSEC) 40 MG capsule, Take 1 capsule by mouth Daily. (Patient taking differently: Take 40 mg by mouth Daily As Needed.), Disp: 90 capsule, Rfl: 1    Objective     Vital Signs   /70 (BP Location: Left arm, Patient Position: Sitting, Cuff Size: Adult)   Pulse 60   Ht  "188 cm (74.02\")   Wt 101 kg (222 lb 10.6 oz)   BMI 28.58 kg/m²      Physical Exam:  General appearance - alert, well appearing, and in no distress  Mental status - alert, oriented to person, place, and time  Eyes - pupils equal and reactive, extraocular eye movements intact  Neck - supple, no significant adenopathy  Chest - no tachypnea, retractions or cyanosis  Heart - normal rate and regular rhythm  Abdomen - soft, nontender, nondistended, no masses or organomegaly  Neurological - alert, oriented, normal speech, no focal findings or movement disorder noted  Musculoskeletal - no joint tenderness, deformity or swelling    Results Review:    The following data was reviewed by: Kim Bonilla MD on 12/29/2022:  CBC WITH AUTO DIFFERENTIAL (12/22/2022 12:25 EST)  COMPREHENSIVE METABOLIC PANEL (12/08/2022 13:43 EST)  GENERAL SURGERY - SCAN - NOTES FROM DR MACARIO (12/28/2022)  Progress Notes by Anton Kinney III, MD (12/22/2022 13:30)  XR Chest 1 View (11/16/2022 12:29)  ECG 12 Lead (03/11/2022 11:36)    Assessment & Plan       Diagnoses and all orders for this visit:    1. Malignant neoplasm of right lung, unspecified part of lung (HCC) (Primary)  -     Case Request; Standing  -     MRSA Screen Culture (Outpatient) - Swab, Nares; Future  -     ECG 12 Lead; Future  -     XR chest 1 vw; Future  -     ceFAZolin (ANCEF) 2 g in sodium chloride 0.9 % 100 mL IVPB  -     Case Request    2. Overweight with body mass index (BMI) of 28 to 28.9 in adult    3. Retroperitoneal lymphadenopathy    Other orders  -     Follow Anesthesia Guidelines / Protocol; Future  -     Obtain Informed Consent; Future  -     Provide NPO Instructions to Patient; Future  -     Chlorhexidine Skin Prep; Future  -     Follow Anesthesia Guidelines / Protocol; Standing  -     Verify / Perform Chlorhexidine Skin Prep; Standing  -     Verify / Perform Chlorhexidine Skin Prep if Indicated (If Not Already Completed); Standing  -     Instructions on " coughing, deep breathing, and incentive spirometry.; Standing  -     Oxygen Therapy-; Standing  -     Notify physician (specify); Standing         Lalo Jones is a 57 y.o. male with a need for port placement for chemotherapy for lung cancer. After a discussion of risks (including bleeding, port infection with need for removal, damage to surrounding structures including the arteries, pneumothorax and possible port malfunction) and benefits, the patient wishes to proceed with single lumen port placement with fluoroscopy. The patient is currently scheduled for this procedure on 12/30/22. He is at increased risk of perioperative complications 2/2 his elevated BMI.     In terms of his retroperitoneal lymphadenopathy, I do not recommend a robotic retroperitoneal lymph node biopsy as the node is only 0.7cm and I believe it would be unlikely we would be able to identify that node without guidance. It would be an extremely high risk procedure as it the node is very close to the vasculature. I discussed this with Dr. Kinney personally. This is a new undiagnosed problem with an uncertain prognosis.     I have personally reviewed the oncology note, labs, and PET scan above. I have personally discussed the patient and the care plan with Dr. Kinney and at tumor board.     I also discussed with the patient post-operative pain management including multimodal pain control utilizing Tylenol, ibuprofen, and tramadol for breakthrough pain. I will plan to given the patient 5 tabs of 50mg Ultram post-operatively for break through pain.     BMI is >= 25 and <30. (Overweight) The following options were offered after discussion;: weight loss educational material (shared in after visit summary)      Kim Bonilla MD  12/29/22  20:22 CST

## 2022-12-30 ENCOUNTER — HOSPITAL ENCOUNTER (OUTPATIENT)
Dept: GENERAL RADIOLOGY | Facility: HOSPITAL | Age: 57
Setting detail: HOSPITAL OUTPATIENT SURGERY
Discharge: HOME | End: 2022-12-30
Payer: MEDICAID

## 2022-12-30 ENCOUNTER — HOSPITAL ENCOUNTER (OUTPATIENT)
Facility: HOSPITAL | Age: 57
Setting detail: HOSPITAL OUTPATIENT SURGERY
Discharge: HOME | End: 2022-12-30
Attending: STUDENT IN AN ORGANIZED HEALTH CARE EDUCATION/TRAINING PROGRAM | Admitting: STUDENT IN AN ORGANIZED HEALTH CARE EDUCATION/TRAINING PROGRAM
Payer: MEDICAID

## 2022-12-30 ENCOUNTER — ANESTHESIA EVENT (OUTPATIENT)
Dept: PERIOP | Facility: HOSPITAL | Age: 57
End: 2022-12-30
Payer: MEDICAID

## 2022-12-30 ENCOUNTER — ANESTHESIA (OUTPATIENT)
Dept: PERIOP | Facility: HOSPITAL | Age: 57
End: 2022-12-30
Payer: MEDICAID

## 2022-12-30 ENCOUNTER — APPOINTMENT (OUTPATIENT)
Dept: GENERAL RADIOLOGY | Facility: HOSPITAL | Age: 57
End: 2022-12-30
Payer: MEDICAID

## 2022-12-30 VITALS
RESPIRATION RATE: 16 BRPM | DIASTOLIC BLOOD PRESSURE: 85 MMHG | HEART RATE: 63 BPM | HEIGHT: 74 IN | OXYGEN SATURATION: 96 % | TEMPERATURE: 97.4 F | BODY MASS INDEX: 27.56 KG/M2 | WEIGHT: 214.73 LBS | SYSTOLIC BLOOD PRESSURE: 125 MMHG

## 2022-12-30 DIAGNOSIS — C34.91 MALIGNANT NEOPLASM OF RIGHT LUNG, UNSPECIFIED PART OF LUNG: ICD-10-CM

## 2022-12-30 PROCEDURE — 25010000002 FENTANYL CITRATE (PF) 100 MCG/2ML SOLUTION

## 2022-12-30 PROCEDURE — 0 LIDOCAINE 1 % SOLUTION 20 ML VIAL: Performed by: STUDENT IN AN ORGANIZED HEALTH CARE EDUCATION/TRAINING PROGRAM

## 2022-12-30 PROCEDURE — 71045 X-RAY EXAM CHEST 1 VIEW: CPT

## 2022-12-30 PROCEDURE — 36561 INSERT TUNNELED CV CATH: CPT | Performed by: STUDENT IN AN ORGANIZED HEALTH CARE EDUCATION/TRAINING PROGRAM

## 2022-12-30 PROCEDURE — 77295 3-D RADIOTHERAPY PLAN: CPT | Performed by: RADIOLOGY

## 2022-12-30 PROCEDURE — 77300 RADIATION THERAPY DOSE PLAN: CPT | Performed by: RADIOLOGY

## 2022-12-30 PROCEDURE — 25010000002 CEFAZOLIN PER 500 MG: Performed by: STUDENT IN AN ORGANIZED HEALTH CARE EDUCATION/TRAINING PROGRAM

## 2022-12-30 PROCEDURE — 93005 ELECTROCARDIOGRAM TRACING: CPT | Performed by: STUDENT IN AN ORGANIZED HEALTH CARE EDUCATION/TRAINING PROGRAM

## 2022-12-30 PROCEDURE — 77001 FLUOROGUIDE FOR VEIN DEVICE: CPT

## 2022-12-30 PROCEDURE — 25010000002 PROPOFOL 1000 MG/100ML EMULSION

## 2022-12-30 PROCEDURE — 77470 SPECIAL RADIATION TREATMENT: CPT | Performed by: RADIOLOGY

## 2022-12-30 PROCEDURE — 76000 FLUOROSCOPY <1 HR PHYS/QHP: CPT

## 2022-12-30 PROCEDURE — 93010 ELECTROCARDIOGRAM REPORT: CPT | Performed by: INTERNAL MEDICINE

## 2022-12-30 PROCEDURE — 77001 FLUOROGUIDE FOR VEIN DEVICE: CPT | Performed by: STUDENT IN AN ORGANIZED HEALTH CARE EDUCATION/TRAINING PROGRAM

## 2022-12-30 PROCEDURE — 77334 RADIATION TREATMENT AID(S): CPT | Performed by: RADIOLOGY

## 2022-12-30 PROCEDURE — 25010000002 HEPARIN LOCK FLUSH PER 10 UNITS: Performed by: STUDENT IN AN ORGANIZED HEALTH CARE EDUCATION/TRAINING PROGRAM

## 2022-12-30 PROCEDURE — C1788 PORT, INDWELLING, IMP: HCPCS | Performed by: STUDENT IN AN ORGANIZED HEALTH CARE EDUCATION/TRAINING PROGRAM

## 2022-12-30 DEVICE — PRT INTRO VASC/INTERV VORTEX FILL/HL DETACH/POLYURET/CATH 8F: Type: IMPLANTABLE DEVICE | Site: CHEST | Status: FUNCTIONAL

## 2022-12-30 RX ORDER — ONDANSETRON 2 MG/ML
4 INJECTION INTRAMUSCULAR; INTRAVENOUS ONCE AS NEEDED
Status: DISCONTINUED | OUTPATIENT
Start: 2022-12-30 | End: 2022-12-30 | Stop reason: HOSPADM

## 2022-12-30 RX ORDER — FENTANYL CITRATE 50 UG/ML
25 INJECTION, SOLUTION INTRAMUSCULAR; INTRAVENOUS
Status: DISCONTINUED | OUTPATIENT
Start: 2022-12-30 | End: 2022-12-30 | Stop reason: HOSPADM

## 2022-12-30 RX ORDER — SODIUM CHLORIDE 0.9 % (FLUSH) 0.9 %
3 SYRINGE (ML) INJECTION AS NEEDED
Status: DISCONTINUED | OUTPATIENT
Start: 2022-12-30 | End: 2022-12-30 | Stop reason: HOSPADM

## 2022-12-30 RX ORDER — HEPARIN SODIUM (PORCINE) LOCK FLUSH IV SOLN 100 UNIT/ML 100 UNIT/ML
SOLUTION INTRAVENOUS AS NEEDED
Status: DISCONTINUED | OUTPATIENT
Start: 2022-12-30 | End: 2022-12-30 | Stop reason: HOSPADM

## 2022-12-30 RX ORDER — DEXAMETHASONE 4 MG/1
4 TABLET ORAL SEE ADMIN INSTRUCTIONS
Qty: 36 TABLET | Refills: 0 | Status: SHIPPED | OUTPATIENT
Start: 2022-12-30 | End: 2023-01-02

## 2022-12-30 RX ORDER — OXYCODONE HYDROCHLORIDE 5 MG/1
5 TABLET ORAL EVERY 8 HOURS PRN
Qty: 5 TABLET | Refills: 0 | Status: SHIPPED | OUTPATIENT
Start: 2022-12-30 | End: 2023-01-18 | Stop reason: ALTCHOICE

## 2022-12-30 RX ORDER — SODIUM CHLORIDE, SODIUM LACTATE, POTASSIUM CHLORIDE, CALCIUM CHLORIDE 600; 310; 30; 20 MG/100ML; MG/100ML; MG/100ML; MG/100ML
9 INJECTION, SOLUTION INTRAVENOUS CONTINUOUS
Status: DISCONTINUED | OUTPATIENT
Start: 2022-12-30 | End: 2022-12-30 | Stop reason: HOSPADM

## 2022-12-30 RX ORDER — FLUMAZENIL 0.1 MG/ML
0.2 INJECTION INTRAVENOUS AS NEEDED
Status: DISCONTINUED | OUTPATIENT
Start: 2022-12-30 | End: 2022-12-30 | Stop reason: HOSPADM

## 2022-12-30 RX ORDER — LIDOCAINE HYDROCHLORIDE 10 MG/ML
0.5 INJECTION, SOLUTION EPIDURAL; INFILTRATION; INTRACAUDAL; PERINEURAL ONCE AS NEEDED
Status: DISCONTINUED | OUTPATIENT
Start: 2022-12-30 | End: 2022-12-30 | Stop reason: HOSPADM

## 2022-12-30 RX ORDER — OXYCODONE AND ACETAMINOPHEN 10; 325 MG/1; MG/1
1 TABLET ORAL ONCE AS NEEDED
Status: DISCONTINUED | OUTPATIENT
Start: 2022-12-30 | End: 2022-12-30 | Stop reason: HOSPADM

## 2022-12-30 RX ORDER — SODIUM CHLORIDE, SODIUM LACTATE, POTASSIUM CHLORIDE, CALCIUM CHLORIDE 600; 310; 30; 20 MG/100ML; MG/100ML; MG/100ML; MG/100ML
1000 INJECTION, SOLUTION INTRAVENOUS CONTINUOUS
Status: DISCONTINUED | OUTPATIENT
Start: 2022-12-30 | End: 2022-12-30 | Stop reason: HOSPADM

## 2022-12-30 RX ORDER — PROPOFOL 10 MG/ML
INJECTION, EMULSION INTRAVENOUS CONTINUOUS PRN
Status: DISCONTINUED | OUTPATIENT
Start: 2022-12-30 | End: 2022-12-30 | Stop reason: SURG

## 2022-12-30 RX ORDER — MIDAZOLAM HYDROCHLORIDE 1 MG/ML
1 INJECTION INTRAMUSCULAR; INTRAVENOUS
Status: DISCONTINUED | OUTPATIENT
Start: 2022-12-30 | End: 2022-12-30 | Stop reason: HOSPADM

## 2022-12-30 RX ORDER — IBUPROFEN 200 MG
600 TABLET ORAL EVERY 8 HOURS
Qty: 100 TABLET | Refills: 2
Start: 2022-12-30 | End: 2023-01-18 | Stop reason: ALTCHOICE

## 2022-12-30 RX ORDER — ACETAMINOPHEN 325 MG/1
975 TABLET ORAL EVERY 8 HOURS
Qty: 100 TABLET | Refills: 2
Start: 2022-12-30 | End: 2023-12-30

## 2022-12-30 RX ORDER — SODIUM CHLORIDE 0.9 % (FLUSH) 0.9 %
10 SYRINGE (ML) INJECTION AS NEEDED
Status: DISCONTINUED | OUTPATIENT
Start: 2022-12-30 | End: 2022-12-30 | Stop reason: HOSPADM

## 2022-12-30 RX ORDER — DROPERIDOL 2.5 MG/ML
0.62 INJECTION, SOLUTION INTRAMUSCULAR; INTRAVENOUS ONCE AS NEEDED
Status: DISCONTINUED | OUTPATIENT
Start: 2022-12-30 | End: 2022-12-30 | Stop reason: HOSPADM

## 2022-12-30 RX ORDER — BUPIVACAINE HCL/0.9 % NACL/PF 0.1 %
2 PLASTIC BAG, INJECTION (ML) EPIDURAL ONCE
Status: COMPLETED | OUTPATIENT
Start: 2022-12-30 | End: 2022-12-30

## 2022-12-30 RX ORDER — OXYCODONE AND ACETAMINOPHEN 7.5; 325 MG/1; MG/1
2 TABLET ORAL EVERY 4 HOURS PRN
Status: DISCONTINUED | OUTPATIENT
Start: 2022-12-30 | End: 2022-12-30 | Stop reason: HOSPADM

## 2022-12-30 RX ORDER — SODIUM CHLORIDE 9 MG/ML
INJECTION, SOLUTION INTRAVENOUS AS NEEDED
Status: DISCONTINUED | OUTPATIENT
Start: 2022-12-30 | End: 2022-12-30 | Stop reason: HOSPADM

## 2022-12-30 RX ORDER — DEXTROSE MONOHYDRATE 25 G/50ML
12.5 INJECTION, SOLUTION INTRAVENOUS AS NEEDED
Status: DISCONTINUED | OUTPATIENT
Start: 2022-12-30 | End: 2022-12-30 | Stop reason: HOSPADM

## 2022-12-30 RX ORDER — FENTANYL CITRATE 50 UG/ML
INJECTION, SOLUTION INTRAMUSCULAR; INTRAVENOUS AS NEEDED
Status: DISCONTINUED | OUTPATIENT
Start: 2022-12-30 | End: 2022-12-30 | Stop reason: SURG

## 2022-12-30 RX ORDER — ONDANSETRON 4 MG/1
4 TABLET, FILM COATED ORAL EVERY 8 HOURS PRN
Qty: 15 TABLET | Refills: 0 | Status: SHIPPED | OUTPATIENT
Start: 2022-12-30 | End: 2023-01-18 | Stop reason: ALTCHOICE

## 2022-12-30 RX ORDER — LABETALOL HYDROCHLORIDE 5 MG/ML
5 INJECTION, SOLUTION INTRAVENOUS
Status: DISCONTINUED | OUTPATIENT
Start: 2022-12-30 | End: 2022-12-30 | Stop reason: HOSPADM

## 2022-12-30 RX ORDER — NALOXONE HCL 0.4 MG/ML
0.4 VIAL (ML) INJECTION AS NEEDED
Status: DISCONTINUED | OUTPATIENT
Start: 2022-12-30 | End: 2022-12-30 | Stop reason: HOSPADM

## 2022-12-30 RX ORDER — IBUPROFEN 600 MG/1
600 TABLET ORAL ONCE AS NEEDED
Status: DISCONTINUED | OUTPATIENT
Start: 2022-12-30 | End: 2022-12-30 | Stop reason: HOSPADM

## 2022-12-30 RX ORDER — SODIUM CHLORIDE 0.9 % (FLUSH) 0.9 %
10 SYRINGE (ML) INJECTION EVERY 12 HOURS SCHEDULED
Status: DISCONTINUED | OUTPATIENT
Start: 2022-12-30 | End: 2022-12-30 | Stop reason: HOSPADM

## 2022-12-30 RX ADMIN — LIDOCAINE HYDROCHLORIDE 60 MG: 20 INJECTION, SOLUTION INTRAVENOUS at 12:27

## 2022-12-30 RX ADMIN — SODIUM CHLORIDE, POTASSIUM CHLORIDE, SODIUM LACTATE AND CALCIUM CHLORIDE 1000 ML: 600; 310; 30; 20 INJECTION, SOLUTION INTRAVENOUS at 09:38

## 2022-12-30 RX ADMIN — PROPOFOL 100 MCG/KG/MIN: 10 INJECTION, EMULSION INTRAVENOUS at 12:27

## 2022-12-30 RX ADMIN — Medication 2 G: at 12:30

## 2022-12-30 RX ADMIN — FENTANYL CITRATE 50 MCG: 50 INJECTION INTRAMUSCULAR; INTRAVENOUS at 12:29

## 2022-12-30 RX ADMIN — FENTANYL CITRATE 50 MCG: 50 INJECTION INTRAMUSCULAR; INTRAVENOUS at 12:42

## 2022-12-30 NOTE — DISCHARGE INSTRUCTIONS
Wound:   - you have skin glue on your incisions. Okay to shower tomorrow.   - Leave skin glue in place, it should slowly fall off over 2 weeks   - No swimming/soaking/bathing x 2 weeks to allow incisions to heal.     Activity:   - Activity as tolerated.   - No driving or operating machinery on narcotic pain medication.     Pain medication:   - Take 1000mg of tylenol every 8 hours for 3 days. After three days, take it prn.   - Take 600mg of ibuprofen (motrin) every 8 hours for 3 days. After three days, take it prn.   - You have a prescription for a narcotic. It will be roxicodone 5mg tabs. Take these only as needed after you have taken the tylenol and ibuprofen. If you are taking the roxicodone, make sure to take a stool softener (colace) with it as it can cause constipation.   - The narcotic may make you nauseated, you will have a prescription for zofran in case of nausea.     Follow up:   - If you have any concerns call my office at [phone]

## 2022-12-30 NOTE — ANESTHESIA PREPROCEDURE EVALUATION
Anesthesia Evaluation     Patient summary reviewed   no history of anesthetic complications:  NPO Solid Status: > 8 hours             Airway   Mallampati: II  Dental      Pulmonary    (+) a smoker Former, lung cancer, COPD, sleep apnea,   (-) asthma  Cardiovascular   Exercise tolerance: good (4-7 METS)    (-) pacemaker, past MI, angina, cardiac stents      Neuro/Psych  (-) seizures, TIA, CVA  GI/Hepatic/Renal/Endo    (+)  GERD,    (-) liver disease, no renal disease, diabetes    Musculoskeletal     Abdominal    Substance History      OB/GYN          Other                        Anesthesia Plan    ASA 3     MAC       Anesthetic plan, risks, benefits, and alternatives have been provided, discussed and informed consent has been obtained with: patient.        CODE STATUS:

## 2022-12-30 NOTE — OP NOTE
Port-A-Cath Placement with Fluoroscopy Operative Report:     Patient: Lalo Jones  MRN: [id number]    YOB: 1965  Age: 57 y.o.  Sex: male  Unit:  [facility] OR Room/Bed: [facility] OR/MAIN OR Location: [facility]      Admitting Physician: VERENA BONILLA    Primary Care Physician: Flex Sheikh MD             INDICATIONS: Lalo Jones is a 57 y.o. male with a need for port placement for chemotherapy for lung cancer. After a discussion of risks (including bleeding, port infection with need for removal, damage to surrounding structures including the arteries, pneumothorax and possible port malfunction) and benefits, the patient wishes to proceed with single lumen port placement with fluoroscopy. To OR today.     DATE OF OPERATION: 12/30/2022     Surgeon(s) and Role:     * Verena Bonilla MD - Primary    ANESTHESIA: Monitored Anesthesia Care     PREOPERATIVE DIAGNOSIS: Malignant neoplasm of right lung, unspecified part of lung (HCC) [C34.91]    POSTOPERATIVE DIAGNOSIS: Same    PROCEDURES PERFORMED:  Port-A-Cath placement in the right cephalic vein     PROCEDURE DETAILS:     After patient was placed on the table in a supine position the bilateral chest and neck were prepped with ChloraPrep and draped in the usual fashion.  Preoperative antibiotics were given.  A timeout was performed.    On the right, the skin overlying the deltopectoral groove was infiltrated with 1% lidocaine with epinephrine. An incision was made, and I dissected down to the deltopectoral groove with a combination of cautery and sharp dissection. The cephalic vein was identified. 2-0 silk ties were placed proximally and distally. The distal tie was tied down.  An 11 blade was used to create a venotomy. The yellow pic was used to open the vein and the catheter was inserted. Under live fluoroscopy, the catheter was noted to be in the SVC. The proximal tie was tied down, ensuring not to narrow the lumen of the catheter  in the vein. A pocket was created in the subcutaneous tissue for the port.  The catheter was connected to the Port-A-Cath which was buried into the subcutaneous pocket and secured with 0-Vicryl sutures.  The port was accessed through the skin, easily withdrew blood, flushed easily, and was flushed with a final heparin flush. The pocket was closed with 3-0 Vicryl and then 4-0 Monocryl.  Skin glue was placed over the incision.  The patient tolerated procedure well.  There were no complications. No x-ray needed in PACU     Findings: Port-a-cath placed in the right cephalic vein   Estimated Blood Loss: 20mL  Complications: none apparent            Specimens: None      Disposition: PACU - hemodynamically stable.           Condition: stable    Kim Bonilla MD  12/29/2022

## 2022-12-30 NOTE — ANESTHESIA POSTPROCEDURE EVALUATION
"Patient: Lalo Jones    Procedure Summary     Date: 12/30/22 Room / Location:  [facility] OR  /  [facility] OR    Anesthesia Start: 1224 Anesthesia Stop: 1308    Procedure: Single Lumen Port-a-cath insertion with flouroscopy (Chin to Nipples) Diagnosis:       Malignant neoplasm of right lung, unspecified part of lung (HCC)      (Malignant neoplasm of right lung, unspecified part of lung (HCC) [C34.91])    Surgeons: Kim Bonilla MD Provider: Enrique Alston CRNA    Anesthesia Type: MAC ASA Status: 3          Anesthesia Type: MAC    Vitals  Vitals Value Taken Time   BP     Temp     Pulse 73 12/30/22 1308   Resp     SpO2     Vitals shown include unvalidated device data.        Post Anesthesia Care and Evaluation    Patient location during evaluation: PHASE II  Patient participation: complete - patient participated  Level of consciousness: awake  Pain management: adequate    Airway patency: patent  Anesthetic complications: No anesthetic complications    Cardiovascular status: acceptable  Respiratory status: acceptable  Hydration status: acceptable    Comments: /84 (BP Location: Left arm, Patient Position: Sitting)   Pulse 68   Temp 97.7 °F (36.5 °C) (Temporal)   Resp 18   Ht 187 cm (73.62\")   Wt 97.4 kg (214 lb 11.7 oz)   SpO2 94%   BMI 27.85 kg/m²         "

## 2022-12-31 LAB
QT INTERVAL: 354 MS
QTC INTERVAL: 398 MS

## 2023-01-01 ENCOUNTER — DOCUMENTATION (OUTPATIENT)
Dept: RADIATION ONCOLOGY | Facility: HOSPITAL | Age: 58
End: 2023-01-01
Payer: MEDICAID

## 2023-01-01 NOTE — PROGRESS NOTES
I presented this patient at tumor conference.  Dr. Kim Bonilla was in attendance unfortunately she is not able to biopsy the upper abdominal lymph nodes due to the proximity to adjacent aorta and superior mesenteric artery.    I also reviewed the recommendation of Dr. Goldy Carlin for neoadjuvant chemoradiation followed by definitive surgery for the right upper lobe lung tumor.    Discussion at tumor conference surrounded management of the upper abdominal lymph nodes.  The consensus was at this time to proceed with treatment of the primary known adenocarcinoma lung and base further treatment recommendations upon findings at definitive surgery.    Abdominal lymph nodes serial radiographs is recommended for follow-up.  It was agreed that this is an unlikely metastatic distribution for right upper lobe lung carcinoma in the lymph nodes may represent another etiology.    I will coordinate with Dr. Tadeo for concomitant neoadjuvant chemoradiation of the right upper lobe lung tumor followed by definitive surgery per Dr. Goldy Carlin.

## 2023-01-02 ENCOUNTER — HOSPITAL ENCOUNTER (OUTPATIENT)
Dept: RADIATION ONCOLOGY | Facility: HOSPITAL | Age: 58
Setting detail: RADIATION/ONCOLOGY SERIES
End: 2023-01-02
Payer: MEDICAID

## 2023-01-03 ENCOUNTER — HOSPITAL ENCOUNTER (OUTPATIENT)
Dept: ULTRASOUND IMAGING | Facility: HOSPITAL | Age: 58
Discharge: HOME | End: 2023-01-03
Payer: MEDICAID

## 2023-01-03 ENCOUNTER — HOSPITAL ENCOUNTER (OUTPATIENT)
Dept: MRI IMAGING | Facility: HOSPITAL | Age: 58
Discharge: HOME | End: 2023-01-03
Payer: MEDICAID

## 2023-01-03 DIAGNOSIS — R10.11 RIGHT UPPER QUADRANT PAIN: ICD-10-CM

## 2023-01-03 DIAGNOSIS — C34.91 ADENOCARCINOMA, LUNG, RIGHT: ICD-10-CM

## 2023-01-03 DIAGNOSIS — R59.9 ENLARGED LYMPH NODES: ICD-10-CM

## 2023-01-03 DIAGNOSIS — Z85.46 HISTORY OF PROSTATE CANCER: ICD-10-CM

## 2023-01-03 LAB — CREAT BLDA-MCNC: 1.3 MG/DL (ref 0.6–1.3)

## 2023-01-03 PROCEDURE — 82565 ASSAY OF CREATININE: CPT

## 2023-01-03 PROCEDURE — 70553 MRI BRAIN STEM W/O & W/DYE: CPT

## 2023-01-03 PROCEDURE — 0 GADOBENATE DIMEGLUMINE 529 MG/ML SOLUTION: Performed by: INTERNAL MEDICINE

## 2023-01-03 PROCEDURE — A9577 INJ MULTIHANCE: HCPCS | Performed by: INTERNAL MEDICINE

## 2023-01-03 RX ADMIN — GADOBENATE DIMEGLUMINE 20 ML: 529 INJECTION, SOLUTION INTRAVENOUS at 11:59

## 2023-01-03 NOTE — PROGRESS NOTES
Patient:  Salas Torres  YOB: 1965  Date of Service: 1/5/2023  MRN: [id number]    Primary Care Physician: Mark Acevedo    Chief Complaint   Patient presents with    Follow-up     Adenocarcinoma of right lung    Chemotherapy     C#1Cisplat/Alimta/Opdivo           Patient Seen, Chart, Consults notes, Labs, Radiology studies reviewed. 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 Libby Olivarez September 2020 for PT2N0 [place] 7 disease with tumor approaching the right posterior margin    Multidisciplinary interdepartmental consultations have been obtained with radiation oncology, thoracic surgery and general surgery for opinion regarding robotic abdominal biopsy approach for lymph node tissue sampling leading to today's appointment to proceed with neoadjuvant chemoimmunotherapy. Gladis Oliva is here accompanied by his wife to initiate cycle #1 of chemoimmunotherapy. Neoadjuvant concurrent XRT (with chemoimmunotherapy) initiated on 1/4/2023.  5040 cGy planned in 28 treatment fractions. Neoadjuvant cisplatin 75 mg/m2, Alimta 500 mg/m2 and Opdivo 360 mg, cycle #1 initiated on 1/5/2023      TARGET LUNG CANCER SITES:  4.7 cm x 4 cm x 1.6 cm mass in the RUL of the lung SUV of 12  9 mm retroaortic upper abdominal lymph node is mildly hypermetabolic with an SUV of 4.0.   7 mm aortocaval lymph node is mildly hypermetabolic with an SUV of 3.0. 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 Elo Carter in [facility] for medical oncology opinion regarding a diagnosis of prostate cancer.    [employer] GENETIC TESTING on 7/15/2020 documented a VUS:  MEN1, heterozygous, for c.  511C>T (p.Arg 171 Trp)    Mr. Odalys Pelayo was again seen on 12/8/2022 re-referred by Dr. Shahab Mendez for new diagnosis of adenocarcinoma of the lung, diagnosed on 11/16/2022 by CT Guided needle Biopsy. Maria Elena Sandersred is a longtime cigarette smoker of 1 pack/day since age 27. He quit smoking on 12/1/2022 after the diagnosis of lung cancer. Naman's PCP, Dr. Ju Santacruz ordered a low-dose screening CT scan of the chest for monitoring. CT chest without contrast, low-dose protocol at [facility] on 5/19/2022:  4 mm LEFT lower lobe pulmonary nodule in superior segment   3.3 x 1.3 cm mass like area of pleural thickening in the RIGHT anterior upper chest   Moderate centrilobular and paraseptal emphysema. Lung-RADS 2S-- Nodules with a very low likelihood of becoming a clinically active cancer due to size or lack of growth. Continue annual screening with low dose CT in 12 months. 3 month follow-up chest CT recommended to evaluate for stability of RIGHT anterior upper chest masslike pleural thickening    Dr. Krystal Griffin repeated the low-dose CT scan because of the findings in the right anterior upper chest    CT chest without contrast, low-dose protocol at [facility] on 10/17/2022:  4.7 cm x 4 cm x 1.6 cm abnormal area of pleural thickening in the periphery of the RUL with slightly irregular margins  Moderate paraseptal emphysematous changes with scattered bullae in both lungs  Lung-RADS - 4B      Initial consult with Dr. Shahab Mendez at [facility] on 10/24/2022:  Explained the abnormal CT scan results from May which is done in the [facility] to the patient and also discussed with the radiologist from [facility] in [place] who reviewed the current CT scan of the chest done earlier this month. This shows the right upper lobe pleural-based thickening or mass and left upper lobe nodule. Due to his smoking history the possibility of malignancy cannot be ruled out.   Discussing different options I ordered a PET scan and a follow-up CT scan of the chest in 3 months time. If the PET scan is positive a CT-guided needle biopsy would be best option for the right upper lobe pleural-based lung mass which is not reachable by bronchoscopy. If PET scan is negative will wait for the neck CT scan of the chest and make further recommendations. NM PET/CT SKULL BASE TO MID THIGH at [facility] on 11/1/2022:Comparison: Chest CT 5/19/2022  Background right hepatic lobe metabolic activity measures max SUV 2.7.  4.3 x 1.9 cm RIGHT anterior upper chest pleural thickening which is hypermetabolic with a maximum SUV of 12.0  1.7 cm LEFT upper abdomen retroperitoneal lymph node hypermetabolicactivity, max SUV 5.3. This nodule/node closely approximates the LEFT adrenal gland. 9 mm retroaortic upper abdominal lymph node is mildly hypermetabolic with a max SUV of 4.0.   7 mm aortocaval lymph node is mildly hypermetabolic with a max SUV of 3.0. CT Needle Biopsy Lung at [facility] on 11/16/2022:  Successful CT guided biopsy of the right anterior pleural mass  Final Diagnosis  Right upper chest/pleural mass, core biopsies:  Adenocarcinoma of pulmonary origin. [employer] on 11/16/2022:  ALK(D5F3)- NEGATIVE  FISH Analysis ROS1- NEGATIVE  EGFR Exon 18-Not Detected  EGFR Exon 19-Not Detected  EGFR Exon 20 [id number] Detected  EGFR Exon 20 (other Mutations)-Not Detected  EGFR Exon 21- Not Detected  BRAF Mutation-Not Detected  FISH MET-No evidence of a MET amplification    Concern on the work-up is that in addition to the 4.7 cm primary RUL adenocarcinoma of the lung there are upper abdominal retroperitoneal and aortocaval lymph nodes positive on the PET scan. If the upper abdominal lymph nodes were not a concern, Mague Ceja could be treated with neoadjuvant Opdivo Alimta Keytruda followed by resection. Intense interdepartmental consultations are being undertaken to give Mague Ceja the most aggressive approach possible.   All of the plans outlined below were discussed at length with Gen Alanis and his wife Cathi Singh. MRI abdomen without and with IV contrast  at [facility] on 12/15/2022:COMPARISON: PET/CT 11/01/2022    9 mm RIGHT liver cyst  15.2 cm spleen  9 mm upper abdominal retroperitoneal lymph node,unchanged      CT ABDOMEN PELVIS WO CONTRAST at [facility] on 12/15/2022:COMPARISON: PET/CT 11/01/2022  Penile prosthesis. Penile pump implanted within the left anterolateral pelvic wall. There is prominent wall thickening of the distal sigmoid colon with multiple regional diverticuli. There are small fatty containing umbilical hernia. Fatty containing inguinal hernias  7 mm left periaortic and aortocaval lymph nodes demonstrating increased FDG uptake comparison PET   exam remains     NM BONE SCAN WHOLE BODY at [facility] on 12/15/2022:Comparison is made with abdomen/pelvis CT imaging from earlier today. Comparison is made with a PET/CT from 11/01/2022. Comparison is made with a bone scan from 02/20/2020. No evidence of bone metastasis. Initial Consult with Dr. Amalia Pappas on 12/27/2022:  He met with Dr. Vernon Herrera for surgical consirations of the right upper lobe lesion. He believes that there is likely chest wall invasion and recommends neoadjuvant chemoradiation prior to definitive surgery of the right upper lobe tumor. I anticipate a dose of 5040 cGy in 28 treatment fractions to the lung lesion as neoadjuvant treatment along with concomitant chemotherapy and/or immunotherapy. In addition, we will determine if Dr. Carmen Rivera is able to sample the upper abdominal lymph nodes to determine the etiology of that finding on the PET scan. He is scheduled to see her later this week. Initial Consult with Dr. Baltazar Lauren at [facility] on 12/29/2022: In terms of his retroperitoneal lymphadenopathy, I do not recommend a robotic retroperitoneal lymph node biopsy as the node is only 0.7cm and I believe it would be unlikely we would be able to identify that node without guidance.  It would be an extremely high risk procedure as it the node is very close to the vasculature. I discussed this with Dr. Ronaldo Macedo personally. This is a new undiagnosed problem with an uncertain prognosis. I have personally reviewed the oncology note, labs, and PET scan above. I have personally discussed the patient and the care plan with Dr. Ronaldo Macedo and at tumor board    Port placed by Dr. Sarha Garcia at [facility] on 12/30/2022    Case Presented at Tumor Board at [facility] By Dr. Randy Ghosh (note 1/1/2023): I (Dr. Tinoco Yessenia this patient at tumor conference. Dr. Sarah Garcia was in attendance unfortunately she is not able to biopsy the upper abdominal lymph nodes due to the proximity to adjacent aorta and superior mesenteric artery. I (Dr. Mary Alice Pavon reviewed the recommendation of Dr. Ariella Caceres for neoadjuvant chemoradiation followed by definitive surgery for the right upper lobe lung tumor. Discussion at tumor conference surrounded management of the upper abdominal lymph nodes. The consensus was at this time to proceed with treatment of the primary known adenocarcinoma lung and base further treatment recommendations upon findings at definitive surgery. Abdominal lymph nodes serial radiographs is recommended for follow-up. It was agreed that this is an unlikely metastatic distribution for right upper lobe lung carcinoma in the lymph nodes may represent another etiology. I (Dr. Ronaldo Macedo) will coordinate with Dr. Kaylan Espinosa for concomitant neoadjuvant chemoradiation of the right upper lobe lung tumor followed by definitive surgery per Dr. Ariella Caceres      MRI Brain With & Without Contrast at [facility] on 1/3/2023:  No metastatic disease identified    I Mirta Espinosa) and Dr. Randy Ghosh have both discussed Mr. Jessica's case with Dr. Ariella Caceres on multiple occasions. He has participated in the decision-making and treatment plan and recommendations moving forward.   Talia Patterson has an official appointment with Dr. Ariella Caceres scheduled for 1/12/2023    TREATMENT SUMMARY:  Neoadjuvant concurrent XRT (with chemoimmunotherapy) initiated on 1/4/2023  5040 cGy planned in 28 treatment fractions. Neoadjuvant cisplatin 75 mg/m2, Alimta 500 mg/m2 and Opdivo 360 mg, cycle #1 initiated on 1/5/2023              #2   TUMOR HISTORY: Prostate cancer-  Clive Talbot was seen in initial oncology consultation on 7/15/2020 referred by his PCP, Dr. Andrew Estrella in [facility] for medical oncology opinion on his diagnosis of prostate cancer. Clive Talbot relates that he was found to have a minimally increased PSA of between 4.5 and 5 on a routine physical examination. This led to work-up and eventual prostate biopsy leading to the diagnosis of prostate cancer. Prostate biopsy on 11/1/2018 documented the following:  Right lateral base-Microfocus of prostatic carcinoma   Prostate biopsy on 2/25/2019 documented the following:  Right lateral base-adenocarcinoma: [place] 3+4 = 7  Right lateral mid- adenocarcinoma: [place] 3+3 = 6  Prostate biopsy 2/18/2020 documented the following:  Right lateral base-adenocarcinoma: [place] 3+4 = 7     PSA levels by date as follows:  2/19/2019-9.1  4/19/2019-7.0  7/22/2019-6.6  10/17/2019-7.8  114 2020-8.39  5/18/2020- 11.5     MRI pelvis with and without contrast on 2/5/2020 at [facility] documented:  No suspicious lesions in the prostate (PI-RADS 3 or greater)  No pelvis lymphadenopathy or suspicious pelvic bone lesions     Prostate biopsy was performed on 2/18/2020 by Dr. Aleja Davis at [facility].   Pathology revealed:  Prostate, right apex, needle biopsy:  Benign prostate tissue  Prostate, right base, needle biopsy:  Adenocarcinoma, acinar type, Harrison grade 3+4=7, grade group 2, discontinuously involving 10/20 mm or 50% of core (3 foci measuring 4,3, and 1 mm), 30% of tumor is Harrison pattern 4  Prostate, right mid, needle biopsy:  Benign prostate tissue  Prostate, left apex, needle biopsy:  Benign prostate tissue  Prostate, left base, needle biopsy  Benign prostate tissue  Prostate, left mid, needle biopsy:  Benign prostate tissue        CT abdomen and pelvis without contrast on 2/20/2020 at [facility] documented:  No evidence of complication after prostate biopsy  Numerous colonic diverticula with no evidence of acute diverticulitis  Small bilateral fat-containing groin hernias     Bone scan on 6/2/2020 at [facility] documented:  Abnormal uptake at the right dorsal tissues at the level of T12 and right upper kidney. This could represent a bone lesion or renal lesion. Recommend CT abdomen with contrast for further evaluation if patient's renal function permits. If low renal function, MRI abdomen without contrast would be an alternative for evaluation     CT abdomen and pelvis without contrast on 6/7/2020 at [facility] documented no acute abnormality. CBC (7/15/2020) revealed a WBC of 6.73. Hgb is 15.5 with an MCV of 93.5 and platelet count of 794,332. Yazmin Harris has been under evaluation and monitoring in the urology department by Dr. Chris Butts at [facility]. At his last visit on 5/28/2020 Mr. Oracio Mota was leaning toward a radical prostatectomy to address the issue of his prostate cancer. Medical oncology consultation was requested for opinion. The 2 best options at this juncture include a radical prostatectomy which is most definitive followed by radiation therapy which gives similar statistical long-term results but with the potential for long-term radiation therapy associated side effects. I have encouraged him that a radical prostatectomy is appropriate and if he is considering this that I would support that completely. He has an appointment with Dr. Fanny Guidry on 8/21/2022 discussed robotic prostatectomy. Recommendation prior to his August appointment with urology is as follows:   [employer] genetic testing drawn today  PSA  CMP  MRI of the thoracic spine W&WO to evaluate the T12 area and any other areas of concern     He had robotic assisted laparoscopic prostatectomy by Dr. Nae Fitzgerald September 2020 for PT2N0 [place] 7 disease with tumor approaching the right posterior margin. TREATMENT SUMMARY:  Robotic assisted laparoscopic prostatectomy by Dr. Nae Fitzgerald September 2020 for PT2N0 [place] 7 disease with tumor approaching the right posterior margin          Allergies:  Patient has no known allergies. Medicines:  Current Outpatient Medications   Medication Sig Dispense Refill    montelukast (SINGULAIR) 10 MG tablet Take 10 mg by mouth nightly      dexamethasone (DECADRON) 4 MG tablet Take 4 mg by mouth 2 times daily (with meals) Take one tablet twice a day starting day before chemotherapy for three days      folic acid (FOLVITE) 1 MG tablet Take 1 tablet by mouth daily 30 tablet 5    hydrOXYzine pamoate (VISTARIL) 50 MG capsule       gabapentin (NEURONTIN) 800 MG tablet Take 800 mg by mouth 2 times daily. ibuprofen (ADVIL;MOTRIN) 800 MG tablet Take 800 mg by mouth every 6 hours as needed for Pain      omeprazole (PRILOSEC) 40 MG delayed release capsule Take 40 mg by mouth daily      Albuterol Sulfate (VENTOLIN HFA IN) Inhale 2 puffs into the lungs every 4 hours as needed      sildenafil (VIAGRA) 100 MG tablet Take 100 mg by mouth daily (Patient not taking: Reported on 1/5/2023)       No current facility-administered medications for this visit.      Facility-Administered Medications Ordered in Other Visits   Medication Dose Route Frequency Provider Last Rate Last Admin    sodium chloride (PF) 0.9 % injection 5-40 mL  5-40 mL IntraVENous PRN Rahat Zabala MD        heparin flush 100 UNIT/ML injection 500 Units  500 Units IntraCATHeter PRN Rahat Zabala MD           Past Medical History:      Diagnosis Date    Adenocarcinoma, lung, right ([address] 12/22/2022    Arthritis     Cancer [address] [address]     Prostate    Carcinoma in situ of prostate     Thrombocytopenia [facility])         Past Surgical History:      Procedure Laterality Date    ANKLE SURGERY Right ANTERIOR CRUCIATE LIGAMENT REPAIR Left     BACK SURGERY      X2    ELBOW SURGERY Right 2011    Tennis elbow release    NASAL SEPTUM SURGERY      OR SONO GUIDE NEEDLE BIOPSY  2020    Dr. Reji Calhoun @ hospitals    SHOULDER SURGERY Left     x4        Family History:  No family history on file. Social History  Social History     Tobacco Use    Smoking status: Former     Packs/day: 1.00     Years: 15.00     Pack years: 15.00     Types: Cigarettes     Start date: 1987     Quit date: 2022     Years since quittin.1    Smokeless tobacco: Never    Tobacco comments: On Chantix. ..trying to quit   Vaping Use    Vaping Use: Former    Substances: Nicotine    Devices: Pre-filled or refillable cartridge, Refillable tank   Substance Use Topics    Alcohol use: Not Currently    Drug use: Never              Wt Readings from Last 3 Encounters:   23 218 lb 8 oz (99.1 kg)   22 216 lb 6.4 oz (98.2 kg)   22 212 lb 8 oz (96.4 kg)        Objective:  Vital Signs: Blood pressure 122/78, pulse 57, temperature 97.7 °F (36.5 °C), height 6' 2\" (1.88 m), weight 218 lb 8 oz (99.1 kg), SpO2 97 %. Labs:  BMP:   Recent Labs     23  0838      K 4.8      CO2 25   BUN 20   CREATININE 0.8   CALCIUM 10.5*     CBC:   Recent Labs     23  0841   WBC 6.11   HGB 15.1   HCT 44.9   MCV 86.2   *         PT/INR: No results for input(s): PROTIME, INR in the last 72 hours. APTT: No results for input(s): APTT in the last 72 hours. Magnesium:  Recent Labs     23  1016   MG 2.1     Phosphorus:No results for input(s): PHOS in the last 72 hours. Hepatic:   Recent Labs     23  0838   ALKPHOS 121   ALT 42   AST 34   PROT 7.7   BILITOT 0.7   LABALBU 4.5       Cultures:   No results for input(s): CULTURE in the last 72 hours. Radiology reports as per the Radiologist  Radiology: No results found.      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 Yobani Rivas September 2020 for PT2N0 [place] 7 disease with tumor approaching the right posterior margin    Multidisciplinary interdepartmental consultations have been obtained with radiation oncology, thoracic surgery and general surgery for opinion regarding robotic abdominal biopsy approach for lymph node tissue sampling leading to today's appointment to proceed with neoadjuvant chemoimmunotherapy. /78   Pulse 57   Temp 97.7 °F (36.5 °C)   Ht 6' 2\" (1.88 m)   Wt 218 lb 8 oz (99.1 kg)   SpO2 97%   BMI 28.05 kg/m²       Examination today, 1/5/2023, is without evidence of palpable supraclavicular or infraclavicular lymphadenopathy. Lungs reveal bilateral rales in upper and lower lung fields but without signs of consolidation. Heart regular rate and rhythm normal S1 and S2, no S3  Abdomen is soft and benign without organomegaly or masses  Extremities no cyanosis clubbing or edema  Neurological exam is grossly intact. CBC today 1/5/2023  reveals a WBC of 6.11 Hgb is 15.1 with an MCV of 86.2 and platelet count of 476,094. TARGET LUNG CANCER SITES:  4.7 cm x 4 cm x 1.6 cm mass in the RUL of the lung SUV of 12  9 mm retroaortic upper abdominal lymph node is mildly hypermetabolic with an SUV of 4.0.   7 mm aortocaval lymph node is mildly hypermetabolic with an SUV of 3.0. MRI abdomen without and with IV contrast  at [facility] on 12/15/2022:COMPARISON: PET/CT 11/01/2022    9 mm RIGHT liver cyst  15.2 cm spleen  9 mm upper abdominal retroperitoneal lymph node,unchanged      CT ABDOMEN PELVIS WO CONTRAST at [facility] on 12/15/2022:COMPARISON: PET/CT 11/01/2022  Penile prosthesis. Penile pump implanted within the left anterolateral pelvic wall.    There is prominent wall thickening of the distal sigmoid colon with multiple regional diverticuli. There are small fatty containing umbilical hernia. Fatty containing inguinal hernias  7 mm left periaortic and aortocaval lymph nodes demonstrating increased FDG uptake comparison PET       NM BONE SCAN WHOLE BODY at \[facility]\"" on 12/15/2022:Comparison is made with abdomen/pelvis CT imaging from earlier today. Comparison is made with a PET/CT from 11/01/2022. Comparison is made with a bone scan from 02/20/2020. No evidence of bone metastasis. Initial Consult with Dr. Yaya Hannah on 12/27/2022:  He met with Dr. Enzo Limon for surgical consirations of the right upper lobe lesion. He believes that there is likely chest wall invasion and recommends neoadjuvant chemoradiation prior to definitive surgery of the right upper lobe tumor. I anticipate a dose of 5040 cGy in 28 treatment fractions to the lung lesion as neoadjuvant treatment along with concomitant chemotherapy and/or immunotherapy. In addition, we will determine if Dr. Vivian Parikh is able to sample the upper abdominal lymph nodes to determine the etiology of that finding on the PET scan. He is scheduled to see her later this week. Initial Consult with Dr. Lisandro Vides at \[facility]\"" on 12/29/2022: In terms of his retroperitoneal lymphadenopathy, I do not recommend a robotic retroperitoneal lymph node biopsy as the node is only 0.7cm and I believe it would be unlikely we would be able to identify that node without guidance. It would be an extremely high risk procedure as it the node is very close to the vasculature. I discussed this with Dr. Shanita Carrillo personally. This is a new undiagnosed problem with an uncertain prognosis. I have personally reviewed the oncology note, labs, and PET scan above.  I have personally discussed the patient and the care plan with Dr. Shanita Carrillo and at tumor board    Port placed by Dr. Lisandro Vides at \[facility]\"" on 12/30/2022    Case Presented at Tumor Board at \[facility]\"" By Dr. Nahed Carias (note 1/1/2023): I (Dr. Charlene Grahams this patient at tumor conference. Dr. Nayeli Davila was in attendance unfortunately she is not able to biopsy the upper abdominal lymph nodes due to the proximity to adjacent aorta and superior mesenteric artery. I (Dr. Lala Gonzalez reviewed the recommendation of Dr. Claude Beaver for neoadjuvant chemoradiation followed by definitive surgery for the right upper lobe lung tumor. Discussion at tumor conference surrounded management of the upper abdominal lymph nodes. The consensus was at this time to proceed with treatment of the primary known adenocarcinoma lung and base further treatment recommendations upon findings at definitive surgery. Abdominal lymph nodes serial radiographs is recommended for follow-up. It was agreed that this is an unlikely metastatic distribution for right upper lobe lung carcinoma in the lymph nodes may represent another etiology. I (Dr. Ingrid Macedo) will coordinate with Dr. Iron Pelaez for concomitant neoadjuvant chemoradiation of the right upper lobe lung tumor followed by definitive surgery per Dr. Claude Beaver      MRI Brain With & Without Contrast at [facility] on 1/3/2023:  No metastatic disease identified    I (Iron Pelaez) and Dr. Nahed Carias have both discussed Mr. Jessica's case with Dr. Claude Beaver on multiple occasions. He has participated in the decision-making and treatment plan and recommendations moving forward. Janki Hamilton has an official appointment with Dr. Claude Beaver scheduled for 1/12/2023      Neoadjuvant concurrent XRT (with chemoimmunotherapy) initiated on 1/4/2023.  5040 cGy planned in 28 treatment fractions. Neoadjuvant cisplatin 75 mg/m2, Alimta 500 mg/m2 and Opdivo 360 mg, cycle #1 initiated today, 1/5/2023    Janki Hamilton is here accompanied by his wife to initiate cycle #1 of chemoimmunotherapy. An opportunity for questions was opened, many questions asked and answered by both  and Mrs. Monroe Lane.   All other questions were answered to their understanding satisfaction. Cycle #1 of chemoimmunotherapy will be given today. Side effects toxicities expectations and plans surrounding all the work-up and explanation of everybody's opinions was undertaken today. I will continue monitoring Yon Dobson closely during these 3 cycles of treatment. #2  TUMOR HISTORY: Prostate cancer  Yon Dobson was seen in initial oncology consultation on 7/15/2020 referred by his PCP, Dr. Evangelina Christensen in [facility] for medical oncology opinion on his diagnosis of prostate cancer. Yon Dobson relates that he was found to have a minimally increased PSA of between 4.5 and 5 on a routine physical examination. This led to work-up and eventual prostate biopsy leading to the diagnosis of prostate cancer. Prostate biopsy on 11/1/2018 documented the following:  Right lateral base-Microfocus of prostatic carcinoma   Prostate biopsy on 2/25/2019 documented the following:  Right lateral base-adenocarcinoma: [place] 3+4 = 7  Right lateral mid- adenocarcinoma: [place] 3+3 = 6  Prostate biopsy 2/18/2020 documented the following:  Right lateral base-adenocarcinoma: [place] 3+4 = 7     PSA levels by date as follows:  2/19/2019-9.1  4/19/2019-7.0  7/22/2019-6.6  10/17/2019-7.8  114 2020-8.39  5/18/2020- 11.5     MRI pelvis with and without contrast on 2/5/2020 at [facility] documented:  No suspicious lesions in the prostate (PI-RADS 3 or greater)  No pelvis lymphadenopathy or suspicious pelvic bone lesions     Prostate biopsy was performed on 2/18/2020 by Dr. Macy Seaman at [facility].   Pathology revealed:  Prostate, right apex, needle biopsy:  Benign prostate tissue  Prostate, right base, needle biopsy:  Adenocarcinoma, acinar type, [place] grade 3+4=7, grade group 2, discontinuously involving 10/20 mm or 50% of core (3 foci measuring 4,3, and 1 mm), 30% of tumor is [place] pattern 4  Prostate, right mid, needle biopsy:  Benign prostate tissue  Prostate, left apex, needle biopsy:  Benign prostate tissue  Prostate, left base, needle biopsy  Benign prostate tissue  Prostate, left mid, needle biopsy:  Benign prostate tissue        CT abdomen and pelvis without contrast on 2/20/2020 at [facility] documented:  No evidence of complication after prostate biopsy  Numerous colonic diverticula with no evidence of acute diverticulitis  Small bilateral fat-containing groin hernias     Bone scan on 6/2/2020 at [facility] documented:  Abnormal uptake at the right dorsal tissues at the level of T12 and right upper kidney. This could represent a bone lesion or renal lesion. Recommend CT abdomen with contrast for further evaluation if patient's renal function permits. If low renal function, MRI abdomen without contrast would be an alternative for evaluation     CT abdomen and pelvis without contrast on 6/7/2020 at [facility] documented no acute abnormality. King Garcia has been under evaluation and monitoring in the urology department by Dr. Cole Foster at [facility]. At his last visit on 5/28/2020 Mr. Dileep Prince was leaning toward a radical prostatectomy to address the issue of his prostate cancer. Medical oncology consultation was requested for opinion. The 2 best options at this juncture include a radical prostatectomy which is most definitive followed by radiation therapy which gives similar statistical long-term results but with the potential for long-term radiation therapy associated side effects. I have encouraged him that a radical prostatectomy is appropriate and if he is considering this that I would support that completely. He has an appointment with Dr. Wagner Reich on 8/21/2022 discussed robotic prostatectomy. Recommendation prior to his August appointment with urology is as follows:   [employer] genetic testing drawn today  PSA  CMP  MRI of the thoracic spine W&WO to evaluate the T12 area and any other areas of concern     He had robotic assisted laparoscopic prostatectomy by Dr. Chichi Villalpando September 2020 for PT2N0 [place] 7 disease with tumor approaching the right posterior margin. 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 Diagnoses and all orders for this visit:    Adenocarcinoma, lung, right ([facility] [address]  -     CBC with Auto Differential; Future  -     Comprehensive Metabolic Panel; Future    Other orders  -     Hepatitis B Surface Antigen  -     Hepatitis B Core Antibody, Total  -     Hepatitis B surface antibody          Orders Placed This Encounter   Procedures    CBC with Auto Differential     Standing Status:   Future     Number of Occurrences:   1     Standing Expiration Date:   1/5/2024    Comprehensive Metabolic Panel     Standing Status:   Future     Number of Occurrences:   1     Standing Expiration Date:   1/5/2024    Hepatitis B Surface Antigen    Hepatitis B Core Antibody, Total    Hepatitis B surface antibody             Return in about 3 weeks (around 1/26/2023) for follow-up with .

## 2023-01-04 ENCOUNTER — HOSPITAL ENCOUNTER (OUTPATIENT)
Dept: RADIATION ONCOLOGY | Facility: HOSPITAL | Age: 58
Setting detail: RADIATION/ONCOLOGY SERIES
Discharge: HOME | End: 2023-01-04
Payer: MEDICAID

## 2023-01-04 LAB
RAD ONC ARIA COURSE ID: NORMAL
RAD ONC ARIA COURSE LAST TREATMENT DATE: NORMAL
RAD ONC ARIA COURSE START DATE: NORMAL
RAD ONC ARIA COURSE TREATMENT ELAPSED DAYS: 0
RAD ONC ARIA FIRST TREATMENT DATE: NORMAL
RAD ONC ARIA PLAN FRACTIONS TREATED TO DATE: 1
RAD ONC ARIA PLAN ID: NORMAL
RAD ONC ARIA PLAN PRESCRIBED DOSE PER FRACTION: 1.8 GY
RAD ONC ARIA PLAN PRIMARY REFERENCE POINT: NORMAL
RAD ONC ARIA PLAN TOTAL FRACTIONS PRESCRIBED: 28
RAD ONC ARIA PLAN TOTAL PRESCRIBED DOSE: 5040 CGY
RAD ONC ARIA REFERENCE POINT DOSAGE GIVEN TO DATE: 1.8 GY
RAD ONC ARIA REFERENCE POINT ID: NORMAL
RAD ONC ARIA REFERENCE POINT SESSION DOSAGE GIVEN: 1.8 GY

## 2023-01-04 PROCEDURE — 77280 THER RAD SIMULAJ FIELD SMPL: CPT | Performed by: RADIOLOGY

## 2023-01-04 PROCEDURE — 77412 RADIATION TX DELIVERY LVL 3: CPT | Performed by: RADIOLOGY

## 2023-01-05 ENCOUNTER — HOSPITAL ENCOUNTER (OUTPATIENT)
Dept: RADIATION ONCOLOGY | Facility: HOSPITAL | Age: 58
Setting detail: RADIATION/ONCOLOGY SERIES
Discharge: HOME | End: 2023-01-05
Payer: MEDICAID

## 2023-01-05 ENCOUNTER — HOSPITAL ENCOUNTER (OUTPATIENT)
Dept: INFUSION THERAPY | Age: 58
Discharge: HOME | End: 2023-01-05
Payer: MEDICAID

## 2023-01-05 ENCOUNTER — OFFICE VISIT (OUTPATIENT)
Dept: HEMATOLOGY | Age: 58
End: 2023-01-05
Payer: MEDICAID

## 2023-01-05 VITALS
SYSTOLIC BLOOD PRESSURE: 122 MMHG | DIASTOLIC BLOOD PRESSURE: 78 MMHG | WEIGHT: 218.5 LBS | HEIGHT: 74 IN | TEMPERATURE: 97.7 F | BODY MASS INDEX: 28.04 KG/M2 | HEART RATE: 57 BPM | OXYGEN SATURATION: 97 %

## 2023-01-05 DIAGNOSIS — C34.91 ADENOCARCINOMA, LUNG, RIGHT (HCC): Primary | ICD-10-CM

## 2023-01-05 DIAGNOSIS — C34.91 ADENOCARCINOMA, LUNG, RIGHT (HCC): ICD-10-CM

## 2023-01-05 LAB
ALBUMIN SERPL-MCNC: 4.5 G/DL (ref 3.5–5.2)
ALP BLD-CCNC: 121 U/L (ref 40–130)
ALT SERPL-CCNC: 42 U/L (ref 21–72)
ANION GAP SERPL CALCULATED.3IONS-SCNC: 14 MMOL/L (ref 7–19)
AST SERPL-CCNC: 34 U/L (ref 17–59)
BILIRUB SERPL-MCNC: 0.7 MG/DL (ref 0.2–1.3)
BUN BLDV-MCNC: 20 MG/DL (ref 9–20)
CALCIUM SERPL-MCNC: 10.5 MG/DL (ref 8.4–10.2)
CHLORIDE BLD-SCNC: 105 MMOL/L (ref 98–111)
CO2: 25 MMOL/L (ref 22–29)
CORTISOL TOTAL: 1 UG/DL
CREAT SERPL-MCNC: 0.8 MG/DL (ref 0.6–1.2)
GFR SERPL CREATININE-BSD FRML MDRD: >60 ML/MIN/{1.73_M2}
GLOBULIN: 3.2 G/DL
GLUCOSE BLD-MCNC: 149 MG/DL (ref 74–106)
HCT VFR BLD CALC: 44.9 % (ref 40.1–51)
HEMOGLOBIN: 15.1 G/DL (ref 13.7–17.5)
HEPATITIS B SURFACE ANTIGEN INTERPRETATION: NORMAL
LYMPHOCYTES ABSOLUTE: 0.64 K/UL (ref 1.18–3.74)
LYMPHOCYTES RELATIVE PERCENT: 10.5 % (ref 19.3–53.1)
MAGNESIUM: 2.1 MG/DL (ref 1.6–2.3)
MCH RBC QN AUTO: 29 PG (ref 25.7–32.2)
MCHC RBC AUTO-ENTMCNC: 33.6 G/DL (ref 32.3–36.5)
MCV RBC AUTO: 86.2 FL (ref 79–92.2)
MONOCYTES ABSOLUTE: 0.14 K/UL (ref 0.24–0.82)
MONOCYTES RELATIVE PERCENT: 2.3 % (ref 4.7–12.5)
NEUTROPHILS ABSOLUTE: 5.28 K/UL (ref 1.56–6.13)
NEUTROPHILS RELATIVE PERCENT: 86.4 % (ref 34–71.1)
PDW BLD-RTO: 13 % (ref 11.6–14.4)
PLATELET # BLD: 161 K/UL (ref 163–337)
PMV BLD AUTO: 8.9 FL (ref 7.4–10.4)
POTASSIUM SERPL-SCNC: 4.8 MMOL/L (ref 3.5–5.1)
RAD ONC ARIA COURSE ID: NORMAL
RAD ONC ARIA COURSE LAST TREATMENT DATE: NORMAL
RAD ONC ARIA COURSE START DATE: NORMAL
RAD ONC ARIA COURSE TREATMENT ELAPSED DAYS: 1
RAD ONC ARIA FIRST TREATMENT DATE: NORMAL
RAD ONC ARIA PLAN FRACTIONS TREATED TO DATE: 2
RAD ONC ARIA PLAN ID: NORMAL
RAD ONC ARIA PLAN PRESCRIBED DOSE PER FRACTION: 1.8 GY
RAD ONC ARIA PLAN PRIMARY REFERENCE POINT: NORMAL
RAD ONC ARIA PLAN TOTAL FRACTIONS PRESCRIBED: 28
RAD ONC ARIA PLAN TOTAL PRESCRIBED DOSE: 5040 CGY
RAD ONC ARIA REFERENCE POINT DOSAGE GIVEN TO DATE: 3.6 GY
RAD ONC ARIA REFERENCE POINT ID: NORMAL
RAD ONC ARIA REFERENCE POINT SESSION DOSAGE GIVEN: 1.8 GY
RBC # BLD: 5.21 M/UL (ref 4.63–6.08)
SODIUM BLD-SCNC: 144 MMOL/L (ref 137–145)
TOTAL PROTEIN: 7.7 G/DL (ref 6.3–8.2)
TSH SERPL DL<=0.05 MIU/L-ACNC: 1.59 UIU/ML (ref 0.27–4.2)
WBC # BLD: 6.11 K/UL (ref 4.23–9.07)

## 2023-01-05 PROCEDURE — 2580000003 HC RX 258: Performed by: INTERNAL MEDICINE

## 2023-01-05 PROCEDURE — 80053 COMPREHEN METABOLIC PANEL: CPT

## 2023-01-05 PROCEDURE — 83735 ASSAY OF MAGNESIUM: CPT

## 2023-01-05 PROCEDURE — 99215 OFFICE O/P EST HI 40 MIN: CPT | Performed by: INTERNAL MEDICINE

## 2023-01-05 PROCEDURE — 96417 CHEMO IV INFUS EACH ADDL SEQ: CPT

## 2023-01-05 PROCEDURE — 77412 RADIATION TX DELIVERY LVL 3: CPT | Performed by: RADIOLOGY

## 2023-01-05 PROCEDURE — 96360 HYDRATION IV INFUSION INIT: CPT

## 2023-01-05 PROCEDURE — 36415 COLL VENOUS BLD VENIPUNCTURE: CPT

## 2023-01-05 PROCEDURE — 96415 CHEMO IV INFUSION ADDL HR: CPT

## 2023-01-05 PROCEDURE — 6360000002 HC RX W HCPCS: Performed by: INTERNAL MEDICINE

## 2023-01-05 PROCEDURE — 85025 COMPLETE CBC W/AUTO DIFF WBC: CPT

## 2023-01-05 PROCEDURE — 96361 HYDRATE IV INFUSION ADD-ON: CPT

## 2023-01-05 PROCEDURE — 96409 CHEMO IV PUSH SNGL DRUG: CPT

## 2023-01-05 PROCEDURE — 77417 THER RADIOLOGY PORT IMAGE(S): CPT | Performed by: RADIOLOGY

## 2023-01-05 PROCEDURE — 96376 TX/PRO/DX INJ SAME DRUG ADON: CPT

## 2023-01-05 PROCEDURE — 96375 TX/PRO/DX INJ NEW DRUG ADDON: CPT

## 2023-01-05 PROCEDURE — 96367 TX/PROPH/DG ADDL SEQ IV INF: CPT

## 2023-01-05 PROCEDURE — 96411 CHEMO IV PUSH ADDL DRUG: CPT

## 2023-01-05 PROCEDURE — 96413 CHEMO IV INFUSION 1 HR: CPT

## 2023-01-05 RX ORDER — SODIUM CHLORIDE 9 MG/ML
5-250 INJECTION, SOLUTION INTRAVENOUS PRN
Status: CANCELLED | OUTPATIENT
Start: 2023-01-26

## 2023-01-05 RX ORDER — SODIUM CHLORIDE 9 MG/ML
5-40 INJECTION INTRAVENOUS PRN
Status: CANCELLED | OUTPATIENT
Start: 2023-01-26

## 2023-01-05 RX ORDER — FUROSEMIDE 10 MG/ML
20 INJECTION INTRAMUSCULAR; INTRAVENOUS ONCE
Status: CANCELLED
Start: 2023-01-26 | End: 2023-01-26

## 2023-01-05 RX ORDER — SODIUM CHLORIDE 9 MG/ML
INJECTION, SOLUTION INTRAVENOUS CONTINUOUS
Status: CANCELLED | OUTPATIENT
Start: 2023-01-26

## 2023-01-05 RX ORDER — HEPARIN SODIUM (PORCINE) LOCK FLUSH IV SOLN 100 UNIT/ML 100 UNIT/ML
500 SOLUTION INTRAVENOUS PRN
Status: DISCONTINUED | OUTPATIENT
Start: 2023-01-05 | End: 2023-01-06 | Stop reason: HOSPADM

## 2023-01-05 RX ORDER — FOLIC ACID 1 MG/1
1 TABLET ORAL DAILY
Qty: 30 TABLET | Refills: 5 | Status: SHIPPED | OUTPATIENT
Start: 2023-01-05

## 2023-01-05 RX ORDER — CYANOCOBALAMIN 1000 UG/ML
1000 INJECTION, SOLUTION INTRAMUSCULAR; SUBCUTANEOUS ONCE
Status: COMPLETED | OUTPATIENT
Start: 2023-01-05 | End: 2023-01-05

## 2023-01-05 RX ORDER — EPINEPHRINE 1 MG/ML
0.3 INJECTION, SOLUTION, CONCENTRATE INTRAVENOUS PRN
Status: CANCELLED | OUTPATIENT
Start: 2023-01-26

## 2023-01-05 RX ORDER — MEPERIDINE HYDROCHLORIDE 25 MG/ML
12.5 INJECTION INTRAMUSCULAR; INTRAVENOUS; SUBCUTANEOUS PRN
Status: CANCELLED | OUTPATIENT
Start: 2023-01-26

## 2023-01-05 RX ORDER — SODIUM CHLORIDE 9 MG/ML
INJECTION, SOLUTION INTRAVENOUS CONTINUOUS
OUTPATIENT
Start: 2023-01-05

## 2023-01-05 RX ORDER — FUROSEMIDE 10 MG/ML
20 INJECTION INTRAMUSCULAR; INTRAVENOUS ONCE
Status: COMPLETED | OUTPATIENT
Start: 2023-01-05 | End: 2023-01-05

## 2023-01-05 RX ORDER — PALONOSETRON 0.05 MG/ML
0.25 INJECTION, SOLUTION INTRAVENOUS ONCE
Status: CANCELLED | OUTPATIENT
Start: 2023-01-26 | End: 2023-01-26

## 2023-01-05 RX ORDER — MEPERIDINE HYDROCHLORIDE 25 MG/ML
12.5 INJECTION INTRAMUSCULAR; INTRAVENOUS; SUBCUTANEOUS PRN
OUTPATIENT
Start: 2023-01-05

## 2023-01-05 RX ORDER — DIPHENHYDRAMINE HYDROCHLORIDE 50 MG/ML
50 INJECTION INTRAMUSCULAR; INTRAVENOUS
Status: CANCELLED | OUTPATIENT
Start: 2023-01-26

## 2023-01-05 RX ORDER — CYANOCOBALAMIN 1000 UG/ML
1000 INJECTION, SOLUTION INTRAMUSCULAR; SUBCUTANEOUS ONCE
Status: CANCELLED | OUTPATIENT
Start: 2023-01-26 | End: 2023-01-26

## 2023-01-05 RX ORDER — ACETAMINOPHEN 325 MG/1
650 TABLET ORAL
Status: CANCELLED | OUTPATIENT
Start: 2023-01-26

## 2023-01-05 RX ORDER — EPINEPHRINE 1 MG/ML
0.3 INJECTION, SOLUTION, CONCENTRATE INTRAVENOUS PRN
OUTPATIENT
Start: 2023-01-05

## 2023-01-05 RX ORDER — SODIUM CHLORIDE 9 MG/ML
5-250 INJECTION, SOLUTION INTRAVENOUS PRN
OUTPATIENT
Start: 2023-01-05

## 2023-01-05 RX ORDER — DEXAMETHASONE 4 MG/1
4 TABLET ORAL 2 TIMES DAILY WITH MEALS
COMMUNITY

## 2023-01-05 RX ORDER — ONDANSETRON 2 MG/ML
8 INJECTION INTRAMUSCULAR; INTRAVENOUS
OUTPATIENT
Start: 2023-01-05

## 2023-01-05 RX ORDER — ACETAMINOPHEN 325 MG/1
650 TABLET ORAL
OUTPATIENT
Start: 2023-01-05

## 2023-01-05 RX ORDER — HEPARIN SODIUM (PORCINE) LOCK FLUSH IV SOLN 100 UNIT/ML 100 UNIT/ML
500 SOLUTION INTRAVENOUS PRN
Status: CANCELLED | OUTPATIENT
Start: 2023-01-26

## 2023-01-05 RX ORDER — FAMOTIDINE 10 MG/ML
20 INJECTION, SOLUTION INTRAVENOUS
OUTPATIENT
Start: 2023-01-05

## 2023-01-05 RX ORDER — ONDANSETRON 2 MG/ML
8 INJECTION INTRAMUSCULAR; INTRAVENOUS
Status: CANCELLED | OUTPATIENT
Start: 2023-01-26

## 2023-01-05 RX ORDER — FAMOTIDINE 10 MG/ML
20 INJECTION, SOLUTION INTRAVENOUS
Status: CANCELLED | OUTPATIENT
Start: 2023-01-26

## 2023-01-05 RX ORDER — DIPHENHYDRAMINE HYDROCHLORIDE 50 MG/ML
50 INJECTION INTRAMUSCULAR; INTRAVENOUS
OUTPATIENT
Start: 2023-01-05

## 2023-01-05 RX ORDER — ALBUTEROL SULFATE 90 UG/1
4 AEROSOL, METERED RESPIRATORY (INHALATION) PRN
OUTPATIENT
Start: 2023-01-05

## 2023-01-05 RX ORDER — MONTELUKAST SODIUM 10 MG/1
10 TABLET ORAL NIGHTLY
COMMUNITY

## 2023-01-05 RX ORDER — PALONOSETRON 0.05 MG/ML
0.25 INJECTION, SOLUTION INTRAVENOUS ONCE
Status: COMPLETED | OUTPATIENT
Start: 2023-01-05 | End: 2023-01-05

## 2023-01-05 RX ORDER — ALBUTEROL SULFATE 90 UG/1
4 AEROSOL, METERED RESPIRATORY (INHALATION) PRN
Status: CANCELLED | OUTPATIENT
Start: 2023-01-26

## 2023-01-05 RX ORDER — SODIUM CHLORIDE 9 MG/ML
5-40 INJECTION INTRAVENOUS PRN
Status: DISCONTINUED | OUTPATIENT
Start: 2023-01-05 | End: 2023-01-06 | Stop reason: HOSPADM

## 2023-01-05 RX ADMIN — PEMETREXED DISODIUM 1130 MG: 500 INJECTION, POWDER, LYOPHILIZED, FOR SOLUTION INTRAVENOUS at 11:17

## 2023-01-05 RX ADMIN — FOSAPREPITANT 150 MG: 150 INJECTION, POWDER, LYOPHILIZED, FOR SOLUTION INTRAVENOUS at 09:50

## 2023-01-05 RX ADMIN — CYANOCOBALAMIN 1000 MCG: 1000 INJECTION, SOLUTION INTRAMUSCULAR; SUBCUTANEOUS at 09:37

## 2023-01-05 RX ADMIN — CISPLATIN 170 MG: 1 INJECTION INTRAVENOUS at 11:32

## 2023-01-05 RX ADMIN — PALONOSETRON 0.25 MG: 0.05 INJECTION, SOLUTION INTRAVENOUS at 09:39

## 2023-01-05 RX ADMIN — FUROSEMIDE 20 MG: 10 INJECTION, SOLUTION INTRAMUSCULAR; INTRAVENOUS at 13:20

## 2023-01-05 RX ADMIN — POTASSIUM CHLORIDE: 2 INJECTION, SOLUTION, CONCENTRATE INTRAVENOUS at 11:13

## 2023-01-05 RX ADMIN — SODIUM CHLORIDE 360 MG: 9 INJECTION, SOLUTION INTRAVENOUS at 10:39

## 2023-01-05 RX ADMIN — Medication 10 ML: at 15:38

## 2023-01-05 RX ADMIN — DEXAMETHASONE SODIUM PHOSPHATE: 10 INJECTION, SOLUTION INTRAMUSCULAR; INTRAVENOUS at 09:40

## 2023-01-05 RX ADMIN — Medication 500 UNITS: at 15:38

## 2023-01-05 RX ADMIN — POTASSIUM CHLORIDE: 2 INJECTION, SOLUTION, CONCENTRATE INTRAVENOUS at 13:19

## 2023-01-05 RX ADMIN — FUROSEMIDE 20 MG: 10 INJECTION, SOLUTION INTRAMUSCULAR; INTRAVENOUS at 14:22

## 2023-01-06 ENCOUNTER — HOSPITAL ENCOUNTER (OUTPATIENT)
Dept: RADIATION ONCOLOGY | Facility: HOSPITAL | Age: 58
Setting detail: RADIATION/ONCOLOGY SERIES
Discharge: HOME | End: 2023-01-06
Payer: MEDICAID

## 2023-01-06 LAB
RAD ONC ARIA COURSE ID: NORMAL
RAD ONC ARIA COURSE LAST TREATMENT DATE: NORMAL
RAD ONC ARIA COURSE START DATE: NORMAL
RAD ONC ARIA COURSE TREATMENT ELAPSED DAYS: 2
RAD ONC ARIA FIRST TREATMENT DATE: NORMAL
RAD ONC ARIA PLAN FRACTIONS TREATED TO DATE: 3
RAD ONC ARIA PLAN ID: NORMAL
RAD ONC ARIA PLAN PRESCRIBED DOSE PER FRACTION: 1.8 GY
RAD ONC ARIA PLAN PRIMARY REFERENCE POINT: NORMAL
RAD ONC ARIA PLAN TOTAL FRACTIONS PRESCRIBED: 28
RAD ONC ARIA PLAN TOTAL PRESCRIBED DOSE: 5040 CGY
RAD ONC ARIA REFERENCE POINT DOSAGE GIVEN TO DATE: 5.4 GY
RAD ONC ARIA REFERENCE POINT ID: NORMAL
RAD ONC ARIA REFERENCE POINT SESSION DOSAGE GIVEN: 1.8 GY

## 2023-01-06 PROCEDURE — 77412 RADIATION TX DELIVERY LVL 3: CPT | Performed by: RADIOLOGY

## 2023-01-07 LAB
HBV SURFACE AB TITR SER: 3.53 IU/L
HEPATITIS B CORE TOTAL ANTIBODY: NEGATIVE

## 2023-01-09 ENCOUNTER — HOSPITAL ENCOUNTER (OUTPATIENT)
Dept: RADIATION ONCOLOGY | Facility: HOSPITAL | Age: 58
Setting detail: RADIATION/ONCOLOGY SERIES
Discharge: HOME | End: 2023-01-09
Payer: MEDICAID

## 2023-01-09 ENCOUNTER — HOSPITAL ENCOUNTER (OUTPATIENT)
Dept: SLEEP MEDICINE | Facility: HOSPITAL | Age: 58
End: 2023-01-09
Payer: MEDICAID

## 2023-01-09 LAB
RAD ONC ARIA COURSE ID: NORMAL
RAD ONC ARIA COURSE LAST TREATMENT DATE: NORMAL
RAD ONC ARIA COURSE START DATE: NORMAL
RAD ONC ARIA COURSE TREATMENT ELAPSED DAYS: 5
RAD ONC ARIA FIRST TREATMENT DATE: NORMAL
RAD ONC ARIA PLAN FRACTIONS TREATED TO DATE: 4
RAD ONC ARIA PLAN ID: NORMAL
RAD ONC ARIA PLAN PRESCRIBED DOSE PER FRACTION: 1.8 GY
RAD ONC ARIA PLAN PRIMARY REFERENCE POINT: NORMAL
RAD ONC ARIA PLAN TOTAL FRACTIONS PRESCRIBED: 28
RAD ONC ARIA PLAN TOTAL PRESCRIBED DOSE: 5040 CGY
RAD ONC ARIA REFERENCE POINT DOSAGE GIVEN TO DATE: 7.2 GY
RAD ONC ARIA REFERENCE POINT ID: NORMAL
RAD ONC ARIA REFERENCE POINT SESSION DOSAGE GIVEN: 1.8 GY

## 2023-01-09 PROCEDURE — 77412 RADIATION TX DELIVERY LVL 3: CPT | Performed by: RADIOLOGY

## 2023-01-10 ENCOUNTER — HOSPITAL ENCOUNTER (OUTPATIENT)
Dept: RADIATION ONCOLOGY | Facility: HOSPITAL | Age: 58
Setting detail: RADIATION/ONCOLOGY SERIES
Discharge: HOME | End: 2023-01-10
Payer: MEDICAID

## 2023-01-10 LAB
RAD ONC ARIA COURSE ID: NORMAL
RAD ONC ARIA COURSE LAST TREATMENT DATE: NORMAL
RAD ONC ARIA COURSE START DATE: NORMAL
RAD ONC ARIA COURSE TREATMENT ELAPSED DAYS: 6
RAD ONC ARIA FIRST TREATMENT DATE: NORMAL
RAD ONC ARIA PLAN FRACTIONS TREATED TO DATE: 5
RAD ONC ARIA PLAN ID: NORMAL
RAD ONC ARIA PLAN PRESCRIBED DOSE PER FRACTION: 1.8 GY
RAD ONC ARIA PLAN PRIMARY REFERENCE POINT: NORMAL
RAD ONC ARIA PLAN TOTAL FRACTIONS PRESCRIBED: 28
RAD ONC ARIA PLAN TOTAL PRESCRIBED DOSE: 5040 CGY
RAD ONC ARIA REFERENCE POINT DOSAGE GIVEN TO DATE: 9 GY
RAD ONC ARIA REFERENCE POINT ID: NORMAL
RAD ONC ARIA REFERENCE POINT SESSION DOSAGE GIVEN: 1.8 GY

## 2023-01-10 PROCEDURE — 77412 RADIATION TX DELIVERY LVL 3: CPT | Performed by: RADIOLOGY

## 2023-01-11 ENCOUNTER — HOSPITAL ENCOUNTER (OUTPATIENT)
Dept: RADIATION ONCOLOGY | Facility: HOSPITAL | Age: 58
Setting detail: RADIATION/ONCOLOGY SERIES
Discharge: HOME | End: 2023-01-11
Payer: MEDICAID

## 2023-01-11 LAB
RAD ONC ARIA COURSE ID: NORMAL
RAD ONC ARIA COURSE LAST TREATMENT DATE: NORMAL
RAD ONC ARIA COURSE START DATE: NORMAL
RAD ONC ARIA COURSE TREATMENT ELAPSED DAYS: 7
RAD ONC ARIA FIRST TREATMENT DATE: NORMAL
RAD ONC ARIA PLAN FRACTIONS TREATED TO DATE: 6
RAD ONC ARIA PLAN ID: NORMAL
RAD ONC ARIA PLAN PRESCRIBED DOSE PER FRACTION: 1.8 GY
RAD ONC ARIA PLAN PRIMARY REFERENCE POINT: NORMAL
RAD ONC ARIA PLAN TOTAL FRACTIONS PRESCRIBED: 28
RAD ONC ARIA PLAN TOTAL PRESCRIBED DOSE: 5040 CGY
RAD ONC ARIA REFERENCE POINT DOSAGE GIVEN TO DATE: 10.8 GY
RAD ONC ARIA REFERENCE POINT ID: NORMAL
RAD ONC ARIA REFERENCE POINT SESSION DOSAGE GIVEN: 1.8 GY

## 2023-01-11 PROCEDURE — 77336 RADIATION PHYSICS CONSULT: CPT | Performed by: RADIOLOGY

## 2023-01-11 PROCEDURE — 77412 RADIATION TX DELIVERY LVL 3: CPT | Performed by: RADIOLOGY

## 2023-01-12 ENCOUNTER — HOSPITAL ENCOUNTER (OUTPATIENT)
Dept: INFUSION THERAPY | Age: 58
Discharge: HOME | End: 2023-01-12
Payer: MEDICAID

## 2023-01-12 ENCOUNTER — HOSPITAL ENCOUNTER (OUTPATIENT)
Dept: RADIATION ONCOLOGY | Facility: HOSPITAL | Age: 58
Setting detail: RADIATION/ONCOLOGY SERIES
Discharge: HOME | End: 2023-01-12
Payer: MEDICAID

## 2023-01-12 DIAGNOSIS — C34.91 ADENOCARCINOMA, LUNG, RIGHT (HCC): ICD-10-CM

## 2023-01-12 DIAGNOSIS — C34.11 PRIMARY ADENOCARCINOMA OF UPPER LOBE OF RIGHT LUNG (HCC): ICD-10-CM

## 2023-01-12 LAB
HCT VFR BLD CALC: 45.7 % (ref 40.1–51)
HEMOGLOBIN: 14.8 G/DL (ref 13.7–17.5)
LYMPHOCYTES ABSOLUTE: 0.8 K/UL (ref 1.18–3.74)
LYMPHOCYTES RELATIVE PERCENT: 20.6 % (ref 19.3–53.1)
MAGNESIUM: 2 MG/DL (ref 1.6–2.3)
MCH RBC QN AUTO: 28.1 PG (ref 25.7–32.2)
MCHC RBC AUTO-ENTMCNC: 32.4 G/DL (ref 32.3–36.5)
MCV RBC AUTO: 86.7 FL (ref 79–92.2)
MONOCYTES ABSOLUTE: 0.27 K/UL (ref 0.24–0.82)
MONOCYTES RELATIVE PERCENT: 6.9 % (ref 4.7–12.5)
NEUTROPHILS ABSOLUTE: 2.55 K/UL (ref 1.56–6.13)
NEUTROPHILS RELATIVE PERCENT: 65.5 % (ref 34–71.1)
PDW BLD-RTO: 13.4 % (ref 11.6–14.4)
PLATELET # BLD: 112 K/UL (ref 163–337)
PMV BLD AUTO: 8.9 FL (ref 7.4–10.4)
RAD ONC ARIA COURSE ID: NORMAL
RAD ONC ARIA COURSE LAST TREATMENT DATE: NORMAL
RAD ONC ARIA COURSE START DATE: NORMAL
RAD ONC ARIA COURSE TREATMENT ELAPSED DAYS: 8
RAD ONC ARIA FIRST TREATMENT DATE: NORMAL
RAD ONC ARIA PLAN FRACTIONS TREATED TO DATE: 7
RAD ONC ARIA PLAN ID: NORMAL
RAD ONC ARIA PLAN PRESCRIBED DOSE PER FRACTION: 1.8 GY
RAD ONC ARIA PLAN PRIMARY REFERENCE POINT: NORMAL
RAD ONC ARIA PLAN TOTAL FRACTIONS PRESCRIBED: 28
RAD ONC ARIA PLAN TOTAL PRESCRIBED DOSE: 5040 CGY
RAD ONC ARIA REFERENCE POINT DOSAGE GIVEN TO DATE: 12.6 GY
RAD ONC ARIA REFERENCE POINT ID: NORMAL
RAD ONC ARIA REFERENCE POINT SESSION DOSAGE GIVEN: 1.8 GY
RBC # BLD: 5.27 M/UL (ref 4.63–6.08)
WBC # BLD: 3.89 K/UL (ref 4.23–9.07)

## 2023-01-12 PROCEDURE — 85025 COMPLETE CBC W/AUTO DIFF WBC: CPT

## 2023-01-12 PROCEDURE — 77417 THER RADIOLOGY PORT IMAGE(S): CPT | Performed by: RADIOLOGY

## 2023-01-12 PROCEDURE — 36415 COLL VENOUS BLD VENIPUNCTURE: CPT

## 2023-01-12 PROCEDURE — 83735 ASSAY OF MAGNESIUM: CPT

## 2023-01-12 PROCEDURE — 77412 RADIATION TX DELIVERY LVL 3: CPT | Performed by: RADIOLOGY

## 2023-01-12 RX ORDER — PROCHLORPERAZINE MALEATE 10 MG
10 TABLET ORAL EVERY 6 HOURS PRN
Qty: 120 TABLET | Refills: 3 | Status: SHIPPED | OUTPATIENT
Start: 2023-01-12 | End: 2023-05-12

## 2023-01-12 NOTE — TELEPHONE ENCOUNTER
Karen Cox reports persistent nausea despite taking prescribed Zofran. Reports normal bowel movements without constipation. Per Dr Dai Prado, Rs compazine to preferred pharmacy. Karen Cox understands to call with new or worsening symptoms.

## 2023-01-13 ENCOUNTER — HOSPITAL ENCOUNTER (OUTPATIENT)
Dept: RADIATION ONCOLOGY | Facility: HOSPITAL | Age: 58
Setting detail: RADIATION/ONCOLOGY SERIES
Discharge: HOME | End: 2023-01-13
Payer: MEDICAID

## 2023-01-13 ENCOUNTER — TELEPHONE (OUTPATIENT)
Dept: CARDIAC SURGERY | Facility: CLINIC | Age: 58
End: 2023-01-13
Payer: MEDICAID

## 2023-01-13 LAB
RAD ONC ARIA COURSE ID: NORMAL
RAD ONC ARIA COURSE LAST TREATMENT DATE: NORMAL
RAD ONC ARIA COURSE START DATE: NORMAL
RAD ONC ARIA COURSE TREATMENT ELAPSED DAYS: 9
RAD ONC ARIA FIRST TREATMENT DATE: NORMAL
RAD ONC ARIA PLAN FRACTIONS TREATED TO DATE: 8
RAD ONC ARIA PLAN ID: NORMAL
RAD ONC ARIA PLAN PRESCRIBED DOSE PER FRACTION: 1.8 GY
RAD ONC ARIA PLAN PRIMARY REFERENCE POINT: NORMAL
RAD ONC ARIA PLAN TOTAL FRACTIONS PRESCRIBED: 28
RAD ONC ARIA PLAN TOTAL PRESCRIBED DOSE: 5040 CGY
RAD ONC ARIA REFERENCE POINT DOSAGE GIVEN TO DATE: 14.4 GY
RAD ONC ARIA REFERENCE POINT ID: NORMAL
RAD ONC ARIA REFERENCE POINT SESSION DOSAGE GIVEN: 1.8 GY

## 2023-01-13 PROCEDURE — 77412 RADIATION TX DELIVERY LVL 3: CPT | Performed by: RADIOLOGY

## 2023-01-13 NOTE — TELEPHONE ENCOUNTER
Pt missed his appt on 1-12-23 b/c he thought it was sched for 1-13-23.  Next appt is 1-30-23.  OK for pt to wait until then or would Dr Carlin want to see pt sooner?/joan

## 2023-01-16 ENCOUNTER — HOSPITAL ENCOUNTER (OUTPATIENT)
Dept: RADIATION ONCOLOGY | Facility: HOSPITAL | Age: 58
Discharge: HOME | End: 2023-01-16

## 2023-01-16 LAB
RAD ONC ARIA COURSE ID: NORMAL
RAD ONC ARIA COURSE LAST TREATMENT DATE: NORMAL
RAD ONC ARIA COURSE START DATE: NORMAL
RAD ONC ARIA COURSE TREATMENT ELAPSED DAYS: 12
RAD ONC ARIA FIRST TREATMENT DATE: NORMAL
RAD ONC ARIA PLAN FRACTIONS TREATED TO DATE: 9
RAD ONC ARIA PLAN ID: NORMAL
RAD ONC ARIA PLAN PRESCRIBED DOSE PER FRACTION: 1.8 GY
RAD ONC ARIA PLAN PRIMARY REFERENCE POINT: NORMAL
RAD ONC ARIA PLAN TOTAL FRACTIONS PRESCRIBED: 28
RAD ONC ARIA PLAN TOTAL PRESCRIBED DOSE: 5040 CGY
RAD ONC ARIA REFERENCE POINT DOSAGE GIVEN TO DATE: 16.2 GY
RAD ONC ARIA REFERENCE POINT ID: NORMAL
RAD ONC ARIA REFERENCE POINT SESSION DOSAGE GIVEN: 1.8 GY

## 2023-01-16 PROCEDURE — 77412 RADIATION TX DELIVERY LVL 3: CPT | Performed by: RADIOLOGY

## 2023-01-17 ENCOUNTER — HOSPITAL ENCOUNTER (OUTPATIENT)
Dept: RADIATION ONCOLOGY | Facility: HOSPITAL | Age: 58
Discharge: HOME | End: 2023-01-17

## 2023-01-17 LAB
RAD ONC ARIA COURSE ID: NORMAL
RAD ONC ARIA COURSE LAST TREATMENT DATE: NORMAL
RAD ONC ARIA COURSE START DATE: NORMAL
RAD ONC ARIA COURSE TREATMENT ELAPSED DAYS: 13
RAD ONC ARIA FIRST TREATMENT DATE: NORMAL
RAD ONC ARIA PLAN FRACTIONS TREATED TO DATE: 10
RAD ONC ARIA PLAN ID: NORMAL
RAD ONC ARIA PLAN PRESCRIBED DOSE PER FRACTION: 1.8 GY
RAD ONC ARIA PLAN PRIMARY REFERENCE POINT: NORMAL
RAD ONC ARIA PLAN TOTAL FRACTIONS PRESCRIBED: 28
RAD ONC ARIA PLAN TOTAL PRESCRIBED DOSE: 5040 CGY
RAD ONC ARIA REFERENCE POINT DOSAGE GIVEN TO DATE: 18 GY
RAD ONC ARIA REFERENCE POINT ID: NORMAL
RAD ONC ARIA REFERENCE POINT SESSION DOSAGE GIVEN: 1.8 GY

## 2023-01-17 PROCEDURE — 77412 RADIATION TX DELIVERY LVL 3: CPT | Performed by: RADIOLOGY

## 2023-01-18 ENCOUNTER — CLINICAL DOCUMENTATION (OUTPATIENT)
Dept: ONCOLOGY | Age: 58
End: 2023-01-18

## 2023-01-18 ENCOUNTER — HOSPITAL ENCOUNTER (OUTPATIENT)
Dept: RADIATION ONCOLOGY | Facility: HOSPITAL | Age: 58
Setting detail: RADIATION/ONCOLOGY SERIES
Discharge: HOME | End: 2023-01-18
Payer: MEDICAID

## 2023-01-18 ENCOUNTER — TELEPHONE (OUTPATIENT)
Dept: UROLOGY | Facility: CLINIC | Age: 58
End: 2023-01-18
Payer: MEDICAID

## 2023-01-18 ENCOUNTER — OFFICE VISIT (OUTPATIENT)
Dept: CARDIAC SURGERY | Facility: CLINIC | Age: 58
End: 2023-01-18
Payer: MEDICAID

## 2023-01-18 VITALS
SYSTOLIC BLOOD PRESSURE: 114 MMHG | BODY MASS INDEX: 28.88 KG/M2 | WEIGHT: 225 LBS | HEIGHT: 74 IN | OXYGEN SATURATION: 96 % | DIASTOLIC BLOOD PRESSURE: 78 MMHG | HEART RATE: 93 BPM

## 2023-01-18 DIAGNOSIS — C34.91 MALIGNANT NEOPLASM OF RIGHT LUNG, UNSPECIFIED PART OF LUNG: Primary | ICD-10-CM

## 2023-01-18 LAB
RAD ONC ARIA COURSE ID: NORMAL
RAD ONC ARIA COURSE LAST TREATMENT DATE: NORMAL
RAD ONC ARIA COURSE START DATE: NORMAL
RAD ONC ARIA COURSE TREATMENT ELAPSED DAYS: 14
RAD ONC ARIA FIRST TREATMENT DATE: NORMAL
RAD ONC ARIA PLAN FRACTIONS TREATED TO DATE: 11
RAD ONC ARIA PLAN ID: NORMAL
RAD ONC ARIA PLAN PRESCRIBED DOSE PER FRACTION: 1.8 GY
RAD ONC ARIA PLAN PRIMARY REFERENCE POINT: NORMAL
RAD ONC ARIA PLAN TOTAL FRACTIONS PRESCRIBED: 28
RAD ONC ARIA PLAN TOTAL PRESCRIBED DOSE: 5040 CGY
RAD ONC ARIA REFERENCE POINT DOSAGE GIVEN TO DATE: 19.8 GY
RAD ONC ARIA REFERENCE POINT ID: NORMAL
RAD ONC ARIA REFERENCE POINT SESSION DOSAGE GIVEN: 1.8 GY

## 2023-01-18 PROCEDURE — 99204 OFFICE O/P NEW MOD 45 MIN: CPT | Performed by: SURGERY

## 2023-01-18 PROCEDURE — 77412 RADIATION TX DELIVERY LVL 3: CPT | Performed by: RADIOLOGY

## 2023-01-18 RX ORDER — DEXAMETHASONE 4 MG/1
TABLET ORAL AS NEEDED
COMMUNITY
Start: 2022-12-30

## 2023-01-18 RX ORDER — PROCHLORPERAZINE MALEATE 10 MG
TABLET ORAL
COMMUNITY
Start: 2023-01-12 | End: 2023-03-30 | Stop reason: ALTCHOICE

## 2023-01-18 RX ORDER — FOLIC ACID 1 MG/1
TABLET ORAL
COMMUNITY
Start: 2023-01-05

## 2023-01-18 NOTE — PROGRESS NOTES
Thoracic Surgery Consultation    Referring Physician: Dr. Anton Kinney/Dr. Samuel Tadeo    Primary Care Physician: Dr. Flex Sheikh    Chief Complaint   Patient presents with   • Malignant Neoplasm of unspecified part of right bronchus or     New patient referred from Dr. Tadeo         Subjective     History of Present Illness  Mr. Jones is a 57-year-old male who presents with incidental finding right upper lobe lung cancer.  He had a screening CT scan due to his smoking history.  His smoking history, he quit 1 month ago but prior to that had a 35-pack-year smoking history with 1 pack/day.  With this his PCP obtain a screening CT scan which incidentally noted right upper lobe lung mass that was abutting and possibly into the anterior right chest wall.  He does note that if he coughs funny or has some movements he notices some soreness and pain in his anterior chest at the location of this mass.  This seems to be worse after he had port placed but was present prior to port as well.  He denies hemoptysis.  He denies any unwanted weight loss is actually gained weight recently.  He does have a history of narcotic abuse he is very upfront about this and wants to avoid narcotics if possible.  He has never had surgery on his heart lungs or chest.  He does have a history of prostate cancer that was early stage, his last PSA was undetectable.  He has started chemotherapy immunotherapy and neoadjuvant radiation therapy given chest wall invasion.  He sees me in consultation for consideration of surgical resection after neoadjuvant therapy.        Review of Systems   Constitutional: Negative for activity change, fatigue and unexpected weight change.   Respiratory: Positive for cough. Negative for chest tightness and shortness of breath.    Cardiovascular: Positive for chest pain. Negative for palpitations and leg swelling.        A complete review of systems was performed, is negative except stated above.    Past Medical  History:   Diagnosis Date   • Abnormal PET scan of lung     Nov 2022   • Allergic rhinitis    • Arthritis    • Bronchiectasis (HCC)    • Bronchitis    • Cancer (HCC)     prostate   • Chronic bronchitis (HCC)    • Chronic pain    • GERD (gastroesophageal reflux disease)    • Pneumonia    • Prostatitis, acute    • S/P ACL reconstruction    • Septic shock due to urinary tract infection (HCC)    • Sinusitis    • Strep throat    • UTI (urinary tract infection)      Past Surgical History:   Procedure Laterality Date   • ANKLE SURGERY Right    • BACK SURGERY      X2   • ENDOSCOPY N/A 01/10/2022    Procedure: ESOPHAGOGASTRODUODENOSCOPY WITH ANESTHESIA;  Surgeon: Tucker Bright MD;  Location:  [facility];  Service: Gastroenterology;  Laterality: N/A;  pre food bolus  post food bolus  DrYandy   • KNEE ACL RECONSTRUCTION Left    • NASAL SEPTUM SURGERY     • PENILE PROSTHESIS IMPLANT N/A 3/15/2022    Procedure: 3-PIECE INFLATABLE PENILE PROSTHESIS PLACEMENT;  Surgeon: Trae Clark MD;  Location:  [facility];  Service: Urology;  Laterality: N/A;   • PROSTATE ULTRASOUND BIOPSY N/A 02/18/2020    Procedure: PROSTATE  BIOPSY;  Surgeon: Trae Clark MD;  Location:  [facility];  Service: Urology;  Laterality: N/A;   • PROSTATECTOMY N/A 09/22/2020    Procedure: RADICAL PROSTATECTOMY LAPAROSCOPIC WITH DAVINCI ROBOT;  Surgeon: Nuno Jj MD;  Location:  [facility];  Service: DaVinci;  Laterality: N/A;   • SHOULDER SURGERY Left     X 4   • TENNIS ELBOW RELEASE Bilateral 04/14/2011   • VENOUS ACCESS DEVICE (PORT) INSERTION N/A 12/30/2022    Procedure: Single Lumen Port-a-cath insertion with flouroscopy;  Surgeon: Kim Bonilla MD;  Location:  [facility];  Service: General;  Laterality: N/A;     Family History   Problem Relation Age of Onset   • Cancer Mother      Social History     Tobacco Use   • Smoking status: Former     Packs/day: 1.00     Years: 37.00     Pack years: 37.00     Types: Cigarettes     Start date: 1985      Quit date: 2022     Years since quittin.1   • Smokeless tobacco: Former     Types: Snuff     Quit date:    Vaping Use   • Vaping Use: Former   Substance Use Topics   • Alcohol use: No     Comment: 0   • Drug use: Not Currently     Frequency: 7.0 times per week     Types: Marijuana     Comment: Edible gummies - 1 per day     Current Outpatient Medications   Medication Sig Dispense Refill   • acetaminophen (Tylenol) 325 MG tablet Take 3 tablets by mouth Every 8 (Eight) Hours. Take every 8 hours for 3 days then take prn as needed. 100 tablet 2   • albuterol sulfate  (90 Base) MCG/ACT inhaler Inhale 2 puffs Every 4 (Four) Hours As Needed for Wheezing or Shortness of Air.     • cetirizine (zyrTEC) 10 MG tablet 1 tablet Daily.     • dexamethasone (DECADRON) 4 MG tablet      • fluticasone (Flonase Allergy Relief) 50 MCG/ACT nasal spray 2 sprays into the nostril(s) as directed by provider Daily. 16 g 11   • Fluticasone-Salmeterol (Wixela Inhub) 250-50 MCG/ACT DISKUS Inhale 1 puff 2 (Two) Times a Day. 1 each 11   • folic acid (FOLVITE) 1 MG tablet      • gabapentin (NEURONTIN) 800 MG tablet Take 800 mg by mouth 3 (Three) Times a Day.     • hydrOXYzine pamoate (VISTARIL) 50 MG capsule 1 capsule Daily.     • ibuprofen (ADVIL,MOTRIN) 800 MG tablet Take 800 mg by mouth Every 8 (Eight) Hours As Needed for Mild Pain .     • montelukast (SINGULAIR) 10 MG tablet Take 1 tablet by mouth Every Night. 90 tablet 3   • multivitamin with minerals tablet tablet Take 1 tablet by mouth Daily.     • NON FORMULARY Take  by mouth Daily. Marijuana Gummies     • omeprazole (priLOSEC) 40 MG capsule Take 1 capsule by mouth Daily. 90 capsule 1   • prochlorperazine (COMPAZINE) 10 MG tablet      • ondansetron (Zofran) 4 MG tablet Take 1 tablet by mouth Every 8 (Eight) Hours As Needed for Nausea or Vomiting. 15 tablet 0     No current facility-administered medications for this visit.     Allergies:  Patient has no known  "allergies.    Objective      Vital Signs  Visit Vitals  /78 (BP Location: Right arm, Patient Position: Sitting, Cuff Size: Adult)   Pulse 93   Ht 187 cm (73.62\")   Wt 102 kg (225 lb)   SpO2 96%   BMI 29.19 kg/m²         Physical Exam  Constitutional:       General: He is not in acute distress.     Appearance: He is well-developed. He is not diaphoretic.   HENT:      Head: Normocephalic and atraumatic.      Right Ear: External ear normal.      Left Ear: External ear normal.   Eyes:      General:         Right eye: No discharge.         Left eye: No discharge.      Pupils: Pupils are equal, round, and reactive to light.   Neck:      Vascular: No JVD.      Trachea: No tracheal deviation.   Cardiovascular:      Rate and Rhythm: Normal rate and regular rhythm.      Heart sounds: Normal heart sounds. No murmur heard.  Pulmonary:      Effort: Pulmonary effort is normal. No respiratory distress.      Breath sounds: Normal breath sounds. No stridor. No wheezing.      Comments: Port in place right chest  Abdominal:      General: There is no distension.      Palpations: Abdomen is soft.      Tenderness: There is no abdominal tenderness. There is no guarding.   Musculoskeletal:         General: No tenderness or deformity. Normal range of motion.      Cervical back: Normal range of motion and neck supple.   Skin:     General: Skin is warm and dry.      Capillary Refill: Capillary refill takes less than 2 seconds.      Coloration: Skin is not pale.      Findings: No erythema or rash.   Neurological:      Mental Status: He is alert and oriented to person, place, and time.      Motor: No abnormal muscle tone.      Coordination: Coordination normal.   Psychiatric:         Behavior: Behavior normal.         Thought Content: Thought content normal.         Judgment: Judgment normal.         Results Review:     WBC   Date Value Ref Range Status   01/12/2023 3.89 (L) 4.23 - 9.07 K/uL Final     RBC   Date Value Ref Range Status "   01/12/2023 5.27 4.63 - 6.08 M/uL Final     Hemoglobin   Date Value Ref Range Status   01/12/2023 14.8 13.7 - 17.5 g/dL Final     Hematocrit   Date Value Ref Range Status   01/12/2023 45.7 40.1 - 51.0 % Final     MCV   Date Value Ref Range Status   01/12/2023 86.7 79.0 - 92.2 fL Final     MCH   Date Value Ref Range Status   01/12/2023 28.1 25.7 - 32.2 pg Final     MCHC   Date Value Ref Range Status   01/12/2023 32.4 32.3 - 36.5 g/dL Final     RDW   Date Value Ref Range Status   01/12/2023 13.4 11.6 - 14.4 % Final     RDW-SD   Date Value Ref Range Status   11/08/2022 41.1 37.0 - 54.0 fl Final     MPV   Date Value Ref Range Status   01/12/2023 8.9 7.4 - 10.4 fL Final     Platelets   Date Value Ref Range Status   01/12/2023 112 (L) 163 - 337 K/uL Final     Neutrophil Rel %   Date Value Ref Range Status   01/12/2023 65.5 34.0 - 71.1 % Final     Lymphocyte Rel %   Date Value Ref Range Status   01/12/2023 20.6 19.3 - 53.1 % Final     Monocyte Rel %   Date Value Ref Range Status   01/12/2023 6.9 4.7 - 12.5 % Final     Eosinophil Rel %   Date Value Ref Range Status   12/22/2022 3.3 0.7 - 7.0 % Final     Basophil Rel %   Date Value Ref Range Status   12/22/2022 1.1 0.1 - 1.2 % Final     Immature Grans %   Date Value Ref Range Status   01/10/2022 0.5 0.0 - 0.5 % Final     Neutrophils Absolute   Date Value Ref Range Status   01/12/2023 2.55 1.56 - 6.13 K/uL Final     Lymphocytes Absolute   Date Value Ref Range Status   01/12/2023 0.80 (L) 1.18 - 3.74 K/uL Final     Monocytes Absolute   Date Value Ref Range Status   01/12/2023 0.27 0.24 - 0.82 K/uL Final     Eosinophils Absolute   Date Value Ref Range Status   12/22/2022 0.21 0.04 - 0.54 K/uL Final     Basophils Absolute   Date Value Ref Range Status   12/22/2022 0.07 0.01 - 0.08 K/uL Final     Immature Grans, Absolute   Date Value Ref Range Status   01/10/2022 0.03 0.00 - 0.05 10*3/mm3 Final     nRBC   Date Value Ref Range Status   01/10/2022 0.3 (H) 0.0 - 0.2 /100 WBC Final      Glucose   Date Value Ref Range Status   03/11/2022 93 65 - 99 mg/dL Final   07/15/2020 90 74 - 106 mg/dL Final     Sodium   Date Value Ref Range Status   03/11/2022 144 136 - 145 mmol/L Final   07/15/2020 145 137 - 145 mmol/L Final     Potassium   Date Value Ref Range Status   03/11/2022 4.7 3.5 - 5.2 mmol/L Final   07/15/2020 4.5 3.5 - 5.1 mmol/L Final     CO2   Date Value Ref Range Status   03/11/2022 29.0 22.0 - 29.0 mmol/L Final   07/15/2020 29 22 - 29 mmol/L Final     Chloride   Date Value Ref Range Status   03/11/2022 107 98 - 107 mmol/L Final   07/15/2020 109 98 - 111 mmol/L Final     Anion Gap   Date Value Ref Range Status   03/11/2022 8.0 5.0 - 15.0 mmol/L Final   07/15/2020 7 7 - 19 mmol/L Final     Creatinine   Date Value Ref Range Status   01/03/2023 1.30 0.60 - 1.30 mg/dL Final     Comment:     Serial Number: [id number]:  [id number]   07/15/2020 1.1 0.6 - 1.2 mg/dL Final     BUN   Date Value Ref Range Status   03/11/2022 21 (H) 6 - 20 mg/dL Final   07/15/2020 13 9 - 20 mg/dL Final     BUN/Creatinine Ratio   Date Value Ref Range Status   03/11/2022 24.7 7.0 - 25.0 Final     Calcium   Date Value Ref Range Status   03/11/2022 9.5 8.6 - 10.5 mg/dL Final   07/15/2020 10.0 8.4 - 10.2 mg/dL Final     eGFR Non  Am   Date Value Ref Range Status   07/15/2020 >60 >60 Final     Comment:     This calculation may be inaccurate for patients under the age of 18 years.  For ages 18 and older, a GFR >60 mL/min/1.73m2 (not corrected for weight) is  valid for stable renal function.     eGFR Non  Amer   Date Value Ref Range Status   01/10/2022 80 >60 mL/min/1.73 Final     Alkaline Phosphatase   Date Value Ref Range Status   01/10/2022 120 (H) 39 - 117 U/L Final   07/15/2020 106 40 - 130 U/L Final     Total Protein   Date Value Ref Range Status   01/10/2022 7.6 6.0 - 8.5 g/dL Final   07/15/2020 7.0 6.3 - 8.2 g/dL Final     ALT (SGPT)   Date Value Ref Range Status   01/10/2022 23 1 - 41 U/L Final    07/15/2020 25 21 - 72 U/L Final     AST (SGOT)   Date Value Ref Range Status   01/10/2022 34 1 - 40 U/L Final   07/15/2020 31 17 - 59 U/L Final     Total Bilirubin   Date Value Ref Range Status   01/10/2022 1.0 0.0 - 1.2 mg/dL Final   07/15/2020 0.5 0.2 - 1.3 mg/dL Final     Albumin   Date Value Ref Range Status   01/10/2022 4.60 3.50 - 5.20 g/dL Final   07/15/2020 4.4 3.5 - 5.2 g/dL Final     Globulin   Date Value Ref Range Status   01/10/2022 3.0 gm/dL Final   07/15/2020 2.6 g/dL Final        I reviewed the patient's clinical results and discussed with patient.    CT Chest:     FINDINGS:     4 mm LEFT lower lobe pulmonary nodule in superior segment on image 96.  No other pulmonary nodule identified. 3.3 x 1.3 cm masslike area of  pleural thickening in the RIGHT anterior upper chest on image 54. No  consolidation or pleural effusion. Central airways are clear. Moderate  centrilobular and paraseptal emphysema.     No enlarged thoracic lymph nodes. Main pulmonary artery is nondilated.  Thoracic aorta is nonaneurysmal. No pericardial effusion. Mild coronary  artery calcification.     No acute chest wall soft tissue abnormality. No acute finding in the  partially imaged upper abdomen. No acute osseous finding.     IMPRESSION:     1.  4 mm LEFT lower lobe pulmonary nodule.  2.  Moderate emphysema.  3.  3.3 x 1.3 cm masslike area of pleural thickening in the RIGHT  anterior upper chest. Recommend a follow-up chest CT in 3 months to  ensure stability.     Lung-RADS 2S-- Nodules with a very low likelihood of becoming a  clinically active cancer due to size or lack of growth.    - Continue annual screening with low dose CT in 12 months.    - 3 month follow-up chest CT recommended to evaluate for stability of  RIGHT anterior upper chest masslike pleural thickening.  This report was finalized on 05/19/2022 15:58 by Dr. Harpreet Koenig MD.      PET Scan:  Findings:  Background right hepatic lobe metabolic activity measures  max SUV 2.7.     Neck:      No abnormal hypermetabolic activity in the neck. Lower intracranial  cavity appears unremarkable. No acute orbital finding. Parotid glands  appear normal. No focal asymmetry in the aerodigestive tract. No  cervical lymphadenopathy. No large thyroid nodule. Mastoid air cells and  paranasal sinuses are clear.     CHEST:     Slight increase in size of 4.3 x 1.9 cm RIGHT anterior upper chest  pleural thickening which is hypermetabolic with a maximum SUV of 12.0.  No other hypermetabolic pleural thickening. No other metabolic pulmonary  nodule. No hypermetabolic thoracic lymph nodes.     Central airways are clear. No consolidation or pleural effusion. Mild  centrilobular and paraseptal emphysema. No pathologically enlarged  cervical lymph nodes. Thoracic aorta is nonaneurysmal. No pericardial  effusion. No acute osseous finding the chest.     Abdomen/pelvis:     1.7 cm LEFT upper abdomen retroperitoneal lymph node hypermetabolic  activity, max SUV 5.3. This nodule/node closely approximates the LEFT  adrenal gland. 9 mm retroaortic upper abdominal lymph node on image 117  is mildly hypermetabolic with a max SUV of 4.0. 7 mm aortocaval lymph  node on image 102 is mildly hypermetabolic with a max SUV of 3.0. No  other abnormal hypermetabolic activity in the abdomen or pelvis.     Unenhanced liver, spleen, adrenal glands, and pancreas appear  unremarkable. No urinary tract calculi or hydronephrosis. Colonic  diverticulosis without definite evidence of diverticulitis. No evidence  of active small bowel inflammation. No ascites or free pelvic fluid.  Prior prostatectomy. Nonaneurysmal atherosclerotic abdominal aorta.  Penile prosthesis and reservoir. No acute osseous finding. Postoperative  change in the lower lumbar spine.      IMPRESSION:     1.  Slight increase in size of 4.3 x 1.9 cm RIGHT anterior upper chest  masslike pleural thickening which is markedly hypermetabolic. Favor this  to  represent a neoplastic process with differential including lymphoma.  Primary pleural neoplasm and metastatic disease are also in the  differential.     2.  Hypermetabolic upper abdominal retroperitoneal lymphadenopathy.  Suspect these lymph nodes related to the same process as the RIGHT  anterior pleural lesion.     3.  No other abnormal hypermetabolic activity.  This report was finalized on 11/01/2022 12:07 by Dr. Harpreet Koenig MD.      I personally reviewed images of following exams, the following is my interpretation:    CT Chest: Mass along right anterior chest above the level of cooper that abuts or emanates from right upper lobe in its midportion, measures 3.4 cm in maximum dimension  PET Scan: Hypermetabolic activity at anterior right chest wall mass, hypermetabolic activity and upper abdominal lymph nodes    Lung mass biopsy:     Final Diagnosis   Right upper chest/pleural mass, core biopsies:  Adenocarcinoma of pulmonary origin.         Assessment & Plan     Mr. Jones is a 57-year-old male who presents with incidentally found right upper lobe lung mass is adjacent/invading right anterior chest wall.  He has a history of prostate cancer but recently had PSA that was negative.  On his PET scan he does have some abdominal lymph nodes that are hypermetabolic but no evidence of intrathoracic metastatic disease.  I am concerned about chest wall invasion given some minor pain with movements and coughing at the location of the tumor.  I discussed the case at length with Dr. Kinney and Dr. Tadeo.  Given tumor size and chest wall invasion we agreed that neoadjuvant therapy with radiation, chemotherapy and immunotherapy is the best course of action and then reevaluation for resection.  This will also let us test biology of tumor as well as see response of intra-abdominal lymph node.  I believe unlikely for non-small cell lung cancer to spread to infra intra-abdominal lymph node without first mediastinal spread.   I have discussed the natural history of this with Mr. Jones and his significant other at length.  We discussed the treatment plan at length as well.  We will plan on PFTs towards the end of his neoadjuvant treatment cycle.  We will repeat a PET scan and CT scan 4 weeks after completing treatment cycle and plan on surgery sometime after 6 weeks of treatment.  I will see him back after his repeat scans.    Clinical Stage of Malignancy: T3N0M0, Stage IIB    Thank you for trusting me with the care of Mr. Jones.  Please do not hesitate to call with any questions or concerns.    Goldy Carlin MD  Cardiothoracic Surgeon

## 2023-01-18 NOTE — TELEPHONE ENCOUNTER
Called Patient to remind them to get PSA prior to appointment.  Spoke with Patient.  If patient calls back it is ok for the HUB to tell the pt the message.

## 2023-01-18 NOTE — LETTER
January 18, 2023     Anton Kinney III, MD  [address]  [place] [address]    Patient: Lalo Jones   YOB: 1965   Date of Visit: 1/18/2023       Dear Anton Kinney III, MD,    Lalo Jones was in my office today. Below are the relevant portions of my assessment and plan of care.    Mr. Jones is a 57-year-old male who presents with incidentally found right upper lobe lung mass is adjacent/invading right anterior chest wall.  He has a history of prostate cancer but recently had PSA that was negative.  On his PET scan he does have some abdominal lymph nodes that are hypermetabolic but no evidence of intrathoracic metastatic disease.  I am concerned about chest wall invasion given some minor pain with movements and coughing at the location of the tumor.  I discussed the case at length with Dr. Kinney and Dr. Tadeo.  Given tumor size and chest wall invasion we agreed that neoadjuvant therapy with radiation, chemotherapy and immunotherapy is the best course of action and then reevaluation for resection.  This will also let us test biology of tumor as well as see response of intra-abdominal lymph node.  I believe unlikely for non-small cell lung cancer to spread to infra intra-abdominal lymph node without first mediastinal spread.  I have discussed the natural history of this with Mr. Jones and his significant other at length.  We discussed the treatment plan at length as well.  We will plan on PFTs towards the end of his neoadjuvant treatment cycle.  We will repeat a PET scan and CT scan 4 weeks after completing treatment cycle and plan on surgery sometime after 6 weeks of treatment.  I will see him back after his repeat scans.    Clinical Stage of Malignancy: T3N0M0, Stage IIB    Thank you for trusting me with the care of Mr. Jones.  Please do not hesitate to call with any questions or concerns.    Sincerely,        Goldy Carlin MD        CC: MD Flex Buenrostro  MD Kori

## 2023-01-18 NOTE — PROGRESS NOTES
I received a call from Dr. Mohsen Price today, 1/18/2023, regarding . Regina Bernstein. He would like to complete the 3 cycles of neoadjuvant chemoimmunotherapy prior to scanning. About 3 to 4 weeks after completion of cycle #3 he would like repeat scanning to then be followed by surgical resection about 6 weeks after cycle #3. I will make these arrangements.

## 2023-01-19 ENCOUNTER — HOSPITAL ENCOUNTER (OUTPATIENT)
Dept: INFUSION THERAPY | Age: 58
Discharge: HOME | End: 2023-01-19
Payer: MEDICAID

## 2023-01-19 ENCOUNTER — LAB (OUTPATIENT)
Dept: INTERNAL MEDICINE | Facility: CLINIC | Age: 58
End: 2023-01-19
Payer: MEDICAID

## 2023-01-19 ENCOUNTER — HOSPITAL ENCOUNTER (OUTPATIENT)
Dept: RADIATION ONCOLOGY | Facility: HOSPITAL | Age: 58
Setting detail: RADIATION/ONCOLOGY SERIES
Discharge: HOME | End: 2023-01-19
Payer: MEDICAID

## 2023-01-19 DIAGNOSIS — C34.11 PRIMARY ADENOCARCINOMA OF UPPER LOBE OF RIGHT LUNG (HCC): ICD-10-CM

## 2023-01-19 LAB
ALBUMIN SERPL-MCNC: 4.1 G/DL (ref 3.5–5.2)
ALP BLD-CCNC: 125 U/L (ref 40–130)
ALT SERPL-CCNC: 26 U/L (ref 21–72)
ANION GAP SERPL CALCULATED.3IONS-SCNC: 3 MMOL/L (ref 7–19)
AST SERPL-CCNC: 27 U/L (ref 17–59)
BILIRUB SERPL-MCNC: 0.5 MG/DL (ref 0.2–1.3)
BUN BLDV-MCNC: 23 MG/DL (ref 9–20)
CALCIUM SERPL-MCNC: 9.9 MG/DL (ref 8.4–10.2)
CHLORIDE BLD-SCNC: 113 MMOL/L (ref 98–111)
CO2: 28 MMOL/L (ref 22–29)
CREAT SERPL-MCNC: 1.1 MG/DL (ref 0.6–1.2)
GFR SERPL CREATININE-BSD FRML MDRD: >60 ML/MIN/{1.73_M2}
GLUCOSE BLD-MCNC: 99 MG/DL (ref 74–106)
HCT VFR BLD CALC: 41 % (ref 40.1–51)
HEMOGLOBIN: 13.4 G/DL (ref 13.7–17.5)
LYMPHOCYTES ABSOLUTE: 0.63 K/UL (ref 1.18–3.74)
LYMPHOCYTES RELATIVE PERCENT: 20.1 % (ref 19.3–53.1)
MCH RBC QN AUTO: 28.6 PG (ref 25.7–32.2)
MCHC RBC AUTO-ENTMCNC: 32.7 G/DL (ref 32.3–36.5)
MCV RBC AUTO: 87.4 FL (ref 79–92.2)
MONOCYTES ABSOLUTE: 0.46 K/UL (ref 0.24–0.82)
MONOCYTES RELATIVE PERCENT: 14.7 % (ref 4.7–12.5)
NEUTROPHILS ABSOLUTE: 1.84 K/UL (ref 1.56–6.13)
NEUTROPHILS RELATIVE PERCENT: 58.8 % (ref 34–71.1)
PDW BLD-RTO: 14.3 % (ref 11.6–14.4)
PLATELET # BLD: 121 K/UL (ref 163–337)
PMV BLD AUTO: 8.5 FL (ref 7.4–10.4)
POTASSIUM SERPL-SCNC: 4.9 MMOL/L (ref 3.5–5.1)
RAD ONC ARIA COURSE ID: NORMAL
RAD ONC ARIA COURSE LAST TREATMENT DATE: NORMAL
RAD ONC ARIA COURSE START DATE: NORMAL
RAD ONC ARIA COURSE TREATMENT ELAPSED DAYS: 15
RAD ONC ARIA FIRST TREATMENT DATE: NORMAL
RAD ONC ARIA PLAN FRACTIONS TREATED TO DATE: 12
RAD ONC ARIA PLAN ID: NORMAL
RAD ONC ARIA PLAN PRESCRIBED DOSE PER FRACTION: 1.8 GY
RAD ONC ARIA PLAN PRIMARY REFERENCE POINT: NORMAL
RAD ONC ARIA PLAN TOTAL FRACTIONS PRESCRIBED: 28
RAD ONC ARIA PLAN TOTAL PRESCRIBED DOSE: 5040 CGY
RAD ONC ARIA REFERENCE POINT DOSAGE GIVEN TO DATE: 21.6 GY
RAD ONC ARIA REFERENCE POINT ID: NORMAL
RAD ONC ARIA REFERENCE POINT SESSION DOSAGE GIVEN: 1.8 GY
RBC # BLD: 4.69 M/UL (ref 4.63–6.08)
SODIUM BLD-SCNC: 144 MMOL/L (ref 137–145)
TOTAL PROTEIN: 6.8 G/DL (ref 6.3–8.2)
WBC # BLD: 3.13 K/UL (ref 4.23–9.07)

## 2023-01-19 PROCEDURE — 77412 RADIATION TX DELIVERY LVL 3: CPT | Performed by: RADIOLOGY

## 2023-01-19 PROCEDURE — 85025 COMPLETE CBC W/AUTO DIFF WBC: CPT

## 2023-01-19 PROCEDURE — 77336 RADIATION PHYSICS CONSULT: CPT | Performed by: RADIOLOGY

## 2023-01-19 PROCEDURE — 36415 COLL VENOUS BLD VENIPUNCTURE: CPT

## 2023-01-19 PROCEDURE — 80053 COMPREHEN METABOLIC PANEL: CPT

## 2023-01-20 ENCOUNTER — TELEPHONE (OUTPATIENT)
Dept: CARDIAC SURGERY | Facility: CLINIC | Age: 58
End: 2023-01-20
Payer: MEDICAID

## 2023-01-20 ENCOUNTER — HOSPITAL ENCOUNTER (OUTPATIENT)
Dept: RADIATION ONCOLOGY | Facility: HOSPITAL | Age: 58
Setting detail: RADIATION/ONCOLOGY SERIES
Discharge: HOME | End: 2023-01-20
Payer: MEDICAID

## 2023-01-20 DIAGNOSIS — C34.91 MALIGNANT NEOPLASM OF RIGHT LUNG, UNSPECIFIED PART OF LUNG: Primary | ICD-10-CM

## 2023-01-20 LAB
RAD ONC ARIA COURSE ID: NORMAL
RAD ONC ARIA COURSE LAST TREATMENT DATE: NORMAL
RAD ONC ARIA COURSE START DATE: NORMAL
RAD ONC ARIA COURSE TREATMENT ELAPSED DAYS: 16
RAD ONC ARIA FIRST TREATMENT DATE: NORMAL
RAD ONC ARIA PLAN FRACTIONS TREATED TO DATE: 13
RAD ONC ARIA PLAN ID: NORMAL
RAD ONC ARIA PLAN PRESCRIBED DOSE PER FRACTION: 1.8 GY
RAD ONC ARIA PLAN PRIMARY REFERENCE POINT: NORMAL
RAD ONC ARIA PLAN TOTAL FRACTIONS PRESCRIBED: 28
RAD ONC ARIA PLAN TOTAL PRESCRIBED DOSE: 5040 CGY
RAD ONC ARIA REFERENCE POINT DOSAGE GIVEN TO DATE: 23.4 GY
RAD ONC ARIA REFERENCE POINT ID: NORMAL
RAD ONC ARIA REFERENCE POINT SESSION DOSAGE GIVEN: 1.8 GY

## 2023-01-20 PROCEDURE — 77417 THER RADIOLOGY PORT IMAGE(S): CPT | Performed by: RADIOLOGY

## 2023-01-20 PROCEDURE — 77412 RADIATION TX DELIVERY LVL 3: CPT | Performed by: RADIOLOGY

## 2023-01-20 NOTE — TELEPHONE ENCOUNTER
After office visit on Wednesday with Dr. Carlin, the plan was for patient to RTC in 3 months.  It appears that he has scheduled this appointment.  Dr. Carlin asked that the patient get CT chest, abdomen, and pelvis with contrast along with PET scan the week before this follow-up appointment.  I have ordered the testing.  Please schedule this for the patient.

## 2023-01-23 ENCOUNTER — HOSPITAL ENCOUNTER (OUTPATIENT)
Dept: RADIATION ONCOLOGY | Facility: HOSPITAL | Age: 58
Setting detail: RADIATION/ONCOLOGY SERIES
Discharge: HOME | End: 2023-01-23
Payer: MEDICAID

## 2023-01-23 LAB
RAD ONC ARIA COURSE ID: NORMAL
RAD ONC ARIA COURSE LAST TREATMENT DATE: NORMAL
RAD ONC ARIA COURSE START DATE: NORMAL
RAD ONC ARIA COURSE TREATMENT ELAPSED DAYS: 19
RAD ONC ARIA FIRST TREATMENT DATE: NORMAL
RAD ONC ARIA PLAN FRACTIONS TREATED TO DATE: 14
RAD ONC ARIA PLAN ID: NORMAL
RAD ONC ARIA PLAN PRESCRIBED DOSE PER FRACTION: 1.8 GY
RAD ONC ARIA PLAN PRIMARY REFERENCE POINT: NORMAL
RAD ONC ARIA PLAN TOTAL FRACTIONS PRESCRIBED: 28
RAD ONC ARIA PLAN TOTAL PRESCRIBED DOSE: 5040 CGY
RAD ONC ARIA REFERENCE POINT DOSAGE GIVEN TO DATE: 25.2 GY
RAD ONC ARIA REFERENCE POINT ID: NORMAL
RAD ONC ARIA REFERENCE POINT SESSION DOSAGE GIVEN: 1.8 GY

## 2023-01-23 PROCEDURE — 77412 RADIATION TX DELIVERY LVL 3: CPT | Performed by: RADIOLOGY

## 2023-01-24 ENCOUNTER — HOSPITAL ENCOUNTER (OUTPATIENT)
Dept: RADIATION ONCOLOGY | Facility: HOSPITAL | Age: 58
Setting detail: RADIATION/ONCOLOGY SERIES
Discharge: HOME | End: 2023-01-24
Payer: MEDICAID

## 2023-01-24 LAB
RAD ONC ARIA COURSE ID: NORMAL
RAD ONC ARIA COURSE LAST TREATMENT DATE: NORMAL
RAD ONC ARIA COURSE START DATE: NORMAL
RAD ONC ARIA COURSE TREATMENT ELAPSED DAYS: 20
RAD ONC ARIA FIRST TREATMENT DATE: NORMAL
RAD ONC ARIA PLAN FRACTIONS TREATED TO DATE: 15
RAD ONC ARIA PLAN ID: NORMAL
RAD ONC ARIA PLAN PRESCRIBED DOSE PER FRACTION: 1.8 GY
RAD ONC ARIA PLAN PRIMARY REFERENCE POINT: NORMAL
RAD ONC ARIA PLAN TOTAL FRACTIONS PRESCRIBED: 28
RAD ONC ARIA PLAN TOTAL PRESCRIBED DOSE: 5040 CGY
RAD ONC ARIA REFERENCE POINT DOSAGE GIVEN TO DATE: 27 GY
RAD ONC ARIA REFERENCE POINT ID: NORMAL
RAD ONC ARIA REFERENCE POINT SESSION DOSAGE GIVEN: 1.8 GY

## 2023-01-24 PROCEDURE — 77412 RADIATION TX DELIVERY LVL 3: CPT | Performed by: RADIOLOGY

## 2023-01-24 RX ORDER — LIDOCAINE HYDROCHLORIDE 20 MG/ML
5 SOLUTION OROPHARYNGEAL AS NEEDED
Qty: 100 ML | Refills: 0 | Status: SHIPPED | OUTPATIENT
Start: 2023-01-24 | End: 2023-03-30 | Stop reason: ALTCHOICE

## 2023-01-24 NOTE — PROGRESS NOTES
Patient:  Yung Ceja  YOB: 1965  Date of Service: 1/26/2023  MRN: [id number]    Primary Care Physician: Sarwat Palmer    Chief Complaint   Patient presents with    Cancer     Adenocarcinoma RUL    Chemotherapy     C2 Cisplat/Alimta/Opdivo             Patient Seen, Chart, Consults notes, Labs, Radiology studies reviewed. 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 Chichi Villalpando September 2020 for PT2N0 [place] 7 disease with tumor approaching the right posterior margin    Multidisciplinary interdepartmental consultations were obtained with radiation oncology, thoracic surgery and general surgery for opinion regarding robotic abdominal biopsy approach for lymph node tissue sampling leading to neoadjuvant chemoimmunotherapy delivered concurrently with XRT. King Garcia is here accompanied by his wife to initiate cycle #1 of chemoimmunotherapy. Neoadjuvant concurrent XRT (with chemoimmunotherapy) initiated on 1/4/2023.  5040 cGy planned in 28 treatment fractions. Neoadjuvant cisplatin 75 mg/m2, Alimta 500 mg/m2 and Opdivo 360 mg, cycle #1 initiated on 1/5/2023    King Garcia tolerated cycle #1 of neoadjuvant chemoimmunotherapy well. Mr. Dileep Prince is here accompanied by his wife to proceed with cycle #2 of neoadjuvant cisplatin 75 mg/m2, Alimta 500 mg/m2 and Opdivo 360 mg      TARGET LUNG CANCER SITES:  4.7 cm x 4 cm x 1.6 cm mass in the RUL of the lung SUV of 12  9 mm retroaortic upper abdominal lymph node is mildly hypermetabolic with an SUV of 4.0.   7 mm aortocaval lymph node is mildly hypermetabolic with an SUV of 3.0.       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 Jose Resendez in [place] for medical oncology opinion regarding a diagnosis of prostate cancer. [employer] [facility] TEST[facility] on 7/15/2020 documented a VUS:  MEN1, heterozygous, for c.  511C>T  (p.Arg 171 Trp)    Mr. Mansoor Urban was again seen on 12/8/2022 re-referred by Dr. Katie Holt for new diagnosis of adenocarcinoma of the lung, diagnosed on 11/16/2022 by CT Guided needle Biopsy. Charbel Egan is a longtime cigarette smoker of 1 pack/day since age 27. He quit smoking on 12/1/2022 after the diagnosis of lung cancer. Naman's PCP, Dr. Alison Grande ordered a low-dose screening CT scan of the chest for monitoring. CT chest without contrast, low-dose protocol at [facility] on 5/19/2022:  4 mm LEFT lower lobe pulmonary nodule in superior segment   3.3 x 1.3 cm mass like area of pleural thickening in the RIGHT anterior upper chest   Moderate centrilobular and paraseptal emphysema. Lung-RADS 2S-- Nodules with a very low likelihood of becoming a clinically active cancer due to size or lack of growth. Continue annual screening with low dose CT in 12 months. 3 month follow-up chest CT recommended to evaluate for stability of RIGHT anterior upper chest masslike pleural thickening    Dr. Micaela Norton repeated the low-dose CT scan because of the findings in the right anterior upper chest    CT chest without contrast, low-dose protocol at [facility] on 10/17/2022:  4.7 cm x 4 cm x 1.6 cm abnormal area of pleural thickening in the periphery of the RUL with slightly irregular margins  Moderate paraseptal emphysematous changes with scattered bullae in both lungs  Lung-RADS - 4B      Initial consult with Dr. Katie Holt at [facility] on 10/24/2022:  Explained the abnormal CT scan results from May which is done in the [facility] to the patient and also discussed with the radiologist from [facility] in [place] who reviewed the current CT scan of the chest done earlier this month.   This shows the right upper lobe pleural-based thickening or mass and left upper lobe nodule. Due to his smoking history the possibility of malignancy cannot be ruled out. Discussing different options I ordered a PET scan and a follow-up CT scan of the chest in 3 months time. If the PET scan is positive a CT-guided needle biopsy would be best option for the right upper lobe pleural-based lung mass which is not reachable by bronchoscopy. If PET scan is negative will wait for the neck CT scan of the chest and make further recommendations. NM PET/CT SKULL BASE TO MID THIGH at [facility] on 11/1/2022:Comparison: Chest CT 5/19/2022  Background right hepatic lobe metabolic activity measures max SUV 2.7.  4.3 x 1.9 cm RIGHT anterior upper chest pleural thickening which is hypermetabolic with a maximum SUV of 12.0  1.7 cm LEFT upper abdomen retroperitoneal lymph node hypermetabolicactivity, max SUV 5.3. This nodule/node closely approximates the LEFT adrenal gland. 9 mm retroaortic upper abdominal lymph node is mildly hypermetabolic with a max SUV of 4.0.   7 mm aortocaval lymph node is mildly hypermetabolic with a max SUV of 3.0. CT Needle Biopsy Lung at [facility] on 11/16/2022:  Successful CT guided biopsy of the right anterior pleural mass  Final Diagnosis  Right upper chest/pleural mass, core biopsies:  Adenocarcinoma of pulmonary origin. [employer] on 11/16/2022:  ALK(D5F3)- NEGATIVE  FISH Analysis ROS1- NEGATIVE  EGFR Exon 18-Not Detected  EGFR Exon 19-Not Detected  EGFR Exon 20 T768P-Bvd Detected  EGFR Exon 20 (other Mutations)-Not Detected  EGFR Exon 21- Not Detected  BRAF Mutation-Not Detected  FISH MET-No evidence of a MET amplification    Concern on the work-up is that in addition to the 4.7 cm primary RUL adenocarcinoma of the lung there are upper abdominal retroperitoneal and aortocaval lymph nodes positive on the PET scan.   If the upper abdominal lymph nodes were not a concern, Mague Ceja could be treated with neoadjuvant Opdivo Alimta Ivan Lloyd followed by resection. Intense interdepartmental consultations are being undertaken to give Kennedy Hoang the most aggressive approach possible. All of the plans outlined below were discussed at length with Kennedy Hoang and his wife Nini Jenkins. MRI abdomen without and with IV contrast  at [facility] on 12/15/2022:COMPARISON: PET/CT 11/01/2022    9 mm RIGHT liver cyst  15.2 cm spleen  9 mm upper abdominal retroperitoneal lymph node,unchanged      CT ABDOMEN PELVIS WO CONTRAST at [facility] on 12/15/2022:COMPARISON: PET/CT 11/01/2022  Penile prosthesis. Penile pump implanted within the left anterolateral pelvic wall. There is prominent wall thickening of the distal sigmoid colon with multiple regional diverticuli. There are small fatty containing umbilical hernia. Fatty containing inguinal hernias  7 mm left periaortic and aortocaval lymph nodes demonstrating increased FDG uptake comparison PET   exam remains     NM BONE SCAN WHOLE BODY at [facility] on 12/15/2022:Comparison is made with abdomen/pelvis CT imaging from earlier today. Comparison is made with a PET/CT from 11/01/2022. Comparison is made with a bone scan from 02/20/2020. No evidence of bone metastasis. Initial Consult with Dr. Daquan Giraldo on 12/27/2022:  He met with Dr. Heather Kang for surgical consirations of the right upper lobe lesion. He believes that there is likely chest wall invasion and recommends neoadjuvant chemoradiation prior to definitive surgery of the right upper lobe tumor. I anticipate a dose of 5040 cGy in 28 treatment fractions to the lung lesion as neoadjuvant treatment along with concomitant chemotherapy and/or immunotherapy. In addition, we will determine if Dr. Filemon Buenrostro is able to sample the upper abdominal lymph nodes to determine the etiology of that finding on the PET scan. He is scheduled to see her later this week. Initial Consult with Dr. Amy Okeefe at [facility] on 12/29/2022:   In terms of his retroperitoneal lymphadenopathy, I do not recommend a robotic retroperitoneal lymph node biopsy as the node is only 0.7cm and I believe it would be unlikely we would be able to identify that node without guidance. It would be an extremely high risk procedure as it the node is very close to the vasculature. I discussed this with Dr. Carolee Severance personally. This is a new undiagnosed problem with an uncertain prognosis. I have personally reviewed the oncology note, labs, and PET scan above. I have personally discussed the patient and the care plan with Dr. Carolee Severance and at tumor board    Port placed by Dr. Prachi Rosa at \[facility]\"" on 12/30/2022    Case Presented at Tumor Board at \[facility]\"" By Dr. Pasquale Barry (note 1/1/2023): I (Dr. Rhiannon Munsona this patient at tumor conference. Dr. Prachi Rosa was in attendance unfortunately she is not able to biopsy the upper abdominal lymph nodes due to the proximity to adjacent aorta and superior mesenteric artery. I (Dr. Shahriar Harvey reviewed the recommendation of Dr. Bindu Aguilar for neoadjuvant chemoradiation followed by definitive surgery for the right upper lobe lung tumor. Discussion at tumor conference surrounded management of the upper abdominal lymph nodes. The consensus was at this time to proceed with treatment of the primary known adenocarcinoma lung and base further treatment recommendations upon findings at definitive surgery. Abdominal lymph nodes serial radiographs is recommended for follow-up. It was agreed that this is an unlikely metastatic distribution for right upper lobe lung carcinoma in the lymph nodes may represent another etiology. I (Dr. Carolee Severance) will coordinate with Dr. Lety Dickerson for concomitant neoadjuvant chemoradiation of the right upper lobe lung tumor followed by definitive surgery per Dr. Bindu Aguilar      MRI Brain With & Without Contrast at \[facility]\"" on 1/3/2023:  No metastatic disease identified    I (Lety Dickerson) and Dr. Pasquale Barry have both discussed Mr. Jessica's case with Dr. Bindu Aguialr on multiple occasions. He has participated in the decision-making and treatment plan and recommendations moving forward. Multidisciplinary interdepartmental consultations were obtained with radiation oncology, thoracic surgery and general surgery for opinion regarding robotic abdominal biopsy approach for lymph node tissue sampling leading to neoadjuvant chemoimmunotherapy delivered concurrently with XRT. Neoadjuvant concurrent XRT (with chemoimmunotherapy) initiated on 1/4/2023.  5040 cGy planned in 28 treatment fractions. Neoadjuvant cisplatin 75 mg/m2, Alimta 500 mg/m2 and Opdivo 360 mg, cycle #1 initiated on 1/5/2023    Elenita Lyons had an official appointment with Dr. Gerardo Murcia scheduled for 1/12/2023    TREATMENT SUMMARY:  Neoadjuvant concurrent XRT (with chemoimmunotherapy) initiated on 1/4/2023  5040 cGy planned in 28 treatment fractions. Neoadjuvant cisplatin 75 mg/m2, Alimta 500 mg/m2 and Opdivo 360 mg, cycle #1 initiated on 1/5/2023        #2   TUMOR HISTORY: Prostate cancer-  Elenita Lyons was seen in initial oncology consultation on 7/15/2020 referred by his PCP, Dr. Lennie Concepcion in [facility] for medical oncology opinion on his diagnosis of prostate cancer. Elenita Lyons relates that he was found to have a minimally increased PSA of between 4.5 and 5 on a routine physical examination. This led to work-up and eventual prostate biopsy leading to the diagnosis of prostate cancer.      Prostate biopsy on 11/1/2018 documented the following:  Right lateral base-Microfocus of prostatic carcinoma   Prostate biopsy on 2/25/2019 documented the following:  Right lateral base-adenocarcinoma: Wyatt 3+4 = 7  Right lateral mid- adenocarcinoma: [place] 3+3 = 6  Prostate biopsy 2/18/2020 documented the following:  Right lateral base-adenocarcinoma: Wyatt 3+4 = 7     PSA levels by date as follows:  2/19/2019-9.1  4/19/2019-7.0  7/22/2019-6.6  10/17/2019-7.8  114 2020-8.39  5/18/2020- 11.5     MRI pelvis with and without contrast on 2/5/2020 at [facility] documented:  No suspicious lesions in the prostate (PI-RADS 3 or greater)  No pelvis lymphadenopathy or suspicious pelvic bone lesions     Prostate biopsy was performed on 2/18/2020 by Dr. Muriel Lorenz at [facility]. Pathology revealed:  Prostate, right apex, needle biopsy:  Benign prostate tissue  Prostate, right base, needle biopsy:  Adenocarcinoma, acinar type, Wyatt grade 3+4=7, grade group 2, discontinuously involving 10/20 mm or 50% of core (3 foci measuring 4,3, and 1 mm), 30% of tumor is Wyatt pattern 4  Prostate, right mid, needle biopsy:  Benign prostate tissue  Prostate, left apex, needle biopsy:  Benign prostate tissue  Prostate, left base, needle biopsy  Benign prostate tissue  Prostate, left mid, needle biopsy:  Benign prostate tissue        CT abdomen and pelvis without contrast on 2/20/2020 at [facility] documented:  No evidence of complication after prostate biopsy  Numerous colonic diverticula with no evidence of acute diverticulitis  Small bilateral fat-containing groin hernias     Bone scan on 6/2/2020 at [facility] documented:  Abnormal uptake at the right dorsal tissues at the level of T12 and right upper kidney. This could represent a bone lesion or renal lesion. Recommend CT abdomen with contrast for further evaluation if patient's renal function permits. If low renal function, MRI abdomen without contrast would be an alternative for evaluation     CT abdomen and pelvis without contrast on 6/7/2020 at [facility] documented no acute abnormality. CBC (7/15/2020) revealed a WBC of 6.73. Hgb is 15.5 with an MCV of 93.5 and platelet count of 711,243. Ji Vasquez has been under evaluation and monitoring in the urology department by Dr. Sofia Malone at [facility]. At his last visit on 5/28/2020 Mr. Carolyn Grissom was leaning toward a radical prostatectomy to address the issue of his prostate cancer. Medical oncology consultation was requested for opinion.   The 2 best options at this juncture include a radical prostatectomy which is most definitive followed by radiation therapy which gives similar statistical long-term results but with the potential for long-term radiation therapy associated side effects. I have encouraged him that a radical prostatectomy is appropriate and if he is considering this that I would support that completely. He has an appointment with Dr. Aleshia Aparicio on 8/21/2022 discussed robotic prostatectomy. Recommendation prior to his August appointment with urology is as follows: [employer] genetic testing drawn today  PSA  CMP  MRI of the thoracic spine W&WO to evaluate the T12 area and any other areas of concern     He had robotic assisted laparoscopic prostatectomy by Dr. Gavin Youngblood September 2020 for PT2N0 [place] 7 disease with tumor approaching the right posterior margin. TREATMENT SUMMARY:  Robotic assisted laparoscopic prostatectomy by Dr. Gavin Youngblood September 2020 for PT2N0 [place] 7 disease with tumor approaching the right posterior margin          Allergies:  Patient has no known allergies. Medicines:  Current Outpatient Medications   Medication Sig Dispense Refill    prochlorperazine (COMPAZINE) 10 MG tablet Take 1 tablet by mouth every 6 hours as needed (nausea) 120 tablet 3    montelukast (SINGULAIR) 10 MG tablet Take 10 mg by mouth nightly      dexamethasone (DECADRON) 4 MG tablet Take 4 mg by mouth 2 times daily (with meals) Take one tablet twice a day starting day before chemotherapy for three days      folic acid (FOLVITE) 1 MG tablet Take 1 tablet by mouth daily 30 tablet 5    hydrOXYzine pamoate (VISTARIL) 50 MG capsule       gabapentin (NEURONTIN) 800 MG tablet Take 800 mg by mouth 2 times daily.       ibuprofen (ADVIL;MOTRIN) 800 MG tablet Take 800 mg by mouth every 6 hours as needed for Pain      omeprazole (PRILOSEC) 40 MG delayed release capsule Take 40 mg by mouth daily      sildenafil (VIAGRA) 100 MG tablet Take 100 mg by mouth daily (Patient not taking: Reported on 1/5/2023)      Albuterol Sulfate (VENTOLIN HFA IN) Inhale 2 puffs into the lungs every 4 hours as needed       No current facility-administered medications for this visit. Facility-Administered Medications Ordered in Other Visits   Medication Dose Route Frequency Provider Last Rate Last Admin    furosemide (LASIX) injection 20 mg  20 mg IntraVENous Once Allie Winter MD        0.9 % sodium chloride infusion  5-250 mL/hr IntraVENous PRN Allie Winter MD        potassium chloride 20 mEq, magnesium sulfate 1,000 mg in sodium chloride 0.9 % 1,000 mL IVPB   IntraVENous Once Allie Winter  mL/hr at 01/26/23 1159 New Bag at 01/26/23 1159    cyanocobalamin injection 1,000 mcg  1,000 mcg IntraMUSCular Once Allie Winter MD        CISplatin (PLATINOL) 170 mg in sodium chloride 0.9 % 450 mL chemo IVPB  75 mg/m2 (Treatment Plan Recorded) IntraVENous Once Allie Winter .7 mL/hr at 01/26/23 1218 170 mg at 01/26/23 1218    potassium chloride 20 mEq, magnesium sulfate 1,000 mg in sodium chloride 0.9 % 1,000 mL IVPB   IntraVENous Once Allie Winter MD        sodium chloride (PF) 0.9 % injection 5-40 mL  5-40 mL IntraVENous PRN Allie Winter MD        heparin flush 100 UNIT/ML injection 500 Units  500 Units IntraCATHeter PRN Allie Winter MD           Past Medical History:      Diagnosis Date    Adenocarcinoma, lung, right ([facility] [address] 12/22/2022    Arthritis     Cancer ([facility] [address]     Prostate    Carcinoma in situ of prostate     Thrombocytopenia ([facility] [address]         Past Surgical History:      Procedure Laterality Date    ANKLE SURGERY Right     ANTERIOR CRUCIATE LIGAMENT REPAIR Left     BACK SURGERY      X2    [facility] S&I  02/18/2020    Dr. Peri Brittle @ [facility] Right 04/14/2011    Tennis elbow release    NASAL SEPTUM SURGERY      SHOULDER SURGERY Left     x4        Family History:  History reviewed. No pertinent family history.      Social History  Social History     Tobacco Use    Smoking status: Former     Packs/day: 1.00     Years: 15.00     Pack years: 15.00     Types: Cigarettes     Start date: 1987     Quit date: 2022     Years since quittin.1    Smokeless tobacco: Never    Tobacco comments: On Chantix. ..trying to quit   Vaping Use    Vaping Use: Former    Substances: Nicotine    Devices: Pre-filled or refillable cartridge, Refillable tank   Substance Use Topics    Alcohol use: Not Currently    Drug use: Never              Wt Readings from Last 3 Encounters:   23 219 lb 1.6 oz (99.4 kg)   23 218 lb 8 oz (99.1 kg)   22 216 lb 6.4 oz (98.2 kg)        Objective:  Vital Signs: Blood pressure 110/75, pulse 83, temperature 97.9 °F (36.6 °C), resp. rate 18, height 6' 2\" (1.88 m), weight 219 lb 1.6 oz (99.4 kg), SpO2 95 %. Labs:  BMP:   Recent Labs     23  0911      K 4.7      CO2 29   BUN 22*   CREATININE 1.2   CALCIUM 9.8       CBC:   Recent Labs     23  0910   WBC 3.48*   HGB 12.9*   HCT 39.2*   MCV 85.6              PT/INR: No results for input(s): PROTIME, INR in the last 72 hours. APTT: No results for input(s): APTT in the last 72 hours. Magnesium:  Recent Labs     23  0911   MG 2.1       Phosphorus:No results for input(s): PHOS in the last 72 hours. Hepatic:   Recent Labs     23  0911   ALKPHOS 117   ALT 27   AST 27   PROT 6.7   BILITOT 0.5   LABALBU 3.9         Cultures:   No results for input(s): CULTURE in the last 72 hours. Radiology reports as per the Radiologist  Radiology: No results found.      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 Greig Hatchet 2020 for PT2N0 [place] 7 disease with tumor approaching the right posterior margin    Multidisciplinary interdepartmental consultations were obtained with radiation oncology, thoracic surgery and general surgery for opinion regarding robotic abdominal biopsy approach for lymph node tissue sampling leading to neoadjuvant chemoimmunotherapy. Talia Patterson is here accompanied by his wife to initiate cycle #1 of chemoimmunotherapy. Neoadjuvant concurrent XRT (with chemoimmunotherapy) initiated on 1/4/2023.  5040 cGy planned in 28 treatment fractions. Neoadjuvant cisplatin 75 mg/m2, Alimta 500 mg/m2 and Opdivo 360 mg, cycle #1 initiated on 1/5/2023    Talia Patterson tolerated cycle #1 of neoadjuvant chemoimmunotherapy well. Mr. Stephen Norman is here accompanied by his wife to proceed with cycle #2 of neoadjuvant cisplatin 75 mg/m2, Alimta 500 mg/m2 and Opdivo 360 mg    /75   Pulse 83   Temp 97.9 °F (36.6 °C)   Resp 18   Ht 6' 2\" (1.88 m)   Wt 219 lb 1.6 oz (99.4 kg)   SpO2 95%   BMI 28.13 kg/m²     Examination today, 1/26/2023, is without evidence of palpable supraclavicular or infraclavicular lymphadenopathy. Lungs reveal bilateral rales in upper and lower lung fields but without signs of consolidation. Heart regular rate and rhythm normal S1 and S2, no S3  Abdomen is soft and benign without organomegaly or masses  Extremities no cyanosis clubbing or edema  Neurological exam is grossly intact. CBC today 1/26/2023 reveals a WBC of 3.48. Hgb is 12.9 with an MCV of 85.6 and platelet count of 823,943. TARGET LUNG CANCER SITES:  4.7 cm x 4 cm x 1.6 cm mass in the RUL of the lung SUV of 12  9 mm retroaortic upper abdominal lymph node is mildly hypermetabolic with an SUV of 4.0.   7 mm aortocaval lymph node is mildly hypermetabolic with an SUV of 3.0. Review of systems and physical examination are unremarkable for newly developed toxicities or side effects.   He is clinically doing well with his treatments of XRT and chemoimmunotherapy. Questions were asked and answered by he and his wife to their understanding and satisfaction. We will proceed with cycle #2 of therapy as outlined. I will continue monitoring Abel Marina closely during these 3 cycles of neoadjuvant chemoimmunotherapy treatments being delivered concurrently with XRT for toxicity, side effects and support. #2  TUMOR HISTORY: Prostate cancer  Abel Marina was seen in initial oncology consultation on 7/15/2020 referred by his PCP, Dr. Carmelo Barton in [facility] for medical oncology opinion on his diagnosis of prostate cancer. Abel Marina relates that he was found to have a minimally increased PSA of between 4.5 and 5 on a routine physical examination. This led to work-up and eventual prostate biopsy leading to the diagnosis of prostate cancer. Prostate biopsy on 11/1/2018 documented the following:  Right lateral base-Microfocus of prostatic carcinoma   Prostate biopsy on 2/25/2019 documented the following:  Right lateral base-adenocarcinoma: Wyatt 3+4 = 7  Right lateral mid- adenocarcinoma: [place] 3+3 = 6  Prostate biopsy 2/18/2020 documented the following:  Right lateral base-adenocarcinoma: [place] 3+4 = 7     PSA levels by date as follows:  2/19/2019-9.1  4/19/2019-7.0  7/22/2019-6.6  10/17/2019-7.8  114 2020-8.39  5/18/2020- 11.5     MRI pelvis with and without contrast on 2/5/2020 at [facility] documented:  No suspicious lesions in the prostate (PI-RADS 3 or greater)  No pelvis lymphadenopathy or suspicious pelvic bone lesions     Prostate biopsy was performed on 2/18/2020 by Dr. Jaciel Diaz at [facility].   Pathology revealed:  Prostate, right apex, needle biopsy:  Benign prostate tissue  Prostate, right base, needle biopsy:  Adenocarcinoma, acinar type, [place] grade 3+4=7, grade group 2, discontinuously involving 10/20 mm or 50% of core (3 foci measuring 4,3, and 1 mm), 30% of tumor is [place] pattern 4  Prostate, right mid, needle biopsy:  Benign prostate tissue  Prostate, left apex, needle biopsy:  Benign prostate tissue  Prostate, left base, needle biopsy  Benign prostate tissue  Prostate, left mid, needle biopsy:  Benign prostate tissue        CT abdomen and pelvis without contrast on 2/20/2020 at [facility] documented:  No evidence of complication after prostate biopsy  Numerous colonic diverticula with no evidence of acute diverticulitis  Small bilateral fat-containing groin hernias     Bone scan on 6/2/2020 at [facility] documented:  Abnormal uptake at the right dorsal tissues at the level of T12 and right upper kidney. This could represent a bone lesion or renal lesion. Recommend CT abdomen with contrast for further evaluation if patient's renal function permits. If low renal function, MRI abdomen without contrast would be an alternative for evaluation     CT abdomen and pelvis without contrast on 6/7/2020 at [facility] documented no acute abnormality. Nabil Prakash has been under evaluation and monitoring in the urology department by Dr. Karla Reed at [facility]. At his last visit on 5/28/2020 Mr. Althea Dunn was leaning toward a radical prostatectomy to address the issue of his prostate cancer. Medical oncology consultation was requested for opinion. The 2 best options at this juncture include a radical prostatectomy which is most definitive followed by radiation therapy which gives similar statistical long-term results but with the potential for long-term radiation therapy associated side effects. I have encouraged him that a radical prostatectomy is appropriate and if he is considering this that I would support that completely. He has an appointment with Dr. Glory Bustos on 8/21/2022 discussed robotic prostatectomy. Recommendation prior to his August appointment with urology is as follows:   [employer] genetic testing drawn today  PSA  CMP  MRI of the thoracic spine W&WO to evaluate the T12 area and any other areas of concern     He had robotic assisted laparoscopic prostatectomy by Dr. Roger Noguera September 2020 for PT2N0 [place] 7 disease with tumor approaching the right posterior margin. 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 Diagnoses and all orders for this visit:    Adenocarcinoma of right lung [facility])    Encounter for chemotherapy management    Health care maintenance            No orders of the defined types were placed in this encounter. Return in about 3 weeks (around 2/16/2023) for treatment and see Dr. Basilia Garcia.

## 2023-01-25 ENCOUNTER — HOSPITAL ENCOUNTER (OUTPATIENT)
Dept: RADIATION ONCOLOGY | Facility: HOSPITAL | Age: 58
Setting detail: RADIATION/ONCOLOGY SERIES
Discharge: HOME | End: 2023-01-25
Payer: MEDICAID

## 2023-01-25 DIAGNOSIS — C34.91 ADENOCARCINOMA, LUNG, RIGHT (HCC): Primary | ICD-10-CM

## 2023-01-25 LAB
RAD ONC ARIA COURSE ID: NORMAL
RAD ONC ARIA COURSE LAST TREATMENT DATE: NORMAL
RAD ONC ARIA COURSE START DATE: NORMAL
RAD ONC ARIA COURSE TREATMENT ELAPSED DAYS: 21
RAD ONC ARIA FIRST TREATMENT DATE: NORMAL
RAD ONC ARIA PLAN FRACTIONS TREATED TO DATE: 16
RAD ONC ARIA PLAN ID: NORMAL
RAD ONC ARIA PLAN PRESCRIBED DOSE PER FRACTION: 1.8 GY
RAD ONC ARIA PLAN PRIMARY REFERENCE POINT: NORMAL
RAD ONC ARIA PLAN TOTAL FRACTIONS PRESCRIBED: 28
RAD ONC ARIA PLAN TOTAL PRESCRIBED DOSE: 5040 CGY
RAD ONC ARIA REFERENCE POINT DOSAGE GIVEN TO DATE: 28.8 GY
RAD ONC ARIA REFERENCE POINT ID: NORMAL
RAD ONC ARIA REFERENCE POINT SESSION DOSAGE GIVEN: 1.8 GY

## 2023-01-25 PROCEDURE — 77336 RADIATION PHYSICS CONSULT: CPT | Performed by: RADIOLOGY

## 2023-01-25 PROCEDURE — 77412 RADIATION TX DELIVERY LVL 3: CPT | Performed by: RADIOLOGY

## 2023-01-25 RX ORDER — ACETAMINOPHEN 325 MG/1
650 TABLET ORAL
Status: CANCELLED | OUTPATIENT
Start: 2023-01-26

## 2023-01-25 RX ORDER — ONDANSETRON 2 MG/ML
8 INJECTION INTRAMUSCULAR; INTRAVENOUS
Status: CANCELLED | OUTPATIENT
Start: 2023-01-26

## 2023-01-25 RX ORDER — ALBUTEROL SULFATE 90 UG/1
4 AEROSOL, METERED RESPIRATORY (INHALATION) PRN
Status: CANCELLED | OUTPATIENT
Start: 2023-01-26

## 2023-01-25 RX ORDER — EPINEPHRINE 1 MG/ML
0.3 INJECTION, SOLUTION, CONCENTRATE INTRAVENOUS PRN
Status: CANCELLED | OUTPATIENT
Start: 2023-01-26

## 2023-01-25 RX ORDER — FUROSEMIDE 10 MG/ML
20 INJECTION INTRAMUSCULAR; INTRAVENOUS ONCE
Status: CANCELLED | OUTPATIENT
Start: 2023-01-26 | End: 2023-01-26

## 2023-01-25 RX ORDER — MEPERIDINE HYDROCHLORIDE 50 MG/ML
12.5 INJECTION INTRAMUSCULAR; INTRAVENOUS; SUBCUTANEOUS PRN
Status: CANCELLED | OUTPATIENT
Start: 2023-01-26

## 2023-01-25 RX ORDER — FAMOTIDINE 10 MG/ML
20 INJECTION, SOLUTION INTRAVENOUS
Status: CANCELLED | OUTPATIENT
Start: 2023-01-26

## 2023-01-25 RX ORDER — SODIUM CHLORIDE 9 MG/ML
5-250 INJECTION, SOLUTION INTRAVENOUS PRN
Status: CANCELLED | OUTPATIENT
Start: 2023-01-26

## 2023-01-25 RX ORDER — DIPHENHYDRAMINE HYDROCHLORIDE 50 MG/ML
50 INJECTION INTRAMUSCULAR; INTRAVENOUS
Status: CANCELLED | OUTPATIENT
Start: 2023-01-26

## 2023-01-25 RX ORDER — PALONOSETRON 0.05 MG/ML
0.25 INJECTION, SOLUTION INTRAVENOUS ONCE
Status: CANCELLED | OUTPATIENT
Start: 2023-01-26 | End: 2023-01-26

## 2023-01-25 RX ORDER — CYANOCOBALAMIN 1000 UG/ML
1000 INJECTION, SOLUTION INTRAMUSCULAR; SUBCUTANEOUS ONCE
Status: CANCELLED | OUTPATIENT
Start: 2023-01-26 | End: 2023-01-26

## 2023-01-25 RX ORDER — SODIUM CHLORIDE 9 MG/ML
5-40 INJECTION INTRAVENOUS PRN
Status: CANCELLED | OUTPATIENT
Start: 2023-01-26

## 2023-01-25 RX ORDER — SODIUM CHLORIDE 9 MG/ML
INJECTION, SOLUTION INTRAVENOUS CONTINUOUS
Status: CANCELLED | OUTPATIENT
Start: 2023-01-26

## 2023-01-25 RX ORDER — HEPARIN SODIUM (PORCINE) LOCK FLUSH IV SOLN 100 UNIT/ML 100 UNIT/ML
500 SOLUTION INTRAVENOUS PRN
Status: CANCELLED | OUTPATIENT
Start: 2023-01-26

## 2023-01-26 ENCOUNTER — HOSPITAL ENCOUNTER (OUTPATIENT)
Dept: INFUSION THERAPY | Age: 58
Discharge: HOME | End: 2023-01-26
Payer: MEDICAID

## 2023-01-26 ENCOUNTER — HOSPITAL ENCOUNTER (OUTPATIENT)
Dept: RADIATION ONCOLOGY | Facility: HOSPITAL | Age: 58
Setting detail: RADIATION/ONCOLOGY SERIES
Discharge: HOME | End: 2023-01-26
Payer: MEDICAID

## 2023-01-26 ENCOUNTER — OFFICE VISIT (OUTPATIENT)
Dept: HEMATOLOGY | Age: 58
End: 2023-01-26

## 2023-01-26 VITALS
HEIGHT: 74 IN | RESPIRATION RATE: 18 BRPM | WEIGHT: 219.1 LBS | BODY MASS INDEX: 28.12 KG/M2 | TEMPERATURE: 97.9 F | DIASTOLIC BLOOD PRESSURE: 75 MMHG | OXYGEN SATURATION: 95 % | SYSTOLIC BLOOD PRESSURE: 110 MMHG | HEART RATE: 83 BPM

## 2023-01-26 DIAGNOSIS — C34.91 ADENOCARCINOMA, LUNG, RIGHT (HCC): ICD-10-CM

## 2023-01-26 DIAGNOSIS — C34.91 ADENOCARCINOMA OF RIGHT LUNG (HCC): Primary | ICD-10-CM

## 2023-01-26 DIAGNOSIS — C34.11 PRIMARY ADENOCARCINOMA OF UPPER LOBE OF RIGHT LUNG (HCC): Primary | ICD-10-CM

## 2023-01-26 DIAGNOSIS — Z51.11 ENCOUNTER FOR CHEMOTHERAPY MANAGEMENT: ICD-10-CM

## 2023-01-26 DIAGNOSIS — Z00.00 HEALTH CARE MAINTENANCE: ICD-10-CM

## 2023-01-26 LAB
ALBUMIN SERPL-MCNC: 3.9 G/DL (ref 3.5–5.2)
ALP BLD-CCNC: 117 U/L (ref 40–130)
ALT SERPL-CCNC: 27 U/L (ref 21–72)
ANION GAP SERPL CALCULATED.3IONS-SCNC: 2 MMOL/L (ref 7–19)
AST SERPL-CCNC: 27 U/L (ref 17–59)
BILIRUB SERPL-MCNC: 0.5 MG/DL (ref 0.2–1.3)
BUN BLDV-MCNC: 22 MG/DL (ref 9–20)
CALCIUM SERPL-MCNC: 9.8 MG/DL (ref 8.4–10.2)
CHLORIDE BLD-SCNC: 110 MMOL/L (ref 98–111)
CO2: 29 MMOL/L (ref 22–29)
CORTISOL TOTAL: 22.7 UG/DL
CREAT SERPL-MCNC: 1.2 MG/DL (ref 0.6–1.2)
GFR SERPL CREATININE-BSD FRML MDRD: >60 ML/MIN/{1.73_M2}
GLOBULIN: 2.8 G/DL
GLUCOSE BLD-MCNC: 103 MG/DL (ref 74–106)
HBV SURFACE AB TITR SER: NORMAL {TITER}
HCT VFR BLD CALC: 39.2 % (ref 40.1–51)
HEMOGLOBIN: 12.9 G/DL (ref 13.7–17.5)
HEPATITIS B SURFACE ANTIGEN INTERPRETATION: NORMAL
LYMPHOCYTES ABSOLUTE: 0.62 K/UL (ref 1.18–3.74)
LYMPHOCYTES RELATIVE PERCENT: 17.8 % (ref 19.3–53.1)
MAGNESIUM: 2.1 MG/DL (ref 1.6–2.3)
MCH RBC QN AUTO: 28.2 PG (ref 25.7–32.2)
MCHC RBC AUTO-ENTMCNC: 32.9 G/DL (ref 32.3–36.5)
MCV RBC AUTO: 85.6 FL (ref 79–92.2)
MONOCYTES ABSOLUTE: 0.6 K/UL (ref 0.24–0.82)
MONOCYTES RELATIVE PERCENT: 17.2 % (ref 4.7–12.5)
NEUTROPHILS ABSOLUTE: 1.91 K/UL (ref 1.56–6.13)
NEUTROPHILS RELATIVE PERCENT: 55 % (ref 34–71.1)
PDW BLD-RTO: 15 % (ref 11.6–14.4)
PLATELET # BLD: 163 K/UL (ref 163–337)
PMV BLD AUTO: 8.8 FL (ref 7.4–10.4)
POTASSIUM SERPL-SCNC: 4.7 MMOL/L (ref 3.5–5.1)
RAD ONC ARIA COURSE ID: NORMAL
RAD ONC ARIA COURSE LAST TREATMENT DATE: NORMAL
RAD ONC ARIA COURSE START DATE: NORMAL
RAD ONC ARIA COURSE TREATMENT ELAPSED DAYS: 22
RAD ONC ARIA FIRST TREATMENT DATE: NORMAL
RAD ONC ARIA PLAN FRACTIONS TREATED TO DATE: 17
RAD ONC ARIA PLAN ID: NORMAL
RAD ONC ARIA PLAN PRESCRIBED DOSE PER FRACTION: 1.8 GY
RAD ONC ARIA PLAN PRIMARY REFERENCE POINT: NORMAL
RAD ONC ARIA PLAN TOTAL FRACTIONS PRESCRIBED: 28
RAD ONC ARIA PLAN TOTAL PRESCRIBED DOSE: 5040 CGY
RAD ONC ARIA REFERENCE POINT DOSAGE GIVEN TO DATE: 30.6 GY
RAD ONC ARIA REFERENCE POINT ID: NORMAL
RAD ONC ARIA REFERENCE POINT SESSION DOSAGE GIVEN: 1.8 GY
RBC # BLD: 4.58 M/UL (ref 4.63–6.08)
SODIUM BLD-SCNC: 141 MMOL/L (ref 137–145)
TOTAL PROTEIN: 6.7 G/DL (ref 6.3–8.2)
TSH SERPL DL<=0.05 MIU/L-ACNC: 2.88 UIU/ML (ref 0.27–4.2)
WBC # BLD: 3.48 K/UL (ref 4.23–9.07)

## 2023-01-26 PROCEDURE — 77412 RADIATION TX DELIVERY LVL 3: CPT | Performed by: RADIOLOGY

## 2023-01-26 PROCEDURE — 96376 TX/PRO/DX INJ SAME DRUG ADON: CPT

## 2023-01-26 PROCEDURE — 83735 ASSAY OF MAGNESIUM: CPT

## 2023-01-26 PROCEDURE — 96413 CHEMO IV INFUSION 1 HR: CPT

## 2023-01-26 PROCEDURE — 96372 THER/PROPH/DIAG INJ SC/IM: CPT

## 2023-01-26 PROCEDURE — 6360000002 HC RX W HCPCS: Performed by: INTERNAL MEDICINE

## 2023-01-26 PROCEDURE — 96366 THER/PROPH/DIAG IV INF ADDON: CPT

## 2023-01-26 PROCEDURE — 2580000003 HC RX 258: Performed by: INTERNAL MEDICINE

## 2023-01-26 PROCEDURE — 96375 TX/PRO/DX INJ NEW DRUG ADDON: CPT

## 2023-01-26 PROCEDURE — 96415 CHEMO IV INFUSION ADDL HR: CPT

## 2023-01-26 PROCEDURE — 96367 TX/PROPH/DG ADDL SEQ IV INF: CPT

## 2023-01-26 PROCEDURE — 80053 COMPREHEN METABOLIC PANEL: CPT

## 2023-01-26 PROCEDURE — 96417 CHEMO IV INFUS EACH ADDL SEQ: CPT

## 2023-01-26 PROCEDURE — 85025 COMPLETE CBC W/AUTO DIFF WBC: CPT

## 2023-01-26 PROCEDURE — 96411 CHEMO IV PUSH ADDL DRUG: CPT

## 2023-01-26 RX ORDER — SODIUM CHLORIDE 9 MG/ML
5-250 INJECTION, SOLUTION INTRAVENOUS PRN
Status: DISCONTINUED | OUTPATIENT
Start: 2023-01-26 | End: 2023-01-27 | Stop reason: HOSPADM

## 2023-01-26 RX ORDER — CYANOCOBALAMIN 1000 UG/ML
1000 INJECTION, SOLUTION INTRAMUSCULAR; SUBCUTANEOUS ONCE
Status: COMPLETED | OUTPATIENT
Start: 2023-01-26 | End: 2023-01-26

## 2023-01-26 RX ORDER — FUROSEMIDE 10 MG/ML
20 INJECTION INTRAMUSCULAR; INTRAVENOUS ONCE
Status: COMPLETED | OUTPATIENT
Start: 2023-01-26 | End: 2023-01-26

## 2023-01-26 RX ORDER — PALONOSETRON 0.05 MG/ML
0.25 INJECTION, SOLUTION INTRAVENOUS ONCE
Status: COMPLETED | OUTPATIENT
Start: 2023-01-26 | End: 2023-01-26

## 2023-01-26 RX ORDER — HEPARIN SODIUM (PORCINE) LOCK FLUSH IV SOLN 100 UNIT/ML 100 UNIT/ML
500 SOLUTION INTRAVENOUS PRN
Status: DISCONTINUED | OUTPATIENT
Start: 2023-01-26 | End: 2023-01-27 | Stop reason: HOSPADM

## 2023-01-26 RX ORDER — SODIUM CHLORIDE 9 MG/ML
5-40 INJECTION INTRAVENOUS PRN
Status: DISCONTINUED | OUTPATIENT
Start: 2023-01-26 | End: 2023-01-27 | Stop reason: HOSPADM

## 2023-01-26 RX ADMIN — Medication 500 UNITS: at 16:12

## 2023-01-26 RX ADMIN — SODIUM CHLORIDE 360 MG: 9 INJECTION, SOLUTION INTRAVENOUS at 11:25

## 2023-01-26 RX ADMIN — DEXAMETHASONE SODIUM PHOSPHATE 20 MG: 10 INJECTION, SOLUTION INTRAMUSCULAR; INTRAVENOUS at 10:27

## 2023-01-26 RX ADMIN — POTASSIUM CHLORIDE: 2 INJECTION, SOLUTION, CONCENTRATE INTRAVENOUS at 11:59

## 2023-01-26 RX ADMIN — FOSAPREPITANT 150 MG: 150 INJECTION, POWDER, LYOPHILIZED, FOR SOLUTION INTRAVENOUS at 10:38

## 2023-01-26 RX ADMIN — PEMETREXED DISODIUM 1130 MG: 500 INJECTION, POWDER, LYOPHILIZED, FOR SOLUTION INTRAVENOUS at 12:01

## 2023-01-26 RX ADMIN — FUROSEMIDE 20 MG: 10 INJECTION, SOLUTION INTRAMUSCULAR; INTRAVENOUS at 12:55

## 2023-01-26 RX ADMIN — POTASSIUM CHLORIDE: 2 INJECTION, SOLUTION, CONCENTRATE INTRAVENOUS at 14:01

## 2023-01-26 RX ADMIN — CISPLATIN 170 MG: 1 INJECTION INTRAVENOUS at 12:18

## 2023-01-26 RX ADMIN — FUROSEMIDE 20 MG: 10 INJECTION, SOLUTION INTRAMUSCULAR; INTRAVENOUS at 14:02

## 2023-01-26 RX ADMIN — Medication 10 ML: at 16:12

## 2023-01-26 RX ADMIN — PALONOSETRON 0.25 MG: 0.05 INJECTION, SOLUTION INTRAVENOUS at 10:24

## 2023-01-26 RX ADMIN — CYANOCOBALAMIN 1000 MCG: 1000 INJECTION, SOLUTION INTRAMUSCULAR; SUBCUTANEOUS at 16:11

## 2023-01-27 ENCOUNTER — HOSPITAL ENCOUNTER (OUTPATIENT)
Dept: RADIATION ONCOLOGY | Facility: HOSPITAL | Age: 58
Setting detail: RADIATION/ONCOLOGY SERIES
Discharge: HOME | End: 2023-01-27
Payer: MEDICAID

## 2023-01-27 LAB
HEPATITIS B CORE TOTAL ANTIBODY: NEGATIVE
RAD ONC ARIA COURSE ID: NORMAL
RAD ONC ARIA COURSE LAST TREATMENT DATE: NORMAL
RAD ONC ARIA COURSE START DATE: NORMAL
RAD ONC ARIA COURSE TREATMENT ELAPSED DAYS: 23
RAD ONC ARIA FIRST TREATMENT DATE: NORMAL
RAD ONC ARIA PLAN FRACTIONS TREATED TO DATE: 18
RAD ONC ARIA PLAN ID: NORMAL
RAD ONC ARIA PLAN PRESCRIBED DOSE PER FRACTION: 1.8 GY
RAD ONC ARIA PLAN PRIMARY REFERENCE POINT: NORMAL
RAD ONC ARIA PLAN TOTAL FRACTIONS PRESCRIBED: 28
RAD ONC ARIA PLAN TOTAL PRESCRIBED DOSE: 5040 CGY
RAD ONC ARIA REFERENCE POINT DOSAGE GIVEN TO DATE: 32.4 GY
RAD ONC ARIA REFERENCE POINT ID: NORMAL
RAD ONC ARIA REFERENCE POINT SESSION DOSAGE GIVEN: 1.8 GY

## 2023-01-27 PROCEDURE — 77412 RADIATION TX DELIVERY LVL 3: CPT | Performed by: RADIOLOGY

## 2023-01-27 PROCEDURE — 77417 THER RADIOLOGY PORT IMAGE(S): CPT | Performed by: RADIOLOGY

## 2023-01-30 ENCOUNTER — HOSPITAL ENCOUNTER (OUTPATIENT)
Dept: RADIATION ONCOLOGY | Facility: HOSPITAL | Age: 58
Setting detail: RADIATION/ONCOLOGY SERIES
Discharge: HOME | End: 2023-01-30
Payer: MEDICAID

## 2023-01-30 LAB
RAD ONC ARIA COURSE ID: NORMAL
RAD ONC ARIA COURSE LAST TREATMENT DATE: NORMAL
RAD ONC ARIA COURSE START DATE: NORMAL
RAD ONC ARIA COURSE TREATMENT ELAPSED DAYS: 26
RAD ONC ARIA FIRST TREATMENT DATE: NORMAL
RAD ONC ARIA PLAN FRACTIONS TREATED TO DATE: 19
RAD ONC ARIA PLAN ID: NORMAL
RAD ONC ARIA PLAN PRESCRIBED DOSE PER FRACTION: 1.8 GY
RAD ONC ARIA PLAN PRIMARY REFERENCE POINT: NORMAL
RAD ONC ARIA PLAN TOTAL FRACTIONS PRESCRIBED: 28
RAD ONC ARIA PLAN TOTAL PRESCRIBED DOSE: 5040 CGY
RAD ONC ARIA REFERENCE POINT DOSAGE GIVEN TO DATE: 34.2 GY
RAD ONC ARIA REFERENCE POINT ID: NORMAL
RAD ONC ARIA REFERENCE POINT SESSION DOSAGE GIVEN: 1.8 GY

## 2023-01-30 PROCEDURE — 77412 RADIATION TX DELIVERY LVL 3: CPT | Performed by: RADIOLOGY

## 2023-02-01 ENCOUNTER — HOSPITAL ENCOUNTER (OUTPATIENT)
Dept: RADIATION ONCOLOGY | Facility: HOSPITAL | Age: 58
Setting detail: RADIATION/ONCOLOGY SERIES
End: 2023-02-01
Payer: MEDICAID

## 2023-02-02 ENCOUNTER — HOSPITAL ENCOUNTER (OUTPATIENT)
Dept: GENERAL RADIOLOGY | Age: 58
Discharge: HOME | End: 2023-02-02
Payer: MEDICAID

## 2023-02-02 ENCOUNTER — HOSPITAL ENCOUNTER (OUTPATIENT)
Dept: INFUSION THERAPY | Age: 58
Discharge: HOME | End: 2023-02-02
Payer: MEDICAID

## 2023-02-02 ENCOUNTER — CLINICAL DOCUMENTATION (OUTPATIENT)
Dept: HEMATOLOGY | Age: 58
End: 2023-02-02

## 2023-02-02 ENCOUNTER — HOSPITAL ENCOUNTER (OUTPATIENT)
Dept: RADIATION ONCOLOGY | Facility: HOSPITAL | Age: 58
Setting detail: RADIATION/ONCOLOGY SERIES
Discharge: HOME | End: 2023-02-02
Payer: MEDICAID

## 2023-02-02 DIAGNOSIS — C34.11 PRIMARY ADENOCARCINOMA OF UPPER LOBE OF RIGHT LUNG (HCC): ICD-10-CM

## 2023-02-02 DIAGNOSIS — C34.91 ADENOCARCINOMA, LUNG, RIGHT (HCC): ICD-10-CM

## 2023-02-02 DIAGNOSIS — C34.11 PRIMARY ADENOCARCINOMA OF UPPER LOBE OF RIGHT LUNG (HCC): Primary | ICD-10-CM

## 2023-02-02 DIAGNOSIS — C34.91 ADENOCARCINOMA, LUNG, RIGHT (HCC): Primary | ICD-10-CM

## 2023-02-02 LAB
ALBUMIN SERPL-MCNC: 4 G/DL (ref 3.5–5.2)
ALP BLD-CCNC: 127 U/L (ref 40–130)
ALT SERPL-CCNC: 32 U/L (ref 21–72)
ANION GAP SERPL CALCULATED.3IONS-SCNC: 6 MMOL/L (ref 7–19)
AST SERPL-CCNC: 29 U/L (ref 17–59)
BILIRUB SERPL-MCNC: 0.6 MG/DL (ref 0.2–1.3)
BUN BLDV-MCNC: 25 MG/DL (ref 9–20)
CALCIUM SERPL-MCNC: 9.9 MG/DL (ref 8.4–10.2)
CHLORIDE BLD-SCNC: 106 MMOL/L (ref 98–111)
CO2: 25 MMOL/L (ref 22–29)
CREAT SERPL-MCNC: 1.1 MG/DL (ref 0.6–1.2)
GFR SERPL CREATININE-BSD FRML MDRD: >60 ML/MIN/{1.73_M2}
GLOBULIN: 2.9 G/DL
GLUCOSE BLD-MCNC: 189 MG/DL (ref 74–106)
HCT VFR BLD CALC: 45.6 % (ref 40.1–51)
HEMOGLOBIN: 15 G/DL (ref 13.7–17.5)
LYMPHOCYTES ABSOLUTE: 0.6 K/UL (ref 1.18–3.74)
LYMPHOCYTES RELATIVE PERCENT: 12.6 % (ref 19.3–53.1)
MCH RBC QN AUTO: 28.5 PG (ref 25.7–32.2)
MCHC RBC AUTO-ENTMCNC: 32.9 G/DL (ref 32.3–36.5)
MCV RBC AUTO: 86.5 FL (ref 79–92.2)
MONOCYTES ABSOLUTE: 0.6 K/UL (ref 0.24–0.82)
MONOCYTES RELATIVE PERCENT: 11.1 % (ref 4.7–12.5)
NEUTROPHILS ABSOLUTE: 3.8 K/UL (ref 1.56–6.13)
NEUTROPHILS RELATIVE PERCENT: 76.3 % (ref 34–71.1)
PDW BLD-RTO: 14.4 % (ref 11.6–14.4)
PLATELET # BLD: 106 K/UL (ref 163–337)
PMV BLD AUTO: 9.3 FL (ref 7.4–10.4)
POTASSIUM SERPL-SCNC: 4.7 MMOL/L (ref 3.5–5.1)
RAD ONC ARIA COURSE ID: NORMAL
RAD ONC ARIA COURSE LAST TREATMENT DATE: NORMAL
RAD ONC ARIA COURSE START DATE: NORMAL
RAD ONC ARIA COURSE TREATMENT ELAPSED DAYS: 29
RAD ONC ARIA FIRST TREATMENT DATE: NORMAL
RAD ONC ARIA PLAN FRACTIONS TREATED TO DATE: 20
RAD ONC ARIA PLAN ID: NORMAL
RAD ONC ARIA PLAN PRESCRIBED DOSE PER FRACTION: 1.8 GY
RAD ONC ARIA PLAN PRIMARY REFERENCE POINT: NORMAL
RAD ONC ARIA PLAN TOTAL FRACTIONS PRESCRIBED: 28
RAD ONC ARIA PLAN TOTAL PRESCRIBED DOSE: 5040 CGY
RAD ONC ARIA REFERENCE POINT DOSAGE GIVEN TO DATE: 36 GY
RAD ONC ARIA REFERENCE POINT ID: NORMAL
RAD ONC ARIA REFERENCE POINT SESSION DOSAGE GIVEN: 1.8 GY
RBC # BLD: 5.27 M/UL (ref 4.63–6.08)
SODIUM BLD-SCNC: 137 MMOL/L (ref 137–145)
TOTAL PROTEIN: 6.9 G/DL (ref 6.3–8.2)
WBC # BLD: 5 K/UL (ref 4.23–9.07)

## 2023-02-02 PROCEDURE — 77336 RADIATION PHYSICS CONSULT: CPT | Performed by: RADIOLOGY

## 2023-02-02 PROCEDURE — 96361 HYDRATE IV INFUSION ADD-ON: CPT

## 2023-02-02 PROCEDURE — 80053 COMPREHEN METABOLIC PANEL: CPT

## 2023-02-02 PROCEDURE — 96367 TX/PROPH/DG ADDL SEQ IV INF: CPT

## 2023-02-02 PROCEDURE — 71046 X-RAY EXAM CHEST 2 VIEWS: CPT

## 2023-02-02 PROCEDURE — 6360000002 HC RX W HCPCS: Performed by: INTERNAL MEDICINE

## 2023-02-02 PROCEDURE — 85025 COMPLETE CBC W/AUTO DIFF WBC: CPT

## 2023-02-02 PROCEDURE — 71046 X-RAY EXAM CHEST 2 VIEWS: CPT | Performed by: RADIOLOGY

## 2023-02-02 PROCEDURE — 96365 THER/PROPH/DIAG IV INF INIT: CPT

## 2023-02-02 PROCEDURE — 2580000003 HC RX 258: Performed by: INTERNAL MEDICINE

## 2023-02-02 PROCEDURE — 96360 HYDRATION IV INFUSION INIT: CPT

## 2023-02-02 PROCEDURE — 36415 COLL VENOUS BLD VENIPUNCTURE: CPT

## 2023-02-02 RX ORDER — HEPARIN SODIUM (PORCINE) LOCK FLUSH IV SOLN 100 UNIT/ML 100 UNIT/ML
500 SOLUTION INTRAVENOUS PRN
Status: DISCONTINUED | OUTPATIENT
Start: 2023-02-02 | End: 2023-02-03 | Stop reason: HOSPADM

## 2023-02-02 RX ORDER — 0.9 % SODIUM CHLORIDE 0.9 %
500 INTRAVENOUS SOLUTION INTRAVENOUS ONCE
Status: COMPLETED | OUTPATIENT
Start: 2023-02-02 | End: 2023-02-02

## 2023-02-02 RX ORDER — HEPARIN SODIUM (PORCINE) LOCK FLUSH IV SOLN 100 UNIT/ML 100 UNIT/ML
500 SOLUTION INTRAVENOUS PRN
OUTPATIENT
Start: 2023-02-02

## 2023-02-02 RX ORDER — SODIUM CHLORIDE 0.9 % (FLUSH) 0.9 %
5-40 SYRINGE (ML) INJECTION PRN
Status: DISCONTINUED | OUTPATIENT
Start: 2023-02-02 | End: 2023-02-03 | Stop reason: HOSPADM

## 2023-02-02 RX ORDER — 0.9 % SODIUM CHLORIDE 0.9 %
1000 INTRAVENOUS SOLUTION INTRAVENOUS ONCE
Status: CANCELLED | OUTPATIENT
Start: 2023-02-02 | End: 2023-02-02

## 2023-02-02 RX ORDER — SODIUM CHLORIDE 9 MG/ML
5-250 INJECTION, SOLUTION INTRAVENOUS PRN
OUTPATIENT
Start: 2023-02-02

## 2023-02-02 RX ORDER — SODIUM CHLORIDE 0.9 % (FLUSH) 0.9 %
5-40 SYRINGE (ML) INJECTION PRN
OUTPATIENT
Start: 2023-02-02

## 2023-02-02 RX ADMIN — ONDANSETRON 8 MG: 2 INJECTION INTRAMUSCULAR; INTRAVENOUS at 09:19

## 2023-02-02 RX ADMIN — SODIUM CHLORIDE, PRESERVATIVE FREE 10 ML: 5 INJECTION INTRAVENOUS at 10:23

## 2023-02-02 RX ADMIN — HEPARIN 500 UNITS: 100 SYRINGE at 10:23

## 2023-02-02 RX ADMIN — SODIUM CHLORIDE 500 ML: 9 INJECTION, SOLUTION INTRAVENOUS at 09:09

## 2023-02-02 NOTE — PROGRESS NOTES
Blanca Li presents for cbc as scheduled. S/p C2 Cisplat + Alimta + Opdivo on 1/26/23 with concurrent xrt to [facility] for adenocarcinoma. Reports lowgrade temp 99+ at home last night with chills; productive cough (yellow);persistent nausea, admits to little PO hydration although is eating well. Weight stable at 220 lbs. /50. . R18 . Sp02 96% RA. Oral temp 98.1 (reports having taken ibuprofen this AM prior to visit). CBC today reveals a WBC of 5.0/ ANC 76.3. Hgb is  15.0 with an MCV of 86.5 and platelet count of 223,023. PLAN  Blood cultures (port and peripheral)  CMP  CXR  IVF bolus + IV antiemetic    Phoned Lakeisha MONTGOMERY at Dr Miranda Quiet office with status update and delay of scheduled xrt appt. Will update with any changes, but will plan to send to Dr Kip Smith' s office following clinic appointment.

## 2023-02-03 ENCOUNTER — HOSPITAL ENCOUNTER (OUTPATIENT)
Dept: RADIATION ONCOLOGY | Facility: HOSPITAL | Age: 58
Discharge: HOME | End: 2023-02-03

## 2023-02-03 LAB
RAD ONC ARIA COURSE ID: NORMAL
RAD ONC ARIA COURSE LAST TREATMENT DATE: NORMAL
RAD ONC ARIA COURSE START DATE: NORMAL
RAD ONC ARIA COURSE TREATMENT ELAPSED DAYS: 30
RAD ONC ARIA FIRST TREATMENT DATE: NORMAL
RAD ONC ARIA PLAN FRACTIONS TREATED TO DATE: 21
RAD ONC ARIA PLAN ID: NORMAL
RAD ONC ARIA PLAN PRESCRIBED DOSE PER FRACTION: 1.8 GY
RAD ONC ARIA PLAN PRIMARY REFERENCE POINT: NORMAL
RAD ONC ARIA PLAN TOTAL FRACTIONS PRESCRIBED: 28
RAD ONC ARIA PLAN TOTAL PRESCRIBED DOSE: 5040 CGY
RAD ONC ARIA REFERENCE POINT DOSAGE GIVEN TO DATE: 37.8 GY
RAD ONC ARIA REFERENCE POINT ID: NORMAL
RAD ONC ARIA REFERENCE POINT SESSION DOSAGE GIVEN: 1.8 GY

## 2023-02-03 PROCEDURE — 77412 RADIATION TX DELIVERY LVL 3: CPT | Performed by: RADIOLOGY

## 2023-02-06 ENCOUNTER — HOSPITAL ENCOUNTER (OUTPATIENT)
Dept: RADIATION ONCOLOGY | Facility: HOSPITAL | Age: 58
Discharge: HOME | End: 2023-02-06

## 2023-02-06 LAB
RAD ONC ARIA COURSE ID: NORMAL
RAD ONC ARIA COURSE LAST TREATMENT DATE: NORMAL
RAD ONC ARIA COURSE START DATE: NORMAL
RAD ONC ARIA COURSE TREATMENT ELAPSED DAYS: 33
RAD ONC ARIA FIRST TREATMENT DATE: NORMAL
RAD ONC ARIA PLAN FRACTIONS TREATED TO DATE: 22
RAD ONC ARIA PLAN ID: NORMAL
RAD ONC ARIA PLAN PRESCRIBED DOSE PER FRACTION: 1.8 GY
RAD ONC ARIA PLAN PRIMARY REFERENCE POINT: NORMAL
RAD ONC ARIA PLAN TOTAL FRACTIONS PRESCRIBED: 28
RAD ONC ARIA PLAN TOTAL PRESCRIBED DOSE: 5040 CGY
RAD ONC ARIA REFERENCE POINT DOSAGE GIVEN TO DATE: 39.6 GY
RAD ONC ARIA REFERENCE POINT ID: NORMAL
RAD ONC ARIA REFERENCE POINT SESSION DOSAGE GIVEN: 1.8 GY

## 2023-02-06 PROCEDURE — 77412 RADIATION TX DELIVERY LVL 3: CPT | Performed by: RADIOLOGY

## 2023-02-07 ENCOUNTER — HOSPITAL ENCOUNTER (OUTPATIENT)
Dept: ULTRASOUND IMAGING | Age: 58
Discharge: HOME | End: 2023-02-07
Payer: MEDICAID

## 2023-02-07 ENCOUNTER — HOSPITAL ENCOUNTER (OUTPATIENT)
Dept: RADIATION ONCOLOGY | Facility: HOSPITAL | Age: 58
Setting detail: RADIATION/ONCOLOGY SERIES
Discharge: HOME | End: 2023-02-07
Payer: MEDICAID

## 2023-02-07 ENCOUNTER — TELEPHONE (OUTPATIENT)
Dept: PULMONOLOGY | Facility: CLINIC | Age: 58
End: 2023-02-07
Payer: MEDICAID

## 2023-02-07 DIAGNOSIS — R10.11 RUQ PAIN: ICD-10-CM

## 2023-02-07 DIAGNOSIS — C34.11 MALIGNANT NEOPLASM OF UPPER LOBE OF RIGHT LUNG: Primary | ICD-10-CM

## 2023-02-07 DIAGNOSIS — Z92.3 HISTORY OF RADIATION THERAPY: ICD-10-CM

## 2023-02-07 LAB
RAD ONC ARIA COURSE ID: NORMAL
RAD ONC ARIA COURSE LAST TREATMENT DATE: NORMAL
RAD ONC ARIA COURSE START DATE: NORMAL
RAD ONC ARIA COURSE TREATMENT ELAPSED DAYS: 34
RAD ONC ARIA FIRST TREATMENT DATE: NORMAL
RAD ONC ARIA PLAN FRACTIONS TREATED TO DATE: 23
RAD ONC ARIA PLAN ID: NORMAL
RAD ONC ARIA PLAN PRESCRIBED DOSE PER FRACTION: 1.8 GY
RAD ONC ARIA PLAN PRIMARY REFERENCE POINT: NORMAL
RAD ONC ARIA PLAN TOTAL FRACTIONS PRESCRIBED: 28
RAD ONC ARIA PLAN TOTAL PRESCRIBED DOSE: 5040 CGY
RAD ONC ARIA REFERENCE POINT DOSAGE GIVEN TO DATE: 41.4 GY
RAD ONC ARIA REFERENCE POINT ID: NORMAL
RAD ONC ARIA REFERENCE POINT SESSION DOSAGE GIVEN: 1.8 GY

## 2023-02-07 PROCEDURE — 76705 ECHO EXAM OF ABDOMEN: CPT

## 2023-02-07 PROCEDURE — 77412 RADIATION TX DELIVERY LVL 3: CPT | Performed by: RADIOLOGY

## 2023-02-08 ENCOUNTER — HOSPITAL ENCOUNTER (OUTPATIENT)
Dept: RADIATION ONCOLOGY | Facility: HOSPITAL | Age: 58
Setting detail: RADIATION/ONCOLOGY SERIES
Discharge: HOME | End: 2023-02-08
Payer: MEDICAID

## 2023-02-08 LAB
RAD ONC ARIA COURSE ID: NORMAL
RAD ONC ARIA COURSE LAST TREATMENT DATE: NORMAL
RAD ONC ARIA COURSE START DATE: NORMAL
RAD ONC ARIA COURSE TREATMENT ELAPSED DAYS: 35
RAD ONC ARIA FIRST TREATMENT DATE: NORMAL
RAD ONC ARIA PLAN FRACTIONS TREATED TO DATE: 24
RAD ONC ARIA PLAN ID: NORMAL
RAD ONC ARIA PLAN PRESCRIBED DOSE PER FRACTION: 1.8 GY
RAD ONC ARIA PLAN PRIMARY REFERENCE POINT: NORMAL
RAD ONC ARIA PLAN TOTAL FRACTIONS PRESCRIBED: 28
RAD ONC ARIA PLAN TOTAL PRESCRIBED DOSE: 5040 CGY
RAD ONC ARIA REFERENCE POINT DOSAGE GIVEN TO DATE: 43.2 GY
RAD ONC ARIA REFERENCE POINT ID: NORMAL
RAD ONC ARIA REFERENCE POINT SESSION DOSAGE GIVEN: 1.8 GY

## 2023-02-08 PROCEDURE — 77412 RADIATION TX DELIVERY LVL 3: CPT | Performed by: RADIOLOGY

## 2023-02-08 PROCEDURE — 77417 THER RADIOLOGY PORT IMAGE(S): CPT | Performed by: RADIOLOGY

## 2023-02-09 ENCOUNTER — HOSPITAL ENCOUNTER (OUTPATIENT)
Dept: RADIATION ONCOLOGY | Facility: HOSPITAL | Age: 58
Setting detail: RADIATION/ONCOLOGY SERIES
Discharge: HOME | End: 2023-02-09
Payer: MEDICAID

## 2023-02-09 ENCOUNTER — HOSPITAL ENCOUNTER (OUTPATIENT)
Dept: INFUSION THERAPY | Age: 58
Discharge: HOME | End: 2023-02-09
Payer: MEDICAID

## 2023-02-09 DIAGNOSIS — C34.11 PRIMARY ADENOCARCINOMA OF UPPER LOBE OF RIGHT LUNG (HCC): ICD-10-CM

## 2023-02-09 LAB
BASOPHILS ABSOLUTE: 0.02 K/UL (ref 0.01–0.08)
BASOPHILS RELATIVE PERCENT: 0.7 % (ref 0.1–1.2)
EOSINOPHILS ABSOLUTE: 0.05 K/UL (ref 0.04–0.54)
EOSINOPHILS RELATIVE PERCENT: 1.7 % (ref 0.7–7)
HCT VFR BLD CALC: 40.9 % (ref 40.1–51)
HEMOGLOBIN: 13.2 G/DL (ref 13.7–17.5)
LYMPHOCYTES ABSOLUTE: 0.69 K/UL (ref 1.18–3.74)
LYMPHOCYTES RELATIVE PERCENT: 23.3 % (ref 19.3–53.1)
MCH RBC QN AUTO: 28.8 PG (ref 25.7–32.2)
MCHC RBC AUTO-ENTMCNC: 32.3 G/DL (ref 32.3–36.5)
MCV RBC AUTO: 89.3 FL (ref 79–92.2)
MONOCYTES ABSOLUTE: 0.48 K/UL (ref 0.24–0.82)
MONOCYTES RELATIVE PERCENT: 16.2 % (ref 4.7–12.5)
NEUTROPHILS ABSOLUTE: 1.71 K/UL (ref 1.56–6.13)
NEUTROPHILS RELATIVE PERCENT: 57.8 % (ref 34–71.1)
PDW BLD-RTO: 15.4 % (ref 11.6–14.4)
PLATELET # BLD: 124 K/UL (ref 163–337)
PMV BLD AUTO: 9.4 FL (ref 7.4–10.4)
RAD ONC ARIA COURSE ID: NORMAL
RAD ONC ARIA COURSE LAST TREATMENT DATE: NORMAL
RAD ONC ARIA COURSE START DATE: NORMAL
RAD ONC ARIA COURSE TREATMENT ELAPSED DAYS: 36
RAD ONC ARIA FIRST TREATMENT DATE: NORMAL
RAD ONC ARIA PLAN FRACTIONS TREATED TO DATE: 25
RAD ONC ARIA PLAN ID: NORMAL
RAD ONC ARIA PLAN PRESCRIBED DOSE PER FRACTION: 1.8 GY
RAD ONC ARIA PLAN PRIMARY REFERENCE POINT: NORMAL
RAD ONC ARIA PLAN TOTAL FRACTIONS PRESCRIBED: 28
RAD ONC ARIA PLAN TOTAL PRESCRIBED DOSE: 5040 CGY
RAD ONC ARIA REFERENCE POINT DOSAGE GIVEN TO DATE: 45 GY
RAD ONC ARIA REFERENCE POINT ID: NORMAL
RAD ONC ARIA REFERENCE POINT SESSION DOSAGE GIVEN: 1.8 GY
RBC # BLD: 4.58 M/UL (ref 4.63–6.08)
WBC # BLD: 2.96 K/UL (ref 4.23–9.07)

## 2023-02-09 PROCEDURE — 77336 RADIATION PHYSICS CONSULT: CPT | Performed by: RADIOLOGY

## 2023-02-09 PROCEDURE — 77412 RADIATION TX DELIVERY LVL 3: CPT | Performed by: RADIOLOGY

## 2023-02-09 PROCEDURE — 36415 COLL VENOUS BLD VENIPUNCTURE: CPT

## 2023-02-09 PROCEDURE — 85025 COMPLETE CBC W/AUTO DIFF WBC: CPT

## 2023-02-10 ENCOUNTER — HOSPITAL ENCOUNTER (OUTPATIENT)
Dept: RADIATION ONCOLOGY | Facility: HOSPITAL | Age: 58
Setting detail: RADIATION/ONCOLOGY SERIES
Discharge: HOME | End: 2023-02-10
Payer: MEDICAID

## 2023-02-10 LAB
RAD ONC ARIA COURSE ID: NORMAL
RAD ONC ARIA COURSE LAST TREATMENT DATE: NORMAL
RAD ONC ARIA COURSE START DATE: NORMAL
RAD ONC ARIA COURSE TREATMENT ELAPSED DAYS: 37
RAD ONC ARIA FIRST TREATMENT DATE: NORMAL
RAD ONC ARIA PLAN FRACTIONS TREATED TO DATE: 26
RAD ONC ARIA PLAN ID: NORMAL
RAD ONC ARIA PLAN PRESCRIBED DOSE PER FRACTION: 1.8 GY
RAD ONC ARIA PLAN PRIMARY REFERENCE POINT: NORMAL
RAD ONC ARIA PLAN TOTAL FRACTIONS PRESCRIBED: 28
RAD ONC ARIA PLAN TOTAL PRESCRIBED DOSE: 5040 CGY
RAD ONC ARIA REFERENCE POINT DOSAGE GIVEN TO DATE: 46.8 GY
RAD ONC ARIA REFERENCE POINT ID: NORMAL
RAD ONC ARIA REFERENCE POINT SESSION DOSAGE GIVEN: 1.8 GY

## 2023-02-10 PROCEDURE — 77412 RADIATION TX DELIVERY LVL 3: CPT | Performed by: RADIOLOGY

## 2023-02-13 ENCOUNTER — HOSPITAL ENCOUNTER (OUTPATIENT)
Dept: RADIATION ONCOLOGY | Facility: HOSPITAL | Age: 58
Setting detail: RADIATION/ONCOLOGY SERIES
Discharge: HOME | End: 2023-02-13
Payer: MEDICAID

## 2023-02-13 LAB
RAD ONC ARIA COURSE ID: NORMAL
RAD ONC ARIA COURSE LAST TREATMENT DATE: NORMAL
RAD ONC ARIA COURSE START DATE: NORMAL
RAD ONC ARIA COURSE TREATMENT ELAPSED DAYS: 40
RAD ONC ARIA FIRST TREATMENT DATE: NORMAL
RAD ONC ARIA PLAN FRACTIONS TREATED TO DATE: 27
RAD ONC ARIA PLAN ID: NORMAL
RAD ONC ARIA PLAN PRESCRIBED DOSE PER FRACTION: 1.8 GY
RAD ONC ARIA PLAN PRIMARY REFERENCE POINT: NORMAL
RAD ONC ARIA PLAN TOTAL FRACTIONS PRESCRIBED: 28
RAD ONC ARIA PLAN TOTAL PRESCRIBED DOSE: 5040 CGY
RAD ONC ARIA REFERENCE POINT DOSAGE GIVEN TO DATE: 48.6 GY
RAD ONC ARIA REFERENCE POINT ID: NORMAL
RAD ONC ARIA REFERENCE POINT SESSION DOSAGE GIVEN: 1.8 GY

## 2023-02-13 PROCEDURE — 77412 RADIATION TX DELIVERY LVL 3: CPT | Performed by: RADIOLOGY

## 2023-02-14 ENCOUNTER — HOSPITAL ENCOUNTER (OUTPATIENT)
Dept: RADIATION ONCOLOGY | Facility: HOSPITAL | Age: 58
Setting detail: RADIATION/ONCOLOGY SERIES
Discharge: HOME | End: 2023-02-14
Payer: MEDICAID

## 2023-02-14 LAB
RAD ONC ARIA COURSE ID: NORMAL
RAD ONC ARIA COURSE LAST TREATMENT DATE: NORMAL
RAD ONC ARIA COURSE START DATE: NORMAL
RAD ONC ARIA COURSE TREATMENT ELAPSED DAYS: 41
RAD ONC ARIA FIRST TREATMENT DATE: NORMAL
RAD ONC ARIA PLAN FRACTIONS TREATED TO DATE: 28
RAD ONC ARIA PLAN ID: NORMAL
RAD ONC ARIA PLAN PRESCRIBED DOSE PER FRACTION: 1.8 GY
RAD ONC ARIA PLAN PRIMARY REFERENCE POINT: NORMAL
RAD ONC ARIA PLAN TOTAL FRACTIONS PRESCRIBED: 28
RAD ONC ARIA PLAN TOTAL PRESCRIBED DOSE: 5040 CGY
RAD ONC ARIA REFERENCE POINT DOSAGE GIVEN TO DATE: 50.4 GY
RAD ONC ARIA REFERENCE POINT ID: NORMAL
RAD ONC ARIA REFERENCE POINT SESSION DOSAGE GIVEN: 1.8 GY

## 2023-02-14 PROCEDURE — 77336 RADIATION PHYSICS CONSULT: CPT | Performed by: RADIOLOGY

## 2023-02-14 PROCEDURE — 77412 RADIATION TX DELIVERY LVL 3: CPT | Performed by: RADIOLOGY

## 2023-02-14 NOTE — PROGRESS NOTES
Patient:  Kamla Sanchez  YOB: 1965  Date of Service: 2/16/2023  MRN: [id number]    Primary Care Physician: Gilbert Brooke    Chief Complaint   Patient presents with    Follow-up     Stage II B Adenocarcinoma of RUL  Prostate Cancer    Chemotherapy     C#3 Cisplatin/Alimta/Opdivo    Results     US RUQ ABD    Other     XRT Completed on 2/14/2023               Patient Seen, Chart, Consults notes, Labs, Radiology studies reviewed. 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 Henok Jeffers September 2020 for PT2N0 Wy[place] 7 disease with tumor approaching the right posterior margin    Multidisciplinary interdepartmental consultations were obtained with radiation oncology, thoracic surgery and general surgery for opinion regarding robotic abdominal biopsy approach for lymph node tissue sampling leading to neoadjuvant chemoimmunotherapy delivered concurrently with XRT. Abel Marina is here accompanied by his wife to initiate cycle #1 of chemoimmunotherapy. Neoadjuvant concurrent XRT (with chemoimmunotherapy) initiated on 1/4/2023.  5040 cGy planned in 28 treatment fractions. Neoadjuvant cisplatin 75 mg/m2, Alimta 500 mg/m2 and Opdivo 360 mg, cycle #1 initiated on 1/5/2023    Abel Marina tolerated cycle #1 of neoadjuvant chemoimmunotherapy well. Mr. Bernice Nix is here accompanied by his wife to proceed with cycle #3 of neoadjuvant cisplatin 75 mg/m2, Alimta 500 mg/m2 and Opdivo 360 mg.   Abel Marina continues to work, he is tolerating chemotherapy well. He is very tired and fatigued at home when he gets home from work but he is still gainfully employed and continuing to stay as active as possible.       TARGET LUNG CANCER SITES:  4.7 cm x 4 cm x 1.6 cm mass in the RUL of the lung SUV of 12  9 mm retroaortic upper abdominal lymph node is mildly hypermetabolic with an SUV of 4.0.   7 mm aortocaval lymph node is mildly hypermetabolic with an SUV of 3.0. 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 Sela Nissen in [facility] for medical oncology opinion regarding a diagnosis of prostate cancer. [facility] GEN[facility] TEST[facility] on 7/15/2020 documented a VUS:  MEN1, heterozygous, for c.  511C>T  (p.Arg 171 Trp)    Mr. Nathaniel Pineda was again seen on 12/8/2022 re-referred by Dr. Warden Hollis for new diagnosis of adenocarcinoma of the lung, diagnosed on 11/16/2022 by CT Guided needle Biopsy. Gerard Aguilar is a longtime cigarette smoker of 1 pack/day since age 27. He quit smoking on 12/1/2022 after the diagnosis of lung cancer. Naman's PCP, Dr. Sela Nissen ordered a low-dose screening CT scan of the chest for monitoring. CT chest without contrast, low-dose protocol at [facility] on 5/19/2022:  4 mm LEFT lower lobe pulmonary nodule in superior segment   3.3 x 1.3 cm mass like area of pleural thickening in the RIGHT anterior upper chest   Moderate centrilobular and paraseptal emphysema. Lung-RADS 2S-- Nodules with a very low likelihood of becoming a clinically active cancer due to size or lack of growth. Continue annual screening with low dose CT in 12 months.    3 month follow-up chest CT recommended to evaluate for stability of RIGHT anterior upper chest masslike pleural thickening    Dr. Haleigh Cook repeated the low-dose CT scan because of the findings in the right anterior upper chest    CT chest without contrast, low-dose protocol at [facility] on 10/17/2022:  4.7 cm x 4 cm x 1.6 cm abnormal area of pleural thickening in the periphery of the RUL with slightly irregular margins  Moderate paraseptal emphysematous changes with scattered bullae in both lungs  Lung-RADS - 4B      Initial consult with Dr. Warden Hollis at [facility] on 10/24/2022:  Explained the abnormal CT scan results from May which is done in the [facility] to the patient and also discussed with the radiologist from [facility] in [place] who reviewed the current CT scan of the chest done earlier this month. This shows the right upper lobe pleural-based thickening or mass and left upper lobe nodule. Due to his smoking history the possibility of malignancy cannot be ruled out. Discussing different options I ordered a PET scan and a follow-up CT scan of the chest in 3 months time. If the PET scan is positive a CT-guided needle biopsy would be best option for the right upper lobe pleural-based lung mass which is not reachable by bronchoscopy. If PET scan is negative will wait for the neck CT scan of the chest and make further recommendations. NM PET/CT SKULL BASE TO MID THIGH at [facility] on 11/1/2022:Comparison: Chest CT 5/19/2022  Background right hepatic lobe metabolic activity measures max SUV 2.7.  4.3 x 1.9 cm RIGHT anterior upper chest pleural thickening which is hypermetabolic with a maximum SUV of 12.0  1.7 cm LEFT upper abdomen retroperitoneal lymph node hypermetabolicactivity, max SUV 5.3. This nodule/node closely approximates the LEFT adrenal gland. 9 mm retroaortic upper abdominal lymph node is mildly hypermetabolic with a max SUV of 4.0.   7 mm aortocaval lymph node is mildly hypermetabolic with a max SUV of 3.0. CT Needle Biopsy Lung at [facility] on 11/16/2022:  Successful CT guided biopsy of the right anterior pleural mass  Final Diagnosis  Right upper chest/pleural mass, core biopsies:  Adenocarcinoma of pulmonary origin.     [employer] on 11/16/2022:  ALK(D5F3)- NEGATIVE  FISH Analysis ROS1- NEGATIVE  EGFR Exon 18-Not Detected  EGFR Exon 19-Not Detected  EGFR Exon 20 D882O-Ysz Detected  EGFR Exon 20 (other Mutations)-Not Detected  EGFR Exon 21- Not Detected  BRAF Mutation-Not Detected  FISH MET-No evidence of a MET amplification    Concern on the work-up is that in addition to the 4.7 cm primary RUL adenocarcinoma of the lung there are upper abdominal retroperitoneal and aortocaval lymph nodes positive on the PET scan. If the upper abdominal lymph nodes were not a concern, Chaitanya Gross could be treated with neoadjuvant Opdivo Alimta Keytruda followed by resection. Intense interdepartmental consultations are being undertaken to give Chaitanya Gross the most aggressive approach possible. All of the plans outlined below were discussed at length with Chaitanya Gross and his wife Yamila Sanz. MRI abdomen without and with IV contrast  at [facility] on 12/15/2022:COMPARISON: PET/CT 11/01/2022    9 mm RIGHT liver cyst  15.2 cm spleen  9 mm upper abdominal retroperitoneal lymph node,unchanged      CT ABDOMEN PELVIS WO CONTRAST at [facility] on 12/15/2022:COMPARISON: PET/CT 11/01/2022  Penile prosthesis. Penile pump implanted within the left anterolateral pelvic wall. There is prominent wall thickening of the distal sigmoid colon with multiple regional diverticuli. There are small fatty containing umbilical hernia. Fatty containing inguinal hernias  7 mm left periaortic and aortocaval lymph nodes demonstrating increased FDG uptake comparison PET   exam remains     NM BONE SCAN WHOLE BODY at [facility] on 12/15/2022:Comparison is made with abdomen/pelvis CT imaging from earlier today. Comparison is made with a PET/CT from 11/01/2022. Comparison is made with a bone scan from 02/20/2020. No evidence of bone metastasis. Initial Consult with Dr. Checo Watters on 12/27/2022:  He met with Dr. Charlene Young for surgical consirations of the right upper lobe lesion. He believes that there is likely chest wall invasion and recommends neoadjuvant chemoradiation prior to definitive surgery of the right upper lobe tumor.  I anticipate a dose of 5040 cGy in 28 treatment fractions to the lung lesion as neoadjuvant treatment along with concomitant chemotherapy and/or immunotherapy. In addition, we will determine if Dr. Jyoti Hernández is able to sample the upper abdominal lymph nodes to determine the etiology of that finding on the PET scan. He is scheduled to see her later this week. Initial Consult with Dr. Ashlee Simons at [facility] on 12/29/2022: In terms of his retroperitoneal lymphadenopathy, I do not recommend a robotic retroperitoneal lymph node biopsy as the node is only 0.7cm and I believe it would be unlikely we would be able to identify that node without guidance. It would be an extremely high risk procedure as it the node is very close to the vasculature. I discussed this with Dr. Joanne Larson personally. This is a new undiagnosed problem with an uncertain prognosis. I have personally reviewed the oncology note, labs, and PET scan above. I have personally discussed the patient and the care plan with Dr. Joanne Larson and at tumor board    Port placed by Dr. Ashlee Simons at [facility] on 12/30/2022    Case Presented at Tumor Board at [facility] By Dr. Nieves Arriaga (note 1/1/2023): I (Dr. [place] this patient at tumor conference. Dr. Ashlee Simons was in attendance unfortunately she is not able to biopsy the upper abdominal lymph nodes due to the proximity to adjacent aorta and superior mesenteric artery. I (Dr. Andrzej Davis reviewed the recommendation of Dr. Mushtaq Farmer for neoadjuvant chemoradiation followed by definitive surgery for the right upper lobe lung tumor. Discussion at tumor conference surrounded management of the upper abdominal lymph nodes. The consensus was at this time to proceed with treatment of the primary known adenocarcinoma lung and base further treatment recommendations upon findings at definitive surgery. Abdominal lymph nodes serial radiographs is recommended for follow-up. It was agreed that this is an unlikely metastatic distribution for right upper lobe lung carcinoma in the lymph nodes may represent another etiology.   I (Dr. Joanne Larson) will coordinate with Dr. Aman Houston for concomitant neoadjuvant chemoradiation of the right upper lobe lung tumor followed by definitive surgery per Dr. Inez Childress      MRI Brain With & Without Contrast at [facility] on 1/3/2023:  No metastatic disease identified    I (Aman Houston) and Dr. Jessica Hope have both discussed Mr. Jessica's case with Dr. Inez Childress on multiple occasions. He has participated in the decision-making and treatment plan and recommendations moving forward. Multidisciplinary interdepartmental consultations were obtained with radiation oncology, thoracic surgery and general surgery for opinion regarding robotic abdominal biopsy approach for lymph node tissue sampling leading to neoadjuvant chemoimmunotherapy delivered concurrently with XRT. Neoadjuvant concurrent XRT (with chemoimmunotherapy) initiated on 1/4/2023.  5040 cGy planned in 28 treatment fractions. Neoadjuvant cisplatin 75 mg/m2, Alimta 500 mg/m2 and Opdivo 360 mg, cycle #1 initiated on 1/5/2023  Cycle #3 delivered on 2/16/2023. Post neoadjuvant CT scan of the chest abdomen and pelvis is scheduled for 3/20/2023 and a PET scan scheduled for 3/27/2023. Follow-up appointment with Dr. Inez Childress on 3/27/2023 to review imaging studies and to set a surgery date. TREATMENT SUMMARY:  Neoadjuvant concurrent XRT (with chemoimmunotherapy) to RUL of lung initiated on 1/4/2023 completed on 2/14/2023.  5040 cGy over 28 treatment fractions. Neoadjuvant cisplatin 75 mg/m2, Alimta 500 mg/m2 and Opdivo 360 mg, cycle #1 initiated on 1/5/2023. Cycle #3 delivered on 2/16/2023. #2   TUMOR HISTORY: Prostate cancer-  Cassie Norris was seen in initial oncology consultation on 7/15/2020 referred by his PCP, Dr. Lori Mariee in [facility] for medical oncology opinion on his diagnosis of prostate cancer. Cassie Norris relates that he was found to have a minimally increased PSA of between 4.5 and 5 on a routine physical examination.   This led to work-up and eventual prostate biopsy leading to the diagnosis of prostate cancer. Prostate biopsy on 11/1/2018 documented the following:  Right lateral base-Microfocus of prostatic carcinoma   Prostate biopsy on 2/25/2019 documented the following:  Right lateral base-adenocarcinoma: [place] 3+4 = 7  Right lateral mid- adenocarcinoma: Wyatt 3+3 = 6  Prostate biopsy 2/18/2020 documented the following:  Right lateral base-adenocarcinoma: Wyatt 3+4 = 7     PSA levels by date as follows:  2/19/2019-9.1  4/19/2019-7.0  7/22/2019-6.6  10/17/2019-7.8  114 2020-8.39  5/18/2020- 11.5     MRI pelvis with and without contrast on 2/5/2020 at [facility] documented:  No suspicious lesions in the prostate (PI-RADS 3 or greater)  No pelvis lymphadenopathy or suspicious pelvic bone lesions     Prostate biopsy was performed on 2/18/2020 by Dr. Brien Gonzalez at [facility]. Pathology revealed:  Prostate, right apex, needle biopsy:  Benign prostate tissue  Prostate, right base, needle biopsy:  Adenocarcinoma, acinar type, [place] grade 3+4=7, grade group 2, discontinuously involving 10/20 mm or 50% of core (3 foci measuring 4,3, and 1 mm), 30% of tumor is Wyatt pattern 4  Prostate, right mid, needle biopsy:  Benign prostate tissue  Prostate, left apex, needle biopsy:  Benign prostate tissue  Prostate, left base, needle biopsy  Benign prostate tissue  Prostate, left mid, needle biopsy:  Benign prostate tissue        CT abdomen and pelvis without contrast on 2/20/2020 at [facility] documented:  No evidence of complication after prostate biopsy  Numerous colonic diverticula with no evidence of acute diverticulitis  Small bilateral fat-containing groin hernias     Bone scan on 6/2/2020 at [facility] documented:  Abnormal uptake at the right dorsal tissues at the level of T12 and right upper kidney. This could represent a bone lesion or renal lesion. Recommend CT abdomen with contrast for further evaluation if patient's renal function permits.  If low renal function, MRI abdomen without contrast would be an alternative for evaluation     CT abdomen and pelvis without contrast on 6/7/2020 at [facility] documented no acute abnormality. CBC (7/15/2020) revealed a WBC of 6.73. Hgb is 15.5 with an MCV of 93.5 and platelet count of 526,180. Morgan Oleary has been under evaluation and monitoring in the urology department by Dr. Flora Chavez at [facility]. At his last visit on 5/28/2020 Mr. Rosalea Aschoff was leaning toward a radical prostatectomy to address the issue of his prostate cancer. Medical oncology consultation was requested for opinion. The 2 best options at this juncture include a radical prostatectomy which is most definitive followed by radiation therapy which gives similar statistical long-term results but with the potential for long-term radiation therapy associated side effects. I have encouraged him that a radical prostatectomy is appropriate and if he is considering this that I would support that completely. He has an appointment with Dr. Ruthie Brewer on 8/21/2022 discussed robotic prostatectomy. Recommendation prior to his August appointment with urology is as follows: [facility] genetic testing drawn today  PSA  CMP  MRI of the thoracic spine W&WO to evaluate the T12 area and any other areas of concern     He had robotic assisted laparoscopic prostatectomy by Dr. Jyoti Galicia September 2020 for PT2N0 [place] 7 disease with tumor approaching the right posterior margin. TREATMENT SUMMARY:  Robotic assisted laparoscopic prostatectomy by Dr. Jyoti Galicia September 2020 for PT2N0 Wyatt 7 disease with tumor approaching the right posterior margin          Allergies:  Patient has no known allergies.     Medicines:  Current Outpatient Medications   Medication Sig Dispense Refill    prochlorperazine (COMPAZINE) 10 MG tablet Take 1 tablet by mouth every 6 hours as needed (nausea) 120 tablet 3    montelukast (SINGULAIR) 10 MG tablet Take 10 mg by mouth nightly      dexamethasone (DECADRON) 4 MG tablet Take 4 mg by mouth 2 times daily (with meals) Take one tablet twice a day starting day before chemotherapy for three days      folic acid (FOLVITE) 1 MG tablet Take 1 tablet by mouth daily 30 tablet 5    hydrOXYzine pamoate (VISTARIL) 50 MG capsule       gabapentin (NEURONTIN) 800 MG tablet Take 800 mg by mouth 2 times daily. ibuprofen (ADVIL;MOTRIN) 800 MG tablet Take 800 mg by mouth every 6 hours as needed for Pain      omeprazole (PRILOSEC) 40 MG delayed release capsule Take 40 mg by mouth daily      Albuterol Sulfate (VENTOLIN HFA IN) Inhale 2 puffs into the lungs every 4 hours as needed       No current facility-administered medications for this visit.      Facility-Administered Medications Ordered in Other Visits   Medication Dose Route Frequency Provider Last Rate Last Admin    cyanocobalamin injection 1,000 mcg  1,000 mcg IntraMUSCular Once Ricarda Hung MD        fosaprepitant (EMEND) 150 mg in sodium chloride 0.9 % 150 mL IVPB  150 mg IntraVENous Once Ricarda Hung  mL/hr at 02/16/23 0940 150 mg at 02/16/23 0940    furosemide (LASIX) injection 20 mg  20 mg IntraVENous Once Ricarda Hung MD        potassium chloride 20 mEq, magnesium sulfate 1,000 mg in sodium chloride 0.9 % 1,000 mL IVPB   IntraVENous Once Ricarda Hung MD        nivolumab (OPDIVO) 360 mg in sodium chloride 0.9 % 100 mL chemo IVPB  360 mg IntraVENous Once Ricarda Hung MD        PEMEtrexed (ALIMTA) 1,130 mg in sodium chloride 0.9 % 100 mL chemo infusion  500 mg/m2 (Treatment Plan Recorded) IntraVENous Once Ricarda Hung MD        CISplatin (PLATINOL) 170 mg in sodium chloride 0.9 % 450 mL chemo IVPB  75 mg/m2 (Treatment Plan Recorded) IntraVENous Once Ricarda Hung MD        furosemide (LASIX) injection 20 mg  20 mg IntraVENous Once Ricarda Hung MD        potassium chloride 20 mEq, magnesium sulfate 1,000 mg in sodium chloride 0.9 % 1,000 mL IVPB   IntraVENous Once [place] Cram Michelle Lopez MD        sodium chloride (PF) 0.9 % injection 5-40 mL  5-40 mL IntraVENous PRN Carlo Cantrell MD        heparin flush 100 UNIT/ML injection 500 Units  500 Units IntraCATHeter PRN Carlo Cantrell MD           Past Medical History:      Diagnosis Date    Adenocarcinoma, lung, right ([facility] [address] 2022    Arthritis     Cancer ([facility] [address]     Prostate    Carcinoma in situ of prostate     Thrombocytopenia ([facility] [address]         Past Surgical History:      Procedure Laterality Date    ANKLE SURGERY Right     ANTERIOR CRUCIATE LIGAMENT REPAIR Left     BACK SURGERY      X2    CHG US GUIDANCE NEEDLE PLACEMENT IMG S&I  2020    Dr. Neli Yeh @ [facility] Right 2011    Tennis elbow release    NASAL SEPTUM SURGERY      SHOULDER SURGERY Left     x4        Family History:  No family history on file. Social History  Social History     Tobacco Use    Smoking status: Former     Packs/day: 1.00     Years: 15.00     Pack years: 15.00     Types: Cigarettes     Start date: 1987     Quit date: 2022     Years since quittin.2    Smokeless tobacco: Never    Tobacco comments: On Chantix. ..trying to quit   Vaping Use    Vaping Use: Former    Substances: Nicotine    Devices: Pre-filled or refillable cartridge, Refillable tank   Substance Use Topics    Alcohol use: Not Currently    Drug use: Never              Wt Readings from Last 3 Encounters:   23 224 lb 4.8 oz (101.7 kg)   23 219 lb 1.6 oz (99.4 kg)   23 218 lb 8 oz (99.1 kg)        Objective:  Vital Signs: Blood pressure 136/84, pulse (!) 105, temperature 97.8 °F (36.6 °C), resp. rate 18, height 6' 2\" (1.88 m), weight 224 lb 4.8 oz (101.7 kg), SpO2 95 %.             Labs:  BMP:   Recent Labs     23  0906      K 4.2      CO2 27   BUN 15   CREATININE 1.0   CALCIUM 9.9         CBC:   Recent Labs     23  0911   WBC 5.47   HGB 11.9*   HCT 36.3*   MCV 88.5                PT/INR: No results for input(s): PROTIME, INR in the last 72 hours. APTT: No results for input(s): APTT in the last 72 hours. Magnesium:  Recent Labs     02/16/23  0931   MG 1.8         Phosphorus:No results for input(s): PHOS in the last 72 hours. Hepatic:   Recent Labs     02/16/23  0906   ALKPHOS 129   ALT 26   AST 23   PROT 5.9*   BILITOT 0.5   LABALBU 3.8           Cultures:   No results for input(s): CULTURE in the last 72 hours. Radiology reports as per the Radiologist  Radiology: No results found. 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 Ignacia Hough September 2020 for PT2N0 [place]att 7 disease with tumor approaching the right posterior margin    Multidisciplinary interdepartmental consultations were obtained with radiation oncology, thoracic surgery and general surgery for opinion regarding robotic abdominal biopsy approach for lymph node tissue sampling leading to neoadjuvant chemoimmunotherapy delivered concurrently with XRT. Laisha Eagle is here accompanied by his wife to initiate cycle #1 of chemoimmunotherapy. Neoadjuvant concurrent XRT (with chemoimmunotherapy) initiated on 1/4/2023.  5040 cGy planned in 28 treatment fractions. Neoadjuvant cisplatin 75 mg/m2, Alimta 500 mg/m2 and Opdivo 360 mg, cycle #1 initiated on 1/5/2023    Laisha Eagle tolerated cycle #1 of neoadjuvant chemoimmunotherapy well. Mr. Reagan Higgins is here accompanied by his wife to proceed with cycle #3 of neoadjuvant cisplatin 75 mg/m2, Alimta 500 mg/m2 and Opdivo 360 mg.   Laisha Eagle continues to work, he is tolerating chemotherapy well. He is very tired and fatigued at home when he gets home from work but he is still gainfully employed and continuing to stay as active as possible.     BP 136/84   Pulse (!) 105   Temp 97.8 °F (36.6 °C)   Resp 18   Ht 6' 2\" (1.88 m)   Wt 224 lb 4.8 oz (101.7 kg)   SpO2 95%   BMI 28.80 kg/m²     Examination today, 2/16/2023, is without evidence of palpable supraclavicular or infraclavicular lymphadenopathy. Lungs reveal bilateral rales in upper and lower lung fields but without signs of consolidation. Heart regular rate and rhythm normal S1 and S2, no S3  Abdomen is soft and benign without organomegaly or masses  Extremities no cyanosis clubbing or edema  Neurological exam is grossly intact. CBC today 2/16/2023   reveals a WBC of  Hgb is with an MCV of  and platelet count of . Curry Christianson TARGET LUNG CANCER SITES:  4.7 cm x 4 cm x 1.6 cm mass in the RUL of the lung SUV of 12  9 mm retroaortic upper abdominal lymph node is mildly hypermetabolic with an SUV of 4.0.   7 mm aortocaval lymph node is mildly hypermetabolic with an SUV of 3.0. Ultrasound of the right upper quadrant of the abdomen at [address] on 2/7/2023:  There is some calcification and possible adenomyomatosis seen at the gallbladder wall. There is suggestion of some possible soft tissue thickening at the gallbladder wall measuring up to 1.2 cm  The common duct measures 3-4 mm  Further evaluation with MRI postcontrast imaging is recommended to be a subtle mass in this area    Neoadjuvant concurrent XRT (with chemoimmunotherapy) to RUL of lung initiated on 1/4/2023 completed on 2/14/2023.  5040 cGy over 28 treatment fractions. Review of systems and physical examination are unremarkable for newly developed toxicities or side effects. He is clinically doing well with his treatments of XRT and chemoimmunotherapy. Questions were asked and answered by he and his wife to their understanding and satisfaction. We will proceed with cycle #3 of therapy as outlined.     I will continue monitoring Deepak Thorne closely during these 3 cycles of neoadjuvant chemoimmunotherapy treatments being delivered concurrently with XRT for toxicity, side effects and support. Post neoadjuvant CT scan of the chest abdomen and pelvis is scheduled for 3/20/2023 and a PET scan scheduled for 3/27/2023. Angelique Brown has not had PFTs done, I will therefore schedule these to be done in about 1 month, to make sure that they are done prior to his visit with Dr. Mercedez Bowden. Follow-up appointment with Dr. Mercedez Bowden on 3/27/2023 to review imaging studies and to set a surgery date. All of the above discussed with Dr. Mercedez Bowden. I will set up a follow-up appointment for Angelique Brown to see me on 3/29/2023 in case for some reason surgery is not being scheduled and we need to continue with chemotherapy. #2  TUMOR HISTORY: Prostate cancer  Angelique Brown was seen in initial oncology consultation on 7/15/2020 referred by his PCP, Dr. Sara Sanz in [facility] for medical oncology opinion on his diagnosis of prostate cancer. Angelique Brown relates that he was found to have a minimally increased PSA of between 4.5 and 5 on a routine physical examination. This led to work-up and eventual prostate biopsy leading to the diagnosis of prostate cancer. Prostate biopsy on 11/1/2018 documented the following:  Right lateral base-Microfocus of prostatic carcinoma   Prostate biopsy on 2/25/2019 documented the following:  Right lateral base-adenocarcinoma: Wyatt 3+4 = 7  Right lateral mid- adenocarcinoma: Alz[place] 3+3 = 6  Prostate biopsy 2/18/2020 documented the following:  Right lateral base-adenocarcinoma: Wyatt 3+4 = 7     PSA levels by date as follows:  2/19/2019-9.1  4/19/2019-7.0  7/22/2019-6.6  10/17/2019-7.8  114 2020-8.39  5/18/2020- 11.5     MRI pelvis with and without contrast on 2/5/2020 at [facility] documented:  No suspicious lesions in the prostate (PI-RADS 3 or greater)  No pelvis lymphadenopathy or suspicious pelvic bone lesions     Prostate biopsy was performed on 2/18/2020 by Dr. Mitzy Rodgers at [facility].   Pathology revealed:  Prostate, right apex, needle biopsy:  Benign prostate tissue  Prostate, right base, needle biopsy:  Adenocarcinoma, acinar type, Wyatt grade 3+4=7, grade group 2, discontinuously involving 10/20 mm or 50% of core (3 foci measuring 4,3, and 1 mm), 30% of tumor is [place] pattern 4  Prostate, right mid, needle biopsy:  Benign prostate tissue  Prostate, left apex, needle biopsy:  Benign prostate tissue  Prostate, left base, needle biopsy  Benign prostate tissue  Prostate, left mid, needle biopsy:  Benign prostate tissue        CT abdomen and pelvis without contrast on 2/20/2020 at [facility] documented:  No evidence of complication after prostate biopsy  Numerous colonic diverticula with no evidence of acute diverticulitis  Small bilateral fat-containing groin hernias     Bone scan on 6/2/2020 at [facility] documented:  Abnormal uptake at the right dorsal tissues at the level of T12 and right upper kidney. This could represent a bone lesion or renal lesion. Recommend CT abdomen with contrast for further evaluation if patient's renal function permits. If low renal function, MRI abdomen without contrast would be an alternative for evaluation     CT abdomen and pelvis without contrast on 6/7/2020 at [facility] documented no acute abnormality. Christos Jara has been under evaluation and monitoring in the urology department by Dr. Hermelinda Saleh at [facility]. At his last visit on 5/28/2020 Mr. Adonay Barnard was leaning toward a radical prostatectomy to address the issue of his prostate cancer. Medical oncology consultation was requested for opinion. The 2 best options at this juncture include a radical prostatectomy which is most definitive followed by radiation therapy which gives similar statistical long-term results but with the potential for long-term radiation therapy associated side effects. I have encouraged him that a radical prostatectomy is appropriate and if he is considering this that I would support that completely.      He has an appointment with Dr. Lelo Pisano on 8/21/2022 discussed robotic prostatectomy. Recommendation prior to his August appointment with urology is as follows: [facility] genetic testing drawn today  PSA  CMP  MRI of the thoracic spine W&WO to evaluate the T12 area and any other areas of concern     He had robotic assisted laparoscopic prostatectomy by Dr. Greig Hatchet September 2020 for PT2N0 [place] 7 disease with tumor approaching the right posterior margin. [facility] GENETIC TESTING on 7/15/2020 documented a VUS:  MEN1, heterozygous, for c.  511C>T  (p.Arg 171 Trp)       #3  TUMOR SCREENING AND HEALTH MAINTENANCE    GI cancer screening    Prostate cancer screening  PSA= <0.1  on 1/29/2023         #4  Immunizations:  Immunization History   Administered Date(s) Administered    COVID-19, J&J, (age 18y+), IM, 0.5 mL 03/19/2021    COVID-19, MODERNA Booster BLUE border, (age 18y+), IM, 50mcg/0.25mL 04/19/2022         #5  Genetic testing    [facility] GENETIC TESTING on 7/15/2020 documented a VUS:  MEN1, heterozygous, for c.  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 Diagnoses and all orders for this visit:    Adenocarcinoma of right lung ([facility] [address]  -     CBC with Auto Differential; Future  -     Comprehensive Metabolic Panel; Future  -     Full PFT Study With Bronchodilator;  Future              Orders Placed This Encounter   Procedures    CBC with Auto Differential     Standing Status:   Future     Number of Occurrences:   1     Standing Expiration Date:   2/16/2024    Comprehensive Metabolic Panel     Standing Status:   Future     Number of Occurrences:   1     Standing Expiration Date:   2/16/2024    Full PFT Study With Bronchodilator     If an ABG is needed along with this PFT procedure, please place the appropriate lab order     Standing Status:   Future     Standing Expiration Date:   2/16/2024     Scheduling Instructions:      At [facility]               Return in about 6 weeks (around 3/30/2023) for treatment and see Dr. Leontine Moritz.

## 2023-02-16 ENCOUNTER — OFFICE VISIT (OUTPATIENT)
Dept: HEMATOLOGY | Age: 58
End: 2023-02-16
Payer: MEDICAID

## 2023-02-16 ENCOUNTER — TRANSCRIBE ORDERS (OUTPATIENT)
Dept: ADMINISTRATIVE | Facility: HOSPITAL | Age: 58
End: 2023-02-16
Payer: MEDICAID

## 2023-02-16 ENCOUNTER — HOSPITAL ENCOUNTER (OUTPATIENT)
Dept: INFUSION THERAPY | Age: 58
Discharge: HOME | End: 2023-02-16
Payer: MEDICAID

## 2023-02-16 VITALS
BODY MASS INDEX: 28.79 KG/M2 | SYSTOLIC BLOOD PRESSURE: 136 MMHG | WEIGHT: 224.3 LBS | HEART RATE: 105 BPM | DIASTOLIC BLOOD PRESSURE: 84 MMHG | HEIGHT: 74 IN | TEMPERATURE: 97.8 F | RESPIRATION RATE: 18 BRPM | OXYGEN SATURATION: 95 %

## 2023-02-16 DIAGNOSIS — C34.91 ADENOCARCINOMA OF BRONCHUS, RIGHT: Primary | ICD-10-CM

## 2023-02-16 DIAGNOSIS — C34.91 ADENOCARCINOMA OF RIGHT LUNG (HCC): Primary | ICD-10-CM

## 2023-02-16 DIAGNOSIS — C34.91 ADENOCARCINOMA, LUNG, RIGHT (HCC): ICD-10-CM

## 2023-02-16 DIAGNOSIS — C34.91 ADENOCARCINOMA, LUNG, RIGHT (HCC): Primary | ICD-10-CM

## 2023-02-16 LAB
ALBUMIN SERPL-MCNC: 3.8 G/DL (ref 3.5–5.2)
ALP BLD-CCNC: 129 U/L (ref 40–130)
ALT SERPL-CCNC: 26 U/L (ref 21–72)
ANION GAP SERPL CALCULATED.3IONS-SCNC: 8 MMOL/L (ref 7–19)
AST SERPL-CCNC: 23 U/L (ref 17–59)
BILIRUB SERPL-MCNC: 0.5 MG/DL (ref 0.2–1.3)
BUN BLDV-MCNC: 15 MG/DL (ref 9–20)
CALCIUM SERPL-MCNC: 9.9 MG/DL (ref 8.4–10.2)
CHLORIDE BLD-SCNC: 107 MMOL/L (ref 98–111)
CO2: 27 MMOL/L (ref 22–29)
CORTISOL TOTAL: 1.1 UG/DL
CREAT SERPL-MCNC: 1 MG/DL (ref 0.6–1.2)
GFR SERPL CREATININE-BSD FRML MDRD: >60 ML/MIN/{1.73_M2}
GLOBULIN: 2.1 G/DL
GLUCOSE BLD-MCNC: 186 MG/DL (ref 74–106)
HCT VFR BLD CALC: 36.3 % (ref 40.1–51)
HEMOGLOBIN: 11.9 G/DL (ref 13.7–17.5)
LYMPHOCYTES ABSOLUTE: 0.56 K/UL (ref 1.18–3.74)
LYMPHOCYTES RELATIVE PERCENT: 10.2 % (ref 19.3–53.1)
MAGNESIUM: 1.8 MG/DL (ref 1.6–2.3)
MCH RBC QN AUTO: 29 PG (ref 25.7–32.2)
MCHC RBC AUTO-ENTMCNC: 32.8 G/DL (ref 32.3–36.5)
MCV RBC AUTO: 88.5 FL (ref 79–92.2)
MONOCYTES ABSOLUTE: 0.4 K/UL (ref 0.24–0.82)
MONOCYTES RELATIVE PERCENT: 7.3 % (ref 4.7–12.5)
NEUTROPHILS ABSOLUTE: 4.42 K/UL (ref 1.56–6.13)
NEUTROPHILS RELATIVE PERCENT: 80.8 % (ref 34–71.1)
PDW BLD-RTO: 16.7 % (ref 11.6–14.4)
PLATELET # BLD: 223 K/UL (ref 163–337)
PMV BLD AUTO: 8.5 FL (ref 7.4–10.4)
POTASSIUM SERPL-SCNC: 4.2 MMOL/L (ref 3.5–5.1)
RBC # BLD: 4.1 M/UL (ref 4.63–6.08)
SODIUM BLD-SCNC: 142 MMOL/L (ref 137–145)
TOTAL PROTEIN: 5.9 G/DL (ref 6.3–8.2)
TSH SERPL DL<=0.05 MIU/L-ACNC: 0.9 UIU/ML (ref 0.27–4.2)
WBC # BLD: 5.47 K/UL (ref 4.23–9.07)

## 2023-02-16 PROCEDURE — 80053 COMPREHEN METABOLIC PANEL: CPT

## 2023-02-16 PROCEDURE — 96376 TX/PRO/DX INJ SAME DRUG ADON: CPT

## 2023-02-16 PROCEDURE — 96411 CHEMO IV PUSH ADDL DRUG: CPT

## 2023-02-16 PROCEDURE — 83735 ASSAY OF MAGNESIUM: CPT

## 2023-02-16 PROCEDURE — 85025 COMPLETE CBC W/AUTO DIFF WBC: CPT

## 2023-02-16 PROCEDURE — 96413 CHEMO IV INFUSION 1 HR: CPT

## 2023-02-16 PROCEDURE — 6360000002 HC RX W HCPCS: Performed by: INTERNAL MEDICINE

## 2023-02-16 PROCEDURE — 36415 COLL VENOUS BLD VENIPUNCTURE: CPT

## 2023-02-16 PROCEDURE — 96367 TX/PROPH/DG ADDL SEQ IV INF: CPT

## 2023-02-16 PROCEDURE — 96366 THER/PROPH/DIAG IV INF ADDON: CPT

## 2023-02-16 PROCEDURE — 96415 CHEMO IV INFUSION ADDL HR: CPT

## 2023-02-16 PROCEDURE — 96417 CHEMO IV INFUS EACH ADDL SEQ: CPT

## 2023-02-16 PROCEDURE — 96372 THER/PROPH/DIAG INJ SC/IM: CPT

## 2023-02-16 PROCEDURE — 99215 OFFICE O/P EST HI 40 MIN: CPT | Performed by: INTERNAL MEDICINE

## 2023-02-16 PROCEDURE — 2580000003 HC RX 258: Performed by: INTERNAL MEDICINE

## 2023-02-16 PROCEDURE — 96375 TX/PRO/DX INJ NEW DRUG ADDON: CPT

## 2023-02-16 RX ORDER — SODIUM CHLORIDE 9 MG/ML
5-250 INJECTION, SOLUTION INTRAVENOUS PRN
Status: CANCELLED | OUTPATIENT
Start: 2023-02-16

## 2023-02-16 RX ORDER — FUROSEMIDE 10 MG/ML
20 INJECTION INTRAMUSCULAR; INTRAVENOUS ONCE
Status: COMPLETED | OUTPATIENT
Start: 2023-02-16 | End: 2023-02-16

## 2023-02-16 RX ORDER — SODIUM CHLORIDE 9 MG/ML
5-40 INJECTION INTRAVENOUS PRN
Status: DISCONTINUED | OUTPATIENT
Start: 2023-02-16 | End: 2023-02-17 | Stop reason: HOSPADM

## 2023-02-16 RX ORDER — ONDANSETRON 2 MG/ML
8 INJECTION INTRAMUSCULAR; INTRAVENOUS
Status: CANCELLED | OUTPATIENT
Start: 2023-02-16

## 2023-02-16 RX ORDER — CYANOCOBALAMIN 1000 UG/ML
1000 INJECTION, SOLUTION INTRAMUSCULAR; SUBCUTANEOUS ONCE
Status: CANCELLED | OUTPATIENT
Start: 2023-02-16 | End: 2023-02-16

## 2023-02-16 RX ORDER — SODIUM CHLORIDE 9 MG/ML
INJECTION, SOLUTION INTRAVENOUS CONTINUOUS
Status: CANCELLED | OUTPATIENT
Start: 2023-02-16

## 2023-02-16 RX ORDER — HEPARIN SODIUM (PORCINE) LOCK FLUSH IV SOLN 100 UNIT/ML 100 UNIT/ML
500 SOLUTION INTRAVENOUS PRN
Status: CANCELLED | OUTPATIENT
Start: 2023-02-16

## 2023-02-16 RX ORDER — PALONOSETRON 0.05 MG/ML
0.25 INJECTION, SOLUTION INTRAVENOUS ONCE
Status: CANCELLED | OUTPATIENT
Start: 2023-02-16 | End: 2023-02-16

## 2023-02-16 RX ORDER — HEPARIN SODIUM (PORCINE) LOCK FLUSH IV SOLN 100 UNIT/ML 100 UNIT/ML
500 SOLUTION INTRAVENOUS PRN
Status: DISCONTINUED | OUTPATIENT
Start: 2023-02-16 | End: 2023-02-17 | Stop reason: HOSPADM

## 2023-02-16 RX ORDER — ALBUTEROL SULFATE 90 UG/1
4 AEROSOL, METERED RESPIRATORY (INHALATION) PRN
Status: CANCELLED | OUTPATIENT
Start: 2023-02-16

## 2023-02-16 RX ORDER — CYANOCOBALAMIN 1000 UG/ML
1000 INJECTION, SOLUTION INTRAMUSCULAR; SUBCUTANEOUS ONCE
Status: COMPLETED | OUTPATIENT
Start: 2023-02-16 | End: 2023-02-16

## 2023-02-16 RX ORDER — FUROSEMIDE 10 MG/ML
20 INJECTION INTRAMUSCULAR; INTRAVENOUS ONCE
Status: CANCELLED | OUTPATIENT
Start: 2023-02-16 | End: 2023-02-16

## 2023-02-16 RX ORDER — EPINEPHRINE 1 MG/ML
0.3 INJECTION, SOLUTION, CONCENTRATE INTRAVENOUS PRN
Status: CANCELLED | OUTPATIENT
Start: 2023-02-16

## 2023-02-16 RX ORDER — SODIUM CHLORIDE 9 MG/ML
5-40 INJECTION INTRAVENOUS PRN
Status: CANCELLED | OUTPATIENT
Start: 2023-02-16

## 2023-02-16 RX ORDER — MEPERIDINE HYDROCHLORIDE 25 MG/ML
12.5 INJECTION INTRAMUSCULAR; INTRAVENOUS; SUBCUTANEOUS PRN
Status: CANCELLED | OUTPATIENT
Start: 2023-02-16

## 2023-02-16 RX ORDER — DIPHENHYDRAMINE HYDROCHLORIDE 50 MG/ML
50 INJECTION INTRAMUSCULAR; INTRAVENOUS
Status: CANCELLED | OUTPATIENT
Start: 2023-02-16

## 2023-02-16 RX ORDER — PALONOSETRON 0.05 MG/ML
0.25 INJECTION, SOLUTION INTRAVENOUS ONCE
Status: COMPLETED | OUTPATIENT
Start: 2023-02-16 | End: 2023-02-16

## 2023-02-16 RX ORDER — ACETAMINOPHEN 325 MG/1
650 TABLET ORAL
Status: CANCELLED | OUTPATIENT
Start: 2023-02-16

## 2023-02-16 RX ADMIN — FUROSEMIDE 20 MG: 10 INJECTION, SOLUTION INTRAMUSCULAR; INTRAVENOUS at 12:03

## 2023-02-16 RX ADMIN — HEPARIN 500 UNITS: 100 SYRINGE at 15:33

## 2023-02-16 RX ADMIN — CYANOCOBALAMIN 1000 MCG: 1000 INJECTION, SOLUTION INTRAMUSCULAR; SUBCUTANEOUS at 15:33

## 2023-02-16 RX ADMIN — MAGNESIUM SULFATE HEPTAHYDRATE: 500 INJECTION, SOLUTION INTRAMUSCULAR; INTRAVENOUS at 10:56

## 2023-02-16 RX ADMIN — PEMETREXED DISODIUM 1130 MG: 500 INJECTION, POWDER, LYOPHILIZED, FOR SOLUTION INTRAVENOUS at 10:59

## 2023-02-16 RX ADMIN — PALONOSETRON 0.25 MG: 0.05 INJECTION, SOLUTION INTRAVENOUS at 09:22

## 2023-02-16 RX ADMIN — MAGNESIUM SULFATE HEPTAHYDRATE: 500 INJECTION, SOLUTION INTRAMUSCULAR; INTRAVENOUS at 13:10

## 2023-02-16 RX ADMIN — SODIUM CHLORIDE, PRESERVATIVE FREE 10 ML: 5 INJECTION INTRAVENOUS at 15:33

## 2023-02-16 RX ADMIN — CISPLATIN 170 MG: 1 INJECTION, SOLUTION INTRAVENOUS at 11:14

## 2023-02-16 RX ADMIN — FUROSEMIDE 20 MG: 10 INJECTION, SOLUTION INTRAMUSCULAR; INTRAVENOUS at 14:22

## 2023-02-16 RX ADMIN — DEXAMETHASONE SODIUM PHOSPHATE 10 MG: 10 INJECTION, SOLUTION INTRAMUSCULAR; INTRAVENOUS at 09:22

## 2023-02-16 RX ADMIN — FOSAPREPITANT 150 MG: 150 INJECTION, POWDER, LYOPHILIZED, FOR SOLUTION INTRAVENOUS at 09:40

## 2023-02-16 RX ADMIN — SODIUM CHLORIDE 360 MG: 9 INJECTION, SOLUTION INTRAVENOUS at 10:20

## 2023-03-01 ENCOUNTER — HOSPITAL ENCOUNTER (OUTPATIENT)
Dept: INFUSION THERAPY | Age: 58
Discharge: HOME | End: 2023-03-01
Payer: MEDICAID

## 2023-03-01 DIAGNOSIS — Z51.11 ENCOUNTER FOR CHEMOTHERAPY MANAGEMENT: ICD-10-CM

## 2023-03-01 DIAGNOSIS — C34.11 PRIMARY ADENOCARCINOMA OF UPPER LOBE OF RIGHT LUNG (HCC): ICD-10-CM

## 2023-03-01 DIAGNOSIS — C34.91 ADENOCARCINOMA, LUNG, RIGHT (HCC): ICD-10-CM

## 2023-03-01 DIAGNOSIS — Z51.11 ENCOUNTER FOR CHEMOTHERAPY MANAGEMENT: Primary | ICD-10-CM

## 2023-03-01 DIAGNOSIS — R11.0 NAUSEA: Primary | ICD-10-CM

## 2023-03-01 DIAGNOSIS — E86.0 DEHYDRATION: Primary | ICD-10-CM

## 2023-03-01 LAB
ALBUMIN SERPL-MCNC: 3.7 G/DL (ref 3.5–5.2)
ALP BLD-CCNC: 129 U/L (ref 40–130)
ALT SERPL-CCNC: 33 U/L (ref 21–72)
ANION GAP SERPL CALCULATED.3IONS-SCNC: 3 MMOL/L (ref 7–19)
AST SERPL-CCNC: 32 U/L (ref 17–59)
BILIRUB SERPL-MCNC: 0.3 MG/DL (ref 0.2–1.3)
BUN BLDV-MCNC: 21 MG/DL (ref 9–20)
CALCIUM SERPL-MCNC: 9.9 MG/DL (ref 8.4–10.2)
CHLORIDE BLD-SCNC: 111 MMOL/L (ref 98–111)
CO2: 27 MMOL/L (ref 22–29)
CREAT SERPL-MCNC: 1.1 MG/DL (ref 0.6–1.2)
GFR SERPL CREATININE-BSD FRML MDRD: >60 ML/MIN/{1.73_M2}
GLOBULIN: 2.2 G/DL
GLUCOSE BLD-MCNC: 108 MG/DL (ref 74–106)
HCT VFR BLD CALC: 38.5 % (ref 40.1–51)
HEMOGLOBIN: 12.5 G/DL (ref 13.7–17.5)
LYMPHOCYTES ABSOLUTE: 0.65 K/UL (ref 1.18–3.74)
LYMPHOCYTES RELATIVE PERCENT: 19.3 % (ref 19.3–53.1)
MCH RBC QN AUTO: 29.3 PG (ref 25.7–32.2)
MCHC RBC AUTO-ENTMCNC: 32.5 G/DL (ref 32.3–36.5)
MCV RBC AUTO: 90.4 FL (ref 79–92.2)
MONOCYTES ABSOLUTE: 0.48 K/UL (ref 0.24–0.82)
MONOCYTES RELATIVE PERCENT: 14.3 % (ref 4.7–12.5)
NEUTROPHILS ABSOLUTE: 2.13 K/UL (ref 1.56–6.13)
NEUTROPHILS RELATIVE PERCENT: 63.4 % (ref 34–71.1)
PDW BLD-RTO: 18.4 % (ref 11.6–14.4)
PLATELET # BLD: 113 K/UL (ref 163–337)
PMV BLD AUTO: 9.1 FL (ref 7.4–10.4)
POTASSIUM SERPL-SCNC: 4.6 MMOL/L (ref 3.5–5.1)
RBC # BLD: 4.26 M/UL (ref 4.63–6.08)
SODIUM BLD-SCNC: 141 MMOL/L (ref 137–145)
TOTAL PROTEIN: 5.9 G/DL (ref 6.3–8.2)
WBC # BLD: 3.36 K/UL (ref 4.23–9.07)

## 2023-03-01 PROCEDURE — 96374 THER/PROPH/DIAG INJ IV PUSH: CPT

## 2023-03-01 PROCEDURE — 36415 COLL VENOUS BLD VENIPUNCTURE: CPT

## 2023-03-01 PROCEDURE — 2580000003 HC RX 258: Performed by: INTERNAL MEDICINE

## 2023-03-01 PROCEDURE — 96360 HYDRATION IV INFUSION INIT: CPT

## 2023-03-01 PROCEDURE — 2500000003 HC RX 250 WO HCPCS: Performed by: INTERNAL MEDICINE

## 2023-03-01 PROCEDURE — 80053 COMPREHEN METABOLIC PANEL: CPT

## 2023-03-01 PROCEDURE — 96375 TX/PRO/DX INJ NEW DRUG ADDON: CPT

## 2023-03-01 PROCEDURE — 96361 HYDRATE IV INFUSION ADD-ON: CPT

## 2023-03-01 PROCEDURE — 6360000002 HC RX W HCPCS: Performed by: INTERNAL MEDICINE

## 2023-03-01 PROCEDURE — 85025 COMPLETE CBC W/AUTO DIFF WBC: CPT

## 2023-03-01 RX ORDER — SODIUM CHLORIDE 0.9 % (FLUSH) 0.9 %
5-40 SYRINGE (ML) INJECTION PRN
OUTPATIENT
Start: 2023-03-01

## 2023-03-01 RX ORDER — HEPARIN SODIUM (PORCINE) LOCK FLUSH IV SOLN 100 UNIT/ML 100 UNIT/ML
500 SOLUTION INTRAVENOUS PRN
Status: DISCONTINUED | OUTPATIENT
Start: 2023-03-01 | End: 2023-03-02 | Stop reason: HOSPADM

## 2023-03-01 RX ORDER — SODIUM CHLORIDE 0.9 % (FLUSH) 0.9 %
5-40 SYRINGE (ML) INJECTION PRN
Status: DISCONTINUED | OUTPATIENT
Start: 2023-03-01 | End: 2023-03-02 | Stop reason: HOSPADM

## 2023-03-01 RX ORDER — HEPARIN SODIUM (PORCINE) LOCK FLUSH IV SOLN 100 UNIT/ML 100 UNIT/ML
500 SOLUTION INTRAVENOUS PRN
OUTPATIENT
Start: 2023-03-01

## 2023-03-01 RX ORDER — SODIUM CHLORIDE 9 MG/ML
5-250 INJECTION, SOLUTION INTRAVENOUS PRN
OUTPATIENT
Start: 2023-03-01

## 2023-03-01 RX ORDER — FAMOTIDINE 10 MG/ML
20 INJECTION, SOLUTION INTRAVENOUS ONCE
Status: COMPLETED | OUTPATIENT
Start: 2023-03-01 | End: 2023-03-01

## 2023-03-01 RX ORDER — 0.9 % SODIUM CHLORIDE 0.9 %
1000 INTRAVENOUS SOLUTION INTRAVENOUS ONCE
Status: CANCELLED | OUTPATIENT
Start: 2023-03-01 | End: 2023-03-01

## 2023-03-01 RX ORDER — METOCLOPRAMIDE 5 MG/1
5 TABLET ORAL 3 TIMES DAILY
Qty: 120 TABLET | Refills: 3 | Status: SHIPPED | OUTPATIENT
Start: 2023-03-01

## 2023-03-01 RX ORDER — 0.9 % SODIUM CHLORIDE 0.9 %
1000 INTRAVENOUS SOLUTION INTRAVENOUS ONCE
Status: COMPLETED | OUTPATIENT
Start: 2023-03-01 | End: 2023-03-01

## 2023-03-01 RX ADMIN — SODIUM CHLORIDE, PRESERVATIVE FREE 10 ML: 5 INJECTION INTRAVENOUS at 10:00

## 2023-03-01 RX ADMIN — SODIUM CHLORIDE 1000 ML: 9 INJECTION, SOLUTION INTRAVENOUS at 08:05

## 2023-03-01 RX ADMIN — HEPARIN 500 UNITS: 100 SYRINGE at 10:00

## 2023-03-01 RX ADMIN — FAMOTIDINE 20 MG: 10 INJECTION INTRAVENOUS at 08:09

## 2023-03-01 NOTE — PROGRESS NOTES
Robbin Jack presents to clinic requesting IVF, as he states this made him feel better with previous treatment. He is s/p 13 days from C3 neoadj Cispl + Alimta + Opdivo on 2/16/2023. Reports c/o persistent nausea, associated with intermittent constipation. Current bowel regimen includes miralax daily as prev instructed. Per Dr Nubia Nixon , to add daily senna product and will send reglan rx to pharmacy. IVF in clinic today . Robbin Jack understands to discontinue compazine (reports ineffective and understands incompatible with reglan)    Robbin Jack states he is scheduled with  on 3/27/23 for surgical consultation with imaging (CT, PET) scheduled prior.

## 2023-03-02 ENCOUNTER — TELEPHONE (OUTPATIENT)
Dept: CARDIAC SURGERY | Facility: CLINIC | Age: 58
End: 2023-03-02

## 2023-03-02 NOTE — TELEPHONE ENCOUNTER
Caller: Lalo Jones    Relationship: Self     Best call back number:[phone]     What is the best time to reach you: ANY    Who are you requesting to speak with (clinical staff, provider,  specific staff member): CLINICAL    Do you know the name of the person who called: PATIENT    What was the call regarding:  PATIENT CALLED HUB TRYING TO SEE WHAT IS THE RECOVERY TIME FOR THE SURGERY THAT WAS RECOMMENDED BY DR. YOO. PATIENT AND HIS EMPLOYER ARE TRYING TO PLAN OUT DATES OF HOW LONG HE WILL BE IN RECOVERY TIME. PATIENT WOULD LIKE A CALL BACK.     Do you require a callback: YES

## 2023-03-02 NOTE — TELEPHONE ENCOUNTER
SALMA Carlin-he says patient will need to be off work at least 6 weeks after surgery. Any further questions he will discuss with patient at upcoming office visit. Please advise patient.

## 2023-03-03 ENCOUNTER — TELEPHONE (OUTPATIENT)
Dept: CARDIAC SURGERY | Facility: CLINIC | Age: 58
End: 2023-03-03
Payer: MEDICAID

## 2023-03-03 NOTE — TELEPHONE ENCOUNTER
Naheed with [facility] calling to report re: CT's sched for 3-20-23.  States ins denied the chest CT stating it had been done w/i the last 60 days.  They approved the abd/pelv CT.  Can reach Naheed at #[phone] if any questions or instructions/kahm

## 2023-03-06 ENCOUNTER — PATIENT ROUNDING (BHMG ONLY) (OUTPATIENT)
Dept: CARDIAC SURGERY | Facility: CLINIC | Age: 58
End: 2023-03-06
Payer: MEDICAID

## 2023-03-06 NOTE — PROGRESS NOTES
A "[employer] message has been sent to the patient for PATIENT ROUNDING with [facility].

## 2023-03-16 ENCOUNTER — HOSPITAL ENCOUNTER (OUTPATIENT)
Dept: PULMONOLOGY | Facility: HOSPITAL | Age: 58
Discharge: HOME | End: 2023-03-16
Admitting: INTERNAL MEDICINE
Payer: MEDICAID

## 2023-03-16 DIAGNOSIS — C34.91 ADENOCARCINOMA OF BRONCHUS, RIGHT: ICD-10-CM

## 2023-03-16 PROCEDURE — 94726 PLETHYSMOGRAPHY LUNG VOLUMES: CPT | Performed by: INTERNAL MEDICINE

## 2023-03-16 PROCEDURE — 94060 EVALUATION OF WHEEZING: CPT | Performed by: INTERNAL MEDICINE

## 2023-03-16 PROCEDURE — 94729 DIFFUSING CAPACITY: CPT | Performed by: INTERNAL MEDICINE

## 2023-03-16 PROCEDURE — 94726 PLETHYSMOGRAPHY LUNG VOLUMES: CPT

## 2023-03-16 PROCEDURE — 94060 EVALUATION OF WHEEZING: CPT

## 2023-03-16 PROCEDURE — 94729 DIFFUSING CAPACITY: CPT

## 2023-03-16 RX ORDER — ALBUTEROL SULFATE 2.5 MG/3ML
2.5 SOLUTION RESPIRATORY (INHALATION) ONCE
Status: COMPLETED | OUTPATIENT
Start: 2023-03-16 | End: 2023-03-16

## 2023-03-16 RX ADMIN — ALBUTEROL SULFATE 2.5 MG: 2.5 SOLUTION RESPIRATORY (INHALATION) at 13:23

## 2023-03-17 NOTE — PROGRESS NOTES
Subjective    Mr. Jones is 57 y.o. male    Chief Complaint: History of Prostate cancer    History of Present Illness    57-year-old male established patient follow-up for history of prostate cancer after robotic assisted laparoscopic prostatectomy by Dr. Jj September 2020 for PT2N0 [place] 7 disease with tumor approaching the right posterior margin.  He had Coloplast Titan touch three-piece inflatable penile implant 3/15/2022 for post-prostatectomy erectile dysfunction.  His most recent PSA remains undetectable.  He has been doing well other than battling lung cancer.    Lab Results   Component Value Date    PSA <0.1 01/19/2023    PSA <0.1 07/26/2022    PSA <0.1 02/08/2022       The following portions of the patient's history were reviewed and updated as appropriate: allergies, current medications, past family history, past medical history, past social history, past surgical history and problem list.    Review of Systems      Current Outpatient Medications:   •  acetaminophen (Tylenol) 325 MG tablet, Take 3 tablets by mouth Every 8 (Eight) Hours. Take every 8 hours for 3 days then take prn as needed., Disp: 100 tablet, Rfl: 2  •  albuterol sulfate  (90 Base) MCG/ACT inhaler, Inhale 2 puffs Every 4 (Four) Hours As Needed for Wheezing or Shortness of Air., Disp: , Rfl:   •  cetirizine (zyrTEC) 10 MG tablet, 1 tablet Daily., Disp: , Rfl:   •  dexamethasone (DECADRON) 4 MG tablet, , Disp: , Rfl:   •  fluticasone (Flonase Allergy Relief) 50 MCG/ACT nasal spray, 2 sprays into the nostril(s) as directed by provider Daily., Disp: 16 g, Rfl: 11  •  Fluticasone-Salmeterol (Wixela Inhub) 250-50 MCG/ACT DISKUS, Inhale 1 puff 2 (Two) Times a Day., Disp: 1 each, Rfl: 11  •  folic acid (FOLVITE) 1 MG tablet, , Disp: , Rfl:   •  gabapentin (NEURONTIN) 800 MG tablet, Take 800 mg by mouth 3 (Three) Times a Day., Disp: , Rfl:   •  hydrOXYzine pamoate (VISTARIL) 50 MG capsule, 1 capsule Daily., Disp: , Rfl:   •  ibuprofen  (ADVIL,MOTRIN) 800 MG tablet, Take 800 mg by mouth Every 8 (Eight) Hours As Needed for Mild Pain ., Disp: , Rfl:   •  Lidocaine Viscous HCl (XYLOCAINE) 2 % solution, Take 5 mL by mouth As Needed for Mild Pain or Moderate Pain., Disp: 100 mL, Rfl: 0  •  montelukast (SINGULAIR) 10 MG tablet, Take 1 tablet by mouth Every Night., Disp: 90 tablet, Rfl: 3  •  multivitamin with minerals tablet tablet, Take 1 tablet by mouth Daily., Disp: , Rfl:   •  NON FORMULARY, Take  by mouth Daily. Marijuana Gummies, Disp: , Rfl:   •  omeprazole (priLOSEC) 40 MG capsule, Take 1 capsule by mouth Daily., Disp: 90 capsule, Rfl: 1  •  prochlorperazine (COMPAZINE) 10 MG tablet, , Disp: , Rfl:     Past Medical History:   Diagnosis Date   • Abnormal PET scan of lung     Nov 2022   • Allergic rhinitis    • Arthritis    • Bronchiectasis (HCC)    • Bronchitis    • Cancer (HCC)     prostate   • Chronic bronchitis (HCC)    • Chronic pain    • GERD (gastroesophageal reflux disease)    • Pneumonia    • Prostatitis, acute    • S/P ACL reconstruction    • Septic shock due to urinary tract infection (HCC)    • Sinusitis    • Strep throat    • UTI (urinary tract infection)        Past Surgical History:   Procedure Laterality Date   • ANKLE SURGERY Right    • BACK SURGERY      X2   • ENDOSCOPY N/A 01/10/2022    Procedure: ESOPHAGOGASTRODUODENOSCOPY WITH ANESTHESIA;  Surgeon: Tucker Bright MD;  Location: [facility] OR;  Service: Gastroenterology;  Laterality: N/A;  pre food bolus  post food bolus     • KNEE ACL RECONSTRUCTION Left    • NASAL SEPTUM SURGERY     • PENILE PROSTHESIS IMPLANT N/A 3/15/2022    Procedure: 3-PIECE INFLATABLE PENILE PROSTHESIS PLACEMENT;  Surgeon: Trae Clark MD;  Location:  [facility] OR;  Service: Urology;  Laterality: N/A;   • PROSTATE ULTRASOUND BIOPSY N/A 02/18/2020    Procedure: PROSTATE  BIOPSY;  Surgeon: Trae Clark MD;  Location: [facility] OR;  Service: Urology;  Laterality: N/A;   • PROSTATECTOMY N/A 09/22/2020  "   Procedure: RADICAL PROSTATECTOMY LAPAROSCOPIC WITH DAVINCI ROBOT;  Surgeon: Nuno Jj MD;  Location:  [facility] OR;  Service: [facility];  Laterality: N/A;   • SHOULDER SURGERY Left     X 4   • TENNIS ELBOW RELEASE Bilateral 2011   • VENOUS ACCESS DEVICE (PORT) INSERTION N/A 2022    Procedure: Single Lumen Port-a-cath insertion with flouroscopy;  Surgeon: Kim Bonilla MD;  Location:  [facility] OR;  Service: General;  Laterality: N/A;       Social History     Socioeconomic History   • Marital status:    Tobacco Use   • Smoking status: Former     Packs/day: 1.00     Years: 37.00     Pack years: 37.00     Types: Cigarettes     Start date:      Quit date: 2022     Years since quittin.2   • Smokeless tobacco: Former     Types: Snuff     Quit date:    Vaping Use   • Vaping Use: Former   Substance and Sexual Activity   • Alcohol use: No     Comment: 0   • Drug use: Not Currently     Frequency: 7.0 times per week     Types: Marijuana     Comment: Edible gummies - 1 per day   • Sexual activity: Yes     Partners: Female     Birth control/protection: None       Family History   Problem Relation Age of Onset   • Cancer Mother        Objective    Temp 97.8 °F (36.6 °C)   Ht 187 cm (73.62\")   Wt 102 kg (225 lb)   BMI 29.19 kg/m²     Physical Exam        Results for orders placed or performed in visit on 23   POC Urinalysis Dipstick, Multipro    Specimen: Urine   Result Value Ref Range    Color Yellow Yellow, Straw, Dark Yellow, Beryl    Clarity, UA Clear Clear    Glucose, UA Negative Negative mg/dL    Bilirubin Negative Negative    Ketones, UA Negative Negative    Specific Gravity  1.030 1.005 - 1.030    Blood, UA Negative Negative    pH, Urine 5.5 5.0 - 8.0    Protein, POC Negative Negative mg/dL    Urobilinogen, UA Normal Normal, 0.2 E.U./dL    Nitrite, UA Negative Negative    Leukocytes Negative Negative     Assessment and Plan    Diagnoses and all orders for this " visit:    1. History of prostate cancer (Primary)  -     POC Urinalysis Dipstick, Multipro  -     PSA DIAGNOSTIC; Future  -     PSA DIAGNOSTIC; Future      Doing well MARGARITO with undetectable PSA.  Followup with me in 6 months and [facility] clinic with preclinic PSA or sooner as needed.     I spent approximately 15 minutes providing clinical care for this patient; including review of patient's chart and provider documentation, face to face time spent with patient in examination room (obtaining history, performing physical exam, discussing diagnosis and management options), placing orders, and completing patient documentation.         This document has been signed by DESHAUN Clark MD on March 24, 2023 16:47 CDT

## 2023-03-20 ENCOUNTER — HOSPITAL ENCOUNTER (OUTPATIENT)
Dept: CT IMAGING | Facility: HOSPITAL | Age: 58
Discharge: HOME | End: 2023-03-20
Admitting: NURSE PRACTITIONER
Payer: MEDICAID

## 2023-03-20 DIAGNOSIS — C34.91 MALIGNANT NEOPLASM OF RIGHT LUNG, UNSPECIFIED PART OF LUNG: ICD-10-CM

## 2023-03-20 PROCEDURE — 74177 CT ABD & PELVIS W/CONTRAST: CPT

## 2023-03-20 PROCEDURE — 25510000001 IOPAMIDOL 61 % SOLUTION: Performed by: NURSE PRACTITIONER

## 2023-03-20 PROCEDURE — 71260 CT THORAX DX C+: CPT

## 2023-03-20 RX ADMIN — IOPAMIDOL 100 ML: 612 INJECTION, SOLUTION INTRAVENOUS at 10:49

## 2023-03-21 ENCOUNTER — TELEPHONE (OUTPATIENT)
Dept: CARDIAC SURGERY | Facility: CLINIC | Age: 58
End: 2023-03-21
Payer: MEDICAID

## 2023-03-21 NOTE — TELEPHONE ENCOUNTER
Pt calling to request results of CT chest done recently.  States he has been able to see the abd/pelv results on MyChart but not the chest.  Informed pt that this would likely be discussed with all his results at appt next week but pt does not want to wait until then and is requesting a call back re: this sooner.  Can reach pt at #[phone]/joan

## 2023-03-21 NOTE — TELEPHONE ENCOUNTER
CT was authorized and has been completed as of 03/20/2023. Pt is scheduled for PET Scan 03/27 prior to office visit with Dr. Carlin

## 2023-03-22 DIAGNOSIS — C34.91 MALIGNANT NEOPLASM OF RIGHT LUNG, UNSPECIFIED PART OF LUNG: Primary | ICD-10-CM

## 2023-03-22 RX ORDER — LEVOFLOXACIN 750 MG/1
750 TABLET ORAL DAILY
Qty: 7 TABLET | Refills: 0 | Status: SHIPPED | OUTPATIENT
Start: 2023-03-22 | End: 2023-03-29

## 2023-03-22 NOTE — PROGRESS NOTES
Patient had imaging as scheduled following neoadjuvant xrt concurrent with chemoimmunotherapy:    Neoadjuvant concurrent XRT (with chemoimmunotherapy) initiated on 1/4/2023.  5040 cGy planned in 28 treatment fractions. Neoadjuvant cisplatin 75 mg/m2, Alimta 500 mg/m2 and Opdivo 360 mg, cycle #1 initiated on 1/5/2023  Cycle #3 delivered on 2/16/2023. 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 new spiculated nodules in the right upper lobe, the largest measuring 1.6 cm, some of these areas of nodularity are ill-defined and seen at the branch points of the bronchial tree, could potentially be infectious or inflammatory. The main concern with this appearance is for intrapulmonary metastases however. Repeat chest CT is recommended in 3 months, follow-up PET/CT would be appropriate if these new spiculated areas persist or increase in size. No evidence of intrathoracic lymphadenopathy, no left-sided pulmonary nodule or lung mass. Stable changes of paraseptal emphysema in both lungs      CT ABDOMEN/PELVIS at [facility] on 3/20/23, compared to 12/15/22  Interval resolution of retroperitoneal lymphadenopathy. No evidence of metastatic disease in the abdomen/pelvis. Heavy sigmoid diverticulosis, without definite evidence of diverticulitis. There is some residual sigmoid colon wall thickening,   the degree of which has significantly decreased from the prior exam.      PULMONARY FUNCTION TESTS at [facility] on 3/21/2023 per Dr Luis Fernando Levy  Interpretation :   1.  Spirometry is consistent with a moderate obstructive ventilatory defect although the decrease in the patient's FEV1 is just into the moderate range at 79% of predicted.    2.  There is improvement in spirometry

## 2023-03-22 NOTE — TELEPHONE ENCOUNTER
Dr. Carlin discussed CT results with patient.  Work-up PET scan is scheduled prior to appointment with Dr. Carlin on Monday.  We will refer to Dr. Perales.  PET scan will be discussed at upcoming office visit.

## 2023-03-23 ENCOUNTER — OFFICE VISIT (OUTPATIENT)
Dept: UROLOGY | Facility: CLINIC | Age: 58
End: 2023-03-23
Payer: MEDICAID

## 2023-03-23 VITALS — WEIGHT: 225 LBS | HEIGHT: 74 IN | TEMPERATURE: 97.8 F | BODY MASS INDEX: 28.88 KG/M2

## 2023-03-23 DIAGNOSIS — Z85.46 HISTORY OF PROSTATE CANCER: Primary | ICD-10-CM

## 2023-03-23 LAB
BILIRUB BLD-MCNC: NEGATIVE MG/DL
CLARITY, POC: CLEAR
COLOR UR: YELLOW
GLUCOSE UR STRIP-MCNC: NEGATIVE MG/DL
KETONES UR QL: NEGATIVE
LEUKOCYTE EST, POC: NEGATIVE
NITRITE UR-MCNC: NEGATIVE MG/ML
PH UR: 5.5 [PH] (ref 5–8)
PROT UR STRIP-MCNC: NEGATIVE MG/DL
RBC # UR STRIP: NEGATIVE /UL
SP GR UR: 1.03 (ref 1–1.03)
UROBILINOGEN UR QL: NORMAL

## 2023-03-23 PROCEDURE — 1159F MED LIST DOCD IN RCRD: CPT | Performed by: UROLOGY

## 2023-03-23 PROCEDURE — 1160F RVW MEDS BY RX/DR IN RCRD: CPT | Performed by: UROLOGY

## 2023-03-23 PROCEDURE — 99024 POSTOP FOLLOW-UP VISIT: CPT | Performed by: UROLOGY

## 2023-03-24 ENCOUNTER — TELEPHONE (OUTPATIENT)
Dept: CARDIAC SURGERY | Facility: CLINIC | Age: 58
End: 2023-03-24
Payer: MEDICAID

## 2023-03-24 ENCOUNTER — TELEPHONE (OUTPATIENT)
Dept: RADIATION ONCOLOGY | Facility: HOSPITAL | Age: 58
End: 2023-03-24
Payer: MEDICAID

## 2023-03-24 ENCOUNTER — CLINICAL DOCUMENTATION (OUTPATIENT)
Dept: HEMATOLOGY | Age: 58
End: 2023-03-24

## 2023-03-24 DIAGNOSIS — R91.1 LUNG NODULE SEEN ON IMAGING STUDY: Primary | ICD-10-CM

## 2023-03-24 DIAGNOSIS — C34.91 ADENOCARCINOMA OF RIGHT LUNG (HCC): ICD-10-CM

## 2023-03-24 NOTE — TELEPHONE ENCOUNTER
Alycia with Dr Kinney calling to relay a message from Dr Perales's office that was sent to them in error.  States Dr Perales's office rec'd a referral on this pt but he has [place] medicaid and they cannot take this ins at this time so they are unable to see this pt./joan

## 2023-03-24 NOTE — TELEPHONE ENCOUNTER
Faxed referral, CT Chest/Abd/Pel to Dr. Perales's office. Facesheet included that he will be having a PET Scan on 03/27 and we can send results once received.

## 2023-03-24 NOTE — PROGRESS NOTES
Spoke to Shakir at [facility] radiology with request to share the following images to [facility]:  Ct Chest, Abd & Pelvis 3/20/23 and PET 11/1/2022. He states he will push images through today.

## 2023-03-24 NOTE — PROGRESS NOTES
Dr Jeong Call placed a call to Dr Dennis Musa at [facility] regarding request for navigational bronchoscopy to rule out or confirm metastatic disease. Recent imaging set via [facility] from [facility] in [place]:  CT Chest, Abd & Pelvis 3/20/23 and PET 11/1/22. Patient scheduled for repeat PET on 3/27/2023, will send upon completion. Referral placed per protocol to Dr Alejandra Donis. Spoke with Farooq Grace who understands to expect call from Dr Dali Bennett office to schedule bronchoscopy. He will keep appt on Monday for PET scan and f/u with Dr Chris Don to discuss the above, as he recommended the procedure to r/o metastatic disease prior to planned surgery.

## 2023-03-24 NOTE — TELEPHONE ENCOUNTER
Laurie, Beryl and I just discussed this and Beryl and I just saw Laurie's message. Dr. Perales does not accept pt's insurance. How would you like to proceed?

## 2023-03-24 NOTE — TELEPHONE ENCOUNTER
Dr. Carlin discussed with Dr. Tadeo and he is going to see if there is someone in [place] to refer to for navigational bronchoscopy.  If he is unable to find anyone, Dr. Carlin will call .

## 2023-03-24 NOTE — TELEPHONE ENCOUNTER
Received a call from Dr. Perales regarding referral. Stated that due to patients insurance being [place] Medicaid they can not schedule him. They stated that they are working on getting approval to see [place] Medicaid patients.     Reached out to Minnie García's office since they referred him and explained this to Sonia. She verbalized understanding.

## 2023-03-27 ENCOUNTER — HOSPITAL ENCOUNTER (OUTPATIENT)
Dept: CT IMAGING | Facility: HOSPITAL | Age: 58
Discharge: HOME | End: 2023-03-27
Payer: MEDICAID

## 2023-03-27 ENCOUNTER — CLINICAL DOCUMENTATION (OUTPATIENT)
Dept: HEMATOLOGY | Age: 58
End: 2023-03-27

## 2023-03-27 ENCOUNTER — OFFICE VISIT (OUTPATIENT)
Dept: CARDIAC SURGERY | Facility: CLINIC | Age: 58
End: 2023-03-27
Payer: MEDICAID

## 2023-03-27 VITALS
BODY MASS INDEX: 29.26 KG/M2 | HEIGHT: 74 IN | WEIGHT: 228 LBS | OXYGEN SATURATION: 97 % | DIASTOLIC BLOOD PRESSURE: 78 MMHG | HEART RATE: 98 BPM | SYSTOLIC BLOOD PRESSURE: 106 MMHG

## 2023-03-27 DIAGNOSIS — C34.91 MALIGNANT NEOPLASM OF RIGHT LUNG, UNSPECIFIED PART OF LUNG: ICD-10-CM

## 2023-03-27 DIAGNOSIS — C34.91 MALIGNANT NEOPLASM OF RIGHT LUNG, UNSPECIFIED PART OF LUNG: Primary | ICD-10-CM

## 2023-03-27 PROCEDURE — 78815 PET IMAGE W/CT SKULL-THIGH: CPT

## 2023-03-27 PROCEDURE — A9552 F18 FDG: HCPCS | Performed by: NURSE PRACTITIONER

## 2023-03-27 PROCEDURE — 0 FLUDEOXYGLUCOSE F18 SOLUTION: Performed by: NURSE PRACTITIONER

## 2023-03-27 PROCEDURE — 99214 OFFICE O/P EST MOD 30 MIN: CPT | Performed by: SURGERY

## 2023-03-27 RX ADMIN — FLUDEOXYGLUCOSE F18 1 DOSE: 300 INJECTION INTRAVENOUS at 11:45

## 2023-03-27 NOTE — PROGRESS NOTES
"    [facility] Cardiothoracic Surgery  PROGRESS NOTE   CC: Right upper lobe lung cancer status post perioperative chemoimmunotherapy    Subjective:     Mr. Jones is a 57-year-old male who returns to me in follow-up now 2 months after initial evaluation. He was found to have right upper lobe lung cancer that was adjacent to the chest wall and has since undergone preoperative chemo, immunotherapy and radiation therapy. He returns today with repeat imaging to further discuss proceeding with surgical resection. Of note, prior to this, he did have hypermetabolic abdominal lymph node as well. He is accompanied by an adult female.    In summary the patient was advised along with the adult female that he will require an additional biopsy of his new nodularity's  prior to determining if he will have surgery or continue with chemotherapy treatment. His case will be discussed with Dr. Tadeo in order to expedite his biopsy.    ROS:   No fevers, chills or recent illness.    Objective:      /78 (BP Location: Right arm, Patient Position: Sitting, Cuff Size: Adult)   Pulse 98   Ht 187 cm (73.62\")   Wt 103 kg (228 lb)   SpO2 97%   BMI 29.57 kg/m²         PE:  Vitals:    03/27/23 1401   BP: 106/78   Pulse: 98   SpO2: 97%     GENERAL: NAD, resting comfortably, normal color  CARDIOVASCULAR: regular, regular rate, sinus  PULMONARY: Equal bilateral breath sounds without any wheezes. No acute distress.   ABDOMEN: soft, nontender/nondistended  EXTREMITIES: mild peripheral edema, normal pulses, normal ROM        Lab Results (last 72 hours)     ** No results found for the last 72 hours. **      I reviewed the patient's clinical results and discussed with patient.     PET Scan:   FINDINGS:     HEAD AND NECK: The visualized brain demonstrates no definite focal  abnormal FDG activity. Salivary, tonsillar and laryngeal activity  appears ordinary. No hypermetabolic cervical or supraclavicular  lymphadenopathy is " demonstrated.      CHEST: Previously treated right pleural mass has decreased in terms of  its tracer avidity, SUV max 3.1 (previously 11.4). New nearby right  upper lobe patchy airspace disease with low-level level uptake, SUV max  3.2. No scintigraphic evidence of hilar or mediastinal sav metastasis.     ABDOMEN AND PELVIS: Normal physiologic activity is noted in the liver,  spleen, stomach and bowel. No adrenal hypermetabolism is noted. Urinary  activity is noted in the kidneys and bladder. No FDG-avid  lymphadenopathy is appreciated.      MUSCULOSKELETAL: No FDG-avid osseous metastases are demonstrated.      IMPRESSION:  1. Previously irradiated right chest wall mass demonstrates much less  tracer avidity on today's exam, SUV max 3.1 as compared to 11.4 on the  earlier PET scan from November 2022, indicating a positive treatment  response.  2. There are new patchy airspace opacities identified more centrally in  the right upper lobe, some of which demonstrates PET uptake (SUV max  3.2), suspected radiation pneumonitis.  3. No scintigraphic evidence of sav or distant metastatic disease.     This report was finalized on 03/27/2023 14:08 by Dr Shay Bravo, .    CT Scan:   Review of lung windows demonstrates a new spiculated noncalcified nodule  in the right upper lobe centered on image 62 series 3, with a maximum  size of 1.6 cm. This is suspicious for neoplasm. In addition, there are  several smaller somewhat ill-defined spiculated nodules in the right  upper lobe more inferiorly and anteriorly, images 66 through 83 of  series 3. This includes a soft tissue nodule image 71 of series 3 that  measures 8.4 mm maximum diameter. There is a peribronchiolar nodule in  the right upper lobe that measures 12.9 mm seen on image 79 series 3.  There are smaller ill-defined nodular infiltrates at branch points of  the bronchi in the right upper lobe, just above the minor fissure. The  pleural-based mass seen in the right  upper lobe anteriorly and laterally  is decrease in size in volume, with residual soft tissue component is  seen on image 55 of series 2 and has a maximum thickness of 8.5 mm, the  mass had a diameter 2.8 cm on the PET/CT. The thyroid gland appears  normal. There is a right subclavian port catheter, with the tip in SVC  in satisfactory position. No axillary, mediastinal or hilar  lymphadenopathy is identified. Heart size is normal. There is mild  ectasia of the ascending aorta with a diameter 3.4 cm. The pulmonary  arteries are normal caliber. No pneumothorax is identified. There are  paraseptal emphysematous changes bilaterally as before, no mass or  suspicious nodule is seen in the left flank. There is mild dependent  atelectasis in both lung bases. The upper abdomen appears unremarkable,  no adrenal mass is identified. Review of bone windows shows no evidence  of osseous metastases.     IMPRESSION:  1. 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 However, there are new spiculated nodules in the right  upper lobe, the largest measuring 1.6 cm, some of these areas of  nodularity are ill-defined and seen at the branch points of the  bronchial tree, could potentially be infectious or inflammatory. The  main concern with this appearance is for intrapulmonary metastases  however. Repeat chest CT is recommended in 3 months, follow-up PET/CT  would be appropriate if these new spiculated areas persist or increase  in size.  2. No evidence of intrathoracic lymphadenopathy, no left-sided pulmonary  nodule or lung mass. Stable changes of paraseptal emphysema in both  lungs.     This report was finalized on 03/20/2023 11:56 by Dr. Damian Rutledge MD.      I personally reviewed images of following exams, the following is my interpretation:      PET  scan: There is hypermetabolic activity in the central right upper lobe at area of nodularity that was previously seen on the CT scan.    CT Scan: Smaller right anterior chest wall mass. There is a right upper lobe nodule that is spiculated and adjacent to some changes that I suspect are due to immunotherapy but the nodule of concern is about 1.6 cm in maximum dimension and concerning for malignancy.    Assessment & Plan     Mr. Jones is a 57-year-old male who presented with chest wall invasive right upper lobe lung cancer. He has a history of prostate cancer status post prostatectomy, but his PSA levels have been low. He has undergone induction/perioperative chemotherapy, immunotherapy with optivo and radiation therapy to the chest wall mass. This has shrunk the chest wall mass some on repeat imaging but there are new nodularity's in the right upper lobe that are PET positive. Given this, I discussed with him the differential diagnosis of these new nodularity's and I suspect it's possibly inflammatory in nature, but one of the nodularity's does concern me some. I have discussed the case at length with Dr. Tadeo, I would favor bronchoscopic biopsy of the right upper lobe nodularity and he has plans to go to [facility] for this. If this is negative, I would favor proceeding with surgical resection of the right upper lobe and any involved chest wall as well as lymph nodes. If biopsy is positive for disease, then I would consider this progression of disease with neoadjuvant therapy and he should proceed with definitive chemo and radiation. I discussed this at length with the patient today and he understands.     If we proceed with surgical resection, it'll involve a right thoracotomy with right upper lobectomy and chest wall resection with mediastinal lymph node dissection.     We discussed the risk and benefits of surgery and alternatives including but not limited to bleeding, infection, injury to  major vessels or organs, need for transfusion, need for conversion to open operation, pneumonia, prolonged airleak, risk of anesthesia, and/or death.  He understands these risk and agrees to proceed.  We discussed preoperative, operative and postoperative expectations at length. He understands and agrees to proceed with this.     We will await the biopsy results and then schedule surgery if that is negative. Thank you for trusting me with the care of Mr. Jones. Please do not hesitate to call with questions or concerns.        Goldy Carlin MD   Cardiothoracic Surgeon    Transcribed from ambient dictation for Goldy Carlin MD by Yovany Rouse.  03/27/23   15:55 CDT    Patient or patient representative verbalized consent to the visit recording.  I have personally performed the services described in this document as transcribed by the above individual, and it is both accurate and complete.

## 2023-03-29 ENCOUNTER — TELEPHONE (OUTPATIENT)
Dept: HEMATOLOGY | Age: 58
End: 2023-03-29

## 2023-03-29 NOTE — TELEPHONE ENCOUNTER
Called to let us know that they have received the referral and will be scheduling a CT with [facility] so they can do the broncoscopy.

## 2023-03-30 ENCOUNTER — CLINICAL DOCUMENTATION (OUTPATIENT)
Dept: HEMATOLOGY | Age: 58
End: 2023-03-30

## 2023-04-04 ENCOUNTER — TRANSCRIBE ORDERS (OUTPATIENT)
Dept: ADMINISTRATIVE | Facility: HOSPITAL | Age: 58
End: 2023-04-04
Payer: MEDICAID

## 2023-04-04 ENCOUNTER — CLINICAL DOCUMENTATION (OUTPATIENT)
Dept: HEMATOLOGY | Age: 58
End: 2023-04-04

## 2023-04-04 ENCOUNTER — OFFICE VISIT (OUTPATIENT)
Dept: PULMONOLOGY | Facility: CLINIC | Age: 58
End: 2023-04-04
Payer: MEDICAID

## 2023-04-04 VITALS
HEIGHT: 74 IN | WEIGHT: 229.2 LBS | HEART RATE: 87 BPM | SYSTOLIC BLOOD PRESSURE: 112 MMHG | BODY MASS INDEX: 29.41 KG/M2 | DIASTOLIC BLOOD PRESSURE: 78 MMHG | OXYGEN SATURATION: 97 %

## 2023-04-04 DIAGNOSIS — Z87.01 HISTORY OF PNEUMONIA: ICD-10-CM

## 2023-04-04 DIAGNOSIS — J44.9 CHRONIC OBSTRUCTIVE PULMONARY DISEASE, UNSPECIFIED COPD TYPE: Primary | ICD-10-CM

## 2023-04-04 DIAGNOSIS — Z87.891 FORMER SMOKER: ICD-10-CM

## 2023-04-04 DIAGNOSIS — C34.91 MALIGNANT NEOPLASM OF RIGHT LUNG, UNSPECIFIED PART OF LUNG: ICD-10-CM

## 2023-04-04 DIAGNOSIS — Z92.21 HISTORY OF CHEMOTHERAPY: ICD-10-CM

## 2023-04-04 DIAGNOSIS — Z92.3 STATUS POST RADIATION THERAPY: ICD-10-CM

## 2023-04-04 DIAGNOSIS — J30.89 NON-SEASONAL ALLERGIC RHINITIS, UNSPECIFIED TRIGGER: ICD-10-CM

## 2023-04-04 DIAGNOSIS — R91.1 LUNG NODULE: ICD-10-CM

## 2023-04-04 DIAGNOSIS — Z86.16 HISTORY OF COVID-19: ICD-10-CM

## 2023-04-04 DIAGNOSIS — G47.33 OSA (OBSTRUCTIVE SLEEP APNEA): ICD-10-CM

## 2023-04-04 DIAGNOSIS — R91.8 LUNG MASS: Primary | ICD-10-CM

## 2023-04-04 PROCEDURE — 99214 OFFICE O/P EST MOD 30 MIN: CPT | Performed by: INTERNAL MEDICINE

## 2023-04-04 RX ORDER — AZELASTINE HYDROCHLORIDE 137 UG/1
2 SPRAY, METERED NASAL 2 TIMES DAILY
Qty: 30 ML | Refills: 5 | Status: SHIPPED | OUTPATIENT
Start: 2023-04-04

## 2023-04-04 RX ORDER — ALBUTEROL SULFATE 90 UG/1
2 AEROSOL, METERED RESPIRATORY (INHALATION) EVERY 4 HOURS PRN
Qty: 6.7 G | Refills: 5 | Status: SHIPPED | OUTPATIENT
Start: 2023-04-04

## 2023-04-04 RX ORDER — FLUTICASONE PROPIONATE AND SALMETEROL 250; 50 UG/1; UG/1
1 POWDER RESPIRATORY (INHALATION) 2 TIMES DAILY
Qty: 1 EACH | Refills: 11 | Status: SHIPPED | OUTPATIENT
Start: 2023-04-04

## 2023-04-04 RX ORDER — MONTELUKAST SODIUM 10 MG/1
10 TABLET ORAL NIGHTLY
Qty: 90 TABLET | Refills: 3 | Status: SHIPPED | OUTPATIENT
Start: 2023-04-04

## 2023-04-04 RX ORDER — FLUTICASONE PROPIONATE 50 MCG
2 SPRAY, SUSPENSION (ML) NASAL DAILY
Qty: 16 G | Refills: 11 | Status: SHIPPED | OUTPATIENT
Start: 2023-04-04

## 2023-04-04 RX ORDER — METOCLOPRAMIDE 5 MG/1
TABLET ORAL
COMMUNITY
Start: 2023-03-28

## 2023-04-04 RX ORDER — CETIRIZINE HYDROCHLORIDE 10 MG/1
10 TABLET ORAL DAILY
Qty: 90 TABLET | Refills: 3 | Status: SHIPPED | OUTPATIENT
Start: 2023-04-04

## 2023-04-04 NOTE — PROGRESS NOTES
Spoke with Alycia Chandler who states that he is scheduled for CT Chest on 4/11/23 then await appt for resection at [facility]. He understands to call clinic with updated information. In the interim, c/u appt with dr Marie Alonzo has been tentatively scheduled for 4/20/23.

## 2023-04-04 NOTE — PROGRESS NOTES
[facility] OUTPATIENT PROGRESS NOTE    Patient: Lalo Jones  : 1965  Age: 57 y.o.  Date of Service: 2023    REASON FOR CLINIC VISIT:  Chief Complaint   Patient presents with   • Follow-up     Pt. Here for follow up       Subjective:    History of Present Illness:  Lalo Jones is a 57 y.o. male who presents to the office today to be seen for    Diagnosis Plan   1. Chronic obstructive pulmonary disease, unspecified COPD type        2. History of pneumonia        3. Lung nodule        4. Former smoker        5. Malignant neoplasm of right lung, unspecified part of lung        6. FABIEN (obstructive sleep apnea)        7. History of COVID-19        8. History of chemotherapy        9. Status post radiation therapy        10. Non-seasonal allergic rhinitis, unspecified trigger        .  Other problems per record.  Is a very pleasant middle aged  male who was seen in the pulmonary clinic for follow-up visit.    Patient is a former smoker and quit smoking few months ago.  He had a diagnosis of chronic obstructive pulmonary disease and had pneumonia in the past.  He also had a lung nodule and recently diagnosed with non-small cell lung cancer.  He follows up with Dr. Tadeo.  He had to go to [place] for a interventional bronchoscopy for the diagnosis.  After his diagnosis is made he is following up locally and also had 3 cycles of chemotherapy and 20 cycles of radiation treatment with Dr. Kinney.  Patient is doing much better after the treatment although he gets nauseous at times.  His last treatment was more than a month ago.    Overall he is feeling better and he gained some weight.  He does not have any acute shortness of breath he is currently using Wixela and also albuterol rescue inhaler and also uses Zyrtec and fluticasone nasal spray with Singulair but he continues to have sinus drainage and cough with clear expectoration.  He did not have any recent  hospitalizations and ER visit and urgent care visit and history is active.  He lives at home with his family.  No other acute complaints at this time.  He had recent CT scan and PET scan done which was reviewed it showed the PET positive area in the right upper lung but the peripheral mass which was detected earlier has regressed in size and the CT scan and the PET scan uptake is low.  However he told me he is going to get a follow-up CT scan or PET scan in the middle again as required by the physicians there.    He already had been vaccinated for COVID but will need a booster dose and also an influenza vaccine.  No other new complaints.      PFT done today:  Not done today      Results for orders placed during the hospital encounter of 03/16/23    Full Pulmonary Function Test With Bronchodilator & ABG    Narrative  [facility] - Pulmonary Function Test    [address] [place] [address]  [id number]  [phone]    Patient : Lalo Jones  MRN : [id number]  CSN : [id number]  Pulmonologist : Anam Brown MD  Date : 3/21/2023    ______________________________________________________________________    Interpretation :  1.  Spirometry is consistent with a moderate obstructive ventilatory defect although the decrease in the patient's FEV1 is just into the moderate range at 79% of predicted.  2.  There is improvement in spirometry postbronchodilator so that postbronchodilator spirometry just reveals a mild obstructive ventilatory defect manifested by a decrease in midflows and peak expiratory flow.  3.  Lung volumes reveal hyperinflation.  4.  There is a mild diffusion impairment particularly when corrected for alveolar volume.      Anam Brown MD         Bronchodilator therapy: Advair and Albuterol rescue inhaler.     Smoking Status:   Social History     Tobacco Use   Smoking Status Former   • Packs/day: 1.00   • Years: 35.00   • Pack years: 35.00   • Types: Cigarettes   • Start date:  1987   • Quit date: 2022   • Years since quittin.3   Smokeless Tobacco Former   • Types: Snuff   • Quit date:      Pulm Rehab: no  Sleep: yes    Support System: lives with their spouse    Code Status:   There are no questions and answers to display.        Review of Systems:  A complete review of systems is performed and all other systems were reviewed and negative as note above in the HPI.  Review of Systems   Constitutional: Negative.    HENT: Positive for congestion, postnasal drip and sinus pressure.    Eyes: Negative.    Respiratory: Positive for chest tightness and shortness of breath.    Cardiovascular: Negative.    Gastrointestinal: Negative.    Endocrine: Negative.    Genitourinary: Negative.    Musculoskeletal: Negative.    Skin: Negative.    Allergic/Immunologic: Positive for environmental allergies.   Neurological: Negative.    Hematological: Negative.    Psychiatric/Behavioral: Negative.        CAT/ACT Score:  Not done today    Medications:  Outpatient Encounter Medications as of 2023   Medication Sig Dispense Refill   • acetaminophen (Tylenol) 325 MG tablet Take 3 tablets by mouth Every 8 (Eight) Hours. Take every 8 hours for 3 days then take prn as needed. 100 tablet 2   • albuterol sulfate  (90 Base) MCG/ACT inhaler Inhale 2 puffs Every 4 (Four) Hours As Needed for Wheezing or Shortness of Air.     • cetirizine (zyrTEC) 10 MG tablet 1 tablet Daily.     • dexamethasone (DECADRON) 4 MG tablet As Needed.     • fluticasone (Flonase Allergy Relief) 50 MCG/ACT nasal spray 2 sprays into the nostril(s) as directed by provider Daily. 16 g 11   • Fluticasone-Salmeterol (Wixela Inhub) 250-50 MCG/ACT DISKUS Inhale 1 puff 2 (Two) Times a Day. 1 each 11   • folic acid (FOLVITE) 1 MG tablet      • gabapentin (NEURONTIN) 800 MG tablet Take 1 tablet by mouth 3 (Three) Times a Day.     • hydrOXYzine pamoate (VISTARIL) 50 MG capsule 1 capsule Daily.     • ibuprofen (ADVIL,MOTRIN) 800 MG  "tablet Take 1 tablet by mouth Every 8 (Eight) Hours As Needed for Mild Pain.     • montelukast (SINGULAIR) 10 MG tablet Take 1 tablet by mouth Every Night. 90 tablet 3   • multivitamin with minerals tablet tablet Take 1 tablet by mouth Daily.     • NON FORMULARY Take  by mouth Daily. Marijuana Gummies     • omeprazole (priLOSEC) 40 MG capsule Take 1 capsule by mouth Daily. 90 capsule 1   • metoclopramide (REGLAN) 5 MG tablet        No facility-administered encounter medications on file as of 4/4/2023.       Allergies:  No Known Allergies    Immunizations:  Immunization History   Administered Date(s) Administered   • COVID-19 (JOSE) 03/19/2021   • COVID-19 (MODERNA) 1st, 2nd, 3rd Dose Only 04/19/2022   • Hepatitis A 06/12/2019, 01/30/2020       Objective:    Vitals:  /78 (BP Location: Left arm, Patient Position: Sitting, Cuff Size: Adult)   Pulse 87   Ht 187 cm (73.62\")   Wt 104 kg (229 lb 3.2 oz)   SpO2 97%   BMI 29.73 kg/m²     Physical Exam:  General: Patient is a 57 y.o. pleasant middle aged  male. Looks stated age. Appears to be in no acute distress.  Eyes: EOMI. PERRLA. Vision intact. No scleral icterus.  Ear, Nose, Mouth and Throat: Hearing is grossly intact. No Leukoplakia, pharyngitis, stomatitis or thrush. Swollen nasal mucosa with post nasal drop.  Neck: Range of motion of neck normal. No thyromegaly or masses. Mallampati Class 3  Respiratory: Clear to auscultation bilaterally. No use of accessory muscles. Decreased breath sounds.  Cardiovascular: Normal heart sounds. Regularly regular rhythm without murmur.  Gastrointestinal: Non tender, non distended, soft. Bowel sounds positive in all four quadrants. No organomegaly.  Skin: No obvious rashes, lesions, ulcers or large amount of bruising. No edema.   Neurological: No new motor deficits. Cranial nerves appear intact.  Psychiatric: Patient is alert and oriented to person, place and time.    Chest Imaging:    Study Result  Narrative & " Impression   EXAMINATION: CT CHEST W CONTRAST DIAGNOSTIC- 3/20/2023 11:36 AM CDT     HISTORY: Lung nodule; C34.91-Malignant neoplasm of unspecified part of  right bronchus or lung     DOSE: 261 mGycm (Automatic exposure control technique was implemented in  an effort to keep the radiation dose as low as possible without  compromising image quality)     REPORT: Spiral CT of the chest was performed after administration of  intravenous contrast from the thoracic inlet through the upper abdomen.  Reconstructed coronal and sagittal images were also reviewed.     Comparison: Low-dose screening chest CT 5/19/2022. Limited chest CT for  lung biopsy on 11/16/2022. PET/CT 11/1/2022.     Review of lung windows demonstrates a new spiculated noncalcified nodule  in the right upper lobe centered on image 62 series 3, with a maximum  size of 1.6 cm. This is suspicious for neoplasm. In addition, there are  several smaller somewhat ill-defined spiculated nodules in the right  upper lobe more inferiorly and anteriorly, images 66 through 83 of  series 3. This includes a soft tissue nodule image 71 of series 3 that  measures 8.4 mm maximum diameter. There is a peribronchiolar nodule in  the right upper lobe that measures 12.9 mm seen on image 79 series 3.  There are smaller ill-defined nodular infiltrates at branch points of  the bronchi in the right upper lobe, just above the minor fissure. The  pleural-based mass seen in the right upper lobe anteriorly and laterally  is decrease in size in volume, with residual soft tissue component is  seen on image 55 of series 2 and has a maximum thickness of 8.5 mm, the  mass had a diameter 2.8 cm on the PET/CT. The thyroid gland appears  normal. There is a right subclavian port catheter, with the tip in SVC  in satisfactory position. No axillary, mediastinal or hilar  lymphadenopathy is identified. Heart size is normal. There is mild  ectasia of the ascending aorta with a diameter 3.4 cm. The  pulmonary  arteries are normal caliber. No pneumothorax is identified. There are  paraseptal emphysematous changes bilaterally as before, no mass or  suspicious nodule is seen in the left flank. There is mild dependent  atelectasis in both lung bases. The upper abdomen appears unremarkable,  no adrenal mass is identified. Review of bone windows shows no evidence  of osseous metastases.     IMPRESSION:  1. 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 However, there are new spiculated nodules in the right  upper lobe, the largest measuring 1.6 cm, some of these areas of  nodularity are ill-defined and seen at the branch points of the  bronchial tree, could potentially be infectious or inflammatory. The  main concern with this appearance is for intrapulmonary metastases  however. Repeat chest CT is recommended in 3 months, follow-up PET/CT  would be appropriate if these new spiculated areas persist or increase  in size.  2. No evidence of intrathoracic lymphadenopathy, no left-sided pulmonary  nodule or lung mass. Stable changes of paraseptal emphysema in both  lungs.     This report was finalized on 03/20/2023 11:56 by Dr. Damian Rutledge MD.     Study Result  Narrative & Impression   PET CT 3/27/2023 11:20 AM CDT     Reason for study: Lung mass; C34.91-Malignant neoplasm of unspecified  part of right bronchus or lung     Restaging exam     Comparison: PET scan dated 11/01/2022, CT scans dated 03/20/2023      Radiopharmaceutical: 10.8 mCi F-18 FDG intravenously.      Technique: Prior to injection of the radiotracer, the patient's blood  glucose is 85 mg/dL. Following injection and a 60 minute distribution  interval, PET scan is performed from the skull base to the midthigh. Low  dose, noncontrast CT is performed in the same anatomic  distribution for  attenuation correction of the PET scan and to assist in localizing the  PET findings. Automated exposure control (AEC) protocols are utilized to  ensure dose lowered technique.      CT dose:  mGy*cm     FINDINGS:     HEAD AND NECK: The visualized brain demonstrates no definite focal  abnormal FDG activity. Salivary, tonsillar and laryngeal activity  appears ordinary. No hypermetabolic cervical or supraclavicular  lymphadenopathy is demonstrated.      CHEST: Previously treated right pleural mass has decreased in terms of  its tracer avidity, SUV max 3.1 (previously 11.4). New nearby right  upper lobe patchy airspace disease with low-level level uptake, SUV max  3.2. No scintigraphic evidence of hilar or mediastinal sav metastasis.     ABDOMEN AND PELVIS: Normal physiologic activity is noted in the liver,  spleen, stomach and bowel. No adrenal hypermetabolism is noted. Urinary  activity is noted in the kidneys and bladder. No FDG-avid  lymphadenopathy is appreciated.      MUSCULOSKELETAL: No FDG-avid osseous metastases are demonstrated.      IMPRESSION:  1. Previously irradiated right chest wall mass demonstrates much less  tracer avidity on today's exam, SUV max 3.1 as compared to 11.4 on the  earlier PET scan from November 2022, indicating a positive treatment  response.  2. There are new patchy airspace opacities identified more centrally in  the right upper lobe, some of which demonstrates PET uptake (SUV max  3.2), suspected radiation pneumonitis.  3. No scintigraphic evidence of sav or distant metastatic disease.     This report was finalized on 03/27/2023 14:08 by Dr Shay Bravo, .         Assessment:  1. Chronic obstructive pulmonary disease, unspecified COPD type    2. History of pneumonia    3. Lung nodule    4. Former smoker    5. Malignant neoplasm of right lung, unspecified part of lung    6. FABIEN (obstructive sleep apnea)    7. History of COVID-19    8. History of  chemotherapy    9. Status post radiation therapy    10. Non-seasonal allergic rhinitis, unspecified trigger        Plan/Recommendations:    1.  I reviewed the CT scan and PET scan and discussed the results with the patient.  He is also getting followed by Dr. Carlin and Dr. Carlin is still undecided whether patient is a surgical candidate or not.  2.  He will continue follow-up with Dr. Tadeo and other physicians in [place] for newly diagnosed lung cancer and he is already improving with chemotherapy and radiation treatment.  Further cancer management per Dr. Tadeo.  3.  He did quit smoking I encouraged him to continue smoking cessation.  He will continue Wixela and albuterol rescue inhaler for his COPD and his COPD is under good control  4.  Patient will continue using fluticasone nasal spray Zyrtec and Singulair for his nasal allergy and as he is on allergy drainage advised him to start using Astelin nasal spray and a prescription was sent to the pharmacy.  He is already vaccinated for COVID and had influenza vaccine but he will need a booster dose of COVID-vaccine.  Advised to return to the pulmonary clinic for a follow-up visit in 6 months time or earlier if needed    Follow up:  6 Months    Time Spent:  30 minutes    I appreciate the opportunity of participating in this patient's care. I would like to thank the PCP for the referral.  Please feel free to contact me with any other questions.    Russel Santana MD   Pulmonologist/Intensivist     Electronically signed by: Russel Santana MD, 4/4/2023 11:32 CDT

## 2023-04-14 ENCOUNTER — HOSPITAL ENCOUNTER (OUTPATIENT)
Dept: CT IMAGING | Facility: HOSPITAL | Age: 58
Discharge: HOME | End: 2023-04-14
Admitting: INTERNAL MEDICINE
Payer: MEDICAID

## 2023-04-14 DIAGNOSIS — R91.8 LUNG MASS: ICD-10-CM

## 2023-04-14 PROCEDURE — 71250 CT THORAX DX C-: CPT

## 2023-04-18 ENCOUNTER — TELEPHONE (OUTPATIENT)
Dept: CARDIAC SURGERY | Facility: CLINIC | Age: 58
End: 2023-04-18

## 2023-04-18 NOTE — TELEPHONE ENCOUNTER
Spoke with patient.  I advised him that I will relay this information to Dr. Carlin and call him back with further recommendations.

## 2023-04-18 NOTE — TELEPHONE ENCOUNTER
Called patient re: this. He states he had the CT scan completed, but Dr Gil's office called and states the first available appt they have is June 6. Patient is upset by this and does not want to wait that long to be seen. I advised I would inform Dr Carlin and we would give him a call back. He voiced understanding.

## 2023-04-18 NOTE — TELEPHONE ENCOUNTER
Caller: Lalo Jones    Relationship: Self    Best call back number: [phone]    What is the best time to reach you: ANY     Who are you requesting to speak with (clinical staff, provider,  specific staff member): CLINICAL    Do you know the name of the person who called: PATIENT     What was the call regarding:   PATIENT CALLED HUB STATING THAT HE WAS REFERRED TO PULMONOLOGY AND NEEDED TESTING DONE PRIOR. PATIENT CALLED TESTING AND WAS TOLD THAT THE FIRST AVAILABLE WAS June 6TH. PATIENT  STATED THAT THAT DATE FOR TESTING IS TOO FAR OUT. PATIENT WOULD LIKE A CALL  BACK TO DISCUSS DIFFERENT OPTIONS.     Do you require a callback: YES

## 2023-04-19 ENCOUNTER — PREP FOR SURGERY (OUTPATIENT)
Dept: OTHER | Facility: HOSPITAL | Age: 58
End: 2023-04-19
Payer: MEDICAID

## 2023-04-19 DIAGNOSIS — R91.8 LUNG MASS: Primary | ICD-10-CM

## 2023-04-19 RX ORDER — SODIUM CHLORIDE 0.9 % (FLUSH) 0.9 %
10 SYRINGE (ML) INJECTION AS NEEDED
OUTPATIENT
Start: 2023-04-19

## 2023-04-19 RX ORDER — BUPIVACAINE HCL/0.9 % NACL/PF 0.1 %
2 PLASTIC BAG, INJECTION (ML) EPIDURAL ONCE
OUTPATIENT
Start: 2023-05-01

## 2023-04-19 RX ORDER — SODIUM CHLORIDE 9 MG/ML
40 INJECTION, SOLUTION INTRAVENOUS AS NEEDED
OUTPATIENT
Start: 2023-04-19

## 2023-04-19 RX ORDER — SODIUM CHLORIDE 0.9 % (FLUSH) 0.9 %
10 SYRINGE (ML) INJECTION EVERY 12 HOURS SCHEDULED
OUTPATIENT
Start: 2023-04-19

## 2023-04-19 RX ORDER — HEPARIN SODIUM 5000 [USP'U]/ML
5000 INJECTION, SOLUTION INTRAVENOUS; SUBCUTANEOUS ONCE
OUTPATIENT
Start: 2023-05-01

## 2023-04-19 RX ORDER — ALBUTEROL SULFATE 1.25 MG/3ML
1.25 SOLUTION RESPIRATORY (INHALATION)
OUTPATIENT
Start: 2023-04-19

## 2023-04-19 NOTE — TELEPHONE ENCOUNTER
Surgery orders RN for 5/1/2023.  This will be 0700 start time.  Dr. Carlin has discussed with the OR and he has a second room for this today.  Please call patient with prework information.  Dexamethasone is on his medication list however I discussed this with the patient and he states he no longer takes this, he was only using this during chemo.  I have advised him no further doses leading up to surgery.  He verbalizes understanding.

## 2023-04-21 ENCOUNTER — TELEPHONE (OUTPATIENT)
Dept: CARDIAC SURGERY | Facility: CLINIC | Age: 58
End: 2023-04-21
Payer: MEDICAID

## 2023-04-21 NOTE — TELEPHONE ENCOUNTER
Pt is scheduled for surgery 05/01/2023 and would like to know how long he will be in the hospital following this procedure, so that he can tell his family what to expect. Pt can be reached at [phone].

## 2023-04-25 ENCOUNTER — ANESTHESIA EVENT (OUTPATIENT)
Dept: PERIOP | Facility: HOSPITAL | Age: 58
DRG: 165 | End: 2023-04-25
Payer: MEDICAID

## 2023-04-26 ENCOUNTER — TELEPHONE (OUTPATIENT)
Dept: CARDIAC SURGERY | Facility: CLINIC | Age: 58
End: 2023-04-26
Payer: MEDICAID

## 2023-04-26 NOTE — TELEPHONE ENCOUNTER
Please call patient and notify him that his surgery date has changed from May 1 to May 4.  Please move his case on the schedule.  Surgery will be May 4 with 1230 start time and patient needs to arrive at 10.

## 2023-04-27 ENCOUNTER — PRE-ADMISSION TESTING (OUTPATIENT)
Dept: PREADMISSION TESTING | Facility: HOSPITAL | Age: 58
End: 2023-04-27
Payer: MEDICAID

## 2023-04-27 ENCOUNTER — HOSPITAL ENCOUNTER (OUTPATIENT)
Dept: GENERAL RADIOLOGY | Facility: HOSPITAL | Age: 58
Discharge: HOME | End: 2023-04-27
Payer: MEDICAID

## 2023-04-27 VITALS
HEIGHT: 74 IN | BODY MASS INDEX: 28.72 KG/M2 | DIASTOLIC BLOOD PRESSURE: 80 MMHG | SYSTOLIC BLOOD PRESSURE: 111 MMHG | RESPIRATION RATE: 18 BRPM | OXYGEN SATURATION: 96 % | WEIGHT: 223.77 LBS | HEART RATE: 85 BPM

## 2023-04-27 DIAGNOSIS — R91.8 LUNG MASS: ICD-10-CM

## 2023-04-27 LAB
ALBUMIN SERPL-MCNC: 4.5 G/DL (ref 3.5–5.2)
ALBUMIN/GLOB SERPL: 1.7 G/DL
ALP SERPL-CCNC: 98 U/L (ref 39–117)
ALT SERPL W P-5'-P-CCNC: 34 U/L (ref 1–41)
ANION GAP SERPL CALCULATED.3IONS-SCNC: 11 MMOL/L (ref 5–15)
APTT PPP: 31.2 SECONDS (ref 24.1–35)
AST SERPL-CCNC: 22 U/L (ref 1–40)
BASOPHILS # BLD AUTO: 0.05 10*3/MM3 (ref 0–0.2)
BASOPHILS NFR BLD AUTO: 0.9 % (ref 0–1.5)
BILIRUB SERPL-MCNC: 0.4 MG/DL (ref 0–1.2)
BUN SERPL-MCNC: 17 MG/DL (ref 6–20)
BUN/CREAT SERPL: 14.9 (ref 7–25)
CALCIUM SPEC-SCNC: 9.8 MG/DL (ref 8.6–10.5)
CHLORIDE SERPL-SCNC: 104 MMOL/L (ref 98–107)
CO2 SERPL-SCNC: 26 MMOL/L (ref 22–29)
CREAT SERPL-MCNC: 1.14 MG/DL (ref 0.76–1.27)
DEPRECATED RDW RBC AUTO: 46.5 FL (ref 37–54)
EGFRCR SERPLBLD CKD-EPI 2021: 76 ML/MIN/1.73
EOSINOPHIL # BLD AUTO: 0.25 10*3/MM3 (ref 0–0.4)
EOSINOPHIL NFR BLD AUTO: 4.7 % (ref 0.3–6.2)
ERYTHROCYTE [DISTWIDTH] IN BLOOD BY AUTOMATED COUNT: 13.3 % (ref 12.3–15.4)
GLOBULIN UR ELPH-MCNC: 2.6 GM/DL
GLUCOSE SERPL-MCNC: 95 MG/DL (ref 65–99)
HCT VFR BLD AUTO: 44.8 % (ref 37.5–51)
HGB BLD-MCNC: 14.2 G/DL (ref 13–17.7)
IMM GRANULOCYTES # BLD AUTO: 0.02 10*3/MM3 (ref 0–0.05)
IMM GRANULOCYTES NFR BLD AUTO: 0.4 % (ref 0–0.5)
INR PPP: 0.92 (ref 0.91–1.09)
LYMPHOCYTES # BLD AUTO: 0.95 10*3/MM3 (ref 0.7–3.1)
LYMPHOCYTES NFR BLD AUTO: 18 % (ref 19.6–45.3)
MCH RBC QN AUTO: 30 PG (ref 26.6–33)
MCHC RBC AUTO-ENTMCNC: 31.7 G/DL (ref 31.5–35.7)
MCV RBC AUTO: 94.5 FL (ref 79–97)
MONOCYTES # BLD AUTO: 0.62 10*3/MM3 (ref 0.1–0.9)
MONOCYTES NFR BLD AUTO: 11.7 % (ref 5–12)
NEUTROPHILS NFR BLD AUTO: 3.4 10*3/MM3 (ref 1.7–7)
NEUTROPHILS NFR BLD AUTO: 64.3 % (ref 42.7–76)
NRBC BLD AUTO-RTO: 0 /100 WBC (ref 0–0.2)
PLATELET # BLD AUTO: 171 10*3/MM3 (ref 140–450)
PMV BLD AUTO: 9 FL (ref 6–12)
POTASSIUM SERPL-SCNC: 4.9 MMOL/L (ref 3.5–5.2)
PROT SERPL-MCNC: 7.1 G/DL (ref 6–8.5)
PROTHROMBIN TIME: 12.5 SECONDS (ref 11.8–14.8)
RBC # BLD AUTO: 4.74 10*6/MM3 (ref 4.14–5.8)
SODIUM SERPL-SCNC: 141 MMOL/L (ref 136–145)
WBC NRBC COR # BLD: 5.29 10*3/MM3 (ref 3.4–10.8)

## 2023-04-27 PROCEDURE — 71046 X-RAY EXAM CHEST 2 VIEWS: CPT

## 2023-04-27 PROCEDURE — 85025 COMPLETE CBC W/AUTO DIFF WBC: CPT

## 2023-04-27 PROCEDURE — 36415 COLL VENOUS BLD VENIPUNCTURE: CPT

## 2023-04-27 PROCEDURE — 85610 PROTHROMBIN TIME: CPT

## 2023-04-27 PROCEDURE — 93010 ELECTROCARDIOGRAM REPORT: CPT | Performed by: INTERNAL MEDICINE

## 2023-04-27 PROCEDURE — 93005 ELECTROCARDIOGRAM TRACING: CPT

## 2023-04-27 PROCEDURE — 80053 COMPREHEN METABOLIC PANEL: CPT

## 2023-04-27 PROCEDURE — 85730 THROMBOPLASTIN TIME PARTIAL: CPT

## 2023-04-27 NOTE — DISCHARGE INSTRUCTIONS
Before you come to the hospital        Arrival time: AS DIRECTED BY OFFICE     YOU MAY TAKE THE FOLLOWING MEDICATION(S) THE MORNING OF SURGERY WITH A SIP OF WATER: ***CONTACT DR MARGARITA OFFICE ABOUT IBUPROFEN AND NEURONTIN           ALL OTHER HOME MEDICATION CHECK WITH YOUR PHYSICIAN (especially if   you are taking diabetes medicines or blood thinners)    Do not take any Erectile Dysfunction medications (EX: CIALIS, VIAGRA) 24 hours prior to surgery.      If you were given and instructed to use a germ- killing soap, use as directed the night before surgery and again the morning of surgery or as directed by your surgeon. (Use one-half of the bottle with each shower.)   See attached information for How to Use Chlorhexidine for Bathing if applicable.            Eating and drinking restrictions prior to scheduled arrival time    2 Hours before arrival time STOP   Drinking Clear liquids (water, apple juice-no pulp)     6 Hours before arrival time STOP   Milk or drinks that contain milk, full liquids    6 Hours before arrival time STOP   Light meals or foods, such as toast or cereal    8 Hours before arrival time STOP   Heavy foods, such as meat, fried foods, or fatty foods    (It is extremely important that you follow these guidelines to prevent delay or cancelation of your procedure)     Clear Liquids  Water and flavored water                                                                      Clear Fruit juices, such as cranberry juice and apple juice.  Black coffee (NO cream of any kind, including powdered).  Plain tea  Clear bouillon or broth.  Flavored gelatin.  Soda.  Gatorade or Powerade.  Full liquid examples  Juices that have pulp.  Frozen ice pops that contain fruit pieces.  Coffee with creamer  Milk.  Yogurt.                MANAGING PAIN AFTER SURGERY    We know you are probably wondering what your pain will be like after surgery.  Following surgery it is unrealistic to expect you will not have pain.   Pain is  how our bodies let us know that something is wrong or cautions us to be careful.  That said, our goal is to make your pain tolerable.    Methods we may use to treat your pain include (oral or IV medications, PCAs, epidurals, nerve blocks, etc.)   While some procedures require IV pain medications for a short time after surgery, transitioning to pain medications by mouth allows for better management of pain.   Your nurse will encourage you to take oral pain medications whenever possible.  IV medications work almost immediately, but only last a short while.  Taking medications by mouth allows for a more constant level of medication in your blood stream for a longer period of time.      Once your pain is out of control it is harder to get back under control.  It is important you are aware when your next dose of pain medication is due.  If you are admitted, your nurse may write the time of your next dose on the white board in your room to help you remember.      We are interested in your pain and encourage you to inform us about aggravating factors during your visit.   Many times a simple repositioning every few hours can make a big difference.    If your physician says it is okay, do not let your pain prevent you from getting out of bed. Be sure to call your nurse for assistance prior to getting up so you do not fall.      Before surgery, please decide your tolerable pain goal.  These faces help describe the pain ratings we use on a 0-10 scale.   Be prepared to tell us your goal and whether or not you take pain or anxiety medications at home.          Preparing for Surgery  Preparing for surgery is an important part of your care. It can make things go more smoothly and help you avoid complications. The steps leading up to surgery may vary among hospitals. Follow all instructions given to you by your health care providers. Ask questions if you do not understand something. Talk about any concerns that you have.  Here are  some questions to consider asking before your surgery:  If my surgery is not an emergency (is elective), when would be the best time to have the surgery?  What arrangements do I need to make for work, home, or school?  What will my recovery be like? How long will it be before I can return to normal activities?  Will I need to prepare my home? Will I need to arrange care for me or my children?  Should I expect to have pain after surgery? What are my pain management options? Are there nonmedical options that I can try for pain?  Tell a health care provider about:  Any allergies you have.  All medicines you are taking, including vitamins, herbs, eye drops, creams, and over-the-counter medicines.  Any problems you or family members have had with anesthetic medicines.  Any blood disorders you have.  Any surgeries you have had.  Any medical conditions you have.  Whether you are pregnant or may be pregnant.  What are the risks?  The risks and complications of surgery depend on the specific procedure that you have. Discuss all the risks with your health care providers before your surgery. Ask about common surgical complications, which may include:  Infection.  Bleeding or a need for blood replacement (transfusion).  Allergic reactions to medicines.  Damage to surrounding nerves, tissues, or structures.  A blood clot.  Scarring.  Failure of the surgery to correct the problem.  Follow these instructions before the procedure:  Several days or weeks before your procedure  You may have a physical exam by your primary health care provider to make sure it is safe for you to have surgery.  You may have testing. This may include a chest X-ray, blood and urine tests, electrocardiogram (ECG), or other testing.  Ask your health care provider about:  Changing or stopping your regular medicines. This is especially important if you are taking diabetes medicines or blood thinners.  Taking medicines such as aspirin and ibuprofen. These  medicines can thin your blood. Do not take these medicines unless your health care provider tells you to take them.  Taking over-the-counter medicines, vitamins, herbs, and supplements.  Do not use any products that contain nicotine or tobacco, such as cigarettes and e-cigarettes. If you need help quitting, ask your health care provider.  Avoid alcohol.  Ask your health care provider if there are exercises you can do to prepare for surgery.  Eat a healthy diet.   Plan to have someone 18 years of age or older to take you home from the hospital. We will need to verify your ride on the morning of surgery if you are being discharged home on the same day. Tell your ride to be expecting a call from the hospital prior to your procedure.   Plan to have a responsible adult care for you for at least 24 hours after you leave the hospital or clinic. This is important.  The day before your procedure  You may be given antibiotic medicine to take by mouth to help prevent infection. Take it as told by your health care provider.  You may be asked to shower with a germ-killing soap.  Follow instructions from your health care provider about eating and drinking restrictions. This includes gum, mints and hard candy.  Pack comfortable clothes according to your procedure.   The day of your procedure  You may need to take another shower with a germ-killing soap before you leave home in the morning.  With a small sip of water, take only the medicines that you are told to take.  Remove all jewelry including rings.   Leave anything you consider valuable at home except hearing aids if needed.  You do not need to bring your home medications into the hospital.   Do not wear any makeup, nail polish, powder, deodorant, lotion, hair accessories, or anything on your skin or body except your clothes.  If you will be staying in the hospital, bring a case to hold your glasses, contacts, or dentures. You may also want to bring your robe and non-skid  footwear.       (Do not use denture adhesives since you will be asked to remove them during  surgery).   If you wear oxygen at home, bring it with you the day of surgery.  If instructed by your health care provider, bring your sleep apnea device with you on the day of your surgery (if this applies to you).  You may want to leave your suitcase and sleep apnea device in the car until after surgery.   Arrive at the hospital as scheduled.  Bring a friend or family member with you who can help to answer questions and be present while you meet with your health care provider.  At the hospital  When you arrive at the hospital:  Go to registration located at the main entrance of the hospital. You will be registered and given a beeper and a sticker sheet. Take the stickers to the Outpatient nurses desk and place in the black tray. This is to notify staff that you have arrived. Then return to the lobby to wait.   When your beeper lights up and vibrates proceed through the double doors, under the stairs, and a member of the Outpatient Surgery staff will escort you to your preoperative room.  You may have to wear compression sleeves. These help to prevent blood clots and reduce swelling in your legs.  An IV may be inserted into one of your veins.              In the operating room, you may be given one or more of the following:        A medicine to help you relax (sedative).        A medicine to numb the area (local anesthetic).        A medicine to make you fall asleep (general anesthetic).        A medicine that is injected into an area of your body to numb everything below the                      injection site (regional anesthetic).  You may be given an antibiotic through your IV to help prevent infection.  Your surgical site will be marked or identified.    Contact a health care provider if you:  Develop a fever of more than 100.4°F (38°C) or other feelings of illness during the 48 hours before your surgery.  Have symptoms  that get worse.  Have questions or concerns about your surgery.  Summary  Preparing for surgery can make the procedure go more smoothly and lower your risk of complications.  Before surgery, make a list of questions and concerns to discuss with your surgeon. Ask about the risks and possible complications.  In the days or weeks before your surgery, follow all instructions from your health care provider. You may need to stop smoking, avoid alcohol, follow eating restrictions, and change or stop your regular medicines.  Contact your surgeon if you develop a fever or other signs of illness during the few days before your surgery.  This information is not intended to replace advice given to you by your health care provider. Make sure you discuss any questions you have with your health care provider.  Document Revised: 12/21/2018 Document Reviewed: 10/23/2018  Elsevier Patient Education © 2021 [employer]

## 2023-04-27 NOTE — TELEPHONE ENCOUNTER
Lola in prework called with pt present. Pt aware of date and time change. Phone number updated. Primary number for pt previously listed was his work phone number. Updated this to his cell phone number.

## 2023-04-28 LAB
QT INTERVAL: 364 MS
QTC INTERVAL: 409 MS

## 2023-05-01 ENCOUNTER — ANESTHESIA (OUTPATIENT)
Dept: PERIOP | Facility: HOSPITAL | Age: 58
DRG: 165 | End: 2023-05-01
Payer: MEDICAID

## 2023-05-04 ENCOUNTER — APPOINTMENT (OUTPATIENT)
Dept: GENERAL RADIOLOGY | Facility: HOSPITAL | Age: 58
DRG: 165 | End: 2023-05-04
Payer: MEDICAID

## 2023-05-04 ENCOUNTER — HOSPITAL ENCOUNTER (INPATIENT)
Facility: HOSPITAL | Age: 58
LOS: 4 days | Discharge: HOME | DRG: 165 | End: 2023-05-08
Attending: SURGERY | Admitting: SURGERY
Payer: MEDICAID

## 2023-05-04 DIAGNOSIS — R91.8 LUNG MASS: ICD-10-CM

## 2023-05-04 DIAGNOSIS — C34.11 MALIGNANT NEOPLASM OF UPPER LOBE OF RIGHT LUNG: Primary | ICD-10-CM

## 2023-05-04 PROBLEM — C34.90 LUNG CANCER: Status: ACTIVE | Noted: 2023-05-04

## 2023-05-04 PROBLEM — R91.1 LUNG NODULE: Status: RESOLVED | Noted: 2022-10-24 | Resolved: 2023-05-04

## 2023-05-04 LAB
ABO GROUP BLD: NORMAL
ANION GAP SERPL CALCULATED.3IONS-SCNC: 10 MMOL/L (ref 5–15)
BLD GP AB SCN SERPL QL: NEGATIVE
BUN SERPL-MCNC: 15 MG/DL (ref 6–20)
BUN/CREAT SERPL: 13.6 (ref 7–25)
CALCIUM SPEC-SCNC: 9 MG/DL (ref 8.6–10.5)
CHLORIDE SERPL-SCNC: 106 MMOL/L (ref 98–107)
CO2 SERPL-SCNC: 23 MMOL/L (ref 22–29)
CREAT SERPL-MCNC: 1.1 MG/DL (ref 0.76–1.27)
DEPRECATED RDW RBC AUTO: 43 FL (ref 37–54)
EGFRCR SERPLBLD CKD-EPI 2021: 79.3 ML/MIN/1.73
ERYTHROCYTE [DISTWIDTH] IN BLOOD BY AUTOMATED COUNT: 12.8 % (ref 12.3–15.4)
GLUCOSE SERPL-MCNC: 146 MG/DL (ref 65–99)
HCT VFR BLD AUTO: 40.5 % (ref 37.5–51)
HGB BLD-MCNC: 13.3 G/DL (ref 13–17.7)
MCH RBC QN AUTO: 30.1 PG (ref 26.6–33)
MCHC RBC AUTO-ENTMCNC: 32.8 G/DL (ref 31.5–35.7)
MCV RBC AUTO: 91.6 FL (ref 79–97)
PLATELET # BLD AUTO: 181 10*3/MM3 (ref 140–450)
PMV BLD AUTO: 8.9 FL (ref 6–12)
POTASSIUM SERPL-SCNC: 4.8 MMOL/L (ref 3.5–5.2)
RBC # BLD AUTO: 4.42 10*6/MM3 (ref 4.14–5.8)
RH BLD: POSITIVE
SODIUM SERPL-SCNC: 139 MMOL/L (ref 136–145)
T&S EXPIRATION DATE: NORMAL
WBC NRBC COR # BLD: 8.78 10*3/MM3 (ref 3.4–10.8)

## 2023-05-04 PROCEDURE — C9290 INJ, BUPIVACAINE LIPOSOME: HCPCS | Performed by: SURGERY

## 2023-05-04 PROCEDURE — 25010000002 HYDROMORPHONE PER 4 MG: Performed by: ANESTHESIOLOGY

## 2023-05-04 PROCEDURE — 94799 UNLISTED PULMONARY SVC/PX: CPT

## 2023-05-04 PROCEDURE — 25010000002 PROPOFOL 10 MG/ML EMULSION: Performed by: NURSE ANESTHETIST, CERTIFIED REGISTERED

## 2023-05-04 PROCEDURE — 71045 X-RAY EXAM CHEST 1 VIEW: CPT

## 2023-05-04 PROCEDURE — 25010000002 HEPARIN (PORCINE) PER 1000 UNITS: Performed by: NURSE PRACTITIONER

## 2023-05-04 PROCEDURE — 94761 N-INVAS EAR/PLS OXIMETRY MLT: CPT

## 2023-05-04 PROCEDURE — 0 BUPIVACAINE LIPOSOME 1.3 % SUSPENSION 20 ML VIAL: Performed by: SURGERY

## 2023-05-04 PROCEDURE — C1729 CATH, DRAINAGE: HCPCS | Performed by: SURGERY

## 2023-05-04 PROCEDURE — 25010000002 MIDAZOLAM PER 1 MG: Performed by: ANESTHESIOLOGY

## 2023-05-04 PROCEDURE — C1713 ANCHOR/SCREW BN/BN,TIS/BN: HCPCS | Performed by: SURGERY

## 2023-05-04 PROCEDURE — 25010000002 FENTANYL CITRATE (PF) 250 MCG/5ML SOLUTION: Performed by: NURSE ANESTHETIST, CERTIFIED REGISTERED

## 2023-05-04 PROCEDURE — 86900 BLOOD TYPING SEROLOGIC ABO: CPT | Performed by: NURSE PRACTITIONER

## 2023-05-04 PROCEDURE — 80048 BASIC METABOLIC PNL TOTAL CA: CPT | Performed by: SURGERY

## 2023-05-04 PROCEDURE — S0260 H&P FOR SURGERY: HCPCS | Performed by: SURGERY

## 2023-05-04 PROCEDURE — 94640 AIRWAY INHALATION TREATMENT: CPT

## 2023-05-04 PROCEDURE — 0BTC0ZZ RESECTION OF RIGHT UPPER LUNG LOBE, OPEN APPROACH: ICD-10-PCS | Performed by: SURGERY

## 2023-05-04 PROCEDURE — 88341 IMHCHEM/IMCYTCHM EA ADD ANTB: CPT | Performed by: SURGERY

## 2023-05-04 PROCEDURE — 25010000002 CEFAZOLIN PER 500 MG: Performed by: SURGERY

## 2023-05-04 PROCEDURE — 07B70ZX EXCISION OF THORAX LYMPHATIC, OPEN APPROACH, DIAGNOSTIC: ICD-10-PCS | Performed by: SURGERY

## 2023-05-04 PROCEDURE — 86850 RBC ANTIBODY SCREEN: CPT | Performed by: NURSE PRACTITIONER

## 2023-05-04 PROCEDURE — 38746 REMOVE THORACIC LYMPH NODES: CPT | Performed by: SURGERY

## 2023-05-04 PROCEDURE — 25010000002 FENTANYL CITRATE (PF) 50 MCG/ML SOLUTION: Performed by: ANESTHESIOLOGY

## 2023-05-04 PROCEDURE — 86901 BLOOD TYPING SEROLOGIC RH(D): CPT | Performed by: NURSE PRACTITIONER

## 2023-05-04 PROCEDURE — 85027 COMPLETE CBC AUTOMATED: CPT | Performed by: SURGERY

## 2023-05-04 PROCEDURE — 25010000002 KETOROLAC TROMETHAMINE PER 15 MG: Performed by: SURGERY

## 2023-05-04 PROCEDURE — 32480 PARTIAL REMOVAL OF LUNG: CPT | Performed by: SURGERY

## 2023-05-04 PROCEDURE — 25010000002 CEFAZOLIN PER 500 MG: Performed by: NURSE PRACTITIONER

## 2023-05-04 PROCEDURE — 88331 PATH CONSLTJ SURG 1 BLK 1SPC: CPT | Performed by: SURGERY

## 2023-05-04 PROCEDURE — 88342 IMHCHEM/IMCYTCHM 1ST ANTB: CPT | Performed by: SURGERY

## 2023-05-04 PROCEDURE — 0 HYDROMORPHONE PER 4 MG: Performed by: SURGERY

## 2023-05-04 PROCEDURE — 88305 TISSUE EXAM BY PATHOLOGIST: CPT | Performed by: SURGERY

## 2023-05-04 PROCEDURE — 88309 TISSUE EXAM BY PATHOLOGIST: CPT | Performed by: SURGERY

## 2023-05-04 DEVICE — IMPLANTABLE DEVICE
Type: IMPLANTABLE DEVICE | Site: CHEST | Status: FUNCTIONAL
Brand: RIBFIX BLU SYSTEM

## 2023-05-04 DEVICE — ENDOPATH ECHELON VASCULAR  RELOADS, WHITE, 35MM
Type: IMPLANTABLE DEVICE | Site: LUNG | Status: FUNCTIONAL
Brand: ECHELON ENDOPATH

## 2023-05-04 DEVICE — KT HEMOST ABS SURGIFOAM PORCN 1GRAM: Type: IMPLANTABLE DEVICE | Site: LUNG | Status: FUNCTIONAL

## 2023-05-04 DEVICE — ABSORBABLE HEMOSTAT (OXIDIZED REGENERATED CELLULOSE, U.S.P.)
Type: IMPLANTABLE DEVICE | Site: LUNG | Status: FUNCTIONAL
Brand: SURGICEL

## 2023-05-04 DEVICE — THE ECHELON, ECHELON ENDOPATH™ AND ECHELON FLEX™ FAMILIES OF ENDOSCOPIC LINEAR CUTTERS AND RELOADS ARE STERILE, SINGLE PATIENT USE INSTRUMENTS THAT SIMULTANEOUSLY CUT AND STAPLE TISSUE. THERE ARE SIX STAGGERED ROWS OF STAPLES, THREE ON EITHER SIDE OF THE CUT LINE. THE 45 MM INSTRUMENTS HAVE A STAPLE LINE THATIS APPROXIMATELY 45 MM LONG AND A CUT LINE THAT IS APPROXIMATELY 42 MM LONG. THE SHAFT CAN ROTATE FREELY IN BOTH DIRECTIONS AND AN ARTICULATION MECHANISM ON ARTICULATING INSTRUMENTS ENABLES BENDING THE DISTAL PORTIONOF THE SHAFT TO FACILITATE LATERAL ACCESS OF THE OPERATIVE SITE.THE INSTRUMENTS ARE SHIPPED WITHOUT A RELOAD AND MUST BE LOADED PRIOR TO USE. A STAPLE RETAINING CAP ON THE RELOAD PROTECTS THE STAPLE LEG POINTS DURING SHIPPING AND TRANSPORTATION. THE INSTRUMENTS’ LOCK-OUT FEATURE IS DESIGNED TO PREVENT A USED RELOAD FROM BEING REFIRED.
Type: IMPLANTABLE DEVICE | Site: LUNG | Status: FUNCTIONAL
Brand: ECHELON ENDOPATH

## 2023-05-04 RX ORDER — KETOROLAC TROMETHAMINE 30 MG/ML
15 INJECTION, SOLUTION INTRAMUSCULAR; INTRAVENOUS EVERY 6 HOURS
Status: COMPLETED | OUTPATIENT
Start: 2023-05-04 | End: 2023-05-06

## 2023-05-04 RX ORDER — HEPARIN SODIUM 5000 [USP'U]/ML
5000 INJECTION, SOLUTION INTRAVENOUS; SUBCUTANEOUS ONCE
Status: COMPLETED | OUTPATIENT
Start: 2023-05-04 | End: 2023-05-04

## 2023-05-04 RX ORDER — NALOXONE HCL 0.4 MG/ML
0.1 VIAL (ML) INJECTION
Status: DISCONTINUED | OUTPATIENT
Start: 2023-05-04 | End: 2023-05-08 | Stop reason: HOSPADM

## 2023-05-04 RX ORDER — SODIUM CHLORIDE 0.9 % (FLUSH) 0.9 %
3 SYRINGE (ML) INJECTION EVERY 12 HOURS SCHEDULED
Status: DISCONTINUED | OUTPATIENT
Start: 2023-05-04 | End: 2023-05-04 | Stop reason: HOSPADM

## 2023-05-04 RX ORDER — KETAMINE HCL IN NACL, ISO-OSM 100MG/10ML
SYRINGE (ML) INJECTION AS NEEDED
Status: DISCONTINUED | OUTPATIENT
Start: 2023-05-04 | End: 2023-05-04 | Stop reason: SURG

## 2023-05-04 RX ORDER — NALOXONE HCL 0.4 MG/ML
0.04 VIAL (ML) INJECTION AS NEEDED
Status: DISCONTINUED | OUTPATIENT
Start: 2023-05-04 | End: 2023-05-04 | Stop reason: HOSPADM

## 2023-05-04 RX ORDER — ROCURONIUM BROMIDE 10 MG/ML
INJECTION, SOLUTION INTRAVENOUS AS NEEDED
Status: DISCONTINUED | OUTPATIENT
Start: 2023-05-04 | End: 2023-05-04 | Stop reason: SURG

## 2023-05-04 RX ORDER — SODIUM CHLORIDE 0.9 % (FLUSH) 0.9 %
3-10 SYRINGE (ML) INJECTION AS NEEDED
Status: DISCONTINUED | OUTPATIENT
Start: 2023-05-04 | End: 2023-05-04 | Stop reason: HOSPADM

## 2023-05-04 RX ORDER — MIDAZOLAM HYDROCHLORIDE 1 MG/ML
1 INJECTION INTRAMUSCULAR; INTRAVENOUS
Status: DISCONTINUED | OUTPATIENT
Start: 2023-05-04 | End: 2023-05-04 | Stop reason: HOSPADM

## 2023-05-04 RX ORDER — ENOXAPARIN SODIUM 100 MG/ML
40 INJECTION SUBCUTANEOUS DAILY
Status: DISCONTINUED | OUTPATIENT
Start: 2023-05-05 | End: 2023-05-08 | Stop reason: HOSPADM

## 2023-05-04 RX ORDER — ALBUTEROL SULFATE 1.25 MG/3ML
1.25 SOLUTION RESPIRATORY (INHALATION)
Status: DISCONTINUED | OUTPATIENT
Start: 2023-05-04 | End: 2023-05-04 | Stop reason: HOSPADM

## 2023-05-04 RX ORDER — FLUTICASONE PROPIONATE AND SALMETEROL 250; 50 UG/1; UG/1
1 POWDER RESPIRATORY (INHALATION)
COMMUNITY

## 2023-05-04 RX ORDER — SODIUM CHLORIDE, SODIUM LACTATE, POTASSIUM CHLORIDE, CALCIUM CHLORIDE 600; 310; 30; 20 MG/100ML; MG/100ML; MG/100ML; MG/100ML
40 INJECTION, SOLUTION INTRAVENOUS CONTINUOUS
Status: DISCONTINUED | OUTPATIENT
Start: 2023-05-04 | End: 2023-05-05

## 2023-05-04 RX ORDER — IPRATROPIUM BROMIDE AND ALBUTEROL SULFATE 2.5; .5 MG/3ML; MG/3ML
3 SOLUTION RESPIRATORY (INHALATION)
Status: DISPENSED | OUTPATIENT
Start: 2023-05-04 | End: 2023-05-06

## 2023-05-04 RX ORDER — DEXAMETHASONE 4 MG/1
4 TABLET ORAL 2 TIMES DAILY WITH MEALS
Status: ON HOLD | COMMUNITY
End: 2023-05-04

## 2023-05-04 RX ORDER — SODIUM CHLORIDE, SODIUM LACTATE, POTASSIUM CHLORIDE, CALCIUM CHLORIDE 600; 310; 30; 20 MG/100ML; MG/100ML; MG/100ML; MG/100ML
100 INJECTION, SOLUTION INTRAVENOUS CONTINUOUS
Status: DISCONTINUED | OUTPATIENT
Start: 2023-05-04 | End: 2023-05-04

## 2023-05-04 RX ORDER — SODIUM CHLORIDE, SODIUM LACTATE, POTASSIUM CHLORIDE, CALCIUM CHLORIDE 600; 310; 30; 20 MG/100ML; MG/100ML; MG/100ML; MG/100ML
1000 INJECTION, SOLUTION INTRAVENOUS CONTINUOUS
Status: DISCONTINUED | OUTPATIENT
Start: 2023-05-04 | End: 2023-05-04

## 2023-05-04 RX ORDER — FENTANYL CITRATE 50 UG/ML
25 INJECTION, SOLUTION INTRAMUSCULAR; INTRAVENOUS
Status: COMPLETED | OUTPATIENT
Start: 2023-05-04 | End: 2023-05-04

## 2023-05-04 RX ORDER — GABAPENTIN 400 MG/1
800 CAPSULE ORAL EVERY 8 HOURS SCHEDULED
Status: DISCONTINUED | OUTPATIENT
Start: 2023-05-04 | End: 2023-05-08 | Stop reason: HOSPADM

## 2023-05-04 RX ORDER — PANTOPRAZOLE SODIUM 40 MG/1
40 TABLET, DELAYED RELEASE ORAL
Status: DISCONTINUED | OUTPATIENT
Start: 2023-05-05 | End: 2023-05-08 | Stop reason: HOSPADM

## 2023-05-04 RX ORDER — VECURONIUM BROMIDE 1 MG/ML
INJECTION, POWDER, LYOPHILIZED, FOR SOLUTION INTRAVENOUS AS NEEDED
Status: DISCONTINUED | OUTPATIENT
Start: 2023-05-04 | End: 2023-05-04 | Stop reason: SURG

## 2023-05-04 RX ORDER — ACETAMINOPHEN 325 MG/1
650 TABLET ORAL EVERY 6 HOURS SCHEDULED
Status: DISCONTINUED | OUTPATIENT
Start: 2023-05-04 | End: 2023-05-08 | Stop reason: HOSPADM

## 2023-05-04 RX ORDER — FLUMAZENIL 0.1 MG/ML
0.2 INJECTION INTRAVENOUS AS NEEDED
Status: DISCONTINUED | OUTPATIENT
Start: 2023-05-04 | End: 2023-05-04 | Stop reason: HOSPADM

## 2023-05-04 RX ORDER — DOCUSATE SODIUM 100 MG/1
100 CAPSULE, LIQUID FILLED ORAL DAILY
Status: DISCONTINUED | OUTPATIENT
Start: 2023-05-04 | End: 2023-05-08 | Stop reason: HOSPADM

## 2023-05-04 RX ORDER — SODIUM CHLORIDE 0.9 % (FLUSH) 0.9 %
3 SYRINGE (ML) INJECTION AS NEEDED
Status: DISCONTINUED | OUTPATIENT
Start: 2023-05-04 | End: 2023-05-04 | Stop reason: HOSPADM

## 2023-05-04 RX ORDER — ONDANSETRON 2 MG/ML
4 INJECTION INTRAMUSCULAR; INTRAVENOUS EVERY 6 HOURS PRN
Status: DISCONTINUED | OUTPATIENT
Start: 2023-05-04 | End: 2023-05-08 | Stop reason: HOSPADM

## 2023-05-04 RX ORDER — SODIUM CHLORIDE 0.9 % (FLUSH) 0.9 %
10 SYRINGE (ML) INJECTION AS NEEDED
Status: DISCONTINUED | OUTPATIENT
Start: 2023-05-04 | End: 2023-05-04 | Stop reason: HOSPADM

## 2023-05-04 RX ORDER — ACETAMINOPHEN 500 MG
1000 TABLET ORAL ONCE
Status: COMPLETED | OUTPATIENT
Start: 2023-05-04 | End: 2023-05-04

## 2023-05-04 RX ORDER — POLYETHYLENE GLYCOL 3350 17 G/17G
17 POWDER, FOR SOLUTION ORAL DAILY
Status: DISCONTINUED | OUTPATIENT
Start: 2023-05-04 | End: 2023-05-08 | Stop reason: HOSPADM

## 2023-05-04 RX ORDER — DROPERIDOL 2.5 MG/ML
0.62 INJECTION, SOLUTION INTRAMUSCULAR; INTRAVENOUS ONCE AS NEEDED
Status: DISCONTINUED | OUTPATIENT
Start: 2023-05-04 | End: 2023-05-04 | Stop reason: HOSPADM

## 2023-05-04 RX ORDER — SODIUM CHLORIDE 0.9 % (FLUSH) 0.9 %
10 SYRINGE (ML) INJECTION EVERY 12 HOURS SCHEDULED
Status: DISCONTINUED | OUTPATIENT
Start: 2023-05-04 | End: 2023-05-04 | Stop reason: HOSPADM

## 2023-05-04 RX ORDER — FENTANYL CITRATE 50 UG/ML
INJECTION, SOLUTION INTRAMUSCULAR; INTRAVENOUS AS NEEDED
Status: DISCONTINUED | OUTPATIENT
Start: 2023-05-04 | End: 2023-05-04 | Stop reason: SURG

## 2023-05-04 RX ORDER — METOCLOPRAMIDE 5 MG/1
5 TABLET ORAL 3 TIMES DAILY PRN
COMMUNITY

## 2023-05-04 RX ORDER — OXYCODONE AND ACETAMINOPHEN 10; 325 MG/1; MG/1
1 TABLET ORAL ONCE AS NEEDED
Status: DISCONTINUED | OUTPATIENT
Start: 2023-05-04 | End: 2023-05-04 | Stop reason: HOSPADM

## 2023-05-04 RX ORDER — SODIUM CHLORIDE 9 MG/ML
40 INJECTION, SOLUTION INTRAVENOUS AS NEEDED
Status: DISCONTINUED | OUTPATIENT
Start: 2023-05-04 | End: 2023-05-04 | Stop reason: HOSPADM

## 2023-05-04 RX ORDER — ACETYLCYSTEINE 200 MG/ML
3 SOLUTION ORAL; RESPIRATORY (INHALATION)
Status: DISPENSED | OUTPATIENT
Start: 2023-05-04 | End: 2023-05-06

## 2023-05-04 RX ORDER — MAGNESIUM HYDROXIDE 1200 MG/15ML
LIQUID ORAL AS NEEDED
Status: DISCONTINUED | OUTPATIENT
Start: 2023-05-04 | End: 2023-05-04 | Stop reason: HOSPADM

## 2023-05-04 RX ORDER — LABETALOL HYDROCHLORIDE 5 MG/ML
5 INJECTION, SOLUTION INTRAVENOUS
Status: DISCONTINUED | OUTPATIENT
Start: 2023-05-04 | End: 2023-05-04 | Stop reason: HOSPADM

## 2023-05-04 RX ORDER — LIDOCAINE HYDROCHLORIDE 20 MG/ML
INJECTION, SOLUTION EPIDURAL; INFILTRATION; INTRACAUDAL; PERINEURAL AS NEEDED
Status: DISCONTINUED | OUTPATIENT
Start: 2023-05-04 | End: 2023-05-04 | Stop reason: SURG

## 2023-05-04 RX ORDER — IBUPROFEN 600 MG/1
600 TABLET ORAL ONCE AS NEEDED
Status: DISCONTINUED | OUTPATIENT
Start: 2023-05-04 | End: 2023-05-04 | Stop reason: HOSPADM

## 2023-05-04 RX ORDER — ONDANSETRON 2 MG/ML
4 INJECTION INTRAMUSCULAR; INTRAVENOUS
Status: DISCONTINUED | OUTPATIENT
Start: 2023-05-04 | End: 2023-05-04 | Stop reason: HOSPADM

## 2023-05-04 RX ORDER — HYDROMORPHONE HYDROCHLORIDE 1 MG/ML
0.5 INJECTION, SOLUTION INTRAMUSCULAR; INTRAVENOUS; SUBCUTANEOUS
Status: DISCONTINUED | OUTPATIENT
Start: 2023-05-04 | End: 2023-05-04 | Stop reason: HOSPADM

## 2023-05-04 RX ORDER — IBUPROFEN 600 MG/1
600 TABLET ORAL EVERY 6 HOURS SCHEDULED
Status: DISCONTINUED | OUTPATIENT
Start: 2023-05-04 | End: 2023-05-05

## 2023-05-04 RX ORDER — LIDOCAINE HYDROCHLORIDE 10 MG/ML
0.5 INJECTION, SOLUTION EPIDURAL; INFILTRATION; INTRACAUDAL; PERINEURAL ONCE AS NEEDED
Status: DISCONTINUED | OUTPATIENT
Start: 2023-05-04 | End: 2023-05-04 | Stop reason: HOSPADM

## 2023-05-04 RX ORDER — PROPOFOL 10 MG/ML
VIAL (ML) INTRAVENOUS AS NEEDED
Status: DISCONTINUED | OUTPATIENT
Start: 2023-05-04 | End: 2023-05-04 | Stop reason: SURG

## 2023-05-04 RX ORDER — NEOSTIGMINE METHYLSULFATE 5 MG/5 ML
SYRINGE (ML) INTRAVENOUS AS NEEDED
Status: DISCONTINUED | OUTPATIENT
Start: 2023-05-04 | End: 2023-05-04 | Stop reason: SURG

## 2023-05-04 RX ORDER — GLYCOPYRROLATE 0.2 MG/ML
INJECTION INTRAMUSCULAR; INTRAVENOUS AS NEEDED
Status: DISCONTINUED | OUTPATIENT
Start: 2023-05-04 | End: 2023-05-04 | Stop reason: SURG

## 2023-05-04 RX ORDER — BISACODYL 10 MG
10 SUPPOSITORY, RECTAL RECTAL DAILY PRN
Status: DISCONTINUED | OUTPATIENT
Start: 2023-05-04 | End: 2023-05-08 | Stop reason: HOSPADM

## 2023-05-04 RX ORDER — ONDANSETRON 4 MG/1
4 TABLET, FILM COATED ORAL EVERY 6 HOURS PRN
Status: DISCONTINUED | OUTPATIENT
Start: 2023-05-04 | End: 2023-05-08 | Stop reason: HOSPADM

## 2023-05-04 RX ORDER — FLUTICASONE PROPIONATE 50 MCG
2 SPRAY, SUSPENSION (ML) NASAL DAILY PRN
COMMUNITY

## 2023-05-04 RX ORDER — METHOCARBAMOL 500 MG/1
500 TABLET, FILM COATED ORAL EVERY 8 HOURS SCHEDULED
Status: DISCONTINUED | OUTPATIENT
Start: 2023-05-04 | End: 2023-05-08 | Stop reason: HOSPADM

## 2023-05-04 RX ORDER — ALBUTEROL SULFATE 1.25 MG/3ML
1.25 SOLUTION RESPIRATORY (INHALATION)
Status: DISCONTINUED | OUTPATIENT
Start: 2023-05-04 | End: 2023-05-04 | Stop reason: SDUPTHER

## 2023-05-04 RX ADMIN — LIDOCAINE HYDROCHLORIDE 100 MG: 20 INJECTION, SOLUTION EPIDURAL; INFILTRATION; INTRACAUDAL; PERINEURAL at 11:43

## 2023-05-04 RX ADMIN — ROCURONIUM BROMIDE 50 MG: 10 INJECTION, SOLUTION INTRAVENOUS at 11:43

## 2023-05-04 RX ADMIN — POLYETHYLENE GLYCOL 3350 17 G: 17 POWDER, FOR SOLUTION ORAL at 18:02

## 2023-05-04 RX ADMIN — FENTANYL CITRATE 150 MCG: 0.05 INJECTION, SOLUTION INTRAMUSCULAR; INTRAVENOUS at 11:43

## 2023-05-04 RX ADMIN — FENTANYL CITRATE 100 MCG: 0.05 INJECTION, SOLUTION INTRAMUSCULAR; INTRAVENOUS at 12:22

## 2023-05-04 RX ADMIN — IBUPROFEN 600 MG: 600 TABLET, FILM COATED ORAL at 23:23

## 2023-05-04 RX ADMIN — VECURONIUM BROMIDE 3 MG: 1 INJECTION, POWDER, LYOPHILIZED, FOR SOLUTION INTRAVENOUS at 14:12

## 2023-05-04 RX ADMIN — HYDROMORPHONE HYDROCHLORIDE 0.5 MG: 1 INJECTION, SOLUTION INTRAMUSCULAR; INTRAVENOUS; SUBCUTANEOUS at 15:42

## 2023-05-04 RX ADMIN — ACETYLCYSTEINE 3 ML: 200 INHALANT RESPIRATORY (INHALATION) at 22:30

## 2023-05-04 RX ADMIN — DOCUSATE SODIUM 100 MG: 100 CAPSULE ORAL at 18:02

## 2023-05-04 RX ADMIN — IPRATROPIUM BROMIDE AND ALBUTEROL SULFATE 3 ML: .5; 3 SOLUTION RESPIRATORY (INHALATION) at 22:30

## 2023-05-04 RX ADMIN — FENTANYL CITRATE 25 MCG: 50 INJECTION, SOLUTION INTRAMUSCULAR; INTRAVENOUS at 16:02

## 2023-05-04 RX ADMIN — Medication 4 MG: at 15:08

## 2023-05-04 RX ADMIN — Medication 50 MG: at 11:43

## 2023-05-04 RX ADMIN — FENTANYL CITRATE 50 MCG: 0.05 INJECTION, SOLUTION INTRAMUSCULAR; INTRAVENOUS at 14:49

## 2023-05-04 RX ADMIN — KETOROLAC TROMETHAMINE 15 MG: 30 INJECTION, SOLUTION INTRAMUSCULAR; INTRAVENOUS at 23:23

## 2023-05-04 RX ADMIN — IBUPROFEN 600 MG: 600 TABLET, FILM COATED ORAL at 18:02

## 2023-05-04 RX ADMIN — SODIUM CHLORIDE, POTASSIUM CHLORIDE, SODIUM LACTATE AND CALCIUM CHLORIDE 40 ML/HR: 600; 310; 30; 20 INJECTION, SOLUTION INTRAVENOUS at 17:30

## 2023-05-04 RX ADMIN — HEPARIN SODIUM 5000 UNITS: 5000 INJECTION, SOLUTION INTRAVENOUS; SUBCUTANEOUS at 11:03

## 2023-05-04 RX ADMIN — FENTANYL CITRATE 25 MCG: 50 INJECTION, SOLUTION INTRAMUSCULAR; INTRAVENOUS at 16:07

## 2023-05-04 RX ADMIN — KETOROLAC TROMETHAMINE 15 MG: 30 INJECTION, SOLUTION INTRAMUSCULAR; INTRAVENOUS at 17:30

## 2023-05-04 RX ADMIN — PROPOFOL 200 MG: 10 INJECTION, EMULSION INTRAVENOUS at 11:43

## 2023-05-04 RX ADMIN — METHOCARBAMOL 500 MG: 500 TABLET, FILM COATED ORAL at 21:13

## 2023-05-04 RX ADMIN — FENTANYL CITRATE 25 MCG: 50 INJECTION, SOLUTION INTRAMUSCULAR; INTRAVENOUS at 15:45

## 2023-05-04 RX ADMIN — FENTANYL CITRATE 25 MCG: 50 INJECTION, SOLUTION INTRAMUSCULAR; INTRAVENOUS at 15:50

## 2023-05-04 RX ADMIN — FENTANYL CITRATE 50 MCG: 0.05 INJECTION, SOLUTION INTRAMUSCULAR; INTRAVENOUS at 13:22

## 2023-05-04 RX ADMIN — ACETAMINOPHEN 1000 MG: 500 TABLET, FILM COATED ORAL at 11:23

## 2023-05-04 RX ADMIN — VECURONIUM BROMIDE 3 MG: 1 INJECTION, POWDER, LYOPHILIZED, FOR SOLUTION INTRAVENOUS at 12:50

## 2023-05-04 RX ADMIN — Medication 25 MG: at 14:00

## 2023-05-04 RX ADMIN — ACETAMINOPHEN 650 MG: 325 TABLET, FILM COATED ORAL at 23:23

## 2023-05-04 RX ADMIN — GABAPENTIN 800 MG: 400 CAPSULE ORAL at 21:13

## 2023-05-04 RX ADMIN — HYDROMORPHONE HYDROCHLORIDE: 10 INJECTION, SOLUTION INTRAMUSCULAR; INTRAVENOUS; SUBCUTANEOUS at 17:30

## 2023-05-04 RX ADMIN — GLYCOPYRROLATE 0.4 MG: 0.2 INJECTION INTRAMUSCULAR; INTRAVENOUS at 15:08

## 2023-05-04 RX ADMIN — Medication 25 MG: at 12:57

## 2023-05-04 RX ADMIN — SODIUM CHLORIDE, POTASSIUM CHLORIDE, SODIUM LACTATE AND CALCIUM CHLORIDE 1000 ML: 600; 310; 30; 20 INJECTION, SOLUTION INTRAVENOUS at 10:43

## 2023-05-04 RX ADMIN — CEFAZOLIN 2 G: 2 INJECTION, POWDER, FOR SOLUTION INTRAMUSCULAR; INTRAVENOUS at 21:13

## 2023-05-04 RX ADMIN — VECURONIUM BROMIDE 3 MG: 1 INJECTION, POWDER, LYOPHILIZED, FOR SOLUTION INTRAVENOUS at 13:18

## 2023-05-04 RX ADMIN — FENTANYL CITRATE 100 MCG: 0.05 INJECTION, SOLUTION INTRAMUSCULAR; INTRAVENOUS at 12:37

## 2023-05-04 RX ADMIN — MIDAZOLAM 2 MG: 1 INJECTION INTRAMUSCULAR; INTRAVENOUS at 11:22

## 2023-05-04 RX ADMIN — ACETAMINOPHEN 650 MG: 325 TABLET, FILM COATED ORAL at 17:30

## 2023-05-04 RX ADMIN — SODIUM CHLORIDE, POTASSIUM CHLORIDE, SODIUM LACTATE AND CALCIUM CHLORIDE 1000 ML: 600; 310; 30; 20 INJECTION, SOLUTION INTRAVENOUS at 10:41

## 2023-05-04 RX ADMIN — FENTANYL CITRATE 50 MCG: 0.05 INJECTION, SOLUTION INTRAMUSCULAR; INTRAVENOUS at 13:42

## 2023-05-04 RX ADMIN — CEFAZOLIN 2 G: 2 INJECTION, POWDER, FOR SOLUTION INTRAMUSCULAR; INTRAVENOUS at 11:51

## 2023-05-04 NOTE — ANESTHESIA PROCEDURE NOTES
Arterial Line    Pre-sedation assessment completed: 5/4/2023 11:25 AM    Patient reassessed immediately prior to procedure    Patient location during procedure: pre-op  Start time: 5/4/2023 11:25 AM  Stop Time:5/4/2023 11:30 AM       Line placed for hemodynamic monitoring.  Performed By   Anesthesiologist: Mary Power MD   Preanesthetic Checklist  Completed: patient identified, IV checked, site marked, risks and benefits discussed, surgical consent, monitors and equipment checked, pre-op evaluation and timeout performed  Arterial Line Prep    Sterile Tech: cap, gloves, gown, mask and sterile barriers  Prep: ChloraPrep  Patient monitoring: continuous pulse oximetry  Arterial Line Procedure   Laterality:left  Location:  radial artery  Catheter size: 20 G   Guidance: ultrasound guided  Number of attempts: 2  Successful placement: yes Images: still images not obtained  Post Assessment   Dressing Type: secured with tape and wrist guard applied.   Complications no  Circ/Move/Sens Assessment: normal and unchanged.   Patient Tolerance: patient tolerated the procedure well with no apparent complications  Additional Notes  Initial attempt with palpation, wire passed easily but unable to pass catheter and hematoma formed, line withdrawn. Second attempt proximal to first with direct ultrasound visualization.

## 2023-05-04 NOTE — H&P
"Since last visit no significant change.  Attempted to get bronchoscopic biopsy of nodularity, unable to due to patient insurance.  Reviewed at tumor conference, all in agreement that these are likely changes associated with immunotherapy and recommendation to proceed with resection.  Discussed with him again the risks and benefits of right thoracotomy, possible chest wall resection, MLND.  He understands the risks and agrees to proceed.    Goldy Carlin M.D.  Cardiothoracic Surgeon    [facility] Cardiothoracic Surgery  PROGRESS NOTE   CC: Right upper lobe lung cancer status post perioperative chemoimmunotherapy     Subjective:      Mr. Jones is a 57-year-old male who returns to me in follow-up now 2 months after initial evaluation. He was found to have right upper lobe lung cancer that was adjacent to the chest wall and has since undergone preoperative chemo, immunotherapy and radiation therapy. He returns today with repeat imaging to further discuss proceeding with surgical resection. Of note, prior to this, he did have hypermetabolic abdominal lymph node as well. He is accompanied by an adult female.     In summary the patient was advised along with the adult female that he will require an additional biopsy of his new nodularity's  prior to determining if he will have surgery or continue with chemotherapy treatment. His case will be discussed with Dr. Tadeo in order to expedite his biopsy.     ROS:   No fevers, chills or recent illness.     Objective:        /78 (BP Location: Right arm, Patient Position: Sitting, Cuff Size: Adult)   Pulse 98   Ht 187 cm (73.62\")   Wt 103 kg (228 lb)   SpO2 97%   BMI 29.57 kg/m²            PE:      Vitals:     03/27/23 1401   BP: 106/78   Pulse: 98   SpO2: 97%      GENERAL: NAD, resting comfortably, normal color  CARDIOVASCULAR: regular, regular rate, sinus  PULMONARY: Equal bilateral breath sounds without any wheezes. No acute distress. "   ABDOMEN: soft, nontender/nondistended  EXTREMITIES: mild peripheral edema, normal pulses, normal ROM               Lab Results (last 72 hours)      ** No results found for the last 72 hours. **       I reviewed the patient's clinical results and discussed with patient.      PET Scan:   FINDINGS:     HEAD AND NECK: The visualized brain demonstrates no definite focal  abnormal FDG activity. Salivary, tonsillar and laryngeal activity  appears ordinary. No hypermetabolic cervical or supraclavicular  lymphadenopathy is demonstrated.      CHEST: Previously treated right pleural mass has decreased in terms of  its tracer avidity, SUV max 3.1 (previously 11.4). New nearby right  upper lobe patchy airspace disease with low-level level uptake, SUV max  3.2. No scintigraphic evidence of hilar or mediastinal sav metastasis.     ABDOMEN AND PELVIS: Normal physiologic activity is noted in the liver,  spleen, stomach and bowel. No adrenal hypermetabolism is noted. Urinary  activity is noted in the kidneys and bladder. No FDG-avid  lymphadenopathy is appreciated.      MUSCULOSKELETAL: No FDG-avid osseous metastases are demonstrated.      IMPRESSION:  1. Previously irradiated right chest wall mass demonstrates much less  tracer avidity on today's exam, SUV max 3.1 as compared to 11.4 on the  earlier PET scan from November 2022, indicating a positive treatment  response.  2. There are new patchy airspace opacities identified more centrally in  the right upper lobe, some of which demonstrates PET uptake (SUV max  3.2), suspected radiation pneumonitis.  3. No scintigraphic evidence of sav or distant metastatic disease.     This report was finalized on 03/27/2023 14:08 by Dr Shay Bravo, .     CT Scan:   Review of lung windows demonstrates a new spiculated noncalcified nodule  in the right upper lobe centered on image 62 series 3, with a maximum  size of 1.6 cm. This is suspicious for neoplasm. In addition, there are  several  smaller somewhat ill-defined spiculated nodules in the right  upper lobe more inferiorly and anteriorly, images 66 through 83 of  series 3. This includes a soft tissue nodule image 71 of series 3 that  measures 8.4 mm maximum diameter. There is a peribronchiolar nodule in  the right upper lobe that measures 12.9 mm seen on image 79 series 3.  There are smaller ill-defined nodular infiltrates at branch points of  the bronchi in the right upper lobe, just above the minor fissure. The  pleural-based mass seen in the right upper lobe anteriorly and laterally  is decrease in size in volume, with residual soft tissue component is  seen on image 55 of series 2 and has a maximum thickness of 8.5 mm, the  mass had a diameter 2.8 cm on the PET/CT. The thyroid gland appears  normal. There is a right subclavian port catheter, with the tip in SVC  in satisfactory position. No axillary, mediastinal or hilar  lymphadenopathy is identified. Heart size is normal. There is mild  ectasia of the ascending aorta with a diameter 3.4 cm. The pulmonary  arteries are normal caliber. No pneumothorax is identified. There are  paraseptal emphysematous changes bilaterally as before, no mass or  suspicious nodule is seen in the left flank. There is mild dependent  atelectasis in both lung bases. The upper abdomen appears unremarkable,  no adrenal mass is identified. Review of bone windows shows no evidence  of osseous metastases.     IMPRESSION:  1. 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 However, there are new spiculated nodules in the right  upper lobe, the largest measuring 1.6 cm, some of these areas of  nodularity are ill-defined and seen at the branch points of the  bronchial tree, could potentially be infectious or inflammatory. The  main concern with  this appearance is for intrapulmonary metastases  however. Repeat chest CT is recommended in 3 months, follow-up PET/CT  would be appropriate if these new spiculated areas persist or increase  in size.  2. No evidence of intrathoracic lymphadenopathy, no left-sided pulmonary  nodule or lung mass. Stable changes of paraseptal emphysema in both  lungs.     This report was finalized on 03/20/2023 11:56 by Dr. Damian Rutledge MD.        I personally reviewed images of following exams, the following is my interpretation:       PET scan: There is hypermetabolic activity in the central right upper lobe at area of nodularity that was previously seen on the CT scan.     CT Scan: Smaller right anterior chest wall mass. There is a right upper lobe nodule that is spiculated and adjacent to some changes that I suspect are due to immunotherapy but the nodule of concern is about 1.6 cm in maximum dimension and concerning for malignancy.        Assessment & Plan        Mr. Jones is a 57-year-old male who presented with chest wall invasive right upper lobe lung cancer. He has a history of prostate cancer status post prostatectomy, but his PSA levels have been low. He has undergone induction/perioperative chemotherapy, immunotherapy with optivo and radiation therapy to the chest wall mass. This has shrunk the chest wall mass some on repeat imaging but there are new nodularity's in the right upper lobe that are PET positive. Given this, I discussed with him the differential diagnosis of these new nodularity's and I suspect it's possibly inflammatory in nature, but one of the nodularity's does concern me some. I have discussed the case at length with Dr. Tadeo, I would favor bronchoscopic biopsy of the right upper lobe nodularity and he has plans to go to [facility] for this. If this is negative, I would favor proceeding with surgical resection of the right upper lobe and any involved chest wall as well as lymph nodes.  If biopsy is positive for disease, then I would consider this progression of disease with neoadjuvant therapy and he should proceed with definitive chemo and radiation. I discussed this at length with the patient today and he understands.      If we proceed with surgical resection, it'll involve a right thoracotomy with right upper lobectomy and chest wall resection with mediastinal lymph node dissection.      We discussed the risk and benefits of surgery and alternatives including but not limited to bleeding, infection, injury to major vessels or organs, need for transfusion, need for conversion to open operation, pneumonia, prolonged airleak, risk of anesthesia, and/or death.  He understands these risk and agrees to proceed.  We discussed preoperative, operative and postoperative expectations at length. He understands and agrees to proceed with this.      We will await the biopsy results and then schedule surgery if that is negative. Thank you for trusting me with the care of Mr. Jones. Please do not hesitate to call with questions or concerns.           Goldy Carlin MD   Cardiothoracic Surgeon

## 2023-05-04 NOTE — PLAN OF CARE
Goal Outcome Evaluation:  Plan of Care Reviewed With: patient, spouse        Progress: no change  Outcome Evaluation: Patient c/o pain, PCA set up and scheduled meds given; F/C in place to BSD; CT 2->1 present right side to -20cm dry suction, air leak noted- MD on call notified, CXR repeated-awaiting results; pt denies any respiratory issues; safety maintained.

## 2023-05-04 NOTE — ANESTHESIA PROCEDURE NOTES
Airway  Urgency: elective    Date/Time: 5/4/2023 11:45 AM  Airway not difficult    General Information and Staff    Patient location during procedure: OR  CRNA/CAA: Kim Dorsey CRNA    Indications and Patient Condition  Indications for airway management: airway protection    Preoxygenated: yes  Mask difficulty assessment: 1 - vent by mask    Final Airway Details  Final airway type: endotracheal airway      Successful airway: EBT - double lumen left  Cuffed: yes   Successful intubation technique: video laryngoscopy  Facilitating devices/methods: intubating stylet  Endotracheal tube insertion site: oral  Blade: Alvares  Blade size: 3  EBT DL size (fr): 39  Cormack-Lehane Classification: grade I - full view of glottis  Placement verified by: chest auscultation and capnometry   Measured from: lips  ETT/EBT  to lips (cm): 31  Number of attempts at approach: 1  Assessment: lips, teeth, and gum same as pre-op and atraumatic intubation    Additional Comments  atraumatic

## 2023-05-04 NOTE — OP NOTE
Cardiothoracic Surgery Operative Note     Date of Procedure: 5/4/2023     Pre-op diagnosis: Right Upper Lobe Lung Cancer, s/p Neoadjuvant Chemo/Immunotherapy/Radiation   Former Smoker  COPD  GERD  Prostate Cancer s/p Resection     Post-op diagnosis: Same   Procedure:  1. Right Thoracotomy with Right Upper Lobectomy  2. Mediastinal Lymph Node Dissection     This resection was for curative intent.     Surgeon: Goldy Carlin M.D.  Assistant: Anna Wren  Anesthesia: GETA- double-lumen  EBL: 50 mL  Drains: 24 [place]ian straight right pleural tube (posterior on skin) and 28 [place]ian right angle pleural tube (anterior on skin)  Specimens:  1. Chest Wall Margin - All negative for carcinoma  2. Right Upper Lobectomy with Multiple Lymph Nodes Swept into [place] of Specimen  - Frozen Section of Bronchial Margin is Negative for Cancer  3. Level 7 Lymph Nodes  4. Level 4R Lymph Nodes  5. Level 2R Lymph Nodes  6. Multiple Level 10 Lymph Nodes     Operative indications:    Mr. Jones is a 57-year-old male who presented with right upper lobe lung cancer, it was invasive into the right anterior chest wall via imaging.  With this he underwent neoadjuvant chemoimmunotherapy as well as local radiation to the chest wall.  He underwent repeat work-up after this and this showed no distant metastasis.  There was some intraparenchymal nodularity that was consistent with changes from immunotherapy.  He had adequate PFTs to tolerate anatomic resection.  Given this we discussed proceeding with right thoracotomy and right upper lobectomy with possible chest wall resection.     Operative findings  Intrathoracic Findings: Moderately anthrascitic lungs.    Adhesions of lung overlying site of malignancy, dissection through parietal pleura and multiple segments sent for margin which were all negative for malignancy  There were dense adhesions in the hilum making dissection very difficult, able to complete right upper lobectomy and bronchial margin was  negative  Multiple lymph nodes taken as labeled above  Adequate hemostasis at the end of the case and right sixth rib closed using 1 plate and 7-10 mm screws     Operative description in detail:  The patient was taken to the operative suite where he was placed in a supine position. General anesthesia was induced without complication and a double lumen ET tube was placed by anesthesia. A timeout was performed. With that bronchoscopy was performed demonstrating no endobronchial lesion and standard bronchial anatomy.  Double-lumen tube position was confirmed.  With that he was positioned in lateral decubitus position with right side up. All pressure points are protected. Single lung ventilation was initiated and double-lumen endotracheal tube position confirmed. The patient was then prepped and draped in the usual and sterile fashion.       An incision was made sharply in line with the posterior axillary line at approximately the 8th intercostal space.  The pleural cavity was accessed.   The parietal pleural surface was assessed and there were no areas concerning for pleural metastases.  Given this we proceeded with thoracotomy and right upper lobe resection.  A standard posterior lateral thoracotomy was created and the chest entered in the fifth intercostal space.  Retractors were placed after shingling the sixth rib.     I began by dividing adhesions and resecting a portion of the parietal pleura overlying the malignancy.  3 samples were taken from the specimen side and sent for frozen section all were negative for malignancy at the resection margin..  I then exposed the fissure and tried to develop this with Bovie cautery, there were dense adhesions in the fissure and it was difficult to fully develop.  I then retracted the lung anteriorly and expose the posterior pulmonary artery and its branches.  Pulmonary vein branch going to the upper lobe was divided using a vascular load staple, there was 1 large pulmonary  trunk going to both the upper and middle lobes in the anterior minor fissure was not well developed.  Pulmonary artery branches were encircled and divided using vascular load staple.     I divided the adhesions between the azygos vein and bronchus and pulmonary artery.  I then retracted the lung anteriorly encircled the bronchus going to the upper lobe.  It was clamped test inflation was performed with good expansion of the middle and lower lobes and then it was divided with a green load stapler.  I then did begin completing the fissure.  The upper and lower lobes were grasped elevated and the posterior major fissure was completed using blue load staples to the point where all 3 lobes met.  I then carefully elevated the upper lobe and completed the fissure going posterior to anterior including one pulmonary vein branch with this division.  The upper lobe specimen was removed from the chest and sent for frozen section of the bronchial margin which was negative for carcinoma.    I then opened the paratracheal area and obtain level 4R and level 2R lymph nodes.  I then retracted the lung anteriorly and found subcarinal lymph nodes.  I then retracted the lower lobe superiorly and divided the inferior pulmonary ligament up to the level of the pulmonary vein.      I then performed transthoracic intercostal nerve blocks with 2 mL of liposomal bupivacaine in each ICS from 10 to 4.  The pleural cavity was copiously irrigated until irrigant was clear.  There was adequate hemostasis.  A 28 Bengali right angle chest tube was tunneled into 8th ICS and placed posteriorly and inferiorly in pleural cavity.  A 24 Bengali straight chest tube was also tunneled into the 8th ICS and placed apical.  Both chest tubes were placed to 20 cm of suction on Pleur-evac.  The lower and middle lobes were expanded under direct vision and filled the thoracic cavity well.  The chest tubes were secured in place with suture.    The shingled the sixth rib  was reduced and then closed using a rib plate and 7 screws.  The wounds were closed in anatomical layers with absorbable suture.  The patient was awakened from anesthesia and extubated in the operating room.  He tolerated the procedure well.    Instruments, sharps, and sponge counts were reported as correct at the completion of the case.        Complications: None  Disposition: Transfer to PACU extubated and in stable condition.      Goldy Carlin MD  Cardiothoracic Surgeon

## 2023-05-04 NOTE — ANESTHESIA PREPROCEDURE EVALUATION
Anesthesia Evaluation     no history of anesthetic complications:  NPO Solid Status: > 8 hours  NPO Liquid Status: > 8 hours           Airway   Mallampati: II  TM distance: >3 FB  Neck ROM: full  No difficulty expected  Dental      Pulmonary    (+) a smoker (quit 12/2022) Former, lung cancer, COPD, sleep apnea,   Cardiovascular   Exercise tolerance: good (4-7 METS)    (-) hypertension, CAD      Neuro/Psych  (-) seizures, TIA, CVA  GI/Hepatic/Renal/Endo    (+)  GERD,    (-) liver disease, no renal disease, diabetes    Musculoskeletal     Abdominal    Substance History      OB/GYN          Other   arthritis,    history of cancer      Other Comment: Prostate cancer s/p resection  Lung cancer, ongoing chemo/radiation, presents for surgery                  Anesthesia Plan    ASA 3     general     intravenous induction     Anesthetic plan, risks, benefits, and alternatives have been provided, discussed and informed consent has been obtained with: patient.        CODE STATUS:

## 2023-05-05 ENCOUNTER — APPOINTMENT (OUTPATIENT)
Dept: GENERAL RADIOLOGY | Facility: HOSPITAL | Age: 58
DRG: 165 | End: 2023-05-05
Payer: MEDICAID

## 2023-05-05 LAB
ANION GAP SERPL CALCULATED.3IONS-SCNC: 7 MMOL/L (ref 5–15)
BUN SERPL-MCNC: 15 MG/DL (ref 6–20)
BUN/CREAT SERPL: 14.2 (ref 7–25)
CALCIUM SPEC-SCNC: 8.5 MG/DL (ref 8.6–10.5)
CHLORIDE SERPL-SCNC: 103 MMOL/L (ref 98–107)
CO2 SERPL-SCNC: 29 MMOL/L (ref 22–29)
CREAT SERPL-MCNC: 1.06 MG/DL (ref 0.76–1.27)
DEPRECATED RDW RBC AUTO: 44.6 FL (ref 37–54)
EGFRCR SERPLBLD CKD-EPI 2021: 82.9 ML/MIN/1.73
ERYTHROCYTE [DISTWIDTH] IN BLOOD BY AUTOMATED COUNT: 12.8 % (ref 12.3–15.4)
GLUCOSE SERPL-MCNC: 129 MG/DL (ref 65–99)
HCT VFR BLD AUTO: 37.2 % (ref 37.5–51)
HGB BLD-MCNC: 11.9 G/DL (ref 13–17.7)
MCH RBC QN AUTO: 30.3 PG (ref 26.6–33)
MCHC RBC AUTO-ENTMCNC: 32 G/DL (ref 31.5–35.7)
MCV RBC AUTO: 94.7 FL (ref 79–97)
PLATELET # BLD AUTO: 180 10*3/MM3 (ref 140–450)
PMV BLD AUTO: 9.5 FL (ref 6–12)
POTASSIUM SERPL-SCNC: 4.1 MMOL/L (ref 3.5–5.2)
RBC # BLD AUTO: 3.93 10*6/MM3 (ref 4.14–5.8)
SODIUM SERPL-SCNC: 139 MMOL/L (ref 136–145)
WBC NRBC COR # BLD: 6.67 10*3/MM3 (ref 3.4–10.8)

## 2023-05-05 PROCEDURE — 25010000002 ENOXAPARIN PER 10 MG: Performed by: SURGERY

## 2023-05-05 PROCEDURE — 25010000002 CEFAZOLIN PER 500 MG: Performed by: SURGERY

## 2023-05-05 PROCEDURE — 25010000002 KETOROLAC TROMETHAMINE PER 15 MG: Performed by: SURGERY

## 2023-05-05 PROCEDURE — 71045 X-RAY EXAM CHEST 1 VIEW: CPT

## 2023-05-05 PROCEDURE — 93010 ELECTROCARDIOGRAM REPORT: CPT | Performed by: INTERNAL MEDICINE

## 2023-05-05 PROCEDURE — 85027 COMPLETE CBC AUTOMATED: CPT | Performed by: SURGERY

## 2023-05-05 PROCEDURE — 94799 UNLISTED PULMONARY SVC/PX: CPT

## 2023-05-05 PROCEDURE — 80048 BASIC METABOLIC PNL TOTAL CA: CPT | Performed by: SURGERY

## 2023-05-05 RX ORDER — OXYCODONE HYDROCHLORIDE 5 MG/1
5 TABLET ORAL EVERY 6 HOURS PRN
Status: DISCONTINUED | OUTPATIENT
Start: 2023-05-05 | End: 2023-05-08 | Stop reason: HOSPADM

## 2023-05-05 RX ADMIN — ENOXAPARIN SODIUM 40 MG: 100 INJECTION SUBCUTANEOUS at 08:47

## 2023-05-05 RX ADMIN — ACETAMINOPHEN 650 MG: 325 TABLET, FILM COATED ORAL at 12:45

## 2023-05-05 RX ADMIN — IBUPROFEN 600 MG: 600 TABLET, FILM COATED ORAL at 05:06

## 2023-05-05 RX ADMIN — PANTOPRAZOLE SODIUM 40 MG: 40 TABLET, DELAYED RELEASE ORAL at 05:06

## 2023-05-05 RX ADMIN — OXYCODONE HYDROCHLORIDE 5 MG: 5 TABLET ORAL at 14:37

## 2023-05-05 RX ADMIN — ACETAMINOPHEN 650 MG: 325 TABLET, FILM COATED ORAL at 17:57

## 2023-05-05 RX ADMIN — IPRATROPIUM BROMIDE AND ALBUTEROL SULFATE 3 ML: .5; 3 SOLUTION RESPIRATORY (INHALATION) at 14:33

## 2023-05-05 RX ADMIN — POLYETHYLENE GLYCOL 3350 17 G: 17 POWDER, FOR SOLUTION ORAL at 08:48

## 2023-05-05 RX ADMIN — OXYCODONE HYDROCHLORIDE 5 MG: 5 TABLET ORAL at 20:32

## 2023-05-05 RX ADMIN — METHOCARBAMOL 500 MG: 500 TABLET, FILM COATED ORAL at 20:32

## 2023-05-05 RX ADMIN — KETOROLAC TROMETHAMINE 15 MG: 30 INJECTION, SOLUTION INTRAMUSCULAR; INTRAVENOUS at 05:06

## 2023-05-05 RX ADMIN — IPRATROPIUM BROMIDE AND ALBUTEROL SULFATE 3 ML: .5; 3 SOLUTION RESPIRATORY (INHALATION) at 06:37

## 2023-05-05 RX ADMIN — CEFAZOLIN 2 G: 2 INJECTION, POWDER, FOR SOLUTION INTRAMUSCULAR; INTRAVENOUS at 05:05

## 2023-05-05 RX ADMIN — GABAPENTIN 800 MG: 400 CAPSULE ORAL at 05:06

## 2023-05-05 RX ADMIN — ACETYLCYSTEINE 3 ML: 200 INHALANT RESPIRATORY (INHALATION) at 14:33

## 2023-05-05 RX ADMIN — IPRATROPIUM BROMIDE AND ALBUTEROL SULFATE 3 ML: .5; 3 SOLUTION RESPIRATORY (INHALATION) at 23:43

## 2023-05-05 RX ADMIN — KETOROLAC TROMETHAMINE 15 MG: 30 INJECTION, SOLUTION INTRAMUSCULAR; INTRAVENOUS at 17:57

## 2023-05-05 RX ADMIN — KETOROLAC TROMETHAMINE 15 MG: 30 INJECTION, SOLUTION INTRAMUSCULAR; INTRAVENOUS at 23:47

## 2023-05-05 RX ADMIN — DOCUSATE SODIUM 100 MG: 100 CAPSULE ORAL at 08:47

## 2023-05-05 RX ADMIN — METHOCARBAMOL 500 MG: 500 TABLET, FILM COATED ORAL at 05:06

## 2023-05-05 RX ADMIN — ACETYLCYSTEINE 3 ML: 200 INHALANT RESPIRATORY (INHALATION) at 23:43

## 2023-05-05 RX ADMIN — METHOCARBAMOL 500 MG: 500 TABLET, FILM COATED ORAL at 16:32

## 2023-05-05 RX ADMIN — ACETYLCYSTEINE 3 ML: 200 INHALANT RESPIRATORY (INHALATION) at 06:37

## 2023-05-05 RX ADMIN — GABAPENTIN 800 MG: 400 CAPSULE ORAL at 14:37

## 2023-05-05 RX ADMIN — ACETAMINOPHEN 650 MG: 325 TABLET, FILM COATED ORAL at 23:47

## 2023-05-05 RX ADMIN — ACETAMINOPHEN 650 MG: 325 TABLET, FILM COATED ORAL at 05:06

## 2023-05-05 RX ADMIN — GABAPENTIN 800 MG: 400 CAPSULE ORAL at 20:32

## 2023-05-05 RX ADMIN — KETOROLAC TROMETHAMINE 15 MG: 30 INJECTION, SOLUTION INTRAMUSCULAR; INTRAVENOUS at 12:34

## 2023-05-05 NOTE — CONSULTS
MEDICAL ONCOLOGY CONSULTATION    Pt Name: Lalo Jones  MRN: [id number]  [id number]  YOB: 1965  Date of evaluation: 5/5/2023    Reason for Consultation: Continuity of care regarding a diagnosis of adenocarcinoma of the RUL of the lung  Requesting Physician: Dr. Goldy Carlin    History Obtained From: The patient, EMR and discussion with Dr. Goldy Carlin    HISTORY OF PRESENT ILLNESS:      Mr. 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    Lalo was treated with concurrent neoadjuvant chemotherapy and radiation therapy as follows:  • Neoadjuvant concurrent XRT (with chemoimmunotherapy) to RUL of lung initiated on 1/4/2023 completed on 2/14/2023.  5040 cGy over 28 treatment fractions.  • Neoadjuvant cisplatin 75 mg/m2, Alimta 500 mg/m2 and Opdivo 360 mg, cycle #1 initiated on 1/5/2023.  Cycle #3 delivered on 2/16/2023.    Lalo underwent a right thoracotomy with right upper lobectomy and mediastinal lymph node dissection by Dr. Goldy Carlin on 5/4/2023.  Oncology consultation requested and continuity of care.      DETAILED TUMOR HISTORY IS FROM OFFICE RECORDS      TARGET LUNG CANCER SITES:  • 4.7 cm x 4 cm x 1.6 cm mass in the RUL of the lung SUV of 12  • 9 mm retroaortic upper abdominal lymph node is mildly hypermetabolic with an SUV of 4.0.   • 7 mm aortocaval lymph node is mildly hypermetabolic with an SUV of 3.0.        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 Flex Sheikh in [facility] for medical oncology opinion regarding a diagnosis of prostate cancer.   [facility] GENETIC TESTING on 7/15/2020 documented a VUS:  MEN1, heterozygous, for c.  511C>T  (p.Arg 171 Trp)     Mr. Jones  was again seen on 12/8/2022 re-referred by Dr. Russel Santana for new diagnosis of adenocarcinoma of the lung, diagnosed on 11/16/2022 by CT Guided needle Biopsy.     Nino is a longtime cigarette smoker of 1 pack/day since age 20.  He quit smoking on 12/1/2022 after the diagnosis of lung cancer.     Nino's PCP, Dr. Flex Sheikh ordered a low-dose screening CT scan of the chest for monitoring.     CT chest without contrast, low-dose protocol at [facility] on 5/19/2022:  • 4 mm LEFT lower lobe pulmonary nodule in superior segment   • 3.3 x 1.3 cm mass like area of pleural thickening in the RIGHT anterior upper chest   • Moderate centrilobular and paraseptal emphysema.   • Lung-RADS 2S-- Nodules with a very low likelihood of becoming a clinically active cancer due to size or lack of growth.   • Continue annual screening with low dose CT in 12 months.   • 3 month follow-up chest CT recommended to evaluate for stability of RIGHT anterior upper chest masslike pleural thickening     Dr. Flex French repeated the low-dose CT scan because of the findings in the right anterior upper chest     CT chest without contrast, low-dose protocol at [facility] on 10/17/2022:  • 4.7 cm x 4 cm x 1.6 cm abnormal area of pleural thickening in the periphery of the RUL with slightly irregular margins  • Moderate paraseptal emphysematous changes with scattered bullae in both lungs  • Lung-RADS - 4B        Initial consult with Dr. Russel Santana at [facility] on 10/24/2022:  Explained the abnormal CT scan results from May which is done in the [facility] to the patient and also discussed with the radiologist from [facility] in [place] who reviewed the current CT scan of the chest done earlier this month.  This shows the right upper lobe pleural-based thickening or mass and left upper lobe nodule.  Due to his smoking history the possibility of malignancy cannot be ruled out.  Discussing different options I ordered a PET scan and a  follow-up CT scan of the chest in 3 months time.  If the PET scan is positive a CT-guided needle biopsy would be best option for the right upper lobe pleural-based lung mass which is not reachable by bronchoscopy.  If PET scan is negative will wait for the neck CT scan of the chest and make further recommendations.       NM PET/CT SKULL BASE TO MID THIGH at [facility] on 11/1/2022:Comparison: Chest CT 5/19/2022  • Background right hepatic lobe metabolic activity measures max SUV 2.7.  • 4.3 x 1.9 cm RIGHT anterior upper chest pleural thickening which is hypermetabolic with a maximum SUV of 12.0  • 1.7 cm LEFT upper abdomen retroperitoneal lymph node hypermetabolicactivity, max SUV 5.3. This nodule/node closely approximates the LEFT adrenal gland.   • 9 mm retroaortic upper abdominal lymph node is mildly hypermetabolic with a max SUV of 4.0.   • 7 mm aortocaval lymph node is mildly hypermetabolic with a max SUV of 3.0.        CT Needle Biopsy Lung at [facility] on 11/16/2022:  Successful CT guided biopsy of the right anterior pleural mass  Final Diagnosis  Right upper chest/pleural mass, core biopsies:  Adenocarcinoma of pulmonary origin.     [employer] on 11/16/2022:  ALK(D5F3)- NEGATIVE  FISH Analysis ROS1- NEGATIVE  EGFR Exon 18-Not Detected  EGFR Exon 19-Not Detected  EGFR Exon 20 V265H-Dcw Detected  EGFR Exon 20 (other Mutations)-Not Detected  EGFR Exon 21- Not Detected  BRAF Mutation-Not Detected  FISH MET-No evidence of a MET amplification     Concern on the work-up is that in addition to the 4.7 cm primary RUL adenocarcinoma of the lung there are upper abdominal retroperitoneal and aortocaval lymph nodes positive on the PET scan.  If the upper abdominal lymph nodes were not a concern, Lalo could be treated with neoadjuvant Opdivo Alimta Keytruda followed by resection.     Intense interdepartmental consultations are being undertaken to give Lalo the most aggressive approach possible.  All of the plans outlined below  were discussed at length with Lalo and his wife Joann.       MRI abdomen without and with IV contrast  at [facility] on 12/15/2022:COMPARISON: PET/CT 11/01/2022    • 9 mm RIGHT liver cyst  • 15.2 cm spleen  • 9 mm upper abdominal retroperitoneal lymph node,unchanged        CT ABDOMEN PELVIS WO CONTRAST at [facility] on 12/15/2022:COMPARISON: PET/CT 11/01/2022  • Penile prosthesis.   • Penile pump implanted within the left anterolateral pelvic wall.   • There is prominent wall thickening of the distal sigmoid colon with multiple regional diverticuli.  • There are small fatty containing umbilical hernia.   • Fatty containing inguinal hernias  • 7 mm left periaortic and aortocaval lymph nodes demonstrating increased FDG uptake comparison PET   exam remains      NM BONE SCAN WHOLE BODY at [facility] on 12/15/2022:Comparison is made with abdomen/pelvis CT imaging from earlier today. Comparison is made with a PET/CT from 11/01/2022. Comparison is made with a bone scan from 02/20/2020.   •  No evidence of bone metastasis.     Initial Consult with Dr. Anton Kinney on 12/27/2022:  He met with Dr. Carlin for surgical consirations of the right upper lobe lesion. He believes that there is likely chest wall invasion and recommends neoadjuvant chemoradiation prior to definitive surgery of the right upper lobe tumor. I anticipate a dose of 5040 cGy in 28 treatment fractions to the lung lesion as neoadjuvant treatment along with concomitant chemotherapy and/or immunotherapy.  In addition, we will determine if Dr. Bonilla is able to sample the upper abdominal lymph nodes to determine the etiology of that finding on the PET scan. He is scheduled to see her later this week.     Initial Consult with Dr. Kim Bonilla at [facility] on 12/29/2022:  In terms of his retroperitoneal lymphadenopathy, I do not recommend a robotic retroperitoneal lymph node biopsy as the node is only 0.7cm and I believe it would be unlikely we would be able to identify that node  without guidance. It would be an extremely high risk procedure as it the node is very close to the vasculature. I discussed this with Dr. Kinney personally. This is a new undiagnosed problem with an uncertain prognosis.   I have personally reviewed the oncology note, labs, and PET scan above. I have personally discussed the patient and the care plan with Dr. Kinney and at tumor board     Port placed by Dr. Kim Bonilla at [facility] on 12/30/2022     Case Presented at Tumor Board at [facility] By Dr. Anton Kinney (note 1/1/2023):  • MORRIS (Dr. Kinney)presented this patient at tumor conference. Dr. Kim Bonilla was in attendance unfortunately she is not able to biopsy the upper abdominal lymph nodes due to the proximity to adjacent aorta and superior mesenteric artery.  • MORRIS (Dr. Kinney)also reviewed the recommendation of Dr. Goldy Carlin for neoadjuvant chemoradiation followed by definitive surgery for the right upper lobe lung tumor.  • Discussion at tumor conference surrounded management of the upper abdominal lymph nodes. The consensus was at this time to proceed with treatment of the primary known adenocarcinoma lung and base further treatment recommendations upon findings at definitive surgery.  • Abdominal lymph nodes serial radiographs is recommended for follow-up. It was agreed that this is an unlikely metastatic distribution for right upper lobe lung carcinoma in the lymph nodes may represent another etiology.  • MORRIS (Dr. Kinney) will coordinate with Dr. Tadeo for concomitant neoadjuvant chemoradiation of the right upper lobe lung tumor followed by definitive surgery per Dr. Goldy Carlin        MRI Brain With & Without Contrast at [facility] on 1/3/2023:  • No metastatic disease identified     MORRIS Pérez) and Dr. Anton Kinney have both discussed Mr. Jones's case with Dr. Goldy Carlin on multiple occasions.  He has participated in the decision-making and treatment plan and recommendations moving forward.     Multidisciplinary  interdepartmental consultations were obtained with radiation oncology, thoracic surgery and general surgery for opinion regarding robotic abdominal biopsy approach for lymph node tissue sampling leading to neoadjuvant chemoimmunotherapy delivered concurrently with XRT.     Neoadjuvant concurrent XRT (with chemoimmunotherapy) initiated on 1/4/2023.  5040 cGy planned in 28 treatment fractions.  Neoadjuvant cisplatin 75 mg/m2, Alimta 500 mg/m2 and Opdivo 360 mg, cycle #1 initiated on 1/5/2023  Cycle #3 delivered on 2/16/2023.     Post neoadjuvant CT scan of the chest abdomen and pelvis is scheduled for 3/20/2023 and a PET scan scheduled for 3/27/2023.      Follow-up appointment with Dr. Goldy Carlin on 3/27/2023 to review imaging studies and to set a surgery date.      Right thoracotomy with right upper lobectomy and mediastinal lymph node dissection by Dr. Goldy Carlin on 5/4/2023      TREATMENT SUMMARY:  • Neoadjuvant concurrent XRT (with chemoimmunotherapy) to RUL of lung initiated on 1/4/2023 completed on 2/14/2023.  5040 cGy over 28 treatment fractions.  • Neoadjuvant cisplatin 75 mg/m2, Alimta 500 mg/m2 and Opdivo 360 mg, cycle #1 initiated on 1/5/2023.  Cycle #3 delivered on 2/16/2023.  • Right thoracotomy with right upper lobectomy and mediastinal lymph node dissection by Dr. Goldy Carlin on 5/4/2023.        #2   TUMOR HISTORY: Prostate cancer-  Lalo was seen in initial oncology consultation on 7/15/2020 referred by his PCP, Dr. Flex Sheikh in [facility] for medical oncology opinion on his diagnosis of prostate cancer.     Lalo relates that he was found to have a minimally increased PSA of between 4.5 and 5 on a routine physical examination.  This led to work-up and eventual prostate biopsy leading to the diagnosis of prostate cancer.     Prostate biopsy on 11/1/2018 documented the following:  Right lateral base-Microfocus of prostatic carcinoma   Prostate biopsy on 2/25/2019 documented the  following:  Right lateral base-adenocarcinoma: Consuelo 3+4 = 7  Right lateral mid- adenocarcinoma: Consuelo 3+3 = 6  Prostate biopsy 2/18/2020 documented the following:  Right lateral base-adenocarcinoma: Consuelo 3+4 = 7     PSA levels by date as follows:  2/19/2019-9.1  4/19/2019-7.0  7/22/2019-6.6  10/17/2019-7.8  114 2020-8.39  5/18/2020- 11.5     MRI pelvis with and without contrast on 2/5/2020 at [facility] documented:  • No suspicious lesions in the prostate (PI-RADS 3 or greater)  • No pelvis lymphadenopathy or suspicious pelvic bone lesions     Prostate biopsy was performed on 2/18/2020 by Dr. Clark at [facility].  Pathology revealed:  Prostate, right apex, needle biopsy:  • Benign prostate tissue  Prostate, right base, needle biopsy:  • Adenocarcinoma, acinar type, [place] grade 3+4=7, grade group 2, discontinuously involving 10/20 mm or 50% of core (3 foci measuring 4,3, and 1 mm), 30% of tumor is [place] pattern 4  Prostate, right mid, needle biopsy:  • Benign prostate tissue  Prostate, left apex, needle biopsy:  • Benign prostate tissue  Prostate, left base, needle biopsy  • Benign prostate tissue  Prostate, left mid, needle biopsy:  • Benign prostate tissue        CT abdomen and pelvis without contrast on 2/20/2020 at [facility] documented:  • No evidence of complication after prostate biopsy  • Numerous colonic diverticula with no evidence of acute diverticulitis  • Small bilateral fat-containing groin hernias     Bone scan on 6/2/2020 at [facility] documented:  • Abnormal uptake at the right dorsal tissues at the level of T12 and right upper kidney. This could represent a bone lesion or renal lesion. Recommend CT abdomen with contrast for further evaluation if patient's renal function permits. If low renal function, MRI abdomen without contrast would be an alternative for evaluation     CT abdomen and pelvis without contrast on 6/7/2020 at [facility] documented no acute abnormality.      CBC (7/15/2020) revealed a WBC of 6.73. Hgb  is 15.5 with an MCV of 93.5 and platelet count of 147,000.      Lalo has been under evaluation and monitoring in the urology department by Dr. Trae Clark at [facility].   At his last visit on 5/28/2020 Mr. Jones was leaning toward a radical prostatectomy to address the issue of his prostate cancer.     Medical oncology consultation was requested for opinion.  The 2 best options at this juncture include a radical prostatectomy which is most definitive followed by radiation therapy which gives similar statistical long-term results but with the potential for long-term radiation therapy associated side effects.   I have encouraged him that a radical prostatectomy is appropriate and if he is considering this that I would support that completely.     He has an appointment with Dr. Nuno Jj on 8/21/2022 discussed robotic prostatectomy.     Recommendation prior to his August appointment with urology is as follows:  • [employer] genetic testing drawn today  • PSA  • CMP  • MRI of the thoracic spine W&WO to evaluate the T12 area and any other areas of concern     He had robotic assisted laparoscopic prostatectomy by Dr. Jj September 2020 for PT2N0 Consuelo 7 disease with tumor approaching the right posterior margin.        TREATMENT SUMMARY:  • Robotic assisted laparoscopic prostatectomy by Dr. Jj September 2020 for PT2N0 Consuelo 7 disease with tumor approaching the right posterior margin              Past Medical History:    Past Medical History:   Diagnosis Date   • Abnormal PET scan of lung     Nov 2022   • Allergic rhinitis    • Arthritis    • Bronchiectasis    • Bronchitis    • Cancer     prostate   • Chronic bronchitis    • Chronic pain    • GERD (gastroesophageal reflux disease)    • Pneumonia    • Prostatitis, acute    • S/P ACL reconstruction    • Septic shock due to urinary tract infection    • Sinusitis    • Strep throat    • UTI (urinary tract infection)        Past Surgical History:    Past Surgical  History:   Procedure Laterality Date   • ANKLE SURGERY Right    • BACK SURGERY      X2   • ENDOSCOPY N/A 01/10/2022    Procedure: ESOPHAGOGASTRODUODENOSCOPY WITH ANESTHESIA;  Surgeon: Tucker Bright MD;  Location:  [facility] OR;  Service: Gastroenterology;  Laterality: N/A;  pre food bolus  post food bolus     • KNEE ACL RECONSTRUCTION Left    • NASAL SEPTUM SURGERY     • PENILE PROSTHESIS IMPLANT N/A 03/15/2022    Procedure: 3-PIECE INFLATABLE PENILE PROSTHESIS PLACEMENT;  Surgeon: Trae Clark MD;  Location:  [facility] OR;  Service: Urology;  Laterality: N/A;   • PROSTATE ULTRASOUND BIOPSY N/A 02/18/2020    Procedure: PROSTATE  BIOPSY;  Surgeon: Trae Clark MD;  Location:  [facility] OR;  Service: Urology;  Laterality: N/A;   • PROSTATECTOMY N/A 09/22/2020    Procedure: RADICAL PROSTATECTOMY LAPAROSCOPIC WITH DAVINCI ROBOT;  Surgeon: Nuno Jj MD;  Location:  [facility] OR;  Service: DaVinci;  Laterality: N/A;   • SHOULDER SURGERY Left     X 4   • TENNIS ELBOW RELEASE Bilateral 04/14/2011   • THORACOTOMY Right 5/4/2023    Procedure: RIGHT THORACOTOMY WITH CHEST WALL RESECTION, MEDIASTINAL LYMPH NODE DISSECTION, RIGHT UPPER LOBECTOMY;  Surgeon: Goldy Carlin MD;  Location:  [facility] OR;  Service: Cardiothoracic;  Laterality: Right;   • VENOUS ACCESS DEVICE (PORT) INSERTION N/A 12/30/2022    Procedure: Single Lumen Port-a-cath insertion with flouroscopy;  Surgeon: Kim Bonilla MD;  Location:  [facility] OR;  Service: General;  Laterality: N/A;       Current Hospital Medications:      Current Facility-Administered Medications:   •  acetaminophen (TYLENOL) tablet 650 mg, 650 mg, Oral, Q6H, Goldy Carlin MD, 650 mg at 05/05/23 1757  •  acetylcysteine (MUCOMYST) 20 % nebulizer solution 3 mL, 3 mL, Nebulization, Q8H - RT, Goldy Carlin MD, 3 mL at 05/05/23 1433  •  bisacodyl (DULCOLAX) suppository 10 mg, 10 mg, Rectal, Daily PRN, Goldy Carlin MD  •  docusate sodium (COLACE) capsule 100 mg, 100 mg,  Oral, Daily, Goldy Carlin MD, 100 mg at 23 0847  •  Enoxaparin Sodium (LOVENOX) syringe 40 mg, 40 mg, Subcutaneous, Daily, Goldy Carlin MD, 40 mg at 23 0847  •  gabapentin (NEURONTIN) capsule 800 mg, 800 mg, Oral, Q8H, Goldy Carlin MD, 800 mg at 23 1437  •  ipratropium-albuterol (DUO-NEB) nebulizer solution 3 mL, 3 mL, Nebulization, Q8H - RT, Goldy Carlin MD, 3 mL at 23 1433  •  ketorolac (TORADOL) injection 15 mg, 15 mg, Intravenous, Q6H, Goldy Carlin MD, 15 mg at 23 1757  •  methocarbamol (ROBAXIN) tablet 500 mg, 500 mg, Oral, Q8H, Goldy Carlin MD, 500 mg at 23 1632  •  naloxone (NARCAN) injection 0.1 mg, 0.1 mg, Intravenous, Q5 Min PRN, Goldy Carlin MD  •  ondansetron (ZOFRAN) tablet 4 mg, 4 mg, Oral, Q6H PRN **OR** ondansetron (ZOFRAN) injection 4 mg, 4 mg, Intravenous, Q6H PRN, Goldy Carlin MD  •  oxyCODONE (ROXICODONE) immediate release tablet 5 mg, 5 mg, Oral, Q6H PRN, Minnie García APRN, 5 mg at 23 1437  •  pantoprazole (PROTONIX) EC tablet 40 mg, 40 mg, Oral, Q AM, Goldy Carlin MD, 40 mg at 23 0506  •  polyethylene glycol (MIRALAX) packet 17 g, 17 g, Oral, Daily, Goldy Carlin MD, 17 g at 23 0848    Allergies: No Known Allergies    Social History:    Social History     Socioeconomic History   • Marital status:    Tobacco Use   • Smoking status: Former     Packs/day: 1.00     Years: 35.00     Pack years: 35.00     Types: Cigarettes     Start date: 1987     Quit date: 2022     Years since quittin.4   • Smokeless tobacco: Former     Types: Snuff     Quit date:    Vaping Use   • Vaping Use: Former   Substance and Sexual Activity   • Alcohol use: No     Comment: 0   • Drug use: Not Currently     Frequency: 7.0 times per week     Types: Hydrocodone, Marijuana, Oxycodone     Comment: Edible gummies - 1 per day   • Sexual activity: Yes     Partners: Female     Birth control/protection: None       Family  "History:   Family History   Problem Relation Age of Onset   • Cancer Mother        REVIEW OF SYSTEMS:    Constitutional: no fever, no night sweats, no fatigue;   HEENT: no blurring of vision, no double vision, no hearing difficulty, no tinnitus,no ulceration, no dental caries, no dysphagia     Lungs: Remarkably, no dyspnea shortness of breath or cough postoperatively  CVS: no palpitation, no chest pain, no shortness of breath  GI: no abdominal pain, no nausea , no vomiting, no constipation  ANAT: no dysuria, frequency and urgency, no hematuria, no kidney stones  Musculoskeletal: no joint pain, swelling , stiffness  Endocrine: no polyuria, polydipsia, no cold or heat intolerance  Hematology: no anemia, no easy brusing or bleeding, no hx of clotting disorder  Dermatology: no skin rash, no eczema, no pruritus  Psychiatry: no depression, no anxiety,no panic attacks, no suicide ideation  Neurology: no syncope, no seizures, no numbness or tingling of hands, no numbness or tingling of feet, no paresis    Vitals:    /66 (BP Location: Left arm, Patient Position: Lying)   Pulse 113   Temp 98.8 °F (37.1 °C) (Oral)   Resp 18   Ht 187 cm (73.62\")   Wt 101 kg (222 lb 10.6 oz)   SpO2 94%   BMI 28.88 kg/m²     PHYSICAL EXAM:    CONSTITUTIONAL: Alert, appropriate, no acute distress  EYES: Nonicteric, EOM intact, pupils equal round   ENT: Mucous membranes moist, no oropharyngeal lesions   NECK: Supple, no masses   CHEST/LUNGS: Chest tube present and unremarkable  CARDIOVASCULAR: RRR, no murmurs  ABDOMEN: soft non-tender, active bowel sounds, no HSM  EXTREMITIES: warm, moves all fours  SKIN: Multiple tattoos  LYMPH: No cervical, clavicular, axillary, or inguinal lymphadenopathy  NEUROLOGIC: follows commands, non focal   PSYCH: mood and affect appropriate    CBC  Results from last 7 days   Lab Units 05/05/23  0500 05/04/23  1540   WBC 10*3/mm3 6.67 8.78   HEMOGLOBIN g/dL 11.9* 13.3   HEMATOCRIT % 37.2* 40.5   PLATELETS " 10*3/mm3 180 181         Lab Results   Component Value Date     05/05/2023    K 4.1 05/05/2023     05/05/2023    CO2 29.0 05/05/2023    BUN 15 05/05/2023    CREATININE 1.06 05/05/2023    GLUCOSE 129 (H) 05/05/2023    CALCIUM 8.5 (L) 05/05/2023    BILITOT 0.4 04/27/2023    ALKPHOS 98 04/27/2023    AST 22 04/27/2023    ALT 34 04/27/2023    AGRATIO 1.7 04/27/2023    GLOB 2.6 04/27/2023       Lab Results   Component Value Date    INR 0.92 04/27/2023    INR 0.93 11/16/2022    PROTIME 12.5 04/27/2023    PROTIME 12.1 11/16/2022       Cultures:    Lab Results   Component Value Date    BLOODCX No growth at 5 days 06/07/2020     No components found for: URINCX    ASSESSMENT/PLAN:    Mr. 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    Lalo was treated with concurrent neoadjuvant chemotherapy and radiation therapy as follows:  • Neoadjuvant concurrent XRT (with chemoimmunotherapy) to RUL of lung initiated on 1/4/2023 completed on 2/14/2023.  5040 cGy over 28 treatment fractions.  • Neoadjuvant cisplatin 75 mg/m2, Alimta 500 mg/m2 and Opdivo 360 mg, cycle #1 initiated on 1/5/2023.  Cycle #3 delivered on 2/16/2023.    Lalo underwent a right thoracotomy with right upper lobectomy and mediastinal lymph node dissection by Dr. Goldy Carlin on 5/4/2023.  Oncology consultation requested and continuity of care.     Nino has done remarkably well postoperatively.  He is excited to be able to get this phase of his treatment over with.  He recently bought a new [employer] and also moved into a new house.  He has many reasons that he is wanting to get well quickly and get back home and get back to work.  He has tolerated all the phases of his neoadjuvant and surgical treatments remarkably well.      Lalo has an outpatient follow-up with me on 5/24/2023 at 9:30  AM to review final pathology and potential further recommendations for adjuvant immunotherapy depending on pathology results.      Enrique Tadeo MD    05/05/23  18:44 CDT

## 2023-05-05 NOTE — PLAN OF CARE
Goal Outcome Evaluation:  Plan of Care Reviewed With: patient        Progress: no change       PCA in use. Pt ambulating in jones. Air leak present, MD aware. Joseph to be removed later in the shift. No distress noted.

## 2023-05-05 NOTE — ANESTHESIA POSTPROCEDURE EVALUATION
"Patient: Lalo Jones    Procedure Summary       Date: 05/04/23 Room / Location:  [facility] 16 /  [facility]    Anesthesia Start: 1140 Anesthesia Stop: 1535    Procedure: RIGHT THORACOTOMY WITH CHEST WALL RESECTION, MEDIASTINAL LYMPH NODE DISSECTION, RIGHT UPPER LOBECTOMY (Right: Chest) Diagnosis:       Lung mass      (Lung mass [R91.8])    Surgeons: Goldy Carlin MD Provider: Dorian Dash CRNA    Anesthesia Type: general ASA Status: 3            Anesthesia Type: general    Vitals  Vitals Value Taken Time   /87 05/04/23 1636   Temp 98.7 °F (37.1 °C) 05/04/23 1640   Pulse 89 05/04/23 1640   Resp 17 05/04/23 1635   SpO2 97 % 05/04/23 1640           Post Anesthesia Care and Evaluation    Patient location during evaluation: PACU  Patient participation: complete - patient participated  Level of consciousness: awake and alert  Pain management: adequate    Airway patency: patent  Anesthetic complications: No anesthetic complications    Cardiovascular status: acceptable  Respiratory status: acceptable  Hydration status: acceptable    Comments: Blood pressure 119/63, pulse 101, temperature 98.4 °F (36.9 °C), temperature source Oral, resp. rate 18, height 187 cm (73.62\"), weight 101 kg (222 lb 10.6 oz), SpO2 94 %.    Pt discharged from PACU based on brien score >8    "

## 2023-05-05 NOTE — PROGRESS NOTES
"Patient name: Lalo Jones  Patient : 1965  VISIT # [id number]  MR #[id number]    Procedure:Procedure(s):  RIGHT THORACOTOMY WITH CHEST WALL RESECTION, MEDIASTINAL LYMPH NODE DISSECTION, RIGHT UPPER LOBECTOMY  Procedure Date:2023  POD:1 Day Post-Op    Subjective     Patient is resting in bed on room air. On dilaudid PCA. Pain is well controlled at this time.  Patient reports he has been ambulating in the hallways. Reaching 1500 on his incentive spirometer.        Objective     Visit Vitals  /63 (BP Location: Left arm, Patient Position: Lying)   Pulse 101   Temp 98.4 °F (36.9 °C) (Oral)   Resp 18   Ht 187 cm (73.62\")   Wt 101 kg (222 lb 10.6 oz)   SpO2 94%   BMI 28.88 kg/m²       Intake/Output Summary (Last 24 hours) at 2023 0934  Last data filed at 2023 0855  Gross per 24 hour   Intake 890 ml   Output 1140 ml   Net -250 ml     Right pleural chest tube output: 340 ml/24 hr  Lab:     CBC:  Results from last 7 days   Lab Units 23  0500 23  1540   WBC 10*3/mm3 6.67 8.78   HEMATOCRIT % 37.2* 40.5   PLATELETS 10*3/mm3 180 181          BMP:  Results from last 7 days   Lab Units 23  0500 23  1540   SODIUM mmol/L 139 139   POTASSIUM mmol/L 4.1 4.8   CHLORIDE mmol/L 103 106   CO2 mmol/L 29.0 23.0   GLUCOSE mg/dL 129* 146*   BUN mg/dL 15 15   CREATININE mg/dL 1.06 1.10          COAG:      Invalid input(s): PT    IMAGES:       Imaging Results (Last 24 Hours)     Procedure Component Value Units Date/Time    XR Chest 1 View [[id number]] Collected: 23     Updated: 23    Narrative:      EXAMINATION: XR CHEST 1 VW- 2023 8:44 AM CDT     HISTORY: post op lung surgery     REPORT: A frontal view of the chest was obtained.     COMPARISON: Chest x-ray 2023 1816 hours.     There are postthoracotomy changes on the right as before, 2 right chest  tubes are present, one with the tip at the apex, the other at the right  base, both appear in satisfactory " position, unchanged. A right  subclavian port catheter is present, good position as before. There is  mild volume loss in the right. Screw plate fixation of the posterior  right eighth rib is noted. No pneumothorax or pleural effusion is  identified. Heart size is normal. There is stable ectasia of the  thoracic aorta. Mild atelectasis is noted at the left base.       Impression:      Stable 1 day appearance of the chest.  This report was finalized on 05/05/2023 08:46 by Dr. Damian Rutledge MD.    XR Chest 1 View [[id number]] Collected: 05/04/23 1847     Updated: 05/04/23 1851    Narrative:      EXAM/TECHNIQUE: XR CHEST 1 VW-     INDICATION: Air leak; R91.8-Other nonspecific abnormal finding of lung  field     COMPARISON: Same day radiograph     FINDINGS:     RIGHT apical and basilar chest tubes are stable in position. No  measurable pneumothorax. No pleural effusion or focal consolidation.  Stable cardiac silhouette. RIGHT chest port with catheter tip overlying  the SVC. Prior RIGHT posterior rib plating noted.       Impression:         No measurable pneumothorax with 2 right-sided chest tubes in place.  This report was finalized on 05/04/2023 18:48 by Dr. Harpreet Koenig MD.    XR Chest 1 View [[id number]] Collected: 05/04/23 1550     Updated: 05/04/23 1555    Narrative:      EXAMINATION: XR CHEST 1 VW-  5/4/2023 3:50 PM CDT 1 view     HISTORY: post op lung surgery; R91.8-Other nonspecific abnormal finding  of lung field     COMPARISON: 04/27/2023.     FINDINGS:   There has been interval surgery to the RIGHT hemithorax with a plate and  screw construct on the RIGHT fifth posterolateral rib. There are 2 chest  tubes in the RIGHT hemithorax. No measurable pneumothorax identified.  Infusion port is seen on the RIGHT..      There is some gas in the soft tissues at the base of the neck and along  the RIGHT lateral chest wall, likely postoperative.          Impression:         1.  Postoperative changes in the RIGHT  hemithorax as discussed above. No  definite acute findings.        This report was finalized on 05/04/2023 15:52 by Dr. Lalo Elena MD.      My interpretation of chest x-ray: Chests tubes in satisfactory position. Screw fixation plate noted to the posterior right eighth rib. No appreciable pneumothorax or effusion.  Stable postoperative x-ray.      Physical Exam:  General: Alert, oriented. No apparent distress.   Cardiovascular: Regular rate and rhythm without murmur, rubs, or gallops.    Pulmonary: Clear to auscultation bilaterally without wheezing, rubs, or rales.  Chest: Thoracic incisions clean, dry, and intact. Chest tube to 20 cm H2O suction. 1/5 air leak. Fluid is serosanguineous   Abdomen: Soft, non distended, and non tender.  Extremities: Warm, moves all extremities. No edema.   Neurologic:  Grossly intact with no focal deficits.          Impression:  S/p right lobectomy   Malignant neoplasm of right upper lobe  Former smoker    Plan:  Continue chest tube suction at 20 cm  Continue multimodality pain control regimen  Encourage pulmonary toilet and ambulation  Daily chest x-ray  Discussed with patient      MADDY Avila  05/05/23  09:34 CDT

## 2023-05-05 NOTE — CASE MANAGEMENT/SOCIAL WORK
Discharge Planning Assessment  [facility]     Patient Name: Lalo Jones  MRN: [id number]  Today's Date: 5/5/2023    Admit Date: 5/4/2023        Discharge Needs Assessment     Row Name 05/05/23 0945       Living Environment    People in Home child(amisha), dependent;spouse    Name(s) of People in Home Ambrocio Foster - spouse    Current Living Arrangements home    Potentially Unsafe Housing Conditions none    Primary Care Provided by self    Provides Primary Care For child(amisha)    Caregiving Concerns dependent children    Family Caregiver if Needed spouse    Quality of Family Relationships helpful;involved;supportive    Able to Return to Prior Arrangements yes       Resource/Environmental Concerns    Resource/Environmental Concerns none    Transportation Concerns none       Transportation Needs    In the past 12 months, has lack of transportation kept you from medical appointments or from getting medications? no    In the past 12 months, has lack of transportation kept you from meetings, work, or from getting things needed for daily living? No       Food Insecurity    Within the past 12 months, you worried that your food would run out before you got the money to buy more. Never true    Within the past 12 months, the food you bought just didn't last and you didn't have money to get more. Never true       Transition Planning    Patient/Family Anticipates Transition to home with family;home    Patient/Family Anticipated Services at Transition none    Transportation Anticipated car, drives self;family or friend will provide       Discharge Needs Assessment    Readmission Within the Last 30 Days no previous admission in last 30 days    Equipment Currently Used at Home none    Concerns to be Addressed denies needs/concerns at this time    Anticipated Changes Related to Illness none    Discharge Coordination/Progress Pt lives at home with spouse and dependent children, independent and works, has PCP and RX coverage;  denies any concerns at this time; will continue to follow.               Discharge Plan    No documentation.               Continued Care and Services - Admitted Since 5/4/2023    Coordination has not been started for this encounter.       Expected Discharge Date and Time     Expected Discharge Date Expected Discharge Time    May 7, 2023          Demographic Summary    No documentation.                Functional Status    No documentation.                Psychosocial    No documentation.                Abuse/Neglect    No documentation.                Legal    No documentation.                Substance Abuse    No documentation.                Patient Forms    No documentation.                   Yamile Cardenas RN

## 2023-05-06 ENCOUNTER — APPOINTMENT (OUTPATIENT)
Dept: GENERAL RADIOLOGY | Facility: HOSPITAL | Age: 58
DRG: 165 | End: 2023-05-06
Payer: MEDICAID

## 2023-05-06 LAB
QT INTERVAL: 304 MS
QTC INTERVAL: 480 MS

## 2023-05-06 PROCEDURE — 25010000002 FUROSEMIDE PER 20 MG: Performed by: NURSE PRACTITIONER

## 2023-05-06 PROCEDURE — 25010000002 ENOXAPARIN PER 10 MG: Performed by: SURGERY

## 2023-05-06 PROCEDURE — 94664 DEMO&/EVAL PT USE INHALER: CPT

## 2023-05-06 PROCEDURE — 25010000002 AMIODARONE IN DEXTROSE 5% 360-4.14 MG/200ML-% SOLUTION: Performed by: SURGERY

## 2023-05-06 PROCEDURE — 25010000002 KETOROLAC TROMETHAMINE PER 15 MG: Performed by: SURGERY

## 2023-05-06 PROCEDURE — 94799 UNLISTED PULMONARY SVC/PX: CPT

## 2023-05-06 PROCEDURE — 71045 X-RAY EXAM CHEST 1 VIEW: CPT

## 2023-05-06 PROCEDURE — 93005 ELECTROCARDIOGRAM TRACING: CPT | Performed by: SURGERY

## 2023-05-06 RX ORDER — POTASSIUM CHLORIDE 750 MG/1
20 CAPSULE, EXTENDED RELEASE ORAL ONCE
Status: COMPLETED | OUTPATIENT
Start: 2023-05-06 | End: 2023-05-06

## 2023-05-06 RX ORDER — AMIODARONE HYDROCHLORIDE 200 MG/1
400 TABLET ORAL
Status: DISCONTINUED | OUTPATIENT
Start: 2023-05-06 | End: 2023-05-08 | Stop reason: HOSPADM

## 2023-05-06 RX ORDER — FUROSEMIDE 10 MG/ML
20 INJECTION INTRAMUSCULAR; INTRAVENOUS ONCE
Status: COMPLETED | OUTPATIENT
Start: 2023-05-06 | End: 2023-05-06

## 2023-05-06 RX ORDER — METOPROLOL TARTRATE 5 MG/5ML
5 INJECTION INTRAVENOUS ONCE
Status: COMPLETED | OUTPATIENT
Start: 2023-05-06 | End: 2023-05-06

## 2023-05-06 RX ADMIN — ACETAMINOPHEN 650 MG: 325 TABLET, FILM COATED ORAL at 23:13

## 2023-05-06 RX ADMIN — METOPROLOL TARTRATE 12.5 MG: 25 TABLET, FILM COATED ORAL at 21:13

## 2023-05-06 RX ADMIN — GABAPENTIN 800 MG: 400 CAPSULE ORAL at 21:13

## 2023-05-06 RX ADMIN — DOCUSATE SODIUM 100 MG: 100 CAPSULE ORAL at 08:53

## 2023-05-06 RX ADMIN — METOPROLOL TARTRATE 12.5 MG: 25 TABLET, FILM COATED ORAL at 11:00

## 2023-05-06 RX ADMIN — GABAPENTIN 800 MG: 400 CAPSULE ORAL at 14:55

## 2023-05-06 RX ADMIN — POTASSIUM CHLORIDE 20 MEQ: 10 CAPSULE, COATED, EXTENDED RELEASE ORAL at 10:22

## 2023-05-06 RX ADMIN — GABAPENTIN 800 MG: 400 CAPSULE ORAL at 05:45

## 2023-05-06 RX ADMIN — METHOCARBAMOL 500 MG: 500 TABLET, FILM COATED ORAL at 21:13

## 2023-05-06 RX ADMIN — IPRATROPIUM BROMIDE AND ALBUTEROL SULFATE 3 ML: .5; 3 SOLUTION RESPIRATORY (INHALATION) at 06:49

## 2023-05-06 RX ADMIN — FUROSEMIDE 20 MG: 10 INJECTION, SOLUTION INTRAMUSCULAR; INTRAVENOUS at 10:23

## 2023-05-06 RX ADMIN — AMIODARONE HYDROCHLORIDE 0.5 MG/MIN: 1.8 INJECTION, SOLUTION INTRAVENOUS at 06:57

## 2023-05-06 RX ADMIN — PANTOPRAZOLE SODIUM 40 MG: 40 TABLET, DELAYED RELEASE ORAL at 05:45

## 2023-05-06 RX ADMIN — OXYCODONE HYDROCHLORIDE 5 MG: 5 TABLET ORAL at 21:13

## 2023-05-06 RX ADMIN — OXYCODONE HYDROCHLORIDE 5 MG: 5 TABLET ORAL at 15:15

## 2023-05-06 RX ADMIN — METHOCARBAMOL 500 MG: 500 TABLET, FILM COATED ORAL at 05:45

## 2023-05-06 RX ADMIN — METHOCARBAMOL 500 MG: 500 TABLET, FILM COATED ORAL at 14:55

## 2023-05-06 RX ADMIN — OXYCODONE HYDROCHLORIDE 5 MG: 5 TABLET ORAL at 08:53

## 2023-05-06 RX ADMIN — KETOROLAC TROMETHAMINE 15 MG: 30 INJECTION, SOLUTION INTRAMUSCULAR; INTRAVENOUS at 05:45

## 2023-05-06 RX ADMIN — AMIODARONE HYDROCHLORIDE 400 MG: 200 TABLET ORAL at 10:21

## 2023-05-06 RX ADMIN — OXYCODONE HYDROCHLORIDE 5 MG: 5 TABLET ORAL at 02:19

## 2023-05-06 RX ADMIN — ACETAMINOPHEN 650 MG: 325 TABLET, FILM COATED ORAL at 17:13

## 2023-05-06 RX ADMIN — ACETAMINOPHEN 650 MG: 325 TABLET, FILM COATED ORAL at 12:38

## 2023-05-06 RX ADMIN — ACETAMINOPHEN 650 MG: 325 TABLET, FILM COATED ORAL at 05:45

## 2023-05-06 RX ADMIN — METOPROLOL TARTRATE 5 MG: 5 INJECTION INTRAVENOUS at 00:50

## 2023-05-06 RX ADMIN — KETOROLAC TROMETHAMINE 15 MG: 30 INJECTION, SOLUTION INTRAMUSCULAR; INTRAVENOUS at 12:38

## 2023-05-06 RX ADMIN — ENOXAPARIN SODIUM 40 MG: 100 INJECTION SUBCUTANEOUS at 08:53

## 2023-05-06 RX ADMIN — AMIODARONE HYDROCHLORIDE 1 MG/MIN: 1.8 INJECTION, SOLUTION INTRAVENOUS at 01:19

## 2023-05-06 RX ADMIN — POLYETHYLENE GLYCOL 3350 17 G: 17 POWDER, FOR SOLUTION ORAL at 08:53

## 2023-05-06 RX ADMIN — ACETYLCYSTEINE 1.5 ML: 200 INHALANT RESPIRATORY (INHALATION) at 06:49

## 2023-05-06 NOTE — PROGRESS NOTES
"Patient name: Lalo Jones  Patient : 1965  VISIT # [id number]  MR #[id number]    Procedure:Procedure(s):  RIGHT THORACOTOMY WITH CHEST WALL RESECTION, MEDIASTINAL LYMPH NODE DISSECTION, RIGHT UPPER LOBECTOMY  Procedure Date:2023  POD:2 Days Post-Op    Subjective     Resting in bed and remains on room air.  Reports increased pain related to chest tubes.  Experienced atrial fibrillation last night, now in sinus rhythm.  Denies any chest pain.  Reports ambulating in jones.  Remains on room air with SPO2 of 93%     Objective     Visit Vitals  BP 99/55 (BP Location: Left arm, Patient Position: Lying)   Pulse 87   Temp 98.1 °F (36.7 °C) (Oral)   Resp 18   Ht 187 cm (73.62\")   Wt 101 kg (222 lb 10.6 oz)   SpO2 93%   BMI 28.88 kg/m²       Intake/Output Summary (Last 24 hours) at 2023 1109  Last data filed at 2023 0905  Gross per 24 hour   Intake 240 ml   Output 470 ml   Net -230 ml     Right pleural chest tube output: 410 ml/24 hr to waterseal with no air leak    Lab:     CBC:  Results from last 7 days   Lab Units 23  0500 23  1540   WBC 10*3/mm3 6.67 8.78   HEMATOCRIT % 37.2* 40.5   PLATELETS 10*3/mm3 180 181          BMP:  Results from last 7 days   Lab Units 23  0500 23  1540   SODIUM mmol/L 139 139   POTASSIUM mmol/L 4.1 4.8   CHLORIDE mmol/L 103 106   CO2 mmol/L 29.0 23.0   GLUCOSE mg/dL 129* 146*   BUN mg/dL 15 15   CREATININE mg/dL 1.06 1.10          COAG:      Invalid input(s): PT    IMAGES:       Imaging Results (Last 24 Hours)     Procedure Component Value Units Date/Time    XR Chest 1 View [[id number]] Collected: 23     Updated: 23    Narrative:      EXAM/TECHNIQUE: XR CHEST 1 VW-     INDICATION: post op lung surgery     COMPARISON: 2023     FINDINGS:     RIGHT apical and RIGHT basilar chest tubes remain in place. No  measurable pneumothorax. Small amount of RIGHT chest wall subcutaneous  emphysema is noted. Prior RIGHT posterior " rib plating. RIGHT chest port  catheter tip overlying the SVC. Cardiac silhouette is stable. No pleural  effusion or focal consolidation.       Impression:         No change in appearance of the chest.  This report was finalized on 05/06/2023 07:07 by Dr. Harpreet Koenig MD.      My interpretation of chest x-ray: Chest tube in place.  No pleural effusion and no pneumothorax.  X-ray is stable.      Physical Exam:  General: Alert, oriented. No apparent distress.   Cardiovascular: Regular rate and rhythm without murmur, rubs, or gallops.    Pulmonary: Clear to auscultation bilaterally without wheezing, rubs, or rales.  Chest: Thoracic incisions clean, dry, and intact. Chest tube to waterseal.  No air leak. Fluid is serosanguineous   Abdomen: Soft, non distended, and non tender.  Extremities: Warm, moves all extremities. No edema.   Neurologic:  Grossly intact with no focal deficits.          Impression:  S/p right lobectomy   Malignant neoplasm of right upper lobe  Former smoker    Plan:  Continue chest tube suction at 20 cm  Continue multimodality pain control regimen  Encourage pulmonary toilet and ambulation  Daily chest x-ray  Receiving IV amiodarone, will finish current bag and add amiodarone 400 mg p.o. daily.    Lasix 20 mg IV x1 and potassium 20 mEq p.o. x1      Discussed with patient and nursing      MADDY Hernandez  05/06/23  11:09 CDT

## 2023-05-06 NOTE — PROGRESS NOTES
HEMATOLOGY AND MEDICAL ONCOLOGY PROGRESS NOTE    Patient name: Lalo Jones  Patient : 1965  VISIT # [id number]  MR #[id number]  Room:  372    SUBJECTIVE:  POD # 2 right thoracotomy with right upper lobectomy and mediastinal lymph node dissection.  Having mild - moderate Chest tube pain.  He developed Afib with RVR and is on an amiodarone drip - converted to NSR this am at 1:30 am      INTERVAL HISTORY:  Mr. 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     Lalo was treated with concurrent neoadjuvant chemotherapy and radiation therapy as follows:  • Neoadjuvant concurrent XRT (with chemoimmunotherapy) to RUL of lung initiated on 2023 completed on 2023.  5040 cGy over 28 treatment fractions.  • Neoadjuvant cisplatin 75 mg/m2, Alimta 500 mg/m2 and Opdivo 360 mg, cycle #1 initiated on 2023.  Cycle #3 delivered on 2023.     Lalo underwent a right thoracotomy with right upper lobectomy and mediastinal lymph node dissection by Dr. Goldy Carlin on 2023.  Oncology consultation requested and continuity of care.        DETAILED TUMOR HISTORY IS FROM OFFICE RECORDS        TARGET LUNG CANCER SITES:  • 4.7 cm x 4 cm x 1.6 cm mass in the RUL of the lung SUV of 12  • 9 mm retroaortic upper abdominal lymph node is mildly hypermetabolic with an SUV of 4.0.   • 7 mm aortocaval lymph node is mildly hypermetabolic with an SUV of 3.0.        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 Flex Sheikh in [facility] for medical oncology opinion regarding a diagnosis of prostate cancer.   [facility] GENETIC TESTING on 7/15/2020 documented a VUS:  MEN1, heterozygous, for c.  511C>T  (p.Arg 171  Trp)     Mr. Jones was again seen on 12/8/2022 re-referred by Dr. Russel Santana for new diagnosis of adenocarcinoma of the lung, diagnosed on 11/16/2022 by CT Guided needle Biopsy.     Nino is a longtime cigarette smoker of 1 pack/day since age 20.  He quit smoking on 12/1/2022 after the diagnosis of lung cancer.     Nino's PCP, Dr. Flex Sheikh ordered a low-dose screening CT scan of the chest for monitoring.     CT chest without contrast, low-dose protocol at [facility] on 5/19/2022:  • 4 mm LEFT lower lobe pulmonary nodule in superior segment   • 3.3 x 1.3 cm mass like area of pleural thickening in the RIGHT anterior upper chest   • Moderate centrilobular and paraseptal emphysema.   • Lung-RADS 2S-- Nodules with a very low likelihood of becoming a clinically active cancer due to size or lack of growth.   • Continue annual screening with low dose CT in 12 months.   • 3 month follow-up chest CT recommended to evaluate for stability of RIGHT anterior upper chest masslike pleural thickening     Dr. Flex French repeated the low-dose CT scan because of the findings in the right anterior upper chest     CT chest without contrast, low-dose protocol at [facility] on 10/17/2022:  • 4.7 cm x 4 cm x 1.6 cm abnormal area of pleural thickening in the periphery of the RUL with slightly irregular margins  • Moderate paraseptal emphysematous changes with scattered bullae in both lungs  • Lung-RADS - 4B        Initial consult with Dr. Russel Santana at [facility] on 10/24/2022:  Explained the abnormal CT scan results from May which is done in the [facility] to the patient and also discussed with the radiologist from [facility] in [place] who reviewed the current CT scan of the chest done earlier this month.  This shows the right upper lobe pleural-based thickening or mass and left upper lobe nodule.  Due to his smoking history the possibility of malignancy cannot be ruled out.  Discussing different options I ordered  a PET scan and a follow-up CT scan of the chest in 3 months time.  If the PET scan is positive a CT-guided needle biopsy would be best option for the right upper lobe pleural-based lung mass which is not reachable by bronchoscopy.  If PET scan is negative will wait for the neck CT scan of the chest and make further recommendations.       NM PET/CT SKULL BASE TO MID THIGH at [facility] on 11/1/2022:Comparison: Chest CT 5/19/2022  • Background right hepatic lobe metabolic activity measures max SUV 2.7.  • 4.3 x 1.9 cm RIGHT anterior upper chest pleural thickening which is hypermetabolic with a maximum SUV of 12.0  • 1.7 cm LEFT upper abdomen retroperitoneal lymph node hypermetabolicactivity, max SUV 5.3. This nodule/node closely approximates the LEFT adrenal gland.   • 9 mm retroaortic upper abdominal lymph node is mildly hypermetabolic with a max SUV of 4.0.   • 7 mm aortocaval lymph node is mildly hypermetabolic with a max SUV of 3.0.        CT Needle Biopsy Lung at [facility] on 11/16/2022:  Successful CT guided biopsy of the right anterior pleural mass  Final Diagnosis  Right upper chest/pleural mass, core biopsies:  Adenocarcinoma of pulmonary origin.     [employer] on 11/16/2022:  ALK(D5F3)- NEGATIVE  FISH Analysis ROS1- NEGATIVE  EGFR Exon 18-Not Detected  EGFR Exon 19-Not Detected  EGFR Exon 20 [id number] Detected  EGFR Exon 20 (other Mutations)-Not Detected  EGFR Exon 21- Not Detected  BRAF Mutation-Not Detected  FISH MET-No evidence of a MET amplification     Concern on the work-up is that in addition to the 4.7 cm primary RUL adenocarcinoma of the lung there are upper abdominal retroperitoneal and aortocaval lymph nodes positive on the PET scan.  If the upper abdominal lymph nodes were not a concern, Lalo could be treated with neoadjuvant Opdivo Alimta Keytruda followed by resection.     Intense interdepartmental consultations are being undertaken to give Lalo the most aggressive approach possible.  All of the plans  outlined below were discussed at length with Lalo and his wife Joann.        MRI abdomen without and with IV contrast  at [facility] on 12/15/2022:COMPARISON: PET/CT 11/01/2022    • 9 mm RIGHT liver cyst  • 15.2 cm spleen  • 9 mm upper abdominal retroperitoneal lymph node,unchanged        CT ABDOMEN PELVIS WO CONTRAST at [facility] on 12/15/2022:COMPARISON: PET/CT 11/01/2022  • Penile prosthesis.   • Penile pump implanted within the left anterolateral pelvic wall.   • There is prominent wall thickening of the distal sigmoid colon with multiple regional diverticuli.  • There are small fatty containing umbilical hernia.   • Fatty containing inguinal hernias  • 7 mm left periaortic and aortocaval lymph nodes demonstrating increased FDG uptake comparison PET   exam remains      NM BONE SCAN WHOLE BODY at [facility] on 12/15/2022:Comparison is made with abdomen/pelvis CT imaging from earlier today. Comparison is made with a PET/CT from 11/01/2022. Comparison is made with a bone scan from 02/20/2020.   •  No evidence of bone metastasis.     Initial Consult with Dr. Anton Kinney on 12/27/2022:  He met with Dr. Carlin for surgical consirations of the right upper lobe lesion. He believes that there is likely chest wall invasion and recommends neoadjuvant chemoradiation prior to definitive surgery of the right upper lobe tumor. I anticipate a dose of 5040 cGy in 28 treatment fractions to the lung lesion as neoadjuvant treatment along with concomitant chemotherapy and/or immunotherapy.  In addition, we will determine if Dr. Bonilla is able to sample the upper abdominal lymph nodes to determine the etiology of that finding on the PET scan. He is scheduled to see her later this week.     Initial Consult with Dr. Kim Bonilla at [facility] on 12/29/2022:  In terms of his retroperitoneal lymphadenopathy, I do not recommend a robotic retroperitoneal lymph node biopsy as the node is only 0.7cm and I believe it would be unlikely we would be able to identify  that node without guidance. It would be an extremely high risk procedure as it the node is very close to the vasculature. I discussed this with Dr. Kinney personally. This is a new undiagnosed problem with an uncertain prognosis.   I have personally reviewed the oncology note, labs, and PET scan above. I have personally discussed the patient and the care plan with Dr. Kinney and at tumor board     Port placed by Dr. Kim Bonilla at [facility] on 12/30/2022     Case Presented at Tumor Board at [facility] By Dr. Anton Kinney (note 1/1/2023):  • MORRIS (Dr. Kinney)presented this patient at tumor conference. Dr. Kim Bonilla was in attendance unfortunately she is not able to biopsy the upper abdominal lymph nodes due to the proximity to adjacent aorta and superior mesenteric artery.  • MORRIS (Dr. Kinney)also reviewed the recommendation of Dr. Goldy Carlin for neoadjuvant chemoradiation followed by definitive surgery for the right upper lobe lung tumor.  • Discussion at tumor conference surrounded management of the upper abdominal lymph nodes. The consensus was at this time to proceed with treatment of the primary known adenocarcinoma lung and base further treatment recommendations upon findings at definitive surgery.  • Abdominal lymph nodes serial radiographs is recommended for follow-up. It was agreed that this is an unlikely metastatic distribution for right upper lobe lung carcinoma in the lymph nodes may represent another etiology.  • MORRIS (Dr. Kinney) will coordinate with Dr. Tadeo for concomitant neoadjuvant chemoradiation of the right upper lobe lung tumor followed by definitive surgery per Dr. Goldy Carlin        MRI Brain With & Without Contrast at [facility] on 1/3/2023:  • No metastatic disease identified     MORRIS Pérez) and Dr. Anton Kinney have both discussed Mr. Jones's case with Dr. Goldy Calrin on multiple occasions.  He has participated in the decision-making and treatment plan and recommendations moving  forward.     Multidisciplinary interdepartmental consultations were obtained with radiation oncology, thoracic surgery and general surgery for opinion regarding robotic abdominal biopsy approach for lymph node tissue sampling leading to neoadjuvant chemoimmunotherapy delivered concurrently with XRT.     Neoadjuvant concurrent XRT (with chemoimmunotherapy) initiated on 1/4/2023.  5040 cGy planned in 28 treatment fractions.  Neoadjuvant cisplatin 75 mg/m2, Alimta 500 mg/m2 and Opdivo 360 mg, cycle #1 initiated on 1/5/2023  Cycle #3 delivered on 2/16/2023.     Post neoadjuvant CT scan of the chest abdomen and pelvis is scheduled for 3/20/2023 and a PET scan scheduled for 3/27/2023.      Follow-up appointment with Dr. Goldy Carlin on 3/27/2023 to review imaging studies and to set a surgery date.      Right thoracotomy with right upper lobectomy and mediastinal lymph node dissection by Dr. Goldy Carlin on 5/4/2023        TREATMENT SUMMARY:  • Neoadjuvant concurrent XRT (with chemoimmunotherapy) to RUL of lung initiated on 1/4/2023 completed on 2/14/2023.  5040 cGy over 28 treatment fractions.  • Neoadjuvant cisplatin 75 mg/m2, Alimta 500 mg/m2 and Opdivo 360 mg, cycle #1 initiated on 1/5/2023.  Cycle #3 delivered on 2/16/2023.  • Right thoracotomy with right upper lobectomy and mediastinal lymph node dissection by Dr. Goldy Carlin on 5/4/2023.        #2   TUMOR HISTORY: Prostate cancer-  Lalo was seen in initial oncology consultation on 7/15/2020 referred by his PCP, Dr. Flex Sheikh in [facility] for medical oncology opinion on his diagnosis of prostate cancer.     Lalo relates that he was found to have a minimally increased PSA of between 4.5 and 5 on a routine physical examination.  This led to work-up and eventual prostate biopsy leading to the diagnosis of prostate cancer.     Prostate biopsy on 11/1/2018 documented the following:  Right lateral base-Microfocus of prostatic carcinoma   Prostate biopsy  on 2/25/2019 documented the following:  Right lateral base-adenocarcinoma: [place] 3+4 = 7  Right lateral mid- adenocarcinoma: [place] 3+3 = 6  Prostate biopsy 2/18/2020 documented the following:  Right lateral base-adenocarcinoma: [place] 3+4 = 7     PSA levels by date as follows:  2/19/2019-9.1  4/19/2019-7.0  7/22/2019-6.6  10/17/2019-7.8  114 2020-8.39  5/18/2020- 11.5     MRI pelvis with and without contrast on 2/5/2020 at [facility] documented:  • No suspicious lesions in the prostate (PI-RADS 3 or greater)  • No pelvis lymphadenopathy or suspicious pelvic bone lesions     Prostate biopsy was performed on 2/18/2020 by Dr. Clark at [facility].  Pathology revealed:  Prostate, right apex, needle biopsy:  • Benign prostate tissue  Prostate, right base, needle biopsy:  • Adenocarcinoma, acinar type, [place] grade 3+4=7, grade group 2, discontinuously involving 10/20 mm or 50% of core (3 foci measuring 4,3, and 1 mm), 30% of tumor is [place] pattern 4  Prostate, right mid, needle biopsy:  • Benign prostate tissue  Prostate, left apex, needle biopsy:  • Benign prostate tissue  Prostate, left base, needle biopsy  • Benign prostate tissue  Prostate, left mid, needle biopsy:  • Benign prostate tissue        CT abdomen and pelvis without contrast on 2/20/2020 at [facility] documented:  • No evidence of complication after prostate biopsy  • Numerous colonic diverticula with no evidence of acute diverticulitis  • Small bilateral fat-containing groin hernias     Bone scan on 6/2/2020 at [facility] documented:  • Abnormal uptake at the right dorsal tissues at the level of T12 and right upper kidney. This could represent a bone lesion or renal lesion. Recommend CT abdomen with contrast for further evaluation if patient's renal function permits. If low renal function, MRI abdomen without contrast would be an alternative for evaluation     CT abdomen and pelvis without contrast on 6/7/2020 at [facility] documented no acute abnormality.      CBC  (7/15/2020) revealed a WBC of 6.73. Hgb is 15.5 with an MCV of 93.5 and platelet count of 147,000.      Lalo has been under evaluation and monitoring in the urology department by Dr. Trae Clark at [facility].   At his last visit on 5/28/2020 Mr. Jones was leaning toward a radical prostatectomy to address the issue of his prostate cancer.     Medical oncology consultation was requested for opinion.  The 2 best options at this juncture include a radical prostatectomy which is most definitive followed by radiation therapy which gives similar statistical long-term results but with the potential for long-term radiation therapy associated side effects.   I have encouraged him that a radical prostatectomy is appropriate and if he is considering this that I would support that completely.     He has an appointment with Dr. Nuno Jj on 8/21/2022 discussed robotic prostatectomy.     Recommendation prior to his August appointment with urology is as follows:  • [employer] genetic testing drawn today  • PSA  • CMP  • MRI of the thoracic spine W&WO to evaluate the T12 area and any other areas of concern     He had robotic assisted laparoscopic prostatectomy by Dr. Jj September 2020 for PT2N0 [place] 7 disease with tumor approaching the right posterior margin.        TREATMENT SUMMARY:  • Robotic assisted laparoscopic prostatectomy by Dr. Jj September 2020 for PT2N0 [place] 7 disease with tumor approaching the right posterior margin           OBJECTIVE:      Vitals:    05/06/23 0716   BP: 99/55   Pulse: 87   Resp: 18   Temp: 98.1 °F (36.7 °C)   SpO2: 93%       Intake/Output Summary (Last 24 hours) at 5/6/2023 0827  Last data filed at 5/6/2023 0547  Gross per 24 hour   Intake 240 ml   Output 470 ml   Net -230 ml       PHYSICAL EXAM:  CONSTITUTIONAL: Alert, appropriate, no acute distress  EYES: Nonicteric, EOM intact, pupils equal round   ENT: Mucous membranes moist, no oropharyngeal lesions   NECK: Supple, no masses    CHEST/LUNGS: Chest tube present and unremarkable  CARDIOVASCULAR: RRR, no murmurs HR 86  ABDOMEN: soft non-tender, active bowel sounds, no HSM  EXTREMITIES: warm, moves all fours  SKIN: Multiple tattoos  LYMPH: No cervical, clavicular, axillary, or inguinal lymphadenopathy  NEUROLOGIC: follows commands, non focal   PSYCH: mood and affect appropriate      CBC  Results from last 7 days   Lab Units 05/05/23  0500 05/04/23  1540   WBC 10*3/mm3 6.67 8.78   HEMOGLOBIN g/dL 11.9* 13.3   HEMATOCRIT % 37.2* 40.5   PLATELETS 10*3/mm3 180 181         Lab Results   Component Value Date     05/05/2023    K 4.1 05/05/2023     05/05/2023    CO2 29.0 05/05/2023    BUN 15 05/05/2023    CREATININE 1.06 05/05/2023    GLUCOSE 129 (H) 05/05/2023    CALCIUM 8.5 (L) 05/05/2023    BILITOT 0.4 04/27/2023    ALKPHOS 98 04/27/2023    AST 22 04/27/2023    ALT 34 04/27/2023    AGRATIO 1.7 04/27/2023    GLOB 2.6 04/27/2023       Lab Results   Component Value Date    INR 0.92 04/27/2023    INR 0.93 11/16/2022    PROTIME 12.5 04/27/2023    PROTIME 12.1 11/16/2022       Cultures:    Lab Results   Component Value Date    BLOODCX No growth at 5 days 06/07/2020     No components found for: URINCX     pCXR 5/6/2023  EXAM/TECHNIQUE: XR CHEST 1 VW-     INDICATION: post op lung surgery     COMPARISON: 05/05/2023     FINDINGS:     RIGHT apical and RIGHT basilar chest tubes remain in place. No  measurable pneumothorax. Small amount of RIGHT chest wall subcutaneous  emphysema is noted. Prior RIGHT posterior rib plating. RIGHT chest port  catheter tip overlying the SVC. Cardiac silhouette is stable. No pleural  effusion or focal consolidation.     IMPRESSION:     No change in appearance of the chest.  This report was finalized on 05/06/2023 07:07 by Dr. Harpreet Koenig MD.    ASSESSMENT/PLAN:  Mr. Lalo Jones is a very pleasant 57-year-old  gentleman with an underlying diagnosis of Stage IIIB (T2b, N3, M0) adenocarcinoma of the RUL of  the lung made  by CT-guided needle biopsy on 11/16/2022.     Lalo was treated with concurrent neoadjuvant chemotherapy and radiation therapy as follows:  • Neoadjuvant concurrent XRT (with chemoimmunotherapy) to RUL of lung initiated on 1/4/2023 completed on 2/14/2023.  5040 cGy over 28 treatment fractions.  • Neoadjuvant cisplatin 75 mg/m2, Alimta 500 mg/m2 and Opdivo 360 mg, cycle #1 initiated on 1/5/2023.  Cycle #3 delivered on 2/16/2023.     POD # 2 right thoracotomy with right upper lobectomy and mediastinal lymph node dissection by Dr. Goldy Carlin on 5/4/2023.    Right CT in place       He developed Afib with RVR and is on an amiodarone drip - converted to NSR this am at 1:30 am    Lalo has an outpatient follow-up with Dr. Tadeo on 5/24/2023 at 9:30 AM to review final pathology and potential further recommendations for adjuvant immunotherapy depending on pathology results.      NATASHA Rod    05/06/23  08:25 CDT    Physicians attestation and contribution:    I, Enrique Tadeo, personally and independently performed an evaluation on Lalo Jones  I have reviewed relevant medical information/data to include but not limited to the medication list, relevant appropriate lab work and imaging when applicable.  I reviewed other physician's notes, ancillary services and nurses assessments.  I have reviewed the above documentation completed by Jason Cifuentes PA-C  Please see my additional addended and/or modified contributions to the history of present illness, physical examination and assessment/medical decision-making and plan that reflects my findings and impressions.  I discussed the essential elements of the care plan with Jason Cifuentes PA-C and the patient.  I have encouraged and answered all the questions raised to the patient's understanding and satisfaction.  I concur with the above stated.    Subjective: Awake, alert but with a bit more pain today.  He developed atrial fibrillation during  the night requiring amiodarone drip, converted back to NSR at about 1:30 AM today.    Objective: Exam is significant for heart rhythm regular without tachycardia    Assessment/plan:    POD#2 right thoracotomy with right upper lobectomy and mediastinal lymph node dissection by Dr. Goldy Carlin on 5/4/2023    Lalo has an outpatient follow-up with me on 5/24/2023 at 9:30 AM to review final pathology and potential further recommendations for adjuvant immunotherapy depending on pathology results.    Following, awaiting pathology    Enrique Tadeo MD  5/6/2023 09:43 CDT

## 2023-05-06 NOTE — PLAN OF CARE
Goal Outcome Evaluation:           Progress: improving   IV to PO amio today, VSS, metoprolol started, Chest tubes in place and draining well. Pain under control, still complaining of pain at Chest tube site.

## 2023-05-06 NOTE — PLAN OF CARE
Goal Outcome Evaluation:  Plan of Care Reviewed With: patient        Progress: no change  Outcome Evaluation: PRN pain meds given for c/o pain. CT x 2 to waterseal. CXR ordered for this am. Patient developed atrial fib this shift per monitor room. EKG performed. Dr. Carlin notified. Lopressor IV x 1 given, patient continued to be in a-fib. Amio gtt started per MD order. Patient is currently in NSR. No distress noted. Safety maintained.

## 2023-05-07 ENCOUNTER — APPOINTMENT (OUTPATIENT)
Dept: GENERAL RADIOLOGY | Facility: HOSPITAL | Age: 58
DRG: 165 | End: 2023-05-07
Payer: MEDICAID

## 2023-05-07 PROCEDURE — 71045 X-RAY EXAM CHEST 1 VIEW: CPT

## 2023-05-07 PROCEDURE — 71046 X-RAY EXAM CHEST 2 VIEWS: CPT

## 2023-05-07 PROCEDURE — 93010 ELECTROCARDIOGRAM REPORT: CPT | Performed by: INTERNAL MEDICINE

## 2023-05-07 PROCEDURE — 93005 ELECTROCARDIOGRAM TRACING: CPT | Performed by: SURGERY

## 2023-05-07 PROCEDURE — 25010000002 ENOXAPARIN PER 10 MG: Performed by: SURGERY

## 2023-05-07 RX ORDER — PSEUDOEPHEDRINE HCL 30 MG
100 TABLET ORAL 2 TIMES DAILY
Qty: 20 CAPSULE | Refills: 0 | Status: SHIPPED | OUTPATIENT
Start: 2023-05-07

## 2023-05-07 RX ORDER — AMIODARONE HYDROCHLORIDE 400 MG/1
400 TABLET ORAL
Qty: 7 TABLET | Refills: 0 | Status: SHIPPED | OUTPATIENT
Start: 2023-05-08 | End: 2023-05-10

## 2023-05-07 RX ORDER — OXYCODONE HYDROCHLORIDE AND ACETAMINOPHEN 5; 325 MG/1; MG/1
1 TABLET ORAL EVERY 6 HOURS PRN
Qty: 25 TABLET | Refills: 0 | Status: SHIPPED | OUTPATIENT
Start: 2023-05-07

## 2023-05-07 RX ADMIN — METHOCARBAMOL 500 MG: 500 TABLET, FILM COATED ORAL at 22:10

## 2023-05-07 RX ADMIN — METOPROLOL TARTRATE 12.5 MG: 25 TABLET, FILM COATED ORAL at 22:10

## 2023-05-07 RX ADMIN — GABAPENTIN 800 MG: 400 CAPSULE ORAL at 14:58

## 2023-05-07 RX ADMIN — METHOCARBAMOL 500 MG: 500 TABLET, FILM COATED ORAL at 14:58

## 2023-05-07 RX ADMIN — AMIODARONE HYDROCHLORIDE 400 MG: 200 TABLET ORAL at 09:08

## 2023-05-07 RX ADMIN — OXYCODONE HYDROCHLORIDE 5 MG: 5 TABLET ORAL at 03:14

## 2023-05-07 RX ADMIN — GABAPENTIN 800 MG: 400 CAPSULE ORAL at 22:10

## 2023-05-07 RX ADMIN — METHOCARBAMOL 500 MG: 500 TABLET, FILM COATED ORAL at 05:05

## 2023-05-07 RX ADMIN — GABAPENTIN 800 MG: 400 CAPSULE ORAL at 05:05

## 2023-05-07 RX ADMIN — ACETAMINOPHEN 650 MG: 325 TABLET, FILM COATED ORAL at 11:35

## 2023-05-07 RX ADMIN — OXYCODONE HYDROCHLORIDE 5 MG: 5 TABLET ORAL at 16:12

## 2023-05-07 RX ADMIN — POLYETHYLENE GLYCOL 3350 17 G: 17 POWDER, FOR SOLUTION ORAL at 09:08

## 2023-05-07 RX ADMIN — ACETAMINOPHEN 650 MG: 325 TABLET, FILM COATED ORAL at 23:05

## 2023-05-07 RX ADMIN — METOPROLOL TARTRATE 12.5 MG: 25 TABLET, FILM COATED ORAL at 09:08

## 2023-05-07 RX ADMIN — OXYCODONE HYDROCHLORIDE 5 MG: 5 TABLET ORAL at 22:10

## 2023-05-07 RX ADMIN — ACETAMINOPHEN 650 MG: 325 TABLET, FILM COATED ORAL at 05:05

## 2023-05-07 RX ADMIN — ENOXAPARIN SODIUM 40 MG: 100 INJECTION SUBCUTANEOUS at 09:18

## 2023-05-07 RX ADMIN — OXYCODONE HYDROCHLORIDE 5 MG: 5 TABLET ORAL at 09:07

## 2023-05-07 RX ADMIN — DOCUSATE SODIUM 100 MG: 100 CAPSULE ORAL at 09:08

## 2023-05-07 RX ADMIN — PANTOPRAZOLE SODIUM 40 MG: 40 TABLET, DELAYED RELEASE ORAL at 05:05

## 2023-05-07 RX ADMIN — ACETAMINOPHEN 650 MG: 325 TABLET, FILM COATED ORAL at 17:47

## 2023-05-07 NOTE — PROGRESS NOTES
"Patient name: Lalo Jones  Patient : 1965  VISIT # [id number]  MR #[id number]    Procedure:Procedure(s):  RIGHT THORACOTOMY WITH CHEST WALL RESECTION, MEDIASTINAL LYMPH NODE DISSECTION, RIGHT UPPER LOBECTOMY  Procedure Date:2023  POD:3 Days Post-Op    Subjective     Up in chair and reports doing well.  Remains on room air with SPO2 of 93%.  Has remained in sinus rhythm.  Eager to be discharged home.  Dr. Carlin removed chest tubes and tolerated well.       Objective     Visit Vitals  /75 (BP Location: Left arm, Patient Position: Lying)   Pulse 80   Temp 98.6 °F (37 °C) (Oral)   Resp 16   Ht 187 cm (73.62\")   Wt 101 kg (222 lb 10.6 oz)   SpO2 93%   BMI 28.88 kg/m²       Intake/Output Summary (Last 24 hours) at 2023 1158  Last data filed at 2023 0549  Gross per 24 hour   Intake --   Output 145 ml   Net -145 ml     Right pleural chest tube output: 145 ml/24 hr to waterseal with no air leak    Lab:     CBC:  Results from last 7 days   Lab Units 23  0500 23  1540   WBC 10*3/mm3 6.67 8.78   HEMATOCRIT % 37.2* 40.5   PLATELETS 10*3/mm3 180 181          BMP:  Results from last 7 days   Lab Units 23  0500 23  1540   SODIUM mmol/L 139 139   POTASSIUM mmol/L 4.1 4.8   CHLORIDE mmol/L 103 106   CO2 mmol/L 29.0 23.0   GLUCOSE mg/dL 129* 146*   BUN mg/dL 15 15   CREATININE mg/dL 1.06 1.10          COAG:      Invalid input(s): PT    IMAGES:       Imaging Results (Last 24 Hours)     Procedure Component Value Units Date/Time    XR Chest 1 View [[id number]] Collected: 23     Updated: 23    Narrative:      EXAM/TECHNIQUE: XR CHEST 1 VW-     INDICATION: post op lung surgery     COMPARISON: Prior day     FINDINGS:     RIGHT basilar and RIGHT apical chest tubes are in place. A tiny RIGHT  apical pneumothorax is present. RIGHT chest wall subcutaneous emphysema  is again noted. RIGHT posterior rib plating. No new airspace opacities.  No large pleural effusion. " Cardiac silhouette is stable.       Impression:         Tiny RIGHT apical pneumothorax with 2 chest tubes in place.  This report was finalized on 05/07/2023 06:39 by Dr. Harpreet Koenig MD.      My interpretation of chest x-ray: Chest tubes x2 in place.  Small amount of subcutaneous emphysema.  No pleural effusion.  Stable.      Physical Exam:  General: Alert, oriented. No apparent distress.   Cardiovascular: Regular rate and rhythm without murmur, rubs, or gallops.    Pulmonary: Clear to auscultation bilaterally without wheezing, rubs, or rales.  Chest: Thoracic incisions clean, dry, and intact. Chest tube to waterseal.  No air leak. Fluid is serosanguineous   Abdomen: Soft, non distended, and non tender.  Extremities: Warm, moves all extremities. No edema.   Neurologic:  Grossly intact with no focal deficits.          Impression:  S/p right lobectomy   Malignant neoplasm of right upper lobe  Former smoker    Plan:  Chest tubes removed, tolerated well  Continue multimodality pain control regimen  Encourage pulmonary toilet and ambulation  PA and lateral chest x-ray at 1400    Okay to discharge home after repeat chest x-ray at 2 PM has been reviewed.  Will follow-up with MADDY Powell in 1 week with chest x-ray    Discussed with patient and nursing      MADDY Hernandez  05/07/23  11:58 CDT

## 2023-05-07 NOTE — DISCHARGE INSTR - ACTIVITY
No heavy lifting over 10 pounds for 3 weeks  No driving while on pain medications  Ok to shower tomorrow no tub baths for 2 weeks

## 2023-05-07 NOTE — PLAN OF CARE
Goal Outcome Evaluation:  Plan of Care Reviewed With: patient        Progress: improving  Outcome Evaluation: IID; up ad lanette; voiding; Tele running normal sinus; po amio and metoprolol; CT 2y1 to water seal small airleak; Room air; Alert and oriented; PRN roxicodone and scheduled tylenol given for pain; Temp of 100.5 at the beginning of the shift; resting between care; safety maintained      Problem: Adult Inpatient Plan of Care  Goal: Plan of Care Review  Outcome: Ongoing, Progressing  Flowsheets (Taken 5/7/2023 0303)  Progress: improving  Plan of Care Reviewed With: patient  Outcome Evaluation:  • IID  • up ad lanette  • voiding  • Tele running normal sinus; po amio and metoprolol  • CT 2y1 to water seal small airleak  • Room air  • Alert and oriented  • PRN roxicodone and scheduled tylenol given for pain  • Temp of 100.5 at the beginning of the shift  • resting between care  • safety maintained  Goal: Patient-Specific Goal (Individualized)  Outcome: Ongoing, Progressing  Goal: Absence of Hospital-Acquired Illness or Injury  Outcome: Ongoing, Progressing  Intervention: Identify and Manage Fall Risk  Recent Flowsheet Documentation  Taken 5/7/2023 0115 by Anuradha Hawk, RN  Safety Promotion/Fall Prevention: safety round/check completed  Taken 5/6/2023 2313 by Anuradha Hawk, RN  Safety Promotion/Fall Prevention: safety round/check completed  Taken 5/6/2023 2113 by Anuradha Hawk, RN  Safety Promotion/Fall Prevention: safety round/check completed  Taken 5/6/2023 1917 by Anuradha Hawk, RN  Safety Promotion/Fall Prevention: safety round/check completed  Intervention: Prevent Skin Injury  Recent Flowsheet Documentation  Taken 5/6/2023 2113 by Anuradha Hawk, RN  Body Position:  • sitting up in bed  • position changed independently  Intervention: Prevent and Manage VTE (Venous Thromboembolism) Risk  Recent Flowsheet Documentation  Taken 5/6/2023 2113 by Anuradha Hawk, RN  Activity Management:  ambulated outside room  VTE Prevention/Management:  • bilateral  • sequential compression devices on  Goal: Optimal Comfort and Wellbeing  Outcome: Ongoing, Progressing  Intervention: Monitor Pain and Promote Comfort  Recent Flowsheet Documentation  Taken 5/6/2023 2313 by Anuradha Hawk, RN  Pain Management Interventions: see MAR  Taken 5/6/2023 2113 by Anuradha Hawk, RN  Pain Management Interventions: see MAR  Taken 5/6/2023 1917 by Aunradha Hawk, RN  Pain Management Interventions:  • relaxation techniques promoted  • position adjusted  Goal: Readiness for Transition of Care  Outcome: Ongoing, Progressing

## 2023-05-07 NOTE — PROGRESS NOTES
"  HEMATOLOGY AND MEDICAL ONCOLOGY PROGRESS NOTE    Patient name: Lalo Jones  Patient : 1965  VISIT # [id number]  MR #[id number]  Room:  372    SUBJECTIVE:  POD #3 right thoracotomy with right upper lobectomy and mediastinal lymph node dissection. Has remained NSR - ST through the night, 76 - 101 on tele. Max temp 100.5  States feeling okay, \"just sore\". States has been out of bed    INTERVAL HISTORY:  Mr. 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     Lalo was treated with concurrent neoadjuvant chemotherapy and radiation therapy as follows:  • Neoadjuvant concurrent XRT (with chemoimmunotherapy) to RUL of lung initiated on 2023 completed on 2023.  5040 cGy over 28 treatment fractions.  • Neoadjuvant cisplatin 75 mg/m2, Alimta 500 mg/m2 and Opdivo 360 mg, cycle #1 initiated on 2023.  Cycle #3 delivered on 2023.     Lalo underwent a right thoracotomy with right upper lobectomy and mediastinal lymph node dissection by Dr. Goldy Carlin on 2023.  Oncology consultation requested and continuity of care.        DETAILED TUMOR HISTORY IS FROM OFFICE RECORDS        TARGET LUNG CANCER SITES:  • 4.7 cm x 4 cm x 1.6 cm mass in the RUL of the lung SUV of 12  • 9 mm retroaortic upper abdominal lymph node is mildly hypermetabolic with an SUV of 4.0.   • 7 mm aortocaval lymph node is mildly hypermetabolic with an SUV of 3.0.        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 Flex Sheikh in [facility] for medical oncology opinion regarding a diagnosis of prostate cancer.   [facility] GENETIC TESTING on 7/15/2020 documented a VUS:  MEN1, heterozygous, for c.  511C>T  (p.Arg 171 " Trp)     Mr. Jones was again seen on 12/8/2022 re-referred by Dr. Russel Santana for new diagnosis of adenocarcinoma of the lung, diagnosed on 11/16/2022 by CT Guided needle Biopsy.     Nino is a longtime cigarette smoker of 1 pack/day since age 20.  He quit smoking on 12/1/2022 after the diagnosis of lung cancer.     Nino's PCP, Dr. lFex Sheikh ordered a low-dose screening CT scan of the chest for monitoring.     CT chest without contrast, low-dose protocol at [facility] on 5/19/2022:  • 4 mm LEFT lower lobe pulmonary nodule in superior segment   • 3.3 x 1.3 cm mass like area of pleural thickening in the RIGHT anterior upper chest   • Moderate centrilobular and paraseptal emphysema.   • Lung-RADS 2S-- Nodules with a very low likelihood of becoming a clinically active cancer due to size or lack of growth.   • Continue annual screening with low dose CT in 12 months.   • 3 month follow-up chest CT recommended to evaluate for stability of RIGHT anterior upper chest masslike pleural thickening     Dr. Flex French repeated the low-dose CT scan because of the findings in the right anterior upper chest     CT chest without contrast, low-dose protocol at [facility] on 10/17/2022:  • 4.7 cm x 4 cm x 1.6 cm abnormal area of pleural thickening in the periphery of the RUL with slightly irregular margins  • Moderate paraseptal emphysematous changes with scattered bullae in both lungs  • Lung-RADS - 4B        Initial consult with Dr. Russel Santana at [facility] on 10/24/2022:  Explained the abnormal CT scan results from May which is done in the [facility] to the patient and also discussed with the radiologist from [facility] in [place] who reviewed the current CT scan of the chest done earlier this month.  This shows the right upper lobe pleural-based thickening or mass and left upper lobe nodule.  Due to his smoking history the possibility of malignancy cannot be ruled out.  Discussing different options I ordered  a PET scan and a follow-up CT scan of the chest in 3 months time.  If the PET scan is positive a CT-guided needle biopsy would be best option for the right upper lobe pleural-based lung mass which is not reachable by bronchoscopy.  If PET scan is negative will wait for the neck CT scan of the chest and make further recommendations.       NM PET/CT SKULL BASE TO MID THIGH at [facility] on 11/1/2022:Comparison: Chest CT 5/19/2022  • Background right hepatic lobe metabolic activity measures max SUV 2.7.  • 4.3 x 1.9 cm RIGHT anterior upper chest pleural thickening which is hypermetabolic with a maximum SUV of 12.0  • 1.7 cm LEFT upper abdomen retroperitoneal lymph node hypermetabolicactivity, max SUV 5.3. This nodule/node closely approximates the LEFT adrenal gland.   • 9 mm retroaortic upper abdominal lymph node is mildly hypermetabolic with a max SUV of 4.0.   • 7 mm aortocaval lymph node is mildly hypermetabolic with a max SUV of 3.0.        CT Needle Biopsy Lung at [facility] on 11/16/2022:  Successful CT guided biopsy of the right anterior pleural mass  Final Diagnosis  Right upper chest/pleural mass, core biopsies:  Adenocarcinoma of pulmonary origin.     [employer] on 11/16/2022:  ALK(D5F3)- NEGATIVE  FISH Analysis ROS1- NEGATIVE  EGFR Exon 18-Not Detected  EGFR Exon 19-Not Detected  EGFR Exon 20 X853A-Mix Detected  EGFR Exon 20 (other Mutations)-Not Detected  EGFR Exon 21- Not Detected  BRAF Mutation-Not Detected  FISH MET-No evidence of a MET amplification     Concern on the work-up is that in addition to the 4.7 cm primary RUL adenocarcinoma of the lung there are upper abdominal retroperitoneal and aortocaval lymph nodes positive on the PET scan.  If the upper abdominal lymph nodes were not a concern, Lalo could be treated with neoadjuvant Opdivo Alimta Keytruda followed by resection.     Intense interdepartmental consultations are being undertaken to give Lalo the most aggressive approach possible.  All of the plans  outlined below were discussed at length with Lalo and his wife Joann.        MRI abdomen without and with IV contrast  at [facility] on 12/15/2022:COMPARISON: PET/CT 11/01/2022    • 9 mm RIGHT liver cyst  • 15.2 cm spleen  • 9 mm upper abdominal retroperitoneal lymph node,unchanged        CT ABDOMEN PELVIS WO CONTRAST at [facility] on 12/15/2022:COMPARISON: PET/CT 11/01/2022  • Penile prosthesis.   • Penile pump implanted within the left anterolateral pelvic wall.   • There is prominent wall thickening of the distal sigmoid colon with multiple regional diverticuli.  • There are small fatty containing umbilical hernia.   • Fatty containing inguinal hernias  • 7 mm left periaortic and aortocaval lymph nodes demonstrating increased FDG uptake comparison PET   exam remains      NM BONE SCAN WHOLE BODY at [facility] on 12/15/2022:Comparison is made with abdomen/pelvis CT imaging from earlier today. Comparison is made with a PET/CT from 11/01/2022. Comparison is made with a bone scan from 02/20/2020.   •  No evidence of bone metastasis.     Initial Consult with Dr. Anton Kinney on 12/27/2022:  He met with Dr. Carlin for surgical consirations of the right upper lobe lesion. He believes that there is likely chest wall invasion and recommends neoadjuvant chemoradiation prior to definitive surgery of the right upper lobe tumor. I anticipate a dose of 5040 cGy in 28 treatment fractions to the lung lesion as neoadjuvant treatment along with concomitant chemotherapy and/or immunotherapy.  In addition, we will determine if Dr. Bonilla is able to sample the upper abdominal lymph nodes to determine the etiology of that finding on the PET scan. He is scheduled to see her later this week.     Initial Consult with Dr. Kim Bonilla at [facility] on 12/29/2022:  In terms of his retroperitoneal lymphadenopathy, I do not recommend a robotic retroperitoneal lymph node biopsy as the node is only 0.7cm and I believe it would be unlikely we would be able to identify  that node without guidance. It would be an extremely high risk procedure as it the node is very close to the vasculature. I discussed this with Dr. Kinney personally. This is a new undiagnosed problem with an uncertain prognosis.   I have personally reviewed the oncology note, labs, and PET scan above. I have personally discussed the patient and the care plan with Dr. Kinney and at tumor board     Port placed by Dr. Kim Bonilla at [facility] on 12/30/2022     Case Presented at Tumor Board at [facility] By Dr. Anton Kinney (note 1/1/2023):  • MORRIS (Dr. Kinney)presented this patient at tumor conference. Dr. Kim Bonilla was in attendance unfortunately she is not able to biopsy the upper abdominal lymph nodes due to the proximity to adjacent aorta and superior mesenteric artery.  • MORRIS (Dr. Kinney)also reviewed the recommendation of Dr. Goldy Carlin for neoadjuvant chemoradiation followed by definitive surgery for the right upper lobe lung tumor.  • Discussion at tumor conference surrounded management of the upper abdominal lymph nodes. The consensus was at this time to proceed with treatment of the primary known adenocarcinoma lung and base further treatment recommendations upon findings at definitive surgery.  • Abdominal lymph nodes serial radiographs is recommended for follow-up. It was agreed that this is an unlikely metastatic distribution for right upper lobe lung carcinoma in the lymph nodes may represent another etiology.  • MORRIS (Dr. Kinney) will coordinate with Dr. Tadeo for concomitant neoadjuvant chemoradiation of the right upper lobe lung tumor followed by definitive surgery per Dr. Goldy Carlin        MRI Brain With & Without Contrast at [facility] on 1/3/2023:  • No metastatic disease identified     MORRIS Pérez) and Dr. Anton Kinney have both discussed Mr. Jones's case with Dr. Goldy Carlin on multiple occasions.  He has participated in the decision-making and treatment plan and recommendations moving  forward.     Multidisciplinary interdepartmental consultations were obtained with radiation oncology, thoracic surgery and general surgery for opinion regarding robotic abdominal biopsy approach for lymph node tissue sampling leading to neoadjuvant chemoimmunotherapy delivered concurrently with XRT.     Neoadjuvant concurrent XRT (with chemoimmunotherapy) initiated on 1/4/2023.  5040 cGy planned in 28 treatment fractions.  Neoadjuvant cisplatin 75 mg/m2, Alimta 500 mg/m2 and Opdivo 360 mg, cycle #1 initiated on 1/5/2023  Cycle #3 delivered on 2/16/2023.     Post neoadjuvant CT scan of the chest abdomen and pelvis is scheduled for 3/20/2023 and a PET scan scheduled for 3/27/2023.      Follow-up appointment with Dr. Goldy Carlin on 3/27/2023 to review imaging studies and to set a surgery date.      Right thoracotomy with right upper lobectomy and mediastinal lymph node dissection by Dr. Goldy Carlin on 5/4/2023        TREATMENT SUMMARY:  • Neoadjuvant concurrent XRT (with chemoimmunotherapy) to RUL of lung initiated on 1/4/2023 completed on 2/14/2023.  5040 cGy over 28 treatment fractions.  • Neoadjuvant cisplatin 75 mg/m2, Alimta 500 mg/m2 and Opdivo 360 mg, cycle #1 initiated on 1/5/2023.  Cycle #3 delivered on 2/16/2023.  • Right thoracotomy with right upper lobectomy and mediastinal lymph node dissection by Dr. Goldy Carlin on 5/4/2023.        #2   TUMOR HISTORY: Prostate cancer-  Lalo was seen in initial oncology consultation on 7/15/2020 referred by his PCP, Dr. Flex Sheikh in [facility] for medical oncology opinion on his diagnosis of prostate cancer.     Lalo relates that he was found to have a minimally increased PSA of between 4.5 and 5 on a routine physical examination.  This led to work-up and eventual prostate biopsy leading to the diagnosis of prostate cancer.     Prostate biopsy on 11/1/2018 documented the following:  Right lateral base-Microfocus of prostatic carcinoma   Prostate biopsy  on 2/25/2019 documented the following:  Right lateral base-adenocarcinoma: [place] 3+4 = 7  Right lateral mid- adenocarcinoma: [place] 3+3 = 6  Prostate biopsy 2/18/2020 documented the following:  Right lateral base-adenocarcinoma: [place] 3+4 = 7     PSA levels by date as follows:  2/19/2019-9.1  4/19/2019-7.0  7/22/2019-6.6  10/17/2019-7.8  114 2020-8.39  5/18/2020- 11.5     MRI pelvis with and without contrast on 2/5/2020 at [facility] documented:  • No suspicious lesions in the prostate (PI-RADS 3 or greater)  • No pelvis lymphadenopathy or suspicious pelvic bone lesions     Prostate biopsy was performed on 2/18/2020 by Dr. Clark at [facility].  Pathology revealed:  Prostate, right apex, needle biopsy:  • Benign prostate tissue  Prostate, right base, needle biopsy:  • Adenocarcinoma, acinar type, [place] grade 3+4=7, grade group 2, discontinuously involving 10/20 mm or 50% of core (3 foci measuring 4,3, and 1 mm), 30% of tumor is [place] pattern 4  Prostate, right mid, needle biopsy:  • Benign prostate tissue  Prostate, left apex, needle biopsy:  • Benign prostate tissue  Prostate, left base, needle biopsy  • Benign prostate tissue  Prostate, left mid, needle biopsy:  • Benign prostate tissue        CT abdomen and pelvis without contrast on 2/20/2020 at [facility] documented:  • No evidence of complication after prostate biopsy  • Numerous colonic diverticula with no evidence of acute diverticulitis  • Small bilateral fat-containing groin hernias     Bone scan on 6/2/2020 at [facility] documented:  • Abnormal uptake at the right dorsal tissues at the level of T12 and right upper kidney. This could represent a bone lesion or renal lesion. Recommend CT abdomen with contrast for further evaluation if patient's renal function permits. If low renal function, MRI abdomen without contrast would be an alternative for evaluation     CT abdomen and pelvis without contrast on 6/7/2020 at [facility] documented no acute abnormality.      CBC  (7/15/2020) revealed a WBC of 6.73. Hgb is 15.5 with an MCV of 93.5 and platelet count of 147,000.      Lalo has been under evaluation and monitoring in the urology department by Dr. Trae Clark at [facility].   At his last visit on 5/28/2020 Mr. Jones was leaning toward a radical prostatectomy to address the issue of his prostate cancer.     Medical oncology consultation was requested for opinion.  The 2 best options at this juncture include a radical prostatectomy which is most definitive followed by radiation therapy which gives similar statistical long-term results but with the potential for long-term radiation therapy associated side effects.   I have encouraged him that a radical prostatectomy is appropriate and if he is considering this that I would support that completely.     He has an appointment with Dr. Nuno Jj on 8/21/2022 discussed robotic prostatectomy.     Recommendation prior to his August appointment with urology is as follows:  • [employer] genetic testing drawn today  • PSA  • CMP  • MRI of the thoracic spine W&WO to evaluate the T12 area and any other areas of concern     He had robotic assisted laparoscopic prostatectomy by Dr. Jj September 2020 for PT2N0 [place] 7 disease with tumor approaching the right posterior margin.        TREATMENT SUMMARY:  • Robotic assisted laparoscopic prostatectomy by Dr. Jj September 2020 for PT2N0 [place] 7 disease with tumor approaching the right posterior margin      OBJECTIVE:    Vitals:    05/07/23 0726   BP: 102/60   Pulse: 74   Resp: 16   Temp: 97.8 °F (36.6 °C)   SpO2: 91%       Intake/Output Summary (Last 24 hours) at 5/7/2023 0815  Last data filed at 5/7/2023 0549  Gross per 24 hour   Intake 240 ml   Output 145 ml   Net 95 ml     PHYSICAL EXAM:  CONSTITUTIONAL: Alert, appropriate, no acute distress  EYES: Nonicteric, EOM intact, pupils equal round   ENT: Mucous membranes moist, no oropharyngeal lesions   NECK: Supple, no masses   CHEST/LUNGS:  Chest tube present and unremarkable  CARDIOVASCULAR: RRR, no murmurs HR 86  ABDOMEN: soft non-tender, active bowel sounds  EXTREMITIES: warm, moves all four extremities   SKIN: Multiple tattoos  NEUROLOGIC: follows commands, non focal   PSYCH: mood and affect appropriate    CBC  Results from last 7 days   Lab Units 05/05/23  0500 05/04/23  1540   WBC 10*3/mm3 6.67 8.78   HEMOGLOBIN g/dL 11.9* 13.3   HEMATOCRIT % 37.2* 40.5   PLATELETS 10*3/mm3 180 181     Lab Results   Component Value Date     05/05/2023    K 4.1 05/05/2023     05/05/2023    CO2 29.0 05/05/2023    BUN 15 05/05/2023    CREATININE 1.06 05/05/2023    GLUCOSE 129 (H) 05/05/2023    CALCIUM 8.5 (L) 05/05/2023    BILITOT 0.4 04/27/2023    ALKPHOS 98 04/27/2023    AST 22 04/27/2023    ALT 34 04/27/2023    AGRATIO 1.7 04/27/2023    GLOB 2.6 04/27/2023     Lab Results   Component Value Date    INR 0.92 04/27/2023    INR 0.93 11/16/2022    PROTIME 12.5 04/27/2023    PROTIME 12.1 11/16/2022     Cultures:    Lab Results   Component Value Date    BLOODCX No growth at 5 days 06/07/2020     No components found for: URINCX     pCXR 5/6/2023  EXAM/TECHNIQUE: XR CHEST 1 VW-     INDICATION: post op lung surgery     COMPARISON: 05/05/2023     FINDINGS:     RIGHT apical and RIGHT basilar chest tubes remain in place. No  measurable pneumothorax. Small amount of RIGHT chest wall subcutaneous  emphysema is noted. Prior RIGHT posterior rib plating. RIGHT chest port  catheter tip overlying the SVC. Cardiac silhouette is stable. No pleural  effusion or focal consolidation.     IMPRESSION:     No change in appearance of the chest.  This report was finalized on 05/06/2023 07:07 by Dr. Harpreet Koenig MD.    ASSESSMENT/PLAN:  Mr. 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     Lalo was treated with  concurrent neoadjuvant chemotherapy and radiation therapy as follows:  • Neoadjuvant concurrent XRT (with chemoimmunotherapy) to RUL of lung initiated on 1/4/2023 completed on 2/14/2023.  5040 cGy over 28 treatment fractions.  • Neoadjuvant cisplatin 75 mg/m2, Alimta 500 mg/m2 and Opdivo 360 mg, cycle #1 initiated on 1/5/2023.  Cycle #3 delivered on 2/16/2023.     POD #3 right thoracotomy with right upper lobectomy and mediastinal lymph node dissection by Dr. Goldy Carlin on 5/4/2023.    Right CT in place  Max temp 100.5  1 view CXR 5/7/2023: Tiny RIGHT apical pneumothorax with 2 chest tubes in place.  Encouraged to use incentive spirometer    He developed Afib with RVR and is on an amiodarone drip - converted to NSR  1:30 am on 5/6/2023. Remains in sinus rhythm     Mild post-op anemia, Hgb 11.9 MCV 94.7 - monitor    Lalo has an outpatient follow-up with Dr. Tadeo on 5/24/2023 at 9:30 AM to review final pathology and potential further recommendations for adjuvant immunotherapy depending on pathology results.    Plan of care discussed with patient. Questions answered.    MADDY Burris    05/07/23  08:15 CDT    Physicians attestation and contribution:    I, Enrique Tadeo, personally and independently performed an evaluation on Lalo Jones  I have reviewed relevant medical information/data to include but not limited to the medication list, relevant appropriate lab work and imaging when applicable.  I reviewed other physician's notes, ancillary services and nurses assessments.  I have reviewed the above documentation completed by Minnie CASTAÑEDA  Please see my additional addended and/or modified contributions to the history of present illness, physical examination and assessment/medical decision-making and plan that reflects my findings and impressions.  I discussed the essential elements of the care plan with Minnie CASTAÑEDA and the patient.  I have encouraged and answered all the  questions raised to the patient's understanding and satisfaction.  I concur with the above stated.    Subjective: Resting comfortably, awake and alert, talkative.  States she has been ambulating daily and tolerating this well.    Objective: 2 chest tubes remain, exam is otherwise unremarkable    Assessment/plan:  Chest x-ray from this morning personally reviewed and reports right apical pneumothorax with 2 chest tubes in place with right chest wall subcutaneous emphysema.    Disposition on chest tubes per thoracic surgery.  Whenever he is discharged, he does have an outpatient appointment with me on 5/24/2023 at 9:30 AM    Enrique Tadeo MD  5/7/2023 08:34 CDT

## 2023-05-07 NOTE — PLAN OF CARE
Goal Outcome Evaluation:   Patient has had chest tubes removed this morning , Site is full of air and some subcutanious emphysema is present above the chest tube incisions. Patient still complains of pain. Educated on chest tube removal and report SOA, new more severe pain, radiating pain on side, dressing and how it should look and what to do if it becomes dislodged or comes off. VSS, patient voiding well, up walking 4x a shift and sitting up in the chair to help with postural draining , breathing , chest tube site to close.         Progress: no change

## 2023-05-08 ENCOUNTER — READMISSION MANAGEMENT (OUTPATIENT)
Dept: CALL CENTER | Facility: HOSPITAL | Age: 58
End: 2023-05-08
Payer: MEDICAID

## 2023-05-08 ENCOUNTER — APPOINTMENT (OUTPATIENT)
Dept: GENERAL RADIOLOGY | Facility: HOSPITAL | Age: 58
DRG: 165 | End: 2023-05-08
Payer: MEDICAID

## 2023-05-08 VITALS
DIASTOLIC BLOOD PRESSURE: 58 MMHG | TEMPERATURE: 98.4 F | OXYGEN SATURATION: 93 % | WEIGHT: 222.66 LBS | BODY MASS INDEX: 28.58 KG/M2 | SYSTOLIC BLOOD PRESSURE: 102 MMHG | HEIGHT: 74 IN | RESPIRATION RATE: 16 BRPM | HEART RATE: 75 BPM

## 2023-05-08 LAB
CYTO UR: NORMAL
LAB AP CASE REPORT: NORMAL
Lab: NORMAL
Lab: NORMAL
PATH REPORT.FINAL DX SPEC: NORMAL
PATH REPORT.GROSS SPEC: NORMAL
QT INTERVAL: 354 MS
QT INTERVAL: 360 MS
QTC INTERVAL: 405 MS
QTC INTERVAL: 415 MS

## 2023-05-08 PROCEDURE — 71046 X-RAY EXAM CHEST 2 VIEWS: CPT

## 2023-05-08 PROCEDURE — 93010 ELECTROCARDIOGRAM REPORT: CPT | Performed by: INTERNAL MEDICINE

## 2023-05-08 PROCEDURE — 93005 ELECTROCARDIOGRAM TRACING: CPT | Performed by: SURGERY

## 2023-05-08 RX ADMIN — AMIODARONE HYDROCHLORIDE 400 MG: 200 TABLET ORAL at 09:09

## 2023-05-08 RX ADMIN — OXYCODONE HYDROCHLORIDE 5 MG: 5 TABLET ORAL at 05:02

## 2023-05-08 RX ADMIN — METOPROLOL TARTRATE 12.5 MG: 25 TABLET, FILM COATED ORAL at 09:09

## 2023-05-08 RX ADMIN — GABAPENTIN 800 MG: 400 CAPSULE ORAL at 05:02

## 2023-05-08 RX ADMIN — ACETAMINOPHEN 650 MG: 325 TABLET, FILM COATED ORAL at 05:02

## 2023-05-08 RX ADMIN — PANTOPRAZOLE SODIUM 40 MG: 40 TABLET, DELAYED RELEASE ORAL at 05:02

## 2023-05-08 RX ADMIN — DOCUSATE SODIUM 100 MG: 100 CAPSULE ORAL at 09:09

## 2023-05-08 RX ADMIN — METHOCARBAMOL 500 MG: 500 TABLET, FILM COATED ORAL at 05:02

## 2023-05-08 NOTE — PROGRESS NOTES
HEMATOLOGY AND MEDICAL ONCOLOGY PROGRESS NOTE    Patient name: Lalo Jones  Patient : 1965  VISIT # [id number]  MR #[id number]  Room:  372    SUBJECTIVE:  POD #4 right thoracotomy with right upper lobectomy and mediastinal lymph node dissection. Has remained NSR over the past 24 hours, 72 - 86 on tele. Max temp 100.5 over the past 36 hours.  Post chest tube removal, small pneumothorax.  He denies any shortness of breath or significant pain.  He has been up ambulating.  No significant subcu emphysema noted with exam this morning.  Chest x-ray this morning pending.      INTERVAL HISTORY:  Mr. 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     Lalo was treated with concurrent neoadjuvant chemotherapy and radiation therapy as follows:  • Neoadjuvant concurrent XRT (with chemoimmunotherapy) to RUL of lung initiated on 2023 completed on 2023.  5040 cGy over 28 treatment fractions.  • Neoadjuvant cisplatin 75 mg/m2, Alimta 500 mg/m2 and Opdivo 360 mg, cycle #1 initiated on 2023.  Cycle #3 delivered on 2023.     Lalo underwent a right thoracotomy with right upper lobectomy and mediastinal lymph node dissection by Dr. Goldy Carlin on 2023.  Oncology consultation requested and continuity of care.        DETAILED TUMOR HISTORY IS FROM OFFICE RECORDS        TARGET LUNG CANCER SITES:  • 4.7 cm x 4 cm x 1.6 cm mass in the RUL of the lung SUV of 12  • 9 mm retroaortic upper abdominal lymph node is mildly hypermetabolic with an SUV of 4.0.   • 7 mm aortocaval lymph node is mildly hypermetabolic with an SUV of 3.0.        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 Flex Sheikh in [place] for medical oncology opinion regarding a diagnosis of prostate cancer.   [facility] GEN[facility] TESTING on 7/15/2020 documented a VUS:  MEN1, heterozygous, for c.  511C>T  (p.Arg 171 Trp)     Mr. Jones was again seen on 12/8/2022 re-referred by Dr. Russel Santana for new diagnosis of adenocarcinoma of the lung, diagnosed on 11/16/2022 by CT Guided needle Biopsy.     Nino is a longtime cigarette smoker of 1 pack/day since age 20.  He quit smoking on 12/1/2022 after the diagnosis of lung cancer.     Nino's PCP, Dr. Flex Sheikh ordered a low-dose screening CT scan of the chest for monitoring.     CT chest without contrast, low-dose protocol at [facility] on 5/19/2022:  • 4 mm LEFT lower lobe pulmonary nodule in superior segment   • 3.3 x 1.3 cm mass like area of pleural thickening in the RIGHT anterior upper chest   • Moderate centrilobular and paraseptal emphysema.   • Lung-RADS 2S-- Nodules with a very low likelihood of becoming a clinically active cancer due to size or lack of growth.   • Continue annual screening with low dose CT in 12 months.   • 3 month follow-up chest CT recommended to evaluate for stability of RIGHT anterior upper chest masslike pleural thickening     Dr. Flex French repeated the low-dose CT scan because of the findings in the right anterior upper chest     CT chest without contrast, low-dose protocol at [facility] on 10/17/2022:  • 4.7 cm x 4 cm x 1.6 cm abnormal area of pleural thickening in the periphery of the RUL with slightly irregular margins  • Moderate paraseptal emphysematous changes with scattered bullae in both lungs  • Lung-RADS - 4B        Initial consult with Dr. Russle Santana at [facility] on 10/24/2022:  Explained the abnormal CT scan results from May which is done in the [facility] to the patient and also discussed with the radiologist from [facility] in [place] who reviewed the current CT scan of the chest done earlier this month.  This shows the right upper  lobe pleural-based thickening or mass and left upper lobe nodule.  Due to his smoking history the possibility of malignancy cannot be ruled out.  Discussing different options I ordered a PET scan and a follow-up CT scan of the chest in 3 months time.  If the PET scan is positive a CT-guided needle biopsy would be best option for the right upper lobe pleural-based lung mass which is not reachable by bronchoscopy.  If PET scan is negative will wait for the neck CT scan of the chest and make further recommendations.       NM PET/CT SKULL BASE TO MID THIGH at [facility] on 11/1/2022:Comparison: Chest CT 5/19/2022  • Background right hepatic lobe metabolic activity measures max SUV 2.7.  • 4.3 x 1.9 cm RIGHT anterior upper chest pleural thickening which is hypermetabolic with a maximum SUV of 12.0  • 1.7 cm LEFT upper abdomen retroperitoneal lymph node hypermetabolicactivity, max SUV 5.3. This nodule/node closely approximates the LEFT adrenal gland.   • 9 mm retroaortic upper abdominal lymph node is mildly hypermetabolic with a max SUV of 4.0.   • 7 mm aortocaval lymph node is mildly hypermetabolic with a max SUV of 3.0.        CT Needle Biopsy Lung at [facility] on 11/16/2022:  Successful CT guided biopsy of the right anterior pleural mass  Final Diagnosis  Right upper chest/pleural mass, core biopsies:  Adenocarcinoma of pulmonary origin.     [facility] on 11/16/2022:  ALK(D5F3)- NEGATIVE  FISH Analysis ROS1- NEGATIVE  EGFR Exon 18-Not Detected  EGFR Exon 19-Not Detected  EGFR Exon 20 N280R-Pph Detected  EGFR Exon 20 (other Mutations)-Not Detected  EGFR Exon 21- Not Detected  BRAF Mutation-Not Detected  FISH MET-No evidence of a MET amplification     Concern on the work-up is that in addition to the 4.7 cm primary RUL adenocarcinoma of the lung there are upper abdominal retroperitoneal and aortocaval lymph nodes positive on the PET scan.  If the upper abdominal lymph nodes were not a concern, Lalo could be treated with  neoadjuvant Opdivo Alimta Keytruda followed by resection.     Intense interdepartmental consultations are being undertaken to give Lalo the most aggressive approach possible.  All of the plans outlined below were discussed at length with Lalo and his wife Joann.        MRI abdomen without and with IV contrast  at [facility] on 12/15/2022:COMPARISON: PET/CT 11/01/2022    • 9 mm RIGHT liver cyst  • 15.2 cm spleen  • 9 mm upper abdominal retroperitoneal lymph node,unchanged        CT ABDOMEN PELVIS WO CONTRAST at [facility] on 12/15/2022:COMPARISON: PET/CT 11/01/2022  • Penile prosthesis.   • Penile pump implanted within the left anterolateral pelvic wall.   • There is prominent wall thickening of the distal sigmoid colon with multiple regional diverticuli.  • There are small fatty containing umbilical hernia.   • Fatty containing inguinal hernias  • 7 mm left periaortic and aortocaval lymph nodes demonstrating increased FDG uptake comparison PET   exam remains      NM BONE SCAN WHOLE BODY at [facility] on 12/15/2022:Comparison is made with abdomen/pelvis CT imaging from earlier today. Comparison is made with a PET/CT from 11/01/2022. Comparison is made with a bone scan from 02/20/2020.   •  No evidence of bone metastasis.     Initial Consult with Dr. Anton Kinney on 12/27/2022:  He met with Dr. Carlin for surgical consirations of the right upper lobe lesion. He believes that there is likely chest wall invasion and recommends neoadjuvant chemoradiation prior to definitive surgery of the right upper lobe tumor. I anticipate a dose of 5040 cGy in 28 treatment fractions to the lung lesion as neoadjuvant treatment along with concomitant chemotherapy and/or immunotherapy.  In addition, we will determine if Dr. Bonilla is able to sample the upper abdominal lymph nodes to determine the etiology of that finding on the PET scan. He is scheduled to see her later this week.     Initial Consult with Dr. iKm Bonilla at [facility] on 12/29/2022:  In  terms of his retroperitoneal lymphadenopathy, I do not recommend a robotic retroperitoneal lymph node biopsy as the node is only 0.7cm and I believe it would be unlikely we would be able to identify that node without guidance. It would be an extremely high risk procedure as it the node is very close to the vasculature. I discussed this with Dr. Kinney personally. This is a new undiagnosed problem with an uncertain prognosis.   I have personally reviewed the oncology note, labs, and PET scan above. I have personally discussed the patient and the care plan with Dr. Kinney and at tumor board     Port placed by Dr. Kim Bonilla at [facility] on 12/30/2022     Case Presented at Tumor Board at [facility] By Dr. Anton Kinney (note 1/1/2023):  • I (Dr. Kinney)presented this patient at tumor conference. Dr. Kim Bonilla was in attendance unfortunately she is not able to biopsy the upper abdominal lymph nodes due to the proximity to adjacent aorta and superior mesenteric artery.  • MORRIS (Dr. Kinney)also reviewed the recommendation of Dr. Goldy Carlin for neoadjuvant chemoradiation followed by definitive surgery for the right upper lobe lung tumor.  • Discussion at tumor conference surrounded management of the upper abdominal lymph nodes. The consensus was at this time to proceed with treatment of the primary known adenocarcinoma lung and base further treatment recommendations upon findings at definitive surgery.  • Abdominal lymph nodes serial radiographs is recommended for follow-up. It was agreed that this is an unlikely metastatic distribution for right upper lobe lung carcinoma in the lymph nodes may represent another etiology.  • MORRIS Kinney) will coordinate with Dr. Tadeo for concomitant neoadjuvant chemoradiation of the right upper lobe lung tumor followed by definitive surgery per Dr. Goldy Carlin        MRI Brain With & Without Contrast at [facility] on 1/3/2023:  • No metastatic disease identified     MORRIS Pérez) and Dr. Walton  Nirmal have both discussed Mr. Jones's case with Dr. Goldy Carlin on multiple occasions.  He has participated in the decision-making and treatment plan and recommendations moving forward.     Multidisciplinary interdepartmental consultations were obtained with radiation oncology, thoracic surgery and general surgery for opinion regarding robotic abdominal biopsy approach for lymph node tissue sampling leading to neoadjuvant chemoimmunotherapy delivered concurrently with XRT.     Neoadjuvant concurrent XRT (with chemoimmunotherapy) initiated on 1/4/2023.  5040 cGy planned in 28 treatment fractions.  Neoadjuvant cisplatin 75 mg/m2, Alimta 500 mg/m2 and Opdivo 360 mg, cycle #1 initiated on 1/5/2023  Cycle #3 delivered on 2/16/2023.     Post neoadjuvant CT scan of the chest abdomen and pelvis is scheduled for 3/20/2023 and a PET scan scheduled for 3/27/2023.      Follow-up appointment with Dr. Goldy Carlin on 3/27/2023 to review imaging studies and to set a surgery date.      Right thoracotomy with right upper lobectomy and mediastinal lymph node dissection by Dr. Goldy Carlin on 5/4/2023        TREATMENT SUMMARY:  • Neoadjuvant concurrent XRT (with chemoimmunotherapy) to RUL of lung initiated on 1/4/2023 completed on 2/14/2023.  5040 cGy over 28 treatment fractions.  • Neoadjuvant cisplatin 75 mg/m2, Alimta 500 mg/m2 and Opdivo 360 mg, cycle #1 initiated on 1/5/2023.  Cycle #3 delivered on 2/16/2023.  • Right thoracotomy with right upper lobectomy and mediastinal lymph node dissection by Dr. Goldy Carlin on 5/4/2023.        #2   TUMOR HISTORY: Prostate cancer-  Lalo was seen in initial oncology consultation on 7/15/2020 referred by his PCP, Dr. Flex Sheikh in [facility] for medical oncology opinion on his diagnosis of prostate cancer.     Lalo relates that he was found to have a minimally increased PSA of between 4.5 and 5 on a routine physical examination.  This led to work-up and eventual prostate  biopsy leading to the diagnosis of prostate cancer.     Prostate biopsy on 11/1/2018 documented the following:  Right lateral base-Microfocus of prostatic carcinoma   Prostate biopsy on 2/25/2019 documented the following:  Right lateral base-adenocarcinoma: Consuelo 3+4 = 7  Right lateral mid- adenocarcinoma: Consuelo 3+3 = 6  Prostate biopsy 2/18/2020 documented the following:  Right lateral base-adenocarcinoma: Consuelo 3+4 = 7     PSA levels by date as follows:  2/19/2019-9.1  4/19/2019-7.0  7/22/2019-6.6  10/17/2019-7.8  114 2020-8.39  5/18/2020- 11.5     MRI pelvis with and without contrast on 2/5/2020 at [facility] documented:  • No suspicious lesions in the prostate (PI-RADS 3 or greater)  • No pelvis lymphadenopathy or suspicious pelvic bone lesions     Prostate biopsy was performed on 2/18/2020 by Dr. Clark at [facility].  Pathology revealed:  Prostate, right apex, needle biopsy:  • Benign prostate tissue  Prostate, right base, needle biopsy:  • Adenocarcinoma, acinar type, Eastman grade 3+4=7, grade group 2, discontinuously involving 10/20 mm or 50% of core (3 foci measuring 4,3, and 1 mm), 30% of tumor is Eastman pattern 4  Prostate, right mid, needle biopsy:  • Benign prostate tissue  Prostate, left apex, needle biopsy:  • Benign prostate tissue  Prostate, left base, needle biopsy  • Benign prostate tissue  Prostate, left mid, needle biopsy:  • Benign prostate tissue        CT abdomen and pelvis without contrast on 2/20/2020 at [facility] documented:  • No evidence of complication after prostate biopsy  • Numerous colonic diverticula with no evidence of acute diverticulitis  • Small bilateral fat-containing groin hernias     Bone scan on 6/2/2020 at [facility] documented:  • Abnormal uptake at the right dorsal tissues at the level of T12 and right upper kidney. This could represent a bone lesion or renal lesion. Recommend CT abdomen with contrast for further evaluation if patient's renal function permits. If low renal function, MRI  abdomen without contrast would be an alternative for evaluation     CT abdomen and pelvis without contrast on 6/7/2020 at [facility] documented no acute abnormality.      CBC (7/15/2020) revealed a WBC of 6.73. Hgb is 15.5 with an MCV of 93.5 and platelet count of 147,000.      Lalo has been under evaluation and monitoring in the urology department by Dr. Trae Clark at [facility].   At his last visit on 5/28/2020 Mr. Jones was leaning toward a radical prostatectomy to address the issue of his prostate cancer.     Medical oncology consultation was requested for opinion.  The 2 best options at this juncture include a radical prostatectomy which is most definitive followed by radiation therapy which gives similar statistical long-term results but with the potential for long-term radiation therapy associated side effects.   I have encouraged him that a radical prostatectomy is appropriate and if he is considering this that I would support that completely.     He has an appointment with Dr. Nuon Jj on 8/21/2022 discussed robotic prostatectomy.     Recommendation prior to his August appointment with urology is as follows:  • [employer] genetic testing drawn today  • PSA  • CMP  • MRI of the thoracic spine W&WO to evaluate the T12 area and any other areas of concern     He had robotic assisted laparoscopic prostatectomy by Dr. Jj September 2020 for PT2N0 [place] 7 disease with tumor approaching the right posterior margin.        TREATMENT SUMMARY:  • Robotic assisted laparoscopic prostatectomy by Dr. Jj September 2020 for PT2N0 [place]uelo 7 disease with tumor approaching the right posterior margin      OBJECTIVE:    Vitals:    05/08/23 0444   BP: 105/61   Pulse: 72   Resp: 18   Temp: 98.5 °F (36.9 °C)   SpO2: 99%     No intake or output data in the 24 hours ending 05/08/23 0634  PHYSICAL EXAM:  CONSTITUTIONAL: Alert, appropriate, no acute distress  EYES: Nonicteric, EOM intact, pupils equal round   ENT: Mucous membranes  moist, no oropharyngeal lesions   NECK: Supple, no masses   CHEST/LUNGS:  Right dressing clean dry and intact.  No significant subcu emphysema noted.  Lungs clear bilaterally.  CARDIOVASCULAR: RRR, no murmurs HR 86  ABDOMEN: soft non-tender, active bowel sounds  EXTREMITIES: warm, moves all four extremities   SKIN: Multiple tattoos  NEUROLOGIC: follows commands, non focal   PSYCH: mood and affect appropriate    CBC  Results from last 7 days   Lab Units 05/05/23  0500 05/04/23  1540   WBC 10*3/mm3 6.67 8.78   HEMOGLOBIN g/dL 11.9* 13.3   HEMATOCRIT % 37.2* 40.5   PLATELETS 10*3/mm3 180 181     Lab Results   Component Value Date     05/05/2023    K 4.1 05/05/2023     05/05/2023    CO2 29.0 05/05/2023    BUN 15 05/05/2023    CREATININE 1.06 05/05/2023    GLUCOSE 129 (H) 05/05/2023    CALCIUM 8.5 (L) 05/05/2023    BILITOT 0.4 04/27/2023    ALKPHOS 98 04/27/2023    AST 22 04/27/2023    ALT 34 04/27/2023    AGRATIO 1.7 04/27/2023    GLOB 2.6 04/27/2023     Lab Results   Component Value Date    INR 0.92 04/27/2023    INR 0.93 11/16/2022    PROTIME 12.5 04/27/2023    PROTIME 12.1 11/16/2022     Cultures:    Lab Results   Component Value Date    BLOODCX No growth at 5 days 06/07/2020     No components found for: URINCX     pCXR 5/6/2023  EXAM/TECHNIQUE: XR CHEST 1 VW-     INDICATION: post op lung surgery     COMPARISON: 05/05/2023     FINDINGS:     RIGHT apical and RIGHT basilar chest tubes remain in place. No  measurable pneumothorax. Small amount of RIGHT chest wall subcutaneous  emphysema is noted. Prior RIGHT posterior rib plating. RIGHT chest port  catheter tip overlying the SVC. Cardiac silhouette is stable. No pleural  effusion or focal consolidation.     IMPRESSION:     No change in appearance of the chest.  This report was finalized on 05/06/2023 07:07 by Dr. Harpreet Koenig MD.    ASSESSMENT/PLAN:  Mr. Lalo Jones is a very pleasant 57-year-old  gentleman with an underlying diagnosis of Stage  IIIB (T2b, N3, M0) adenocarcinoma of the RUL of the lung made  by CT-guided needle biopsy on 11/16/2022.     Lalo was treated with concurrent neoadjuvant chemotherapy and radiation therapy as follows:  • Neoadjuvant concurrent XRT (with chemoimmunotherapy) to RUL of lung initiated on 1/4/2023 completed on 2/14/2023.  5040 cGy over 28 treatment fractions.  • Neoadjuvant cisplatin 75 mg/m2, Alimta 500 mg/m2 and Opdivo 360 mg, cycle #1 initiated on 1/5/2023.  Cycle #3 delivered on 2/16/2023.     POD #4 right thoracotomy with right upper lobectomy and mediastinal lymph node dissection by Dr. Goldy Carlin on 5/4/2023.    Right CT removed on 5/7/2023  1 view CXR 5/7/2023: Tiny RIGHT apical pneumothorax with 2 chest tubes in place.  Encouraged to use incentive spirometer  Chest x-ray PA and lateral on 5/7/2023 post chest tube removal: Small right apical pneumothorax measuring 2.8 cm.  Follow-up chest x-ray PA and lateral for this morning, 5/8/2023, yet to be completed    He developed Afib with RVR placed on amiodarone drip - converted to NSR  1:30 am on 5/6/2023. Remains in sinus rhythm     Mild post-op anemia, Hgb 11.9 MCV 94.7 on 5/5/2023- monitor    Lalo has an outpatient follow-up with Dr. Tadeo on 5/24/2023 at 9:30 AM to review final pathology and potential further recommendations for adjuvant immunotherapy depending on pathology results.    Plan of care discussed with patient. Questions answered.    MADDY Hernandez    05/08/23  06:34 CDT    Physicians attestation and contribution:    I, Enrique Tadeo, personally and independently performed an evaluation on Lalo Jones  I have reviewed relevant medical information/data to include but not limited to the medication list, relevant appropriate lab work and imaging when applicable.  I reviewed other physician's notes, ancillary services and nurses assessments.  I have reviewed the above documentation completed by Briana CASTAÑEDA  Please see my  additional addended and/or modified contributions to the history of present illness, physical examination and assessment/medical decision-making and plan that reflects my findings and impressions.  I discussed the essential elements of the care plan with Briana CASTAÑEDA and the patient.  I have encouraged and answered all the questions raised to the patient's understanding and satisfaction.  I concur with the above stated.    Subjective:  Nino  denies shortness of breath or significant pain.  He has been up and ambulating.     Objective:  NSR over the past 24 hours, 72 - 86 on tele. Max temp 100.5 over the past 36 hours.   No significant subcutaneous emphysema    Assessment/plan:  POD #4 right thoracotomy with right upper lobectomy and mediastinal lymph node dissection  Follow-up apt with me on 5/24/2023 at 9:30 AM  OK with me when discharged    Enrique Tadeo MD  5/8/2023 07:12 CDT

## 2023-05-08 NOTE — DISCHARGE SUMMARY
[facility] Cardiothoracic Surgery  DISCHARGE SUMMARY        Date of Admission: 5/4/2023  Date of Discharge:  5/8/2023  Primary Care Physician: Flex Sheikh MD    Discharge Diagnoses:  Active Hospital Problems    Diagnosis    • Malignant neoplasm of upper lobe of right lung    • Lung cancer        Procedures Performed:   Right Thoracotomy with Right Upper Lobectomy, Mediastinal Lymph Node Dissection by Dr. Carlin on 5/4/2023    HPI:  Mr. Lalo Jones is a 57 y.o. male who presented to Dr. Carlin as an outpatient with a right upper lobe lung cancer that was invasive into the right anterior chest wall.  He underwent neoadjuvant immunotherapy as well as local radiation to the chest wall.  Follow-up work-up was obtained showing no distant metastasis.  Pulmonary function test revealed adequate reserve for anatomic resection.  Dr. Carlin discussed with him the risk and benefits of proceeding with right thoracotomy and right upper lobe lobectomy with possible chest wall resection and the patient was agreeable to proceed.    Hospital Course: On 5/4/2023, Mr. Jones was taken to the operating room for right thoracotomy with right upper lobectomy and mediastinal lymph node dissection.  See separate op note by Dr. Carlin detailing the operation.  Following surgery he was transferred to the PACU in stable condition.  After meeting PACU discharge criteria he was transferred to  for ongoing recovery.  The remainder of his hospital stay was significant for encouraging pulmonary toilet and ambulation, diuresis, and pain control.  He did have intermittent atrial fibrillation postoperatively and was started on amiodarone protocol.  He ultimately converted to normal sinus rhythm.  Oncology followed along during this admission as well.  The right chest tubes were removed without remark on postop day 3 and follow-up imaging revealed right apical space.  He was kept overnight for evaluation and  follow-up imaging the next day revealed stability in right apical space.  He is eating well and is demonstrated adequate bowel function.  He is ambulating without difficulty.  He meets criteria for discharge home on postop day 4 and the patient is agreeable to this plan    He will be discharged home with amiodarone daily for 30 days.  He will also take Lopressor twice daily for 30 days.  Routine follow-up with me in 1 week with chest x-ray prior to appointment.  Final pathology is pending at the time of discharge and will be discussed with patient at his follow-up visit.    Condition on Discharge:  Neurologically intact and has good pain control.  He is eating well and has demonstrable good bowel function.  All thoracic incisions are healing nicely without evidence of thoracic hernia.  The heart is in normal sinus rhythm.         Discharge Disposition:  Home or Self Care [1]    Discharge Medications:     Discharge Medications      New Medications      Instructions Start Date   amiodarone 400 MG tablet  Commonly known as: PACERONE   400 mg, Oral, Every 24 Hours Scheduled      docusate sodium 100 MG capsule   100 mg, Oral, 2 Times Daily      metoprolol tartrate 25 MG tablet  Commonly known as: LOPRESSOR   12.5 mg, Oral, Every 12 Hours Scheduled      oxyCODONE-acetaminophen 5-325 MG per tablet  Commonly known as: Percocet   1 tablet, Oral, Every 6 Hours PRN         Changes to Medications      Instructions Start Date   Azelastine HCl 137 MCG/SPRAY solution  What changed:   · when to take this  · reasons to take this   274 mcg, Nasal, 2 Times Daily         Continue These Medications      Instructions Start Date   acetaminophen 325 MG tablet  Commonly known as: Tylenol   975 mg, Oral, Every 8 Hours, Take every 8 hours for 3 days then take prn as needed.      albuterol sulfate  (90 Base) MCG/ACT inhaler  Commonly known as: PROVENTIL HFA;VENTOLIN HFA;PROAIR HFA   2 puffs, Inhalation, Every 4 Hours PRN      cetirizine  10 MG tablet  Commonly known as: zyrTEC   10 mg, Oral, Daily      fluticasone 50 MCG/ACT nasal spray  Commonly known as: FLONASE   2 sprays, Nasal, Daily PRN      Fluticasone-Salmeterol 250-50 MCG/ACT DISKUS  Commonly known as: ADVAIR/WIXELA   1 puff, Inhalation, 2 Times Daily - RT      folic acid 1 MG tablet  Commonly known as: FOLVITE   1 mg, Oral, Daily      gabapentin 800 MG tablet  Commonly known as: NEURONTIN   800 mg, Oral, 3 Times Daily      hydrOXYzine pamoate 50 MG capsule  Commonly known as: VISTARIL   1 capsule, Oral, Daily      ibuprofen 800 MG tablet  Commonly known as: ADVIL,MOTRIN   800 mg, Oral, Every 8 Hours PRN      metoclopramide 5 MG tablet  Commonly known as: REGLAN   5 mg, Oral, 3 Times Daily PRN      montelukast 10 MG tablet  Commonly known as: SINGULAIR   10 mg, Oral, Nightly      multivitamin with minerals tablet tablet   1 tablet, Oral, Daily      NON FORMULARY   1 tablet, Oral, Daily, Marijuana Gummies             Discharge Diet: Regular diet     Discharge Care Plan / Instructions:   Keep incisions clean and open to air.  May wash with soap and water.  Chest tube sites with dressings to keep on for 48 hours.  Ok to reapply dressing as needed after removal.      Activity at Discharge:   No heavy lifting greater than 5 pounds or a gallon of milk and refrain from driving until cleared.      Tobacco: The patient does not use tobacco products and therefore does not need tobacco cessation education.    BMI: BMI is >= 25 and <30. (Overweight) The following options were offered after discussion;: referral to primary care      Follow-up Appointments: Lalo Enrique Jones  is requested to see Flex Sheikh MD within 1-2 weeks from time of discharge, to follow-up with MADDY Powell in 1 week with chest x-ray prior to appointment.  Keep scheduled follow-up with oncology.

## 2023-05-08 NOTE — PROGRESS NOTES
"Patient name: Lalo Jones  Patient : 1965  VISIT # [id number]  MR #[id number]    Procedure:Procedure(s):  RIGHT THORACOTOMY WITH CHEST WALL RESECTION, MEDIASTINAL LYMPH NODE DISSECTION, RIGHT UPPER LOBECTOMY  Procedure Date:2023  POD:4 Days Post-Op    Subjective     Resting in bed. Reports he is feeling better today.  Remains on room air with SPO2 of 93%.  No overnight events reported.  Eager to be discharged home.       Objective     Visit Vitals  /58 (BP Location: Left arm, Patient Position: Lying)   Pulse 75   Temp 98.4 °F (36.9 °C) (Oral)   Resp 16   Ht 187 cm (73.62\")   Wt 101 kg (222 lb 10.6 oz)   SpO2 93%   BMI 28.88 kg/m²     No intake or output data in the 24 hours ending 23    Lab:     CBC:  Results from last 7 days   Lab Units 23  0500 23  1540   WBC 10*3/mm3 6.67 8.78   HEMATOCRIT % 37.2* 40.5   PLATELETS 10*3/mm3 180 181          BMP:  Results from last 7 days   Lab Units 23  0500 23  1540   SODIUM mmol/L 139 139   POTASSIUM mmol/L 4.1 4.8   CHLORIDE mmol/L 103 106   CO2 mmol/L 29.0 23.0   GLUCOSE mg/dL 129* 146*   BUN mg/dL 15 15   CREATININE mg/dL 1.06 1.10          COAG:      Invalid input(s): PT    IMAGES:       Imaging Results (Last 24 Hours)     Procedure Component Value Units Date/Time    XR Chest PA & Lateral [[id number]] Collected: 2353     Updated: 2359    Narrative:      EXAMINATION: XR CHEST PA AND LATERAL-     2023 8:39 AM CDT     HISTORY: Post chest tube removal; C34.11-Malignant neoplasm of upper  lobe, right bronchus or lung; R91.8-Other nonspecific abnormal finding  of lung field     2 view chest x-ray.     Comparison is made with yesterday at 12:46 PM.     Persistent right chest wall soft tissue air and persistent small right  apical pneumothorax.     The apical pleural reflection lies 22 mm below the bony apex compared  with 25 mm yesterday.     Plate and screw fixation of the posterior right fifth " rib.  Unchanged right subclavian port.     Normal heart size.     The left lung remains fully expanded and clear.     There is some patchy infiltrate or atelectasis within the mid and lower  right lung.     Summary:  1. Stable chest x-ray appearance with a small right apical pneumothorax  which is either stable or minimally smaller.  This report was finalized on 05/08/2023 08:55 by Dr. Joon Agosto MD.    XR Chest PA & Lateral [[id number]] Collected: 05/07/23 1350     Updated: 05/07/23 1357    Narrative:      EXAM/TECHNIQUE: XR CHEST PA AND LATERAL-     INDICATION: Post thoracic surgery; C34.11-Malignant neoplasm of upper  lobe, right bronchus or lung; R91.8-Other nonspecific abnormal finding  of lung field     COMPARISON: Same day radiograph     FINDINGS:     Right-sided chest tubes have been removed. A small RIGHT apical  pneumothorax is present measuring 2.8 cm. No left-sided pneumothorax. No  consolidation or pleural effusion.     Cardiac silhouette is normal size. RIGHT chest port with catheter tip  overlying the SVC. RIGHT posterior rib plating. Subcutaneous emphysema  in the RIGHT chest wall.       Impression:         Small RIGHT apical pneumothorax measuring 2.8 cm after removal of chest  tubes. Recommend close clinical and imaging follow-up.  This report was finalized on 05/07/2023 13:53 by Dr. Harpreet Koenig MD.      My interpretation of chest x-ray: Stable to slightly improved right apical pneumothorax.     Physical Exam:  General: Alert, oriented. No apparent distress.   Cardiovascular: Regular rate and rhythm without murmur, rubs, or gallops.    Pulmonary: Clear to auscultation bilaterally without wheezing, rubs, or rales.  Chest: Thoracic incisions clean, dry, and intact.  Abdomen: Soft, non distended, and non tender.  Extremities: Warm, moves all extremities. No edema.   Neurologic:  Grossly intact with no focal deficits.          Impression:  S/p right lobectomy   Malignant neoplasm of right upper  lobe  Former smoker    Plan:  Continue multimodality pain control regimen  Encourage pulmonary toilet and ambulation  Okay to discharge home  Will follow-up with MADDY Powell in 1 week with chest x-ray  Discussed with patient and nursing      MADDY Avila  05/08/23  09:25 CDT

## 2023-05-08 NOTE — PLAN OF CARE
Goal Outcome Evaluation:  Plan of Care Reviewed With: patient        Progress: no change  Outcome Evaluation: IID; Up ad lanette; voiding; Room air; No respiratory distress noted; Tele running normal sinus; Chest tube site dressing changed x1; Alert and oriented; Scheduled tylenol and prn roxicodone given for pain; PA/LAT ordered for this morning; resting between care; safety maintained      Problem: Adult Inpatient Plan of Care  Goal: Plan of Care Review  Outcome: Ongoing, Progressing  Flowsheets (Taken 5/8/2023 0322)  Progress: no change  Plan of Care Reviewed With: patient  Outcome Evaluation:   IID   Up ad lanette   voiding   Room air   No respiratory distress noted   Tele running normal sinus   Chest tube site dressing changed x1   Alert and oriented   Scheduled tylenol and prn roxicodone given for pain   PA/LAT ordered for this morning   resting between care   safety maintained  Goal: Patient-Specific Goal (Individualized)  Outcome: Ongoing, Progressing  Goal: Absence of Hospital-Acquired Illness or Injury  Outcome: Ongoing, Progressing  Goal: Optimal Comfort and Wellbeing  Outcome: Ongoing, Progressing  Goal: Readiness for Transition of Care  Outcome: Ongoing, Progressing     Problem: Bleeding (Lung Surgery)  Goal: Absence of Bleeding  Outcome: Ongoing, Progressing     Problem: Bowel Motility Impaired (Lung Surgery)  Goal: Effective Bowel Elimination  Outcome: Ongoing, Progressing

## 2023-05-09 ENCOUNTER — TELEPHONE (OUTPATIENT)
Dept: CARDIAC SURGERY | Facility: CLINIC | Age: 58
End: 2023-05-09
Payer: MEDICAID

## 2023-05-09 NOTE — OUTREACH NOTE
Prep Survey    Flowsheet Row Responses   [facility] facility patient discharged from? [place]   Is LACE score < 7 ? No   Eligibility Readm Mgmt   Discharge diagnosis Lung nodule- right thoracotomy with right upper lobectomy and mediastinal lymph node dissection.    Does the patient have one of the following disease processes/diagnoses(primary or secondary)? Cardiothoracic surgery   Does the patient have Home health ordered? No   Is there a DME ordered? No   Prep survey completed? Yes          Minnie GARCIA - Registered Nurse

## 2023-05-09 NOTE — TELEPHONE ENCOUNTER
Pt calling to find out if he needs to change his dressings or leave them in place until appt next week or what to do.  Can reach him at #[phone]/joan

## 2023-05-09 NOTE — PAYOR COMM NOTE
"Ref:   [id number]  [facility]  TARA  [phone]  OR  FAX   [phone]     Lalo Quinteros (57 y.o. Male)     Date of Birth   1965    Social Security Number       Address   [facility] [place] [address]    Home Phone   [phone]    MRN   [id number]       [employer]ist   [facility]    Marital Status                               Admission Date   5/4/23    Admission Type   Elective    Admitting Provider   Goldy Carlin MD    Attending Provider       Department, Room/Bed   [facility] 3C, 372/1       Discharge Date   5/8/2023    Discharge Disposition   Home or Self Care    Discharge Destination                               Attending Provider: (none)   Allergies: No Known Allergies    Isolation: None   Infection: None   Code Status: Prior    Ht: 187 cm (73.62\")   Wt: 101 kg (222 lb 10.6 oz)    Admission Cmt: None   Principal Problem: Lung nodule [R91.1]                 Active Insurance as of 5/4/2023     Primary Coverage     Payor Plan Insurance Group Employer/Plan Group    [employer] MEDICAID [employer] MEDICAID [id number]     Payor Plan Address Payor Plan Phone Number Payor Plan Fax Number Effective Dates    [address] [address] [phone]  12/1/2017 - None Entered    [facility] [id number]       Subscriber Name Subscriber Birth Date Member ID       LALO QUINTEROS 1965 [id number]                 Emergency Contacts      (Rel.) Home Phone Work Phone Mobile Phone    JENNIFER QUINTEROS (Spouse) [phone] -- --    RADHA QUINTEROS (Brother) [phone] -- --               Discharge Summary      Minnie García APRN at 05/08/23 0922     Attestation signed by Goldy Carlin MD at 05/08/23 1117    I have personally seen and examined Lalo Quinteros and reviewed the record. Agree with the aforementioned plan rendered jointly with Minnie García.    Goldy Carlin M.D.  Cardiothoracic Surgeon                                  [facility] Cardiothoracic Surgery  DISCHARGE SUMMARY        Date of Admission: 5/4/2023  Date of Discharge:  5/8/2023  Primary Care Physician: Flex Sheikh MD    Discharge Diagnoses:  Active Hospital Problems    Diagnosis    • Malignant neoplasm of upper lobe of right lung    • Lung cancer        Procedures Performed:   Right Thoracotomy with Right Upper Lobectomy, Mediastinal Lymph Node Dissection by Dr. Carlin on 5/4/2023    HPI:  Mr. Lalo Jones is a 57 y.o. male who presented to Dr. Carlin as an outpatient with a right upper lobe lung cancer that was invasive into the right anterior chest wall.  He underwent neoadjuvant immunotherapy as well as local radiation to the chest wall.  Follow-up work-up was obtained showing no distant metastasis.  Pulmonary function test revealed adequate reserve for anatomic resection.  Dr. Carlin discussed with him the risk and benefits of proceeding with right thoracotomy and right upper lobe lobectomy with possible chest wall resection and the patient was agreeable to proceed.    Hospital Course: On 5/4/2023, Mr. Jones was taken to the operating room for right thoracotomy with right upper lobectomy and mediastinal lymph node dissection.  See separate op note by Dr. Carlin detailing the operation.  Following surgery he was transferred to the PACU in stable condition.  After meeting PACU discharge criteria he was transferred to  for ongoing recovery.  The remainder of his hospital stay was significant for encouraging pulmonary toilet and ambulation, diuresis, and pain control.  He did have intermittent atrial fibrillation postoperatively and was started on amiodarone protocol.  He ultimately converted to normal sinus rhythm.  Oncology followed along during this admission as well.  The right chest tubes were removed without remark on postop day 3 and follow-up imaging revealed right apical space.  He was kept overnight for evaluation and follow-up imaging the next day  revealed stability in right apical space.  He is eating well and is demonstrated adequate bowel function.  He is ambulating without difficulty.  He meets criteria for discharge home on postop day 4 and the patient is agreeable to this plan    He will be discharged home with amiodarone daily for 30 days.  He will also take Lopressor twice daily for 30 days.  Routine follow-up with me in 1 week with chest x-ray prior to appointment.  Final pathology is pending at the time of discharge and will be discussed with patient at his follow-up visit.    Condition on Discharge:  Neurologically intact and has good pain control.  He is eating well and has demonstrable good bowel function.  All thoracic incisions are healing nicely without evidence of thoracic hernia.  The heart is in normal sinus rhythm.         Discharge Disposition:  Home or Self Care [1]    Discharge Medications:     Discharge Medications      New Medications      Instructions Start Date   amiodarone 400 MG tablet  Commonly known as: PACERONE   400 mg, Oral, Every 24 Hours Scheduled      docusate sodium 100 MG capsule   100 mg, Oral, 2 Times Daily      metoprolol tartrate 25 MG tablet  Commonly known as: LOPRESSOR   12.5 mg, Oral, Every 12 Hours Scheduled      oxyCODONE-acetaminophen 5-325 MG per tablet  Commonly known as: Percocet   1 tablet, Oral, Every 6 Hours PRN         Changes to Medications      Instructions Start Date   Azelastine HCl 137 MCG/SPRAY solution  What changed:   · when to take this  · reasons to take this   274 mcg, Nasal, 2 Times Daily         Continue These Medications      Instructions Start Date   acetaminophen 325 MG tablet  Commonly known as: Tylenol   975 mg, Oral, Every 8 Hours, Take every 8 hours for 3 days then take prn as needed.      albuterol sulfate  (90 Base) MCG/ACT inhaler  Commonly known as: PROVENTIL HFA;VENTOLIN HFA;PROAIR HFA   2 puffs, Inhalation, Every 4 Hours PRN      cetirizine 10 MG tablet  Commonly known  as: zyrTEC   10 mg, Oral, Daily      fluticasone 50 MCG/ACT nasal spray  Commonly known as: FLONASE   2 sprays, Nasal, Daily PRN      Fluticasone-Salmeterol 250-50 MCG/ACT DISKUS  Commonly known as: ADVAIR/WIXELA   1 puff, Inhalation, 2 Times Daily - RT      folic acid 1 MG tablet  Commonly known as: FOLVITE   1 mg, Oral, Daily      gabapentin 800 MG tablet  Commonly known as: NEURONTIN   800 mg, Oral, 3 Times Daily      hydrOXYzine pamoate 50 MG capsule  Commonly known as: VISTARIL   1 capsule, Oral, Daily      ibuprofen 800 MG tablet  Commonly known as: ADVIL,MOTRIN   800 mg, Oral, Every 8 Hours PRN      metoclopramide 5 MG tablet  Commonly known as: REGLAN   5 mg, Oral, 3 Times Daily PRN      montelukast 10 MG tablet  Commonly known as: SINGULAIR   10 mg, Oral, Nightly      multivitamin with minerals tablet tablet   1 tablet, Oral, Daily      NON FORMULARY   1 tablet, Oral, Daily, Marijuana Gummies             Discharge Diet: Regular diet     Discharge Care Plan / Instructions:   Keep incisions clean and open to air.  May wash with soap and water.  Chest tube sites with dressings to keep on for 48 hours.  Ok to reapply dressing as needed after removal.      Activity at Discharge:   No heavy lifting greater than 5 pounds or a gallon of milk and refrain from driving until cleared.      Tobacco: The patient does not use tobacco products and therefore does not need tobacco cessation education.    BMI: BMI is >= 25 and <30. (Overweight) The following options were offered after discussion;: referral to primary care      Follow-up Appointments: Lalo Nunez Robert  is requested to see Flex Sheikh MD within 1-2 weeks from time of discharge, to follow-up with MADDY Powell in 1 week with chest x-ray prior to appointment.  Keep scheduled follow-up with oncology.    Electronically signed by Goldy Carlin MD at 05/08/23 1116       Discharge Order (From admission, onward)     Start     Ordered    05/08/23  0938  Discharge patient  Once        Expected Discharge Date: 05/08/23    Discharge Disposition: Home or Self Care    Physician of Record for Attribution - Please select from Treatment Team: SATISH YOO [[id number]]    Review needed by CMO to determine Physician of Record: No       Question Answer Comment   Physician of Record for Attribution - Please select from Treatment Team SATISH YOO    Review needed by CMO to determine Physician of Record No        05/08/23 0937    05/07/23 1158  Discharge patient  Once,   Status:  Canceled        Comments: Review cxr prior to d/c please, meds sent to [facility]   Expected Discharge Date: 05/07/23    Expected Discharge Time: Afternoon    Discharge Disposition: Home or Self Care    Physician of Record for Attribution - Please select from Treatment Team: SATISH YOO [[id number]]    Review needed by CMO to determine Physician of Record: No       Question Answer Comment   Physician of Record for Attribution - Please select from Treatment Team SATISH YOO    Review needed by CMO to determine Physician of Record No        05/07/23 1158

## 2023-05-10 RX ORDER — AMIODARONE HYDROCHLORIDE 200 MG/1
400 TABLET ORAL DAILY
Qty: 14 TABLET | Refills: 0 | Status: SHIPPED | OUTPATIENT
Start: 2023-05-10 | End: 2023-05-15

## 2023-05-10 NOTE — TELEPHONE ENCOUNTER
Pt calling again today with the same questions about his dressings. Question has been answered again.    Pt also states that his pharmacy will not fill amiodarone as they only have 200 mg in stock. They will need a new prescription that reflects this. They will not fill 14 of the 200 mg until they have a new prescription.

## 2023-05-10 NOTE — TELEPHONE ENCOUNTER
New amiodarone prescription for 200 mg tablet sent to [facility] pharmacy.  We also discussed that he may remove his dressings and cover with dry dressing only as needed.  He verbalizes understanding.

## 2023-05-11 ENCOUNTER — READMISSION MANAGEMENT (OUTPATIENT)
Dept: CALL CENTER | Facility: HOSPITAL | Age: 58
End: 2023-05-11
Payer: MEDICAID

## 2023-05-11 NOTE — OUTREACH NOTE
CT Surgery Week 1 Survey    Flowsheet Row Responses   [facility] patient discharged from? [place]   Does the patient have one of the following disease processes/diagnoses(primary or secondary)? Cardiothoracic surgery   Week 1 attempt successful? Yes   Call start time 1124   Call end time 1130   Discharge diagnosis Lung nodule- right thoracotomy with right upper lobectomy and mediastinal lymph node dissection.    Meds reviewed with patient/caregiver? Yes   Is the patient having any side effects they believe may be caused by any medication additions or changes? No   Does the patient have all medications related to this admission filled (includes all antibiotics, pain medications, cardiac medications, etc.) Yes   Is the patient taking all medications as directed (includes completed medication regime)? Yes   Does the patient have a primary care provider?  Yes   Does the patient have an appointment scheduled with their C/T surgeon? Yes   Has the patient kept scheduled appointments due by today? N/A   Has home health visited the patient within 72 hours of discharge? N/A   Psychosocial issues? No   Did the patient receive a copy of their discharge instructions? Yes   Nursing interventions Reviewed instructions with patient   What is the patient's perception of their health status since discharge? Improving   Nursing interventions Nurse provided patient education   Is the patient/caregiver able to teach back normal signs of recovery? Pain or discomfort at incisional site   Nursing interventions Reassured on normal signs of recovery   Is the patient /caregiver able to teach back basic post-op care? Continue use of incentive spirometry at least 1 week post discharge, Practice cough and deep breath every 4 hours while awake, Hold pillow to support chest when coughing, Drive as instructed by MD in discharge instructions, Shower daily, No tub bath, swimming, or hot tub until instructed by MD, Use a clean wash cloth and  antibacterial bar or liquid soap to clean incisions, If the steri-strips are falling off, it is okay to remove them. (If applicable), Lifting as instructed by MD in discharge instructions   Is the patient/caregiver able to teach back signs and symptoms of incisional infection? Increased redness, swelling or pain at the incisonal site, Increased drainage or bleeding, Incisional warmth, Pus or odor from incision, Fever   Is the patient/caregiver able to teach back steps to recovery at home? Set small, achievable goals for return to baseline health, Rest and rebuild strength, gradually increase activity, Eat a well-balance diet   Is the patient /caregiver able to teach back the importance of cardiac rehab? --  [N/A]   If the patient is a current smoker, are they able to teach back resources for cessation? Not a smoker   Is the patient/caregiver able to teach back the hierarchy of who to call/visit for symptoms/problems? PCP, Specialist, Home health nurse, Urgent Care, ED, 911 Yes   Week 1 call completed? Yes            Arcelia SHARP - Registered Nurse

## 2023-05-12 ENCOUNTER — HOSPITAL ENCOUNTER (OUTPATIENT)
Dept: RADIATION ONCOLOGY | Facility: HOSPITAL | Age: 58
Setting detail: RADIATION/ONCOLOGY SERIES
End: 2023-05-12
Payer: MEDICAID

## 2023-05-12 NOTE — PROGRESS NOTES
Subjective   Chief Complaint   Patient presents with   • Post-op Follow-up     Pt is here for follow-up w/ CXR s/p right thoracotomy with chest wall resection, mediastinal lymph node dissection, and right upper lobectomy on 05/04/2023       Patient ID: Lalo Jones is a 57 y.o. male who is here for follow-up having had Right Thoracotomy with Right Upper Lobectomy, Mediastinal Lymph Node Dissection by Dr. Carlin on 5/4/2023    History of Present Illness  Post operative recovery was uneventful without any major complications. Pain control has been fair.  He is using scheduled Tylenol.  Mainly only needing pain medication in morning and at night.  His main complaint today is nerve pain due to nerve block wearing off.  No fevers/sweats/chills. No drainage from incisions.   Denies tobacco use.  He is here for 1 week follow-up with chest x-ray.  He did see Dr. Kinney's nurse practitioner this morning and is scheduled to see Dr. Tadeo next week.  He does report that blood pressure has been running low.    The following portions of the patient's history were reviewed and updated as appropriate: allergies, current medications, past family history, past medical history, past social history, past surgical history and problem list.    Review of Systems   Constitutional: Positive for fatigue. Negative for activity change, diaphoresis and fever.   HENT: Negative for trouble swallowing and voice change.    Eyes: Negative for visual disturbance.   Respiratory: Negative for chest tightness and shortness of breath.    Cardiovascular: Negative for chest pain, palpitations and leg swelling.   Gastrointestinal: Negative for abdominal pain, diarrhea, nausea and vomiting.   Musculoskeletal: Negative for arthralgias and myalgias.        Right chest wall pain, tender to touch with skin sensitivity.   Skin: Negative for color change, pallor, rash and wound.   Neurological: Negative for dizziness, syncope and light-headedness.  "  Psychiatric/Behavioral: Negative for agitation, confusion and sleep disturbance.       Objective   Visit Vitals  BP 96/64   Pulse 70   Ht 187 cm (73.62\")   Wt 103 kg (226 lb)   SpO2 95%   BMI 29.32 kg/m²       Physical Exam  Vitals reviewed.   Constitutional:       General: He is not in acute distress.     Appearance: Normal appearance.   HENT:      Head: Normocephalic.   Eyes:      Pupils: Pupils are equal, round, and reactive to light.   Cardiovascular:      Rate and Rhythm: Normal rate and regular rhythm.      Heart sounds: Normal heart sounds. No murmur heard.  Pulmonary:      Breath sounds: Normal breath sounds. No wheezing or rales.   Abdominal:      General: There is no distension.      Palpations: Abdomen is soft.      Tenderness: There is no abdominal tenderness.   Musculoskeletal:         General: No swelling or tenderness.   Skin:     General: Skin is warm and dry.      Comments: Thoracic incisions are clean dry and intact and healing nicely.   Neurological:      General: No focal deficit present.      Mental Status: He is alert and oriented to person, place, and time.   Psychiatric:         Mood and Affect: Mood normal.         Behavior: Behavior normal.         Thought Content: Thought content normal.         Judgment: Judgment normal.             Assessment & Plan                   Independent Review of Radiographic Studies:    CXR: Ongoing right chest wall subcutaneous emphysema that is slightly improved since last imaging.  No visible right apical pneumothorax on today's exam.    Diagnoses and all orders for this visit:    1. Malignant neoplasm of upper lobe of right lung (Primary)  -     pregabalin (Lyrica) 25 MG capsule; Take 1 capsule by mouth 2 (Two) Times a Day As Needed (pain) for up to 7 days.  Dispense: 14 capsule; Refill: 0  -     XR Chest 2 View; Future           Overall, Lalo Jones is doing well.  I have advised him to discontinue amiodarone due to hypotension and fatigue.  " Continue metoprolol 12.5 mg twice daily.  Monitor blood pressure and heart rate at home and call if hypotension does not improve.  In regard to nerve pain, I have sent Lyrica to his pharmacy.  Continue scheduled Tylenol and as needed Norco for pain.  Provided support and encouragement. All questions have been answered to the best of my ability. Patient has been instructed to contact our office with any questions or concerns should they arise prior to the next office visit.  He has scheduled follow-up with Dr. Carlin next month and I have advised him to keep this appointment.  He will have a chest x-ray prior to that appointment as well.  Keep scheduled follow-up with Dr. Tadeo next week.  Patient verbalizes understanding and is agreeable.    BMI is >= 25 and <30. (Overweight) The following options were offered after discussion;: referral to primary care      Lalo Jones  reports that he quit smoking about 5 months ago. His smoking use included cigarettes. He started smoking about 36 years ago. He has a 35.00 pack-year smoking history. He quit smokeless tobacco use about 38 years ago.  His smokeless tobacco use included snuff.. I have educated him on the risk of diseases from using tobacco products such as cancer, COPD and heart disease.

## 2023-05-14 PROBLEM — J44.9 CHRONIC OBSTRUCTIVE PULMONARY DISEASE: Status: ACTIVE | Noted: 2023-05-14

## 2023-05-14 PROBLEM — Z92.3 HISTORY OF RADIATION THERAPY: Status: ACTIVE | Noted: 2023-05-14

## 2023-05-14 NOTE — PROGRESS NOTES
[facility]  Anton Kinney MD      Naif Jones, APRN  ____________________________________________________________  [facility]  Department of Radiation Oncology  [address] [place] [id number]  Office:  [phone]  Fax: [phone]    DATE: 05/15/2023  PATIENT: Lalo Jones  1965                                 MEDICAL RECORD #: [id number]    Reason for Follow up Visit:  Lalo Jones is a very pleasant 57 y.o. patient that completed radiation to the lung and returns to the clinic today for inital follow up exam. Reports fatigue and SOB. Denies activity change, appetite change, unexpected weight change, nasuea/vomiting, diarrhea, light-headedness, weakness, and headaches. He continues to follow  and .     History of Present Illness:  Diagnosed with right upper lobe of the lung. He completed 5040 cGy in 28 fractions to the right lung on 02/14/2023.     05/19/2022 - CT Chest Low Dose:  • 4 mm LEFT lower lobe pulmonary nodule.  • Moderate emphysema.  • 3.3 x 1.3 cm masslike area of pleural thickening in the RIGHT anterior upper chest. Recommend a follow-up chest CT in 3 months to ensure stability.    10/17/2022 - CT chest without contrast, low-dose protocol at [facility]:  • 4.7 cm x 4 cm x 1.6 cm abnormal area of pleural thickening in the periphery of the RUL with slightly irregular margins  • Moderate paraseptal emphysematous changes with scattered bullae in both lungs  • Lung-RADS - 4B    10/24/2022 - Appointment with Russel Santana MD:   • I explained the abnormal CT scan results from May which is done in the [facility] to the patient and also discussed with the radiologist from [facility] in [place] who reviewed the current CT scan of the chest done earlier this month.  This shows the right upper lobe pleural-based thickening or mass and left upper lobe nodule.  Due to his smoking history the  possibility of malignancy cannot be ruled out.  • Discussing different options I ordered a PET scan and a follow-up CT scan of the chest in 3 months time.  If the PET scan is positive a CT-guided needle biopsy would be best option for the right upper lobe pleural-based lung mass which is not reachable by bronchoscopy.  If PET scan is negative will wait for the neck CT scan of the chest and make further recommendations.  Patient is agreeable with the plan.  In the meantime I will try to get the CD from from the outside hospital with the latest CT scan of the chest for further comparison.  • Patient definitely has chronic obstructive pulmonary disease and he was started on Wixela 250/50 1 puff twice a day and albuterol rescue inhaler will be continued.  • His nasal allergy he was started on fluticasone nasal spray and Singulair.  He told me he is already using Zyrtec mostly over-the-counter which will be continued.  All medications were sent to the pharmacy.  • Lalo Jones  reports that he has been smoking cigarettes. He started smoking about 35 years ago. He has a 35.00 pack-year smoking history. He has never used smokeless tobacco.. I have educated him on the risk of diseases from using tobacco products such as cancer, COPD and heart disease.   • I advised him to quit and he is willing to quit. We have discussed the following method/s for tobacco cessation:  Counseling.  Together we have set a quit date for 2 weeks from today.  He will follow up with me in 3 months or sooner to check on his progress.I spent 7 minutes counseling the patient  • Patient is advised to have a sleep study due to sleep disturbances and most likely have sleep apnea and may need his CPAP.  He is also advised to get a pulmonary function test before the next clinic visit for his underlying COPD in addition to the PET scan and CT scan which are already ordered.  He will return to the pulm clinic for follow-up visit in 3 months time or  earlier if needed  Follow up:  • 3 months     11/01/2022 - PET Scan:  • Slight increase in size of 4.3 x 1.9 cm RIGHT anterior upper chest masslike pleural thickening which is markedly hypermetabolic. Favor this to represent a neoplastic process with differential including lymphoma. Primary pleural neoplasm and metastatic disease are also in the differential.  • Hypermetabolic upper abdominal retroperitoneal lymphadenopathy. Suspect these lymph nodes related to the same process as the RIGHT anterior pleural lesion.  • No other abnormal hypermetabolic activity.    11/16/2022 - Right upper chest/pleural mass, core biopsies:  • Adenocarcinoma of pulmonary origin.    12/08/2022 - Appointment with :  Adenocarcinoma of Pulmonary Origin-11/16/2022  • Lalo Jones is a 57-year-old  gentleman with primary and secondary diagnoses as follows:  Robotic assisted laparoscopic prostatectomy by Dr. Jj September 2020 for PT2N0 Consuelo 7 disease with tumor approaching the right posterior margin  • New diagnosis of 4.7 cm x 4 cm x 1.6 cm adenocarcinoma of the RUL of the lung  • Nino was last seen on 7/15/2020 for opinion regarding a diagnosis of resected prostate cancer. He was lost to follow-up after that visit. He lives in [place], and his prostate cancer is managed by Dr. Jj with urology at [facility].  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 Russel Santana with a new diagnosis of a 4.7 x 4 x 1.6 cm adenocarcinoma of the RUL of the lung for management.  • Nino is a longtime cigarette smoker of 1 pack/day since age 20. He quit smoking on 12/1/2022 after the diagnosis of lung cancer.  • Nino's PCP, Dr. Flex Sheikh ordered a low-dose screening CT scan of the chest for monitoring because he is a pack a day smoker since 1987  • CT chest without contrast, low-dose protocol at [facility] on 5/19/2022:  4 mm LEFT lower lobe  pulmonary nodule in superior segment   3.3 x 1.3 cm mass like area of pleural thickening in the RIGHT anterior upper chest   Moderate centrilobular and paraseptal emphysema.   Lung-RADS 2S-- Nodules with a very low likelihood of becoming a clinically active cancer due to size or lack of growth.   Continue annual screening with low dose CT in 12 months.   3 month follow-up chest CT recommended to evaluate for stability of RIGHT anterior upper chest masslike pleural thickening  • Dr. Flex French repeated the low-dose CT scan because of the findings in the right anterior upper chest  • CT chest without contrast, low-dose protocol at [facility] on 10/17/2022:  4.7 cm x 4 cm x 1.6 cm abnormal area of pleural thickening in the periphery of the RUL with slightly irregular margins  Moderate paraseptal emphysematous changes with scattered bullae in both lungs  Lung-RADS - 4B  • Initial consult with Dr. Russel Santana at [facility] on 10/24/2022:  • Explained the abnormal CT scan results from May which is done in the [facility] to the patient and also discussed with the radiologist from [facility] in [place] who reviewed the current CT scan of the chest done earlier this month. This shows the right upper lobe pleural-based thickening or mass and left upper lobe nodule. Due to his smoking history the possibility of malignancy cannot be ruled out.  • Discussing different options I ordered a PET scan and a follow-up CT scan of the chest in 3 months time. If the PET scan is positive a CT-guided needle biopsy would be best option for the right upper lobe pleural-based lung mass which is not reachable by bronchoscopy. If PET scan is negative will wait for the neck CT scan of the chest and make further recommendations.   • NM PET/CT SKULL BASE TO MID THIGH at [facility] on 11/1/2022:Comparison: Chest CT 5/19/2022  Background right hepatic lobe metabolic activity measures max SUV 2.7.  4.3 x 1.9 cm RIGHT anterior upper  chest pleural thickening which is hypermetabolic with a maximum SUV of 12.0  1.7 cm LEFT upper abdomen retroperitoneal lymph node hypermetabolicactivity, max SUV 5.3. This nodule/node closely approximates the LEFT adrenal gland.   9 mm retroaortic upper abdominal lymph node is mildly hypermetabolic with a max SUV of 4.0.   7 mm aortocaval lymph node is mildly hypermetabolic with a max SUV of 3.0.  • CT Needle Biopsy Lung at [facility] on 11/16/2022:  Successful CT guided biopsy of the right anterior pleural mass  Final Diagnosis  Right upper chest/pleural mass, core biopsies:  Adenocarcinoma of pulmonary origin.  • [facility] on 11/16/2022:  ALK(D5F3)- NEGATIVE  FISH Analysis ROS1- NEGATIVE  EGFR Exon 18-Not Detected  EGFR Exon 19-Not Detected  EGFR Exon 20 V728P-Ibv Detected  EGFR Exon 20 (other Mutations)-Not Detected  EGFR Exon 21- Not Detected  BRAF Mutation-Not Detected  FISH MET-No evidence of a MET amplification  • Medical oncology consultation requested  • Concern on the work-up is that in addition to the 4.7 cm primary RUL adenocarcinoma of the lung there are upper abdominal retroperitoneal and aortocaval lymph nodes positive on the PET scan.  • If the upper abdominal lymph nodes were not a concern, Lalo could be treated with neoadjuvant Opdivo Alimta Keytruda followed by resection.  • Intense interdepartmental consultations are being undertaken to give Lalo the most aggressive approach possible.  • All of the plans outlined below were discussed at length with Lalo and his wife Joann.  RECOMMEND:  • Guardant 360  • CT scan of the abdomen and pelvis  • Bone scan  • MRI of the abdomen  • Thoracic surgery consultation with Dr. Goldy Carlin  • Call placed and spoke to Dr. Goldy Carlin at length  • Radiation therapy consult for intradepartmental consultation  • Consult surgical group at [facility] for port placement and consideration of a diagnostic laparoscopy for lymph node sampling.  • Follow-up with me upon completion  of scans and for further patient and family conference  • Return for Follow Up with [facility] scans prior.    12/15/2022 - MRI Abdomen with and without contrast:  • No change in borderline prominent 9 mm short axis dimension upper abdominal retroperitoneal lymph node, similar to prior PET/CT were it was hypermetabolic. No new or enlarging lymph nodes in the abdomen.  • Splenomegaly.    12/15/2022 - Bone Scan:  • No evidence of bone metastasis.    12/15/2022 - CT Abdomen/Pelvis without contrast:  • There is abnormal wall thickening of the distal sigmoid colon which does contain multiple diverticuli. Findings may be seen with diverticulitis, however a regional neoplastic process also considered. Follow-up sigmoidoscopy might be considered for further evaluation.  • No morphologically pathologic lymphadenopathy identified. The two hypermetabolic retroperitoneal lymph nodes within the upper abdomen on the PET exam of 11/01/2022 remain similar in size measuring only 7 mm in short axis.    12/22/2022 - Appointment with :  • Note pending    12/22/2022 - Documentation per :  • I spoke with Dr. Tadeo regarding this patient today.  He has a very unusual pattern of malignancy with a right upper lobe peripheral adenocarcinoma which was biopsied with CT-guided biopsy.  He also has intra-abdominal hypermetabolic lymphadenopathy which is clinically suspicious for neoplasm.  • This would be an unusual metastatic site from a primary lung carcinoma.  The patient also has previous history of prostate carcinoma.  • I have discussed this with Dr. Tadeo and I believe if it is possible perhaps robotic biopsy of the periaortic lymphadenopathy could be considered.  We will also discussed with Dr. Goldy Carlin for consideration of definitive treatment of the primary right upper lobe lung tumor.  • We will see the patient in consultation today and coordinate with Dr. Tadeo and other physicians to develop a coordinated  treatment plan.    12/22/2022 - Consult with :  • Following this discussion and in consideration of the diagnostic data/evaluation of the patient, I recommended referral to CT surgery for surgical considerations of the lung nodule. He has been referred to Dr. Carlin. He has also been referred to general surgery for biopsy considerations of the intra-abdominal lymph nodes.    • Continue ongoing management per primary care physician and other specialists. Thank you for allowing me to assist in this patients care.   Plan:  • See Dr Carlin and Dr. Bonilla    12/22/2022 - Documentation per :  • I spoke with Dr. Carlin this afternoon and he has had a chance to review the radiographs on this patient.  He believes that there is likely chest wall invasion and recommends neoadjuvant chemoradiation prior to definitive surgery of the right upper lobe tumor.  • I believe we can treat this lesion with tangential portals and avoid any significant dose to the bronchus, thereby avoiding any potential issues with healing.  • I would anticipate a dose of 5040 cGy in 28 treatment fractions to the lung lesion as neoadjuvant treatment along with concomitant chemotherapy and/or immunotherapy.  • In addition, we will determine if Dr. Kim Bonilla is able to sample the upper abdominal lymph nodes to determine the etiology of that finding on the PET scan.    12/23/2022 - Telephone encounter with :  • I spoke with Dr. Tadeo today and reviewed my conversation with Dr. Carlin from last night.  • We will plan to proceed with neoadjuvant chemoradiation to the lung lesion.  This will be somewhat dependent upon the potential for Dr. Bonilla to biopsy the upper abdominal lymph nodes.  • However, I do believe we will be able to treat the chest wall lesion for surgical resection and if necessary we can come back and treat mediastinal and upper abdominal lymph nodes if we have evidence of metastatic disease.  • We will plan  to see the patient back on Tuesday, December 27 at 11:00 for simulation.    12/27/2022 - Appointment with :  • He met with Dr. Carlin for surgical consirations of the right upper lobe lesion. He believes that there is likely chest wall invasion and recommends neoadjuvant chemoradiation prior to definitive surgery of the right upper lobe tumor. I anticipate a dose of 5040 cGy in 28 treatment fractions to the lung lesion as neoadjuvant treatment along with concomitant chemotherapy and/or immunotherapy.  • In addition, we will determine if Dr. Bonilla is able to sample the upper abdominal lymph nodes to determine the etiology of that finding on the PET scan. He is scheduled to see her later this week.    01/01/2023 - Documentation per :  • I presented this patient at tumor conference.  Dr. Kim Bonilla was in attendance unfortunately she is not able to biopsy the upper abdominal lymph nodes due to the proximity to adjacent aorta and superior mesenteric artery.  • I also reviewed the recommendation of Dr. Goldy Carlin for neoadjuvant chemoradiation followed by definitive surgery for the right upper lobe lung tumor.  • Discussion at tumor conference surrounded management of the upper abdominal lymph nodes.  The consensus was at this time to proceed with treatment of the primary known adenocarcinoma lung and base further treatment recommendations upon findings at definitive surgery.  • Abdominal lymph nodes serial radiographs is recommended for follow-up.  It was agreed that this is an unlikely metastatic distribution for right upper lobe lung carcinoma in the lymph nodes may represent another etiology.  • I will coordinate with Dr. Tadeo for concomitant neoadjuvant chemoradiation of the right upper lobe lung tumor followed by definitive surgery per Dr. Goldy Carlin.    01/04/2023 - 02/14/2023 - Completed radiation course:  • Received 5040 cGy in 28 fractions to the right lung via external beam  radiation therapy.    03/20/2023 - CT Abdomen/Pelvis with contrast:  • Interval resolution of retroperitoneal lymphadenopathy. No evidence of metastatic disease in the abdomen/pelvis.  • Heavy sigmoid diverticulosis, without definite evidence of diverticulitis. There is some residual sigmoid colon wall thickening, the degree of which has significantly decreased from the prior exam.    03/20/2023 - CT Chest with contrast:  • Interval significant decrease in size and volume of the biopsy proven pleural malignancy in the right upper lobe, the residual soft tissue component has a maximum diameter of 8.5 mm, compared with 2.8 cm on previous PET/CT. However, there are new spiculated nodules in the right upper lobe, the largest measuring 1.6 cm, some of these areas of nodularity are ill-defined and seen at the branch points of the bronchial tree, could potentially be infectious or inflammatory. The main concern with this appearance is for intrapulmonary metastases however. Repeat chest CT is recommended in 3 months, follow-up PET/CT would be appropriate if these new spiculated areas persist or increase in size.  • No evidence of intrathoracic lymphadenopathy, no left-sided pulmonary nodule or lung mass. Stable changes of paraseptal emphysema in both lungs.    03/27/2023 - PET Scan:  • Previously irradiated right chest wall mass demonstrates much less tracer avidity on today's exam, SUV max 3.1 as compared to 11.4 on the earlier PET scan from November 2022, indicating a positive treatment response.  • There are new patchy airspace opacities identified more centrally in the right upper lobe, some of which demonstrates PET uptake (SUV max 3.2), suspected radiation pneumonitis.  • No scintigraphic evidence of sav or distant metastatic disease.    04/14/2023 - CT Chest without contrast:  • Mildly decreased size of RIGHT anterior pleural thickening now 6 mm, previously 8.5 cm.  • Decreased size of RIGHT upper lobe pulmonary  nodule now 12 mm, previously 16 mm on 03/20/2023.  • Similar patchy consolidative opacities at the RIGHT upper lobe, which may represent postradiation treatment changes.  • Similar emphysematous changes.    05/04/2023 - Right thoracotomy with right upper lobectomy and mediastinal lymph node dissection by Dr. Winslow Carlin:  • Right chest wall margin, excision:  o Benign adipose tissue and skeletal muscle.  No malignancy is identified.  • Right chest wall margin #2, excision:  o Benign skeletal muscle and fibroconnective tissue.  o No malignancy is identified.  • Right chest wall margin #3, excision:  o Benign skeletal muscle and fibroconnective tissue.  o No malignancy identified.  • Right upper lobe of lung, lobectomy:  o Previous invasive adenocarcinoma of pulmonary origin ([id number]).  o Inflammatory pseudotumor.  o Residual tumor is not present.  o The surgical margins are free of tumor.  o 7 benign perihilar and intraparenchymal lymph nodes.  • Level 10 lymph node, excision:  o 1 benign lymph node with anthracotic pigment.  o No malignancy is identified.  • Level 10 lymph node, excision:  o 1 benign lymph node with anthracotic pigment  o No malignancy identified.  • Level 4R lymph node, excision:  o No malignancy identified.  o 1 benign lymph node.  • Level 7 lymph node, excision:  o No malignancy identified.  o 1 benign lymph node with anthracotic pigment.  • Level 10 lymph node, excision:  o No malignancy identified.  o 1 benign lymph node with anthracotic pigment.  • Level 2R lymph node, excision:  o No malignancy identified.  o 1 benign lymph node.    History obtained from  PATIENT and CHART    PAST MEDICAL HISTORY  Past Medical History:   Diagnosis Date   • Abnormal PET scan of lung     Nov 2022   • Allergic rhinitis    • Arthritis    • Bronchiectasis    • Bronchitis    • Cancer     prostate   • Chronic bronchitis    • Chronic pain    • GERD (gastroesophageal reflux disease)    • Lung cancer    • Pneumonia     • Prostatitis, acute    • S/P ACL reconstruction    • Septic shock due to urinary tract infection    • Sinusitis    • Strep throat    • UTI (urinary tract infection)       PAST SURGICAL HISTORY  Past Surgical History:   Procedure Laterality Date   • ANKLE SURGERY Right    • BACK SURGERY      X2   • ENDOSCOPY N/A 01/10/2022    Procedure: ESOPHAGOGASTRODUODENOSCOPY WITH ANESTHESIA;  Surgeon: Tucker Bright MD;  Location:  [facility] OR;  Service: Gastroenterology;  Laterality: N/A;  pre food bolus  post food bolus  DrYandy   • KNEE ACL RECONSTRUCTION Left    • NASAL SEPTUM SURGERY     • PENILE PROSTHESIS IMPLANT N/A 03/15/2022    Procedure: 3-PIECE INFLATABLE PENILE PROSTHESIS PLACEMENT;  Surgeon: Trae Clark MD;  Location:  [facility] OR;  Service: Urology;  Laterality: N/A;   • PROSTATE ULTRASOUND BIOPSY N/A 02/18/2020    Procedure: PROSTATE  BIOPSY;  Surgeon: Trae Clrak MD;  Location:  [facility] OR;  Service: Urology;  Laterality: N/A;   • PROSTATECTOMY N/A 09/22/2020    Procedure: RADICAL PROSTATECTOMY LAPAROSCOPIC WITH DAVINCI ROBOT;  Surgeon: Nuno Jj MD;  Location:  [facility] OR;  Service: DaVinci;  Laterality: N/A;   • SHOULDER SURGERY Left     X 4   • TENNIS ELBOW RELEASE Bilateral 04/14/2011   • THORACOTOMY Right 5/4/2023    Procedure: RIGHT THORACOTOMY WITH CHEST WALL RESECTION, MEDIASTINAL LYMPH NODE DISSECTION, RIGHT UPPER LOBECTOMY;  Surgeon: Goldy Carlin MD;  Location:  [facility] OR;  Service: Cardiothoracic;  Laterality: Right;   • VENOUS ACCESS DEVICE (PORT) INSERTION N/A 12/30/2022    Procedure: Single Lumen Port-a-cath insertion with flouroscopy;  Surgeon: Kim Bonilla MD;  Location:  [facility] OR;  Service: General;  Laterality: N/A;      FAMILY HISTORY  family history includes Cancer in his mother.     SOCIAL HISTORY  Social History     Tobacco Use   • Smoking status: Former     Packs/day: 1.00     Years: 35.00     Pack years: 35.00     Types: Cigarettes     Start date: 2/7/1987      Quit date: 2022     Years since quittin.4   • Smokeless tobacco: Former     Types: Snuff     Quit date:    Vaping Use   • Vaping Use: Former   Substance Use Topics   • Alcohol use: No     Comment: 0   • Drug use: Not Currently     Frequency: 7.0 times per week     Types: Hydrocodone, Marijuana, Oxycodone     Comment: Edible gummies - 1 per day      Patient has no known allergies.     MEDICATIONS  Current Outpatient Medications   Medication Sig Dispense Refill   • acetaminophen (Tylenol) 325 MG tablet Take 3 tablets by mouth Every 8 (Eight) Hours. Take every 8 hours for 3 days then take prn as needed. 100 tablet 2   • albuterol sulfate  (90 Base) MCG/ACT inhaler Inhale 2 puffs Every 4 (Four) Hours As Needed for Wheezing or Shortness of Air. 6.7 g 5   • amiodarone (PACERONE) 200 MG tablet Take 2 tablets by mouth Daily. 14 tablet 0   • Azelastine HCl 137 MCG/SPRAY solution 2 sprays into the nostril(s) as directed by provider 2 (Two) Times a Day. 30 mL 5   • cetirizine (zyrTEC) 10 MG tablet Take 1 tablet by mouth Daily. 90 tablet 3   • docusate sodium 100 MG capsule Take 1 capsule by mouth 2 (Two) Times a Day. 20 capsule 0   • fluticasone (FLONASE) 50 MCG/ACT nasal spray 2 sprays into the nostril(s) as directed by provider Daily As Needed for Rhinitis or Allergies.     • Fluticasone-Salmeterol (ADVAIR/WIXELA) 250-50 MCG/ACT DISKUS Inhale 1 puff 2 (Two) Times a Day.     • folic acid (FOLVITE) 1 MG tablet Take 1 tablet by mouth Daily.     • gabapentin (NEURONTIN) 800 MG tablet Take 1 tablet by mouth 3 (Three) Times a Day.     • hydrOXYzine pamoate (VISTARIL) 50 MG capsule Take 1 capsule by mouth Daily.     • ibuprofen (ADVIL,MOTRIN) 800 MG tablet Take 1 tablet by mouth Every 8 (Eight) Hours As Needed for Mild Pain.     • metoclopramide (REGLAN) 5 MG tablet Take 1 tablet by mouth 3 (Three) Times a Day As Needed (N/V).     • metoprolol tartrate (LOPRESSOR) 25 MG tablet Take 0.5 tablets by mouth Every 12  "(Twelve) Hours. 30 tablet 0   • montelukast (SINGULAIR) 10 MG tablet Take 1 tablet by mouth Every Night. 90 tablet 3   • multivitamin with minerals tablet tablet Take 1 tablet by mouth Daily.     • NON FORMULARY Take 1 tablet by mouth Daily. Marijuana Gummies     • oxyCODONE-acetaminophen (Percocet) 5-325 MG per tablet Take 1 tablet by mouth Every 6 (Six) Hours As Needed for Moderate Pain. 25 tablet 0     No current facility-administered medications for this visit.      Current outpatient and discharge medications have been reconciled for the patient.  Reviewed by: MADDY Donnelly    The following portions of the patient's history were reviewed and updated as appropriate: allergies, current medications, past family history, past medical history, past social history, past surgical history and problem list.    REVIEW OF SYSTEMS  Review of Systems   Constitutional: Positive for fatigue. Negative for activity change.   HENT: Negative.    Respiratory: Positive for shortness of breath. Negative for cough.    Cardiovascular: Negative.  Negative for chest pain.   Gastrointestinal: Negative.    Genitourinary: Negative.    Musculoskeletal: Negative.    Skin: Negative.    Neurological: Negative.  Negative for dizziness.   Hematological: Negative.  Negative for adenopathy.   Psychiatric/Behavioral: Negative.    All other systems reviewed and are negative.     PHYSICAL EXAM  VITAL SIGNS:   Vitals:    05/15/23 0944   BP: 100/68   Pulse: 72   SpO2: 97%  Comment: room air   Weight: 103 kg (226 lb)   Height: 187 cm (73.62\")   PainSc:   6   PainLoc: Chest  Comment: right, post-op      Physical Exam  Vitals reviewed.   Constitutional:       Appearance: Normal appearance.   HENT:      Head: Normocephalic.   Cardiovascular:      Rate and Rhythm: Normal rate and regular rhythm.      Pulses: Normal pulses.      Heart sounds: Normal heart sounds.   Pulmonary:      Effort: Pulmonary effort is normal. No respiratory distress.      " Breath sounds: Normal breath sounds.   Abdominal:      General: Bowel sounds are normal.   Musculoskeletal:         General: Normal range of motion.      Cervical back: Normal range of motion and neck supple.   Skin:     General: Skin is warm.      Capillary Refill: Capillary refill takes less than 2 seconds.   Neurological:      General: No focal deficit present.      Mental Status: He is alert and oriented to person, place, and time.   Psychiatric:         Mood and Affect: Mood normal.         Behavior: Behavior normal.         Performance Status: ECOG (0) Fully active, able to carry on all predisease performance without restriction    Clinical Quality Measures  -Pain Documented  Lalo Jones reports a pain score of 6. Given his pain assessment as noted, treatment options were discussed and the following options were decided upon as a follow-up plan to address the patient's pain: continuation of current treatment plan for pain and use of non-medical modalities (ice, heat, stretching and/or behavior modifications)    Pain Medications             acetaminophen (Tylenol) 325 MG tablet Take 3 tablets by mouth Every 8 (Eight) Hours. Take every 8 hours for 3 days then take prn as needed.    gabapentin (NEURONTIN) 800 MG tablet Take 1 tablet by mouth 3 (Three) Times a Day.    ibuprofen (ADVIL,MOTRIN) 800 MG tablet Take 1 tablet by mouth Every 8 (Eight) Hours As Needed for Mild Pain.    oxyCODONE-acetaminophen (Percocet) 5-325 MG per tablet Take 1 tablet by mouth Every 6 (Six) Hours As Needed for Moderate Pain.        -Advanced Care Planning Advance Care Planning   ACP discussion was held with the patient during this visit. Patient does not have an advance directive, information provided.    -Body Mass Index Screening and Follow-Up Plan  BMI is >= 25 and <30. (Overweight) The following options were offered after discussion;: none (medical contraindication)    -Tobacco Use: Screening and Cessation Intervention  Social History    Tobacco Use      Smoking status: Former        Packs/day: 1.00        Years: 35.00        Pack years: 35        Types: Cigarettes        Start date: 1987        Quit date: 2022        Years since quittin.4      Smokeless tobacco: Former        Types: Snuff        Quit date:     ASSESSMENT AND PLAN  1. Malignant neoplasm of right lung, unspecified part of lung    2. Chronic obstructive pulmonary disease, unspecified COPD type    3. History of radiation therapy    4. Former smoker      RECOMMENDATIONS:  Lalo Jones is status post completion of radiation therapy to the lung and presents to our clinic today for surveillance exam and to review imaging. Diagnosed with right upper lobe of the lung. He completed 5040 cGy in 28 fractions to the right lung on 2023.     Underwent right thoracotomy with right upper lobectomy and mediastinal lymph node dissection per Dr. Goldy Carlin on 2023 revealing no malignancy identified.    On exam, I do not see evidence for recurrent or metastatic disease at this time. He is scheduled for postoperative follow up with CT surgery later this afternoon. We will continue routine follow-up/surveillance as discussed in 6 months with follow up CT scan before visit and I have instructed him to continue to see the other health care providers as per their scheduling.    Todays appointment time was spent in counseling, coordination of care and surveillance related to patients diagnosis as well as radiation therapy possible and probable after effects.   Naif Jones, APRN  05/15/2023

## 2023-05-15 ENCOUNTER — OFFICE VISIT (OUTPATIENT)
Dept: RADIATION ONCOLOGY | Facility: HOSPITAL | Age: 58
End: 2023-05-15
Payer: MEDICAID

## 2023-05-15 ENCOUNTER — HOSPITAL ENCOUNTER (OUTPATIENT)
Dept: GENERAL RADIOLOGY | Facility: HOSPITAL | Age: 58
Discharge: HOME | End: 2023-05-15
Admitting: NURSE PRACTITIONER
Payer: MEDICAID

## 2023-05-15 ENCOUNTER — OFFICE VISIT (OUTPATIENT)
Dept: CARDIAC SURGERY | Facility: CLINIC | Age: 58
End: 2023-05-15
Payer: MEDICAID

## 2023-05-15 VITALS
SYSTOLIC BLOOD PRESSURE: 100 MMHG | OXYGEN SATURATION: 97 % | DIASTOLIC BLOOD PRESSURE: 68 MMHG | WEIGHT: 226 LBS | HEIGHT: 74 IN | BODY MASS INDEX: 29 KG/M2 | HEART RATE: 72 BPM

## 2023-05-15 VITALS
WEIGHT: 226 LBS | HEIGHT: 74 IN | OXYGEN SATURATION: 95 % | HEART RATE: 70 BPM | DIASTOLIC BLOOD PRESSURE: 64 MMHG | BODY MASS INDEX: 29 KG/M2 | SYSTOLIC BLOOD PRESSURE: 96 MMHG

## 2023-05-15 DIAGNOSIS — J44.9 CHRONIC OBSTRUCTIVE PULMONARY DISEASE, UNSPECIFIED COPD TYPE: ICD-10-CM

## 2023-05-15 DIAGNOSIS — Z92.3 HISTORY OF RADIATION THERAPY: ICD-10-CM

## 2023-05-15 DIAGNOSIS — C34.11 MALIGNANT NEOPLASM OF UPPER LOBE OF RIGHT LUNG: Primary | ICD-10-CM

## 2023-05-15 DIAGNOSIS — C34.11 MALIGNANT NEOPLASM OF UPPER LOBE OF RIGHT LUNG: ICD-10-CM

## 2023-05-15 DIAGNOSIS — C34.91 MALIGNANT NEOPLASM OF RIGHT LUNG, UNSPECIFIED PART OF LUNG: Primary | ICD-10-CM

## 2023-05-15 DIAGNOSIS — Z87.891 FORMER SMOKER: ICD-10-CM

## 2023-05-15 PROCEDURE — 99024 POSTOP FOLLOW-UP VISIT: CPT | Performed by: NURSE PRACTITIONER

## 2023-05-15 PROCEDURE — 71046 X-RAY EXAM CHEST 2 VIEWS: CPT

## 2023-05-15 PROCEDURE — G0463 HOSPITAL OUTPT CLINIC VISIT: HCPCS | Performed by: RADIOLOGY

## 2023-05-15 RX ORDER — PREGABALIN 25 MG/1
25 CAPSULE ORAL 2 TIMES DAILY PRN
Qty: 14 CAPSULE | Refills: 0 | Status: SHIPPED | OUTPATIENT
Start: 2023-05-15 | End: 2023-05-22

## 2023-05-23 NOTE — PROGRESS NOTES
disease identified    I (Parris Singletary) and Dr. Carlene Richter have both discussed Mr. Jessica's case with Dr. Shani Nava on multiple occasions. He has participated in the decision-making and treatment plan and recommendations moving forward. Multidisciplinary interdepartmental consultations were obtained with radiation oncology, thoracic surgery and general surgery for opinion regarding robotic abdominal biopsy approach for lymph node tissue sampling leading to neoadjuvant chemoimmunotherapy delivered concurrently with XRT. Neoadjuvant concurrent XRT (with chemoimmunotherapy) initiated on 1/4/2023. 5040 cGy planned in 28 treatment fractions. Neoadjuvant cisplatin 75 mg/m2, Alimta 500 mg/m2 and Opdivo 360 mg, cycle #1 initiated on 1/5/2023   Cycle #3 delivered on 2/16/2023. Post neoadjuvant CT scan of the chest abdomen and pelvis is scheduled for 3/20/2023 and a PET scan scheduled for 3/27/2023. CT CHEST W CONTRAST AT [facility] on 03/20/2023 reported:   1.6 cm new spiculated noncalcified nodule in the right upper lobe, suspicious for neoplasm  8.4 mm soft tissue nodule right upper lobe  12.9 mm peribronchiolar nodule in the right upper lobe  8.5 mm pleural-based mass seen in the right upper lobe, maximum thickness a diameter of 2.8 cm  mild dependent atelectasis in both lung bases  The upper abdomen appears unremarkable, no adrenal mass is identified. bone windows shows no evidence of osseous metastases. CT ABDOMEN PELVIS W CONTRAST AT [facility] on 03/30/2023 reported:   Small RIGHT peripheral liver cyst   No suspicious liver lesion. Pancreas and adrenal glands are unremarkable  14.3 cm mildly enlarged spleen   No solid renal mass      NM PET/CT SKULL BASE TO MID THIGH AT [facility] 0327/2023 reported:   right pleural mass has decreased in terms of its tracer avidity, SUV max 3.1 (previously 1.4).    3.2 SUV MAX  right upper lobe patchy airspace disease with low-level level uptake, NEW  No scintigraphic evidence of dav

## 2023-05-24 ENCOUNTER — OFFICE VISIT (OUTPATIENT)
Dept: HEMATOLOGY | Age: 58
End: 2023-05-24
Payer: MEDICAID

## 2023-05-24 ENCOUNTER — HOSPITAL ENCOUNTER (OUTPATIENT)
Dept: INFUSION THERAPY | Age: 58
Discharge: HOME | End: 2023-05-24
Payer: MEDICAID

## 2023-05-24 VITALS
HEART RATE: 105 BPM | BODY MASS INDEX: 28.9 KG/M2 | SYSTOLIC BLOOD PRESSURE: 120 MMHG | DIASTOLIC BLOOD PRESSURE: 72 MMHG | OXYGEN SATURATION: 97 % | WEIGHT: 225.1 LBS

## 2023-05-24 DIAGNOSIS — C34.91 ADENOCARCINOMA OF RIGHT LUNG (HCC): Primary | ICD-10-CM

## 2023-05-24 DIAGNOSIS — Z98.890 STATUS POST THORACOTOMY: ICD-10-CM

## 2023-05-24 DIAGNOSIS — Z85.46 HISTORY OF PROSTATE CANCER: ICD-10-CM

## 2023-05-24 DIAGNOSIS — Z90.89 S/P LOBECTOMY OF BRAIN: ICD-10-CM

## 2023-05-24 DIAGNOSIS — Z71.2 ENCOUNTER TO DISCUSS TEST RESULTS: ICD-10-CM

## 2023-05-24 DIAGNOSIS — Z00.00 HEALTH CARE MAINTENANCE: ICD-10-CM

## 2023-05-24 PROCEDURE — 99214 OFFICE O/P EST MOD 30 MIN: CPT | Performed by: INTERNAL MEDICINE

## 2023-05-24 PROCEDURE — 99212 OFFICE O/P EST SF 10 MIN: CPT

## 2023-05-24 NOTE — PROGRESS NOTES
S/p Neoadjuvant concurrent XRT with chemoimmunotherapy (see note)  S/p RUL lobectomy 5/4/2023.     Adjuvant Keytruda to begin as recommended by Dr Ange Pop, per NCCN guidelines:

## 2023-06-01 DIAGNOSIS — C34.91 ADENOCARCINOMA OF RIGHT LUNG (HCC): Primary | ICD-10-CM

## 2023-06-01 NOTE — PROGRESS NOTES
Referral to financial navigator requesting assistance with coverage for treatment. S/p Neoadjuvant concurrent XRT with chemoimmunotherapy (see note)  S/p RUL lobectomy 5/4/2023. Adjuvant Keytruda to begin as recommended by Dr Derrek Nye rec'd from [address]  dated 5/26/23. (See attached)

## 2023-06-08 ENCOUNTER — TELEPHONE (OUTPATIENT)
Dept: CARDIAC SURGERY | Facility: CLINIC | Age: 58
End: 2023-06-08
Payer: MEDICAID

## 2023-06-08 DIAGNOSIS — C34.11 MALIGNANT NEOPLASM OF UPPER LOBE OF RIGHT LUNG: Primary | ICD-10-CM

## 2023-06-08 NOTE — TELEPHONE ENCOUNTER
Caller: Laol Jones    Relationship: Self    Best call back number: [phone]    What is the best time to reach you: ANY    Who are you requesting to speak with (clinical staff, provider,  specific staff member): CLINICAL STAFF    Do you know the name of the person who called: PT    What was the call regarding: PT IS WANTING TO KNOW IF HE CAN BE RELEASED TO WORK.    Is it okay if the provider responds through MyChart: NO

## 2023-06-09 NOTE — TELEPHONE ENCOUNTER
Per Minnie SHARP/ Carlin - patient okay to return to work light duty as of 6/13/23. Letter completed and will be left at  for patient to  on 6/12/23.

## 2023-06-12 ENCOUNTER — HOSPITAL ENCOUNTER (OUTPATIENT)
Dept: INFUSION THERAPY | Age: 58
Discharge: HOME | End: 2023-06-12
Payer: MEDICAID

## 2023-06-12 ENCOUNTER — HOSPITAL ENCOUNTER (OUTPATIENT)
Dept: GENERAL RADIOLOGY | Facility: HOSPITAL | Age: 58
Discharge: HOME | End: 2023-06-12
Payer: MEDICAID

## 2023-06-12 DIAGNOSIS — C34.11 MALIGNANT NEOPLASM OF UPPER LOBE OF RIGHT LUNG: ICD-10-CM

## 2023-06-12 DIAGNOSIS — C34.91 ADENOCARCINOMA, LUNG, RIGHT (HCC): ICD-10-CM

## 2023-06-12 DIAGNOSIS — C34.11 PRIMARY ADENOCARCINOMA OF UPPER LOBE OF RIGHT LUNG (HCC): Primary | ICD-10-CM

## 2023-06-12 DIAGNOSIS — Z90.89 S/P LOBECTOMY OF BRAIN: ICD-10-CM

## 2023-06-12 LAB
ALBUMIN SERPL-MCNC: 4.1 G/DL (ref 3.5–5.2)
ALP SERPL-CCNC: 117 U/L (ref 40–130)
ALT SERPL-CCNC: 25 U/L (ref 21–72)
ANION GAP SERPL CALCULATED.3IONS-SCNC: 10 MMOL/L (ref 7–19)
AST SERPL-CCNC: 24 U/L (ref 17–59)
BILIRUB SERPL-MCNC: 0.3 MG/DL (ref 0.2–1.3)
BUN SERPL-MCNC: 20 MG/DL (ref 9–20)
CALCIUM SERPL-MCNC: 9.9 MG/DL (ref 8.4–10.2)
CHLORIDE SERPL-SCNC: 108 MMOL/L (ref 98–111)
CO2 SERPL-SCNC: 24 MMOL/L (ref 22–29)
CORTIS SERPL-MCNC: 6.3 UG/DL
CREAT SERPL-MCNC: 1.1 MG/DL (ref 0.6–1.2)
ERYTHROCYTE [DISTWIDTH] IN BLOOD BY AUTOMATED COUNT: 13.2 % (ref 11.6–14.4)
GLOBULIN: 2.8 G/DL
GLUCOSE SERPL-MCNC: 88 MG/DL (ref 74–106)
HBV SURFACE AB SERPL IA-ACNC: NORMAL M[IU]/ML
HBV SURFACE AG SERPL QL IA: NORMAL
HCT VFR BLD AUTO: 41.1 % (ref 40.1–51)
HGB BLD-MCNC: 13.4 G/DL (ref 13.7–17.5)
LYMPHOCYTES # BLD: 1.2 K/UL (ref 1.18–3.74)
LYMPHOCYTES NFR BLD: 27 % (ref 19.3–53.1)
MCH RBC QN AUTO: 29.7 PG (ref 25.7–32.2)
MCHC RBC AUTO-ENTMCNC: 32.6 G/DL (ref 32.3–36.5)
MCV RBC AUTO: 91.1 FL (ref 79–92.2)
MONOCYTES # BLD: 0.43 K/UL (ref 0.24–0.82)
MONOCYTES NFR BLD: 9.7 % (ref 4.7–12.5)
NEUTROPHILS # BLD: 2.51 K/UL (ref 1.56–6.13)
NEUTS SEG NFR BLD: 56.4 % (ref 34–71.1)
PLATELET # BLD AUTO: 152 K/UL (ref 163–337)
PMV BLD AUTO: 8.7 FL (ref 7.4–10.4)
POTASSIUM SERPL-SCNC: 4.6 MMOL/L (ref 3.5–5.1)
PROT SERPL-MCNC: 6.9 G/DL (ref 6.3–8.2)
RBC # BLD AUTO: 4.51 M/UL (ref 4.63–6.08)
SODIUM SERPL-SCNC: 142 MMOL/L (ref 137–145)
TSH SERPL DL<=0.005 MIU/L-ACNC: 4.77 UIU/ML (ref 0.27–4.2)
WBC # BLD AUTO: 4.45 K/UL (ref 4.23–9.07)

## 2023-06-12 PROCEDURE — 6360000002 HC RX W HCPCS: Performed by: INTERNAL MEDICINE

## 2023-06-12 PROCEDURE — 2580000003 HC RX 258: Performed by: INTERNAL MEDICINE

## 2023-06-12 PROCEDURE — 80053 COMPREHEN METABOLIC PANEL: CPT

## 2023-06-12 PROCEDURE — 36415 COLL VENOUS BLD VENIPUNCTURE: CPT

## 2023-06-12 PROCEDURE — 71046 X-RAY EXAM CHEST 2 VIEWS: CPT

## 2023-06-12 PROCEDURE — 96413 CHEMO IV INFUSION 1 HR: CPT

## 2023-06-12 PROCEDURE — 85025 COMPLETE CBC W/AUTO DIFF WBC: CPT

## 2023-06-12 RX ORDER — HEPARIN SODIUM 100 [USP'U]/ML
500 INJECTION, SOLUTION INTRAVENOUS PRN
Status: DISCONTINUED | OUTPATIENT
Start: 2023-06-12 | End: 2023-06-13 | Stop reason: HOSPADM

## 2023-06-12 RX ORDER — SODIUM CHLORIDE 0.9 % (FLUSH) 0.9 %
5-40 SYRINGE (ML) INJECTION PRN
Status: DISCONTINUED | OUTPATIENT
Start: 2023-06-12 | End: 2023-06-13 | Stop reason: HOSPADM

## 2023-06-12 RX ADMIN — SODIUM CHLORIDE, PRESERVATIVE FREE 10 ML: 5 INJECTION INTRAVENOUS at 13:24

## 2023-06-12 RX ADMIN — HEPARIN 500 UNITS: 100 SYRINGE at 13:24

## 2023-06-12 RX ADMIN — SODIUM CHLORIDE 200 MG: 9 INJECTION, SOLUTION INTRAVENOUS at 12:54

## 2023-06-14 LAB — HBV CORE AB SERPL QL IA: NEGATIVE

## 2023-06-30 RX ORDER — FOLIC ACID 1 MG/1
1 TABLET ORAL DAILY
Qty: 30 TABLET | Refills: 5 | Status: SHIPPED | OUTPATIENT
Start: 2023-06-30

## 2023-07-03 ENCOUNTER — HOSPITAL ENCOUNTER (OUTPATIENT)
Dept: INFUSION THERAPY | Age: 58
Discharge: HOME | End: 2023-07-03
Payer: MEDICAID

## 2023-07-03 VITALS
BODY MASS INDEX: 29.85 KG/M2 | DIASTOLIC BLOOD PRESSURE: 85 MMHG | TEMPERATURE: 97.9 F | OXYGEN SATURATION: 100 % | HEIGHT: 74 IN | SYSTOLIC BLOOD PRESSURE: 134 MMHG | RESPIRATION RATE: 16 BRPM | HEART RATE: 61 BPM | WEIGHT: 232.6 LBS

## 2023-07-03 DIAGNOSIS — C34.11 PRIMARY ADENOCARCINOMA OF UPPER LOBE OF RIGHT LUNG (HCC): Primary | ICD-10-CM

## 2023-07-03 DIAGNOSIS — C34.91 ADENOCARCINOMA, LUNG, RIGHT (HCC): ICD-10-CM

## 2023-07-03 DIAGNOSIS — Z90.89 S/P LOBECTOMY OF BRAIN: ICD-10-CM

## 2023-07-03 DIAGNOSIS — Z51.12 ENCOUNTER FOR ANTINEOPLASTIC IMMUNOTHERAPY: ICD-10-CM

## 2023-07-03 DIAGNOSIS — C34.11 PRIMARY ADENOCARCINOMA OF UPPER LOBE OF RIGHT LUNG (HCC): ICD-10-CM

## 2023-07-03 DIAGNOSIS — R53.83 FATIGUE DUE TO TREATMENT: ICD-10-CM

## 2023-07-03 LAB
ALBUMIN SERPL-MCNC: 3.6 G/DL (ref 3.5–5.2)
ALP SERPL-CCNC: 92 U/L (ref 40–130)
ALT SERPL-CCNC: 26 U/L (ref 21–72)
ANION GAP SERPL CALCULATED.3IONS-SCNC: 8 MMOL/L (ref 7–19)
AST SERPL-CCNC: 24 U/L (ref 17–59)
BILIRUB SERPL-MCNC: 0.3 MG/DL (ref 0.2–1.3)
BUN SERPL-MCNC: 19 MG/DL (ref 9–20)
CALCIUM SERPL-MCNC: 9.1 MG/DL (ref 8.4–10.2)
CHLORIDE SERPL-SCNC: 109 MMOL/L (ref 98–111)
CO2 SERPL-SCNC: 25 MMOL/L (ref 22–29)
CORTIS AM PEAK SERPL-MCNC: 10.7 UG/DL (ref 6.2–19.4)
CREAT SERPL-MCNC: 1 MG/DL (ref 0.6–1.2)
ERYTHROCYTE [DISTWIDTH] IN BLOOD BY AUTOMATED COUNT: 13.9 % (ref 11.6–14.4)
GLOBULIN: 2.5 G/DL
GLUCOSE SERPL-MCNC: 93 MG/DL (ref 74–106)
HCT VFR BLD AUTO: 38.3 % (ref 40.1–51)
HGB BLD-MCNC: 12.4 G/DL (ref 13.7–17.5)
LYMPHOCYTES # BLD: 0.66 K/UL (ref 1.18–3.74)
LYMPHOCYTES NFR BLD: 20.6 % (ref 19.3–53.1)
MAGNESIUM SERPL-MCNC: 1.7 MG/DL (ref 1.6–2.3)
MCH RBC QN AUTO: 29.2 PG (ref 25.7–32.2)
MCHC RBC AUTO-ENTMCNC: 32.4 G/DL (ref 32.3–36.5)
MCV RBC AUTO: 90.3 FL (ref 79–92.2)
MONOCYTES # BLD: 0.35 K/UL (ref 0.24–0.82)
MONOCYTES NFR BLD: 10.9 % (ref 4.7–12.5)
NEUTROPHILS # BLD: 1.93 K/UL (ref 1.56–6.13)
NEUTS SEG NFR BLD: 60.2 % (ref 34–71.1)
PHOSPHATE SERPL-MCNC: 3.1 MG/DL (ref 2.5–4.5)
PLATELET # BLD AUTO: 154 K/UL (ref 163–337)
PMV BLD AUTO: 9 FL (ref 7.4–10.4)
POTASSIUM SERPL-SCNC: 5 MMOL/L (ref 3.5–5.1)
PROT SERPL-MCNC: 6.2 G/DL (ref 6.3–8.2)
RBC # BLD AUTO: 4.24 M/UL (ref 4.63–6.08)
SODIUM SERPL-SCNC: 142 MMOL/L (ref 137–145)
TSH SERPL DL<=0.005 MIU/L-ACNC: 4.85 UIU/ML (ref 0.27–4.2)
WBC # BLD AUTO: 3.21 K/UL (ref 4.23–9.07)

## 2023-07-03 PROCEDURE — 36415 COLL VENOUS BLD VENIPUNCTURE: CPT

## 2023-07-03 PROCEDURE — 83735 ASSAY OF MAGNESIUM: CPT

## 2023-07-03 PROCEDURE — 2580000003 HC RX 258: Performed by: PHYSICIAN ASSISTANT

## 2023-07-03 PROCEDURE — 85025 COMPLETE CBC W/AUTO DIFF WBC: CPT

## 2023-07-03 PROCEDURE — 6360000002 HC RX W HCPCS: Performed by: PHYSICIAN ASSISTANT

## 2023-07-03 PROCEDURE — 96413 CHEMO IV INFUSION 1 HR: CPT

## 2023-07-03 PROCEDURE — 80053 COMPREHEN METABOLIC PANEL: CPT

## 2023-07-03 PROCEDURE — 84100 ASSAY OF PHOSPHORUS: CPT

## 2023-07-03 RX ORDER — MEPERIDINE HYDROCHLORIDE 50 MG/ML
12.5 INJECTION INTRAMUSCULAR; INTRAVENOUS; SUBCUTANEOUS PRN
Status: CANCELLED | OUTPATIENT
Start: 2023-07-03

## 2023-07-03 RX ORDER — ALBUTEROL SULFATE 90 UG/1
4 AEROSOL, METERED RESPIRATORY (INHALATION) PRN
Status: CANCELLED | OUTPATIENT
Start: 2023-07-03

## 2023-07-03 RX ORDER — SODIUM CHLORIDE 9 MG/ML
5-250 INJECTION, SOLUTION INTRAVENOUS PRN
Status: CANCELLED | OUTPATIENT
Start: 2023-07-03

## 2023-07-03 RX ORDER — SODIUM CHLORIDE 0.9 % (FLUSH) 0.9 %
5-40 SYRINGE (ML) INJECTION PRN
Status: DISCONTINUED | OUTPATIENT
Start: 2023-07-03 | End: 2023-07-04 | Stop reason: HOSPADM

## 2023-07-03 RX ORDER — HEPARIN SODIUM 100 [USP'U]/ML
500 INJECTION, SOLUTION INTRAVENOUS PRN
Status: DISCONTINUED | OUTPATIENT
Start: 2023-07-03 | End: 2023-07-04 | Stop reason: HOSPADM

## 2023-07-03 RX ORDER — ACETAMINOPHEN 325 MG/1
650 TABLET ORAL
Status: CANCELLED | OUTPATIENT
Start: 2023-07-03

## 2023-07-03 RX ORDER — ONDANSETRON 2 MG/ML
8 INJECTION INTRAMUSCULAR; INTRAVENOUS
Status: CANCELLED | OUTPATIENT
Start: 2023-07-03

## 2023-07-03 RX ORDER — DIPHENHYDRAMINE HYDROCHLORIDE 50 MG/ML
50 INJECTION INTRAMUSCULAR; INTRAVENOUS
Status: CANCELLED | OUTPATIENT
Start: 2023-07-03

## 2023-07-03 RX ORDER — HEPARIN SODIUM (PORCINE) LOCK FLUSH IV SOLN 100 UNIT/ML 100 UNIT/ML
500 SOLUTION INTRAVENOUS PRN
Status: CANCELLED | OUTPATIENT
Start: 2023-07-03

## 2023-07-03 RX ORDER — EPINEPHRINE 1 MG/ML
0.3 INJECTION, SOLUTION, CONCENTRATE INTRAVENOUS PRN
Status: CANCELLED | OUTPATIENT
Start: 2023-07-03

## 2023-07-03 RX ORDER — FAMOTIDINE 10 MG/ML
20 INJECTION, SOLUTION INTRAVENOUS
Status: CANCELLED | OUTPATIENT
Start: 2023-07-03

## 2023-07-03 RX ORDER — SODIUM CHLORIDE 9 MG/ML
INJECTION, SOLUTION INTRAVENOUS CONTINUOUS
Status: CANCELLED | OUTPATIENT
Start: 2023-07-03

## 2023-07-03 RX ORDER — SODIUM CHLORIDE 0.9 % (FLUSH) 0.9 %
5-40 SYRINGE (ML) INJECTION PRN
Status: CANCELLED | OUTPATIENT
Start: 2023-07-03

## 2023-07-03 RX ADMIN — SODIUM CHLORIDE 200 MG: 9 INJECTION, SOLUTION INTRAVENOUS at 10:11

## 2023-07-03 RX ADMIN — HEPARIN 500 UNITS: 100 SYRINGE at 10:44

## 2023-07-03 RX ADMIN — SODIUM CHLORIDE, PRESERVATIVE FREE 10 ML: 5 INJECTION INTRAVENOUS at 10:44

## 2023-07-03 NOTE — PROGRESS NOTES
Pt here for D1 C2 Keytruda 200 mg Q 3 weeks. On arrival to room, pt states over the last 4 months or so, there have been 3-4 episodes where he becomes nauseated & passes out. He states this happened again Saturday, 7/1/23, while lifting the toolbox in his truck, which he describes as quite heavy. He says that on Saturday he vomited & got dizzy, but did not pass out. He states that these episodes seem to be random and has not noticed any pattern Orthostatic VS: Lying /81, HR 63; Sitting /83, HR 64; Standing /90, HR 74. CMP today is unremarkable. Discussed with Mark Villa PA-C, who advises that pt follow up with PCP to get this issue checked out. Will proceed with treatment today. Advised pt to follow up with PCP to further investigate what may be the cause of these episodes; he verbalizes understanding, agrees to contact PCP.

## 2023-07-20 PROBLEM — Z90.2 S/P LOBECTOMY OF LUNG: Status: ACTIVE | Noted: 2023-07-20

## 2023-07-21 NOTE — PROGRESS NOTES
Occurrences:   1     Standing Expiration Date:   7/21/2024    TSH     Standing Status:   Future     Number of Occurrences:   1     Standing Expiration Date:   7/21/2024    Cortisol AM, Total     Standing Status:   Future     Number of Occurrences:   1     Standing Expiration Date:   7/21/2024       No orders of the defined types were placed in this encounter. Return in about 6 weeks (around 9/5/2023) for treatment and see Dr. Katie Terrazas.

## 2023-07-24 ENCOUNTER — OFFICE VISIT (OUTPATIENT)
Dept: HEMATOLOGY | Age: 58
End: 2023-07-24
Payer: MEDICAID

## 2023-07-24 ENCOUNTER — HOSPITAL ENCOUNTER (OUTPATIENT)
Dept: INFUSION THERAPY | Age: 58
Discharge: HOME | End: 2023-07-24
Payer: MEDICAID

## 2023-07-24 VITALS
DIASTOLIC BLOOD PRESSURE: 78 MMHG | SYSTOLIC BLOOD PRESSURE: 128 MMHG | HEIGHT: 74 IN | TEMPERATURE: 97.8 F | BODY MASS INDEX: 29.84 KG/M2 | HEART RATE: 69 BPM | RESPIRATION RATE: 18 BRPM | WEIGHT: 232.5 LBS | OXYGEN SATURATION: 97 %

## 2023-07-24 DIAGNOSIS — Z51.12 ENCOUNTER FOR ANTINEOPLASTIC IMMUNOTHERAPY: ICD-10-CM

## 2023-07-24 DIAGNOSIS — C34.91 ADENOCARCINOMA OF RIGHT LUNG (HCC): Primary | ICD-10-CM

## 2023-07-24 DIAGNOSIS — Z00.00 HEALTH CARE MAINTENANCE: ICD-10-CM

## 2023-07-24 DIAGNOSIS — C34.91 ADENOCARCINOMA, LUNG, RIGHT (HCC): ICD-10-CM

## 2023-07-24 DIAGNOSIS — Z98.890 STATUS POST THORACOTOMY: ICD-10-CM

## 2023-07-24 DIAGNOSIS — C34.91 ADENOCARCINOMA OF RIGHT LUNG (HCC): ICD-10-CM

## 2023-07-24 DIAGNOSIS — Z90.89 S/P LOBECTOMY OF BRAIN: ICD-10-CM

## 2023-07-24 DIAGNOSIS — C34.91 ADENOCARCINOMA, LUNG, RIGHT (HCC): Primary | ICD-10-CM

## 2023-07-24 LAB
ALBUMIN SERPL-MCNC: 3.8 G/DL (ref 3.5–5.2)
ALP SERPL-CCNC: 108 U/L (ref 40–130)
ALT SERPL-CCNC: 25 U/L (ref 21–72)
ANION GAP SERPL CALCULATED.3IONS-SCNC: 9 MMOL/L (ref 7–19)
AST SERPL-CCNC: 25 U/L (ref 17–59)
BILIRUB SERPL-MCNC: 0.4 MG/DL (ref 0.2–1.3)
BUN SERPL-MCNC: 16 MG/DL (ref 9–20)
CALCIUM SERPL-MCNC: 9.8 MG/DL (ref 8.4–10.2)
CHLORIDE SERPL-SCNC: 110 MMOL/L (ref 98–111)
CO2 SERPL-SCNC: 26 MMOL/L (ref 22–29)
CORTIS AM PEAK SERPL-MCNC: 8 UG/DL (ref 6.2–19.4)
CREAT SERPL-MCNC: 0.9 MG/DL (ref 0.6–1.2)
ERYTHROCYTE [DISTWIDTH] IN BLOOD BY AUTOMATED COUNT: 14.3 % (ref 11.6–14.4)
GLOBULIN: 2.8 G/DL
GLUCOSE SERPL-MCNC: 94 MG/DL (ref 74–106)
HCT VFR BLD AUTO: 37 % (ref 40.1–51)
HGB BLD-MCNC: 12.3 G/DL (ref 13.7–17.5)
LYMPHOCYTES # BLD: 0.71 K/UL (ref 1.18–3.74)
LYMPHOCYTES NFR BLD: 13.9 % (ref 19.3–53.1)
MCH RBC QN AUTO: 29.4 PG (ref 25.7–32.2)
MCHC RBC AUTO-ENTMCNC: 33.2 G/DL (ref 32.3–36.5)
MCV RBC AUTO: 88.5 FL (ref 79–92.2)
MONOCYTES # BLD: 0.44 K/UL (ref 0.24–0.82)
MONOCYTES NFR BLD: 8.6 % (ref 4.7–12.5)
NEUTROPHILS # BLD: 3.71 K/UL (ref 1.56–6.13)
NEUTS SEG NFR BLD: 72.4 % (ref 34–71.1)
PLATELET # BLD AUTO: 152 K/UL (ref 163–337)
PMV BLD AUTO: 9.1 FL (ref 7.4–10.4)
POTASSIUM SERPL-SCNC: 4.3 MMOL/L (ref 3.5–5.1)
PROT SERPL-MCNC: 6.6 G/DL (ref 6.3–8.2)
RBC # BLD AUTO: 4.18 M/UL (ref 4.63–6.08)
SODIUM SERPL-SCNC: 145 MMOL/L (ref 137–145)
TSH SERPL DL<=0.005 MIU/L-ACNC: 2.79 UIU/ML (ref 0.27–4.2)
WBC # BLD AUTO: 5.12 K/UL (ref 4.23–9.07)

## 2023-07-24 PROCEDURE — 36415 COLL VENOUS BLD VENIPUNCTURE: CPT

## 2023-07-24 PROCEDURE — 85025 COMPLETE CBC W/AUTO DIFF WBC: CPT

## 2023-07-24 PROCEDURE — 2580000003 HC RX 258: Performed by: INTERNAL MEDICINE

## 2023-07-24 PROCEDURE — 99214 OFFICE O/P EST MOD 30 MIN: CPT | Performed by: INTERNAL MEDICINE

## 2023-07-24 PROCEDURE — 96413 CHEMO IV INFUSION 1 HR: CPT

## 2023-07-24 PROCEDURE — 6360000002 HC RX W HCPCS: Performed by: INTERNAL MEDICINE

## 2023-07-24 PROCEDURE — 80053 COMPREHEN METABOLIC PANEL: CPT

## 2023-07-24 RX ORDER — ACETAMINOPHEN 325 MG/1
650 TABLET ORAL
Status: CANCELLED | OUTPATIENT
Start: 2023-07-24

## 2023-07-24 RX ORDER — HEPARIN SODIUM (PORCINE) LOCK FLUSH IV SOLN 100 UNIT/ML 100 UNIT/ML
500 SOLUTION INTRAVENOUS PRN
Status: CANCELLED | OUTPATIENT
Start: 2023-07-24

## 2023-07-24 RX ORDER — ONDANSETRON 2 MG/ML
8 INJECTION INTRAMUSCULAR; INTRAVENOUS
Status: CANCELLED | OUTPATIENT
Start: 2023-07-24

## 2023-07-24 RX ORDER — FAMOTIDINE 10 MG/ML
20 INJECTION, SOLUTION INTRAVENOUS
Status: CANCELLED | OUTPATIENT
Start: 2023-07-24

## 2023-07-24 RX ORDER — EPINEPHRINE 1 MG/ML
0.3 INJECTION, SOLUTION, CONCENTRATE INTRAVENOUS PRN
Status: CANCELLED | OUTPATIENT
Start: 2023-07-24

## 2023-07-24 RX ORDER — DIPHENHYDRAMINE HYDROCHLORIDE 50 MG/ML
50 INJECTION INTRAMUSCULAR; INTRAVENOUS
Status: CANCELLED | OUTPATIENT
Start: 2023-07-24

## 2023-07-24 RX ORDER — MEPERIDINE HYDROCHLORIDE 50 MG/ML
12.5 INJECTION INTRAMUSCULAR; INTRAVENOUS; SUBCUTANEOUS PRN
Status: CANCELLED | OUTPATIENT
Start: 2023-07-24

## 2023-07-24 RX ORDER — SODIUM CHLORIDE 9 MG/ML
5-250 INJECTION, SOLUTION INTRAVENOUS PRN
Status: CANCELLED | OUTPATIENT
Start: 2023-07-24

## 2023-07-24 RX ORDER — SODIUM CHLORIDE 0.9 % (FLUSH) 0.9 %
5-40 SYRINGE (ML) INJECTION PRN
Status: CANCELLED | OUTPATIENT
Start: 2023-07-24

## 2023-07-24 RX ORDER — ALBUTEROL SULFATE 90 UG/1
4 AEROSOL, METERED RESPIRATORY (INHALATION) PRN
Status: CANCELLED | OUTPATIENT
Start: 2023-07-24

## 2023-07-24 RX ORDER — SODIUM CHLORIDE 0.9 % (FLUSH) 0.9 %
5-40 SYRINGE (ML) INJECTION PRN
Status: DISCONTINUED | OUTPATIENT
Start: 2023-07-24 | End: 2023-07-25 | Stop reason: HOSPADM

## 2023-07-24 RX ORDER — SODIUM CHLORIDE 9 MG/ML
INJECTION, SOLUTION INTRAVENOUS CONTINUOUS
Status: CANCELLED | OUTPATIENT
Start: 2023-07-24

## 2023-07-24 RX ORDER — HEPARIN 100 UNIT/ML
500 SYRINGE INTRAVENOUS PRN
Status: DISCONTINUED | OUTPATIENT
Start: 2023-07-24 | End: 2023-07-25 | Stop reason: HOSPADM

## 2023-07-24 RX ADMIN — HEPARIN 500 UNITS: 100 SYRINGE at 10:33

## 2023-07-24 RX ADMIN — SODIUM CHLORIDE 200 MG: 9 INJECTION, SOLUTION INTRAVENOUS at 10:03

## 2023-07-24 RX ADMIN — SODIUM CHLORIDE, PRESERVATIVE FREE 10 ML: 5 INJECTION INTRAVENOUS at 10:33

## 2023-08-13 DIAGNOSIS — C34.91 ADENOCARCINOMA, LUNG, RIGHT (HCC): Primary | ICD-10-CM

## 2023-08-13 DIAGNOSIS — Z90.89 S/P LOBECTOMY OF BRAIN: ICD-10-CM

## 2023-08-13 RX ORDER — ACETAMINOPHEN 325 MG/1
650 TABLET ORAL
OUTPATIENT
Start: 2023-08-14

## 2023-08-13 RX ORDER — ONDANSETRON 2 MG/ML
8 INJECTION INTRAMUSCULAR; INTRAVENOUS
OUTPATIENT
Start: 2023-08-14

## 2023-08-13 RX ORDER — DIPHENHYDRAMINE HYDROCHLORIDE 50 MG/ML
50 INJECTION INTRAMUSCULAR; INTRAVENOUS
OUTPATIENT
Start: 2023-08-14

## 2023-08-13 RX ORDER — ALBUTEROL SULFATE 90 UG/1
4 AEROSOL, METERED RESPIRATORY (INHALATION) PRN
OUTPATIENT
Start: 2023-08-14

## 2023-08-13 RX ORDER — SODIUM CHLORIDE 0.9 % (FLUSH) 0.9 %
5-40 SYRINGE (ML) INJECTION PRN
OUTPATIENT
Start: 2023-08-14

## 2023-08-13 RX ORDER — HEPARIN SODIUM (PORCINE) LOCK FLUSH IV SOLN 100 UNIT/ML 100 UNIT/ML
500 SOLUTION INTRAVENOUS PRN
OUTPATIENT
Start: 2023-08-14

## 2023-08-13 RX ORDER — SODIUM CHLORIDE 9 MG/ML
INJECTION, SOLUTION INTRAVENOUS CONTINUOUS
OUTPATIENT
Start: 2023-08-14

## 2023-08-13 RX ORDER — EPINEPHRINE 1 MG/ML
0.3 INJECTION, SOLUTION, CONCENTRATE INTRAVENOUS PRN
OUTPATIENT
Start: 2023-08-14

## 2023-08-13 RX ORDER — FAMOTIDINE 10 MG/ML
20 INJECTION, SOLUTION INTRAVENOUS
OUTPATIENT
Start: 2023-08-14

## 2023-08-13 RX ORDER — SODIUM CHLORIDE 9 MG/ML
5-250 INJECTION, SOLUTION INTRAVENOUS PRN
OUTPATIENT
Start: 2023-08-14

## 2023-08-13 RX ORDER — MEPERIDINE HYDROCHLORIDE 50 MG/ML
12.5 INJECTION INTRAMUSCULAR; INTRAVENOUS; SUBCUTANEOUS PRN
OUTPATIENT
Start: 2023-08-14

## 2023-08-14 ENCOUNTER — HOSPITAL ENCOUNTER (OUTPATIENT)
Dept: INFUSION THERAPY | Age: 58
Discharge: HOME | End: 2023-08-14
Payer: MEDICAID

## 2023-08-14 VITALS
DIASTOLIC BLOOD PRESSURE: 84 MMHG | SYSTOLIC BLOOD PRESSURE: 124 MMHG | RESPIRATION RATE: 18 BRPM | BODY MASS INDEX: 28.88 KG/M2 | TEMPERATURE: 98.1 F | HEIGHT: 74 IN | OXYGEN SATURATION: 97 % | WEIGHT: 225 LBS | HEART RATE: 83 BPM

## 2023-08-14 DIAGNOSIS — Z51.12 ENCOUNTER FOR ANTINEOPLASTIC IMMUNOTHERAPY: ICD-10-CM

## 2023-08-14 DIAGNOSIS — R53.83 FATIGUE DUE TO TREATMENT: ICD-10-CM

## 2023-08-14 DIAGNOSIS — C34.91 ADENOCARCINOMA, LUNG, RIGHT (HCC): Primary | ICD-10-CM

## 2023-08-14 DIAGNOSIS — C34.91 ADENOCARCINOMA, LUNG, RIGHT (HCC): ICD-10-CM

## 2023-08-14 DIAGNOSIS — Z90.89 S/P LOBECTOMY OF BRAIN: Primary | ICD-10-CM

## 2023-08-14 LAB
ALBUMIN SERPL-MCNC: 4.2 G/DL (ref 3.5–5.2)
ALP SERPL-CCNC: 102 U/L (ref 40–130)
ALT SERPL-CCNC: 39 U/L (ref 21–72)
ANION GAP SERPL CALCULATED.3IONS-SCNC: 9 MMOL/L (ref 7–19)
AST SERPL-CCNC: 32 U/L (ref 17–59)
BILIRUB SERPL-MCNC: 0.5 MG/DL (ref 0.2–1.3)
BUN SERPL-MCNC: 18 MG/DL (ref 9–20)
CALCIUM SERPL-MCNC: 9.7 MG/DL (ref 8.4–10.2)
CHLORIDE SERPL-SCNC: 108 MMOL/L (ref 98–111)
CO2 SERPL-SCNC: 27 MMOL/L (ref 22–29)
CORTIS AM PEAK SERPL-MCNC: 13.6 UG/DL (ref 6.2–19.4)
CREAT SERPL-MCNC: 1 MG/DL (ref 0.6–1.2)
ERYTHROCYTE [DISTWIDTH] IN BLOOD BY AUTOMATED COUNT: 14.6 % (ref 11.6–14.4)
GLOBULIN: 2.9 G/DL
GLUCOSE SERPL-MCNC: 80 MG/DL (ref 74–106)
HCT VFR BLD AUTO: 40.7 % (ref 40.1–51)
HGB BLD-MCNC: 13.8 G/DL (ref 13.7–17.5)
LYMPHOCYTES # BLD: 0.75 K/UL (ref 1.18–3.74)
LYMPHOCYTES NFR BLD: 19.8 % (ref 19.3–53.1)
MCH RBC QN AUTO: 29.6 PG (ref 25.7–32.2)
MCHC RBC AUTO-ENTMCNC: 33.9 G/DL (ref 32.3–36.5)
MCV RBC AUTO: 87.3 FL (ref 79–92.2)
MONOCYTES # BLD: 0.43 K/UL (ref 0.24–0.82)
MONOCYTES NFR BLD: 11.4 % (ref 4.7–12.5)
NEUTROPHILS # BLD: 2.32 K/UL (ref 1.56–6.13)
NEUTS SEG NFR BLD: 61.4 % (ref 34–71.1)
PLATELET # BLD AUTO: 166 K/UL (ref 163–337)
PMV BLD AUTO: 9.2 FL (ref 7.4–10.4)
POTASSIUM SERPL-SCNC: 4.1 MMOL/L (ref 3.5–5.1)
PROT SERPL-MCNC: 7.2 G/DL (ref 6.3–8.2)
RBC # BLD AUTO: 4.66 M/UL (ref 4.63–6.08)
SODIUM SERPL-SCNC: 144 MMOL/L (ref 137–145)
TSH SERPL DL<=0.005 MIU/L-ACNC: 3.79 UIU/ML (ref 0.27–4.2)
WBC # BLD AUTO: 3.78 K/UL (ref 4.23–9.07)

## 2023-08-14 PROCEDURE — 96413 CHEMO IV INFUSION 1 HR: CPT

## 2023-08-14 PROCEDURE — 84439 ASSAY OF FREE THYROXINE: CPT

## 2023-08-14 PROCEDURE — 36415 COLL VENOUS BLD VENIPUNCTURE: CPT

## 2023-08-14 PROCEDURE — 84443 ASSAY THYROID STIM HORMONE: CPT

## 2023-08-14 PROCEDURE — 80053 COMPREHEN METABOLIC PANEL: CPT

## 2023-08-14 PROCEDURE — 6360000002 HC RX W HCPCS: Performed by: INTERNAL MEDICINE

## 2023-08-14 PROCEDURE — 82533 TOTAL CORTISOL: CPT

## 2023-08-14 PROCEDURE — 2580000003 HC RX 258: Performed by: INTERNAL MEDICINE

## 2023-08-14 PROCEDURE — 85025 COMPLETE CBC W/AUTO DIFF WBC: CPT

## 2023-08-14 RX ORDER — HEPARIN 100 UNIT/ML
500 SYRINGE INTRAVENOUS PRN
Status: DISCONTINUED | OUTPATIENT
Start: 2023-08-14 | End: 2023-08-15 | Stop reason: HOSPADM

## 2023-08-14 RX ADMIN — SODIUM CHLORIDE 200 MG: 9 INJECTION, SOLUTION INTRAVENOUS at 09:00

## 2023-08-14 RX ADMIN — HEPARIN 500 UNITS: 100 SYRINGE at 09:32

## 2023-08-14 ASSESSMENT — PAIN SCALES - GENERAL: PAINLEVEL_OUTOF10: 3

## 2023-08-14 ASSESSMENT — PAIN DESCRIPTION - LOCATION: LOCATION: CHEST

## 2023-08-17 ENCOUNTER — OFFICE VISIT (OUTPATIENT)
Dept: CARDIOLOGY | Facility: CLINIC | Age: 58
End: 2023-08-17
Payer: MEDICAID

## 2023-08-17 VITALS
HEIGHT: 73 IN | DIASTOLIC BLOOD PRESSURE: 94 MMHG | BODY MASS INDEX: 29.95 KG/M2 | OXYGEN SATURATION: 98 % | HEART RATE: 81 BPM | WEIGHT: 226 LBS | SYSTOLIC BLOOD PRESSURE: 142 MMHG

## 2023-08-17 DIAGNOSIS — J44.9 CHRONIC OBSTRUCTIVE PULMONARY DISEASE, UNSPECIFIED COPD TYPE: ICD-10-CM

## 2023-08-17 DIAGNOSIS — R06.09 DOE (DYSPNEA ON EXERTION): ICD-10-CM

## 2023-08-17 DIAGNOSIS — Z72.0 TOBACCO USE: Primary | ICD-10-CM

## 2023-08-17 DIAGNOSIS — R55 SYNCOPE AND COLLAPSE: ICD-10-CM

## 2023-08-17 DIAGNOSIS — Z86.16 HISTORY OF COVID-19: ICD-10-CM

## 2023-08-17 DIAGNOSIS — Z90.2 S/P LOBECTOMY OF LUNG: ICD-10-CM

## 2023-08-17 DIAGNOSIS — R06.83 SNORING: ICD-10-CM

## 2023-08-17 DIAGNOSIS — Z87.891 FORMER SMOKER: ICD-10-CM

## 2023-08-17 DIAGNOSIS — Z92.3 HISTORY OF RADIATION THERAPY: ICD-10-CM

## 2023-08-17 NOTE — PROGRESS NOTES
Lalo Jones  [id number]  1965  57 y.o.  male    Referring Provider: Flex Sheikh MD    Reason for  Visit:  Initial visit         Subjective    Mild to moderate chronic exertional shortness of breath on exertion relieved with rest  No significant cough or wheezing    No palpitations  No associated chest pain  No significant pedal edema    No fever or chills  No significant expectoration    No hemoptysis  Recurrent syncope   6 times in 3 months   Recent syncope 3 weeks ago , woke up on floor  No preceding palpitations, no incontinence of urine or stools, no preceeding chest pain. No aura.  No seizure like activity   Feels a wave come over him   Gets nauseous and gets diaphoretic     Tolerating current medications well with no untoward side effects   Compliant with prescribed medication regimen. Tries to adhere to cardiac diet.     Easy fatiguability and increasing tired  Feels energy levels running low          History of present illness:  Lalo Jones is a 57 y.o. yo male with ca lung s/p lobectomy 4 months Dr Carlin  who presents today for   Chief Complaint   Patient presents with    Loss of Consciousness   .    History  Past Medical History:   Diagnosis Date    Abnormal PET scan of lung     Nov 2022    Allergic rhinitis     Arthritis     Atrial fibrillation     Bronchiectasis     Bronchitis     Cancer     prostate    Chronic bronchitis     Chronic pain     GERD (gastroesophageal reflux disease)     Lung cancer     Pneumonia     Prostatitis, acute     S/P ACL reconstruction     Septic shock due to urinary tract infection     Sinusitis     Strep throat     UTI (urinary tract infection)    ,   Past Surgical History:   Procedure Laterality Date    ANKLE SURGERY Right     BACK SURGERY      X2    ENDOSCOPY N/A 01/10/2022    Procedure: ESOPHAGOGASTRODUODENOSCOPY WITH ANESTHESIA;  Surgeon: Tucker Bright MD;  Location: [facility];  Service: Gastroenterology;  Laterality: N/A;  pre food  bolus  post food bolus      KNEE ACL RECONSTRUCTION Left     NASAL SEPTUM SURGERY      PENILE PROSTHESIS IMPLANT N/A 03/15/2022    Procedure: 3-PIECE INFLATABLE PENILE PROSTHESIS PLACEMENT;  Surgeon: Trae Clark MD;  Location:  [facility];  Service: Urology;  Laterality: N/A;    PROSTATE ULTRASOUND BIOPSY N/A 2020    Procedure: PROSTATE  BIOPSY;  Surgeon: Trae Clark MD;  Location:  [facility];  Service: Urology;  Laterality: N/A;    PROSTATECTOMY N/A 2020    Procedure: RADICAL PROSTATECTOMY LAPAROSCOPIC WITH DAVINCI ROBOT;  Surgeon: Nuno Jj MD;  Location:  [facility];  Service: DaVinci;  Laterality: N/A;    SHOULDER SURGERY Left     X 4    TENNIS ELBOW RELEASE Bilateral 2011    THORACOTOMY Right 2023    Procedure: RIGHT THORACOTOMY WITH CHEST WALL RESECTION, MEDIASTINAL LYMPH NODE DISSECTION, RIGHT UPPER LOBECTOMY;  Surgeon: Goldy Carlin MD;  Location:  [facility];  Service: Cardiothoracic;  Laterality: Right;    VENOUS ACCESS DEVICE (PORT) INSERTION N/A 2022    Procedure: Single Lumen Port-a-cath insertion with flouroscopy;  Surgeon: Kim Bonilla MD;  Location:  [facility];  Service: General;  Laterality: N/A;   ,   Family History   Problem Relation Age of Onset    Cancer Mother    ,   Social History     Tobacco Use    Smoking status: Former     Packs/day: 1.00     Years: 35.00     Pack years: 35.00     Types: Cigarettes     Start date: 1987     Quit date: 2022     Years since quittin.6     Passive exposure: Past    Smokeless tobacco: Former     Types: Snuff     Quit date:    Vaping Use    Vaping Use: Former   Substance Use Topics    Alcohol use: No     Comment: 0    Drug use: Not Currently     Frequency: 7.0 times per week     Types: Hydrocodone, Marijuana, Oxycodone     Comment: Edible gummies - 1 per day   ,     Medications  Current Outpatient Medications   Medication Sig Dispense Refill    acetaminophen (Tylenol) 325 MG tablet Take 3  tablets by mouth Every 8 (Eight) Hours. Take every 8 hours for 3 days then take prn as needed. 100 tablet 2    albuterol (PROVENTIL) (2.5 MG/3ML) 0.083% nebulizer solution Take 2.5 mg by nebulization Every 4 (Four) Hours As Needed for Wheezing. 360 mL 3    albuterol sulfate  (90 Base) MCG/ACT inhaler Inhale 2 puffs Every 4 (Four) Hours As Needed for Wheezing or Shortness of Air. 6.7 g 5    Azelastine HCl 137 MCG/SPRAY solution 2 sprays into the nostril(s) as directed by provider 2 (Two) Times a Day. 30 mL 5    cetirizine (zyrTEC) 10 MG tablet Take 1 tablet by mouth Daily. 90 tablet 3    docusate sodium 100 MG capsule Take 1 capsule by mouth 2 (Two) Times a Day. 20 capsule 0    fluticasone (FLONASE) 50 MCG/ACT nasal spray 2 sprays into the nostril(s) as directed by provider Daily As Needed for Rhinitis or Allergies. 16 g 5    Fluticasone-Salmeterol (ADVAIR/WIXELA) 250-50 MCG/ACT DISKUS Inhale 1 puff 2 (Two) Times a Day. 1 each 5    folic acid (FOLVITE) 1 MG tablet Take 1 tablet by mouth Daily.      gabapentin (NEURONTIN) 800 MG tablet Take 1 tablet by mouth 3 (Three) Times a Day.      hydrOXYzine pamoate (VISTARIL) 50 MG capsule Take 1 capsule by mouth Daily.      ibuprofen (ADVIL,MOTRIN) 800 MG tablet Take 1 tablet by mouth Every 8 (Eight) Hours As Needed for Mild Pain.      methylPREDNISolone (MEDROL) 4 MG dose pack Take as directed on package instructions. 1 each 0    metoclopramide (REGLAN) 5 MG tablet Take 1 tablet by mouth 3 (Three) Times a Day As Needed (N/V).      metoprolol tartrate (LOPRESSOR) 25 MG tablet Take 0.5 tablets by mouth Every 12 (Twelve) Hours. 30 tablet 0    montelukast (SINGULAIR) 10 MG tablet Take 1 tablet by mouth Every Night. 90 tablet 3    multivitamin with minerals tablet tablet Take 1 tablet by mouth Daily.      NON FORMULARY Take 1 tablet by mouth Daily. Marijuana Gummies      oxyCODONE-acetaminophen (Percocet) 5-325 MG per tablet Take 1 tablet by mouth Every 6 (Six) Hours As  "Needed for Moderate Pain. 25 tablet 0    pregabalin (Lyrica) 25 MG capsule Take 1 capsule by mouth 2 (Two) Times a Day As Needed (pain) for up to 7 days. (Patient not taking: Reported on 7/20/2023) 14 capsule 0     Current Facility-Administered Medications   Medication Dose Route Frequency Provider Last Rate Last Admin    pneumococcal polysaccharide 23-valent (PNEUMOVAX-23) vaccine 0.5 mL  0.5 mL Intramuscular During Hospitalization Russel Santana MD           Allergies:  Adhesive tape    Review of Systems  Review of Systems   Constitutional: Negative.   HENT: Negative.     Eyes: Negative.    Cardiovascular:  Positive for dyspnea on exertion and syncope. Negative for chest pain, claudication, cyanosis, irregular heartbeat, leg swelling, near-syncope, orthopnea, palpitations and paroxysmal nocturnal dyspnea.   Respiratory: Negative.     Endocrine: Negative.    Hematologic/Lymphatic: Negative.    Skin: Negative.    Gastrointestinal:  Negative for anorexia.   Genitourinary: Negative.    Psychiatric/Behavioral: Negative.       Objective     Physical Exam:  /94   Pulse 81   Ht 185.4 cm (72.99\")   Wt 103 kg (226 lb)   SpO2 98%   BMI 29.82 kg/mý     Physical Exam  Constitutional:       Appearance: He is well-developed.   HENT:      Head: Normocephalic.   Neck:      Vascular: Normal carotid pulses. No carotid bruit or JVD.      Trachea: No tracheal tenderness or tracheal deviation.   Cardiovascular:      Rate and Rhythm: Regular rhythm.      Pulses: Normal pulses.      Heart sounds: Normal heart sounds.   Pulmonary:      Effort: Pulmonary effort is normal.      Breath sounds: No stridor.   Abdominal:      General: There is no distension.      Palpations: Abdomen is soft.      Tenderness: There is no abdominal tenderness.   Musculoskeletal:      Cervical back: No edema.   Skin:     General: Skin is warm.   Neurological:      Mental Status: He is alert.      Cranial Nerves: No cranial nerve deficit.      " Sensory: No sensory deficit.   Psychiatric:         Speech: Speech normal.         Behavior: Behavior normal.       Results Review:          ____________________________________________________________________________________________________________________________________________  Health maintenance and recommendations    Heart healthy carbohydrate restricted cardiac diet   The patient is advised to reduce or avoid caffeine or other cardiac stimulants.   Minimize or avoid  NSAID-type medications      Monitor for any signs of bleeding including red or dark stools. Fall precautions.   Advised staying uptodate with immunizations per established standard guidelines.    Offered to give patient  a copy of my notes     Questions were encouraged, asked and answered to the patient's  understanding and satisfaction. Questions if any regarding current medications and side effects, need for refills and importance of compliance to medications stressed.    Reviewed available prior notes, consults, prior visits, laboratory findings, radiology and cardiology relevant reports. Updated chart as applicable. I have reviewed the patient's medical history in detail and updated the computerized patient record as relevant.      Updated patient regarding any new or relevant abnormalities on review of records or any new findings on physical exam. Mentioned to patient about purpose of visit and desirable health short and long term goals and objectives.    Primary to monitor CBC CMP Lipid panel and TSH as applicable    ___________________________________________________________________________________________________________________________________________   Procedures    Assessment & Plan   Diagnoses and all orders for this visit:    1. Tobacco use (Primary)    2. Snoring    3. S/P lobectomy of lung    4. History of COVID-19    5. History of radiation therapy    6. Former smoker    7. Chronic obstructive pulmonary disease, unspecified COPD  type    8. Syncope and collapse  -     Holter Monitor - 72 Hour Up To 15 Days; Future  -     Adult Transthoracic Echo Complete w/ Color, Spectral and Contrast if necessary per protocol; Future  -     Adult Stress Echo W/ Cont or Stress Agent if Necessary Per Protocol; Future  -     Tilt Table; Future  -     US Carotid Bilateral; Future    9. SUTTON (dyspnea on exertion)  -     Adult Stress Echo W/ Cont or Stress Agent if Necessary Per Protocol; Future          Plan    Patient expressed understanding  Encouraged and answered all questions   Discussed with the patient and all questioned fully answered. He will call me if any problems arise.   Discussed results of ECG from 05/08/2023    Orders Placed This Encounter   Procedures    US Carotid Bilateral     Standing Status:   Future     Standing Expiration Date:   8/16/2024     Order Specific Question:   Reason for Exam:     Answer:   syncope     Order Specific Question:   Release to patient     Answer:   Routine Release [[id number]]    Holter Monitor - 72 Hour Up To 15 Days     Standing Status:   Future     Standing Expiration Date:   8/17/2024     Order Specific Question:   Reason for exam?     Answer:   Presyncope or Syncope     Order Specific Question:   How many days is the patient to wear the monitor?     Answer:   14     Order Specific Question:   Release to patient     Answer:   Routine Release [[id number]]    Tilt Table     Standing Status:   Future     Standing Expiration Date:   8/16/2024     Order Specific Question:   Reason for Exam:     Answer:   syncope     Order Specific Question:   Release to patient     Answer:   Routine Release [[id number]]    Adult Transthoracic Echo Complete w/ Color, Spectral and Contrast if necessary per protocol     Standing Status:   Future     Standing Expiration Date:   8/17/2024     Scheduling Instructions:      Myocardial strain to be performed       Use newer echo machine     Order Specific Question:   Reason for exam?      Answer:   Dyspnea     Order Specific Question:   Release to patient     Answer:   Routine Release [[id number]]    Adult Stress Echo W/ Cont or Stress Agent if Necessary Per Protocol     Standing Status:   Future     Standing Expiration Date:   8/16/2024     Order Specific Question:   What stress agent will be used?     Answer:   Dobutamine     Order Specific Question:   Difficulty walking criteria?     Answer:   Musculoskeletal (hips, knees, feet, back, amputee)     Order Specific Question:   Reason for exam?     Answer:   Dyspnea     Order Specific Question:   Release to patient     Answer:   Routine Release [[id number]]        Check BP and heart rates twice daily initially till blood pressures and heart rates under good control and then at least 3x / week,   If blood pressures continue to be well-controlled then can check week a month  at home and bring a recording for review next visit  If BP >130/85 or < 100/60 persistently over 3 reading 30 mins apart or if heart rates persistently above 100 bpm or less than 55 bpm call sooner for evaluation and advise             Return in about 7 weeks (around 10/5/2023).

## 2023-08-21 ENCOUNTER — HOSPITAL ENCOUNTER (OUTPATIENT)
Dept: CARDIOLOGY | Facility: HOSPITAL | Age: 58
Discharge: HOME | End: 2023-08-21
Payer: MEDICAID

## 2023-08-21 DIAGNOSIS — R55 SYNCOPE AND COLLAPSE: ICD-10-CM

## 2023-08-21 PROCEDURE — 93306 TTE W/DOPPLER COMPLETE: CPT

## 2023-08-21 PROCEDURE — 93356 MYOCRD STRAIN IMG SPCKL TRCK: CPT

## 2023-08-21 PROCEDURE — 93246 EXT ECG>7D<15D RECORDING: CPT

## 2023-08-23 LAB
BH CV ECHO LEFT VENTRICLE GLOBAL LONGITUDINAL STRAIN: -16 %
BH CV ECHO MEAS - AO MAX PG: 3.2 MMHG
BH CV ECHO MEAS - AO MEAN PG: 1 MMHG
BH CV ECHO MEAS - AO ROOT DIAM: 3.8 CM
BH CV ECHO MEAS - AO V2 MAX: 88.8 CM/SEC
BH CV ECHO MEAS - AO V2 VTI: 19 CM
BH CV ECHO MEAS - AVA(I,D): 4.1 CM2
BH CV ECHO MEAS - EDV(CUBED): 134.2 ML
BH CV ECHO MEAS - EDV(MOD-SP4): 109 ML
BH CV ECHO MEAS - EF(MOD-SP4): 57.2 %
BH CV ECHO MEAS - ESV(CUBED): 35.3 ML
BH CV ECHO MEAS - ESV(MOD-SP4): 46.6 ML
BH CV ECHO MEAS - FS: 35.9 %
BH CV ECHO MEAS - IVS/LVPW: 1.07 CM
BH CV ECHO MEAS - IVSD: 0.92 CM
BH CV ECHO MEAS - LA DIMENSION: 4 CM
BH CV ECHO MEAS - LAT PEAK E' VEL: 12.7 CM/SEC
BH CV ECHO MEAS - LV DIASTOLIC VOL/BSA (35-75): 48.1 CM2
BH CV ECHO MEAS - LV MASS(C)D: 161.8 GRAMS
BH CV ECHO MEAS - LV MAX PG: 3.1 MMHG
BH CV ECHO MEAS - LV MEAN PG: 2 MMHG
BH CV ECHO MEAS - LV SYSTOLIC VOL/BSA (12-30): 20.6 CM2
BH CV ECHO MEAS - LV V1 MAX: 87.5 CM/SEC
BH CV ECHO MEAS - LV V1 VTI: 21.5 CM
BH CV ECHO MEAS - LVIDD: 5.1 CM
BH CV ECHO MEAS - LVIDS: 3.3 CM
BH CV ECHO MEAS - LVOT AREA: 3.6 CM2
BH CV ECHO MEAS - LVOT DIAM: 2.15 CM
BH CV ECHO MEAS - LVPWD: 0.86 CM
BH CV ECHO MEAS - MED PEAK E' VEL: 7.6 CM/SEC
BH CV ECHO MEAS - MV A MAX VEL: 53.6 CM/SEC
BH CV ECHO MEAS - MV DEC SLOPE: 373 CM/SEC2
BH CV ECHO MEAS - MV DEC TIME: 0.14 MSEC
BH CV ECHO MEAS - MV E MAX VEL: 84 CM/SEC
BH CV ECHO MEAS - MV E/A: 1.57
BH CV ECHO MEAS - MV P1/2T: 73.1 MSEC
BH CV ECHO MEAS - MVA(P1/2T): 3 CM2
BH CV ECHO MEAS - PA V2 MAX: 89.6 CM/SEC
BH CV ECHO MEAS - RAP SYSTOLE: 5 MMHG
BH CV ECHO MEAS - RV MAX PG: 1.54 MMHG
BH CV ECHO MEAS - RV V1 MAX: 62 CM/SEC
BH CV ECHO MEAS - RV V1 VTI: 14.6 CM
BH CV ECHO MEAS - RVSP: 32.7 MMHG
BH CV ECHO MEAS - SI(MOD-SP4): 27.5 ML/M2
BH CV ECHO MEAS - SV(LVOT): 78.1 ML
BH CV ECHO MEAS - SV(MOD-SP4): 62.4 ML
BH CV ECHO MEAS - TR MAX PG: 27.7 MMHG
BH CV ECHO MEAS - TR MAX VEL: 263 CM/SEC
BH CV ECHO MEASUREMENTS AVERAGE E/E' RATIO: 8.28
BH CV XLRA - RV BASE: 4.6 CM
BH CV XLRA - TDI S': 12.7 CM/SEC
LEFT ATRIUM VOLUME INDEX: 29.1 ML/M2

## 2023-08-31 NOTE — PROGRESS NOTES
Patient:  Kaykay Baird  YOB: 1965  Date of Service: 9/5/2023  MRN: [id number]    Primary Care Physician: [address]    Chief Complaint   Patient presents with    Cancer     Adenocarcinoma of right lung    Treatment     Adjuvant Keytruda, Cycle # 5 of 18       Patient Seen, Chart, Consults notes, Labs, Radiology studies reviewed. Mr. 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 Robotic assisted laparoscopic prostatectomy by Dr. Humphrey Chi September 2020 for PT2N0 [place] 7 disease with tumor approaching the right posterior margin    Neoadjuvant chemotherapy and radiation therapy as follows:   Neoadjuvant concurrent XRT (with chemoimmunotherapy) to RUL of lung initiated on 1/4/2023 completed on 2/14/2023. 5040 cGy over 28 treatment fractions. Neoadjuvant cisplatin 75 mg/m2, Alimta 500 mg/m2 and Opdivo 360 mg, cycle #1 initiated on 1/5/2023. Cycle #3 delivered on 2/16/2023. Right thoracotomy with right upper lobectomy and mediastinal lymph node dissection by Dr. Tayla Boyce on 5/4/2023. Final pathology was negative. The primary tumor and all mediastinal lymph nodes were also negative documenting complete response to neoadjuvant therapy. Chalino Ruth received cycle #1 of adjuvant immunotherapy with Keytruda on 6/12/2023     He is tolerating immunotherapy without new symptoms other than persistent fatigue. I conferenced with Dr. Ramu Ruiz, his pulmonologist, regarding pneumococcal vaccinations and suggested okay to proceed. Chalino Ruth is here today, 9/5/2023 to continue cycle #5 of 18 planned adjuvant immunotherapy treatments with Keytruda.     TARGET LUNG CANCER SITES:   4.7 cm x 4 cm x 1.6 cm mass in the RUL of the lung SUV of 12  9 mm retroaortic upper abdominal lymph node is mildly hypermetabolic with an SUV of

## 2023-09-05 ENCOUNTER — OFFICE VISIT (OUTPATIENT)
Dept: HEMATOLOGY | Age: 58
End: 2023-09-05
Payer: MEDICAID

## 2023-09-05 ENCOUNTER — HOSPITAL ENCOUNTER (OUTPATIENT)
Dept: INFUSION THERAPY | Age: 58
Discharge: HOME | End: 2023-09-05
Payer: MEDICAID

## 2023-09-05 VITALS
HEART RATE: 68 BPM | HEIGHT: 74 IN | DIASTOLIC BLOOD PRESSURE: 91 MMHG | SYSTOLIC BLOOD PRESSURE: 131 MMHG | BODY MASS INDEX: 29.27 KG/M2 | RESPIRATION RATE: 18 BRPM | WEIGHT: 228.1 LBS | TEMPERATURE: 98 F | OXYGEN SATURATION: 99 %

## 2023-09-05 DIAGNOSIS — Z90.89 S/P LOBECTOMY OF BRAIN: ICD-10-CM

## 2023-09-05 DIAGNOSIS — C34.91 ADENOCARCINOMA, LUNG, RIGHT (HCC): ICD-10-CM

## 2023-09-05 DIAGNOSIS — Z98.890 STATUS POST THORACOTOMY: ICD-10-CM

## 2023-09-05 DIAGNOSIS — Z51.12 ENCOUNTER FOR ANTINEOPLASTIC IMMUNOTHERAPY: ICD-10-CM

## 2023-09-05 DIAGNOSIS — C34.91 ADENOCARCINOMA OF RIGHT LUNG (HCC): Primary | ICD-10-CM

## 2023-09-05 DIAGNOSIS — C34.91 ADENOCARCINOMA OF RIGHT LUNG (HCC): ICD-10-CM

## 2023-09-05 DIAGNOSIS — C34.91 ADENOCARCINOMA, LUNG, RIGHT (HCC): Primary | ICD-10-CM

## 2023-09-05 LAB
ALBUMIN SERPL-MCNC: 4 G/DL (ref 3.5–5.2)
ALP SERPL-CCNC: 111 U/L (ref 40–130)
ALT SERPL-CCNC: 35 U/L (ref 21–72)
ANION GAP SERPL CALCULATED.3IONS-SCNC: 12 MMOL/L (ref 7–19)
AST SERPL-CCNC: 28 U/L (ref 17–59)
BILIRUB SERPL-MCNC: 0.4 MG/DL (ref 0.2–1.3)
BUN SERPL-MCNC: 22 MG/DL (ref 9–20)
CALCIUM SERPL-MCNC: 9.7 MG/DL (ref 8.4–10.2)
CHLORIDE SERPL-SCNC: 108 MMOL/L (ref 98–111)
CO2 SERPL-SCNC: 25 MMOL/L (ref 22–29)
CORTIS AM PEAK SERPL-MCNC: 11 UG/DL (ref 6.2–19.4)
CREAT SERPL-MCNC: 1 MG/DL (ref 0.6–1.2)
ERYTHROCYTE [DISTWIDTH] IN BLOOD BY AUTOMATED COUNT: 14.2 % (ref 11.6–14.4)
GLOBULIN: 2.7 G/DL
GLUCOSE SERPL-MCNC: 95 MG/DL (ref 74–106)
HCT VFR BLD AUTO: 37.5 % (ref 40.1–51)
HGB BLD-MCNC: 12.8 G/DL (ref 13.7–17.5)
LYMPHOCYTES # BLD: 0.7 K/UL (ref 1.18–3.74)
LYMPHOCYTES NFR BLD: 14.7 % (ref 19.3–53.1)
MCH RBC QN AUTO: 29.6 PG (ref 25.7–32.2)
MCHC RBC AUTO-ENTMCNC: 34.1 G/DL (ref 32.3–36.5)
MCV RBC AUTO: 86.8 FL (ref 79–92.2)
MONOCYTES # BLD: 0.47 K/UL (ref 0.24–0.82)
MONOCYTES NFR BLD: 9.9 % (ref 4.7–12.5)
NEUTROPHILS # BLD: 3.35 K/UL (ref 1.56–6.13)
NEUTS SEG NFR BLD: 70.3 % (ref 34–71.1)
PLATELET # BLD AUTO: 174 K/UL (ref 163–337)
PMV BLD AUTO: 9 FL (ref 7.4–10.4)
POTASSIUM SERPL-SCNC: 4 MMOL/L (ref 3.5–5.1)
PROT SERPL-MCNC: 6.7 G/DL (ref 6.3–8.2)
RBC # BLD AUTO: 4.32 M/UL (ref 4.63–6.08)
SODIUM SERPL-SCNC: 145 MMOL/L (ref 137–145)
TSH SERPL DL<=0.005 MIU/L-ACNC: 5.38 UIU/ML (ref 0.27–4.2)
WBC # BLD AUTO: 4.76 K/UL (ref 4.23–9.07)

## 2023-09-05 PROCEDURE — 85025 COMPLETE CBC W/AUTO DIFF WBC: CPT

## 2023-09-05 PROCEDURE — 80053 COMPREHEN METABOLIC PANEL: CPT

## 2023-09-05 PROCEDURE — 6360000002 HC RX W HCPCS: Performed by: INTERNAL MEDICINE

## 2023-09-05 PROCEDURE — 96413 CHEMO IV INFUSION 1 HR: CPT

## 2023-09-05 PROCEDURE — 36415 COLL VENOUS BLD VENIPUNCTURE: CPT

## 2023-09-05 PROCEDURE — 99214 OFFICE O/P EST MOD 30 MIN: CPT | Performed by: INTERNAL MEDICINE

## 2023-09-05 PROCEDURE — 2580000003 HC RX 258: Performed by: INTERNAL MEDICINE

## 2023-09-05 RX ORDER — FAMOTIDINE 10 MG/ML
20 INJECTION, SOLUTION INTRAVENOUS
Status: CANCELLED | OUTPATIENT
Start: 2023-09-05

## 2023-09-05 RX ORDER — HEPARIN 100 UNIT/ML
500 SYRINGE INTRAVENOUS PRN
Status: DISCONTINUED | OUTPATIENT
Start: 2023-09-05 | End: 2023-09-06 | Stop reason: HOSPADM

## 2023-09-05 RX ORDER — MEPERIDINE HYDROCHLORIDE 50 MG/ML
12.5 INJECTION INTRAMUSCULAR; INTRAVENOUS; SUBCUTANEOUS PRN
Status: CANCELLED | OUTPATIENT
Start: 2023-09-05

## 2023-09-05 RX ORDER — DIPHENHYDRAMINE HYDROCHLORIDE 50 MG/ML
50 INJECTION INTRAMUSCULAR; INTRAVENOUS
Status: CANCELLED | OUTPATIENT
Start: 2023-09-05

## 2023-09-05 RX ORDER — SODIUM CHLORIDE 0.9 % (FLUSH) 0.9 %
5-40 SYRINGE (ML) INJECTION PRN
Status: DISCONTINUED | OUTPATIENT
Start: 2023-09-05 | End: 2023-09-06 | Stop reason: HOSPADM

## 2023-09-05 RX ORDER — ALBUTEROL SULFATE 90 UG/1
4 AEROSOL, METERED RESPIRATORY (INHALATION) PRN
Status: CANCELLED | OUTPATIENT
Start: 2023-09-05

## 2023-09-05 RX ORDER — EPINEPHRINE 1 MG/ML
0.3 INJECTION, SOLUTION, CONCENTRATE INTRAVENOUS PRN
Status: CANCELLED | OUTPATIENT
Start: 2023-09-05

## 2023-09-05 RX ORDER — SODIUM CHLORIDE 9 MG/ML
INJECTION, SOLUTION INTRAVENOUS CONTINUOUS
Status: CANCELLED | OUTPATIENT
Start: 2023-09-05

## 2023-09-05 RX ORDER — HEPARIN SODIUM (PORCINE) LOCK FLUSH IV SOLN 100 UNIT/ML 100 UNIT/ML
500 SOLUTION INTRAVENOUS PRN
Status: CANCELLED | OUTPATIENT
Start: 2023-09-05

## 2023-09-05 RX ORDER — SODIUM CHLORIDE 9 MG/ML
5-250 INJECTION, SOLUTION INTRAVENOUS PRN
Status: CANCELLED | OUTPATIENT
Start: 2023-09-05

## 2023-09-05 RX ORDER — ONDANSETRON 2 MG/ML
8 INJECTION INTRAMUSCULAR; INTRAVENOUS
Status: CANCELLED | OUTPATIENT
Start: 2023-09-05

## 2023-09-05 RX ORDER — ACETAMINOPHEN 325 MG/1
650 TABLET ORAL
Status: CANCELLED | OUTPATIENT
Start: 2023-09-05

## 2023-09-05 RX ORDER — SODIUM CHLORIDE 0.9 % (FLUSH) 0.9 %
5-40 SYRINGE (ML) INJECTION PRN
Status: CANCELLED | OUTPATIENT
Start: 2023-09-05

## 2023-09-05 RX ADMIN — SODIUM CHLORIDE, PRESERVATIVE FREE 10 ML: 5 INJECTION INTRAVENOUS at 10:20

## 2023-09-05 RX ADMIN — HEPARIN 500 UNITS: 100 SYRINGE at 10:20

## 2023-09-05 RX ADMIN — SODIUM CHLORIDE 200 MG: 9 INJECTION, SOLUTION INTRAVENOUS at 09:45

## 2023-09-07 ENCOUNTER — HOSPITAL ENCOUNTER (OUTPATIENT)
Dept: CARDIOLOGY | Facility: HOSPITAL | Age: 58
Discharge: HOME | End: 2023-09-07
Payer: MEDICAID

## 2023-09-07 VITALS — WEIGHT: 227.07 LBS | BODY MASS INDEX: 30.09 KG/M2 | HEIGHT: 73 IN

## 2023-09-07 DIAGNOSIS — R55 SYNCOPE AND COLLAPSE: ICD-10-CM

## 2023-09-07 PROCEDURE — 93660 TILT TABLE EVALUATION: CPT

## 2023-09-07 RX ORDER — NITROGLYCERIN 0.4 MG/1
0.4 TABLET SUBLINGUAL ONCE
Status: COMPLETED | OUTPATIENT
Start: 2023-09-07 | End: 2023-09-07

## 2023-09-07 RX ADMIN — NITROGLYCERIN 0.4 MG: 0.4 TABLET SUBLINGUAL at 14:12

## 2023-09-12 ENCOUNTER — HOSPITAL ENCOUNTER (OUTPATIENT)
Dept: ULTRASOUND IMAGING | Facility: HOSPITAL | Age: 58
Discharge: HOME | End: 2023-09-12
Payer: MEDICAID

## 2023-09-12 ENCOUNTER — HOSPITAL ENCOUNTER (OUTPATIENT)
Dept: CARDIOLOGY | Facility: HOSPITAL | Age: 58
Discharge: HOME | End: 2023-09-12
Payer: MEDICAID

## 2023-09-12 VITALS
HEIGHT: 72 IN | SYSTOLIC BLOOD PRESSURE: 122 MMHG | HEART RATE: 77 BPM | BODY MASS INDEX: 30.48 KG/M2 | WEIGHT: 225 LBS | DIASTOLIC BLOOD PRESSURE: 76 MMHG

## 2023-09-12 DIAGNOSIS — R55 SYNCOPE AND COLLAPSE: ICD-10-CM

## 2023-09-12 DIAGNOSIS — R06.09 DOE (DYSPNEA ON EXERTION): ICD-10-CM

## 2023-09-12 PROCEDURE — 25010000002 DOBUTAMINE 250 MG/20ML SOLUTION: Performed by: INTERNAL MEDICINE

## 2023-09-12 PROCEDURE — 93017 CV STRESS TEST TRACING ONLY: CPT

## 2023-09-12 PROCEDURE — 93350 STRESS TTE ONLY: CPT

## 2023-09-12 PROCEDURE — 25510000001 PERFLUTREN 6.52 MG/ML SUSPENSION: Performed by: INTERNAL MEDICINE

## 2023-09-12 PROCEDURE — 93880 EXTRACRANIAL BILAT STUDY: CPT

## 2023-09-12 PROCEDURE — 25010000002 ATROPINE SULFATE: Performed by: INTERNAL MEDICINE

## 2023-09-12 RX ORDER — METOPROLOL TARTRATE 5 MG/5ML
5 INJECTION INTRAVENOUS ONCE
Status: DISCONTINUED | OUTPATIENT
Start: 2023-09-12 | End: 2023-09-13 | Stop reason: HOSPADM

## 2023-09-12 RX ORDER — DOBUTAMINE HYDROCHLORIDE 100 MG/100ML
10-50 INJECTION INTRAVENOUS CONTINUOUS
Status: DISCONTINUED | OUTPATIENT
Start: 2023-09-12 | End: 2023-09-13 | Stop reason: HOSPADM

## 2023-09-12 RX ADMIN — DOBUTAMINE 10 MCG/KG/MIN: 12.5 INJECTION, SOLUTION, CONCENTRATE INTRAVENOUS at 11:50

## 2023-09-12 RX ADMIN — ATROPINE SULFATE 0.6 MG: 0.1 INJECTION INTRAVENOUS at 12:07

## 2023-09-12 RX ADMIN — PERFLUTREN 8.48 MG: 6.52 INJECTION, SUSPENSION INTRAVENOUS at 11:50

## 2023-09-16 LAB
BH CV STRESS BP STAGE 1: NORMAL
BH CV STRESS BP STAGE 2: NORMAL
BH CV STRESS BP STAGE 3: NORMAL
BH CV STRESS DOSE DOBUTAMINE STAGE 1: 10
BH CV STRESS DOSE DOBUTAMINE STAGE 2: 20
BH CV STRESS DOSE DOBUTAMINE STAGE 3: 30
BH CV STRESS DURATION MIN STAGE 1: 3
BH CV STRESS DURATION MIN STAGE 2: 3
BH CV STRESS DURATION MIN STAGE 3: 2
BH CV STRESS DURATION SEC STAGE 1: 0
BH CV STRESS DURATION SEC STAGE 2: 0
BH CV STRESS DURATION SEC STAGE 3: 23
BH CV STRESS ECHO POST STRESS EJECTION FRACTION EF: 65 %
BH CV STRESS HR STAGE 1: 84
BH CV STRESS HR STAGE 2: 133
BH CV STRESS HR STAGE 3: 151
BH CV STRESS PROTOCOL 1: NORMAL
BH CV STRESS RECOVERY BP: NORMAL MMHG
BH CV STRESS RECOVERY HR: 100 BPM
BH CV STRESS STAGE 1: 1
BH CV STRESS STAGE 2: 2
BH CV STRESS STAGE 3: 3
MAXIMAL PREDICTED HEART RATE: 165 BPM
PERCENT MAX PREDICTED HR: 91.52 %
STRESS BASELINE BP: NORMAL MMHG
STRESS BASELINE HR: 77 BPM
STRESS PERCENT HR: 108 %
STRESS POST EXERCISE DUR MIN: 8 MIN
STRESS POST EXERCISE DUR SEC: 23 SEC
STRESS POST PEAK BP: NORMAL MMHG
STRESS POST PEAK HR: 151 BPM
STRESS TARGET HR: 140 BPM

## 2023-09-21 NOTE — PROGRESS NOTES
Subjective    Mr. Jones is 57 y.o. male    Chief Complaint: History of prostate cancer    History of Present Illness    57-year-old male established patient follow-up for history of prostate cancer after robotic assisted laparoscopic prostatectomy by Dr. Jj September 2020 for PT2N0 [place] 7 disease with tumor approaching the right posterior margin.  He had Coloplast Titan touch three-piece inflatable penile implant 3/15/2022 for post-prostatectomy erectile dysfunction.  His most recent PSA has remained undetectable.  He got his PSA drawn today.    The following portions of the patient's history were reviewed and updated as appropriate: allergies, current medications, past family history, past medical history, past social history, past surgical history and problem list.    Review of Systems      Current Outpatient Medications:     acetaminophen (Tylenol) 325 MG tablet, Take 3 tablets by mouth Every 8 (Eight) Hours. Take every 8 hours for 3 days then take prn as needed., Disp: 100 tablet, Rfl: 2    albuterol (PROVENTIL) (2.5 MG/3ML) 0.083% nebulizer solution, Take 2.5 mg by nebulization Every 4 (Four) Hours As Needed for Wheezing., Disp: 360 mL, Rfl: 3    albuterol sulfate  (90 Base) MCG/ACT inhaler, Inhale 2 puffs Every 4 (Four) Hours As Needed for Wheezing or Shortness of Air., Disp: 6.7 g, Rfl: 5    Azelastine HCl 137 MCG/SPRAY solution, 2 sprays into the nostril(s) as directed by provider 2 (Two) Times a Day., Disp: 30 mL, Rfl: 5    cetirizine (zyrTEC) 10 MG tablet, Take 1 tablet by mouth Daily., Disp: 90 tablet, Rfl: 3    fluticasone (FLONASE) 50 MCG/ACT nasal spray, 2 sprays into the nostril(s) as directed by provider Daily As Needed for Rhinitis or Allergies., Disp: 16 g, Rfl: 5    Fluticasone-Salmeterol (ADVAIR/WIXELA) 250-50 MCG/ACT DISKUS, Inhale 1 puff 2 (Two) Times a Day., Disp: 1 each, Rfl: 5    gabapentin (NEURONTIN) 800 MG tablet, Take 1 tablet by mouth 3 (Three) Times a Day., Disp: , Rfl:      hydrOXYzine pamoate (VISTARIL) 50 MG capsule, Take 1 capsule by mouth Daily., Disp: , Rfl:     ibuprofen (ADVIL,MOTRIN) 800 MG tablet, Take 1 tablet by mouth Every 8 (Eight) Hours As Needed for Mild Pain., Disp: , Rfl:     montelukast (SINGULAIR) 10 MG tablet, Take 1 tablet by mouth Every Night., Disp: 90 tablet, Rfl: 3    multivitamin with minerals tablet tablet, Take 1 tablet by mouth Daily., Disp: , Rfl:     NON FORMULARY, Take 1 tablet by mouth Daily. Marijuana Gummies, Disp: , Rfl:     Current Facility-Administered Medications:     pneumococcal polysaccharide 23-valent (PNEUMOVAX-23) vaccine 0.5 mL, 0.5 mL, Intramuscular, During Hospitalization, Russel Santana MD    Past Medical History:   Diagnosis Date    Abnormal PET scan of lung     Nov 2022    Allergic rhinitis     Arthritis     Atrial fibrillation     Bronchiectasis     Bronchitis     Cancer     prostate    Chronic bronchitis     Chronic pain     GERD (gastroesophageal reflux disease)     Lung cancer     Pneumonia     Prostatitis, acute     S/P ACL reconstruction     Septic shock due to urinary tract infection     Sinusitis     Strep throat     UTI (urinary tract infection)        Past Surgical History:   Procedure Laterality Date    ANKLE SURGERY Right     BACK SURGERY      X2    ENDOSCOPY N/A 01/10/2022    Procedure: ESOPHAGOGASTRODUODENOSCOPY WITH ANESTHESIA;  Surgeon: Tucker Bright MD;  Location: [facility] OR;  Service: Gastroenterology;  Laterality: N/A;  pre food bolus  post food bolus      KNEE ACL RECONSTRUCTION Left     NASAL SEPTUM SURGERY      PENILE PROSTHESIS IMPLANT N/A 03/15/2022    Procedure: 3-PIECE INFLATABLE PENILE PROSTHESIS PLACEMENT;  Surgeon: Trae Calrk MD;  Location:  [facility] OR;  Service: Urology;  Laterality: N/A;    PROSTATE ULTRASOUND BIOPSY N/A 02/18/2020    Procedure: PROSTATE  BIOPSY;  Surgeon: Trae Clark MD;  Location:  [facility] OR;  Service: Urology;  Laterality: N/A;    PROSTATECTOMY N/A 09/22/2020     "Procedure: RADICAL PROSTATECTOMY LAPAROSCOPIC WITH DAVINCI ROBOT;  Surgeon: Nuno Jj MD;  Location:  [facility] OR;  Service: DaVinci;  Laterality: N/A;    SHOULDER SURGERY Left     X 4    TENNIS ELBOW RELEASE Bilateral 2011    THORACOTOMY Right 2023    Procedure: RIGHT THORACOTOMY WITH CHEST WALL RESECTION, MEDIASTINAL LYMPH NODE DISSECTION, RIGHT UPPER LOBECTOMY;  Surgeon: Goldy Carlin MD;  Location:  [facility] OR;  Service: Cardiothoracic;  Laterality: Right;    VENOUS ACCESS DEVICE (PORT) INSERTION N/A 2022    Procedure: Single Lumen Port-a-cath insertion with flouroscopy;  Surgeon: Kim Bonilla MD;  Location:  [facility] OR;  Service: General;  Laterality: N/A;       Social History     Socioeconomic History    Marital status:    Tobacco Use    Smoking status: Former     Packs/day: 1.00     Years: 35.00     Pack years: 35.00     Types: Cigarettes     Start date: 1987     Quit date: 2022     Years since quittin.8     Passive exposure: Past    Smokeless tobacco: Former     Types: Snuff     Quit date:    Vaping Use    Vaping Use: Former   Substance and Sexual Activity    Alcohol use: No     Comment: 0    Drug use: Not Currently     Frequency: 7.0 times per week     Types: Hydrocodone, Marijuana, Oxycodone     Comment: Edible gummies - 1 per day    Sexual activity: Yes     Partners: Female     Birth control/protection: None       Family History   Problem Relation Age of Onset    Cancer Mother        Objective    Temp 97.6 °F (36.4 °C)   Ht 188 cm (74\")   Wt 102 kg (225 lb)   BMI 28.89 kg/m²     Physical Exam        Results for orders placed or performed during the hospital encounter of 23   Adult Stress Echo W/ Cont or Stress Agent if Necessary Per Protocol   Result Value Ref Range    BH CV STRESS PROTOCOL 1 Pharmacologic     Stage 1 1.0     HR Stage 1 84     BP Stage 1 132/79     Duration Min Stage 1 3     Duration Sec Stage 1 0     Stress Dose Dobutamine " Stage 1 10.00     Stage 2 2.0     HR Stage 2 133     BP Stage 2 144/72     Duration Min Stage 2 3     Duration Sec Stage 2 0     Stress Dose Dobutamine Stage 2 20.00     Stage 3 3.0     HR Stage 3 151     BP Stage 3 143/73     Duration Min Stage 3 2     Duration Sec Stage 3 23     Stress Dose Dobutamine Stage 3 30.00     Baseline HR 77 bpm    Baseline /76 mmHg    Peak  bpm    Peak /73 mmHg    Recovery  bpm    Recovery /79 mmHg    Target HR (85%) 140 bpm    Max. Pred. HR (100%) 165 bpm    Percent Max Pred HR 91.52 %    Exercise duration (min) 8 min    Exercise duration (sec) 23 sec    Percent Target  %    PostStressEF 65 %     Assessment and Plan    Diagnoses and all orders for this visit:    1. History of prostate cancer (Primary)  -     Cancel: POC Urinalysis Dipstick, Multipro  -     PSA DIAGNOSTIC; Future      Doing well MARGARITO with previously undetectable PSA.  PSA level drawn today and we will call him with the results if abnormal.  He will otherwise followup with me in 6 months in the [facility] with preclinic PSA or sooner as needed.      I spent approximately 15 minutes providing clinical care for this patient; including review of patient's chart and provider documentation, face to face time spent with patient in examination room (obtaining history, performing physical exam, discussing diagnosis and management options), placing orders, and completing patient documentation.         This document has been signed by DESHAUN Clark MD on September 29, 2023 17:06 CDT

## 2023-09-26 ENCOUNTER — HOSPITAL ENCOUNTER (OUTPATIENT)
Dept: INFUSION THERAPY | Age: 58
Discharge: HOME | End: 2023-09-26
Payer: MEDICAID

## 2023-09-26 VITALS
RESPIRATION RATE: 18 BRPM | HEIGHT: 74 IN | WEIGHT: 224 LBS | HEART RATE: 63 BPM | OXYGEN SATURATION: 100 % | DIASTOLIC BLOOD PRESSURE: 81 MMHG | BODY MASS INDEX: 28.75 KG/M2 | SYSTOLIC BLOOD PRESSURE: 122 MMHG | TEMPERATURE: 98 F

## 2023-09-26 DIAGNOSIS — R53.0 NEOPLASTIC MALIGNANT RELATED FATIGUE: ICD-10-CM

## 2023-09-26 DIAGNOSIS — C34.11 PRIMARY ADENOCARCINOMA OF UPPER LOBE OF RIGHT LUNG (HCC): Primary | ICD-10-CM

## 2023-09-26 DIAGNOSIS — Z90.89 S/P LOBECTOMY OF BRAIN: ICD-10-CM

## 2023-09-26 DIAGNOSIS — C34.91 ADENOCARCINOMA, LUNG, RIGHT (HCC): ICD-10-CM

## 2023-09-26 DIAGNOSIS — C34.91 ADENOCARCINOMA OF RIGHT LUNG (HCC): ICD-10-CM

## 2023-09-26 DIAGNOSIS — C34.91 ADENOCARCINOMA OF RIGHT LUNG (HCC): Primary | ICD-10-CM

## 2023-09-26 DIAGNOSIS — R53.0 NEOPLASTIC MALIGNANT RELATED FATIGUE: Primary | ICD-10-CM

## 2023-09-26 LAB
ALBUMIN SERPL-MCNC: 4.4 G/DL (ref 3.5–5.2)
ALP SERPL-CCNC: 88 U/L (ref 40–130)
ALT SERPL-CCNC: 33 U/L (ref 21–72)
ANION GAP SERPL CALCULATED.3IONS-SCNC: 5 MMOL/L (ref 7–19)
AST SERPL-CCNC: 26 U/L (ref 17–59)
BASOPHILS # BLD: 0.04 K/UL (ref 0.01–0.08)
BASOPHILS NFR BLD: 0.9 % (ref 0.1–1.2)
BILIRUB SERPL-MCNC: 0.5 MG/DL (ref 0.2–1.3)
BUN SERPL-MCNC: 20 MG/DL (ref 9–20)
CALCIUM SERPL-MCNC: 9.8 MG/DL (ref 8.4–10.2)
CHLORIDE SERPL-SCNC: 108 MMOL/L (ref 98–111)
CO2 SERPL-SCNC: 27 MMOL/L (ref 22–29)
CORTIS AM PEAK SERPL-MCNC: 11 UG/DL (ref 6.2–19.4)
CREAT SERPL-MCNC: 1.1 MG/DL (ref 0.6–1.2)
EOSINOPHIL # BLD: 0.2 K/UL (ref 0.04–0.54)
EOSINOPHIL NFR BLD: 4.6 % (ref 0.7–7)
ERYTHROCYTE [DISTWIDTH] IN BLOOD BY AUTOMATED COUNT: 13.8 % (ref 11.6–14.4)
GLOBULIN: 2.5 G/DL
GLUCOSE SERPL-MCNC: 87 MG/DL (ref 74–106)
HCT VFR BLD AUTO: 40.1 % (ref 40.1–51)
HGB BLD-MCNC: 13.8 G/DL (ref 13.7–17.5)
LYMPHOCYTES # BLD: 0.86 K/UL (ref 1.18–3.74)
LYMPHOCYTES NFR BLD: 19.6 % (ref 19.3–53.1)
MCH RBC QN AUTO: 30.2 PG (ref 25.7–32.2)
MCHC RBC AUTO-ENTMCNC: 34.4 G/DL (ref 32.3–36.5)
MCV RBC AUTO: 87.7 FL (ref 79–92.2)
MONOCYTES # BLD: 0.43 K/UL (ref 0.24–0.82)
MONOCYTES NFR BLD: 9.8 % (ref 4.7–12.5)
NEUTROPHILS # BLD: 2.84 K/UL (ref 1.56–6.13)
NEUTS SEG NFR BLD: 64.6 % (ref 34–71.1)
PLATELET # BLD AUTO: 150 K/UL (ref 163–337)
PMV BLD AUTO: 9.2 FL (ref 7.4–10.4)
POTASSIUM SERPL-SCNC: 4 MMOL/L (ref 3.5–5.1)
PROT SERPL-MCNC: 6.9 G/DL (ref 6.3–8.2)
RBC # BLD AUTO: 4.57 M/UL (ref 4.63–6.08)
SODIUM SERPL-SCNC: 140 MMOL/L (ref 137–145)
TSH SERPL DL<=0.005 MIU/L-ACNC: 4.3 UIU/ML (ref 0.27–4.2)
WBC # BLD AUTO: 4.39 K/UL (ref 4.23–9.07)

## 2023-09-26 PROCEDURE — 85025 COMPLETE CBC W/AUTO DIFF WBC: CPT

## 2023-09-26 PROCEDURE — 2580000003 HC RX 258: Performed by: PHYSICIAN ASSISTANT

## 2023-09-26 PROCEDURE — 96413 CHEMO IV INFUSION 1 HR: CPT

## 2023-09-26 PROCEDURE — 6360000002 HC RX W HCPCS: Performed by: PHYSICIAN ASSISTANT

## 2023-09-26 PROCEDURE — 80053 COMPREHEN METABOLIC PANEL: CPT

## 2023-09-26 PROCEDURE — 36415 COLL VENOUS BLD VENIPUNCTURE: CPT

## 2023-09-26 RX ORDER — ONDANSETRON 2 MG/ML
8 INJECTION INTRAMUSCULAR; INTRAVENOUS
Status: CANCELLED | OUTPATIENT
Start: 2023-09-26

## 2023-09-26 RX ORDER — EPINEPHRINE 1 MG/ML
0.3 INJECTION, SOLUTION, CONCENTRATE INTRAVENOUS PRN
Status: CANCELLED | OUTPATIENT
Start: 2023-09-26

## 2023-09-26 RX ORDER — SODIUM CHLORIDE 9 MG/ML
5-250 INJECTION, SOLUTION INTRAVENOUS PRN
Status: CANCELLED | OUTPATIENT
Start: 2023-09-26

## 2023-09-26 RX ORDER — ALBUTEROL SULFATE 90 UG/1
4 AEROSOL, METERED RESPIRATORY (INHALATION) PRN
Status: CANCELLED | OUTPATIENT
Start: 2023-09-26

## 2023-09-26 RX ORDER — DIPHENHYDRAMINE HYDROCHLORIDE 50 MG/ML
50 INJECTION INTRAMUSCULAR; INTRAVENOUS
Status: CANCELLED | OUTPATIENT
Start: 2023-09-26

## 2023-09-26 RX ORDER — HEPARIN SODIUM (PORCINE) LOCK FLUSH IV SOLN 100 UNIT/ML 100 UNIT/ML
500 SOLUTION INTRAVENOUS PRN
Status: CANCELLED | OUTPATIENT
Start: 2023-09-26

## 2023-09-26 RX ORDER — HEPARIN 100 UNIT/ML
500 SYRINGE INTRAVENOUS PRN
Status: DISCONTINUED | OUTPATIENT
Start: 2023-09-26 | End: 2023-09-27 | Stop reason: HOSPADM

## 2023-09-26 RX ORDER — SODIUM CHLORIDE 9 MG/ML
INJECTION, SOLUTION INTRAVENOUS CONTINUOUS
Status: CANCELLED | OUTPATIENT
Start: 2023-09-26

## 2023-09-26 RX ORDER — ACETAMINOPHEN 325 MG/1
650 TABLET ORAL
Status: CANCELLED | OUTPATIENT
Start: 2023-09-26

## 2023-09-26 RX ORDER — FAMOTIDINE 10 MG/ML
20 INJECTION, SOLUTION INTRAVENOUS
Status: CANCELLED | OUTPATIENT
Start: 2023-09-26

## 2023-09-26 RX ORDER — SODIUM CHLORIDE 0.9 % (FLUSH) 0.9 %
5-40 SYRINGE (ML) INJECTION PRN
Status: DISCONTINUED | OUTPATIENT
Start: 2023-09-26 | End: 2023-09-27 | Stop reason: HOSPADM

## 2023-09-26 RX ORDER — SODIUM CHLORIDE 0.9 % (FLUSH) 0.9 %
5-40 SYRINGE (ML) INJECTION PRN
Status: CANCELLED | OUTPATIENT
Start: 2023-09-26

## 2023-09-26 RX ORDER — MEPERIDINE HYDROCHLORIDE 50 MG/ML
12.5 INJECTION INTRAMUSCULAR; INTRAVENOUS; SUBCUTANEOUS PRN
Status: CANCELLED | OUTPATIENT
Start: 2023-09-26

## 2023-09-26 RX ADMIN — SODIUM CHLORIDE, PRESERVATIVE FREE 10 ML: 5 INJECTION INTRAVENOUS at 10:10

## 2023-09-26 RX ADMIN — SODIUM CHLORIDE 200 MG: 9 INJECTION, SOLUTION INTRAVENOUS at 09:39

## 2023-09-26 RX ADMIN — HEPARIN 500 UNITS: 100 SYRINGE at 10:10

## 2023-09-26 ASSESSMENT — PAIN DESCRIPTION - LOCATION: LOCATION: GENERALIZED

## 2023-09-26 ASSESSMENT — PAIN SCALES - GENERAL: PAINLEVEL_OUTOF10: 4

## 2023-09-28 ENCOUNTER — OFFICE VISIT (OUTPATIENT)
Dept: UROLOGY | Facility: CLINIC | Age: 58
End: 2023-09-28
Payer: MEDICAID

## 2023-09-28 VITALS — BODY MASS INDEX: 28.88 KG/M2 | TEMPERATURE: 97.6 F | HEIGHT: 74 IN | WEIGHT: 225 LBS

## 2023-09-28 DIAGNOSIS — Z85.46 HISTORY OF PROSTATE CANCER: ICD-10-CM

## 2023-09-28 DIAGNOSIS — Z85.46 HISTORY OF PROSTATE CANCER: Primary | ICD-10-CM

## 2023-09-28 NOTE — LETTER
September 29, 2023     Flex Sheikh MD  [address] [address]    Patient: Lalo Jones Sr.   YOB: 1965   Date of Visit: 9/28/2023     Dear Dr. Kori MD:    Thank you for referring Lalo Jones to me for evaluation. Below are the relevant portions of my assessment and plan of care.    If you have questions, please do not hesitate to call me. I look forward to following Lalo along with you.         Sincerely,        Trae Clark MD        CC: No Recipients      Progress Notes:  Subjective    Mr. Jones is 57 y.o. male    Chief Complaint: History of prostate cancer    History of Present Illness    57-year-old male established patient follow-up for history of prostate cancer after robotic assisted laparoscopic prostatectomy by Dr. Jj September 2020 for PT2N0 [place] 7 disease with tumor approaching the right posterior margin.  He had Coloplast Titan touch three-piece inflatable penile implant 3/15/2022 for post-prostatectomy erectile dysfunction.  His most recent PSA has remained undetectable.  He got his PSA drawn today.    The following portions of the patient's history were reviewed and updated as appropriate: allergies, current medications, past family history, past medical history, past social history, past surgical history and problem list.    Review of Systems      Current Outpatient Medications:   •  acetaminophen (Tylenol) 325 MG tablet, Take 3 tablets by mouth Every 8 (Eight) Hours. Take every 8 hours for 3 days then take prn as needed., Disp: 100 tablet, Rfl: 2  •  albuterol (PROVENTIL) (2.5 MG/3ML) 0.083% nebulizer solution, Take 2.5 mg by nebulization Every 4 (Four) Hours As Needed for Wheezing., Disp: 360 mL, Rfl: 3  •  albuterol sulfate  (90 Base) MCG/ACT inhaler, Inhale 2 puffs Every 4 (Four) Hours As Needed for Wheezing or Shortness of Air., Disp: 6.7 g, Rfl: 5  •  Azelastine HCl 137 MCG/SPRAY solution, 2 sprays into the nostril(s) as  directed by provider 2 (Two) Times a Day., Disp: 30 mL, Rfl: 5  •  cetirizine (zyrTEC) 10 MG tablet, Take 1 tablet by mouth Daily., Disp: 90 tablet, Rfl: 3  •  fluticasone (FLONASE) 50 MCG/ACT nasal spray, 2 sprays into the nostril(s) as directed by provider Daily As Needed for Rhinitis or Allergies., Disp: 16 g, Rfl: 5  •  Fluticasone-Salmeterol (ADVAIR/WIXELA) 250-50 MCG/ACT DISKUS, Inhale 1 puff 2 (Two) Times a Day., Disp: 1 each, Rfl: 5  •  gabapentin (NEURONTIN) 800 MG tablet, Take 1 tablet by mouth 3 (Three) Times a Day., Disp: , Rfl:   •  hydrOXYzine pamoate (VISTARIL) 50 MG capsule, Take 1 capsule by mouth Daily., Disp: , Rfl:   •  ibuprofen (ADVIL,MOTRIN) 800 MG tablet, Take 1 tablet by mouth Every 8 (Eight) Hours As Needed for Mild Pain., Disp: , Rfl:   •  montelukast (SINGULAIR) 10 MG tablet, Take 1 tablet by mouth Every Night., Disp: 90 tablet, Rfl: 3  •  multivitamin with minerals tablet tablet, Take 1 tablet by mouth Daily., Disp: , Rfl:   •  NON FORMULARY, Take 1 tablet by mouth Daily. Marijuana Gummies, Disp: , Rfl:     Current Facility-Administered Medications:   •  pneumococcal polysaccharide 23-valent (PNEUMOVAX-23) vaccine 0.5 mL, 0.5 mL, Intramuscular, During Hospitalization, Russel Santana MD    Past Medical History:   Diagnosis Date   • Abnormal PET scan of lung     Nov 2022   • Allergic rhinitis    • Arthritis    • Atrial fibrillation    • Bronchiectasis    • Bronchitis    • Cancer     prostate   • Chronic bronchitis    • Chronic pain    • GERD (gastroesophageal reflux disease)    • Lung cancer    • Pneumonia    • Prostatitis, acute    • S/P ACL reconstruction    • Septic shock due to urinary tract infection    • Sinusitis    • Strep throat    • UTI (urinary tract infection)        Past Surgical History:   Procedure Laterality Date   • ANKLE SURGERY Right    • BACK SURGERY      X2   • ENDOSCOPY N/A 01/10/2022    Procedure: ESOPHAGOGASTRODUODENOSCOPY WITH ANESTHESIA;  Surgeon: Tucker Bright  MD LUIS;  Location:  [facility] OR;  Service: Gastroenterology;  Laterality: N/A;  pre food bolus  post food bolus     • KNEE ACL RECONSTRUCTION Left    • NASAL SEPTUM SURGERY     • PENILE PROSTHESIS IMPLANT N/A 03/15/2022    Procedure: 3-PIECE INFLATABLE PENILE PROSTHESIS PLACEMENT;  Surgeon: Trae Clark MD;  Location:  [facility] OR;  Service: Urology;  Laterality: N/A;   • PROSTATE ULTRASOUND BIOPSY N/A 2020    Procedure: PROSTATE  BIOPSY;  Surgeon: Trae Clark MD;  Location:  [facility] OR;  Service: Urology;  Laterality: N/A;   • PROSTATECTOMY N/A 2020    Procedure: RADICAL PROSTATECTOMY LAPAROSCOPIC WITH DAVINCI ROBOT;  Surgeon: Nuno Jj MD;  Location:  [facility] OR;  Service: DaVinci;  Laterality: N/A;   • SHOULDER SURGERY Left     X 4   • TENNIS ELBOW RELEASE Bilateral 2011   • THORACOTOMY Right 2023    Procedure: RIGHT THORACOTOMY WITH CHEST WALL RESECTION, MEDIASTINAL LYMPH NODE DISSECTION, RIGHT UPPER LOBECTOMY;  Surgeon: Goldy Carlin MD;  Location:  [facility] OR;  Service: Cardiothoracic;  Laterality: Right;   • VENOUS ACCESS DEVICE (PORT) INSERTION N/A 2022    Procedure: Single Lumen Port-a-cath insertion with flouroscopy;  Surgeon: Kim Bonilla MD;  Location:  [facility] OR;  Service: General;  Laterality: N/A;       Social History     Socioeconomic History   • Marital status:    Tobacco Use   • Smoking status: Former     Packs/day: 1.00     Years: 35.00     Pack years: 35.00     Types: Cigarettes     Start date: 1987     Quit date: 2022     Years since quittin.8     Passive exposure: Past   • Smokeless tobacco: Former     Types: Snuff     Quit date:    Vaping Use   • Vaping Use: Former   Substance and Sexual Activity   • Alcohol use: No     Comment: 0   • Drug use: Not Currently     Frequency: 7.0 times per week     Types: Hydrocodone, Marijuana, Oxycodone     Comment: Edible gummies - 1 per day   • Sexual activity: Yes     Partners:  "Female     Birth control/protection: None       Family History   Problem Relation Age of Onset   • Cancer Mother        Objective    Temp 97.6 °F (36.4 °C)   Ht 188 cm (74\")   Wt 102 kg (225 lb)   BMI 28.89 kg/m²     Physical Exam        Results for orders placed or performed during the hospital encounter of 09/12/23   Adult Stress Echo W/ Cont or Stress Agent if Necessary Per Protocol   Result Value Ref Range    BH CV STRESS PROTOCOL 1 Pharmacologic     Stage 1 1.0     HR Stage 1 84     BP Stage 1 132/79     Duration Min Stage 1 3     Duration Sec Stage 1 0     Stress Dose Dobutamine Stage 1 10.00     Stage 2 2.0     HR Stage 2 133     BP Stage 2 144/72     Duration Min Stage 2 3     Duration Sec Stage 2 0     Stress Dose Dobutamine Stage 2 20.00     Stage 3 3.0     HR Stage 3 151     BP Stage 3 143/73     Duration Min Stage 3 2     Duration Sec Stage 3 23     Stress Dose Dobutamine Stage 3 30.00     Baseline HR 77 bpm    Baseline /76 mmHg    Peak  bpm    Peak /73 mmHg    Recovery  bpm    Recovery /79 mmHg    Target HR (85%) 140 bpm    Max. Pred. HR (100%) 165 bpm    Percent Max Pred HR 91.52 %    Exercise duration (min) 8 min    Exercise duration (sec) 23 sec    Percent Target  %    PostStressEF 65 %     Assessment and Plan    Diagnoses and all orders for this visit:    1. History of prostate cancer (Primary)  -     Cancel: POC Urinalysis Dipstick, Multipro  -     PSA DIAGNOSTIC; Future      Doing well MARGARITO with previously undetectable PSA.  PSA level drawn today and we will call him with the results if abnormal.  He will otherwise followup with me in 6 months in the [facility] clinic with preclinic PSA or sooner as needed.      I spent approximately 15 minutes providing clinical care for this patient; including review of patient's chart and provider documentation, face to face time spent with patient in examination room (obtaining history, performing physical exam, discussing " diagnosis and management options), placing orders, and completing patient documentation.         This document has been signed by DESHAUN Clark MD on September 29, 2023 17:06 CDT

## 2023-10-05 ENCOUNTER — TELEPHONE (OUTPATIENT)
Dept: CARDIAC SURGERY | Facility: CLINIC | Age: 58
End: 2023-10-05
Payer: MEDICAID

## 2023-10-05 NOTE — TELEPHONE ENCOUNTER
Caller: Lalo Jones Sr.    Relationship: Self    Best call back number: [phone]    What form or medical record are you requesting: RETURN TO WORK    Who is requesting this form or medical record from you: [facility]    How would you like to receive the form or medical records (pick-up, mail, fax): FAX  If fax, what is the fax number: [phone] JOLANTA CHIU    Timeframe paperwork needed: ASAP

## 2023-10-09 ENCOUNTER — OFFICE VISIT (OUTPATIENT)
Dept: CARDIOLOGY | Facility: CLINIC | Age: 58
End: 2023-10-09
Payer: MEDICAID

## 2023-10-09 VITALS
DIASTOLIC BLOOD PRESSURE: 83 MMHG | SYSTOLIC BLOOD PRESSURE: 132 MMHG | OXYGEN SATURATION: 98 % | WEIGHT: 224 LBS | HEART RATE: 87 BPM | BODY MASS INDEX: 28.76 KG/M2

## 2023-10-09 DIAGNOSIS — J44.9 CHRONIC OBSTRUCTIVE PULMONARY DISEASE, UNSPECIFIED COPD TYPE: Primary | ICD-10-CM

## 2023-10-09 DIAGNOSIS — C34.11 MALIGNANT NEOPLASM OF UPPER LOBE OF RIGHT LUNG: ICD-10-CM

## 2023-10-09 DIAGNOSIS — Z92.3 STATUS POST RADIATION THERAPY: ICD-10-CM

## 2023-10-09 DIAGNOSIS — Z86.16 HISTORY OF COVID-19: ICD-10-CM

## 2023-10-09 DIAGNOSIS — R55 SYNCOPE AND COLLAPSE: ICD-10-CM

## 2023-10-09 DIAGNOSIS — Z87.891 FORMER SMOKER: ICD-10-CM

## 2023-10-09 DIAGNOSIS — Z90.2 S/P LOBECTOMY OF LUNG: ICD-10-CM

## 2023-10-09 DIAGNOSIS — Z92.21 HISTORY OF CHEMOTHERAPY: ICD-10-CM

## 2023-10-09 PROCEDURE — 1159F MED LIST DOCD IN RCRD: CPT | Performed by: INTERNAL MEDICINE

## 2023-10-09 PROCEDURE — 93000 ELECTROCARDIOGRAM COMPLETE: CPT | Performed by: INTERNAL MEDICINE

## 2023-10-09 PROCEDURE — 1160F RVW MEDS BY RX/DR IN RCRD: CPT | Performed by: INTERNAL MEDICINE

## 2023-10-09 PROCEDURE — 99214 OFFICE O/P EST MOD 30 MIN: CPT | Performed by: INTERNAL MEDICINE

## 2023-10-09 RX ORDER — MIDODRINE HYDROCHLORIDE 2.5 MG/1
2.5 TABLET ORAL
Qty: 90 TABLET | Refills: 11 | Status: SHIPPED | OUTPATIENT
Start: 2023-10-09

## 2023-10-09 NOTE — PROGRESS NOTES
Lalo Jones Sr.  [id number]  1965  57 y.o.  male    Referring Provider: Flex Sheikh MD    Reason for  Visit: Here for routine follow up   Cardiac workup test results as below       Subjective    Overall feels the same   No new events or complaints since last visit   Overall the patient feels no major change from baseline symptoms   Similar symptoms as during last visit       Mild to moderate chronic exertional shortness of breath on exertion relieved with rest  No significant cough or wheezing    No palpitations  No associated chest pain  No significant pedal edema    No fever or chills  No significant expectoration    No hemoptysis  Recurrent syncope   6 times in 3 months   Recent syncope 3 weeks ago , woke up on floor  No preceding palpitations, no incontinence of urine or stools, no preceeding chest pain. No aura.  No seizure like activity   Feels a wave come over him   Gets nauseous and gets diaphoretic     Tolerating current medications well with no untoward side effects   Compliant with prescribed medication regimen. Tries to adhere to cardiac diet.     Easy fatiguability and increasing tired  Feels energy levels running low          History of present illness:  Lalo Jones Sr. is a 57 y.o. yo male with ca lung s/p lobectomy 4 months Dr Tesfaye  who presents today for   Chief Complaint   Patient presents with    Results     7 week fu/ SYNCOPE/SUTTON  Tobacco use  US CAROTID 9/12/23  STRESS TEST 9/12/23  ECHO 8/21/23  HOLTER 8/21/23  TILT TABLE 9/7/23      .    History  Past Medical History:   Diagnosis Date    Abnormal PET scan of lung     Nov 2022    Allergic rhinitis     Arthritis     Atrial fibrillation     Bronchiectasis     Bronchitis     Cancer     prostate    Chronic bronchitis     Chronic pain     COPD (chronic obstructive pulmonary disease)     GERD (gastroesophageal reflux disease)     Lung cancer     Pneumonia     Prostatitis, acute     S/P ACL reconstruction     Septic shock due to  urinary tract infection     Sinusitis     Strep throat     UTI (urinary tract infection)    ,   Past Surgical History:   Procedure Laterality Date    ANKLE SURGERY Right     BACK SURGERY      X2    ENDOSCOPY N/A 01/10/2022    Procedure: ESOPHAGOGASTRODUODENOSCOPY WITH ANESTHESIA;  Surgeon: Tucker Bright MD;  Location:  [facility];  Service: Gastroenterology;  Laterality: N/A;  pre food bolus  post food bolus  Dr. PAULINO ACL RECONSTRUCTION Left     NASAL SEPTUM SURGERY      PENILE PROSTHESIS IMPLANT N/A 03/15/2022    Procedure: 3-PIECE INFLATABLE PENILE PROSTHESIS PLACEMENT;  Surgeon: Trae lCark MD;  Location:  [facility];  Service: Urology;  Laterality: N/A;    PROSTATE ULTRASOUND BIOPSY N/A 2020    Procedure: PROSTATE  BIOPSY;  Surgeon: Trae Clark MD;  Location:  [facility];  Service: Urology;  Laterality: N/A;    PROSTATECTOMY N/A 2020    Procedure: RADICAL PROSTATECTOMY LAPAROSCOPIC WITH DAVINCI ROBOT;  Surgeon: Nnuo Jj MD;  Location:  [facility];  Service: DaVinci;  Laterality: N/A;    SHOULDER SURGERY Left     X 4    TENNIS ELBOW RELEASE Bilateral 2011    THORACOTOMY Right 2023    Procedure: RIGHT THORACOTOMY WITH CHEST WALL RESECTION, MEDIASTINAL LYMPH NODE DISSECTION, RIGHT UPPER LOBECTOMY;  Surgeon: Goldy Carlin MD;  Location:  [facility];  Service: Cardiothoracic;  Laterality: Right;    VENOUS ACCESS DEVICE (PORT) INSERTION N/A 2022    Procedure: Single Lumen Port-a-cath insertion with flouroscopy;  Surgeon: Kim Bonilla MD;  Location:  [facility];  Service: General;  Laterality: N/A;   ,   Family History   Problem Relation Age of Onset    Cancer Mother    ,   Social History     Tobacco Use    Smoking status: Former     Packs/day: 1.00     Years: 35.00     Additional pack years: 0.00     Total pack years: 35.00     Types: Cigarettes     Start date: 1987     Quit date: 2022     Years since quittin.8     Passive exposure: Past     Smokeless tobacco: Former     Types: Snuff     Quit date: 1985   Vaping Use    Vaping Use: Former   Substance Use Topics    Alcohol use: No     Comment: 0    Drug use: Not Currently     Frequency: 7.0 times per week     Types: Hydrocodone, Marijuana, Oxycodone     Comment: Edible gummies - 1 per day   ,     Medications  Current Outpatient Medications   Medication Sig Dispense Refill    acetaminophen (Tylenol) 325 MG tablet Take 3 tablets by mouth Every 8 (Eight) Hours. Take every 8 hours for 3 days then take prn as needed. 100 tablet 2    albuterol (PROVENTIL) (2.5 MG/3ML) 0.083% nebulizer solution Take 2.5 mg by nebulization Every 4 (Four) Hours As Needed for Wheezing. 360 mL 3    albuterol sulfate  (90 Base) MCG/ACT inhaler Inhale 2 puffs Every 4 (Four) Hours As Needed for Wheezing or Shortness of Air. 6.7 g 5    Azelastine HCl 137 MCG/SPRAY solution 2 sprays into the nostril(s) as directed by provider 2 (Two) Times a Day. 30 mL 5    cetirizine (zyrTEC) 10 MG tablet Take 1 tablet by mouth Daily. 90 tablet 3    fluticasone (FLONASE) 50 MCG/ACT nasal spray 2 sprays into the nostril(s) as directed by provider Daily As Needed for Rhinitis or Allergies. 16 g 5    Fluticasone-Salmeterol (ADVAIR/WIXELA) 250-50 MCG/ACT DISKUS Inhale 1 puff 2 (Two) Times a Day. 1 each 5    gabapentin (NEURONTIN) 800 MG tablet Take 1 tablet by mouth 3 (Three) Times a Day.      hydrOXYzine pamoate (VISTARIL) 50 MG capsule Take 1 capsule by mouth Daily.      ibuprofen (ADVIL,MOTRIN) 800 MG tablet Take 1 tablet by mouth Every 8 (Eight) Hours As Needed for Mild Pain.      montelukast (SINGULAIR) 10 MG tablet Take 1 tablet by mouth Every Night. 90 tablet 3    multivitamin with minerals tablet tablet Take 1 tablet by mouth Daily.      NON FORMULARY Take 1 tablet by mouth Daily. Marijuana Gummies      midodrine (PROAMATINE) 2.5 MG tablet Take 1 tablet by mouth 3 (Three) Times a Day Before Meals. 90 tablet 11     Current  Facility-Administered Medications   Medication Dose Route Frequency Provider Last Rate Last Admin    pneumococcal polysaccharide 23-valent (PNEUMOVAX-23) vaccine 0.5 mL  0.5 mL Intramuscular During Hospitalization Russel Santana MD           Allergies:  Adhesive tape    Review of Systems  Review of Systems   Constitutional: Negative.   HENT: Negative.     Eyes: Negative.    Cardiovascular:  Positive for dyspnea on exertion and syncope. Negative for chest pain, claudication, cyanosis, irregular heartbeat, leg swelling, near-syncope, orthopnea, palpitations and paroxysmal nocturnal dyspnea.   Respiratory: Negative.     Endocrine: Negative.    Hematologic/Lymphatic: Negative.    Skin: Negative.    Gastrointestinal:  Negative for anorexia.   Genitourinary: Negative.    Psychiatric/Behavioral: Negative.         Objective     Physical Exam:  /83   Pulse 87   Wt 102 kg (224 lb)   SpO2 98%   BMI 28.76 kg/mý     Physical Exam  Constitutional:       Appearance: He is well-developed.   HENT:      Head: Normocephalic.   Neck:      Vascular: Normal carotid pulses. No carotid bruit or JVD.      Trachea: No tracheal tenderness or tracheal deviation.   Cardiovascular:      Rate and Rhythm: Regular rhythm.      Pulses: Normal pulses.      Heart sounds: Normal heart sounds.   Pulmonary:      Effort: Pulmonary effort is normal.      Breath sounds: No stridor.   Abdominal:      General: There is no distension.      Palpations: Abdomen is soft.      Tenderness: There is no abdominal tenderness.   Musculoskeletal:      Cervical back: No edema.   Skin:     General: Skin is warm.   Neurological:      Mental Status: He is alert.      Cranial Nerves: No cranial nerve deficit.      Sensory: No sensory deficit.   Psychiatric:         Speech: Speech normal.         Behavior: Behavior normal.         Results Review:\          Complete Transthoracic Echocardiogram with Complete Doppler, Color Flow and Myocardial Strain  Imaging    Accession Number: [id number]   Date of Study: 8/21/23   Ordering Provider: Abdi Rivera MD   Clinical Indications: Dyspnea        Interpreting Physicians  Performing Staff   Abdi Rivera MD Tech: Joseph Patel        Patient Hx Of Height, Weight, and Vitals    Height Weight BSA (Calculated - sq m) BMI (Calculated) Retired BMI (kg/m2) Pulse BP               ISCV PACS Images     Show images for Adult Transthoracic Echo Complete w/ Color, Spectral and Contrast if necessary per protocol   Clinical Indication    Dyspnea   Dx: Syncope and collapse [R55 (ICD-10-CM)]     Interpretation Summary         Left ventricular systolic function is normal. Left ventricular ejection fraction appears to be 56 - 60%.    Left ventricular diastolic function was normal.    Estimated right ventricular systolic pressure from tricuspid regurgitation is normal (<35 mmHg).    Normal global longitudinal LV strain (GLS) = -16.0%       Results for orders placed during the hospital encounter of 09/12/23    Adult Stress Echo W/ Cont or Stress Agent if Necessary Per Protocol    Interpretation Summary    Left ventricular ejection fraction appears to be 56 - 60%.    The patient denied chest pain.    Low risk stress test for stress induced myocardial ischemia        All echocardiographic phases visualized which shows increase in contractility wall motion and thickness both globally and regionally suggestive of no obvious evidence of stress-induced ischemia    ____________________________________________________________________  Disclaimer:    Despite low risk stress test for stress induced myocardial ischemia, there is a small but definite fraction of patients who will have significant underlying coronary artery disease that needs further evaluation and definitive treatment.    Missing significant coronary artery disease may result in increased morbidity and mortality.  In view of this if any symptoms such as chest pain, shortness of breath,  increasing weakness, feeling dizzy, passing out, palpitations, cold sweats etc.,to seek immediate medical help.    Stress test is intrinsically limited and therefore the results do not confirm or rule out presence of coronary artery disease and need to be interpreted on the basis of presentation and overall clinical picture.  ______________________________________________________________________        ____________________________________________________________________________________________________________________________________________  Health maintenance and recommendations    Heart healthy carbohydrate restricted cardiac diet   The patient is advised to reduce or avoid caffeine or other cardiac stimulants.   Minimize or avoid  NSAID-type medications      Monitor for any signs of bleeding including red or dark stools. Fall precautions.   Advised staying uptodate with immunizations per established standard guidelines.    Offered to give patient  a copy of my notes     Questions were encouraged, asked and answered to the patient's  understanding and satisfaction. Questions if any regarding current medications and side effects, need for refills and importance of compliance to medications stressed.    Reviewed available prior notes, consults, prior visits, laboratory findings, radiology and cardiology relevant reports. Updated chart as applicable. I have reviewed the patient's medical history in detail and updated the computerized patient record as relevant.      Updated patient regarding any new or relevant abnormalities on review of records or any new findings on physical exam. Mentioned to patient about purpose of visit and desirable health short and long term goals and objectives.    Primary to monitor CBC CMP Lipid panel and TSH as applicable    ___________________________________________________________________________________________________________________________________________     ECG 12  Lead    Date/Time: 10/9/2023 8:49 AM  Performed by: Abdi Rivera MD    Authorized by: Abdi Rivera MD  Comparison: compared with previous ECG from 5/8/2023  Comparison to previous ECG: Ventricular rate changed from  76    to    78   beats per minute      Rhythm: sinus rhythm  Rate: normal  Conduction: conduction normal  ST Segments: ST segments normal  T Waves: T waves normal  QRS axis: normal  Other: no other findings    Clinical impression: normal ECG          Assessment & Plan   Diagnoses and all orders for this visit:    1. Chronic obstructive pulmonary disease, unspecified COPD type (Primary)    2. History of chemotherapy    3. History of COVID-19    4. Former smoker    5. Malignant neoplasm of upper lobe of right lung    6. S/P lobectomy of lung    7. Status post radiation therapy    8. Syncope and collapse  -     Loop Recorder Implant - Treatment Room; Future  -     Ambulatory Referral to Neurology    Other orders  -     midodrine (PROAMATINE) 2.5 MG tablet; Take 1 tablet by mouth 3 (Three) Times a Day Before Meals.  Dispense: 90 tablet; Refill: 11  -     ECG 12 Lead          Plan    Patient expressed understanding  Encouraged and answered all questions   Discussed with the patient and all questioned fully answered. He will call me if any problems arise.   Discussed results of prior testing with patient : stress echo, tilt table that was negative and outpatient cardiac telemetry       implantable loop recorder recommended   Will get neuro consult     Orders Placed This Encounter   Procedures    Ambulatory Referral to Neurology     Referral Priority:   Routine     Referral Type:   Consultation     Referral Reason:   Specialty Services Required     Requested Specialty:   Neurology     Number of Visits Requested:   1    Loop Recorder Implant - Treatment Room     Standing Status:   Future     Standing Expiration Date:   10/9/2024     Order Specific Question:   What type of sedation does the patient require?      Answer:   Other     Order Specific Question:   Other (comment)     Answer:   local     Order Specific Question:   Reason for Exam:     Answer:   syncope     Order Specific Question:   Release to patient     Answer:   Routine Release [[id number]]    ECG 12 Lead     This order was created via procedure documentation     Order Specific Question:   Release to patient     Answer:   Routine Release [[id number]]        Patient expressed understanding  Encouraged and answered all questions   Discussed with the patient and all questioned fully answered. He will call me if any problems arise.   Discussed results of ECG from today     Check BP and heart rates twice daily initially till blood pressures and heart rates under good control and then at least 3x / week,   If blood pressures continue to be well-controlled then can check week a month  at home and bring a recording for review next visit  If BP >130/85 or < 100/60 persistently over 3 reading 30 mins apart or if heart rates persistently above 100 bpm or less than 55 bpm call sooner for evaluation and advise     Compression stockings, increase fluids so that there is clear urine every 2 hours, and use back brace with abdominal binder.   Elevate head end of bed 6 inches while sleeping    Requested Prescriptions     Signed Prescriptions Disp Refills    midodrine (PROAMATINE) 2.5 MG tablet 90 tablet 11     Sig: Take 1 tablet by mouth 3 (Three) Times a Day Before Meals.                Return in about 3 months (around 1/9/2024).

## 2023-10-10 ENCOUNTER — TELEPHONE (OUTPATIENT)
Dept: CARDIAC SURGERY | Facility: CLINIC | Age: 58
End: 2023-10-10
Payer: MEDICAID

## 2023-10-10 NOTE — TELEPHONE ENCOUNTER
See other telephone encounter. Pt aware of letter and this has been faxed as requested to [facility] Fax # [phone].

## 2023-10-10 NOTE — TELEPHONE ENCOUNTER
Caller: Lalo Jones Sr.    Relationship: Self    Best call back number: [phone]    What is the best time to reach you: ANY    Who are you requesting to speak with (clinical staff, provider,  specific staff member): CLINICAL    Do you know the name of the person who called: PT IS UNSURE WHO CALLED-    What was the call regarding: PT RETURNING MISSED CALL.    Is it okay if the provider responds through MyChart: N/A PLEASE CALL BACK PT.

## 2023-10-10 NOTE — TELEPHONE ENCOUNTER
"Relay     \"A letter has been created and signed by Dr. Carlin for you to return to work. You may pick this up at your convenience, or we can fax this. If this needs to be faxed, please provide the fax number you would like this faxed to and we will send a [employer] message once this has been completed.\"                "

## 2023-10-15 NOTE — PROGRESS NOTES
Patient:  Bere Granados  YOB: 1965  Date of Service: 10/17/2023  MRN: [id number]    Primary Care Physician: Gee Erazo    Chief Complaint   Patient presents with    Follow-up    Chemotherapy     Adjuvant Keytruda #7 of 18 planned    Cancer     Stage IIIB (T2b, N3, M0) adenocarcinoma of the RUL       Patient Seen, Chart, Consults notes, Labs, Radiology studies reviewed. Mr. 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 Robotic assisted laparoscopic prostatectomy by Dr. Villafana Dom September 2020 for PT2N0 [place] 7 disease with tumor approaching the right posterior margin    Neoadjuvant chemotherapy and radiation therapy as follows:   Neoadjuvant concurrent XRT (with chemoimmunotherapy) to RUL of lung initiated on 1/4/2023 completed on 2/14/2023. 5040 cGy over 28 treatment fractions. Neoadjuvant cisplatin 75 mg/m2, Alimta 500 mg/m2 and Opdivo 360 mg, cycle #1 initiated on 1/5/2023. Cycle #3 delivered on 2/16/2023. Right thoracotomy with right upper lobectomy and mediastinal lymph node dissection by Dr. Blas Matute on 5/4/2023. Final pathology was negative. The primary tumor and all mediastinal lymph nodes were also negative documenting complete response to neoadjuvant therapy. Amalia Cottrell received cycle #1 of adjuvant immunotherapy with Keytruda on 6/12/2023     He is tolerating immunotherapy without new symptoms other than persistent fatigue. I conferenced with Dr. Shawn Vitale, his pulmonologist, regarding pneumococcal vaccinations and suggested okay to proceed. Amalia Cottrell is here today, 9/5/2023 to continue cycle #7 of 18 planned adjuvant immunotherapy treatments with Keytruda.     TARGET LUNG CANCER SITES:   4.7 cm x 4 cm x 1.6 cm mass in the RUL of the lung SUV of 12  9 mm retroaortic upper abdominal lymph node

## 2023-10-16 DIAGNOSIS — Z90.89 S/P LOBECTOMY OF BRAIN: ICD-10-CM

## 2023-10-16 DIAGNOSIS — C34.91 ADENOCARCINOMA, LUNG, RIGHT (HCC): Primary | ICD-10-CM

## 2023-10-16 RX ORDER — FAMOTIDINE 10 MG/ML
20 INJECTION, SOLUTION INTRAVENOUS
OUTPATIENT
Start: 2023-10-17

## 2023-10-16 RX ORDER — SODIUM CHLORIDE 9 MG/ML
5-250 INJECTION, SOLUTION INTRAVENOUS PRN
OUTPATIENT
Start: 2023-10-17

## 2023-10-16 RX ORDER — SODIUM CHLORIDE 9 MG/ML
INJECTION, SOLUTION INTRAVENOUS CONTINUOUS
OUTPATIENT
Start: 2023-10-17

## 2023-10-16 RX ORDER — ONDANSETRON 2 MG/ML
8 INJECTION INTRAMUSCULAR; INTRAVENOUS
OUTPATIENT
Start: 2023-10-17

## 2023-10-16 RX ORDER — ALBUTEROL SULFATE 90 UG/1
4 AEROSOL, METERED RESPIRATORY (INHALATION) PRN
OUTPATIENT
Start: 2023-10-17

## 2023-10-16 RX ORDER — SODIUM CHLORIDE 0.9 % (FLUSH) 0.9 %
5-40 SYRINGE (ML) INJECTION PRN
OUTPATIENT
Start: 2023-10-17

## 2023-10-16 RX ORDER — DIPHENHYDRAMINE HYDROCHLORIDE 50 MG/ML
50 INJECTION INTRAMUSCULAR; INTRAVENOUS
OUTPATIENT
Start: 2023-10-17

## 2023-10-16 RX ORDER — MEPERIDINE HYDROCHLORIDE 50 MG/ML
12.5 INJECTION INTRAMUSCULAR; INTRAVENOUS; SUBCUTANEOUS PRN
OUTPATIENT
Start: 2023-10-17

## 2023-10-16 RX ORDER — HEPARIN SODIUM (PORCINE) LOCK FLUSH IV SOLN 100 UNIT/ML 100 UNIT/ML
500 SOLUTION INTRAVENOUS PRN
Status: CANCELLED | OUTPATIENT
Start: 2023-10-17

## 2023-10-16 RX ORDER — EPINEPHRINE 1 MG/ML
0.3 INJECTION, SOLUTION, CONCENTRATE INTRAVENOUS PRN
OUTPATIENT
Start: 2023-10-17

## 2023-10-16 RX ORDER — ACETAMINOPHEN 325 MG/1
650 TABLET ORAL
OUTPATIENT
Start: 2023-10-17

## 2023-10-17 ENCOUNTER — OFFICE VISIT (OUTPATIENT)
Dept: HEMATOLOGY | Age: 58
End: 2023-10-17
Payer: MEDICAID

## 2023-10-17 ENCOUNTER — HOSPITAL ENCOUNTER (OUTPATIENT)
Dept: INFUSION THERAPY | Age: 58
Discharge: HOME | End: 2023-10-17
Payer: MEDICAID

## 2023-10-17 ENCOUNTER — HOSPITAL ENCOUNTER (OUTPATIENT)
Dept: CARDIOLOGY | Facility: HOSPITAL | Age: 58
Setting detail: HOSPITAL OUTPATIENT SURGERY
Discharge: HOME | End: 2023-10-17
Admitting: INTERNAL MEDICINE
Payer: MEDICAID

## 2023-10-17 VITALS
HEIGHT: 74 IN | DIASTOLIC BLOOD PRESSURE: 81 MMHG | TEMPERATURE: 97.5 F | WEIGHT: 223.1 LBS | RESPIRATION RATE: 14 BRPM | OXYGEN SATURATION: 99 % | HEART RATE: 72 BPM | BODY MASS INDEX: 28.63 KG/M2 | SYSTOLIC BLOOD PRESSURE: 125 MMHG

## 2023-10-17 VITALS
OXYGEN SATURATION: 99 % | TEMPERATURE: 98 F | WEIGHT: 223 LBS | RESPIRATION RATE: 18 BRPM | BODY MASS INDEX: 28.62 KG/M2 | HEART RATE: 71 BPM | HEIGHT: 74 IN | SYSTOLIC BLOOD PRESSURE: 121 MMHG | DIASTOLIC BLOOD PRESSURE: 74 MMHG

## 2023-10-17 DIAGNOSIS — C34.91 ADENOCARCINOMA OF RIGHT LUNG (HCC): Primary | ICD-10-CM

## 2023-10-17 DIAGNOSIS — Z85.46 HISTORY OF PROSTATE CANCER: ICD-10-CM

## 2023-10-17 DIAGNOSIS — R53.83 FATIGUE DUE TO TREATMENT: ICD-10-CM

## 2023-10-17 DIAGNOSIS — Z00.00 HEALTH CARE MAINTENANCE: ICD-10-CM

## 2023-10-17 DIAGNOSIS — Z98.890 STATUS POST THORACOTOMY: ICD-10-CM

## 2023-10-17 DIAGNOSIS — C34.91 ADENOCARCINOMA, LUNG, RIGHT (HCC): ICD-10-CM

## 2023-10-17 DIAGNOSIS — R55 SYNCOPE AND COLLAPSE: ICD-10-CM

## 2023-10-17 DIAGNOSIS — Z51.12 ENCOUNTER FOR ANTINEOPLASTIC IMMUNOTHERAPY: ICD-10-CM

## 2023-10-17 DIAGNOSIS — Z90.89 S/P LOBECTOMY OF BRAIN: Primary | ICD-10-CM

## 2023-10-17 LAB
ALBUMIN SERPL-MCNC: 4.3 G/DL (ref 3.5–5.2)
ALP SERPL-CCNC: 97 U/L (ref 40–130)
ALT SERPL-CCNC: 35 U/L (ref 21–72)
ANION GAP SERPL CALCULATED.3IONS-SCNC: 8 MMOL/L (ref 7–19)
AST SERPL-CCNC: 29 U/L (ref 17–59)
BASOPHILS # BLD: 0.05 K/UL (ref 0.01–0.08)
BASOPHILS NFR BLD: 1.2 % (ref 0.1–1.2)
BILIRUB SERPL-MCNC: 0.5 MG/DL (ref 0.2–1.3)
BUN SERPL-MCNC: 16 MG/DL (ref 9–20)
CALCIUM SERPL-MCNC: 9.9 MG/DL (ref 8.4–10.2)
CHLORIDE SERPL-SCNC: 109 MMOL/L (ref 98–111)
CO2 SERPL-SCNC: 27 MMOL/L (ref 22–29)
CREAT SERPL-MCNC: 1 MG/DL (ref 0.6–1.2)
EOSINOPHIL # BLD: 0.15 K/UL (ref 0.04–0.54)
EOSINOPHIL NFR BLD: 3.6 % (ref 0.7–7)
ERYTHROCYTE [DISTWIDTH] IN BLOOD BY AUTOMATED COUNT: 13.3 % (ref 11.6–14.4)
GLOBULIN: 2.6 G/DL
GLUCOSE SERPL-MCNC: 98 MG/DL (ref 74–106)
HCT VFR BLD AUTO: 40.1 % (ref 40.1–51)
HGB BLD-MCNC: 13.7 G/DL (ref 13.7–17.5)
LYMPHOCYTES # BLD: 0.85 K/UL (ref 1.18–3.74)
LYMPHOCYTES NFR BLD: 20.1 % (ref 19.3–53.1)
MCH RBC QN AUTO: 30.2 PG (ref 25.7–32.2)
MCHC RBC AUTO-ENTMCNC: 34.2 G/DL (ref 32.3–36.5)
MCV RBC AUTO: 88.3 FL (ref 79–92.2)
MONOCYTES # BLD: 0.49 K/UL (ref 0.24–0.82)
MONOCYTES NFR BLD: 11.6 % (ref 4.7–12.5)
NEUTROPHILS # BLD: 2.66 K/UL (ref 1.56–6.13)
NEUTS SEG NFR BLD: 63 % (ref 34–71.1)
PLATELET # BLD AUTO: 160 K/UL (ref 163–337)
PMV BLD AUTO: 9.2 FL (ref 7.4–10.4)
POTASSIUM SERPL-SCNC: 4 MMOL/L (ref 3.5–5.1)
PROT SERPL-MCNC: 6.9 G/DL (ref 6.3–8.2)
RBC # BLD AUTO: 4.54 M/UL (ref 4.63–6.08)
SODIUM SERPL-SCNC: 144 MMOL/L (ref 137–145)
WBC # BLD AUTO: 4.22 K/UL (ref 4.23–9.07)

## 2023-10-17 PROCEDURE — 6360000002 HC RX W HCPCS: Performed by: INTERNAL MEDICINE

## 2023-10-17 PROCEDURE — C1764 EVENT RECORDER, CARDIAC: HCPCS

## 2023-10-17 PROCEDURE — 80053 COMPREHEN METABOLIC PANEL: CPT

## 2023-10-17 PROCEDURE — 33285 INSJ SUBQ CAR RHYTHM MNTR: CPT

## 2023-10-17 PROCEDURE — 85025 COMPLETE CBC W/AUTO DIFF WBC: CPT

## 2023-10-17 PROCEDURE — 99214 OFFICE O/P EST MOD 30 MIN: CPT | Performed by: INTERNAL MEDICINE

## 2023-10-17 PROCEDURE — 36415 COLL VENOUS BLD VENIPUNCTURE: CPT

## 2023-10-17 PROCEDURE — 2580000003 HC RX 258: Performed by: INTERNAL MEDICINE

## 2023-10-17 PROCEDURE — 96413 CHEMO IV INFUSION 1 HR: CPT

## 2023-10-17 RX ORDER — LIDOCAINE HYDROCHLORIDE 10 MG/ML
INJECTION, SOLUTION INFILTRATION; PERINEURAL
Status: DISCONTINUED
Start: 2023-10-17 | End: 2023-10-18 | Stop reason: HOSPADM

## 2023-10-17 RX ORDER — MIDODRINE HYDROCHLORIDE 2.5 MG/1
2.5 TABLET ORAL 3 TIMES DAILY
COMMUNITY

## 2023-10-17 RX ORDER — HEPARIN 100 UNIT/ML
500 SYRINGE INTRAVENOUS PRN
Status: DISCONTINUED | OUTPATIENT
Start: 2023-10-17 | End: 2023-10-18 | Stop reason: HOSPADM

## 2023-10-17 RX ORDER — LIDOCAINE HYDROCHLORIDE 10 MG/ML
INJECTION, SOLUTION EPIDURAL; INFILTRATION; INTRACAUDAL; PERINEURAL
Status: COMPLETED | OUTPATIENT
Start: 2023-10-17 | End: 2023-10-17

## 2023-10-17 RX ADMIN — LIDOCAINE HYDROCHLORIDE 10 ML: 10 INJECTION, SOLUTION EPIDURAL; INFILTRATION; INTRACAUDAL; PERINEURAL at 12:43

## 2023-10-17 RX ADMIN — SODIUM CHLORIDE 200 MG: 9 INJECTION, SOLUTION INTRAVENOUS at 09:40

## 2023-10-17 RX ADMIN — HEPARIN 500 UNITS: 100 SYRINGE at 10:10

## 2023-11-06 DIAGNOSIS — C34.91 ADENOCARCINOMA, LUNG, RIGHT (HCC): Primary | ICD-10-CM

## 2023-11-06 DIAGNOSIS — Z90.89 S/P LOBECTOMY OF BRAIN: ICD-10-CM

## 2023-11-06 RX ORDER — SODIUM CHLORIDE 0.9 % (FLUSH) 0.9 %
5-40 SYRINGE (ML) INJECTION PRN
Status: CANCELLED | OUTPATIENT
Start: 2023-11-07

## 2023-11-06 RX ORDER — ACETAMINOPHEN 325 MG/1
650 TABLET ORAL
Status: CANCELLED | OUTPATIENT
Start: 2023-11-07

## 2023-11-06 RX ORDER — ONDANSETRON 2 MG/ML
8 INJECTION INTRAMUSCULAR; INTRAVENOUS
Status: CANCELLED | OUTPATIENT
Start: 2023-11-07

## 2023-11-06 RX ORDER — HEPARIN SODIUM (PORCINE) LOCK FLUSH IV SOLN 100 UNIT/ML 100 UNIT/ML
500 SOLUTION INTRAVENOUS PRN
Status: CANCELLED | OUTPATIENT
Start: 2023-11-07

## 2023-11-06 RX ORDER — SODIUM CHLORIDE 9 MG/ML
5-250 INJECTION, SOLUTION INTRAVENOUS PRN
Status: CANCELLED | OUTPATIENT
Start: 2023-11-07

## 2023-11-06 RX ORDER — MEPERIDINE HYDROCHLORIDE 50 MG/ML
12.5 INJECTION INTRAMUSCULAR; INTRAVENOUS; SUBCUTANEOUS PRN
Status: CANCELLED | OUTPATIENT
Start: 2023-11-07

## 2023-11-06 RX ORDER — FAMOTIDINE 10 MG/ML
20 INJECTION, SOLUTION INTRAVENOUS
Status: CANCELLED | OUTPATIENT
Start: 2023-11-07

## 2023-11-06 RX ORDER — ALBUTEROL SULFATE 90 UG/1
4 AEROSOL, METERED RESPIRATORY (INHALATION) PRN
Status: CANCELLED | OUTPATIENT
Start: 2023-11-07

## 2023-11-06 RX ORDER — DIPHENHYDRAMINE HYDROCHLORIDE 50 MG/ML
50 INJECTION INTRAMUSCULAR; INTRAVENOUS
Status: CANCELLED | OUTPATIENT
Start: 2023-11-07

## 2023-11-06 RX ORDER — SODIUM CHLORIDE 9 MG/ML
INJECTION, SOLUTION INTRAVENOUS CONTINUOUS
Status: CANCELLED | OUTPATIENT
Start: 2023-11-07

## 2023-11-06 RX ORDER — EPINEPHRINE 1 MG/ML
0.3 INJECTION, SOLUTION, CONCENTRATE INTRAVENOUS PRN
Status: CANCELLED | OUTPATIENT
Start: 2023-11-07

## 2023-11-07 ENCOUNTER — HOSPITAL ENCOUNTER (OUTPATIENT)
Dept: INFUSION THERAPY | Age: 58
Discharge: HOME | End: 2023-11-07
Payer: MEDICAID

## 2023-11-07 VITALS
TEMPERATURE: 98.3 F | RESPIRATION RATE: 18 BRPM | HEIGHT: 74 IN | SYSTOLIC BLOOD PRESSURE: 124 MMHG | DIASTOLIC BLOOD PRESSURE: 80 MMHG | HEART RATE: 90 BPM | WEIGHT: 221.7 LBS | BODY MASS INDEX: 28.45 KG/M2 | OXYGEN SATURATION: 96 %

## 2023-11-07 DIAGNOSIS — C34.11 PRIMARY ADENOCARCINOMA OF UPPER LOBE OF RIGHT LUNG (HCC): Primary | ICD-10-CM

## 2023-11-07 DIAGNOSIS — R53.0 NEOPLASTIC MALIGNANT RELATED FATIGUE: ICD-10-CM

## 2023-11-07 DIAGNOSIS — C34.11 PRIMARY ADENOCARCINOMA OF UPPER LOBE OF RIGHT LUNG (HCC): ICD-10-CM

## 2023-11-07 DIAGNOSIS — C34.91 ADENOCARCINOMA, LUNG, RIGHT (HCC): ICD-10-CM

## 2023-11-07 DIAGNOSIS — Z51.12 ENCOUNTER FOR ANTINEOPLASTIC IMMUNOTHERAPY: ICD-10-CM

## 2023-11-07 DIAGNOSIS — R19.7 DIARRHEA IN ADULT PATIENT: ICD-10-CM

## 2023-11-07 DIAGNOSIS — R19.7 DIARRHEA IN ADULT PATIENT: Primary | ICD-10-CM

## 2023-11-07 DIAGNOSIS — Z90.89 S/P LOBECTOMY OF BRAIN: ICD-10-CM

## 2023-11-07 LAB
ADV 40+41 DNA STL QL NAA+NON-PROBE: NOT DETECTED
ALBUMIN SERPL-MCNC: 4 G/DL (ref 3.5–5.2)
ALP SERPL-CCNC: 94 U/L (ref 40–130)
ALT SERPL-CCNC: 28 U/L (ref 21–72)
ANION GAP SERPL CALCULATED.3IONS-SCNC: 7 MMOL/L (ref 7–19)
AST SERPL-CCNC: 33 U/L (ref 17–59)
BASOPHILS # BLD: 0.05 K/UL (ref 0.01–0.08)
BASOPHILS NFR BLD: 1.1 % (ref 0.1–1.2)
BILIRUB SERPL-MCNC: 0.4 MG/DL (ref 0.2–1.3)
BUN SERPL-MCNC: 18 MG/DL (ref 9–20)
C CAYETANENSIS DNA STL QL NAA+NON-PROBE: NOT DETECTED
C COLI+JEJ+UPSA DNA STL QL NAA+NON-PROBE: NOT DETECTED
C DIF TOX TCDA+TCDB STL QL NAA+NON-PROBE: NOT DETECTED
CALCIUM SERPL-MCNC: 9.9 MG/DL (ref 8.4–10.2)
CHLORIDE SERPL-SCNC: 110 MMOL/L (ref 98–111)
CO2 SERPL-SCNC: 28 MMOL/L (ref 22–29)
CREAT SERPL-MCNC: 1 MG/DL (ref 0.6–1.2)
CRYPTOSP DNA STL QL NAA+NON-PROBE: NOT DETECTED
E HISTOLYT DNA STL QL NAA+NON-PROBE: NOT DETECTED
EAEC PAA PLAS AGGR+AATA ST NAA+NON-PRB: NOT DETECTED
EC STX1+STX2 GENES STL QL NAA+NON-PROBE: NOT DETECTED
EOSINOPHIL # BLD: 0.11 K/UL (ref 0.04–0.54)
EOSINOPHIL NFR BLD: 2.3 % (ref 0.7–7)
EPEC EAE GENE STL QL NAA+NON-PROBE: NOT DETECTED
ERYTHROCYTE [DISTWIDTH] IN BLOOD BY AUTOMATED COUNT: 12.8 % (ref 11.6–14.4)
ETEC LTA+ST1A+ST1B TOX ST NAA+NON-PROBE: NOT DETECTED
G LAMBLIA DNA STL QL NAA+NON-PROBE: NOT DETECTED
GI PATH DNA+RNA PNL STL NAA+NON-PROBE: NOT DETECTED
GLOBULIN: 2.6 G/DL
GLUCOSE SERPL-MCNC: 90 MG/DL (ref 74–106)
HCT VFR BLD AUTO: 40.2 % (ref 40.1–51)
HGB BLD-MCNC: 13.6 G/DL (ref 13.7–17.5)
LYMPHOCYTES # BLD: 0.86 K/UL (ref 1.18–3.74)
LYMPHOCYTES NFR BLD: 18.1 % (ref 19.3–53.1)
MCH RBC QN AUTO: 29.4 PG (ref 25.7–32.2)
MCHC RBC AUTO-ENTMCNC: 33.8 G/DL (ref 32.3–36.5)
MCV RBC AUTO: 87 FL (ref 79–92.2)
MONOCYTES # BLD: 0.46 K/UL (ref 0.24–0.82)
MONOCYTES NFR BLD: 9.7 % (ref 4.7–12.5)
NEUTROPHILS # BLD: 3.25 K/UL (ref 1.56–6.13)
NEUTS SEG NFR BLD: 68.4 % (ref 34–71.1)
NOROVIRUS GI+II RNA STL QL NAA+NON-PROBE: NOT DETECTED
P SHIGELLOIDES DNA STL QL NAA+NON-PROBE: NOT DETECTED
PLATELET # BLD AUTO: 161 K/UL (ref 163–337)
PMV BLD AUTO: 9.3 FL (ref 7.4–10.4)
POTASSIUM SERPL-SCNC: 3.7 MMOL/L (ref 3.5–5.1)
PROT SERPL-MCNC: 6.6 G/DL (ref 6.3–8.2)
RBC # BLD AUTO: 4.62 M/UL (ref 4.63–6.08)
RVA RNA STL QL NAA+NON-PROBE: NOT DETECTED
S ENT+BONG DNA STL QL NAA+NON-PROBE: NOT DETECTED
SAPO I+II+IV+V RNA STL QL NAA+NON-PROBE: NOT DETECTED
SHIGELLA SP+EIEC IPAH ST NAA+NON-PROBE: NOT DETECTED
SODIUM SERPL-SCNC: 145 MMOL/L (ref 137–145)
TSH SERPL DL<=0.005 MIU/L-ACNC: 2.96 UIU/ML (ref 0.27–4.2)
V CHOL+PARA+VUL DNA STL QL NAA+NON-PROBE: NOT DETECTED
V CHOLERAE DNA STL QL NAA+NON-PROBE: NOT DETECTED
WBC # BLD AUTO: 4.75 K/UL (ref 4.23–9.07)
Y ENTEROCOL DNA STL QL NAA+NON-PROBE: NOT DETECTED

## 2023-11-07 PROCEDURE — 2580000003 HC RX 258: Performed by: INTERNAL MEDICINE

## 2023-11-07 PROCEDURE — 85025 COMPLETE CBC W/AUTO DIFF WBC: CPT

## 2023-11-07 PROCEDURE — 36415 COLL VENOUS BLD VENIPUNCTURE: CPT

## 2023-11-07 PROCEDURE — 80053 COMPREHEN METABOLIC PANEL: CPT

## 2023-11-07 PROCEDURE — 6360000002 HC RX W HCPCS: Performed by: INTERNAL MEDICINE

## 2023-11-07 PROCEDURE — 87507 IADNA-DNA/RNA PROBE TQ 12-25: CPT

## 2023-11-07 PROCEDURE — 96413 CHEMO IV INFUSION 1 HR: CPT

## 2023-11-07 RX ORDER — SODIUM CHLORIDE 0.9 % (FLUSH) 0.9 %
5-40 SYRINGE (ML) INJECTION PRN
Status: DISCONTINUED | OUTPATIENT
Start: 2023-11-07 | End: 2023-11-08 | Stop reason: HOSPADM

## 2023-11-07 RX ORDER — HEPARIN 100 UNIT/ML
500 SYRINGE INTRAVENOUS PRN
Status: DISCONTINUED | OUTPATIENT
Start: 2023-11-07 | End: 2023-11-08 | Stop reason: HOSPADM

## 2023-11-07 RX ADMIN — HEPARIN 500 UNITS: 100 SYRINGE at 10:35

## 2023-11-07 RX ADMIN — SODIUM CHLORIDE, PRESERVATIVE FREE 10 ML: 5 INJECTION INTRAVENOUS at 10:35

## 2023-11-07 RX ADMIN — SODIUM CHLORIDE 200 MG: 9 INJECTION, SOLUTION INTRAVENOUS at 10:03

## 2023-11-07 NOTE — PROGRESS NOTES
Pt here for Keytruda today, c/o diarrhea stools immediately after eating for about 2 weeks. Denies abdominal cramping or bloody stools. Dr. Padma Khan notified, order received to obtain specimen PCR stool panel.

## 2023-11-08 LAB — CORTIS AM PEAK SERPL-MCNC: 12.3 UG/DL (ref 6.2–19.4)

## 2023-11-08 NOTE — RESULT ENCOUNTER NOTE
Spoke with Andrew regarding negative stool panel, indicating that reported diarrhea (new onset over past week, occurs after eating, denies cramping or associated symptoms) is not a bacteria or virus requiring treatment.  However, discussed importance of monitoring for persistent diarrhea in setting of adjuvant immunotherapy.  Andrew understands to report persistent symptoms.

## 2023-11-10 ENCOUNTER — HOSPITAL ENCOUNTER (OUTPATIENT)
Dept: RADIATION ONCOLOGY | Facility: HOSPITAL | Age: 58
Setting detail: RADIATION/ONCOLOGY SERIES
End: 2023-11-10
Payer: MEDICAID

## 2023-11-12 NOTE — PROGRESS NOTES
[facility]  Anton Kinney MD      Naif Jones APRN  ____________________________________________________________  [facility]  Department of Radiation Oncology  [address] [place] [id number]  Office:  [phone]  Fax: [phone]    DATE: 11/13/2023  PATIENT: Lalo Jones Sr.  1965                                 MEDICAL RECORD #: [id number]    Reason for Follow up Visit:  Lalo Jones Sr. is a very pleasant 57 y.o. patient that completed radiation to the lung and returns to the clinic today for routine follow up exam. Reports fatigue, SOB, and diarrhea. Denies activity change, unexpected weight change, cough, vomiting, dizziness, weakness, and headaches. He continues to follow  and .     History of Present Illness:  Diagnosed with right upper lobe of the lung. He completed 5040 cGy in 28 fractions to the right lung on 02/14/2023.     05/19/2022 - CT Chest Low Dose:  4 mm LEFT lower lobe pulmonary nodule.  Moderate emphysema.  3.3 x 1.3 cm masslike area of pleural thickening in the RIGHT anterior upper chest. Recommend a follow-up chest CT in 3 months to ensure stability.    10/17/2022 - CT chest without contrast, low-dose protocol at [facility]:  4.7 cm x 4 cm x 1.6 cm abnormal area of pleural thickening in the periphery of the RUL with slightly irregular margins  Moderate paraseptal emphysematous changes with scattered bullae in both lungs  Lung-RADS - 4B    10/24/2022 - Appointment with Russel Santana MD:   I explained the abnormal CT scan results from May which is done in the [facility] to the patient and also discussed with the radiologist from [facility] in [place] who reviewed the current CT scan of the chest done earlier this month.  This shows the right upper lobe pleural-based thickening or mass and left upper lobe nodule.  Due to his smoking history the possibility of malignancy cannot be ruled  out.  Discussing different options I ordered a PET scan and a follow-up CT scan of the chest in 3 months time.  If the PET scan is positive a CT-guided needle biopsy would be best option for the right upper lobe pleural-based lung mass which is not reachable by bronchoscopy.  If PET scan is negative will wait for the neck CT scan of the chest and make further recommendations.  Patient is agreeable with the plan.  In the meantime I will try to get the CD from from the outside hospital with the latest CT scan of the chest for further comparison.  Patient definitely has chronic obstructive pulmonary disease and he was started on Wixela 250/50 1 puff twice a day and albuterol rescue inhaler will be continued.  His nasal allergy he was started on fluticasone nasal spray and Singulair.  He told me he is already using Zyrtec mostly over-the-counter which will be continued.  All medications were sent to the pharmacy.  Lalo Jones  reports that he has been smoking cigarettes. He started smoking about 35 years ago. He has a 35.00 pack-year smoking history. He has never used smokeless tobacco.. I have educated him on the risk of diseases from using tobacco products such as cancer, COPD and heart disease.   I advised him to quit and he is willing to quit. We have discussed the following method/s for tobacco cessation:  Counseling.  Together we have set a quit date for 2 weeks from today.  He will follow up with me in 3 months or sooner to check on his progress.I spent 7 minutes counseling the patient  Patient is advised to have a sleep study due to sleep disturbances and most likely have sleep apnea and may need his CPAP.  He is also advised to get a pulmonary function test before the next clinic visit for his underlying COPD in addition to the PET scan and CT scan which are already ordered.  He will return to the pulm clinic for follow-up visit in 3 months time or earlier if needed  Follow up:  3 months      11/01/2022 - PET Scan:  Slight increase in size of 4.3 x 1.9 cm RIGHT anterior upper chest masslike pleural thickening which is markedly hypermetabolic. Favor this to represent a neoplastic process with differential including lymphoma. Primary pleural neoplasm and metastatic disease are also in the differential.  Hypermetabolic upper abdominal retroperitoneal lymphadenopathy. Suspect these lymph nodes related to the same process as the RIGHT anterior pleural lesion.  No other abnormal hypermetabolic activity.    11/16/2022 - Right upper chest/pleural mass, core biopsies:  Adenocarcinoma of pulmonary origin.    12/08/2022 - Appointment with :  Adenocarcinoma of Pulmonary Origin-11/16/2022  Lalo Jones is a 57-year-old  gentleman with primary and secondary diagnoses as follows:  Robotic assisted laparoscopic prostatectomy by Dr. Jj September 2020 for PT2N0 Purlear 7 disease with tumor approaching the right posterior margin  New diagnosis of 4.7 cm x 4 cm x 1.6 cm adenocarcinoma of the RUL of the lung  Nino was last seen on 7/15/2020 for opinion regarding a diagnosis of resected prostate cancer. He was lost to follow-up after that visit. He lives in [place], and his prostate cancer is managed by Dr. Jj with urology at [facility].  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 Russel Santana with a new diagnosis of a 4.7 x 4 x 1.6 cm adenocarcinoma of the RUL of the lung for management.  Nino is a longtime cigarette smoker of 1 pack/day since age 20. He quit smoking on 12/1/2022 after the diagnosis of lung cancer.  Nino's PCP, Dr. Flex Sheikh ordered a low-dose screening CT scan of the chest for monitoring because he is a pack a day smoker since 1987  CT chest without contrast, low-dose protocol at [facility] on 5/19/2022:  4 mm LEFT lower lobe pulmonary nodule in superior segment   3.3 x 1.3 cm mass like area of  pleural thickening in the RIGHT anterior upper chest   Moderate centrilobular and paraseptal emphysema.   Lung-RADS 2S-- Nodules with a very low likelihood of becoming a clinically active cancer due to size or lack of growth.   Continue annual screening with low dose CT in 12 months.   3 month follow-up chest CT recommended to evaluate for stability of RIGHT anterior upper chest masslike pleural thickening  Dr. Flex French repeated the low-dose CT scan because of the findings in the right anterior upper chest  CT chest without contrast, low-dose protocol at [facility] on 10/17/2022:  4.7 cm x 4 cm x 1.6 cm abnormal area of pleural thickening in the periphery of the RUL with slightly irregular margins  Moderate paraseptal emphysematous changes with scattered bullae in both lungs  Lung-RADS - 4B  Initial consult with Dr. Russel Santana at [facility] on 10/24/2022:  Explained the abnormal CT scan results from May which is done in the [facility] to the patient and also discussed with the radiologist from [facility] in [place] who reviewed the current CT scan of the chest done earlier this month. This shows the right upper lobe pleural-based thickening or mass and left upper lobe nodule. Due to his smoking history the possibility of malignancy cannot be ruled out.  Discussing different options I ordered a PET scan and a follow-up CT scan of the chest in 3 months time. If the PET scan is positive a CT-guided needle biopsy would be best option for the right upper lobe pleural-based lung mass which is not reachable by bronchoscopy. If PET scan is negative will wait for the neck CT scan of the chest and make further recommendations.   NM PET/CT SKULL BASE TO MID THIGH at [facility] on 11/1/2022:Comparison: Chest CT 5/19/2022  Background right hepatic lobe metabolic activity measures max SUV 2.7.  4.3 x 1.9 cm RIGHT anterior upper chest pleural thickening which is hypermetabolic with a maximum SUV of 12.0  1.7 cm  LEFT upper abdomen retroperitoneal lymph node hypermetabolicactivity, max SUV 5.3. This nodule/node closely approximates the LEFT adrenal gland.   9 mm retroaortic upper abdominal lymph node is mildly hypermetabolic with a max SUV of 4.0.   7 mm aortocaval lymph node is mildly hypermetabolic with a max SUV of 3.0.  CT Needle Biopsy Lung at [facility] on 11/16/2022:  Successful CT guided biopsy of the right anterior pleural mass  Final Diagnosis  Right upper chest/pleural mass, core biopsies:  Adenocarcinoma of pulmonary origin.  [employer] on 11/16/2022:  ALK(D5F3)- NEGATIVE  FISH Analysis ROS1- NEGATIVE  EGFR Exon 18-Not Detected  EGFR Exon 19-Not Detected  EGFR Exon 20 [id number]-Pro Detected  EGFR Exon 20 (other Mutations)-Not Detected  EGFR Exon 21- Not Detected  BRAF Mutation-Not Detected  FISH MET-No evidence of a MET amplification  Medical oncology consultation requested  Concern on the work-up is that in addition to the 4.7 cm primary RUL adenocarcinoma of the lung there are upper abdominal retroperitoneal and aortocaval lymph nodes positive on the PET scan.  If the upper abdominal lymph nodes were not a concern, Lalo could be treated with neoadjuvant Opdivo Alimta Keytruda followed by resection.  Intense interdepartmental consultations are being undertaken to give Lalo the most aggressive approach possible.  All of the plans outlined below were discussed at length with Lalo and his wife Joann.  RECOMMEND:  Guardant 360  CT scan of the abdomen and pelvis  Bone scan  MRI of the abdomen  Thoracic surgery consultation with Dr. Goldy Carlin  Call placed and spoke to Dr. Goldy Carlin at length  Radiation therapy consult for intradepartmental consultation  Consult surgical group at [facility] for port placement and consideration of a diagnostic laparoscopy for lymph node sampling.  Follow-up with me upon completion of scans and for further patient and family conference  Return for Follow Up with Tadeo scans  prior.    12/15/2022 - MRI Abdomen with and without contrast:  No change in borderline prominent 9 mm short axis dimension upper abdominal retroperitoneal lymph node, similar to prior PET/CT were it was hypermetabolic. No new or enlarging lymph nodes in the abdomen.  Splenomegaly.    12/15/2022 - Bone Scan:  No evidence of bone metastasis.    12/15/2022 - CT Abdomen/Pelvis without contrast:  There is abnormal wall thickening of the distal sigmoid colon which does contain multiple diverticuli. Findings may be seen with diverticulitis, however a regional neoplastic process also considered. Follow-up sigmoidoscopy might be considered for further evaluation.  No morphologically pathologic lymphadenopathy identified. The two hypermetabolic retroperitoneal lymph nodes within the upper abdomen on the PET exam of 11/01/2022 remain similar in size measuring only 7 mm in short axis.    12/22/2022 - Appointment with :  Note pending    12/22/2022 - Documentation per :  I spoke with Dr. Tadeo regarding this patient today.  He has a very unusual pattern of malignancy with a right upper lobe peripheral adenocarcinoma which was biopsied with CT-guided biopsy.  He also has intra-abdominal hypermetabolic lymphadenopathy which is clinically suspicious for neoplasm.  This would be an unusual metastatic site from a primary lung carcinoma.  The patient also has previous history of prostate carcinoma.  I have discussed this with Dr. Tadeo and I believe if it is possible perhaps robotic biopsy of the periaortic lymphadenopathy could be considered.  We will also discussed with Dr. Goldy Carlin for consideration of definitive treatment of the primary right upper lobe lung tumor.  We will see the patient in consultation today and coordinate with Dr. Tadeo and other physicians to develop a coordinated treatment plan.    12/22/2022 - Consult with :  Following this discussion and in consideration of the  diagnostic data/evaluation of the patient, I recommended referral to CT surgery for surgical considerations of the lung nodule. He has been referred to Dr. Carlin. He has also been referred to general surgery for biopsy considerations of the intra-abdominal lymph nodes.    Continue ongoing management per primary care physician and other specialists. Thank you for allowing me to assist in this patients care.   Plan:  See Dr Carlin and Dr. Bonilla    12/22/2022 - Documentation per :  I spoke with Dr. Carlin this afternoon and he has had a chance to review the radiographs on this patient.  He believes that there is likely chest wall invasion and recommends neoadjuvant chemoradiation prior to definitive surgery of the right upper lobe tumor.  I believe we can treat this lesion with tangential portals and avoid any significant dose to the bronchus, thereby avoiding any potential issues with healing.  I would anticipate a dose of 5040 cGy in 28 treatment fractions to the lung lesion as neoadjuvant treatment along with concomitant chemotherapy and/or immunotherapy.  In addition, we will determine if Dr. Kim Bonilla is able to sample the upper abdominal lymph nodes to determine the etiology of that finding on the PET scan.    12/23/2022 - Telephone encounter with :  I spoke with Dr. Tadeo today and reviewed my conversation with Dr. Carlin from last night.  We will plan to proceed with neoadjuvant chemoradiation to the lung lesion.  This will be somewhat dependent upon the potential for Dr. Bonilla to biopsy the upper abdominal lymph nodes.  However, I do believe we will be able to treat the chest wall lesion for surgical resection and if necessary we can come back and treat mediastinal and upper abdominal lymph nodes if we have evidence of metastatic disease.  We will plan to see the patient back on Tuesday, December 27 at 11:00 for simulation.    12/27/2022 - Appointment with :  He met with   Tesfaye for surgical consirations of the right upper lobe lesion. He believes that there is likely chest wall invasion and recommends neoadjuvant chemoradiation prior to definitive surgery of the right upper lobe tumor. I anticipate a dose of 5040 cGy in 28 treatment fractions to the lung lesion as neoadjuvant treatment along with concomitant chemotherapy and/or immunotherapy.  In addition, we will determine if Dr. Bonilla is able to sample the upper abdominal lymph nodes to determine the etiology of that finding on the PET scan. He is scheduled to see her later this week.    01/01/2023 - Documentation per :  I presented this patient at tumor conference.  Dr. Kim Bonilla was in attendance unfortunately she is not able to biopsy the upper abdominal lymph nodes due to the proximity to adjacent aorta and superior mesenteric artery.  I also reviewed the recommendation of Dr. Goldy Carlin for neoadjuvant chemoradiation followed by definitive surgery for the right upper lobe lung tumor.  Discussion at tumor conference surrounded management of the upper abdominal lymph nodes.  The consensus was at this time to proceed with treatment of the primary known adenocarcinoma lung and base further treatment recommendations upon findings at definitive surgery.  Abdominal lymph nodes serial radiographs is recommended for follow-up.  It was agreed that this is an unlikely metastatic distribution for right upper lobe lung carcinoma in the lymph nodes may represent another etiology.  I will coordinate with Dr. Tadeo for concomitant neoadjuvant chemoradiation of the right upper lobe lung tumor followed by definitive surgery per Dr. Goldy Carlin.    01/04/2023 - 02/14/2023 - Completed radiation course:  Received 5040 cGy in 28 fractions to the right lung via external beam radiation therapy.    03/20/2023 - CT Abdomen/Pelvis with contrast:  Interval resolution of retroperitoneal lymphadenopathy. No evidence of metastatic  disease in the abdomen/pelvis.  Heavy sigmoid diverticulosis, without definite evidence of diverticulitis. There is some residual sigmoid colon wall thickening, the degree of which has significantly decreased from the prior exam.    03/20/2023 - CT Chest with contrast:  Interval significant decrease in size and volume of the biopsy proven pleural malignancy in the right upper lobe, the residual soft tissue component has a maximum diameter of 8.5 mm, compared with 2.8 cm on previous PET/CT. However, there are new spiculated nodules in the right upper lobe, the largest measuring 1.6 cm, some of these areas of nodularity are ill-defined and seen at the branch points of the bronchial tree, could potentially be infectious or inflammatory. The main concern with this appearance is for intrapulmonary metastases however. Repeat chest CT is recommended in 3 months, follow-up PET/CT would be appropriate if these new spiculated areas persist or increase in size.  No evidence of intrathoracic lymphadenopathy, no left-sided pulmonary nodule or lung mass. Stable changes of paraseptal emphysema in both lungs.    03/27/2023 - PET Scan:  Previously irradiated right chest wall mass demonstrates much less tracer avidity on today's exam, SUV max 3.1 as compared to 11.4 on the earlier PET scan from November 2022, indicating a positive treatment response.  There are new patchy airspace opacities identified more centrally in the right upper lobe, some of which demonstrates PET uptake (SUV max 3.2), suspected radiation pneumonitis.  No scintigraphic evidence of sav or distant metastatic disease.    04/14/2023 - CT Chest without contrast:  Mildly decreased size of RIGHT anterior pleural thickening now 6 mm, previously 8.5 cm.  Decreased size of RIGHT upper lobe pulmonary nodule now 12 mm, previously 16 mm on 03/20/2023.  Similar patchy consolidative opacities at the RIGHT upper lobe, which may represent postradiation treatment  changes.  Similar emphysematous changes.    05/04/2023 - Right thoracotomy with right upper lobectomy and mediastinal lymph node dissection by Dr. Goldy Carlin:  Right chest wall margin, excision:  Benign adipose tissue and skeletal muscle.  No malignancy is identified.  Right chest wall margin #2, excision:  Benign skeletal muscle and fibroconnective tissue.  No malignancy is identified.  Right chest wall margin #3, excision:  Benign skeletal muscle and fibroconnective tissue.  No malignancy identified.  Right upper lobe of lung, lobectomy:  Previous invasive adenocarcinoma of pulmonary origin ([id number]).  Inflammatory pseudotumor.  Residual tumor is not present.  The surgical margins are free of tumor.  7 benign perihilar and intraparenchymal lymph nodes.  Level 10 lymph node, excision:  1 benign lymph node with anthracotic pigment.  No malignancy is identified.  Level 10 lymph node, excision:  1 benign lymph node with anthracotic pigment  No malignancy identified.  Level 4R lymph node, excision:  No malignancy identified.  1 benign lymph node.  Level 7 lymph node, excision:  No malignancy identified.  1 benign lymph node with anthracotic pigment.  Level 10 lymph node, excision:  No malignancy identified.  1 benign lymph node with anthracotic pigment.  Level 2R lymph node, excision:  No malignancy identified.  1 benign lymph node.    06/19/2023 - Appointment with :  Mr. Jones is a 57-year-old male who is now 6 weeks status post right upper lobectomy for post neoadjuvant treatment treated right upper lobe non-small cell lung cancer.   He underwent neoadjuvant therapy with chemo and immunotherapy due to chest wall invasion, his pathology was consistent with complete response without residual tumor or residual disease in his lymph nodes.   He has done very well from surgery, and I am happy with his progress. He has already seen Dr. Tadeo in follow-up and is going to get adjuvant Keytruda and he will follow  with continued surveillance scans.   He does have some shortness of breath; I will discuss this with Dr. Santana, his pulmonologist, but I suspect this will continue to improve.   He is able to do the things he wants to now, just gets a little winded with strenuous work. He has healed well. Without any evidence of problems with his wounds. His pain is well controlled.   We will continue with surveillance with Dr. Tadeo's group and pulmonary follow-up with Dr. Santana.    07/20/2023 - Appointment with Russel Santana MD:  Plan/Recommendations:  Patient is doing well from the pulmonary standpoint and advised him to continue doing Advair and albuterol rescue inhaler for COPD and he is probably having a mild flareup so I prescribed him to take a course of Medrol Dosepak which is a low-dose steroid.  He had right upper lobe lung cancer which is treated by surgery and he follows up with Dr. Carlin.  He also gets followed up with Dr. Tadeo and is currently getting Keytruda he already had radiation treatment.  He is currently cancer free and doing well.  Continue follow-up with oncology and radiation oncology and Dr. Carlin.  Patient should continues in fluticasone nasal spray, Astelin nasal spray, Zyrtec and Singulair.  All prescription refills were sent to the pharmacy as requested by the patient.  Patient also mentioned he will probably benefit more from a nebulizer so I advised him to start using albuterol nebulizer as needed at least twice a day and a new prescription for albuterol nebulizer machine and solution was sent to the pharmacy.  Patient had mild sleep problems and was advised to have a sleep study for possible sleep apnea but patient is doing much better after his cancer treatment and he did not want to do a sleep study somewhat and I told him to hold off for now.  Patient is already vaccinated for COVID and influenza and pneumonia vaccine is currently not given during this clinic visit and I told him to  check with Dr. Tadeo about this vaccination before he takes it.  He will return to pulmonary clinic for follow-up visit in 6 months time or earlier if needed.  We will check on the CT scan which has been ordered by Dr. Tadeo during his next visit  Follow up:  6 Months      10/17/2023 - Appointment with :  ASSESSMENT AND PLAN:  Mr. Lalo Jones is a very pleasant 57-year-old  gentleman with primary and secondary diagnoses as follows:  Stage IIIB (T2b, N3, M0) adenocarcinoma of the RUL of the lung made by CT-guided needle biopsy on 11/16/2022.  Robotic assisted laparoscopic prostatectomy by Dr. Jj September 2020 for PT2N0 Consuelo 7 disease with tumor approaching the right posterior margin  Neoadjuvant chemotherapy and radiation therapy as follows:  Neoadjuvant concurrent XRT (with chemoimmunotherapy) to RUL of lung initiated on 1/4/2023 completed on 2/14/2023. 5040 cGy over 28 treatment fractions.  Neoadjuvant cisplatin 75 mg/m2, Alimta 500 mg/m2 and Opdivo 360 mg, cycle #1 initiated on 1/5/2023. Cycle #3 delivered on 2/16/2023.  Right thoracotomy with right upper lobectomy and mediastinal lymph node dissection by Dr. Goldy Carlin on 5/4/2023.  Final pathology was negative.  The primary tumor and all mediastinal lymph nodes were also negative documenting complete response to neoadjuvant therapy.  Lalo received cycle #1 of adjuvant immunotherapy with Keytruda on 6/12/2023  He is tolerating immunotherapy without new symptoms other than persistent fatigue.  I conferenced with Dr. Santana, his pulmonologist, regarding pneumococcal vaccinations and suggested okay to proceed.  Lalo is here today, 9/5/2023 to continue cycle #7 of 18 planned adjuvant immunotherapy treatments with Keytruda.  PLAN:  Proceed with cycle #7 of 18 planned treatments with Keytruda today, 9/5/2023, and continue every 3 weeks  Serology ordered includes CBC, CMP, TSH and cortisol for continued monitoring of toxicity  associated with immunotherapy that he is receiving  He is to be seen this afternoon at cardiology for loop recorder, cardiac monitor placement and monitoring of previous history of atrial fibrillation, on medication  Follow-up with me in 6 weeks  Return in about 6 weeks (around 11/28/2023) for treatment and see Dr. Tadeo.          History obtained from  PATIENT and CHART    PAST MEDICAL HISTORY  Past Medical History:   Diagnosis Date    Abnormal PET scan of lung     Nov 2022    Allergic rhinitis     Arthritis     Atrial fibrillation     Bronchiectasis     Bronchitis     Cancer     prostate    Chronic bronchitis     Chronic pain     COPD (chronic obstructive pulmonary disease)     GERD (gastroesophageal reflux disease)     Lung cancer     Pneumonia     Prostatitis, acute     S/P ACL reconstruction     Septic shock due to urinary tract infection     Sinusitis     Strep throat     UTI (urinary tract infection)       PAST SURGICAL HISTORY  Past Surgical History:   Procedure Laterality Date    ANKLE SURGERY Right     BACK SURGERY      X2    ENDOSCOPY N/A 01/10/2022    Procedure: ESOPHAGOGASTRODUODENOSCOPY WITH ANESTHESIA;  Surgeon: Tucker Bright MD;  Location:  [facility];  Service: Gastroenterology;  Laterality: N/A;  pre food bolus  post food bolus  Dr. PAULINO ACL RECONSTRUCTION Left     NASAL SEPTUM SURGERY      PENILE PROSTHESIS IMPLANT N/A 03/15/2022    Procedure: 3-PIECE INFLATABLE PENILE PROSTHESIS PLACEMENT;  Surgeon: Trae Clark MD;  Location:  [facility];  Service: Urology;  Laterality: N/A;    PROSTATE ULTRASOUND BIOPSY N/A 02/18/2020    Procedure: PROSTATE  BIOPSY;  Surgeon: Trae Clark MD;  Location:  [facility];  Service: Urology;  Laterality: N/A;    PROSTATECTOMY N/A 09/22/2020    Procedure: RADICAL PROSTATECTOMY LAPAROSCOPIC WITH DAVINCI ROBOT;  Surgeon: Nuno Jj MD;  Location:  [facility];  Service: DaVinci;  Laterality: N/A;    SHOULDER SURGERY Left     X 4    TENNIS ELBOW  RELEASE Bilateral 2011    THORACOTOMY Right 2023    Procedure: RIGHT THORACOTOMY WITH CHEST WALL RESECTION, MEDIASTINAL LYMPH NODE DISSECTION, RIGHT UPPER LOBECTOMY;  Surgeon: Goldy Carlin MD;  Location:  [facility];  Service: Cardiothoracic;  Laterality: Right;    VENOUS ACCESS DEVICE (PORT) INSERTION N/A 2022    Procedure: Single Lumen Port-a-cath insertion with flouroscopy;  Surgeon: Kim Bonilla MD;  Location:  [facility];  Service: General;  Laterality: N/A;      FAMILY HISTORY  family history includes Cancer in his mother.     SOCIAL HISTORY  Social History     Tobacco Use    Smoking status: Former     Packs/day: 1.00     Years: 35.00     Additional pack years: 0.00     Total pack years: 35.00     Types: Cigarettes     Start date: 1987     Quit date: 2022     Years since quittin.9     Passive exposure: Past    Smokeless tobacco: Former     Types: Snuff     Quit date:    Vaping Use    Vaping Use: Former   Substance Use Topics    Alcohol use: No     Comment: 0    Drug use: Not Currently     Frequency: 7.0 times per week     Types: Hydrocodone, Marijuana, Oxycodone     Comment: Edible gummies - 1 per day      Adhesive tape     MEDICATIONS  Current Outpatient Medications   Medication Sig Dispense Refill    acetaminophen (Tylenol) 325 MG tablet Take 3 tablets by mouth Every 8 (Eight) Hours. Take every 8 hours for 3 days then take prn as needed. 100 tablet 2    albuterol (PROVENTIL) (2.5 MG/3ML) 0.083% nebulizer solution Take 2.5 mg by nebulization Every 4 (Four) Hours As Needed for Wheezing. 360 mL 3    albuterol sulfate  (90 Base) MCG/ACT inhaler Inhale 2 puffs Every 4 (Four) Hours As Needed for Wheezing or Shortness of Air. 6.7 g 5    Azelastine HCl 137 MCG/SPRAY solution 2 sprays into the nostril(s) as directed by provider 2 (Two) Times a Day. 30 mL 5    cetirizine (zyrTEC) 10 MG tablet Take 1 tablet by mouth Daily. 90 tablet 3    fluticasone (FLONASE) 50 MCG/ACT  nasal spray 2 sprays into the nostril(s) as directed by provider Daily As Needed for Rhinitis or Allergies. 16 g 5    Fluticasone-Salmeterol (ADVAIR/WIXELA) 250-50 MCG/ACT DISKUS Inhale 1 puff 2 (Two) Times a Day. 1 each 5    gabapentin (NEURONTIN) 800 MG tablet Take 1 tablet by mouth 3 (Three) Times a Day.      hydrOXYzine pamoate (VISTARIL) 50 MG capsule Take 1 capsule by mouth Daily.      ibuprofen (ADVIL,MOTRIN) 800 MG tablet Take 1 tablet by mouth Every 8 (Eight) Hours As Needed for Mild Pain.      midodrine (PROAMATINE) 2.5 MG tablet Take 1 tablet by mouth 3 (Three) Times a Day Before Meals. 90 tablet 11    montelukast (SINGULAIR) 10 MG tablet Take 1 tablet by mouth Every Night. 90 tablet 3    multivitamin with minerals tablet tablet Take 1 tablet by mouth Daily.      NON FORMULARY Take 1 tablet by mouth Daily. Marijuana Gummies       Current Facility-Administered Medications   Medication Dose Route Frequency Provider Last Rate Last Admin    pneumococcal polysaccharide 23-valent (PNEUMOVAX-23) vaccine 0.5 mL  0.5 mL Intramuscular During Hospitalization Russel Santana MD          Current outpatient and discharge medications have been reconciled for the patient.  Reviewed by: MADDY Donnelly    The following portions of the patient's history were reviewed and updated as appropriate: allergies, current medications, past family history, past medical history, past social history, past surgical history and problem list.    REVIEW OF SYSTEMS  Review of Systems   Constitutional:  Positive for fatigue. Negative for activity change and unexpected weight change.   HENT: Negative.     Respiratory:  Positive for shortness of breath. Negative for cough.    Cardiovascular: Negative.    Gastrointestinal:  Positive for diarrhea. Negative for vomiting.   Genitourinary: Negative.    Musculoskeletal: Negative.    Skin: Negative.    Neurological: Negative.  Negative for dizziness, weakness and headaches.   Hematological:  "Negative.  Negative for adenopathy.   Psychiatric/Behavioral: Negative.     All other systems reviewed and are negative.     PHYSICAL EXAM  VITAL SIGNS:   Vitals:    11/13/23 1035   BP: 150/93   Weight: 101 kg (222 lb)   Height: 188 cm (74\")   PainSc: 0-No pain     Physical Exam  Vitals reviewed.   Constitutional:       Appearance: Normal appearance.   HENT:      Head: Normocephalic.   Cardiovascular:      Rate and Rhythm: Normal rate and regular rhythm.      Pulses: Normal pulses.      Heart sounds: Normal heart sounds.   Pulmonary:      Effort: Pulmonary effort is normal.      Breath sounds: Normal breath sounds.   Abdominal:      General: Bowel sounds are normal.   Musculoskeletal:         General: Normal range of motion.      Cervical back: Normal range of motion and neck supple.   Lymphadenopathy:      Cervical: No cervical adenopathy.   Skin:     General: Skin is warm.      Capillary Refill: Capillary refill takes less than 2 seconds.   Neurological:      General: No focal deficit present.      Mental Status: He is alert and oriented to person, place, and time.   Psychiatric:         Behavior: Behavior normal.         Performance Status: ECOG (0) Fully active, able to carry on all predisease performance without restriction    Clinical Quality Measures  -Pain Documented  Lalo Jones Sr. reports a pain score of 0. Given his pain assessment as noted, treatment options were discussed and the following options were decided upon as a follow-up plan to address the patient's pain: continuation of current treatment plan for pain and use of non-medical modalities (ice, heat, stretching and/or behavior modifications)    Pain Medications               acetaminophen (Tylenol) 325 MG tablet Take 3 tablets by mouth Every 8 (Eight) Hours. Take every 8 hours for 3 days then take prn as needed.    gabapentin (NEURONTIN) 800 MG tablet Take 1 tablet by mouth 3 (Three) Times a Day.    ibuprofen (ADVIL,MOTRIN) 800 MG tablet " Take 1 tablet by mouth Every 8 (Eight) Hours As Needed for Mild Pain.          -Advanced Care Planning Advance Care Planning   ACP discussion was held with the patient during this visit. Patient does not have an advance directive, information provided.    -Body Mass Index Screening and Follow-Up Plan  BMI is >= 25 and <30. (Overweight) The following options were offered after discussion;: none (medical contraindication)    -Tobacco Use: Screening and Cessation Intervention Social History    Tobacco Use      Smoking status: Former        Packs/day: 1.00        Years: 35.00        Additional pack years: 0.00        Total pack years: 35.00        Types: Cigarettes        Start date: 1987        Quit date: 2022        Years since quittin.9        Passive exposure: Past      Smokeless tobacco: Former        Types: Snuff        Quit date:     ASSESSMENT AND PLAN  1. Malignant neoplasm of right lung, unspecified part of lung    2. Chronic obstructive pulmonary disease, unspecified COPD type    3. History of radiation therapy    4. Former smoker      RECOMMENDATIONS:  Lalo Jones Sr. is status post completion of radiation therapy to the lung and presents to our clinic today for surveillance exam and to review imaging. Diagnosed with right upper lobe of the lung. He completed 5040 cGy in 28 fractions to the right lung on 2023.     On exam, I do not see evidence for recurrent or metastatic disease at this time. He continues immunotherapy per medical oncology. We will continue routine follow-up/surveillance as discussed in 6 months with follow up CT scan before visit and I have instructed him to continue to see the other health care providers as per their scheduling.    Todays appointment time was spent in counseling, coordination of care and surveillance related to patients diagnosis as well as radiation therapy possible and probable after effects.   Naif Jones, APRN  2023

## 2023-11-13 ENCOUNTER — OFFICE VISIT (OUTPATIENT)
Dept: RADIATION ONCOLOGY | Facility: HOSPITAL | Age: 58
End: 2023-11-13
Payer: MEDICAID

## 2023-11-13 VITALS
SYSTOLIC BLOOD PRESSURE: 150 MMHG | DIASTOLIC BLOOD PRESSURE: 93 MMHG | BODY MASS INDEX: 28.49 KG/M2 | WEIGHT: 222 LBS | HEIGHT: 74 IN

## 2023-11-13 DIAGNOSIS — J44.9 CHRONIC OBSTRUCTIVE PULMONARY DISEASE, UNSPECIFIED COPD TYPE: ICD-10-CM

## 2023-11-13 DIAGNOSIS — Z92.3 HISTORY OF RADIATION THERAPY: ICD-10-CM

## 2023-11-13 DIAGNOSIS — Z87.891 FORMER SMOKER: ICD-10-CM

## 2023-11-13 DIAGNOSIS — C34.91 MALIGNANT NEOPLASM OF RIGHT LUNG, UNSPECIFIED PART OF LUNG: Primary | ICD-10-CM

## 2023-11-13 PROCEDURE — G0463 HOSPITAL OUTPT CLINIC VISIT: HCPCS | Performed by: RADIOLOGY

## 2023-11-27 DIAGNOSIS — Z90.89 S/P LOBECTOMY OF BRAIN: ICD-10-CM

## 2023-11-27 DIAGNOSIS — C34.91 ADENOCARCINOMA, LUNG, RIGHT (HCC): Primary | ICD-10-CM

## 2023-11-27 RX ORDER — SODIUM CHLORIDE 9 MG/ML
INJECTION, SOLUTION INTRAVENOUS CONTINUOUS
Status: CANCELLED | OUTPATIENT
Start: 2023-11-28

## 2023-11-27 RX ORDER — SODIUM CHLORIDE 0.9 % (FLUSH) 0.9 %
5-40 SYRINGE (ML) INJECTION PRN
Status: CANCELLED | OUTPATIENT
Start: 2023-11-28

## 2023-11-27 RX ORDER — MEPERIDINE HYDROCHLORIDE 50 MG/ML
12.5 INJECTION INTRAMUSCULAR; INTRAVENOUS; SUBCUTANEOUS PRN
Status: CANCELLED | OUTPATIENT
Start: 2023-11-28

## 2023-11-27 RX ORDER — SODIUM CHLORIDE 9 MG/ML
5-250 INJECTION, SOLUTION INTRAVENOUS PRN
Status: CANCELLED | OUTPATIENT
Start: 2023-11-28

## 2023-11-27 RX ORDER — DIPHENHYDRAMINE HYDROCHLORIDE 50 MG/ML
50 INJECTION INTRAMUSCULAR; INTRAVENOUS
Status: CANCELLED | OUTPATIENT
Start: 2023-11-28

## 2023-11-27 RX ORDER — ONDANSETRON 2 MG/ML
8 INJECTION INTRAMUSCULAR; INTRAVENOUS
Status: CANCELLED | OUTPATIENT
Start: 2023-11-28

## 2023-11-27 RX ORDER — ALBUTEROL SULFATE 90 UG/1
4 AEROSOL, METERED RESPIRATORY (INHALATION) PRN
Status: CANCELLED | OUTPATIENT
Start: 2023-11-28

## 2023-11-27 RX ORDER — FAMOTIDINE 10 MG/ML
20 INJECTION, SOLUTION INTRAVENOUS
Status: CANCELLED | OUTPATIENT
Start: 2023-11-28

## 2023-11-27 RX ORDER — EPINEPHRINE 1 MG/ML
0.3 INJECTION, SOLUTION, CONCENTRATE INTRAVENOUS PRN
Status: CANCELLED | OUTPATIENT
Start: 2023-11-28

## 2023-11-27 RX ORDER — HEPARIN SODIUM (PORCINE) LOCK FLUSH IV SOLN 100 UNIT/ML 100 UNIT/ML
500 SOLUTION INTRAVENOUS PRN
Status: CANCELLED | OUTPATIENT
Start: 2023-11-28

## 2023-11-27 RX ORDER — ACETAMINOPHEN 325 MG/1
650 TABLET ORAL
Status: CANCELLED | OUTPATIENT
Start: 2023-11-28

## 2023-11-27 NOTE — PROGRESS NOTES
Cancel: Comprehensive Metabolic Panel; Future  -     Cancel: Cortisol AM, Total; Future  -     Cancel: TSH; Future    Fatigue due to treatment  -     Cancel: Cortisol AM, Total; Future  -     Cancel: TSH; Future    Diarrhea, unspecified type                  No orders of the defined types were placed in this encounter. No orders of the defined types were placed in this encounter. Return in about 6 weeks (around 1/9/2024) for treatment and see Dr. Rudy Orosco.

## 2023-11-28 ENCOUNTER — HOSPITAL ENCOUNTER (OUTPATIENT)
Dept: INFUSION THERAPY | Age: 58
Discharge: HOME | End: 2023-11-28
Payer: MEDICAID

## 2023-11-28 ENCOUNTER — OFFICE VISIT (OUTPATIENT)
Dept: HEMATOLOGY | Age: 58
End: 2023-11-28
Payer: MEDICAID

## 2023-11-28 VITALS
HEIGHT: 74 IN | OXYGEN SATURATION: 97 % | HEART RATE: 85 BPM | WEIGHT: 221.1 LBS | DIASTOLIC BLOOD PRESSURE: 89 MMHG | TEMPERATURE: 98.2 F | RESPIRATION RATE: 18 BRPM | SYSTOLIC BLOOD PRESSURE: 128 MMHG | BODY MASS INDEX: 28.38 KG/M2

## 2023-11-28 VITALS
TEMPERATURE: 98.1 F | OXYGEN SATURATION: 100 % | RESPIRATION RATE: 18 BRPM | HEART RATE: 70 BPM | DIASTOLIC BLOOD PRESSURE: 79 MMHG | SYSTOLIC BLOOD PRESSURE: 134 MMHG

## 2023-11-28 DIAGNOSIS — Z90.89 S/P LOBECTOMY OF BRAIN: Primary | ICD-10-CM

## 2023-11-28 DIAGNOSIS — R53.83 FATIGUE DUE TO TREATMENT: ICD-10-CM

## 2023-11-28 DIAGNOSIS — Z51.12 ENCOUNTER FOR ANTINEOPLASTIC IMMUNOTHERAPY: ICD-10-CM

## 2023-11-28 DIAGNOSIS — R19.7 DIARRHEA, UNSPECIFIED TYPE: ICD-10-CM

## 2023-11-28 DIAGNOSIS — C34.91 ADENOCARCINOMA OF RIGHT LUNG (HCC): Primary | ICD-10-CM

## 2023-11-28 DIAGNOSIS — C34.91 ADENOCARCINOMA, LUNG, RIGHT (HCC): ICD-10-CM

## 2023-11-28 DIAGNOSIS — C34.91 ADENOCARCINOMA OF RIGHT LUNG (HCC): ICD-10-CM

## 2023-11-28 LAB
ALBUMIN SERPL-MCNC: 3.8 G/DL (ref 3.5–5.2)
ALP SERPL-CCNC: 84 U/L (ref 40–130)
ALT SERPL-CCNC: 34 U/L (ref 21–72)
ANION GAP SERPL CALCULATED.3IONS-SCNC: 6 MMOL/L (ref 7–19)
AST SERPL-CCNC: 37 U/L (ref 17–59)
BASOPHILS # BLD: 0.03 K/UL (ref 0.01–0.08)
BASOPHILS NFR BLD: 0.8 % (ref 0.1–1.2)
BILIRUB SERPL-MCNC: 0.5 MG/DL (ref 0.2–1.3)
BUN SERPL-MCNC: 14 MG/DL (ref 9–20)
CALCIUM SERPL-MCNC: 9.7 MG/DL (ref 8.4–10.2)
CHLORIDE SERPL-SCNC: 108 MMOL/L (ref 98–111)
CO2 SERPL-SCNC: 29 MMOL/L (ref 22–29)
CREAT SERPL-MCNC: 1 MG/DL (ref 0.6–1.2)
EOSINOPHIL # BLD: 0.09 K/UL (ref 0.04–0.54)
EOSINOPHIL NFR BLD: 2.3 % (ref 0.7–7)
ERYTHROCYTE [DISTWIDTH] IN BLOOD BY AUTOMATED COUNT: 13.4 % (ref 11.6–14.4)
GLOBULIN: 2.5 G/DL
GLUCOSE SERPL-MCNC: 86 MG/DL (ref 74–106)
HCT VFR BLD AUTO: 37.7 % (ref 40.1–51)
HGB BLD-MCNC: 12.7 G/DL (ref 13.7–17.5)
LYMPHOCYTES # BLD: 0.65 K/UL (ref 1.18–3.74)
LYMPHOCYTES NFR BLD: 17 % (ref 19.3–53.1)
MAGNESIUM SERPL-MCNC: 2 MG/DL (ref 1.6–2.3)
MCH RBC QN AUTO: 29.5 PG (ref 25.7–32.2)
MCHC RBC AUTO-ENTMCNC: 33.7 G/DL (ref 32.3–36.5)
MCV RBC AUTO: 87.7 FL (ref 79–92.2)
MONOCYTES # BLD: 0.44 K/UL (ref 0.24–0.82)
MONOCYTES NFR BLD: 11.5 % (ref 4.7–12.5)
NEUTROPHILS # BLD: 2.61 K/UL (ref 1.56–6.13)
NEUTS SEG NFR BLD: 68.1 % (ref 34–71.1)
PLATELET # BLD AUTO: 166 K/UL (ref 163–337)
PMV BLD AUTO: 9 FL (ref 7.4–10.4)
POTASSIUM SERPL-SCNC: 3.3 MMOL/L (ref 3.5–5.1)
PROT SERPL-MCNC: 6.3 G/DL (ref 6.3–8.2)
RBC # BLD AUTO: 4.3 M/UL (ref 4.63–6.08)
SODIUM SERPL-SCNC: 143 MMOL/L (ref 137–145)
WBC # BLD AUTO: 3.83 K/UL (ref 4.23–9.07)

## 2023-11-28 PROCEDURE — 2580000003 HC RX 258: Performed by: INTERNAL MEDICINE

## 2023-11-28 PROCEDURE — 36415 COLL VENOUS BLD VENIPUNCTURE: CPT

## 2023-11-28 PROCEDURE — 83735 ASSAY OF MAGNESIUM: CPT

## 2023-11-28 PROCEDURE — 85025 COMPLETE CBC W/AUTO DIFF WBC: CPT

## 2023-11-28 PROCEDURE — 96367 TX/PROPH/DG ADDL SEQ IV INF: CPT

## 2023-11-28 PROCEDURE — 99214 OFFICE O/P EST MOD 30 MIN: CPT | Performed by: INTERNAL MEDICINE

## 2023-11-28 PROCEDURE — 80053 COMPREHEN METABOLIC PANEL: CPT

## 2023-11-28 PROCEDURE — 96413 CHEMO IV INFUSION 1 HR: CPT

## 2023-11-28 PROCEDURE — 6360000002 HC RX W HCPCS: Performed by: INTERNAL MEDICINE

## 2023-11-28 RX ORDER — SODIUM CHLORIDE 0.9 % (FLUSH) 0.9 %
5-40 SYRINGE (ML) INJECTION PRN
Status: DISCONTINUED | OUTPATIENT
Start: 2023-11-28 | End: 2023-11-29 | Stop reason: HOSPADM

## 2023-11-28 RX ORDER — HEPARIN 100 UNIT/ML
500 SYRINGE INTRAVENOUS PRN
Status: DISCONTINUED | OUTPATIENT
Start: 2023-11-28 | End: 2023-11-29 | Stop reason: HOSPADM

## 2023-11-28 RX ORDER — POTASSIUM CHLORIDE 29.8 MG/ML
20 INJECTION INTRAVENOUS ONCE
Status: COMPLETED | OUTPATIENT
Start: 2023-11-28 | End: 2023-11-28

## 2023-11-28 RX ADMIN — HEPARIN 500 UNITS: 100 SYRINGE at 11:15

## 2023-11-28 RX ADMIN — POTASSIUM CHLORIDE 20 MEQ: 29.8 INJECTION, SOLUTION INTRAVENOUS at 09:49

## 2023-11-28 RX ADMIN — SODIUM CHLORIDE, PRESERVATIVE FREE 10 ML: 5 INJECTION INTRAVENOUS at 11:15

## 2023-11-28 RX ADMIN — SODIUM CHLORIDE 200 MG: 9 INJECTION, SOLUTION INTRAVENOUS at 10:44

## 2023-11-30 DIAGNOSIS — J44.9 CHRONIC OBSTRUCTIVE PULMONARY DISEASE, UNSPECIFIED COPD TYPE: Primary | ICD-10-CM

## 2023-11-30 RX ORDER — ALBUTEROL SULFATE 2.5 MG/3ML
SOLUTION RESPIRATORY (INHALATION)
Qty: 360 ML | Refills: 1 | Status: SHIPPED | OUTPATIENT
Start: 2023-11-30

## 2023-11-30 NOTE — TELEPHONE ENCOUNTER
Rx Refill Note  Requested Prescriptions     Pending Prescriptions Disp Refills    albuterol (PROVENTIL) (2.5 MG/3ML) 0.083% nebulizer solution [Pharmacy Med Name: ALBUTEROL SUL 2.5 MG/3 ML S (2.5 MG/3ML Nebulization Solution] 360 mL 3     Sig: NEBULIZE CONTENT OF ONE VIAL EVERY FOUR HOURS AS NEEDED FOR WHEEZING      Last office visit with prescribing clinician: 7/20/2023   Last telemedicine visit with prescribing clinician: Visit date not found   Next office visit with prescribing clinician: 2/6/2024                         Would you like a call back once the refill request has been completed: [] Yes [] No    If the office needs to give you a call back, can they leave a voicemail: [] Yes [] No    Lisa Doty MA  11/30/23, 14:09 CST

## 2023-12-19 ENCOUNTER — HOSPITAL ENCOUNTER (OUTPATIENT)
Dept: INFUSION THERAPY | Age: 58
Discharge: HOME | End: 2023-12-19
Payer: MEDICAID

## 2023-12-19 DIAGNOSIS — Z51.12 ENCOUNTER FOR ANTINEOPLASTIC IMMUNOTHERAPY: ICD-10-CM

## 2023-12-19 DIAGNOSIS — R53.83 FATIGUE DUE TO TREATMENT: ICD-10-CM

## 2023-12-19 DIAGNOSIS — C34.91 ADENOCARCINOMA, LUNG, RIGHT (HCC): ICD-10-CM

## 2023-12-19 DIAGNOSIS — Z90.89 S/P LOBECTOMY OF BRAIN: Primary | ICD-10-CM

## 2023-12-19 DIAGNOSIS — C34.91 ADENOCARCINOMA OF RIGHT LUNG (HCC): ICD-10-CM

## 2023-12-19 LAB
ALBUMIN SERPL-MCNC: 4.3 G/DL (ref 3.5–5.2)
ALP SERPL-CCNC: 79 U/L (ref 40–130)
ALT SERPL-CCNC: 32 U/L (ref 21–72)
ANION GAP SERPL CALCULATED.3IONS-SCNC: 8 MMOL/L (ref 7–19)
AST SERPL-CCNC: 29 U/L (ref 17–59)
BASOPHILS # BLD: 0.05 K/UL (ref 0.01–0.08)
BASOPHILS NFR BLD: 1.2 % (ref 0.1–1.2)
BILIRUB SERPL-MCNC: 0.5 MG/DL (ref 0.2–1.3)
BUN SERPL-MCNC: 18 MG/DL (ref 9–20)
CALCIUM SERPL-MCNC: 9.6 MG/DL (ref 8.4–10.2)
CHLORIDE SERPL-SCNC: 104 MMOL/L (ref 98–111)
CO2 SERPL-SCNC: 29 MMOL/L (ref 22–29)
CORTIS AM PEAK SERPL-MCNC: 12.6 UG/DL (ref 6.2–19.4)
CREAT SERPL-MCNC: 1.1 MG/DL (ref 0.6–1.2)
EOSINOPHIL # BLD: 0.19 K/UL (ref 0.04–0.54)
EOSINOPHIL NFR BLD: 4.7 % (ref 0.7–7)
ERYTHROCYTE [DISTWIDTH] IN BLOOD BY AUTOMATED COUNT: 13 % (ref 11.6–14.4)
GLOBULIN: 2.5 G/DL
GLUCOSE SERPL-MCNC: 94 MG/DL (ref 74–106)
HCT VFR BLD AUTO: 40.8 % (ref 40.1–51)
HGB BLD-MCNC: 13.9 G/DL (ref 13.7–17.5)
LYMPHOCYTES # BLD: 0.64 K/UL (ref 1.18–3.74)
LYMPHOCYTES NFR BLD: 15.7 % (ref 19.3–53.1)
MCH RBC QN AUTO: 29.8 PG (ref 25.7–32.2)
MCHC RBC AUTO-ENTMCNC: 34.1 G/DL (ref 32.3–36.5)
MCV RBC AUTO: 87.6 FL (ref 79–92.2)
MONOCYTES # BLD: 0.44 K/UL (ref 0.24–0.82)
MONOCYTES NFR BLD: 10.8 % (ref 4.7–12.5)
NEUTROPHILS # BLD: 2.74 K/UL (ref 1.56–6.13)
NEUTS SEG NFR BLD: 67.1 % (ref 34–71.1)
PLATELET # BLD AUTO: 158 K/UL (ref 163–337)
PMV BLD AUTO: 8.9 FL (ref 7.4–10.4)
POTASSIUM SERPL-SCNC: 4.1 MMOL/L (ref 3.5–5.1)
PROT SERPL-MCNC: 6.8 G/DL (ref 6.3–8.2)
RBC # BLD AUTO: 4.66 M/UL (ref 4.63–6.08)
SODIUM SERPL-SCNC: 141 MMOL/L (ref 137–145)
TSH SERPL DL<=0.005 MIU/L-ACNC: 3.63 UIU/ML (ref 0.27–4.2)
WBC # BLD AUTO: 4.08 K/UL (ref 4.23–9.07)

## 2023-12-19 PROCEDURE — 6360000002 HC RX W HCPCS: Performed by: INTERNAL MEDICINE

## 2023-12-19 PROCEDURE — 85025 COMPLETE CBC W/AUTO DIFF WBC: CPT

## 2023-12-19 PROCEDURE — 80053 COMPREHEN METABOLIC PANEL: CPT

## 2023-12-19 PROCEDURE — 36415 COLL VENOUS BLD VENIPUNCTURE: CPT

## 2023-12-19 PROCEDURE — 96413 CHEMO IV INFUSION 1 HR: CPT

## 2023-12-19 PROCEDURE — 2580000003 HC RX 258: Performed by: INTERNAL MEDICINE

## 2023-12-19 RX ORDER — SODIUM CHLORIDE 0.9 % (FLUSH) 0.9 %
5-40 SYRINGE (ML) INJECTION PRN
Status: DISCONTINUED | OUTPATIENT
Start: 2023-12-19 | End: 2023-12-20 | Stop reason: HOSPADM

## 2023-12-19 RX ORDER — HEPARIN 100 UNIT/ML
500 SYRINGE INTRAVENOUS PRN
Status: DISCONTINUED | OUTPATIENT
Start: 2023-12-19 | End: 2023-12-20 | Stop reason: HOSPADM

## 2023-12-19 RX ADMIN — SODIUM CHLORIDE, PRESERVATIVE FREE 10 ML: 5 INJECTION INTRAVENOUS at 09:51

## 2023-12-19 RX ADMIN — SODIUM CHLORIDE 200 MG: 9 INJECTION, SOLUTION INTRAVENOUS at 09:21

## 2023-12-19 RX ADMIN — HEPARIN 500 UNITS: 100 SYRINGE at 09:51

## 2024-01-09 ENCOUNTER — HOSPITAL ENCOUNTER (OUTPATIENT)
Dept: INFUSION THERAPY | Age: 59
Discharge: HOME | End: 2024-01-09
Payer: MEDICAID

## 2024-01-09 VITALS
DIASTOLIC BLOOD PRESSURE: 80 MMHG | BODY MASS INDEX: 28.08 KG/M2 | TEMPERATURE: 98.2 F | OXYGEN SATURATION: 98 % | RESPIRATION RATE: 18 BRPM | SYSTOLIC BLOOD PRESSURE: 115 MMHG | HEIGHT: 74 IN | WEIGHT: 218.8 LBS | HEART RATE: 85 BPM

## 2024-01-09 DIAGNOSIS — R53.83 FATIGUE DUE TO TREATMENT: ICD-10-CM

## 2024-01-09 DIAGNOSIS — C34.91 ADENOCARCINOMA, LUNG, RIGHT (HCC): Primary | ICD-10-CM

## 2024-01-09 DIAGNOSIS — Z90.89 S/P LOBECTOMY OF BRAIN: ICD-10-CM

## 2024-01-09 LAB
ALBUMIN SERPL-MCNC: 4 G/DL (ref 3.5–5.2)
ALP SERPL-CCNC: 92 U/L (ref 40–130)
ALT SERPL-CCNC: 48 U/L (ref 21–72)
ANION GAP SERPL CALCULATED.3IONS-SCNC: 7 MMOL/L (ref 7–19)
AST SERPL-CCNC: 47 U/L (ref 17–59)
BASOPHILS # BLD: 0.03 K/UL (ref 0.01–0.08)
BASOPHILS NFR BLD: 0.8 % (ref 0.1–1.2)
BILIRUB SERPL-MCNC: 0.7 MG/DL (ref 0.2–1.3)
BUN SERPL-MCNC: 23 MG/DL (ref 9–20)
CALCIUM SERPL-MCNC: 8.8 MG/DL (ref 8.4–10.2)
CHLORIDE SERPL-SCNC: 106 MMOL/L (ref 98–111)
CO2 SERPL-SCNC: 25 MMOL/L (ref 22–29)
CREAT SERPL-MCNC: 1.1 MG/DL (ref 0.6–1.2)
EOSINOPHIL # BLD: 0.15 K/UL (ref 0.04–0.54)
EOSINOPHIL NFR BLD: 3.9 % (ref 0.7–7)
ERYTHROCYTE [DISTWIDTH] IN BLOOD BY AUTOMATED COUNT: 12.7 % (ref 11.6–14.4)
GLOBULIN: 2.6 G/DL
GLUCOSE SERPL-MCNC: 107 MG/DL (ref 74–106)
HCT VFR BLD AUTO: 39.4 % (ref 40.1–51)
HGB BLD-MCNC: 13.5 G/DL (ref 13.7–17.5)
LYMPHOCYTES # BLD: 0.78 K/UL (ref 1.18–3.74)
LYMPHOCYTES NFR BLD: 20.5 % (ref 19.3–53.1)
MCH RBC QN AUTO: 29.4 PG (ref 25.7–32.2)
MCHC RBC AUTO-ENTMCNC: 34.3 G/DL (ref 32.3–36.5)
MCV RBC AUTO: 85.8 FL (ref 79–92.2)
MONOCYTES # BLD: 0.42 K/UL (ref 0.24–0.82)
MONOCYTES NFR BLD: 11 % (ref 4.7–12.5)
NEUTROPHILS # BLD: 2.41 K/UL (ref 1.56–6.13)
NEUTS SEG NFR BLD: 63.3 % (ref 34–71.1)
PLATELET # BLD AUTO: 126 K/UL (ref 163–337)
PMV BLD AUTO: 8.9 FL (ref 7.4–10.4)
POTASSIUM SERPL-SCNC: 3.5 MMOL/L (ref 3.5–5.1)
PROT SERPL-MCNC: 6.6 G/DL (ref 6.3–8.2)
RBC # BLD AUTO: 4.59 M/UL (ref 4.63–6.08)
SODIUM SERPL-SCNC: 138 MMOL/L (ref 137–145)
WBC # BLD AUTO: 3.81 K/UL (ref 4.23–9.07)

## 2024-01-09 PROCEDURE — 96413 CHEMO IV INFUSION 1 HR: CPT

## 2024-01-09 PROCEDURE — 6360000002 HC RX W HCPCS: Performed by: PHYSICIAN ASSISTANT

## 2024-01-09 PROCEDURE — 36415 COLL VENOUS BLD VENIPUNCTURE: CPT

## 2024-01-09 PROCEDURE — 2580000003 HC RX 258: Performed by: PHYSICIAN ASSISTANT

## 2024-01-09 PROCEDURE — 80053 COMPREHEN METABOLIC PANEL: CPT

## 2024-01-09 PROCEDURE — 85025 COMPLETE CBC W/AUTO DIFF WBC: CPT

## 2024-01-09 RX ORDER — SODIUM CHLORIDE 9 MG/ML
5-250 INJECTION, SOLUTION INTRAVENOUS PRN
Status: CANCELLED | OUTPATIENT
Start: 2024-01-09

## 2024-01-09 RX ORDER — SODIUM CHLORIDE 0.9 % (FLUSH) 0.9 %
5-40 SYRINGE (ML) INJECTION PRN
Status: DISCONTINUED | OUTPATIENT
Start: 2024-01-09 | End: 2024-01-10 | Stop reason: HOSPADM

## 2024-01-09 RX ORDER — ALBUTEROL SULFATE 90 UG/1
4 AEROSOL, METERED RESPIRATORY (INHALATION) PRN
Status: CANCELLED | OUTPATIENT
Start: 2024-01-09

## 2024-01-09 RX ORDER — EPINEPHRINE 1 MG/ML
0.3 INJECTION, SOLUTION, CONCENTRATE INTRAVENOUS PRN
Status: CANCELLED | OUTPATIENT
Start: 2024-01-09

## 2024-01-09 RX ORDER — ONDANSETRON 2 MG/ML
8 INJECTION INTRAMUSCULAR; INTRAVENOUS
Status: CANCELLED | OUTPATIENT
Start: 2024-01-09

## 2024-01-09 RX ORDER — HEPARIN 100 UNIT/ML
500 SYRINGE INTRAVENOUS PRN
Status: DISCONTINUED | OUTPATIENT
Start: 2024-01-09 | End: 2024-01-10 | Stop reason: HOSPADM

## 2024-01-09 RX ORDER — FAMOTIDINE 10 MG/ML
20 INJECTION, SOLUTION INTRAVENOUS
Status: CANCELLED | OUTPATIENT
Start: 2024-01-09

## 2024-01-09 RX ORDER — MEPERIDINE HYDROCHLORIDE 50 MG/ML
12.5 INJECTION INTRAMUSCULAR; INTRAVENOUS; SUBCUTANEOUS PRN
Status: CANCELLED | OUTPATIENT
Start: 2024-01-09

## 2024-01-09 RX ORDER — DIPHENHYDRAMINE HYDROCHLORIDE 50 MG/ML
50 INJECTION INTRAMUSCULAR; INTRAVENOUS
Status: CANCELLED | OUTPATIENT
Start: 2024-01-09

## 2024-01-09 RX ORDER — ACETAMINOPHEN 325 MG/1
650 TABLET ORAL
Status: CANCELLED | OUTPATIENT
Start: 2024-01-09

## 2024-01-09 RX ORDER — SODIUM CHLORIDE 0.9 % (FLUSH) 0.9 %
5-40 SYRINGE (ML) INJECTION PRN
Status: CANCELLED | OUTPATIENT
Start: 2024-01-09

## 2024-01-09 RX ORDER — HEPARIN SODIUM (PORCINE) LOCK FLUSH IV SOLN 100 UNIT/ML 100 UNIT/ML
500 SOLUTION INTRAVENOUS PRN
Status: CANCELLED | OUTPATIENT
Start: 2024-01-09

## 2024-01-09 RX ORDER — SODIUM CHLORIDE 9 MG/ML
INJECTION, SOLUTION INTRAVENOUS CONTINUOUS
Status: CANCELLED | OUTPATIENT
Start: 2024-01-09

## 2024-01-09 RX ADMIN — SODIUM CHLORIDE, PRESERVATIVE FREE 10 ML: 5 INJECTION INTRAVENOUS at 10:18

## 2024-01-09 RX ADMIN — HEPARIN 500 UNITS: 100 SYRINGE at 10:18

## 2024-01-09 RX ADMIN — SODIUM CHLORIDE 200 MG: 9 INJECTION, SOLUTION INTRAVENOUS at 09:19

## 2024-01-29 NOTE — PROGRESS NOTES
Neurology (Appt 1/30/24-[facility])  F/u 3 months (1/9/2023)    Loop recorder implantation 10/17/23 per Dr Sanders, [facility]      I, Jeanette Tuttle,ULISES am precharting for Horace Dolan MD. Electronically signed by Jeanette Tuttle RN on 2/6/2024 at 1:03 PM        Andrew was seen today for cancer, treatment and follow-up.    Diagnoses and all orders for this visit:    Adenocarcinoma of right lung (HCC)  -     CBC with Auto Differential; Future  -     Comprehensive Metabolic Panel; Future  -     Cortisol AM, Total; Future  -     TSH; Future  -     CEA; Future    Encounter for antineoplastic immunotherapy  -     CBC with Auto Differential; Future  -     Comprehensive Metabolic Panel; Future  -     Cortisol AM, Total; Future  -     TSH; Future  -     CEA; Future    Fatigue due to treatment  -     Cortisol AM, Total; Future  -     TSH; Future    Health care maintenance    History of prostate cancer  -     CEA; Future  -     Cancel: PSA, Diagnostic; Future    Elevated CEA  -     CEA; Future                      Orders Placed This Encounter   Procedures    CBC with Auto Differential     Standing Status:   Future     Number of Occurrences:   1     Standing Expiration Date:   1/28/2025    Comprehensive Metabolic Panel     Standing Status:   Future     Number of Occurrences:   1     Standing Expiration Date:   1/28/2025    Cortisol AM, Total     Standing Status:   Future     Standing Expiration Date:   1/28/2025    TSH     Standing Status:   Future     Standing Expiration Date:   1/28/2025    CEA     Standing Status:   Future     Standing Expiration Date:   1/28/2025       No orders of the defined types were placed in this encounter.      Return in 6 weeks (on 3/19/2024) for treatment and see Dr. Dolan.

## 2024-02-06 ENCOUNTER — HOSPITAL ENCOUNTER (OUTPATIENT)
Dept: INFUSION THERAPY | Age: 59
Discharge: HOME | End: 2024-02-06
Payer: MEDICAID

## 2024-02-06 ENCOUNTER — OFFICE VISIT (OUTPATIENT)
Dept: PULMONOLOGY | Facility: CLINIC | Age: 59
End: 2024-02-06
Payer: MEDICAID

## 2024-02-06 ENCOUNTER — OFFICE VISIT (OUTPATIENT)
Dept: HEMATOLOGY | Age: 59
End: 2024-02-06
Payer: MEDICAID

## 2024-02-06 VITALS
OXYGEN SATURATION: 97 % | TEMPERATURE: 97.4 F | RESPIRATION RATE: 18 BRPM | BODY MASS INDEX: 28.49 KG/M2 | HEART RATE: 84 BPM | HEIGHT: 74 IN | SYSTOLIC BLOOD PRESSURE: 122 MMHG | WEIGHT: 222 LBS | DIASTOLIC BLOOD PRESSURE: 82 MMHG

## 2024-02-06 VITALS
HEART RATE: 87 BPM | WEIGHT: 219.8 LBS | OXYGEN SATURATION: 100 % | HEIGHT: 74 IN | BODY MASS INDEX: 28.21 KG/M2 | SYSTOLIC BLOOD PRESSURE: 138 MMHG | DIASTOLIC BLOOD PRESSURE: 78 MMHG

## 2024-02-06 DIAGNOSIS — C34.91 ADENOCARCINOMA OF RIGHT LUNG (HCC): ICD-10-CM

## 2024-02-06 DIAGNOSIS — R53.83 FATIGUE DUE TO TREATMENT: ICD-10-CM

## 2024-02-06 DIAGNOSIS — Z92.3 HISTORY OF RADIATION THERAPY: ICD-10-CM

## 2024-02-06 DIAGNOSIS — Z85.46 HISTORY OF PROSTATE CANCER: ICD-10-CM

## 2024-02-06 DIAGNOSIS — Z00.00 HEALTH CARE MAINTENANCE: ICD-10-CM

## 2024-02-06 DIAGNOSIS — C34.91 ADENOCARCINOMA OF RIGHT LUNG (HCC): Primary | ICD-10-CM

## 2024-02-06 DIAGNOSIS — Z51.12 ENCOUNTER FOR ANTINEOPLASTIC IMMUNOTHERAPY: ICD-10-CM

## 2024-02-06 DIAGNOSIS — G47.33 OSA (OBSTRUCTIVE SLEEP APNEA): ICD-10-CM

## 2024-02-06 DIAGNOSIS — Z87.891 FORMER SMOKER: ICD-10-CM

## 2024-02-06 DIAGNOSIS — Z92.21 HISTORY OF CHEMOTHERAPY: ICD-10-CM

## 2024-02-06 DIAGNOSIS — Z86.16 HISTORY OF COVID-19: ICD-10-CM

## 2024-02-06 DIAGNOSIS — C34.91 ADENOCARCINOMA, LUNG, RIGHT (HCC): Primary | ICD-10-CM

## 2024-02-06 DIAGNOSIS — R97.0 ELEVATED CEA: ICD-10-CM

## 2024-02-06 DIAGNOSIS — C34.11 MALIGNANT NEOPLASM OF UPPER LOBE OF RIGHT LUNG: ICD-10-CM

## 2024-02-06 DIAGNOSIS — Z90.2 S/P LOBECTOMY OF LUNG: ICD-10-CM

## 2024-02-06 DIAGNOSIS — Z90.89 S/P LOBECTOMY OF BRAIN: ICD-10-CM

## 2024-02-06 DIAGNOSIS — Z87.01 HISTORY OF PNEUMONIA: ICD-10-CM

## 2024-02-06 DIAGNOSIS — J30.89 NON-SEASONAL ALLERGIC RHINITIS, UNSPECIFIED TRIGGER: ICD-10-CM

## 2024-02-06 DIAGNOSIS — J44.9 CHRONIC OBSTRUCTIVE PULMONARY DISEASE, UNSPECIFIED COPD TYPE: Primary | ICD-10-CM

## 2024-02-06 LAB
ALBUMIN SERPL-MCNC: 4.1 G/DL (ref 3.5–5.2)
ALP SERPL-CCNC: 114 U/L (ref 40–130)
ALT SERPL-CCNC: 48 U/L (ref 21–72)
ANION GAP SERPL CALCULATED.3IONS-SCNC: 9 MMOL/L (ref 7–19)
AST SERPL-CCNC: 32 U/L (ref 17–59)
BASOPHILS # BLD: 0.03 K/UL (ref 0.01–0.08)
BASOPHILS NFR BLD: 0.5 % (ref 0.1–1.2)
BILIRUB SERPL-MCNC: <0.2 MG/DL (ref 0.2–1.3)
BUN SERPL-MCNC: 14 MG/DL (ref 9–20)
CALCIUM SERPL-MCNC: 9 MG/DL (ref 8.4–10.2)
CEA SERPL-MCNC: 2.5 NG/ML (ref 0–4.7)
CHLORIDE SERPL-SCNC: 108 MMOL/L (ref 98–111)
CO2 SERPL-SCNC: 27 MMOL/L (ref 22–29)
CORTIS AM PEAK SERPL-MCNC: 8.3 UG/DL (ref 6.2–19.4)
CREAT SERPL-MCNC: 1 MG/DL (ref 0.6–1.2)
EOSINOPHIL # BLD: 0.1 K/UL (ref 0.04–0.54)
EOSINOPHIL NFR BLD: 1.7 % (ref 0.7–7)
ERYTHROCYTE [DISTWIDTH] IN BLOOD BY AUTOMATED COUNT: 13 % (ref 11.6–14.4)
GLOBULIN: 2.8 G/DL
GLUCOSE SERPL-MCNC: 98 MG/DL (ref 74–106)
HCT VFR BLD AUTO: 39.1 % (ref 40.1–51)
HGB BLD-MCNC: 13.1 G/DL (ref 13.7–17.5)
LYMPHOCYTES # BLD: 0.76 K/UL (ref 1.18–3.74)
LYMPHOCYTES NFR BLD: 12.9 % (ref 19.3–53.1)
MCH RBC QN AUTO: 29.4 PG (ref 25.7–32.2)
MCHC RBC AUTO-ENTMCNC: 33.5 G/DL (ref 32.3–36.5)
MCV RBC AUTO: 87.7 FL (ref 79–92.2)
MONOCYTES # BLD: 0.46 K/UL (ref 0.24–0.82)
MONOCYTES NFR BLD: 7.8 % (ref 4.7–12.5)
NEUTROPHILS # BLD: 4.51 K/UL (ref 1.56–6.13)
NEUTS SEG NFR BLD: 76.6 % (ref 34–71.1)
PLATELET # BLD AUTO: 168 K/UL (ref 163–337)
PMV BLD AUTO: 8.9 FL (ref 7.4–10.4)
POTASSIUM SERPL-SCNC: 4.7 MMOL/L (ref 3.5–5.1)
PROT SERPL-MCNC: 7 G/DL (ref 6.3–8.2)
RBC # BLD AUTO: 4.46 M/UL (ref 4.63–6.08)
SODIUM SERPL-SCNC: 144 MMOL/L (ref 137–145)
TSH SERPL DL<=0.005 MIU/L-ACNC: 3.56 UIU/ML (ref 0.27–4.2)
WBC # BLD AUTO: 5.89 K/UL (ref 4.23–9.07)

## 2024-02-06 PROCEDURE — 99214 OFFICE O/P EST MOD 30 MIN: CPT | Performed by: INTERNAL MEDICINE

## 2024-02-06 PROCEDURE — 85025 COMPLETE CBC W/AUTO DIFF WBC: CPT

## 2024-02-06 PROCEDURE — 2580000003 HC RX 258: Performed by: INTERNAL MEDICINE

## 2024-02-06 PROCEDURE — 6360000002 HC RX W HCPCS: Performed by: INTERNAL MEDICINE

## 2024-02-06 PROCEDURE — 80053 COMPREHEN METABOLIC PANEL: CPT

## 2024-02-06 PROCEDURE — 96413 CHEMO IV INFUSION 1 HR: CPT

## 2024-02-06 PROCEDURE — 36415 COLL VENOUS BLD VENIPUNCTURE: CPT

## 2024-02-06 RX ORDER — ALBUTEROL SULFATE 90 UG/1
2 AEROSOL, METERED RESPIRATORY (INHALATION) EVERY 4 HOURS PRN
Qty: 6.7 G | Refills: 5 | Status: SHIPPED | OUTPATIENT
Start: 2024-02-06

## 2024-02-06 RX ORDER — ONDANSETRON 2 MG/ML
8 INJECTION INTRAMUSCULAR; INTRAVENOUS
Status: CANCELLED | OUTPATIENT
Start: 2024-02-06

## 2024-02-06 RX ORDER — ALBUTEROL SULFATE 90 UG/1
4 AEROSOL, METERED RESPIRATORY (INHALATION) PRN
Status: CANCELLED | OUTPATIENT
Start: 2024-02-06

## 2024-02-06 RX ORDER — CETIRIZINE HYDROCHLORIDE 10 MG/1
10 TABLET ORAL DAILY
Qty: 90 TABLET | Refills: 3 | Status: SHIPPED | OUTPATIENT
Start: 2024-02-06

## 2024-02-06 RX ORDER — FAMOTIDINE 10 MG/ML
20 INJECTION, SOLUTION INTRAVENOUS
Status: CANCELLED | OUTPATIENT
Start: 2024-02-06

## 2024-02-06 RX ORDER — SODIUM CHLORIDE 9 MG/ML
5-250 INJECTION, SOLUTION INTRAVENOUS PRN
Status: CANCELLED | OUTPATIENT
Start: 2024-02-06

## 2024-02-06 RX ORDER — AZELASTINE HYDROCHLORIDE 137 UG/1
2 SPRAY, METERED NASAL 2 TIMES DAILY
Qty: 30 ML | Refills: 5 | Status: SHIPPED | OUTPATIENT
Start: 2024-02-06

## 2024-02-06 RX ORDER — MONTELUKAST SODIUM 10 MG/1
10 TABLET ORAL NIGHTLY
Qty: 90 TABLET | Refills: 3 | Status: SHIPPED | OUTPATIENT
Start: 2024-02-06

## 2024-02-06 RX ORDER — EPINEPHRINE 1 MG/ML
0.3 INJECTION, SOLUTION, CONCENTRATE INTRAVENOUS PRN
Status: CANCELLED | OUTPATIENT
Start: 2024-02-06

## 2024-02-06 RX ORDER — HEPARIN SODIUM (PORCINE) LOCK FLUSH IV SOLN 100 UNIT/ML 100 UNIT/ML
500 SOLUTION INTRAVENOUS PRN
Status: CANCELLED | OUTPATIENT
Start: 2024-02-06

## 2024-02-06 RX ORDER — HEPARIN 100 UNIT/ML
500 SYRINGE INTRAVENOUS PRN
Status: DISCONTINUED | OUTPATIENT
Start: 2024-02-06 | End: 2024-02-07 | Stop reason: HOSPADM

## 2024-02-06 RX ORDER — ESOMEPRAZOLE MAGNESIUM 40 MG/1
40 CAPSULE, DELAYED RELEASE ORAL
COMMUNITY
Start: 2024-02-05

## 2024-02-06 RX ORDER — ACETAMINOPHEN 325 MG/1
650 TABLET ORAL
Status: CANCELLED | OUTPATIENT
Start: 2024-02-06

## 2024-02-06 RX ORDER — SODIUM CHLORIDE 9 MG/ML
INJECTION, SOLUTION INTRAVENOUS CONTINUOUS
Status: CANCELLED | OUTPATIENT
Start: 2024-02-06

## 2024-02-06 RX ORDER — DIPHENHYDRAMINE HYDROCHLORIDE 50 MG/ML
50 INJECTION INTRAMUSCULAR; INTRAVENOUS
Status: CANCELLED | OUTPATIENT
Start: 2024-02-06

## 2024-02-06 RX ORDER — MEPERIDINE HYDROCHLORIDE 50 MG/ML
12.5 INJECTION INTRAMUSCULAR; INTRAVENOUS; SUBCUTANEOUS PRN
Status: CANCELLED | OUTPATIENT
Start: 2024-02-06

## 2024-02-06 RX ORDER — FLUTICASONE PROPIONATE AND SALMETEROL 250; 50 UG/1; UG/1
1 POWDER RESPIRATORY (INHALATION)
Qty: 1 EACH | Refills: 5 | Status: SHIPPED | OUTPATIENT
Start: 2024-02-06

## 2024-02-06 RX ORDER — SODIUM CHLORIDE 0.9 % (FLUSH) 0.9 %
5-40 SYRINGE (ML) INJECTION PRN
Status: DISCONTINUED | OUTPATIENT
Start: 2024-02-06 | End: 2024-02-07 | Stop reason: HOSPADM

## 2024-02-06 RX ORDER — FLUTICASONE PROPIONATE 50 MCG
2 SPRAY, SUSPENSION (ML) NASAL DAILY PRN
Qty: 16 G | Refills: 5 | Status: SHIPPED | OUTPATIENT
Start: 2024-02-06

## 2024-02-06 RX ORDER — SODIUM CHLORIDE 0.9 % (FLUSH) 0.9 %
5-40 SYRINGE (ML) INJECTION PRN
Status: CANCELLED | OUTPATIENT
Start: 2024-02-06

## 2024-02-06 RX ADMIN — SODIUM CHLORIDE 200 MG: 9 INJECTION, SOLUTION INTRAVENOUS at 12:09

## 2024-02-06 RX ADMIN — HEPARIN 500 UNITS: 100 SYRINGE at 12:41

## 2024-02-06 RX ADMIN — SODIUM CHLORIDE, PRESERVATIVE FREE 10 ML: 5 INJECTION INTRAVENOUS at 12:41

## 2024-02-06 NOTE — PROGRESS NOTES
[facility] OUTPATIENT PROGRESS NOTE    Patient: Lalo Jones Sr.  : 1965  Age: 58 y.o.  Date of Service: 2024    REASON FOR CLINIC VISIT:  Chief Complaint   Patient presents with    COPD    Lung Nodule       Subjective:    History of Present Illness:  Lalo Jones Sr. is a 58 y.o. male who presents to the office today to be seen for    Diagnosis Plan   1. Chronic obstructive pulmonary disease, unspecified COPD type        2. Malignant neoplasm of upper lobe of right lung        3. S/P lobectomy of lung        4. History of chemotherapy        5. History of radiation therapy        6. FABIEN (obstructive sleep apnea)        7. Former smoker        8. History of COVID-19        9. Non-seasonal allergic rhinitis, unspecified trigger        10. History of pneumonia        .  Other problems per record.    History:    Patient is a very pleasant middle-aged  gentleman who was seen in the pulmonary clinic for a follow-up visit.    Patient is a former smoker and did quit smoking.  He had moderate chronic obstructive pulmonary disease.  He is currently using Advair or Wixela with albuterol nebulizer and rescue inhaler.  He apparently stopped using Advair and was using more albuterol nebulizer and rescue inhaler and still gets short of breath on exertion.  He had a right upper lobe lung mass which had been removed by Dr. Carlin by a partial lobectomy a few years ago and he received chemotherapy and radiation treatment.  He is following up with Dr. Tadeo in [facility] and will be seeing him this afternoon.  He had no recent CT scan of the chest done which has been ordered during this visit.  Patient told me he is currently starting immunotherapy with Dr. Tadeo.    After stopping smoking he is doing much better and he is breathing better and he feels more energetic and less tired.  He had history of pneumonia and COVID-19 in the past and is up-to-date on his  vaccinations.He had sleep disturbances in the past and likely have obstructive sleep apnea but currently he did not want to do any sleep study and does not use any CPAP or oxygen at home.  He is very active and lives with his wife.  He did not have any recent hospital admissions and ER visit and urgent care visit any other new complaints..  He is using fluticasone nasal spray, Astelin nasal spray, Zyrtec and montelukast for his nasal allergy and his allergy symptoms are under good control.    PFT done today:  Not done today      Results for orders placed during the hospital encounter of 23    Full Pulmonary Function Test With Bronchodilator & ABG    Narrative  [facility] - Pulmonary Function Test    [address] [place] [address]  [id number]  [phone]    Patient : Lalo Jones  MRN : [id number]  CSN : [id number]  Pulmonologist : Anam Brown MD  Date : 3/21/2023    ______________________________________________________________________    Interpretation :  1.  Spirometry is consistent with a moderate obstructive ventilatory defect although the decrease in the patient's FEV1 is just into the moderate range at 79% of predicted.  2.  There is improvement in spirometry postbronchodilator so that postbronchodilator spirometry just reveals a mild obstructive ventilatory defect manifested by a decrease in midflows and peak expiratory flow.  3.  Lung volumes reveal hyperinflation.  4.  There is a mild diffusion impairment particularly when corrected for alveolar volume.      Anam Brown MD         Bronchodilator therapy: aDVAIR    Smoking Status:   Social History     Tobacco Use   Smoking Status Former    Packs/day: 1.00    Years: 35.00    Additional pack years: 0.00    Total pack years: 35.00    Types: Cigarettes    Start date: 1987    Quit date: 2022    Years since quittin.1    Passive exposure: Past   Smokeless Tobacco Former    Types: Snuff    Quit date:      Pulm  Rehab: no  Sleep: yes    Support System: lives with their spouse    Code Status:   There are no questions and answers to display.        Review of Systems:  A complete review of systems is performed and all other systems were reviewed and negative as note above in the HPI.  Review of Systems   Constitutional: Negative.  Negative for fatigue.   HENT:  Positive for congestion, postnasal drip and sinus pressure.    Eyes: Negative.    Respiratory:  Positive for cough, chest tightness and shortness of breath.    Cardiovascular: Negative.    Gastrointestinal: Negative.    Genitourinary: Negative.    Musculoskeletal:  Positive for arthralgias and back pain.   Skin: Negative.    Allergic/Immunologic: Positive for environmental allergies.   Neurological: Negative.    Hematological: Negative.    Psychiatric/Behavioral: Negative.         CAT/ACT Score:  Not done today    Medications:  Outpatient Encounter Medications as of 2/6/2024   Medication Sig Dispense Refill    albuterol (PROVENTIL) (2.5 MG/3ML) 0.083% nebulizer solution NEBULIZE CONTENT OF ONE VIAL EVERY FOUR HOURS AS NEEDED FOR WHEEZING 360 mL 1    albuterol sulfate  (90 Base) MCG/ACT inhaler Inhale 2 puffs Every 4 (Four) Hours As Needed for Wheezing or Shortness of Air. 6.7 g 5    Azelastine HCl 137 MCG/SPRAY solution 2 sprays into the nostril(s) as directed by provider 2 (Two) Times a Day. 30 mL 5    cetirizine (zyrTEC) 10 MG tablet Take 1 tablet by mouth Daily. 90 tablet 3    esomeprazole (nexIUM) 40 MG capsule Take 1 capsule by mouth Every Morning Before Breakfast.      fluticasone (FLONASE) 50 MCG/ACT nasal spray 2 sprays into the nostril(s) as directed by provider Daily As Needed for Rhinitis or Allergies. 16 g 5    gabapentin (NEURONTIN) 800 MG tablet Take 1 tablet by mouth 3 (Three) Times a Day.      hydrOXYzine pamoate (VISTARIL) 50 MG capsule Take 1 capsule by mouth Daily.      montelukast (SINGULAIR) 10 MG tablet Take 1 tablet by mouth Every Night. 90  "tablet 3    multivitamin with minerals tablet tablet Take 1 tablet by mouth Daily.      NON FORMULARY Take 1 tablet by mouth Daily. Marijuana Gummies      Fluticasone-Salmeterol (ADVAIR/WIXELA) 250-50 MCG/ACT DISKUS Inhale 1 puff 2 (Two) Times a Day. (Patient not taking: Reported on 2/6/2024) 1 each 5    ibuprofen (ADVIL,MOTRIN) 800 MG tablet Take 1 tablet by mouth Every 8 (Eight) Hours As Needed for Mild Pain. (Patient not taking: Reported on 2/6/2024)      midodrine (PROAMATINE) 2.5 MG tablet Take 1 tablet by mouth 3 (Three) Times a Day Before Meals. (Patient not taking: Reported on 2/6/2024) 90 tablet 11     Facility-Administered Encounter Medications as of 2/6/2024   Medication Dose Route Frequency Provider Last Rate Last Admin    pneumococcal polysaccharide 23-valent (PNEUMOVAX-23) vaccine 0.5 mL  0.5 mL Intramuscular During Hospitalization Russel Santana MD           Allergies:  Allergies   Allergen Reactions    Adhesive Tape Other (See Comments)     Leaves blisters, whelps, rash       Immunizations:  Immunization History   Administered Date(s) Administered    COVID-19 (JOSE) 03/19/2021    COVID-19 (MODERNA) 1st,2nd,3rd Dose Monovalent 04/19/2022    Hepatitis A 06/12/2019, 01/30/2020       Objective:    Vitals:  /78   Pulse 87   Ht 188 cm (74\")   Wt 99.7 kg (219 lb 12.8 oz)   SpO2 100% Comment: ra  BMI 28.22 kg/m²     Physical Exam:  General: Patient is a 58 y.o. pleasant middle aged  male. Looks stated age. Appears to be in no acute distress.  Eyes: EOMI. PERRLA. Vision intact. No scleral icterus.  Ear, Nose, Mouth and Throat: Hearing is grossly intact. No Leukoplakia, pharyngitis, stomatitis or thrush. Swollen nasal mucosa with post nasal drop.  Neck: Range of motion of neck normal. No thyromegaly or masses. Mallampati Class 3  Respiratory: Clear to auscultation bilaterally. No use of accessory muscles. Decreased breath sounds.  Cardiovascular: Normal heart sounds. Regularly regular " rhythm without murmur.  Gastrointestinal: Non tender, non distended, soft. Bowel sounds positive in all four quadrants. No organomegaly.  Skin: No obvious rashes, lesions, ulcers or large amount of bruising. No edema.   Neurological: No new motor deficits. Cranial nerves appear intact.  Psychiatric: Patient is alert and oriented to person, place and time.    Chest Imaging:    Study Result    Narrative & Impression   EXAM/TECHNIQUE: XR CHEST 2 VW-     INDICATION: shortness of breath; C34.11-Malignant neoplasm of upper  lobe, right bronchus or lung     COMPARISON: 5/15/2023     FINDINGS:     The cardiac silhouette is normal size. RIGHT chest port catheter tip  overlying the SVC. Postoperative volume loss in the RIGHT chest, similar  prior exam. No pleural effusion, pneumothorax, or focal consolidation.  Prior RIGHT fifth rib plating noted. No acute osseous finding.     IMPRESSION:  No acute findings.    This report was finalized on 06/12/2023 11:34 by Dr. Harpreet Koenig MD.         Assessment:  1. Chronic obstructive pulmonary disease, unspecified COPD type    2. Malignant neoplasm of upper lobe of right lung    3. S/P lobectomy of lung    4. History of chemotherapy    5. History of radiation therapy    6. FABIEN (obstructive sleep apnea)    7. Former smoker    8. History of COVID-19    9. Non-seasonal allergic rhinitis, unspecified trigger    10. History of pneumonia        Plan/Recommendations:    1.  Patient is doing well from the pulmonary standpoint.  Advised him to continue using Advair instead of using albuterol as rescue inhaler more frequently and he can use albuterol nebulizer and rescue inhaler as needed.  Prescription refill was sent to the pharmacy.  2.  He had non-small cell lung cancer treated by oncology and Dr. Tadeo is going to see him back in the office this afternoon he is getting immune treatment after getting chemotherapy and radiation treatment and also had a partial lobectomy done by Dr. Carlin.   He is doing well but did not have any recent imaging studies on his CT scan of the chest done by next week for further evaluation of his malignancy.  3.  For his nasal allergy will continue using fluticasone nasal spray, Zyrtec, Astelin nasal spray and montelukast and prescription refills were sent to the pharmacy.  4.  Patient did quit smoking.  Continue smoking abstinence.  He will continue other medications and will continue follow-up with the primary care provider and oncology.  He will return to pulmonary clinic for a follow-up visit in 6 months time or earlier if needed.    Follow up:  6 Months    Time Spent:  30 minutes    I appreciate the opportunity of participating in this patient's care. I would like to thank the PCP for the referral.  Please feel free to contact me with any other questions.    Russel Santana MD   Pulmonologist/Intensivist     Electronically signed by: Russel Santana MD, 2/6/2024 09:48 CST

## 2024-02-21 ENCOUNTER — HOSPITAL ENCOUNTER (OUTPATIENT)
Dept: CT IMAGING | Facility: HOSPITAL | Age: 59
Discharge: HOME | End: 2024-02-21
Admitting: INTERNAL MEDICINE
Payer: MEDICAID

## 2024-02-21 DIAGNOSIS — J44.9 CHRONIC OBSTRUCTIVE PULMONARY DISEASE, UNSPECIFIED COPD TYPE: ICD-10-CM

## 2024-02-21 DIAGNOSIS — C34.11 MALIGNANT NEOPLASM OF UPPER LOBE OF RIGHT LUNG: ICD-10-CM

## 2024-02-21 PROCEDURE — 71250 CT THORAX DX C-: CPT

## 2024-02-27 ENCOUNTER — HOSPITAL ENCOUNTER (OUTPATIENT)
Dept: INFUSION THERAPY | Age: 59
Discharge: HOME | End: 2024-02-27
Payer: MEDICAID

## 2024-02-27 VITALS
TEMPERATURE: 98.7 F | HEART RATE: 79 BPM | OXYGEN SATURATION: 98 % | SYSTOLIC BLOOD PRESSURE: 105 MMHG | HEIGHT: 74 IN | BODY MASS INDEX: 28.45 KG/M2 | WEIGHT: 221.7 LBS | RESPIRATION RATE: 16 BRPM | DIASTOLIC BLOOD PRESSURE: 68 MMHG

## 2024-02-27 DIAGNOSIS — Z90.89 S/P LOBECTOMY OF BRAIN: ICD-10-CM

## 2024-02-27 DIAGNOSIS — C34.91 ADENOCARCINOMA, LUNG, RIGHT (HCC): Primary | ICD-10-CM

## 2024-02-27 DIAGNOSIS — C34.91 ADENOCARCINOMA OF RIGHT LUNG (HCC): Primary | ICD-10-CM

## 2024-02-27 DIAGNOSIS — C34.91 ADENOCARCINOMA OF RIGHT LUNG (HCC): ICD-10-CM

## 2024-02-27 LAB
ALBUMIN SERPL-MCNC: 3.9 G/DL (ref 3.5–5.2)
ALP SERPL-CCNC: 80 U/L (ref 40–130)
ALT SERPL-CCNC: 22 U/L (ref 21–72)
ANION GAP SERPL CALCULATED.3IONS-SCNC: 9 MMOL/L (ref 7–19)
AST SERPL-CCNC: 21 U/L (ref 17–59)
BASOPHILS # BLD: 0.03 K/UL (ref 0.01–0.08)
BASOPHILS NFR BLD: 0.4 % (ref 0.1–1.2)
BILIRUB SERPL-MCNC: 0.8 MG/DL (ref 0.2–1.3)
BUN SERPL-MCNC: 18 MG/DL (ref 9–20)
CALCIUM SERPL-MCNC: 9.7 MG/DL (ref 8.4–10.2)
CHLORIDE SERPL-SCNC: 108 MMOL/L (ref 98–111)
CO2 SERPL-SCNC: 26 MMOL/L (ref 22–29)
CREAT SERPL-MCNC: 1.1 MG/DL (ref 0.6–1.2)
EOSINOPHIL # BLD: 0.16 K/UL (ref 0.04–0.54)
EOSINOPHIL NFR BLD: 2 % (ref 0.7–7)
ERYTHROCYTE [DISTWIDTH] IN BLOOD BY AUTOMATED COUNT: 13.8 % (ref 11.6–14.4)
GLOBULIN: 2.8 G/DL
GLUCOSE SERPL-MCNC: 113 MG/DL (ref 74–106)
HCT VFR BLD AUTO: 38.2 % (ref 40.1–51)
HGB BLD-MCNC: 12.6 G/DL (ref 13.7–17.5)
LYMPHOCYTES # BLD: 0.76 K/UL (ref 1.18–3.74)
LYMPHOCYTES NFR BLD: 9.6 % (ref 19.3–53.1)
MCH RBC QN AUTO: 29.2 PG (ref 25.7–32.2)
MCHC RBC AUTO-ENTMCNC: 33 G/DL (ref 32.3–36.5)
MCV RBC AUTO: 88.6 FL (ref 79–92.2)
MONOCYTES # BLD: 0.58 K/UL (ref 0.24–0.82)
MONOCYTES NFR BLD: 7.3 % (ref 4.7–12.5)
NEUTROPHILS # BLD: 6.35 K/UL (ref 1.56–6.13)
NEUTS SEG NFR BLD: 80.2 % (ref 34–71.1)
PLATELET # BLD AUTO: 152 K/UL (ref 163–337)
PMV BLD AUTO: 8.9 FL (ref 7.4–10.4)
POTASSIUM SERPL-SCNC: 3.3 MMOL/L (ref 3.5–5.1)
PROT SERPL-MCNC: 6.7 G/DL (ref 6.3–8.2)
RBC # BLD AUTO: 4.31 M/UL (ref 4.63–6.08)
SODIUM SERPL-SCNC: 143 MMOL/L (ref 137–145)
WBC # BLD AUTO: 7.92 K/UL (ref 4.23–9.07)

## 2024-02-27 PROCEDURE — 2580000003 HC RX 258: Performed by: INTERNAL MEDICINE

## 2024-02-27 PROCEDURE — 85025 COMPLETE CBC W/AUTO DIFF WBC: CPT

## 2024-02-27 PROCEDURE — 96413 CHEMO IV INFUSION 1 HR: CPT

## 2024-02-27 PROCEDURE — 36415 COLL VENOUS BLD VENIPUNCTURE: CPT

## 2024-02-27 PROCEDURE — 6360000002 HC RX W HCPCS: Performed by: INTERNAL MEDICINE

## 2024-02-27 PROCEDURE — 80053 COMPREHEN METABOLIC PANEL: CPT

## 2024-02-27 RX ORDER — SODIUM CHLORIDE 9 MG/ML
5-250 INJECTION, SOLUTION INTRAVENOUS PRN
Status: CANCELLED | OUTPATIENT
Start: 2024-02-27

## 2024-02-27 RX ORDER — DIPHENHYDRAMINE HYDROCHLORIDE 50 MG/ML
50 INJECTION INTRAMUSCULAR; INTRAVENOUS
Status: CANCELLED | OUTPATIENT
Start: 2024-02-27

## 2024-02-27 RX ORDER — ACETAMINOPHEN 325 MG/1
650 TABLET ORAL
Status: CANCELLED | OUTPATIENT
Start: 2024-02-27

## 2024-02-27 RX ORDER — HEPARIN SODIUM (PORCINE) LOCK FLUSH IV SOLN 100 UNIT/ML 100 UNIT/ML
500 SOLUTION INTRAVENOUS PRN
Status: CANCELLED | OUTPATIENT
Start: 2024-02-27

## 2024-02-27 RX ORDER — HEPARIN 100 UNIT/ML
500 SYRINGE INTRAVENOUS PRN
Status: DISCONTINUED | OUTPATIENT
Start: 2024-02-27 | End: 2024-02-28 | Stop reason: HOSPADM

## 2024-02-27 RX ORDER — SODIUM CHLORIDE 0.9 % (FLUSH) 0.9 %
5-40 SYRINGE (ML) INJECTION PRN
Status: DISCONTINUED | OUTPATIENT
Start: 2024-02-27 | End: 2024-02-28 | Stop reason: HOSPADM

## 2024-02-27 RX ORDER — ONDANSETRON 2 MG/ML
8 INJECTION INTRAMUSCULAR; INTRAVENOUS
Status: CANCELLED | OUTPATIENT
Start: 2024-02-27

## 2024-02-27 RX ORDER — EPINEPHRINE 1 MG/ML
0.3 INJECTION, SOLUTION, CONCENTRATE INTRAVENOUS PRN
Status: CANCELLED | OUTPATIENT
Start: 2024-02-27

## 2024-02-27 RX ORDER — MEPERIDINE HYDROCHLORIDE 50 MG/ML
12.5 INJECTION INTRAMUSCULAR; INTRAVENOUS; SUBCUTANEOUS PRN
Status: CANCELLED | OUTPATIENT
Start: 2024-02-27

## 2024-02-27 RX ORDER — FAMOTIDINE 10 MG/ML
20 INJECTION, SOLUTION INTRAVENOUS
Status: CANCELLED | OUTPATIENT
Start: 2024-02-27

## 2024-02-27 RX ORDER — SODIUM CHLORIDE 9 MG/ML
INJECTION, SOLUTION INTRAVENOUS CONTINUOUS
Status: CANCELLED | OUTPATIENT
Start: 2024-02-27

## 2024-02-27 RX ORDER — ALBUTEROL SULFATE 90 UG/1
4 AEROSOL, METERED RESPIRATORY (INHALATION) PRN
Status: CANCELLED | OUTPATIENT
Start: 2024-02-27

## 2024-02-27 RX ORDER — SODIUM CHLORIDE 0.9 % (FLUSH) 0.9 %
5-40 SYRINGE (ML) INJECTION PRN
Status: CANCELLED | OUTPATIENT
Start: 2024-02-27

## 2024-02-27 RX ADMIN — HEPARIN 500 UNITS: 100 SYRINGE at 10:19

## 2024-02-27 RX ADMIN — SODIUM CHLORIDE, PRESERVATIVE FREE 10 ML: 5 INJECTION INTRAVENOUS at 10:19

## 2024-02-27 RX ADMIN — SODIUM CHLORIDE 200 MG: 9 INJECTION, SOLUTION INTRAVENOUS at 09:48

## 2024-03-14 NOTE — PROGRESS NOTES
arteries.       F/U Dr Sanders 10/9/2023  Rx Proamatine 2.5mg TID  ECG: Normal  Recommend loop recorder implant; referral to Neurology (Appt 1/30/24-[facility])  F/u 3 months (1/9/2023)    Loop recorder implantation 10/17/23 per Dr Sanders, [facility]      I, Jeanette Tuttle RN am precharting for Horace Dolan MD. Electronically signed by Jeanette Tuttle RN on 3/19/2024 at 11:41 AM        Andrew was seen today for cancer, treatment and follow-up.    Diagnoses and all orders for this visit:    Adenocarcinoma of right lung (HCC)  -     CBC with Auto Differential; Future  -     Comprehensive Metabolic Panel; Future  -     Cortisol AM, Total; Future  -     TSH; Future  -     MRI BRAIN W WO CONTRAST; Future    Encounter for antineoplastic immunotherapy  -     CBC with Auto Differential; Future  -     Comprehensive Metabolic Panel; Future  -     Cortisol AM, Total; Future  -     TSH; Future    Fatigue due to treatment  -     Cortisol AM, Total; Future  -     TSH; Future    Health care maintenance    Elevated CEA    Dizziness  -     MRI BRAIN W WO CONTRAST; Future                        Orders Placed This Encounter   Procedures    MRI BRAIN W WO CONTRAST     Standing Status:   Future     Standing Expiration Date:   3/19/2025     Order Specific Question:   STAT Creatinine as needed:     Answer:   No     Order Specific Question:   Reason for exam:     Answer:   dizziness, hx lung adenocarcinoma,     Order Specific Question:   What is the sedation requirement?     Answer:   None    CBC with Auto Differential     Standing Status:   Future     Number of Occurrences:   1     Standing Expiration Date:   3/14/2025    Comprehensive Metabolic Panel     Standing Status:   Future     Number of Occurrences:   1     Standing Expiration Date:   3/14/2025    Cortisol AM, Total     Standing Status:   Future     Number of Occurrences:   1     Standing Expiration Date:   3/14/2025    TSH     Standing Status:

## 2024-03-19 ENCOUNTER — HOSPITAL ENCOUNTER (OUTPATIENT)
Dept: MRI IMAGING | Age: 59
Discharge: HOME | End: 2024-03-19

## 2024-03-19 ENCOUNTER — HOSPITAL ENCOUNTER (OUTPATIENT)
Dept: INFUSION THERAPY | Age: 59
Discharge: HOME | End: 2024-03-19
Payer: MEDICAID

## 2024-03-19 ENCOUNTER — OFFICE VISIT (OUTPATIENT)
Dept: HEMATOLOGY | Age: 59
End: 2024-03-19
Payer: MEDICAID

## 2024-03-19 VITALS
BODY MASS INDEX: 28.16 KG/M2 | OXYGEN SATURATION: 98 % | SYSTOLIC BLOOD PRESSURE: 114 MMHG | DIASTOLIC BLOOD PRESSURE: 64 MMHG | HEIGHT: 74 IN | HEART RATE: 86 BPM | WEIGHT: 219.4 LBS | RESPIRATION RATE: 18 BRPM | TEMPERATURE: 97.6 F

## 2024-03-19 DIAGNOSIS — E86.0 DEHYDRATION: ICD-10-CM

## 2024-03-19 DIAGNOSIS — C34.91 ADENOCARCINOMA OF RIGHT LUNG (HCC): ICD-10-CM

## 2024-03-19 DIAGNOSIS — R42 DIZZINESS: ICD-10-CM

## 2024-03-19 DIAGNOSIS — Z51.12 ENCOUNTER FOR ANTINEOPLASTIC IMMUNOTHERAPY: ICD-10-CM

## 2024-03-19 DIAGNOSIS — Z90.89 S/P LOBECTOMY OF BRAIN: ICD-10-CM

## 2024-03-19 DIAGNOSIS — R53.83 FATIGUE DUE TO TREATMENT: ICD-10-CM

## 2024-03-19 DIAGNOSIS — R21 RASH: Primary | ICD-10-CM

## 2024-03-19 DIAGNOSIS — Z00.00 HEALTH CARE MAINTENANCE: ICD-10-CM

## 2024-03-19 DIAGNOSIS — C34.91 ADENOCARCINOMA OF RIGHT LUNG (HCC): Primary | ICD-10-CM

## 2024-03-19 DIAGNOSIS — C34.91 ADENOCARCINOMA, LUNG, RIGHT (HCC): Primary | ICD-10-CM

## 2024-03-19 DIAGNOSIS — R97.0 ELEVATED CEA: ICD-10-CM

## 2024-03-19 LAB
ALBUMIN SERPL-MCNC: 4.2 G/DL (ref 3.5–5.2)
ALP SERPL-CCNC: 147 U/L (ref 40–130)
ALT SERPL-CCNC: 41 U/L (ref 21–72)
ANION GAP SERPL CALCULATED.3IONS-SCNC: 10 MMOL/L (ref 7–19)
AST SERPL-CCNC: 37 U/L (ref 17–59)
BASOPHILS # BLD: 0.04 K/UL (ref 0.01–0.08)
BASOPHILS NFR BLD: 0.8 % (ref 0.1–1.2)
BILIRUB SERPL-MCNC: 0.4 MG/DL (ref 0.2–1.3)
BUN SERPL-MCNC: 23 MG/DL (ref 9–20)
CALCIUM SERPL-MCNC: 9.6 MG/DL (ref 8.4–10.2)
CHLORIDE SERPL-SCNC: 101 MMOL/L (ref 98–111)
CO2 SERPL-SCNC: 28 MMOL/L (ref 22–29)
CORTIS AM PEAK SERPL-MCNC: 10.9 UG/DL (ref 6.2–19.4)
CREAT SERPL-MCNC: 1 MG/DL (ref 0.6–1.2)
EOSINOPHIL # BLD: 0.15 K/UL (ref 0.04–0.54)
EOSINOPHIL NFR BLD: 3 % (ref 0.7–7)
ERYTHROCYTE [DISTWIDTH] IN BLOOD BY AUTOMATED COUNT: 13.3 % (ref 11.6–14.4)
GLOBULIN: 2.7 G/DL
GLUCOSE SERPL-MCNC: 96 MG/DL (ref 74–106)
HCT VFR BLD AUTO: 40.9 % (ref 40.1–51)
HGB BLD-MCNC: 13.5 G/DL (ref 13.7–17.5)
LYMPHOCYTES # BLD: 0.67 K/UL (ref 1.18–3.74)
LYMPHOCYTES NFR BLD: 13.3 % (ref 19.3–53.1)
MCH RBC QN AUTO: 29 PG (ref 25.7–32.2)
MCHC RBC AUTO-ENTMCNC: 33 G/DL (ref 32.3–36.5)
MCV RBC AUTO: 88 FL (ref 79–92.2)
MONOCYTES # BLD: 0.45 K/UL (ref 0.24–0.82)
MONOCYTES NFR BLD: 8.9 % (ref 4.7–12.5)
NEUTROPHILS # BLD: 3.73 K/UL (ref 1.56–6.13)
NEUTS SEG NFR BLD: 73.8 % (ref 34–71.1)
PLATELET # BLD AUTO: 156 K/UL (ref 163–337)
PMV BLD AUTO: 8.9 FL (ref 7.4–10.4)
POTASSIUM SERPL-SCNC: 4 MMOL/L (ref 3.5–5.1)
PROT SERPL-MCNC: 6.8 G/DL (ref 6.3–8.2)
RBC # BLD AUTO: 4.65 M/UL (ref 4.63–6.08)
SODIUM SERPL-SCNC: 139 MMOL/L (ref 137–145)
TSH SERPL DL<=0.005 MIU/L-ACNC: 3.92 UIU/ML (ref 0.27–4.2)
WBC # BLD AUTO: 5.05 K/UL (ref 4.23–9.07)

## 2024-03-19 PROCEDURE — 85025 COMPLETE CBC W/AUTO DIFF WBC: CPT

## 2024-03-19 PROCEDURE — 99214 OFFICE O/P EST MOD 30 MIN: CPT | Performed by: INTERNAL MEDICINE

## 2024-03-19 PROCEDURE — 36415 COLL VENOUS BLD VENIPUNCTURE: CPT

## 2024-03-19 PROCEDURE — 80053 COMPREHEN METABOLIC PANEL: CPT

## 2024-03-19 PROCEDURE — 96360 HYDRATION IV INFUSION INIT: CPT

## 2024-03-19 PROCEDURE — 96413 CHEMO IV INFUSION 1 HR: CPT

## 2024-03-19 PROCEDURE — 6360000002 HC RX W HCPCS: Performed by: INTERNAL MEDICINE

## 2024-03-19 PROCEDURE — G2211 COMPLEX E/M VISIT ADD ON: HCPCS | Performed by: INTERNAL MEDICINE

## 2024-03-19 PROCEDURE — 2580000003 HC RX 258: Performed by: INTERNAL MEDICINE

## 2024-03-19 RX ORDER — EPINEPHRINE 1 MG/ML
0.3 INJECTION, SOLUTION, CONCENTRATE INTRAVENOUS PRN
Status: CANCELLED | OUTPATIENT
Start: 2024-03-19

## 2024-03-19 RX ORDER — MEPERIDINE HYDROCHLORIDE 50 MG/ML
12.5 INJECTION INTRAMUSCULAR; INTRAVENOUS; SUBCUTANEOUS PRN
Status: CANCELLED | OUTPATIENT
Start: 2024-03-19

## 2024-03-19 RX ORDER — SODIUM CHLORIDE 9 MG/ML
5-250 INJECTION, SOLUTION INTRAVENOUS PRN
Status: CANCELLED | OUTPATIENT
Start: 2024-03-19

## 2024-03-19 RX ORDER — ONDANSETRON 2 MG/ML
8 INJECTION INTRAMUSCULAR; INTRAVENOUS
Status: CANCELLED | OUTPATIENT
Start: 2024-03-19

## 2024-03-19 RX ORDER — 0.9 % SODIUM CHLORIDE 0.9 %
500 INTRAVENOUS SOLUTION INTRAVENOUS ONCE
Status: CANCELLED
Start: 2024-03-19

## 2024-03-19 RX ORDER — BENZOCAINE/MENTHOL 6 MG-10 MG
LOZENGE MUCOUS MEMBRANE
Qty: 30 G | Refills: 1 | Status: SHIPPED | OUTPATIENT
Start: 2024-03-19 | End: 2024-03-26

## 2024-03-19 RX ORDER — HEPARIN SODIUM (PORCINE) LOCK FLUSH IV SOLN 100 UNIT/ML 100 UNIT/ML
500 SOLUTION INTRAVENOUS PRN
Status: CANCELLED | OUTPATIENT
Start: 2024-03-19

## 2024-03-19 RX ORDER — CLINDAMYCIN PHOSPHATE 10 MG/G
GEL TOPICAL
Qty: 60 G | Refills: 1 | Status: SHIPPED | OUTPATIENT
Start: 2024-03-19 | End: 2024-03-26

## 2024-03-19 RX ORDER — FERRIC OXIDE RED, ZINC OXIDE, AND PRAMOXINE HYDROCHLORIDE 1.36; 78.65; 1 MG/ML; MG/ML; MG/ML
1 LOTION TOPICAL PRN
Qty: 177 ML | Refills: 1 | Status: SHIPPED | OUTPATIENT
Start: 2024-03-19

## 2024-03-19 RX ORDER — ACETAMINOPHEN 325 MG/1
650 TABLET ORAL
Status: CANCELLED | OUTPATIENT
Start: 2024-03-19

## 2024-03-19 RX ORDER — DIPHENHYDRAMINE HYDROCHLORIDE 50 MG/ML
50 INJECTION INTRAMUSCULAR; INTRAVENOUS
Status: CANCELLED | OUTPATIENT
Start: 2024-03-19

## 2024-03-19 RX ORDER — ALBUTEROL SULFATE 90 UG/1
4 AEROSOL, METERED RESPIRATORY (INHALATION) PRN
Status: CANCELLED | OUTPATIENT
Start: 2024-03-19

## 2024-03-19 RX ORDER — HEPARIN 100 UNIT/ML
500 SYRINGE INTRAVENOUS PRN
Status: DISCONTINUED | OUTPATIENT
Start: 2024-03-19 | End: 2024-03-20 | Stop reason: HOSPADM

## 2024-03-19 RX ORDER — SODIUM CHLORIDE 0.9 % (FLUSH) 0.9 %
5-40 SYRINGE (ML) INJECTION PRN
Status: CANCELLED | OUTPATIENT
Start: 2024-03-19

## 2024-03-19 RX ORDER — SODIUM CHLORIDE 9 MG/ML
INJECTION, SOLUTION INTRAVENOUS CONTINUOUS
Status: CANCELLED | OUTPATIENT
Start: 2024-03-19

## 2024-03-19 RX ORDER — SODIUM CHLORIDE 0.9 % (FLUSH) 0.9 %
5-40 SYRINGE (ML) INJECTION PRN
Status: DISCONTINUED | OUTPATIENT
Start: 2024-03-19 | End: 2024-03-20 | Stop reason: HOSPADM

## 2024-03-19 RX ORDER — FAMOTIDINE 10 MG/ML
20 INJECTION, SOLUTION INTRAVENOUS
Status: CANCELLED | OUTPATIENT
Start: 2024-03-19

## 2024-03-19 RX ORDER — 0.9 % SODIUM CHLORIDE 0.9 %
500 INTRAVENOUS SOLUTION INTRAVENOUS ONCE
Status: COMPLETED | OUTPATIENT
Start: 2024-03-19 | End: 2024-03-19

## 2024-03-19 RX ADMIN — SODIUM CHLORIDE 200 MG: 9 INJECTION, SOLUTION INTRAVENOUS at 11:57

## 2024-03-19 RX ADMIN — HEPARIN 500 UNITS: 100 SYRINGE at 12:31

## 2024-03-19 RX ADMIN — SODIUM CHLORIDE, PRESERVATIVE FREE 10 ML: 5 INJECTION INTRAVENOUS at 12:31

## 2024-03-19 RX ADMIN — SODIUM CHLORIDE 500 ML: 9 INJECTION, SOLUTION INTRAVENOUS at 10:32

## 2024-03-23 ENCOUNTER — HOSPITAL ENCOUNTER (OUTPATIENT)
Dept: MRI IMAGING | Age: 59
End: 2024-03-23
Attending: INTERNAL MEDICINE
Payer: MEDICAID

## 2024-03-23 PROCEDURE — 6360000004 HC RX CONTRAST MEDICATION: Performed by: INTERNAL MEDICINE

## 2024-03-23 PROCEDURE — 70553 MRI BRAIN STEM W/O & W/DYE: CPT

## 2024-03-23 PROCEDURE — A9577 INJ MULTIHANCE: HCPCS | Performed by: INTERNAL MEDICINE

## 2024-03-23 RX ADMIN — GADOBENATE DIMEGLUMINE 20 ML: 529 INJECTION, SOLUTION INTRAVENOUS at 09:11

## 2024-04-07 NOTE — PROGRESS NOTES
Small RIGHT peripheral liver cyst   No suspicious liver lesion.  Pancreas and adrenal glands are unremarkable  14.3 cm mildly enlarged spleen   No solid renal mass      NM PET/CT SKULL BASE TO MID THIGH AT [facility] 03/27/2023 reported:   right pleural mass has decreased in terms of its tracer avidity, SUV max 3.1 (previously 1.4).   3.2 SUV MAX  right upper lobe patchy airspace disease with low-level level uptake, NEW  No scintigraphic evidence of dav or distant metastatic disease.     Follow-up appointment with Dr. Valentin Altamirano on 3/27/2023 to review imaging studies and to set a surgery date.     Right thoracotomy with right upper lobectomy and mediastinal lymph node dissection by Dr. Valentin Altamirano on 5/4/2023   Final pathology diagnosis:  1. Right chest wall margin, excision:  Benign adipose tissue and skeletal muscle. No malignancy is identified.    2. Right chest wall margin #2, excision:  Benign skeletal muscle and fibroconnective tissue.  No malignancy is identified.    3. Right chest wall margin #3, excision:  Benign skeletal muscle and fibroconnective tissue.  No malignancy identified.    4. Right upper lobe of lung, lobectomy:  Previous invasive adenocarcinoma of pulmonary origin ([id number]).  Inflammatory pseudotumor.  Residual tumor is not present.  The surgical margins are free of tumor.  7 benign perihilar and intraparenchymal lymph nodes.    5. Level 10 lymph node, excision:  1 benign lymph node with anthracotic pigment.  No malignancy is identified.    6. Level 10 lymph node, excision:  1 benign lymph node with anthracotic pigment.  No malignancy identified.    7. Level 4R lymph node, excision:  No malignancy identified.  1 benign lymph node.    8. Level 7 lymph node, excision:  No malignancy identified.  1 benign lymph node with anthracotic pigment.    9. Level 10 lymph node, excision:  No malignancy identified.  1 benign lymph node with anthracotic pigment.    10. Level 2R lymph node, excision:  No

## 2024-04-09 ENCOUNTER — HOSPITAL ENCOUNTER (OUTPATIENT)
Dept: INFUSION THERAPY | Age: 59
Discharge: HOME | End: 2024-04-09
Payer: MEDICAID

## 2024-04-09 ENCOUNTER — OFFICE VISIT (OUTPATIENT)
Dept: HEMATOLOGY | Age: 59
End: 2024-04-09
Payer: MEDICAID

## 2024-04-09 VITALS
HEART RATE: 86 BPM | HEIGHT: 74 IN | TEMPERATURE: 98 F | WEIGHT: 214 LBS | OXYGEN SATURATION: 98 % | RESPIRATION RATE: 18 BRPM | DIASTOLIC BLOOD PRESSURE: 74 MMHG | BODY MASS INDEX: 27.46 KG/M2 | SYSTOLIC BLOOD PRESSURE: 110 MMHG

## 2024-04-09 DIAGNOSIS — C34.91 ADENOCARCINOMA OF RIGHT LUNG (HCC): ICD-10-CM

## 2024-04-09 DIAGNOSIS — C34.91 ADENOCARCINOMA OF RIGHT LUNG (HCC): Primary | ICD-10-CM

## 2024-04-09 DIAGNOSIS — R21 RASH: ICD-10-CM

## 2024-04-09 DIAGNOSIS — R53.83 FATIGUE DUE TO TREATMENT: ICD-10-CM

## 2024-04-09 DIAGNOSIS — Z51.12 ENCOUNTER FOR ANTINEOPLASTIC IMMUNOTHERAPY: ICD-10-CM

## 2024-04-09 DIAGNOSIS — C34.91 ADENOCARCINOMA, LUNG, RIGHT (HCC): ICD-10-CM

## 2024-04-09 DIAGNOSIS — E86.0 DEHYDRATION: Primary | ICD-10-CM

## 2024-04-09 DIAGNOSIS — Z90.89 S/P LOBECTOMY OF BRAIN: ICD-10-CM

## 2024-04-09 LAB
ALBUMIN SERPL-MCNC: 4.6 G/DL (ref 3.5–5.2)
ALP SERPL-CCNC: 89 U/L (ref 40–130)
ALT SERPL-CCNC: 35 U/L (ref 21–72)
ANION GAP SERPL CALCULATED.3IONS-SCNC: 12 MMOL/L (ref 7–19)
AST SERPL-CCNC: 27 U/L (ref 17–59)
BASOPHILS # BLD: 0.04 K/UL (ref 0.01–0.08)
BASOPHILS NFR BLD: 0.8 % (ref 0.1–1.2)
BILIRUB SERPL-MCNC: 0.5 MG/DL (ref 0.2–1.3)
BUN SERPL-MCNC: 18 MG/DL (ref 9–20)
CALCIUM SERPL-MCNC: 9.6 MG/DL (ref 8.4–10.2)
CHLORIDE SERPL-SCNC: 104 MMOL/L (ref 98–111)
CO2 SERPL-SCNC: 27 MMOL/L (ref 22–29)
CORTIS AM PEAK SERPL-MCNC: 8.7 UG/DL (ref 6.2–19.4)
CREAT SERPL-MCNC: 1 MG/DL (ref 0.6–1.2)
EOSINOPHIL # BLD: 0.14 K/UL (ref 0.04–0.54)
EOSINOPHIL NFR BLD: 2.8 % (ref 0.7–7)
ERYTHROCYTE [DISTWIDTH] IN BLOOD BY AUTOMATED COUNT: 13.2 % (ref 11.6–14.4)
GLOBULIN: 2.4 G/DL
GLUCOSE SERPL-MCNC: 97 MG/DL (ref 74–106)
HCT VFR BLD AUTO: 44.5 % (ref 40.1–51)
HGB BLD-MCNC: 14.8 G/DL (ref 13.7–17.5)
LYMPHOCYTES # BLD: 1.16 K/UL (ref 1.18–3.74)
LYMPHOCYTES NFR BLD: 23.3 % (ref 19.3–53.1)
MCH RBC QN AUTO: 28.8 PG (ref 25.7–32.2)
MCHC RBC AUTO-ENTMCNC: 33.3 G/DL (ref 32.3–36.5)
MCV RBC AUTO: 86.6 FL (ref 79–92.2)
MONOCYTES # BLD: 0.39 K/UL (ref 0.24–0.82)
MONOCYTES NFR BLD: 7.8 % (ref 4.7–12.5)
NEUTROPHILS # BLD: 3.23 K/UL (ref 1.56–6.13)
NEUTS SEG NFR BLD: 65.1 % (ref 34–71.1)
PLATELET # BLD AUTO: 169 K/UL (ref 163–337)
PMV BLD AUTO: 9.3 FL (ref 7.4–10.4)
POTASSIUM SERPL-SCNC: 4 MMOL/L (ref 3.5–5.1)
PROT SERPL-MCNC: 7 G/DL (ref 6.3–8.2)
RBC # BLD AUTO: 5.14 M/UL (ref 4.63–6.08)
SODIUM SERPL-SCNC: 143 MMOL/L (ref 137–145)
TSH SERPL DL<=0.005 MIU/L-ACNC: 4.48 UIU/ML (ref 0.27–4.2)
WBC # BLD AUTO: 4.97 K/UL (ref 4.23–9.07)

## 2024-04-09 PROCEDURE — 96413 CHEMO IV INFUSION 1 HR: CPT

## 2024-04-09 PROCEDURE — 6360000002 HC RX W HCPCS: Performed by: INTERNAL MEDICINE

## 2024-04-09 PROCEDURE — 80053 COMPREHEN METABOLIC PANEL: CPT

## 2024-04-09 PROCEDURE — 2580000003 HC RX 258: Performed by: INTERNAL MEDICINE

## 2024-04-09 PROCEDURE — 99214 OFFICE O/P EST MOD 30 MIN: CPT | Performed by: INTERNAL MEDICINE

## 2024-04-09 PROCEDURE — 36415 COLL VENOUS BLD VENIPUNCTURE: CPT

## 2024-04-09 PROCEDURE — 85025 COMPLETE CBC W/AUTO DIFF WBC: CPT

## 2024-04-09 PROCEDURE — 96361 HYDRATE IV INFUSION ADD-ON: CPT

## 2024-04-09 PROCEDURE — G2211 COMPLEX E/M VISIT ADD ON: HCPCS | Performed by: INTERNAL MEDICINE

## 2024-04-09 RX ORDER — ALBUTEROL SULFATE 90 UG/1
4 AEROSOL, METERED RESPIRATORY (INHALATION) PRN
OUTPATIENT
Start: 2024-04-30

## 2024-04-09 RX ORDER — ONDANSETRON 2 MG/ML
8 INJECTION INTRAMUSCULAR; INTRAVENOUS
OUTPATIENT
Start: 2024-04-30

## 2024-04-09 RX ORDER — ACETAMINOPHEN 325 MG/1
650 TABLET ORAL
Status: CANCELLED | OUTPATIENT
Start: 2024-04-09

## 2024-04-09 RX ORDER — SODIUM CHLORIDE 9 MG/ML
INJECTION, SOLUTION INTRAVENOUS CONTINUOUS
OUTPATIENT
Start: 2024-04-30

## 2024-04-09 RX ORDER — MEPERIDINE HYDROCHLORIDE 50 MG/ML
12.5 INJECTION INTRAMUSCULAR; INTRAVENOUS; SUBCUTANEOUS PRN
OUTPATIENT
Start: 2024-04-30

## 2024-04-09 RX ORDER — SODIUM CHLORIDE 9 MG/ML
5-250 INJECTION, SOLUTION INTRAVENOUS PRN
Status: CANCELLED | OUTPATIENT
Start: 2024-04-09

## 2024-04-09 RX ORDER — ACETAMINOPHEN 325 MG/1
650 TABLET ORAL
OUTPATIENT
Start: 2024-04-30

## 2024-04-09 RX ORDER — SODIUM CHLORIDE 9 MG/ML
5-250 INJECTION, SOLUTION INTRAVENOUS PRN
OUTPATIENT
Start: 2024-04-30

## 2024-04-09 RX ORDER — DIPHENHYDRAMINE HYDROCHLORIDE 50 MG/ML
50 INJECTION INTRAMUSCULAR; INTRAVENOUS
Status: CANCELLED | OUTPATIENT
Start: 2024-04-09

## 2024-04-09 RX ORDER — DIPHENHYDRAMINE HYDROCHLORIDE 50 MG/ML
50 INJECTION INTRAMUSCULAR; INTRAVENOUS
OUTPATIENT
Start: 2024-04-30

## 2024-04-09 RX ORDER — ONDANSETRON 2 MG/ML
8 INJECTION INTRAMUSCULAR; INTRAVENOUS
Status: CANCELLED | OUTPATIENT
Start: 2024-04-09

## 2024-04-09 RX ORDER — EPINEPHRINE 1 MG/ML
0.3 INJECTION, SOLUTION, CONCENTRATE INTRAVENOUS PRN
OUTPATIENT
Start: 2024-04-30

## 2024-04-09 RX ORDER — HEPARIN 100 UNIT/ML
500 SYRINGE INTRAVENOUS PRN
Status: DISCONTINUED | OUTPATIENT
Start: 2024-04-09 | End: 2024-04-10 | Stop reason: HOSPADM

## 2024-04-09 RX ORDER — SODIUM CHLORIDE 0.9 % (FLUSH) 0.9 %
5-40 SYRINGE (ML) INJECTION PRN
OUTPATIENT
Start: 2024-04-30

## 2024-04-09 RX ORDER — 0.9 % SODIUM CHLORIDE 0.9 %
500 INTRAVENOUS SOLUTION INTRAVENOUS ONCE
Status: COMPLETED | OUTPATIENT
Start: 2024-04-09 | End: 2024-04-09

## 2024-04-09 RX ORDER — MEPERIDINE HYDROCHLORIDE 25 MG/ML
12.5 INJECTION INTRAMUSCULAR; INTRAVENOUS; SUBCUTANEOUS PRN
Status: CANCELLED | OUTPATIENT
Start: 2024-04-09

## 2024-04-09 RX ORDER — ALBUTEROL SULFATE 90 UG/1
4 AEROSOL, METERED RESPIRATORY (INHALATION) PRN
Status: CANCELLED | OUTPATIENT
Start: 2024-04-09

## 2024-04-09 RX ORDER — FAMOTIDINE 10 MG/ML
20 INJECTION, SOLUTION INTRAVENOUS
OUTPATIENT
Start: 2024-04-30

## 2024-04-09 RX ORDER — SODIUM CHLORIDE 9 MG/ML
INJECTION, SOLUTION INTRAVENOUS CONTINUOUS
Status: CANCELLED | OUTPATIENT
Start: 2024-04-09

## 2024-04-09 RX ORDER — EPINEPHRINE 1 MG/ML
0.3 INJECTION, SOLUTION, CONCENTRATE INTRAVENOUS PRN
Status: CANCELLED | OUTPATIENT
Start: 2024-04-09

## 2024-04-09 RX ORDER — HEPARIN SODIUM (PORCINE) LOCK FLUSH IV SOLN 100 UNIT/ML 100 UNIT/ML
500 SOLUTION INTRAVENOUS PRN
OUTPATIENT
Start: 2024-04-30

## 2024-04-09 RX ORDER — SODIUM CHLORIDE 0.9 % (FLUSH) 0.9 %
5-40 SYRINGE (ML) INJECTION PRN
Status: DISCONTINUED | OUTPATIENT
Start: 2024-04-09 | End: 2024-04-10 | Stop reason: HOSPADM

## 2024-04-09 RX ORDER — FAMOTIDINE 10 MG/ML
20 INJECTION, SOLUTION INTRAVENOUS
Status: CANCELLED | OUTPATIENT
Start: 2024-04-09

## 2024-04-09 RX ADMIN — SODIUM CHLORIDE 500 ML: 9 INJECTION, SOLUTION INTRAVENOUS at 10:15

## 2024-04-09 RX ADMIN — SODIUM CHLORIDE 200 MG: 9 INJECTION, SOLUTION INTRAVENOUS at 10:58

## 2024-04-09 RX ADMIN — SODIUM CHLORIDE, PRESERVATIVE FREE 10 ML: 5 INJECTION INTRAVENOUS at 11:31

## 2024-04-09 RX ADMIN — HEPARIN 500 UNITS: 100 SYRINGE at 11:32

## 2024-04-23 ENCOUNTER — APPOINTMENT (OUTPATIENT)
Dept: INFUSION THERAPY | Age: 59
End: 2024-04-23
Payer: MEDICAID

## 2024-04-30 ENCOUNTER — HOSPITAL ENCOUNTER (OUTPATIENT)
Dept: INFUSION THERAPY | Age: 59
Discharge: HOME | End: 2024-04-30
Payer: MEDICAID

## 2024-04-30 VITALS
TEMPERATURE: 97.4 F | RESPIRATION RATE: 18 BRPM | DIASTOLIC BLOOD PRESSURE: 80 MMHG | SYSTOLIC BLOOD PRESSURE: 122 MMHG | HEART RATE: 64 BPM | OXYGEN SATURATION: 97 %

## 2024-04-30 DIAGNOSIS — Z90.89 S/P LOBECTOMY OF BRAIN: ICD-10-CM

## 2024-04-30 DIAGNOSIS — E86.0 DEHYDRATION: ICD-10-CM

## 2024-04-30 DIAGNOSIS — C34.91 ADENOCARCINOMA OF RIGHT LUNG (HCC): ICD-10-CM

## 2024-04-30 DIAGNOSIS — C34.91 ADENOCARCINOMA OF RIGHT LUNG (HCC): Primary | ICD-10-CM

## 2024-04-30 DIAGNOSIS — Z51.12 ENCOUNTER FOR ANTINEOPLASTIC IMMUNOTHERAPY: ICD-10-CM

## 2024-04-30 DIAGNOSIS — R53.0 NEOPLASTIC MALIGNANT RELATED FATIGUE: ICD-10-CM

## 2024-04-30 DIAGNOSIS — C34.91 ADENOCARCINOMA, LUNG, RIGHT (HCC): Primary | ICD-10-CM

## 2024-04-30 LAB
ALBUMIN SERPL-MCNC: 4.2 G/DL (ref 3.5–5.2)
ALP SERPL-CCNC: 93 U/L (ref 40–130)
ALT SERPL-CCNC: 31 U/L (ref 21–72)
ANION GAP SERPL CALCULATED.3IONS-SCNC: 9 MMOL/L (ref 7–19)
AST SERPL-CCNC: 32 U/L (ref 17–59)
BASOPHILS # BLD: 0.04 K/UL (ref 0.01–0.08)
BASOPHILS NFR BLD: 1 % (ref 0.1–1.2)
BILIRUB SERPL-MCNC: 0.5 MG/DL (ref 0.2–1.3)
BUN SERPL-MCNC: 12 MG/DL (ref 9–20)
CALCIUM SERPL-MCNC: 9.3 MG/DL (ref 8.4–10.2)
CHLORIDE SERPL-SCNC: 106 MMOL/L (ref 98–111)
CO2 SERPL-SCNC: 26 MMOL/L (ref 22–29)
CORTIS SERPL-MCNC: 13.1 UG/DL
CREAT SERPL-MCNC: 0.9 MG/DL (ref 0.6–1.2)
EOSINOPHIL # BLD: 0.19 K/UL (ref 0.04–0.54)
EOSINOPHIL NFR BLD: 4.7 % (ref 0.7–7)
ERYTHROCYTE [DISTWIDTH] IN BLOOD BY AUTOMATED COUNT: 13.2 % (ref 11.6–14.4)
GLOBULIN: 2.1 G/DL
GLUCOSE SERPL-MCNC: 85 MG/DL (ref 74–106)
HCT VFR BLD AUTO: 40 % (ref 40.1–51)
HGB BLD-MCNC: 13.8 G/DL (ref 13.7–17.5)
LYMPHOCYTES # BLD: 0.84 K/UL (ref 1.18–3.74)
LYMPHOCYTES NFR BLD: 20.7 % (ref 19.3–53.1)
MCH RBC QN AUTO: 29.5 PG (ref 25.7–32.2)
MCHC RBC AUTO-ENTMCNC: 34.5 G/DL (ref 32.3–36.5)
MCV RBC AUTO: 85.5 FL (ref 79–92.2)
MONOCYTES # BLD: 0.43 K/UL (ref 0.24–0.82)
MONOCYTES NFR BLD: 10.6 % (ref 4.7–12.5)
NEUTROPHILS # BLD: 2.55 K/UL (ref 1.56–6.13)
NEUTS SEG NFR BLD: 62.8 % (ref 34–71.1)
PLATELET # BLD AUTO: 150 K/UL (ref 163–337)
PMV BLD AUTO: 9 FL (ref 7.4–10.4)
POTASSIUM SERPL-SCNC: 3.7 MMOL/L (ref 3.5–5.1)
PROT SERPL-MCNC: 6.3 G/DL (ref 6.3–8.2)
RBC # BLD AUTO: 4.68 M/UL (ref 4.63–6.08)
SODIUM SERPL-SCNC: 141 MMOL/L (ref 137–145)
TSH SERPL DL<=0.005 MIU/L-ACNC: 5.09 UIU/ML (ref 0.27–4.2)
WBC # BLD AUTO: 4.06 K/UL (ref 4.23–9.07)

## 2024-04-30 PROCEDURE — 80053 COMPREHEN METABOLIC PANEL: CPT

## 2024-04-30 PROCEDURE — 6360000002 HC RX W HCPCS: Performed by: INTERNAL MEDICINE

## 2024-04-30 PROCEDURE — 96361 HYDRATE IV INFUSION ADD-ON: CPT

## 2024-04-30 PROCEDURE — 96413 CHEMO IV INFUSION 1 HR: CPT

## 2024-04-30 PROCEDURE — 36415 COLL VENOUS BLD VENIPUNCTURE: CPT

## 2024-04-30 PROCEDURE — 2580000003 HC RX 258: Performed by: INTERNAL MEDICINE

## 2024-04-30 PROCEDURE — 96360 HYDRATION IV INFUSION INIT: CPT

## 2024-04-30 PROCEDURE — 85025 COMPLETE CBC W/AUTO DIFF WBC: CPT

## 2024-04-30 RX ORDER — EPINEPHRINE 1 MG/ML
0.3 INJECTION, SOLUTION, CONCENTRATE INTRAVENOUS PRN
OUTPATIENT
Start: 2024-04-30

## 2024-04-30 RX ORDER — 0.9 % SODIUM CHLORIDE 0.9 %
1000 INTRAVENOUS SOLUTION INTRAVENOUS ONCE
OUTPATIENT
Start: 2024-04-30 | End: 2024-04-30

## 2024-04-30 RX ORDER — DIPHENHYDRAMINE HYDROCHLORIDE 50 MG/ML
50 INJECTION INTRAMUSCULAR; INTRAVENOUS
OUTPATIENT
Start: 2024-04-30

## 2024-04-30 RX ORDER — FAMOTIDINE 10 MG/ML
20 INJECTION, SOLUTION INTRAVENOUS
OUTPATIENT
Start: 2024-04-30

## 2024-04-30 RX ORDER — ONDANSETRON 2 MG/ML
8 INJECTION INTRAMUSCULAR; INTRAVENOUS
OUTPATIENT
Start: 2024-04-30

## 2024-04-30 RX ORDER — 0.9 % SODIUM CHLORIDE 0.9 %
500 INTRAVENOUS SOLUTION INTRAVENOUS ONCE
Start: 2024-04-30

## 2024-04-30 RX ORDER — ACETAMINOPHEN 500 MG
1000 TABLET ORAL
OUTPATIENT
Start: 2024-04-30

## 2024-04-30 RX ORDER — SODIUM CHLORIDE 9 MG/ML
INJECTION, SOLUTION INTRAVENOUS CONTINUOUS
OUTPATIENT
Start: 2024-04-30

## 2024-04-30 RX ORDER — SODIUM CHLORIDE 0.9 % (FLUSH) 0.9 %
5-40 SYRINGE (ML) INJECTION PRN
OUTPATIENT
Start: 2024-04-30

## 2024-04-30 RX ORDER — HEPARIN 100 UNIT/ML
500 SYRINGE INTRAVENOUS PRN
OUTPATIENT
Start: 2024-04-30

## 2024-04-30 RX ORDER — ALBUTEROL SULFATE 90 UG/1
4 AEROSOL, METERED RESPIRATORY (INHALATION) PRN
OUTPATIENT
Start: 2024-04-30

## 2024-04-30 RX ORDER — HEPARIN 100 UNIT/ML
500 SYRINGE INTRAVENOUS PRN
Status: DISCONTINUED | OUTPATIENT
Start: 2024-04-30 | End: 2024-05-01 | Stop reason: HOSPADM

## 2024-04-30 RX ORDER — SODIUM CHLORIDE 0.9 % (FLUSH) 0.9 %
5-40 SYRINGE (ML) INJECTION PRN
Status: DISCONTINUED | OUTPATIENT
Start: 2024-04-30 | End: 2024-05-01 | Stop reason: HOSPADM

## 2024-04-30 RX ORDER — 0.9 % SODIUM CHLORIDE 0.9 %
1000 INTRAVENOUS SOLUTION INTRAVENOUS ONCE
Status: COMPLETED | OUTPATIENT
Start: 2024-04-30 | End: 2024-04-30

## 2024-04-30 RX ADMIN — SODIUM CHLORIDE 1000 ML: 9 INJECTION, SOLUTION INTRAVENOUS at 10:28

## 2024-04-30 RX ADMIN — HEPARIN 500 UNITS: 100 SYRINGE at 12:02

## 2024-04-30 RX ADMIN — SODIUM CHLORIDE, PRESERVATIVE FREE 10 ML: 5 INJECTION INTRAVENOUS at 12:02

## 2024-04-30 RX ADMIN — SODIUM CHLORIDE 200 MG: 9 INJECTION, SOLUTION INTRAVENOUS at 11:13

## 2024-05-10 ENCOUNTER — HOSPITAL ENCOUNTER (OUTPATIENT)
Dept: RADIATION ONCOLOGY | Facility: HOSPITAL | Age: 59
Setting detail: RADIATION/ONCOLOGY SERIES
End: 2024-05-10
Payer: MEDICAID

## 2024-05-10 NOTE — PROGRESS NOTES
[facility]  Radiation Oncology Clinic   MD Les Knight MD  MADDY Bates  _______________________________________________  [facility]  Department of Radiation Oncology  [address] [place] [id number]  Office: [phone]  Fax: [phone]    DATE: 05/13/2024  PATIENT: Lalo Jones Sr.  1965                         MEDICAL RECORD #: [id number]    1. Malignant neoplasm of right lung, unspecified part of lung    2. Chronic obstructive pulmonary disease, unspecified COPD type    3. History of radiation therapy    4. Former smoker                                         REASON FOR VISIT:    Chief Complaint   Patient presents with    Lung Cancer     Reason for Follow up Visit:  Lalo Jones Sr. is a very pleasant 58 y.o. patient that completed radiation to the lung and returns to the clinic today for routine follow up exam. Denies appetite change, unexpected weight change, nasuea/vomiting, diarrhea, light-headedness, weakness, and headaches. He continues to follow .     History of Present Illness:  Diagnosed with right upper lobe of the lung. He completed 5040 cGy in 28 fractions to the right lung on 02/14/2023.     05/19/2022 - CT Chest Low Dose:  4 mm LEFT lower lobe pulmonary nodule.  Moderate emphysema.  3.3 x 1.3 cm masslike area of pleural thickening in the RIGHT anterior upper chest. Recommend a follow-up chest CT in 3 months to ensure stability.    10/17/2022 - CT chest without contrast, low-dose protocol at [facility]:  4.7 cm x 4 cm x 1.6 cm abnormal area of pleural thickening in the periphery of the RUL with slightly irregular margins  Moderate paraseptal emphysematous changes with scattered bullae in both lungs  Lung-RADS - 4B    10/24/2022 - Appointment with Russel Santana MD:   I explained the abnormal CT scan results from May which is done in the [facility] to the patient and also discussed with the  radiologist from [facility] in [place] who reviewed the current CT scan of the chest done earlier this month.  This shows the right upper lobe pleural-based thickening or mass and left upper lobe nodule.  Due to his smoking history the possibility of malignancy cannot be ruled out.  Discussing different options I ordered a PET scan and a follow-up CT scan of the chest in 3 months time.  If the PET scan is positive a CT-guided needle biopsy would be best option for the right upper lobe pleural-based lung mass which is not reachable by bronchoscopy.  If PET scan is negative will wait for the neck CT scan of the chest and make further recommendations.  Patient is agreeable with the plan.  In the meantime I will try to get the CD from from the outside hospital with the latest CT scan of the chest for further comparison.  Patient definitely has chronic obstructive pulmonary disease and he was started on Wixela 250/50 1 puff twice a day and albuterol rescue inhaler will be continued.  His nasal allergy he was started on fluticasone nasal spray and Singulair.  He told me he is already using Zyrtec mostly over-the-counter which will be continued.  All medications were sent to the pharmacy.  Lalo Jones  reports that he has been smoking cigarettes. He started smoking about 35 years ago. He has a 35.00 pack-year smoking history. He has never used smokeless tobacco.. I have educated him on the risk of diseases from using tobacco products such as cancer, COPD and heart disease.   I advised him to quit and he is willing to quit. We have discussed the following method/s for tobacco cessation:  Counseling.  Together we have set a quit date for 2 weeks from today.  He will follow up with me in 3 months or sooner to check on his progress.I spent 7 minutes counseling the patient  Patient is advised to have a sleep study due to sleep disturbances and most likely have sleep apnea and may need his CPAP.  He is  also advised to get a pulmonary function test before the next clinic visit for his underlying COPD in addition to the PET scan and CT scan which are already ordered.  He will return to the pulm clinic for follow-up visit in 3 months time or earlier if needed  Follow up:  3 months     11/01/2022 - PET Scan:  Slight increase in size of 4.3 x 1.9 cm RIGHT anterior upper chest masslike pleural thickening which is markedly hypermetabolic. Favor this to represent a neoplastic process with differential including lymphoma. Primary pleural neoplasm and metastatic disease are also in the differential.  Hypermetabolic upper abdominal retroperitoneal lymphadenopathy. Suspect these lymph nodes related to the same process as the RIGHT anterior pleural lesion.  No other abnormal hypermetabolic activity.    11/16/2022 - Right upper chest/pleural mass, core biopsies:  Adenocarcinoma of pulmonary origin.    12/08/2022 - Appointment with :  Adenocarcinoma of Pulmonary Origin-11/16/2022  Lalo Jones is a 57-year-old  gentleman with primary and secondary diagnoses as follows:  Robotic assisted laparoscopic prostatectomy by Dr. Jj September 2020 for PT2N0 Equality 7 disease with tumor approaching the right posterior margin  New diagnosis of 4.7 cm x 4 cm x 1.6 cm adenocarcinoma of the RUL of the lung  Nino was last seen on 7/15/2020 for opinion regarding a diagnosis of resected prostate cancer. He was lost to follow-up after that visit. He lives in [place], and his prostate cancer is managed by Dr. Jj with urology at [facility].  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 Russel Santana with a new diagnosis of a 4.7 x 4 x 1.6 cm adenocarcinoma of the RUL of the lung for management.  Nino is a longtime cigarette smoker of 1 pack/day since age 20. He quit smoking on 12/1/2022 after the diagnosis of lung cancer.  Nino's PCP,   Flex Sheikh ordered a low-dose screening CT scan of the chest for monitoring because he is a pack a day smoker since 1987  CT chest without contrast, low-dose protocol at [facility] on 5/19/2022:  4 mm LEFT lower lobe pulmonary nodule in superior segment   3.3 x 1.3 cm mass like area of pleural thickening in the RIGHT anterior upper chest   Moderate centrilobular and paraseptal emphysema.   Lung-RADS 2S-- Nodules with a very low likelihood of becoming a clinically active cancer due to size or lack of growth.   Continue annual screening with low dose CT in 12 months.   3 month follow-up chest CT recommended to evaluate for stability of RIGHT anterior upper chest masslike pleural thickening  Dr. Flex French repeated the low-dose CT scan because of the findings in the right anterior upper chest  CT chest without contrast, low-dose protocol at [facility] on 10/17/2022:  4.7 cm x 4 cm x 1.6 cm abnormal area of pleural thickening in the periphery of the RUL with slightly irregular margins  Moderate paraseptal emphysematous changes with scattered bullae in both lungs  Lung-RADS - 4B  Initial consult with Dr. Russel Santana at [facility] on 10/24/2022:  Explained the abnormal CT scan results from May which is done in the [facility] to the patient and also discussed with the radiologist from [facility] in [place] who reviewed the current CT scan of the chest done earlier this month. This shows the right upper lobe pleural-based thickening or mass and left upper lobe nodule. Due to his smoking history the possibility of malignancy cannot be ruled out.  Discussing different options I ordered a PET scan and a follow-up CT scan of the chest in 3 months time. If the PET scan is positive a CT-guided needle biopsy would be best option for the right upper lobe pleural-based lung mass which is not reachable by bronchoscopy. If PET scan is negative will wait for the neck CT scan of the chest and make further  recommendations.   NM PET/CT SKULL BASE TO MID THIGH at [facility] on 11/1/2022:Comparison: Chest CT 5/19/2022  Background right hepatic lobe metabolic activity measures max SUV 2.7.  4.3 x 1.9 cm RIGHT anterior upper chest pleural thickening which is hypermetabolic with a maximum SUV of 12.0  1.7 cm LEFT upper abdomen retroperitoneal lymph node hypermetabolicactivity, max SUV 5.3. This nodule/node closely approximates the LEFT adrenal gland.   9 mm retroaortic upper abdominal lymph node is mildly hypermetabolic with a max SUV of 4.0.   7 mm aortocaval lymph node is mildly hypermetabolic with a max SUV of 3.0.  CT Needle Biopsy Lung at [facility] on 11/16/2022:  Successful CT guided biopsy of the right anterior pleural mass  Final Diagnosis  Right upper chest/pleural mass, core biopsies:  Adenocarcinoma of pulmonary origin.  [employer] on 11/16/2022:  ALK(D5F3)- NEGATIVE  FISH Analysis ROS1- NEGATIVE  EGFR Exon 18-Not Detected  EGFR Exon 19-Not Detected  EGFR Exon 20 [id number] Detected  EGFR Exon 20 (other Mutations)-Not Detected  EGFR Exon 21- Not Detected  BRAF Mutation-Not Detected  FISH MET-No evidence of a MET amplification  Medical oncology consultation requested  Concern on the work-up is that in addition to the 4.7 cm primary RUL adenocarcinoma of the lung there are upper abdominal retroperitoneal and aortocaval lymph nodes positive on the PET scan.  If the upper abdominal lymph nodes were not a concern, Lalo could be treated with neoadjuvant Opdivo Alimta Keytruda followed by resection.  Intense interdepartmental consultations are being undertaken to give Lalo the most aggressive approach possible.  All of the plans outlined below were discussed at length with Lalo and his wife Joann.  RECOMMEND:  Guardant 360  CT scan of the abdomen and pelvis  Bone scan  MRI of the abdomen  Thoracic surgery consultation with Dr. Goldy Carlin  Call placed and spoke to Dr. Goldy Carlin at length  Radiation therapy consult for  intradepartmental consultation  Consult surgical group at [facility] for port placement and consideration of a diagnostic laparoscopy for lymph node sampling.  Follow-up with me upon completion of scans and for further patient and family conference  Return for Follow Up with [facility] scans prior.    12/15/2022 - MRI Abdomen with and without contrast:  No change in borderline prominent 9 mm short axis dimension upper abdominal retroperitoneal lymph node, similar to prior PET/CT were it was hypermetabolic. No new or enlarging lymph nodes in the abdomen.  Splenomegaly.    12/15/2022 - Bone Scan:  No evidence of bone metastasis.    12/15/2022 - CT Abdomen/Pelvis without contrast:  There is abnormal wall thickening of the distal sigmoid colon which does contain multiple diverticuli. Findings may be seen with diverticulitis, however a regional neoplastic process also considered. Follow-up sigmoidoscopy might be considered for further evaluation.  No morphologically pathologic lymphadenopathy identified. The two hypermetabolic retroperitoneal lymph nodes within the upper abdomen on the PET exam of 11/01/2022 remain similar in size measuring only 7 mm in short axis.    12/22/2022 - Appointment with :  Note pending    12/22/2022 - Documentation per :  I spoke with Dr. Tadeo regarding this patient today.  He has a very unusual pattern of malignancy with a right upper lobe peripheral adenocarcinoma which was biopsied with CT-guided biopsy.  He also has intra-abdominal hypermetabolic lymphadenopathy which is clinically suspicious for neoplasm.  This would be an unusual metastatic site from a primary lung carcinoma.  The patient also has previous history of prostate carcinoma.  I have discussed this with Dr. Tadeo and I believe if it is possible perhaps robotic biopsy of the periaortic lymphadenopathy could be considered.  We will also discussed with Dr. Goldy Carlin for consideration of definitive treatment of the  primary right upper lobe lung tumor.  We will see the patient in consultation today and coordinate with Dr. Tadeo and other physicians to develop a coordinated treatment plan.    12/22/2022 - Consult with :  Following this discussion and in consideration of the diagnostic data/evaluation of the patient, I recommended referral to CT surgery for surgical considerations of the lung nodule. He has been referred to Dr. Carlin. He has also been referred to general surgery for biopsy considerations of the intra-abdominal lymph nodes.    Continue ongoing management per primary care physician and other specialists. Thank you for allowing me to assist in this patients care.   Plan:  See Dr Carlin and Dr. Bonilla    12/22/2022 - Documentation per :  I spoke with Dr. Carlin this afternoon and he has had a chance to review the radiographs on this patient.  He believes that there is likely chest wall invasion and recommends neoadjuvant chemoradiation prior to definitive surgery of the right upper lobe tumor.  I believe we can treat this lesion with tangential portals and avoid any significant dose to the bronchus, thereby avoiding any potential issues with healing.  I would anticipate a dose of 5040 cGy in 28 treatment fractions to the lung lesion as neoadjuvant treatment along with concomitant chemotherapy and/or immunotherapy.  In addition, we will determine if Dr. Kim Bonilla is able to sample the upper abdominal lymph nodes to determine the etiology of that finding on the PET scan.    12/23/2022 - Telephone encounter with :  I spoke with Dr. Tadeo today and reviewed my conversation with Dr. Carlin from last night.  We will plan to proceed with neoadjuvant chemoradiation to the lung lesion.  This will be somewhat dependent upon the potential for Dr. Bonilla to biopsy the upper abdominal lymph nodes.  However, I do believe we will be able to treat the chest wall lesion for surgical resection and if  necessary we can come back and treat mediastinal and upper abdominal lymph nodes if we have evidence of metastatic disease.  We will plan to see the patient back on Tuesday, December 27 at 11:00 for simulation.    12/27/2022 - Appointment with :  He met with Dr. Carlin for surgical consirations of the right upper lobe lesion. He believes that there is likely chest wall invasion and recommends neoadjuvant chemoradiation prior to definitive surgery of the right upper lobe tumor. I anticipate a dose of 5040 cGy in 28 treatment fractions to the lung lesion as neoadjuvant treatment along with concomitant chemotherapy and/or immunotherapy.  In addition, we will determine if Dr. Bonilla is able to sample the upper abdominal lymph nodes to determine the etiology of that finding on the PET scan. He is scheduled to see her later this week.    01/01/2023 - Documentation per :  I presented this patient at tumor conference.  Dr. Kim Bonilla was in attendance unfortunately she is not able to biopsy the upper abdominal lymph nodes due to the proximity to adjacent aorta and superior mesenteric artery.  I also reviewed the recommendation of Dr. Goldy Carlin for neoadjuvant chemoradiation followed by definitive surgery for the right upper lobe lung tumor.  Discussion at tumor conference surrounded management of the upper abdominal lymph nodes.  The consensus was at this time to proceed with treatment of the primary known adenocarcinoma lung and base further treatment recommendations upon findings at definitive surgery.  Abdominal lymph nodes serial radiographs is recommended for follow-up.  It was agreed that this is an unlikely metastatic distribution for right upper lobe lung carcinoma in the lymph nodes may represent another etiology.  I will coordinate with Dr. Tadeo for concomitant neoadjuvant chemoradiation of the right upper lobe lung tumor followed by definitive surgery per Dr. Winslow  Tesfaye.    01/04/2023 - 02/14/2023 - Completed radiation course:  Received 5040 cGy in 28 fractions to the right lung via external beam radiation therapy.    03/20/2023 - CT Abdomen/Pelvis with contrast:  Interval resolution of retroperitoneal lymphadenopathy. No evidence of metastatic disease in the abdomen/pelvis.  Heavy sigmoid diverticulosis, without definite evidence of diverticulitis. There is some residual sigmoid colon wall thickening, the degree of which has significantly decreased from the prior exam.    03/20/2023 - CT Chest with contrast:  Interval significant decrease in size and volume of the biopsy proven pleural malignancy in the right upper lobe, the residual soft tissue component has a maximum diameter of 8.5 mm, compared with 2.8 cm on previous PET/CT. However, there are new spiculated nodules in the right upper lobe, the largest measuring 1.6 cm, some of these areas of nodularity are ill-defined and seen at the branch points of the bronchial tree, could potentially be infectious or inflammatory. The main concern with this appearance is for intrapulmonary metastases however. Repeat chest CT is recommended in 3 months, follow-up PET/CT would be appropriate if these new spiculated areas persist or increase in size.  No evidence of intrathoracic lymphadenopathy, no left-sided pulmonary nodule or lung mass. Stable changes of paraseptal emphysema in both lungs.    03/27/2023 - PET Scan:  Previously irradiated right chest wall mass demonstrates much less tracer avidity on today's exam, SUV max 3.1 as compared to 11.4 on the earlier PET scan from November 2022, indicating a positive treatment response.  There are new patchy airspace opacities identified more centrally in the right upper lobe, some of which demonstrates PET uptake (SUV max 3.2), suspected radiation pneumonitis.  No scintigraphic evidence of sav or distant metastatic disease.    04/14/2023 - CT Chest without contrast:  Mildly decreased  size of RIGHT anterior pleural thickening now 6 mm, previously 8.5 cm.  Decreased size of RIGHT upper lobe pulmonary nodule now 12 mm, previously 16 mm on 03/20/2023.  Similar patchy consolidative opacities at the RIGHT upper lobe, which may represent postradiation treatment changes.  Similar emphysematous changes.    05/04/2023 - Right thoracotomy with right upper lobectomy and mediastinal lymph node dissection by Dr. Goldy Carlin:  Right chest wall margin, excision:  Benign adipose tissue and skeletal muscle.  No malignancy is identified.  Right chest wall margin #2, excision:  Benign skeletal muscle and fibroconnective tissue.  No malignancy is identified.  Right chest wall margin #3, excision:  Benign skeletal muscle and fibroconnective tissue.  No malignancy identified.  Right upper lobe of lung, lobectomy:  Previous invasive adenocarcinoma of pulmonary origin ([id number]).  Inflammatory pseudotumor.  Residual tumor is not present.  The surgical margins are free of tumor.  7 benign perihilar and intraparenchymal lymph nodes.  Level 10 lymph node, excision:  1 benign lymph node with anthracotic pigment.  No malignancy is identified.  Level 10 lymph node, excision:  1 benign lymph node with anthracotic pigment  No malignancy identified.  Level 4R lymph node, excision:  No malignancy identified.  1 benign lymph node.  Level 7 lymph node, excision:  No malignancy identified.  1 benign lymph node with anthracotic pigment.  Level 10 lymph node, excision:  No malignancy identified.  1 benign lymph node with anthracotic pigment.  Level 2R lymph node, excision:  No malignancy identified.  1 benign lymph node    06/19/2023 - Appointment with :  Mr. Jones is a 57-year-old male who is now 6 weeks status post right upper lobectomy for post neoadjuvant treatment treated right upper lobe non-small cell lung cancer.   He underwent neoadjuvant therapy with chemo and immunotherapy due to chest wall invasion, his pathology  was consistent with complete response without residual tumor or residual disease in his lymph nodes.   He has done very well from surgery, and I am happy with his progress. He has already seen Dr. Tadeo in follow-up and is going to get adjuvant Keytruda and he will follow with continued surveillance scans.   He does have some shortness of breath; I will discuss this with Dr. Santana, his pulmonologist, but I suspect this will continue to improve.   He is able to do the things he wants to now, just gets a little winded with strenuous work. He has healed well. Without any evidence of problems with his wounds. His pain is well controlled.   We will continue with surveillance with Dr. Tadeo's group and pulmonary follow-up with Dr. Santana.    07/20/2023 - Appointment with Russel Santana MD:  Plan/Recommendations:  Patient is doing well from the pulmonary standpoint and advised him to continue doing Advair and albuterol rescue inhaler for COPD and he is probably having a mild flareup so I prescribed him to take a course of Medrol Dosepak which is a low-dose steroid.  He had right upper lobe lung cancer which is treated by surgery and he follows up with Dr. Carlin.  He also gets followed up with Dr. Tadeo and is currently getting Keytruda he already had radiation treatment.  He is currently cancer free and doing well.  Continue follow-up with oncology and radiation oncology and Dr. Carlin.  Patient should continues in fluticasone nasal spray, Astelin nasal spray, Zyrtec and Singulair.  All prescription refills were sent to the pharmacy as requested by the patient.  Patient also mentioned he will probably benefit more from a nebulizer so I advised him to start using albuterol nebulizer as needed at least twice a day and a new prescription for albuterol nebulizer machine and solution was sent to the pharmacy.  Patient had mild sleep problems and was advised to have a sleep study for possible sleep apnea but patient  is doing much better after his cancer treatment and he did not want to do a sleep study somewhat and I told him to hold off for now.  Patient is already vaccinated for COVID and influenza and pneumonia vaccine is currently not given during this clinic visit and I told him to check with Dr. Tadeo about this vaccination before he takes it.  He will return to pulmonary clinic for follow-up visit in 6 months time or earlier if needed.  We will check on the CT scan which has been ordered by Dr. Tadeo during his next visit  Follow up:  6 Months      10/17/2023 - Appointment with :  ASSESSMENT AND PLAN:  Mr. Lalo Jones is a very pleasant 57-year-old  gentleman with primary and secondary diagnoses as follows:  Stage IIIB (T2b, N3, M0) adenocarcinoma of the RUL of the lung made by CT-guided needle biopsy on 11/16/2022.  Robotic assisted laparoscopic prostatectomy by Dr. Jj September 2020 for PT2N0 Consuelo 7 disease with tumor approaching the right posterior margin  Neoadjuvant chemotherapy and radiation therapy as follows:  Neoadjuvant concurrent XRT (with chemoimmunotherapy) to RUL of lung initiated on 1/4/2023 completed on 2/14/2023. 5040 cGy over 28 treatment fractions.  Neoadjuvant cisplatin 75 mg/m2, Alimta 500 mg/m2 and Opdivo 360 mg, cycle #1 initiated on 1/5/2023. Cycle #3 delivered on 2/16/2023.  Right thoracotomy with right upper lobectomy and mediastinal lymph node dissection by Dr. Goldy Carlin on 5/4/2023.  Final pathology was negative.  The primary tumor and all mediastinal lymph nodes were also negative documenting complete response to neoadjuvant therapy.  Lalo received cycle #1 of adjuvant immunotherapy with Keytruda on 6/12/2023  He is tolerating immunotherapy without new symptoms other than persistent fatigue.  I conferenced with Dr. Santana, his pulmonologist, regarding pneumococcal vaccinations and suggested okay to proceed.  Lalo is here today, 9/5/2023 to continue cycle  #7 of 18 planned adjuvant immunotherapy treatments with Keytruda.  PLAN:  Proceed with cycle #7 of 18 planned treatments with Keytruda today, 9/5/2023, and continue every 3 weeks  Serology ordered includes CBC, CMP, TSH and cortisol for continued monitoring of toxicity associated with immunotherapy that he is receiving  He is to be seen this afternoon at cardiology for loop recorder, cardiac monitor placement and monitoring of previous history of atrial fibrillation, on medication  Follow-up with me in 6 weeks  Return in about 6 weeks (around 11/28/2023) for treatment and see Dr. Tadeo.    11/13/2023 - Appointment with :  On exam, I do not see evidence for recurrent or metastatic disease at this time. He continues immunotherapy per medical oncology.   We will continue routine follow-up/surveillance as discussed in 6 months with follow up CT scan before visit and I have instructed him to continue to see the other health care providers as per their scheduling.    02/06/2024 - Appointment with Russel Santana MD:  Patient is doing well from the pulmonary standpoint.  Advised him to continue using Advair instead of using albuterol as rescue inhaler more frequently and he can use albuterol nebulizer and rescue inhaler as needed.  Prescription refill was sent to the pharmacy.  He had non-small cell lung cancer treated by oncology and Dr. Tadeo is going to see him back in the office this afternoon he is getting immune treatment after getting chemotherapy and radiation treatment and also had a partial lobectomy done by Dr. Carlin.  He is doing well but did not have any recent imaging studies on his CT scan of the chest done by next week for further evaluation of his malignancy.  For his nasal allergy will continue using fluticasone nasal spray, Zyrtec, Astelin nasal spray and montelukast and prescription refills were sent to the pharmacy.  Patient did quit smoking.  Continue smoking abstinence.  He will  continue other medications and will continue follow-up with the primary care provider and oncology.  He will return to pulmonary clinic for a follow-up visit in 6 months time or earlier if needed.  Follow up:  6 Months     02/06/2024 - Appointment with :  F/U 11/13/2023 with Naif CASTAÑEDA with Dr Anton Kinney [facility] RAD [facility]  status post completion of radiation therapy to the lung and presents to our clinic today for surveillance exam and to review imaging. Diagnosed with right upper lobe of the lung. He completed 5040 cGy in 28 fractions to the right lung on 02/14/2023.   On exam, I do not see evidence for recurrent or metastatic disease at this time. He continues immunotherapy per medical oncology. We will continue routine follow-up/surveillance as discussed in 6 months with follow up CT scan before visit and I have instructed him to continue to see the other health care providers as per their scheduling  Physical examination is without new developments or findings. His heart rhythm is regular and normal.  He is due to have CT scan of the chest. This is ordered today.  PLAN:  Proceed with cycle #12 of 18 planned treatments with Keytruda today, 2/6/2024, and continue every 3 weeks   CT scan of the chest is ordered today  Serology ordered includes CBC, CMP, TSH and cortisol for continued monitoring of toxicity associated with immunotherapy that he is receiving to monitor for toxicity  Follow-up with me in 6 weeks    02/21/2024 - CT Chest without contrast:  Chronic lung changes.  Posttreatment changes with no sign of residual or recurrent neoplasm.      History obtained from  PATIENT and CHART    PAST MEDICAL HISTORY  Past Medical History:   Diagnosis Date    Abnormal PET scan of lung     Nov 2022    Allergic rhinitis     Arthritis     Atrial fibrillation     Bronchiectasis     Bronchitis     Cancer     prostate    Chronic bronchitis     Chronic pain     COPD (chronic obstructive pulmonary disease)     GERD  (gastroesophageal reflux disease)     Lung cancer     Pneumonia     Prostatitis, acute     S/P ACL reconstruction     Septic shock due to urinary tract infection     Sinusitis     Strep throat     UTI (urinary tract infection)       PAST SURGICAL HISTORY  Past Surgical History:   Procedure Laterality Date    ANKLE SURGERY Right     BACK SURGERY      X2    ENDOSCOPY N/A 01/10/2022    Procedure: ESOPHAGOGASTRODUODENOSCOPY WITH ANESTHESIA;  Surgeon: Tucker Bright MD;  Location:  [facility] OR;  Service: Gastroenterology;  Laterality: N/A;  pre food bolus  post food bolus  Dr. PAULINO ACL RECONSTRUCTION Left     NASAL SEPTUM SURGERY      PENILE PROSTHESIS IMPLANT N/A 03/15/2022    Procedure: 3-PIECE INFLATABLE PENILE PROSTHESIS PLACEMENT;  Surgeon: Trae Clark MD;  Location:  [facility] OR;  Service: Urology;  Laterality: N/A;    PROSTATE ULTRASOUND BIOPSY N/A 02/18/2020    Procedure: PROSTATE  BIOPSY;  Surgeon: Trae Clark MD;  Location:  [facility];  Service: Urology;  Laterality: N/A;    PROSTATECTOMY N/A 09/22/2020    Procedure: RADICAL PROSTATECTOMY LAPAROSCOPIC WITH DAVINCI ROBOT;  Surgeon: Nuno Jj MD;  Location:  [facility];  Service: DaVinci;  Laterality: N/A;    SHOULDER SURGERY Left     X 4    TENNIS ELBOW RELEASE Bilateral 04/14/2011    THORACOTOMY Right 05/04/2023    Procedure: RIGHT THORACOTOMY WITH CHEST WALL RESECTION, MEDIASTINAL LYMPH NODE DISSECTION, RIGHT UPPER LOBECTOMY;  Surgeon: Goldy Carlin MD;  Location:  [facility];  Service: Cardiothoracic;  Laterality: Right;    VENOUS ACCESS DEVICE (PORT) INSERTION N/A 12/30/2022    Procedure: Single Lumen Port-a-cath insertion with flouroscopy;  Surgeon: Kim Bonilla MD;  Location:  [facility];  Service: General;  Laterality: N/A;      FAMILY HISTORY  family history includes Cancer in his mother.    SOCIAL HISTORY  Social History     Tobacco Use    Smoking status: Former     Current packs/day: 0.00     Average packs/day: 1 pack/day  for 35.8 years (35.8 ttl pk-yrs)     Types: Cigarettes     Start date: 1987     Quit date: 2022     Years since quittin.4     Passive exposure: Past    Smokeless tobacco: Former     Types: Snuff     Quit date:    Vaping Use    Vaping status: Former   Substance Use Topics    Alcohol use: No     Comment: 0    Drug use: Not Currently     Frequency: 7.0 times per week     Types: Hydrocodone, Marijuana, Oxycodone     Comment: Edible gummies - 1 per day     ALLERGIES  Adhesive tape     MEDICATIONS    Current Outpatient Medications:     albuterol (PROVENTIL) (2.5 MG/3ML) 0.083% nebulizer solution, NEBULIZE CONTENT OF ONE VIAL EVERY FOUR HOURS AS NEEDED FOR WHEEZING, Disp: 360 mL, Rfl: 1    albuterol sulfate  (90 Base) MCG/ACT inhaler, Inhale 2 puffs Every 4 (Four) Hours As Needed for Wheezing or Shortness of Air., Disp: 6.7 g, Rfl: 5    Azelastine HCl 137 MCG/SPRAY solution, 2 sprays into the nostril(s) as directed by provider 2 (Two) Times a Day., Disp: 30 mL, Rfl: 5    cetirizine (zyrTEC) 10 MG tablet, Take 1 tablet by mouth Daily., Disp: 90 tablet, Rfl: 3    esomeprazole (nexIUM) 40 MG capsule, Take 1 capsule by mouth Every Morning Before Breakfast., Disp: , Rfl:     fluticasone (FLONASE) 50 MCG/ACT nasal spray, 2 sprays into the nostril(s) as directed by provider Daily As Needed for Rhinitis or Allergies., Disp: 16 g, Rfl: 5    Fluticasone-Salmeterol (ADVAIR/WIXELA) 250-50 MCG/ACT DISKUS, Inhale 1 puff 2 (Two) Times a Day., Disp: 1 each, Rfl: 5    gabapentin (NEURONTIN) 800 MG tablet, Take 1 tablet by mouth 3 (Three) Times a Day., Disp: , Rfl:     hydrOXYzine pamoate (VISTARIL) 50 MG capsule, Take 1 capsule by mouth Daily., Disp: , Rfl:     montelukast (SINGULAIR) 10 MG tablet, Take 1 tablet by mouth Every Night., Disp: 90 tablet, Rfl: 3    multivitamin with minerals tablet tablet, Take 1 tablet by mouth Daily., Disp: , Rfl:     NON FORMULARY, Take 1 tablet by mouth Daily. Marijuana Gummies,  "Disp: , Rfl:     Current Facility-Administered Medications:     pneumococcal polysaccharide 23-valent (PNEUMOVAX-23) vaccine 0.5 mL, 0.5 mL, Intramuscular, During Hospitalization, Russel Santana MD    Current outpatient and discharge medications have been reconciled for the patient.  Reviewed by: MADDY Donnelly    The following portions of the patient's history were reviewed and updated as appropriate: allergies, current medications, past family history, past medical history, past social history, past surgical history and problem list.    REVIEW OF SYSTEMS      PHYSICAL EXAM  VITAL SIGNS:   Vitals:    05/13/24 0933   BP: 133/88   Pulse: 85   SpO2: 96%  Comment: room air   Weight: 100 kg (220 lb 14.4 oz)   Height: 188 cm (74\")   PainSc: 0-No pain     Physical Exam  Vitals reviewed.   Constitutional:       Appearance: Normal appearance.   HENT:      Head: Normocephalic.      Nose: Nose normal.   Eyes:      Pupils: Pupils are equal, round, and reactive to light.   Cardiovascular:      Rate and Rhythm: Normal rate and regular rhythm.      Pulses: Normal pulses.      Heart sounds: Normal heart sounds.   Pulmonary:      Effort: Pulmonary effort is normal. No respiratory distress.      Breath sounds: Normal breath sounds. No wheezing.   Abdominal:      General: Bowel sounds are normal.      Palpations: There is no mass.   Musculoskeletal:         General: Normal range of motion.      Cervical back: Normal range of motion and neck supple. No tenderness.   Lymphadenopathy:      Cervical: No cervical adenopathy.   Skin:     General: Skin is warm and dry.      Capillary Refill: Capillary refill takes less than 2 seconds.   Neurological:      General: No focal deficit present.      Mental Status: He is alert and oriented to person, place, and time.      Motor: No weakness.   Psychiatric:         Mood and Affect: Mood normal.         Behavior: Behavior normal.       Performance Status: ECOG (0) Fully active, able to " carry on all predisease performance without restriction    Clinical Quality Measures  -Pain Documented  Lalo Jones Sr. reports a pain score of 0.  Given his pain assessment as noted, treatment options were discussed and the following options were decided upon as a follow-up plan to address the patient's pain:  No pain, no plan given .  Pain Medications               gabapentin (NEURONTIN) 800 MG tablet Take 1 tablet by mouth 3 (Three) Times a Day.          -Advanced Care Planning Advance Care Planning   ACP discussion was held with the patient during this visit. Patient does not have an advance directive, information provided.    -Body Mass Index Screening and Follow-Up Plan  Body mass index is 28.36 kg/m².    -Tobacco Use: Screening and Cessation Intervention Social History    Tobacco Use      Smoking status: Former        Packs/day: 0.00        Years: 1 pack/day for 35.8 years (35.8 ttl pk-yrs)        Types: Cigarettes        Start date: 1987        Quit date: 2022        Years since quittin.4        Passive exposure: Past      Smokeless tobacco: Former        Types: Snuff        Quit date:      ASSESSMENT AND PLAN  1. Malignant neoplasm of right lung, unspecified part of lung    2. Chronic obstructive pulmonary disease, unspecified COPD type    3. History of radiation therapy    4. Former smoker      No orders of the defined types were placed in this encounter.    RECOMMENDATIONS: Lalo Jones Sr. is status post completion of radiation therapy to the lung and presents to our clinic today for surveillance exam and to review imaging. Diagnosed with right upper lobe of the lung. He completed 5040 cGy in 28 fractions to the right lung on 2023.     CT-scan of the chest on 2024 revealed chronic lung changes. Posttreatment changes with no sign of residual or recurrent neoplasm.    On exam, I do not see evidence for recurrent or metastatic disease at this time. He has 2 cycles of  keytruda remaining. Will defer surveillance imaging to medical oncology. We will continue routine follow-up/surveillance as discussed in 6 months. I have instructed him to continue to see the other health care providers as per their scheduling.    Patient Instructions   1) Return in 6 months    Return in about 6 months (around 11/13/2024).    Time Spent: I spent 34 minutes caring for Lalo on this date of service. This time includes time spent by me in the following activities: preparing for the visit, reviewing tests, obtaining and/or reviewing a separately obtained history, performing a medically appropriate examination and/or evaluation, counseling and educating the patient/family/caregiver, ordering medications, tests, or procedures, referring and communicating with other health care professionals, documenting information in the medical record, independently interpreting results and communicating that information with the patient/family/caregiver, and care coordination.   Naif Jones, APRN  05/13/2024

## 2024-05-13 ENCOUNTER — OFFICE VISIT (OUTPATIENT)
Age: 59
End: 2024-05-13
Payer: MEDICAID

## 2024-05-13 VITALS
WEIGHT: 220.9 LBS | SYSTOLIC BLOOD PRESSURE: 133 MMHG | OXYGEN SATURATION: 96 % | BODY MASS INDEX: 28.35 KG/M2 | DIASTOLIC BLOOD PRESSURE: 88 MMHG | HEART RATE: 85 BPM | HEIGHT: 74 IN

## 2024-05-13 DIAGNOSIS — Z87.891 FORMER SMOKER: ICD-10-CM

## 2024-05-13 DIAGNOSIS — J44.9 CHRONIC OBSTRUCTIVE PULMONARY DISEASE, UNSPECIFIED COPD TYPE: ICD-10-CM

## 2024-05-13 DIAGNOSIS — Z92.3 HISTORY OF RADIATION THERAPY: ICD-10-CM

## 2024-05-13 DIAGNOSIS — C34.91 MALIGNANT NEOPLASM OF RIGHT LUNG, UNSPECIFIED PART OF LUNG: Primary | ICD-10-CM

## 2024-05-13 PROCEDURE — G0463 HOSPITAL OUTPT CLINIC VISIT: HCPCS | Performed by: RADIOLOGY

## 2024-05-14 ENCOUNTER — APPOINTMENT (OUTPATIENT)
Dept: INFUSION THERAPY | Age: 59
End: 2024-05-14
Payer: MEDICAID

## 2024-05-16 ENCOUNTER — TELEPHONE (OUTPATIENT)
Dept: HEMATOLOGY | Age: 59
End: 2024-05-16

## 2024-05-19 NOTE — PROGRESS NOTES
PROGRESS NOTE  Patient:  Andrew Jessica  YOB: 1965  Date of Service: 5/21/2024  MRN: [id number]    Primary Care Physician: Nemesio Schreiber MD    Chief Complaint   Patient presents with    Follow-up    Cancer     Resected stage IIIB lung adenocarcinoma    Treatment     Adjuvant Keytruda #17/18       Patient Seen, Chart, Consults notes, Labs, Radiology studies reviewed.    Mr. 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   Robotic assisted laparoscopic prostatectomy by Dr. Choudhary September 2020 for PT2N0 Wyatt 7 disease with tumor approaching the right posterior margin.  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 5040 cGy over 28 treatment fractions.   Neoadjuvant cisplatin 75 mg/m2, Alimta 500 mg/m2 and Opdivo 360 mg, cycle #1 initiated on 1/5/2023. Cycle #3 delivered on 2/16/2023.    Right thoracotomy with right upper lobectomy and mediastinal lymph node dissection by Dr. Valentin Altamirano on 5/4/2023.   Final pathology was negative.    The primary tumor and all mediastinal lymph nodes were also negative documenting complete response to neoadjuvant therapy.    Andrew received cycle #1 of adjuvant immunotherapy with Keytruda on 6/12/2023     He is tolerating immunotherapy without new symptoms other than persistent fatigue.    I conferenced with Dr. Robertson, his pulmonologist, regarding pneumococcal vaccinations and suggested okay to proceed.    Andrew continues to have the rash primarily over the back area.  It is semidiffuse with punctate lesions everywhere.  He does appear to be

## 2024-05-21 ENCOUNTER — OFFICE VISIT (OUTPATIENT)
Dept: HEMATOLOGY | Age: 59
End: 2024-05-21
Payer: MEDICAID

## 2024-05-21 ENCOUNTER — TRANSCRIBE ORDERS (OUTPATIENT)
Dept: ADMINISTRATIVE | Facility: HOSPITAL | Age: 59
End: 2024-05-21
Payer: MEDICAID

## 2024-05-21 ENCOUNTER — CLINICAL DOCUMENTATION (OUTPATIENT)
Dept: ONCOLOGY | Age: 59
End: 2024-05-21

## 2024-05-21 ENCOUNTER — CLINICAL DOCUMENTATION (OUTPATIENT)
Dept: HEMATOLOGY | Age: 59
End: 2024-05-21

## 2024-05-21 ENCOUNTER — HOSPITAL ENCOUNTER (OUTPATIENT)
Dept: INFUSION THERAPY | Age: 59
Discharge: HOME | End: 2024-05-21
Payer: MEDICAID

## 2024-05-21 VITALS
DIASTOLIC BLOOD PRESSURE: 78 MMHG | HEART RATE: 64 BPM | SYSTOLIC BLOOD PRESSURE: 120 MMHG | OXYGEN SATURATION: 97 % | BODY MASS INDEX: 28.21 KG/M2 | TEMPERATURE: 97.1 F | HEIGHT: 74 IN | WEIGHT: 219.8 LBS | RESPIRATION RATE: 18 BRPM

## 2024-05-21 DIAGNOSIS — C34.91 ADENOSQUAMOUS CARCINOMA OF LUNG, RIGHT: Primary | ICD-10-CM

## 2024-05-21 DIAGNOSIS — M89.9 BONE LESION: ICD-10-CM

## 2024-05-21 DIAGNOSIS — Z51.12 ENCOUNTER FOR ANTINEOPLASTIC IMMUNOTHERAPY: ICD-10-CM

## 2024-05-21 DIAGNOSIS — C34.91 ADENOCARCINOMA OF RIGHT LUNG (HCC): Primary | ICD-10-CM

## 2024-05-21 DIAGNOSIS — Z90.89 S/P LOBECTOMY OF BRAIN: ICD-10-CM

## 2024-05-21 DIAGNOSIS — R59.9 ENLARGED LYMPH NODE: ICD-10-CM

## 2024-05-21 DIAGNOSIS — C34.91 ADENOCARCINOMA OF RIGHT LUNG (HCC): ICD-10-CM

## 2024-05-21 DIAGNOSIS — R59.9 ENLARGED LYMPH NODES: ICD-10-CM

## 2024-05-21 DIAGNOSIS — R53.83 FATIGUE DUE TO TREATMENT: ICD-10-CM

## 2024-05-21 DIAGNOSIS — C34.91 ADENOCARCINOMA, LUNG, RIGHT (HCC): Primary | ICD-10-CM

## 2024-05-21 LAB
ALBUMIN SERPL-MCNC: 4 G/DL (ref 3.5–5.2)
ALP SERPL-CCNC: 101 U/L (ref 40–130)
ALT SERPL-CCNC: 25 U/L (ref 5–41)
ANION GAP SERPL CALCULATED.3IONS-SCNC: 10 MMOL/L (ref 7–19)
AST SERPL-CCNC: 24 U/L (ref 5–40)
BASOPHILS # BLD: 0.05 K/UL (ref 0.01–0.08)
BASOPHILS NFR BLD: 1.3 % (ref 0.1–1.2)
BILIRUB SERPL-MCNC: 0.3 MG/DL (ref 0.2–1.2)
BUN SERPL-MCNC: 17 MG/DL (ref 6–20)
CALCIUM SERPL-MCNC: 9.2 MG/DL (ref 8.6–10)
CHLORIDE SERPL-SCNC: 106 MMOL/L (ref 98–111)
CO2 SERPL-SCNC: 26 MMOL/L (ref 22–29)
CORTIS AM PEAK SERPL-MCNC: 6.8 UG/DL (ref 6.2–19.4)
CREAT SERPL-MCNC: 1 MG/DL (ref 0.5–1.2)
EOSINOPHIL # BLD: 0.19 K/UL (ref 0.04–0.54)
EOSINOPHIL NFR BLD: 4.8 % (ref 0.7–7)
ERYTHROCYTE [DISTWIDTH] IN BLOOD BY AUTOMATED COUNT: 13 % (ref 11.6–14.4)
GLUCOSE SERPL-MCNC: 88 MG/DL (ref 74–109)
HCT VFR BLD AUTO: 39.8 % (ref 40.1–51)
HGB BLD-MCNC: 13.7 G/DL (ref 13.7–17.5)
LYMPHOCYTES # BLD: 0.85 K/UL (ref 1.18–3.74)
LYMPHOCYTES NFR BLD: 21.6 % (ref 19.3–53.1)
MCH RBC QN AUTO: 29.4 PG (ref 25.7–32.2)
MCHC RBC AUTO-ENTMCNC: 34.4 G/DL (ref 32.3–36.5)
MCV RBC AUTO: 85.4 FL (ref 79–92.2)
MONOCYTES # BLD: 0.48 K/UL (ref 0.24–0.82)
MONOCYTES NFR BLD: 12.2 % (ref 4.7–12.5)
NEUTROPHILS # BLD: 2.36 K/UL (ref 1.56–6.13)
NEUTS SEG NFR BLD: 59.8 % (ref 34–71.1)
PLATELET # BLD AUTO: 154 K/UL (ref 163–337)
PMV BLD AUTO: 8.9 FL (ref 7.4–10.4)
POTASSIUM SERPL-SCNC: 3.9 MMOL/L (ref 3.5–5)
PROT SERPL-MCNC: 6.1 G/DL (ref 6.6–8.7)
RBC # BLD AUTO: 4.66 M/UL (ref 4.63–6.08)
SODIUM SERPL-SCNC: 142 MMOL/L (ref 136–145)
TSH SERPL DL<=0.005 MIU/L-ACNC: 4.89 UIU/ML (ref 0.27–4.2)
WBC # BLD AUTO: 3.94 K/UL (ref 4.23–9.07)

## 2024-05-21 PROCEDURE — 2580000003 HC RX 258: Performed by: INTERNAL MEDICINE

## 2024-05-21 PROCEDURE — 36415 COLL VENOUS BLD VENIPUNCTURE: CPT

## 2024-05-21 PROCEDURE — 85025 COMPLETE CBC W/AUTO DIFF WBC: CPT

## 2024-05-21 PROCEDURE — 6360000002 HC RX W HCPCS: Performed by: INTERNAL MEDICINE

## 2024-05-21 PROCEDURE — 36415 COLL VENOUS BLD VENIPUNCTURE: CPT | Performed by: INTERNAL MEDICINE

## 2024-05-21 PROCEDURE — 99215 OFFICE O/P EST HI 40 MIN: CPT | Performed by: INTERNAL MEDICINE

## 2024-05-21 PROCEDURE — 96413 CHEMO IV INFUSION 1 HR: CPT

## 2024-05-21 PROCEDURE — 36591 DRAW BLOOD OFF VENOUS DEVICE: CPT

## 2024-05-21 RX ORDER — ONDANSETRON 2 MG/ML
8 INJECTION INTRAMUSCULAR; INTRAVENOUS
Status: CANCELLED | OUTPATIENT
Start: 2024-05-21

## 2024-05-21 RX ORDER — SODIUM CHLORIDE 9 MG/ML
5-250 INJECTION, SOLUTION INTRAVENOUS PRN
Status: CANCELLED | OUTPATIENT
Start: 2024-05-21

## 2024-05-21 RX ORDER — SODIUM CHLORIDE 9 MG/ML
INJECTION, SOLUTION INTRAVENOUS CONTINUOUS
Status: CANCELLED | OUTPATIENT
Start: 2024-05-21

## 2024-05-21 RX ORDER — HEPARIN 100 UNIT/ML
500 SYRINGE INTRAVENOUS PRN
Status: CANCELLED | OUTPATIENT
Start: 2024-05-21

## 2024-05-21 RX ORDER — ALBUTEROL SULFATE 90 UG/1
4 AEROSOL, METERED RESPIRATORY (INHALATION) PRN
Status: CANCELLED | OUTPATIENT
Start: 2024-05-21

## 2024-05-21 RX ORDER — SODIUM CHLORIDE 0.9 % (FLUSH) 0.9 %
5-40 SYRINGE (ML) INJECTION PRN
Status: DISCONTINUED | OUTPATIENT
Start: 2024-05-21 | End: 2024-05-22 | Stop reason: HOSPADM

## 2024-05-21 RX ORDER — ACETAMINOPHEN 325 MG/1
650 TABLET ORAL
Status: CANCELLED | OUTPATIENT
Start: 2024-05-21

## 2024-05-21 RX ORDER — EPINEPHRINE 1 MG/ML
0.3 INJECTION, SOLUTION, CONCENTRATE INTRAVENOUS PRN
Status: CANCELLED | OUTPATIENT
Start: 2024-05-21

## 2024-05-21 RX ORDER — MEPERIDINE HYDROCHLORIDE 25 MG/ML
12.5 INJECTION INTRAMUSCULAR; INTRAVENOUS; SUBCUTANEOUS PRN
Status: CANCELLED | OUTPATIENT
Start: 2024-05-21

## 2024-05-21 RX ORDER — DIPHENHYDRAMINE HYDROCHLORIDE 50 MG/ML
50 INJECTION INTRAMUSCULAR; INTRAVENOUS
Status: CANCELLED | OUTPATIENT
Start: 2024-05-21

## 2024-05-21 RX ORDER — HEPARIN 100 UNIT/ML
500 SYRINGE INTRAVENOUS PRN
Status: DISCONTINUED | OUTPATIENT
Start: 2024-05-21 | End: 2024-05-22 | Stop reason: HOSPADM

## 2024-05-21 RX ORDER — SODIUM CHLORIDE 0.9 % (FLUSH) 0.9 %
5-40 SYRINGE (ML) INJECTION PRN
Status: CANCELLED | OUTPATIENT
Start: 2024-05-21

## 2024-05-21 RX ADMIN — SODIUM CHLORIDE 200 MG: 9 INJECTION, SOLUTION INTRAVENOUS at 10:26

## 2024-05-21 RX ADMIN — SODIUM CHLORIDE, PRESERVATIVE FREE 10 ML: 5 INJECTION INTRAVENOUS at 10:58

## 2024-05-21 RX ADMIN — HEPARIN 500 UNITS: 100 SYRINGE at 10:58

## 2024-05-21 NOTE — PROGRESS NOTES
OV 5/13/24 with Dada SEGOVIA [facility] RAD [facility]C  status post completion of radiation therapy to the lung and presents to our clinic today for surveillance exam and to review imaging. Diagnosed with right upper lobe of the lung. He completed 5040 cGy in 28 fractions to the right lung on 02/14/2023.   CT-scan of the chest on 02/21/2024 revealed chronic lung changes. Posttreatment changes with no sign of residual or recurrent neoplasm.  On exam, I do not see evidence for recurrent or metastatic disease at this time. He has 2 cycles of keytruda remaining. Will defer surveillance imaging to medical oncology. We will continue routine follow-up/surveillance as discussed in 6 months. I have instructed him to continue to see the other health care providers as per their scheduling.  Return in about 6 months (around 11/13/2024).

## 2024-05-30 RX ORDER — ALBUTEROL SULFATE 90 UG/1
AEROSOL, METERED RESPIRATORY (INHALATION)
Qty: 18 G | Refills: 5 | Status: SHIPPED | OUTPATIENT
Start: 2024-05-30

## 2024-06-11 ENCOUNTER — HOSPITAL ENCOUNTER (OUTPATIENT)
Dept: INFUSION THERAPY | Age: 59
Discharge: HOME | End: 2024-06-11
Payer: MEDICAID

## 2024-06-11 VITALS
DIASTOLIC BLOOD PRESSURE: 75 MMHG | HEIGHT: 74 IN | OXYGEN SATURATION: 97 % | WEIGHT: 215.1 LBS | HEART RATE: 80 BPM | BODY MASS INDEX: 27.61 KG/M2 | SYSTOLIC BLOOD PRESSURE: 124 MMHG | TEMPERATURE: 98 F | RESPIRATION RATE: 18 BRPM

## 2024-06-11 DIAGNOSIS — Z90.89 S/P LOBECTOMY OF BRAIN: Primary | ICD-10-CM

## 2024-06-11 DIAGNOSIS — R53.83 FATIGUE DUE TO TREATMENT: Primary | ICD-10-CM

## 2024-06-11 DIAGNOSIS — R53.83 FATIGUE DUE TO TREATMENT: ICD-10-CM

## 2024-06-11 DIAGNOSIS — C34.91 ADENOCARCINOMA, LUNG, RIGHT (HCC): ICD-10-CM

## 2024-06-11 DIAGNOSIS — Z51.12 ENCOUNTER FOR ANTINEOPLASTIC IMMUNOTHERAPY: ICD-10-CM

## 2024-06-11 DIAGNOSIS — C34.91 ADENOCARCINOMA OF RIGHT LUNG (HCC): ICD-10-CM

## 2024-06-11 LAB
ALBUMIN SERPL-MCNC: 4.4 G/DL (ref 3.5–5.2)
ALP SERPL-CCNC: 82 U/L (ref 40–130)
ALT SERPL-CCNC: 26 U/L (ref 21–72)
ANION GAP SERPL CALCULATED.3IONS-SCNC: 12 MMOL/L (ref 7–19)
AST SERPL-CCNC: 28 U/L (ref 17–59)
BASOPHILS # BLD: 0.04 K/UL (ref 0.01–0.08)
BASOPHILS NFR BLD: 1 % (ref 0.1–1.2)
BILIRUB SERPL-MCNC: 0.7 MG/DL (ref 0.2–1.3)
BUN SERPL-MCNC: 16 MG/DL (ref 9–20)
CALCIUM SERPL-MCNC: 9.6 MG/DL (ref 8.4–10.2)
CHLORIDE SERPL-SCNC: 103 MMOL/L (ref 98–111)
CO2 SERPL-SCNC: 26 MMOL/L (ref 22–29)
CORTIS AM PEAK SERPL-MCNC: 8.3 UG/DL (ref 6.2–19.4)
CREAT SERPL-MCNC: 1 MG/DL (ref 0.6–1.2)
EOSINOPHIL # BLD: 0.16 K/UL (ref 0.04–0.54)
EOSINOPHIL NFR BLD: 3.8 % (ref 0.7–7)
ERYTHROCYTE [DISTWIDTH] IN BLOOD BY AUTOMATED COUNT: 13.3 % (ref 11.6–14.4)
GLOBULIN: 2 G/DL
GLUCOSE SERPL-MCNC: 99 MG/DL (ref 74–106)
HCT VFR BLD AUTO: 41.2 % (ref 40.1–51)
HGB BLD-MCNC: 14.1 G/DL (ref 13.7–17.5)
LYMPHOCYTES # BLD: 0.65 K/UL (ref 1.18–3.74)
LYMPHOCYTES NFR BLD: 15.5 % (ref 19.3–53.1)
MCH RBC QN AUTO: 29.2 PG (ref 25.7–32.2)
MCHC RBC AUTO-ENTMCNC: 34.2 G/DL (ref 32.3–36.5)
MCV RBC AUTO: 85.3 FL (ref 79–92.2)
MONOCYTES # BLD: 0.39 K/UL (ref 0.24–0.82)
MONOCYTES NFR BLD: 9.3 % (ref 4.7–12.5)
NEUTROPHILS # BLD: 2.94 K/UL (ref 1.56–6.13)
NEUTS SEG NFR BLD: 69.9 % (ref 34–71.1)
PLATELET # BLD AUTO: 172 K/UL (ref 163–337)
PMV BLD AUTO: 8.9 FL (ref 7.4–10.4)
POTASSIUM SERPL-SCNC: 3.7 MMOL/L (ref 3.5–5.1)
PROT SERPL-MCNC: 6.4 G/DL (ref 6.3–8.2)
RBC # BLD AUTO: 4.83 M/UL (ref 4.63–6.08)
SODIUM SERPL-SCNC: 141 MMOL/L (ref 137–145)
T4 FREE SERPL-MCNC: 1.29 NG/DL (ref 0.93–1.7)
TSH SERPL DL<=0.005 MIU/L-ACNC: 4.17 UIU/ML (ref 0.27–4.2)
WBC # BLD AUTO: 4.2 K/UL (ref 4.23–9.07)

## 2024-06-11 PROCEDURE — 96413 CHEMO IV INFUSION 1 HR: CPT

## 2024-06-11 PROCEDURE — 2580000003 HC RX 258: Performed by: INTERNAL MEDICINE

## 2024-06-11 PROCEDURE — 85025 COMPLETE CBC W/AUTO DIFF WBC: CPT

## 2024-06-11 PROCEDURE — 6360000002 HC RX W HCPCS: Performed by: PHYSICIAN ASSISTANT

## 2024-06-11 PROCEDURE — 80053 COMPREHEN METABOLIC PANEL: CPT

## 2024-06-11 PROCEDURE — 2580000003 HC RX 258: Performed by: PHYSICIAN ASSISTANT

## 2024-06-11 PROCEDURE — 36591 DRAW BLOOD OFF VENOUS DEVICE: CPT

## 2024-06-11 PROCEDURE — 36415 COLL VENOUS BLD VENIPUNCTURE: CPT

## 2024-06-11 PROCEDURE — 6360000002 HC RX W HCPCS: Performed by: INTERNAL MEDICINE

## 2024-06-11 RX ORDER — EPINEPHRINE 1 MG/ML
0.3 INJECTION, SOLUTION, CONCENTRATE INTRAVENOUS PRN
Status: CANCELLED | OUTPATIENT
Start: 2024-06-11

## 2024-06-11 RX ORDER — DIPHENHYDRAMINE HYDROCHLORIDE 50 MG/ML
50 INJECTION INTRAMUSCULAR; INTRAVENOUS
Status: CANCELLED | OUTPATIENT
Start: 2024-06-11

## 2024-06-11 RX ORDER — HEPARIN 100 UNIT/ML
500 SYRINGE INTRAVENOUS PRN
Status: DISCONTINUED | OUTPATIENT
Start: 2024-06-11 | End: 2024-06-12 | Stop reason: HOSPADM

## 2024-06-11 RX ORDER — ALBUTEROL SULFATE 90 UG/1
4 AEROSOL, METERED RESPIRATORY (INHALATION) PRN
Status: CANCELLED | OUTPATIENT
Start: 2024-06-11

## 2024-06-11 RX ORDER — ONDANSETRON 2 MG/ML
8 INJECTION INTRAMUSCULAR; INTRAVENOUS
Status: CANCELLED | OUTPATIENT
Start: 2024-06-11

## 2024-06-11 RX ORDER — SODIUM CHLORIDE 9 MG/ML
INJECTION, SOLUTION INTRAVENOUS CONTINUOUS
Status: CANCELLED | OUTPATIENT
Start: 2024-06-11

## 2024-06-11 RX ORDER — FAMOTIDINE 10 MG/ML
20 INJECTION, SOLUTION INTRAVENOUS
Status: CANCELLED | OUTPATIENT
Start: 2024-06-11

## 2024-06-11 RX ORDER — MEPERIDINE HYDROCHLORIDE 25 MG/ML
12.5 INJECTION INTRAMUSCULAR; INTRAVENOUS; SUBCUTANEOUS PRN
Status: CANCELLED | OUTPATIENT
Start: 2024-06-11

## 2024-06-11 RX ORDER — ACETAMINOPHEN 325 MG/1
650 TABLET ORAL
Status: CANCELLED | OUTPATIENT
Start: 2024-06-11

## 2024-06-11 RX ORDER — SODIUM CHLORIDE 9 MG/ML
5-250 INJECTION, SOLUTION INTRAVENOUS PRN
Status: DISCONTINUED | OUTPATIENT
Start: 2024-06-11 | End: 2024-06-12 | Stop reason: HOSPADM

## 2024-06-11 RX ORDER — SODIUM CHLORIDE 0.9 % (FLUSH) 0.9 %
5-40 SYRINGE (ML) INJECTION PRN
Status: DISCONTINUED | OUTPATIENT
Start: 2024-06-11 | End: 2024-06-12 | Stop reason: HOSPADM

## 2024-06-11 RX ORDER — SODIUM CHLORIDE 9 MG/ML
5-250 INJECTION, SOLUTION INTRAVENOUS PRN
Status: CANCELLED | OUTPATIENT
Start: 2024-06-11

## 2024-06-11 RX ADMIN — SODIUM CHLORIDE, PRESERVATIVE FREE 10 ML: 5 INJECTION INTRAVENOUS at 11:05

## 2024-06-11 RX ADMIN — HEPARIN 500 UNITS: 100 SYRINGE at 11:05

## 2024-06-11 RX ADMIN — SODIUM CHLORIDE 200 MG: 9 INJECTION, SOLUTION INTRAVENOUS at 10:35

## 2024-06-19 ENCOUNTER — HOSPITAL ENCOUNTER (EMERGENCY)
Facility: HOSPITAL | Age: 59
Discharge: HOME | End: 2024-06-19
Payer: MEDICAID

## 2024-06-19 ENCOUNTER — APPOINTMENT (OUTPATIENT)
Dept: CT IMAGING | Facility: HOSPITAL | Age: 59
End: 2024-06-19
Payer: MEDICAID

## 2024-06-19 ENCOUNTER — TELEPHONE (OUTPATIENT)
Dept: PULMONOLOGY | Facility: CLINIC | Age: 59
End: 2024-06-19
Payer: MEDICAID

## 2024-06-19 ENCOUNTER — APPOINTMENT (OUTPATIENT)
Dept: GENERAL RADIOLOGY | Facility: HOSPITAL | Age: 59
End: 2024-06-19
Payer: MEDICAID

## 2024-06-19 VITALS
HEIGHT: 74 IN | WEIGHT: 211 LBS | RESPIRATION RATE: 16 BRPM | BODY MASS INDEX: 27.08 KG/M2 | SYSTOLIC BLOOD PRESSURE: 115 MMHG | HEART RATE: 85 BPM | OXYGEN SATURATION: 94 % | TEMPERATURE: 97.8 F | DIASTOLIC BLOOD PRESSURE: 82 MMHG

## 2024-06-19 DIAGNOSIS — J06.9 UPPER RESPIRATORY TRACT INFECTION, UNSPECIFIED TYPE: Primary | ICD-10-CM

## 2024-06-19 DIAGNOSIS — J40 BRONCHITIS: ICD-10-CM

## 2024-06-19 LAB
ALBUMIN SERPL-MCNC: 4.4 G/DL (ref 3.5–5.2)
ALBUMIN/GLOB SERPL: 1.6 G/DL
ALP SERPL-CCNC: 113 U/L (ref 39–117)
ALT SERPL W P-5'-P-CCNC: 41 U/L (ref 1–41)
ANION GAP SERPL CALCULATED.3IONS-SCNC: 9 MMOL/L (ref 5–15)
AST SERPL-CCNC: 31 U/L (ref 1–40)
B PARAPERT DNA SPEC QL NAA+PROBE: NOT DETECTED
B PERT DNA SPEC QL NAA+PROBE: NOT DETECTED
BASOPHILS # BLD AUTO: 0.05 10*3/MM3 (ref 0–0.2)
BASOPHILS NFR BLD AUTO: 1.3 % (ref 0–1.5)
BILIRUB SERPL-MCNC: 0.3 MG/DL (ref 0–1.2)
BUN SERPL-MCNC: 16 MG/DL (ref 6–20)
BUN/CREAT SERPL: 16 (ref 7–25)
C PNEUM DNA NPH QL NAA+NON-PROBE: NOT DETECTED
CALCIUM SPEC-SCNC: 10 MG/DL (ref 8.6–10.5)
CHLORIDE SERPL-SCNC: 99 MMOL/L (ref 98–107)
CO2 SERPL-SCNC: 29 MMOL/L (ref 22–29)
CREAT SERPL-MCNC: 1 MG/DL (ref 0.76–1.27)
CRP SERPL-MCNC: 4.68 MG/DL (ref 0–0.5)
DEPRECATED RDW RBC AUTO: 43.8 FL (ref 37–54)
EGFRCR SERPLBLD CKD-EPI 2021: 88.3 ML/MIN/1.73
EOSINOPHIL # BLD AUTO: 0.2 10*3/MM3 (ref 0–0.4)
EOSINOPHIL NFR BLD AUTO: 5.1 % (ref 0.3–6.2)
ERYTHROCYTE [DISTWIDTH] IN BLOOD BY AUTOMATED COUNT: 13.7 % (ref 12.3–15.4)
FLUAV SUBTYP SPEC NAA+PROBE: NOT DETECTED
FLUBV RNA ISLT QL NAA+PROBE: NOT DETECTED
GLOBULIN UR ELPH-MCNC: 2.8 GM/DL
GLUCOSE SERPL-MCNC: 84 MG/DL (ref 65–99)
HADV DNA SPEC NAA+PROBE: NOT DETECTED
HCOV 229E RNA SPEC QL NAA+PROBE: NOT DETECTED
HCOV HKU1 RNA SPEC QL NAA+PROBE: NOT DETECTED
HCOV NL63 RNA SPEC QL NAA+PROBE: NOT DETECTED
HCOV OC43 RNA SPEC QL NAA+PROBE: NOT DETECTED
HCT VFR BLD AUTO: 44 % (ref 37.5–51)
HGB BLD-MCNC: 14.5 G/DL (ref 13–17.7)
HMPV RNA NPH QL NAA+NON-PROBE: NOT DETECTED
HPIV1 RNA ISLT QL NAA+PROBE: NOT DETECTED
HPIV2 RNA SPEC QL NAA+PROBE: NOT DETECTED
HPIV3 RNA NPH QL NAA+PROBE: NOT DETECTED
HPIV4 P GENE NPH QL NAA+PROBE: NOT DETECTED
IMM GRANULOCYTES # BLD AUTO: 0.02 10*3/MM3 (ref 0–0.05)
IMM GRANULOCYTES NFR BLD AUTO: 0.5 % (ref 0–0.5)
INR PPP: 0.92 (ref 0.91–1.09)
LYMPHOCYTES # BLD AUTO: 0.67 10*3/MM3 (ref 0.7–3.1)
LYMPHOCYTES NFR BLD AUTO: 16.9 % (ref 19.6–45.3)
M PNEUMO IGG SER IA-ACNC: NOT DETECTED
MCH RBC QN AUTO: 28.7 PG (ref 26.6–33)
MCHC RBC AUTO-ENTMCNC: 33 G/DL (ref 31.5–35.7)
MCV RBC AUTO: 87 FL (ref 79–97)
MONOCYTES # BLD AUTO: 0.42 10*3/MM3 (ref 0.1–0.9)
MONOCYTES NFR BLD AUTO: 10.6 % (ref 5–12)
NEUTROPHILS NFR BLD AUTO: 2.6 10*3/MM3 (ref 1.7–7)
NEUTROPHILS NFR BLD AUTO: 65.6 % (ref 42.7–76)
NRBC BLD AUTO-RTO: 0 /100 WBC (ref 0–0.2)
NT-PROBNP SERPL-MCNC: <36 PG/ML (ref 0–900)
PLATELET # BLD AUTO: 165 10*3/MM3 (ref 140–450)
PMV BLD AUTO: 9.3 FL (ref 6–12)
POTASSIUM SERPL-SCNC: 3.7 MMOL/L (ref 3.5–5.2)
PROCALCITONIN SERPL-MCNC: 0.06 NG/ML (ref 0–0.25)
PROT SERPL-MCNC: 7.2 G/DL (ref 6–8.5)
PROTHROMBIN TIME: 12.8 SECONDS (ref 11.8–14.8)
RBC # BLD AUTO: 5.06 10*6/MM3 (ref 4.14–5.8)
RHINOVIRUS RNA SPEC NAA+PROBE: DETECTED
RSV RNA NPH QL NAA+NON-PROBE: NOT DETECTED
SARS-COV-2 RNA NPH QL NAA+NON-PROBE: NOT DETECTED
SODIUM SERPL-SCNC: 137 MMOL/L (ref 136–145)
TROPONIN T SERPL HS-MCNC: 6 NG/L
WBC NRBC COR # BLD AUTO: 3.96 10*3/MM3 (ref 3.4–10.8)

## 2024-06-19 PROCEDURE — 71275 CT ANGIOGRAPHY CHEST: CPT

## 2024-06-19 PROCEDURE — 36415 COLL VENOUS BLD VENIPUNCTURE: CPT

## 2024-06-19 PROCEDURE — 93005 ELECTROCARDIOGRAM TRACING: CPT

## 2024-06-19 PROCEDURE — 25510000001 IOPAMIDOL PER 1 ML: Performed by: NURSE PRACTITIONER

## 2024-06-19 PROCEDURE — 0202U NFCT DS 22 TRGT SARS-COV-2: CPT | Performed by: NURSE PRACTITIONER

## 2024-06-19 PROCEDURE — 86140 C-REACTIVE PROTEIN: CPT | Performed by: NURSE PRACTITIONER

## 2024-06-19 PROCEDURE — 84145 PROCALCITONIN (PCT): CPT | Performed by: NURSE PRACTITIONER

## 2024-06-19 PROCEDURE — 25010000002 METHYLPREDNISOLONE PER 125 MG: Performed by: NURSE PRACTITIONER

## 2024-06-19 PROCEDURE — 96374 THER/PROPH/DIAG INJ IV PUSH: CPT

## 2024-06-19 PROCEDURE — 99285 EMERGENCY DEPT VISIT HI MDM: CPT

## 2024-06-19 PROCEDURE — 94664 DEMO&/EVAL PT USE INHALER: CPT

## 2024-06-19 PROCEDURE — 85610 PROTHROMBIN TIME: CPT | Performed by: NURSE PRACTITIONER

## 2024-06-19 PROCEDURE — 80053 COMPREHEN METABOLIC PANEL: CPT | Performed by: NURSE PRACTITIONER

## 2024-06-19 PROCEDURE — 94640 AIRWAY INHALATION TREATMENT: CPT

## 2024-06-19 PROCEDURE — 87040 BLOOD CULTURE FOR BACTERIA: CPT | Performed by: NURSE PRACTITIONER

## 2024-06-19 PROCEDURE — 84484 ASSAY OF TROPONIN QUANT: CPT | Performed by: NURSE PRACTITIONER

## 2024-06-19 PROCEDURE — 93010 ELECTROCARDIOGRAM REPORT: CPT | Performed by: INTERNAL MEDICINE

## 2024-06-19 PROCEDURE — 85025 COMPLETE CBC W/AUTO DIFF WBC: CPT | Performed by: NURSE PRACTITIONER

## 2024-06-19 PROCEDURE — 83880 ASSAY OF NATRIURETIC PEPTIDE: CPT | Performed by: NURSE PRACTITIONER

## 2024-06-19 PROCEDURE — 71045 X-RAY EXAM CHEST 1 VIEW: CPT

## 2024-06-19 RX ORDER — ALBUTEROL SULFATE 2.5 MG/3ML
2.5 SOLUTION RESPIRATORY (INHALATION) EVERY 4 HOURS PRN
Qty: 25 EACH | Refills: 0 | Status: SHIPPED | OUTPATIENT
Start: 2024-06-19

## 2024-06-19 RX ORDER — SODIUM CHLORIDE 0.9 % (FLUSH) 0.9 %
10 SYRINGE (ML) INJECTION AS NEEDED
Status: DISCONTINUED | OUTPATIENT
Start: 2024-06-19 | End: 2024-06-19 | Stop reason: HOSPADM

## 2024-06-19 RX ORDER — IPRATROPIUM BROMIDE AND ALBUTEROL SULFATE 2.5; .5 MG/3ML; MG/3ML
3 SOLUTION RESPIRATORY (INHALATION) ONCE
Status: COMPLETED | OUTPATIENT
Start: 2024-06-19 | End: 2024-06-19

## 2024-06-19 RX ORDER — METHYLPREDNISOLONE SODIUM SUCCINATE 125 MG/2ML
125 INJECTION, POWDER, LYOPHILIZED, FOR SOLUTION INTRAMUSCULAR; INTRAVENOUS ONCE
Status: COMPLETED | OUTPATIENT
Start: 2024-06-19 | End: 2024-06-19

## 2024-06-19 RX ORDER — DEXTROMETHORPHAN HYDROBROMIDE AND PROMETHAZINE HYDROCHLORIDE 15; 6.25 MG/5ML; MG/5ML
5 SYRUP ORAL 4 TIMES DAILY PRN
Qty: 180 ML | Refills: 0 | Status: SHIPPED | OUTPATIENT
Start: 2024-06-19

## 2024-06-19 RX ORDER — METHYLPREDNISOLONE 4 MG/1
TABLET ORAL
Qty: 21 EACH | Refills: 0 | Status: SHIPPED | OUTPATIENT
Start: 2024-06-19

## 2024-06-19 RX ADMIN — IPRATROPIUM BROMIDE AND ALBUTEROL SULFATE 3 ML: .5; 3 SOLUTION RESPIRATORY (INHALATION) at 16:58

## 2024-06-19 RX ADMIN — IOPAMIDOL 100 ML: 755 INJECTION, SOLUTION INTRAVENOUS at 17:27

## 2024-06-19 RX ADMIN — METHYLPREDNISOLONE SODIUM SUCCINATE 125 MG: 125 INJECTION, POWDER, FOR SOLUTION INTRAMUSCULAR; INTRAVENOUS at 17:48

## 2024-06-19 NOTE — ED PROVIDER NOTES
Subjective   History of Present Illness  Patient.  Patient is a 58-year-old male presents emergency department with cough and shortness of breath.  Patient has had a cough for the past 2 weeks.  He states he has been on doxycycline for the past 3 days per his primary care provider without any improvement of symptoms.  Patient states he has been more short of breath today.  He denies any chest pain.  He states he is coughing up yellowish phlegm.  Patient does have a history of adenocarcinoma of the lung.  He finished Keytruda about 2 weeks ago.  He states he had fever 3 days ago.  No nausea or vomiting.  He spoke with his pulmonologist today and they advised him to come the emergency department for further evaluation and treatment.    History provided by:  Patient   used: No        Review of Systems   Constitutional: Negative.    HENT: Negative.     Eyes: Negative.    Respiratory:          Patient.  Patient is a 58-year-old male presents emergency department with cough and shortness of breath.  Patient has had a cough for the past 2 weeks.  He states he has been on doxycycline for the past 3 days per his primary care provider without any improvement of symptoms.  Patient states he has been more short of breath today.  He denies any chest pain.  He states he is coughing up yellowish phlegm.  Patient does have a history of adenocarcinoma of the lung.  He finished Keytruda about 2 weeks ago.  He states he had fever 3 days ago.  No nausea or vomiting.  He spoke with his pulmonologist today and they advised him to come the emergency department for further evaluation and treatment.   Cardiovascular: Negative.    Gastrointestinal: Negative.    Endocrine: Negative.    Genitourinary: Negative.    Musculoskeletal: Negative.    Skin: Negative.    Allergic/Immunologic: Negative.    Neurological: Negative.    Hematological: Negative.    Psychiatric/Behavioral: Negative.     All other systems reviewed and are  negative.      Past Medical History:   Diagnosis Date    Abnormal PET scan of lung     Nov 2022    Allergic rhinitis     Arthritis     Atrial fibrillation     Bronchiectasis     Bronchitis     Cancer     prostate    Chronic bronchitis     Chronic pain     COPD (chronic obstructive pulmonary disease)     GERD (gastroesophageal reflux disease)     Lung cancer     Pneumonia     Prostatitis, acute     S/P ACL reconstruction     Septic shock due to urinary tract infection     Sinusitis     Strep throat     UTI (urinary tract infection)        Allergies   Allergen Reactions    Adhesive Tape Other (See Comments)     Leaves blisters, whelps, rash       Past Surgical History:   Procedure Laterality Date    ANKLE SURGERY Right     BACK SURGERY      X2    ENDOSCOPY N/A 01/10/2022    Procedure: ESOPHAGOGASTRODUODENOSCOPY WITH ANESTHESIA;  Surgeon: Tucker Bright MD;  Location:  [facility];  Service: Gastroenterology;  Laterality: N/A;  pre food bolus  post food bolus  Dr. PAULINO ACL RECONSTRUCTION Left     NASAL SEPTUM SURGERY      PENILE PROSTHESIS IMPLANT N/A 03/15/2022    Procedure: 3-PIECE INFLATABLE PENILE PROSTHESIS PLACEMENT;  Surgeon: Trae Clark MD;  Location:  [facility];  Service: Urology;  Laterality: N/A;    PROSTATE ULTRASOUND BIOPSY N/A 02/18/2020    Procedure: PROSTATE  BIOPSY;  Surgeon: Trae Clark MD;  Location:  [facility];  Service: Urology;  Laterality: N/A;    PROSTATECTOMY N/A 09/22/2020    Procedure: RADICAL PROSTATECTOMY LAPAROSCOPIC WITH DAVINCI ROBOT;  Surgeon: Nuno Jj MD;  Location:  [facility];  Service: DaVinci;  Laterality: N/A;    SHOULDER SURGERY Left     X 4    TENNIS ELBOW RELEASE Bilateral 04/14/2011    THORACOTOMY Right 05/04/2023    Procedure: RIGHT THORACOTOMY WITH CHEST WALL RESECTION, MEDIASTINAL LYMPH NODE DISSECTION, RIGHT UPPER LOBECTOMY;  Surgeon: Goldy Carlin MD;  Location:  [facility];  Service: Cardiothoracic;  Laterality: Right;    VENOUS ACCESS DEVICE  (PORT) INSERTION N/A 2022    Procedure: Single Lumen Port-a-cath insertion with flouroscopy;  Surgeon: Kim Bonilla MD;  Location: [facility];  Service: General;  Laterality: N/A;       Family History   Problem Relation Age of Onset    Cancer Mother        Social History     Socioeconomic History    Marital status: Legally    Tobacco Use    Smoking status: Former     Current packs/day: 0.00     Average packs/day: 1 pack/day for 35.8 years (35.8 ttl pk-yrs)     Types: Cigarettes     Start date: 1987     Quit date: 2022     Years since quittin.5     Passive exposure: Past    Smokeless tobacco: Former     Types: Snuff     Quit date:    Vaping Use    Vaping status: Former   Substance and Sexual Activity    Alcohol use: No     Comment: 0    Drug use: Not Currently     Frequency: 7.0 times per week     Types: Hydrocodone, Marijuana, Oxycodone     Comment: Edible gummies - 1 per day    Sexual activity: Yes     Partners: Female     Birth control/protection: None       Prior to Admission medications    Medication Sig Start Date End Date Taking? Authorizing Provider   albuterol (PROVENTIL) (2.5 MG/3ML) 0.083% nebulizer solution NEBULIZE CONTENT OF ONE VIAL EVERY FOUR HOURS AS NEEDED FOR WHEEZING 23   Sobia Desai APRN   Azelastine HCl 137 MCG/SPRAY solution 2 sprays into the nostril(s) as directed by provider 2 (Two) Times a Day. 24   Russel Santana MD   cetirizine (zyrTEC) 10 MG tablet Take 1 tablet by mouth Daily. 24   Russel Santana MD   esomeprazole (nexIUM) 40 MG capsule Take 1 capsule by mouth Every Morning Before Breakfast. 24   ProviderAmber MD   fluticasone (FLONASE) 50 MCG/ACT nasal spray 2 sprays into the nostril(s) as directed by provider Daily As Needed for Rhinitis or Allergies. 24   Russel Santana MD   Fluticasone-Salmeterol (ADVAIR/WIXELA) 250-50 MCG/ACT DISKUS Inhale 1 puff 2 (Two) Times a Day. 24   Russel Santana  "MD   gabapentin (NEURONTIN) 800 MG tablet Take 1 tablet by mouth 3 (Three) Times a Day.    ProviderAmber MD   hydrOXYzine pamoate (VISTARIL) 50 MG capsule Take 1 capsule by mouth Daily. 10/5/22   Amber Taylor MD   montelukast (SINGULAIR) 10 MG tablet Take 1 tablet by mouth Every Night. 2/6/24   Russel Santana MD   multivitamin with minerals tablet tablet Take 1 tablet by mouth Daily.    Amber Taylor MD   NON FORMULARY Take 1 tablet by mouth Daily. Marijuana Gummies    Amber Taylor MD   Ventolin  (90 Base) MCG/ACT inhaler INHALE TWO PUFFS BY MOUTH EVERY FOUR HOURS AS NEEDED FOR WHEEZING AND FOR SHORTNESS OF BREATH 5/30/24   Russel Satnana MD       /81 (BP Location: Right arm, Patient Position: Sitting)   Pulse 87   Temp 97.8 °F (36.6 °C) (Oral)   Resp 16   Ht 188 cm (74\")   Wt 95.7 kg (211 lb)   SpO2 100%   BMI 27.09 kg/m²     Objective   Physical Exam  Vitals and nursing note reviewed.   Constitutional:       Appearance: He is well-developed.      Comments: Nontoxic-appearing.  No acute distress.   HENT:      Head: Normocephalic and atraumatic.   Eyes:      Conjunctiva/sclera: Conjunctivae normal.      Pupils: Pupils are equal, round, and reactive to light.   Cardiovascular:      Rate and Rhythm: Normal rate and regular rhythm.      Heart sounds: Normal heart sounds.   Pulmonary:      Effort: Pulmonary effort is normal.      Comments: Expiratory  wheezing noted throughout.  Diminished lung sounds left lower lobe.  Abdominal:      General: Bowel sounds are normal.      Palpations: Abdomen is soft.   Musculoskeletal:         General: Normal range of motion.      Cervical back: Normal range of motion and neck supple.   Skin:     General: Skin is warm and dry.   Neurological:      Mental Status: He is alert and oriented to person, place, and time.      Deep Tendon Reflexes: Reflexes are normal and symmetric.   Psychiatric:         Behavior: Behavior normal.    "      Thought Content: Thought content normal.         Judgment: Judgment normal.         Procedures     Wells' Criteria for Pulmonary Embolism - MDCalc  Calculated on Jun 19 2024 6:01 PM  1.0 points -> Low risk group: 1.3% chance of PE in an ED population. Another study assigned scores ? 4 as “PE Unlikely” and had a 3% incidence of PE.    Lab Results (last 24 hours)       Procedure Component Value Units Date/Time    CBC & Differential [[id number]]  (Abnormal) Collected: 06/19/24 1712    Specimen: Blood Updated: 06/19/24 1738    Narrative:      The following orders were created for panel order CBC & Differential.  Procedure                               Abnormality         Status                     ---------                               -----------         ------                     CBC Auto Differential[[id number]]        Abnormal            Final result                 Please view results for these tests on the individual orders.    Comprehensive Metabolic Panel [[id number]] Collected: 06/19/24 1712    Specimen: Blood Updated: 06/19/24 1759     Glucose 84 mg/dL      BUN 16 mg/dL      Creatinine 1.00 mg/dL      Sodium 137 mmol/L      Potassium 3.7 mmol/L      Chloride 99 mmol/L      CO2 29.0 mmol/L      Calcium 10.0 mg/dL      Total Protein 7.2 g/dL      Albumin 4.4 g/dL      ALT (SGPT) 41 U/L      AST (SGOT) 31 U/L      Alkaline Phosphatase 113 U/L      Total Bilirubin 0.3 mg/dL      Globulin 2.8 gm/dL      A/G Ratio 1.6 g/dL      BUN/Creatinine Ratio 16.0     Anion Gap 9.0 mmol/L      eGFR 88.3 mL/min/1.73     Narrative:      GFR Normal >60  Chronic Kidney Disease <60  Kidney Failure <15      Protime-INR [[id number]]  (Normal) Collected: 06/19/24 1712    Specimen: Blood Updated: 06/19/24 1746     Protime 12.8 Seconds      INR 0.92    Blood Culture With ROD - Blood, Arm, Left [[id number]] Collected: 06/19/24 1712    Specimen: Blood from Arm, Left Updated: 06/19/24 1736    Procalcitonin [[id number]]  (Normal) Collected:  "06/19/24 1712    Specimen: Blood Updated: 06/19/24 1805     Procalcitonin 0.06 ng/mL     Narrative:      As a Marker for Sepsis (Non-Neonates):    1. <0.5 ng/mL represents a low risk of severe sepsis and/or septic shock.  2. >2 ng/mL represents a high risk of severe sepsis and/or septic shock.    As a Marker for Lower Respiratory Tract Infections that require antibiotic therapy:    PCT on Admission    Antibiotic Therapy       6-12 Hrs later    >0.5                Strongly Recommended  >0.25 - <0.5        Recommended   0.1 - 0.25          Discouraged              Remeasure/reassess PCT  <0.1                Strongly Discouraged     Remeasure/reassess PCT    As 28 day mortality risk marker: \"Change in Procalcitonin Result\" (>80% or <=80%) if Day 0 (or Day 1) and Day 4 values are available. Refer to http://www.[employer]-pct-calculator.com    Change in PCT <=80%  A decrease of PCT levels below or equal to 80% defines a positive change in PCT test result representing a higher risk for 28-day all-cause mortality of patients diagnosed with severe sepsis for septic shock.    Change in PCT >80%  A decrease of PCT levels of more than 80% defines a negative change in PCT result representing a lower risk for 28-day all-cause mortality of patients diagnosed with severe sepsis or septic shock.       C-reactive Protein [[id number]]  (Abnormal) Collected: 06/19/24 1712    Specimen: Blood Updated: 06/19/24 1759     C-Reactive Protein 4.68 mg/dL     BNP [[id number]]  (Normal) Collected: 06/19/24 1712    Specimen: Blood Updated: 06/19/24 1755     proBNP <36.0 pg/mL     Narrative:      This assay is used as an aid in the diagnosis of individuals suspected of having heart failure. It can be used as an aid in the diagnosis of acute decompensated heart failure (ADHF) in patients presenting with signs and symptoms of ADHF to the emergency department (ED). In addition, NT-proBNP of <300 pg/mL indicates ADHF is not likely.    Age Range Result " Interpretation  NT-proBNP Concentration (pg/mL:      <50             Positive            >450                   Gray                 300-450                    Negative             <300    50-75           Positive            >900                  Gray                300-900                  Negative            <300      >75             Positive            >1800                  Gray                300-1800                  Negative            <300    Single High Sensitivity Troponin T [[id number]]  (Normal) Collected: 06/19/24 1712    Specimen: Blood Updated: 06/19/24 1755     HS Troponin T 6 ng/L     Narrative:      High Sensitive Troponin T Reference Range:  <14.0 ng/L- Negative Female for AMI  <22.0 ng/L- Negative Male for AMI  >=14 - Abnormal Female indicating possible myocardial injury.  >=22 - Abnormal Male indicating possible myocardial injury.   Clinicians would have to utilize clinical acumen, EKG, Troponin, and serial changes to determine if it is an Acute Myocardial Infarction or myocardial injury due to an underlying chronic condition.         CBC Auto Differential [[id number]]  (Abnormal) Collected: 06/19/24 1712    Specimen: Blood Updated: 06/19/24 1738     WBC 3.96 10*3/mm3      RBC 5.06 10*6/mm3      Hemoglobin 14.5 g/dL      Hematocrit 44.0 %      MCV 87.0 fL      MCH 28.7 pg      MCHC 33.0 g/dL      RDW 13.7 %      RDW-SD 43.8 fl      MPV 9.3 fL      Platelets 165 10*3/mm3      Neutrophil % 65.6 %      Lymphocyte % 16.9 %      Monocyte % 10.6 %      Eosinophil % 5.1 %      Basophil % 1.3 %      Immature Grans % 0.5 %      Neutrophils, Absolute 2.60 10*3/mm3      Lymphocytes, Absolute 0.67 10*3/mm3      Monocytes, Absolute 0.42 10*3/mm3      Eosinophils, Absolute 0.20 10*3/mm3      Basophils, Absolute 0.05 10*3/mm3      Immature Grans, Absolute 0.02 10*3/mm3      nRBC 0.0 /100 WBC     Blood Culture With ROD - Blood, Arm, Left [[id number]] Collected: 06/19/24 1720    Specimen: Blood from Arm, Left  Updated: 06/19/24 1736    Respiratory Panel PCR w/COVID-19(SARS-CoV-2) [facility] In-House, NP Swab in UTM/VTM, 2 HR TAT - Swab, Nasopharynx [[id number]] Collected: 06/19/24 1748    Specimen: Swab from Nasopharynx Updated: 06/19/24 1802            CT Angiogram Chest   Final Result   1. No pulmonary embolism identified.   2. Chronic changes with emphysema and partial pneumonectomy on the   right.   3. Bronchial wall thickening could indicate mild bronchitis.       This report was signed and finalized on 6/19/2024 5:56 PM by Cj Bonilla.          XR Chest 1 View   Final Result       1. Postoperative changes and scarring. No active disease is seen in the   chest.       This report was signed and finalized on 6/19/2024 5:02 PM by Cj Bonilla.              ED Course  ED Course as of 06/19/24 1911 Wed Jun 19, 2024   1905 IMPRESSION:  1. No pulmonary embolism identified.  2. Chronic changes with emphysema and partial pneumonectomy on the  right.  3. Bronchial wall thickening could indicate mild bronchitis.     This report was signed and finalized on 6/19/2024 5:56 PM by Cj Bonilla.      [CW]   1905 Procalcitonin 0.06.  CRP is 4.68.  BNP is 36 troponin 6.  CMP is completely unremarkable.  CBC shows no leukocytosis with a white count of 3.96 hemoglobin 14.5 hematocrit 44.  Chest x-ray was unremarkable for anything acute.  CTA of the chest no pulmonary embolus.  Chronic changes with emphysema and partial pneumonectomy on the right.  Bronchial wall thickening which could indicate mild bronchitis.  Pending respiratory panel at present.  Patient is doing much better after DuoNeb treatment and Solu-Medrol.  I reviewed results of testing with the patient and his family.  Patient does have a nebulizer at home.  He is already on doxycycline which she has been on since Monday.  Advised to continue the doxycycline.  Will prescribe steroids, albuterol treatments every 4-6 hours and Phenergan DM cough  syrup.  Advised to increase oral fluids.  Follow-up with primary care provider this week.  Advised to call with respiratory panel results as the lab states is going to take 40 more minutes for results.  Patient is in agreement with the care plan and voices understanding of instructions.  Patient will be discharged shortly in stable condition to follow-up with primary care doctor this week. [CW]   1907 The patient is already on antibiotics, or has taken them within the last 30 days.    [CW]      ED Course User Index  [CW] Sonia Beltran APRN        Medical Decision Making  Patient.  Patient is a 58-year-old male presents emergency department with cough and shortness of breath.  Patient has had a cough for the past 2 weeks.  He states he has been on doxycycline for the past 3 days per his primary care provider without any improvement of symptoms.  Patient states he has been more short of breath today.  He denies any chest pain.  He states he is coughing up yellowish phlegm.  Patient does have a history of adenocarcinoma of the lung.  He finished Keytruda about 2 weeks ago.  He states he had fever 3 days ago.  No nausea or vomiting.  He spoke with his pulmonologist today and they advised him to come the emergency department for further evaluation and treatment.  Course of treatment in the er: Nontoxic-appearing.  No acute distress.  Vitals are stable with a blood pressure 157/81, temp 97.8, heart rate 97, respirations 20, O2 sat 99% on room air.  Lungs expiratory wheezing noted throughout.  Diminished lung sounds left lower lobe.  Laboratory studies including blood cultures and inflammatory markers has been ordered as well as chest x-ray, EKG, troponin, BNP, CTA of the chest to rule out pulmonary embolus as well.  DuoNeb treatment has been ordered as well.  Differential diagnosis to include but not limited to: pneumonia; bronchitis; uri; pulmonary embolus; chf;   Labs Reviewed  C-REACTIVE PROTEIN - Abnormal;  "Notable for the following components:     C-Reactive Protein            4.68 (*)            All other components within normal limits  CBC WITH AUTO DIFFERENTIAL - Abnormal; Notable for the following components:     Lymphocyte %                  16.9 (*)               Lymphocytes, Absolute         0.67 (*)            All other components within normal limits  PROTIME-INR - Normal  PROCALCITONIN - Normal         Narrative: As a Marker for Sepsis (Non-Neonates):                                    1. <0.5 ng/mL represents a low risk of severe sepsis and/or septic shock.                  2. >2 ng/mL represents a high risk of severe sepsis and/or septic shock.                                    As a Marker for Lower Respiratory Tract Infections that require antibiotic therapy:                                    PCT on Admission    Antibiotic Therapy       6-12 Hrs later                                    >0.5                Strongly Recommended                  >0.25 - <0.5        Recommended                   0.1 - 0.25          Discouraged              Remeasure/reassess PCT                  <0.1                Strongly Discouraged     Remeasure/reassess PCT                                    As 28 day mortality risk marker: \"Change in Procalcitonin Result\" (>80% or <=80%) if Day 0 (or Day 1) and Day 4 values are available. Refer to http://www.[facility]-pct-calculator.com                                    Change in PCT <=80%                  A decrease of PCT levels below or equal to 80% defines a positive change in PCT test result representing a higher risk for 28-day all-cause mortality of patients diagnosed with severe sepsis for septic shock.                                    Change in PCT >80%                  A decrease of PCT levels of more than 80% defines a negative change in PCT result representing a lower risk for 28-day all-cause mortality of patients diagnosed with severe sepsis or septic shock.             "         BNP (IN-HOUSE) - Normal         Narrative: This assay is used as an aid in the diagnosis of individuals suspected of having heart failure. It can be used as an aid in the diagnosis of acute decompensated heart failure (ADHF) in patients presenting with signs and symptoms of ADHF to the emergency department (ED). In addition, NT-proBNP of <300 pg/mL indicates ADHF is not likely.                                    Age Range Result Interpretation  NT-proBNP Concentration (pg/mL:                                                      <50             Positive            >450                                   Gray                 300-450                                    Negative             <300                                    50-75           Positive            >900                                  Piña                300-900                                  Negative            <300                                                      >75             Positive            >1800                                  Piña                300-1800                                  Negative            <300  SINGLE HS TROPONIN T - Normal         Narrative: High Sensitive Troponin T Reference Range:                  <14.0 ng/L- Negative Female for AMI                  <22.0 ng/L- Negative Male for AMI                  >=14 - Abnormal Female indicating possible myocardial injury.                  >=22 - Abnormal Male indicating possible myocardial injury.                   Clinicians would have to utilize clinical acumen, EKG, Troponin, and serial changes to determine if it is an Acute Myocardial Infarction or myocardial injury due to an underlying chronic condition.                                       RESPIRATORY PANEL PCR W/ COVID-19 (SARS-COV-2), NP SWAB IN UTM/VTP, 2 HR TAT  BLOOD CULTURE WITH ROD  BLOOD CULTURE WITH ROD  COMPREHENSIVE METABOLIC PANEL         Narrative: GFR Normal >60                  Chronic Kidney  Disease <60                  Kidney Failure <15                    CBC AND DIFFERENTIAL  CT Angiogram Chest   Final Result    1. No pulmonary embolism identified.    2. Chronic changes with emphysema and partial pneumonectomy on the    right.    3. Bronchial wall thickening could indicate mild bronchitis.         This report was signed and finalized on 6/19/2024 5:56 PM by Cj Bonilla.          XR Chest 1 View   Final Result         1. Postoperative changes and scarring. No active disease is seen in the    chest.         This report was signed and finalized on 6/19/2024 5:02 PM by Cj Bonilla.          Procalcitonin 0.06.  CRP is 4.68.  BNP is 36 troponin 6.  CMP is completely unremarkable.  CBC shows no leukocytosis with a white count of 3.96 hemoglobin 14.5 hematocrit 44.  Chest x-ray was unremarkable for anything acute.  CTA of the chest no pulmonary embolus.  Chronic changes with emphysema and partial pneumonectomy on the right.  Bronchial wall thickening which could indicate mild bronchitis.  Pending respiratory panel at present.  Patient is doing much better after DuoNeb treatment and Solu-Medrol.  I reviewed results of testing with the patient and his family.  Patient does have a nebulizer at home.  He is already on doxycycline which she has been on since Monday.  Advised to continue the doxycycline.  Will prescribe steroids, albuterol treatments every 4-6 hours and Phenergan DM cough syrup.  Advised to increase oral fluids.  Follow-up with primary care provider this week.  Advised to call with respiratory panel results as the lab states is going to take 40 more minutes for results.  Patient is in agreement with the care plan and voices understanding of instructions.  Patient will be discharged shortly in stable condition to follow-up with primary care doctor this week.      Amount and/or Complexity of Data Reviewed  Labs: ordered. Decision-making details documented in ED Course.  Radiology:  ordered. Decision-making details documented in ED Course.  ECG/medicine tests: ordered. Decision-making details documented in ED Course.    Risk  Prescription drug management.         Final diagnoses:   Upper respiratory tract infection, unspecified type   Bronchitis          Sonia Beltran, APRN  06/19/24 1911

## 2024-06-19 NOTE — Clinical Note
[facility] EMERGENCY DEPARTMENT  [address]  [facility] [address]  Phone: [phone]    Lalo Jones was seen and treated in our emergency department on 6/19/2024.  He may return to work on 06/22/2024.         Thank you for choosing [facility].    Sonia Beltran APRN

## 2024-06-19 NOTE — TELEPHONE ENCOUNTER
When did your symptoms start? Thursday or Friday    What are your symptoms? Coughing, was coughing up yellow phlegm but now isn't coughing much up. Had fever Monday night. Has a lot of sinus congestion    Have you been treated recently by another provider for this problem?    PCP put him on doxycyline Monday but has not improved, thinks he may have gotten worse.    Have you had any recent testing (ex. COVID or x-ray)? None    Have you had any exposure to anyone sick? No    Are you taking your medication for your breathing as prescribed? Has used everything but neb.    Uses [facility] in [place], [place]

## 2024-06-19 NOTE — TELEPHONE ENCOUNTER
Russel Santana MD Walquist, Emily, MA  Caller: Unspecified (Today,  9:55 AM)  He has if he is having severe bouts of coughing patient needs to go to the ER or urgent care as soon as possible.

## 2024-06-20 ENCOUNTER — CLINICAL DOCUMENTATION (OUTPATIENT)
Dept: HEMATOLOGY | Age: 59
End: 2024-06-20

## 2024-06-20 LAB
QT INTERVAL: 346 MS
QTC INTERVAL: 423 MS

## 2024-06-20 NOTE — DISCHARGE INSTRUCTIONS
Return to ER if symptoms worsen   Continue doxycycline as prescribed  Increase oral fluids  Nebulizer treatments every 4 hours while awake  Follow up with primary care provider this week

## 2024-06-20 NOTE — PROGRESS NOTES
Andrew phoned and left message with status update reporting that he was seen in [facility] ER last night and is now under treatment for rhinovirus.    CTAngiogram Chest 6/19/24 at [facility],  compared to 2/21/24  No pulmonary embolism identified.   Chronic changes with emphysema and partial pneumonectomy on the right.   Bronchial wall thickening could indicate mild bronchitis.

## 2024-06-24 LAB
BACTERIA SPEC AEROBE CULT: NORMAL
BACTERIA SPEC AEROBE CULT: NORMAL

## 2024-06-27 ENCOUNTER — HOSPITAL ENCOUNTER (OUTPATIENT)
Dept: CT IMAGING | Age: 59
Discharge: HOME | End: 2024-06-27
Attending: INTERNAL MEDICINE
Payer: MEDICAID

## 2024-06-27 ENCOUNTER — TELEPHONE (OUTPATIENT)
Dept: HEMATOLOGY | Age: 59
End: 2024-06-27

## 2024-06-27 DIAGNOSIS — M89.9 BONE LESION: ICD-10-CM

## 2024-06-27 DIAGNOSIS — C34.91 ADENOCARCINOMA OF RIGHT LUNG (HCC): ICD-10-CM

## 2024-06-27 DIAGNOSIS — R59.9 ENLARGED LYMPH NODES: ICD-10-CM

## 2024-06-27 PROCEDURE — 71260 CT THORAX DX C+: CPT

## 2024-06-27 PROCEDURE — 6360000004 HC RX CONTRAST MEDICATION: Performed by: INTERNAL MEDICINE

## 2024-06-27 PROCEDURE — 74177 CT ABD & PELVIS W/CONTRAST: CPT

## 2024-06-27 RX ADMIN — IOPAMIDOL 70 ML: 755 INJECTION, SOLUTION INTRAVENOUS at 12:52

## 2024-06-27 NOTE — TELEPHONE ENCOUNTER
Called patient and reminded patient of their appointment on 7/2/2024 and patient confirmed they would be here.

## 2024-06-30 PROBLEM — Z45.2 ENCOUNTER FOR CENTRAL LINE CARE: Status: ACTIVE | Noted: 2024-06-30

## 2024-06-30 RX ORDER — SODIUM CHLORIDE 9 MG/ML
INJECTION, SOLUTION INTRAVENOUS CONTINUOUS
Status: CANCELLED | OUTPATIENT
Start: 2024-07-02

## 2024-06-30 RX ORDER — DIPHENHYDRAMINE HYDROCHLORIDE 50 MG/ML
50 INJECTION INTRAMUSCULAR; INTRAVENOUS
Status: CANCELLED | OUTPATIENT
Start: 2024-07-02

## 2024-06-30 RX ORDER — SODIUM CHLORIDE 9 MG/ML
25 INJECTION, SOLUTION INTRAVENOUS PRN
Status: CANCELLED | OUTPATIENT
Start: 2024-07-02

## 2024-06-30 RX ORDER — ONDANSETRON 2 MG/ML
8 INJECTION INTRAMUSCULAR; INTRAVENOUS
Status: CANCELLED | OUTPATIENT
Start: 2024-07-02

## 2024-06-30 RX ORDER — SODIUM CHLORIDE 0.9 % (FLUSH) 0.9 %
5-40 SYRINGE (ML) INJECTION PRN
Status: CANCELLED | OUTPATIENT
Start: 2024-07-02

## 2024-06-30 RX ORDER — ALBUTEROL SULFATE 90 UG/1
4 AEROSOL, METERED RESPIRATORY (INHALATION) PRN
Status: CANCELLED | OUTPATIENT
Start: 2024-07-02

## 2024-06-30 RX ORDER — HEPARIN SODIUM (PORCINE) LOCK FLUSH IV SOLN 100 UNIT/ML 100 UNIT/ML
500 SOLUTION INTRAVENOUS PRN
Status: CANCELLED | OUTPATIENT
Start: 2024-07-02

## 2024-06-30 RX ORDER — EPINEPHRINE 1 MG/ML
0.3 INJECTION, SOLUTION, CONCENTRATE INTRAVENOUS PRN
Status: CANCELLED | OUTPATIENT
Start: 2024-07-02

## 2024-06-30 RX ORDER — FAMOTIDINE 10 MG/ML
20 INJECTION, SOLUTION INTRAVENOUS
Status: CANCELLED | OUTPATIENT
Start: 2024-07-02

## 2024-06-30 RX ORDER — ACETAMINOPHEN 325 MG/1
650 TABLET ORAL
Status: CANCELLED | OUTPATIENT
Start: 2024-07-02

## 2024-06-30 NOTE — PROGRESS NOTES
to medical oncology. We will continue routine follow-up/surveillance as discussed in 6 months. I have instructed him to continue to see the other health care providers as per their scheduling.  Return in about 6 months (around 11/13/2024).       Presented to [facility] ER 6/19/24; treated for rhinovirus.    CTAngiogram Chest 6/19/24 at [facility],  compared to 2/21/24  No pulmonary embolism identified.   Chronic changes with emphysema and partial pneumonectomy on the right.   Bronchial wall thickening could indicate mild bronchitis.       CT CHEST W CONTRAST 6/27/24 at [facility], compared to Chest CT 02/21/2024, 06/19/2024. PET CT 03/27/2023, 11/01/2022.   Right-sided chest port present   Right upper lobectomy noted. Emphysema. Scattered bullae in the left lung. No suspicious pulmonary nodule identified.   Internal fixation hardware right fifth rib. No discrete osseous lesion detected.   No evidence for recurrent disease in the chest       CT ABDOMEN AND PELVIS WITH CONTRAST on 6/27/24 at [facility], compared to CT chest 06/27/2024   No lymphadenopathy.  1.4 cm fat containing lesion in the left lower quadrant favored to represent fat necrosis.  Diffuse hepatic steatosis.  Colonic diverticulosis.  Postsurgical changes of L4-L5 fusion. Mild degenerative changes of the spine     PET CT SKULL BASE TO MID THIGH on 6/27/24 at [facility] compared to 03/27/2023. CT 06/27/2024   The right pleural mass seen on the prior examination with SUV max of about 3.1 (2.76 with current software) has essentially resolved  The patchy airspace disease with SUV max of up to 3.2 has similarly resolved.   No new significantly FDG avid nodules are seen within the lung fields.     No new significantly FDG avid lymph nodes are seen within the tasha or mediastinum.   The examination demonstrates a generally physiologic distribution of activity in the abdomen and pelvis, included portions of the head and neck, and included portions of the skeleton and

## 2024-07-02 ENCOUNTER — OFFICE VISIT (OUTPATIENT)
Dept: HEMATOLOGY | Age: 59
End: 2024-07-02

## 2024-07-02 ENCOUNTER — HOSPITAL ENCOUNTER (OUTPATIENT)
Dept: INFUSION THERAPY | Age: 59
Discharge: HOME | End: 2024-07-02
Payer: MEDICAID

## 2024-07-02 VITALS
HEART RATE: 90 BPM | TEMPERATURE: 98 F | DIASTOLIC BLOOD PRESSURE: 82 MMHG | BODY MASS INDEX: 27.46 KG/M2 | SYSTOLIC BLOOD PRESSURE: 124 MMHG | HEIGHT: 74 IN | OXYGEN SATURATION: 100 % | WEIGHT: 214 LBS

## 2024-07-02 DIAGNOSIS — R53.83 FATIGUE DUE TO TREATMENT: ICD-10-CM

## 2024-07-02 DIAGNOSIS — C34.91 ADENOCARCINOMA OF RIGHT LUNG (HCC): ICD-10-CM

## 2024-07-02 DIAGNOSIS — Z71.2 ENCOUNTER TO DISCUSS TEST RESULTS: ICD-10-CM

## 2024-07-02 DIAGNOSIS — C34.91 ADENOCARCINOMA OF RIGHT LUNG (HCC): Primary | ICD-10-CM

## 2024-07-02 DIAGNOSIS — Z45.2 ENCOUNTER FOR CENTRAL LINE CARE: Primary | ICD-10-CM

## 2024-07-02 DIAGNOSIS — Z45.2 ENCOUNTER FOR CENTRAL LINE CARE: ICD-10-CM

## 2024-07-02 DIAGNOSIS — C80.1 CANCER (HCC): Primary | ICD-10-CM

## 2024-07-02 DIAGNOSIS — Z51.12 ENCOUNTER FOR ANTINEOPLASTIC IMMUNOTHERAPY: ICD-10-CM

## 2024-07-02 LAB
ALBUMIN SERPL-MCNC: 4.1 G/DL (ref 3.5–5.2)
ALP SERPL-CCNC: 109 U/L (ref 40–130)
ALT SERPL-CCNC: 38 U/L (ref 21–72)
ANION GAP SERPL CALCULATED.3IONS-SCNC: 8 MMOL/L (ref 7–19)
AST SERPL-CCNC: 32 U/L (ref 17–59)
BASOPHILS # BLD: 0.05 K/UL (ref 0.01–0.08)
BASOPHILS NFR BLD: 0.9 % (ref 0.1–1.2)
BILIRUB SERPL-MCNC: 0.7 MG/DL (ref 0.2–1.3)
BUN SERPL-MCNC: 15 MG/DL (ref 9–20)
CALCIUM SERPL-MCNC: 9.8 MG/DL (ref 8.4–10.2)
CHLORIDE SERPL-SCNC: 106 MMOL/L (ref 98–111)
CO2 SERPL-SCNC: 27 MMOL/L (ref 22–29)
CORTIS AM PEAK SERPL-MCNC: 8.2 UG/DL (ref 6.2–19.4)
CREAT SERPL-MCNC: 1 MG/DL (ref 0.6–1.2)
EOSINOPHIL # BLD: 0.14 K/UL (ref 0.04–0.54)
EOSINOPHIL NFR BLD: 2.4 % (ref 0.7–7)
ERYTHROCYTE [DISTWIDTH] IN BLOOD BY AUTOMATED COUNT: 13.6 % (ref 11.6–14.4)
GLOBULIN: 2.4 G/DL
GLUCOSE SERPL-MCNC: 80 MG/DL (ref 74–106)
HCT VFR BLD AUTO: 42.5 % (ref 40.1–51)
HGB BLD-MCNC: 14.5 G/DL (ref 13.7–17.5)
LYMPHOCYTES # BLD: 0.7 K/UL (ref 1.18–3.74)
LYMPHOCYTES NFR BLD: 12 % (ref 19.3–53.1)
MCH RBC QN AUTO: 29.5 PG (ref 25.7–32.2)
MCHC RBC AUTO-ENTMCNC: 34.1 G/DL (ref 32.3–36.5)
MCV RBC AUTO: 86.6 FL (ref 79–92.2)
MONOCYTES # BLD: 0.44 K/UL (ref 0.24–0.82)
MONOCYTES NFR BLD: 7.5 % (ref 4.7–12.5)
NEUTROPHILS # BLD: 4.47 K/UL (ref 1.56–6.13)
NEUTS SEG NFR BLD: 76.7 % (ref 34–71.1)
PLATELET # BLD AUTO: 173 K/UL (ref 163–337)
PMV BLD AUTO: 9.1 FL (ref 7.4–10.4)
POTASSIUM SERPL-SCNC: 4.1 MMOL/L (ref 3.5–5.1)
PROT SERPL-MCNC: 6.5 G/DL (ref 6.3–8.2)
RBC # BLD AUTO: 4.91 M/UL (ref 4.63–6.08)
SODIUM SERPL-SCNC: 141 MMOL/L (ref 137–145)
TSH SERPL DL<=0.005 MIU/L-ACNC: 5.8 UIU/ML (ref 0.27–4.2)
WBC # BLD AUTO: 5.83 K/UL (ref 4.23–9.07)

## 2024-07-02 PROCEDURE — 85025 COMPLETE CBC W/AUTO DIFF WBC: CPT

## 2024-07-02 PROCEDURE — 6360000002 HC RX W HCPCS: Performed by: INTERNAL MEDICINE

## 2024-07-02 PROCEDURE — NBSRV NON-BILLABLE SERVICE: Performed by: INTERNAL MEDICINE

## 2024-07-02 PROCEDURE — 80053 COMPREHEN METABOLIC PANEL: CPT

## 2024-07-02 PROCEDURE — 99215 OFFICE O/P EST HI 40 MIN: CPT | Performed by: INTERNAL MEDICINE

## 2024-07-02 PROCEDURE — 36415 COLL VENOUS BLD VENIPUNCTURE: CPT

## 2024-07-02 PROCEDURE — 99213 OFFICE O/P EST LOW 20 MIN: CPT

## 2024-07-02 PROCEDURE — 2580000003 HC RX 258: Performed by: INTERNAL MEDICINE

## 2024-07-02 PROCEDURE — 96523 IRRIG DRUG DELIVERY DEVICE: CPT

## 2024-07-02 RX ORDER — ALBUTEROL SULFATE 90 UG/1
4 AEROSOL, METERED RESPIRATORY (INHALATION) PRN
OUTPATIENT
Start: 2024-07-02

## 2024-07-02 RX ORDER — SODIUM CHLORIDE 0.9 % (FLUSH) 0.9 %
5-40 SYRINGE (ML) INJECTION PRN
Status: DISCONTINUED | OUTPATIENT
Start: 2024-07-02 | End: 2024-07-03 | Stop reason: HOSPADM

## 2024-07-02 RX ORDER — SODIUM CHLORIDE 9 MG/ML
25 INJECTION, SOLUTION INTRAVENOUS PRN
OUTPATIENT
Start: 2024-07-02

## 2024-07-02 RX ORDER — ACETAMINOPHEN 325 MG/1
650 TABLET ORAL
OUTPATIENT
Start: 2024-07-02

## 2024-07-02 RX ORDER — SODIUM CHLORIDE 9 MG/ML
25 INJECTION, SOLUTION INTRAVENOUS PRN
Status: DISCONTINUED | OUTPATIENT
Start: 2024-07-02 | End: 2024-07-03 | Stop reason: HOSPADM

## 2024-07-02 RX ORDER — EPINEPHRINE 1 MG/ML
0.3 INJECTION, SOLUTION, CONCENTRATE INTRAVENOUS PRN
OUTPATIENT
Start: 2024-07-02

## 2024-07-02 RX ORDER — HEPARIN 100 UNIT/ML
500 SYRINGE INTRAVENOUS PRN
Status: DISCONTINUED | OUTPATIENT
Start: 2024-07-02 | End: 2024-07-03 | Stop reason: HOSPADM

## 2024-07-02 RX ORDER — FAMOTIDINE 10 MG/ML
20 INJECTION, SOLUTION INTRAVENOUS
OUTPATIENT
Start: 2024-07-02

## 2024-07-02 RX ORDER — ONDANSETRON 2 MG/ML
8 INJECTION INTRAMUSCULAR; INTRAVENOUS
OUTPATIENT
Start: 2024-07-02

## 2024-07-02 RX ORDER — DIPHENHYDRAMINE HYDROCHLORIDE 50 MG/ML
50 INJECTION INTRAMUSCULAR; INTRAVENOUS
OUTPATIENT
Start: 2024-07-02

## 2024-07-02 RX ORDER — SODIUM CHLORIDE 0.9 % (FLUSH) 0.9 %
5-40 SYRINGE (ML) INJECTION PRN
OUTPATIENT
Start: 2024-07-02

## 2024-07-02 RX ORDER — SODIUM CHLORIDE 9 MG/ML
INJECTION, SOLUTION INTRAVENOUS CONTINUOUS
OUTPATIENT
Start: 2024-07-02

## 2024-07-02 RX ORDER — HEPARIN 100 UNIT/ML
500 SYRINGE INTRAVENOUS PRN
OUTPATIENT
Start: 2024-07-02

## 2024-07-02 RX ADMIN — HEPARIN 500 UNITS: 100 SYRINGE at 09:26

## 2024-07-02 RX ADMIN — SODIUM CHLORIDE, PRESERVATIVE FREE 10 ML: 5 INJECTION INTRAVENOUS at 09:25

## 2024-07-02 NOTE — PROGRESS NOTES
DEANNE Miller completed Survivorship Care Plan visit with patient.  SW introduced self and explained role and source of support. SW provided and reviewed Survivorship Care Plan Packet. Patient given the opportunity to ask questions and answered these appropriately.  SW provided support through active listening and discussion. Patient denies needs or concerns currently. SW encouraged the patient to call if assistance is needed in the future.

## 2024-07-08 ENCOUNTER — TELEPHONE (OUTPATIENT)
Dept: SURGERY | Facility: CLINIC | Age: 59
End: 2024-07-08

## 2024-07-08 NOTE — TELEPHONE ENCOUNTER
Attempted to contact the patient regarding their missed appointment today. I left them a voicemail with our office number and requested they call us back at their earliest convenience to get rescheduled.    CBC  7/08

## 2024-08-12 ENCOUNTER — OFFICE VISIT (OUTPATIENT)
Dept: SURGERY | Facility: CLINIC | Age: 59
End: 2024-08-12
Payer: MEDICAID

## 2024-08-12 VITALS
OXYGEN SATURATION: 98 % | HEIGHT: 74 IN | HEART RATE: 74 BPM | WEIGHT: 215 LBS | DIASTOLIC BLOOD PRESSURE: 97 MMHG | SYSTOLIC BLOOD PRESSURE: 143 MMHG | BODY MASS INDEX: 27.59 KG/M2

## 2024-08-12 DIAGNOSIS — Z95.828 PORT-A-CATH IN PLACE: Primary | ICD-10-CM

## 2024-08-12 PROCEDURE — 99214 OFFICE O/P EST MOD 30 MIN: CPT

## 2024-08-12 PROCEDURE — 1160F RVW MEDS BY RX/DR IN RCRD: CPT

## 2024-08-12 PROCEDURE — 1159F MED LIST DOCD IN RCRD: CPT

## 2024-08-12 NOTE — H&P (VIEW-ONLY)
Office New Patient History and Physical:     Referring Provider: No ref. provider found    Chief Complaint   Patient presents with    port removal       Subjective .     History of present illness:  Lalo Jones Sr. is a 58 y.o. male who presents to the clinic for discussion of port removal.  Dr. Bonilla placed his port on 12/30/2022.  He has had no issues with send port since.  He sees Dr. Tadeo and has been given the clear to have the port removed as he is done with treatment at this time.      BMI is 27.60. He is a nonsmoker. He does not take any blood thinners.     Review of Systems    Review of Systems - General ROS: negative  ENT ROS: negative  Respiratory ROS: no cough, shortness of breath, or wheezing  Cardiovascular ROS: no chest pain or dyspnea on exertion  Gastrointestinal ROS: no abdominal pain, change in bowel habits, or black or bloody stools  Genito-Urinary ROS: no dysuria, trouble voiding, or hematuria  Dermatological ROS: negative   Breast ROS: negative for breast lumps  Hematological and Lymphatic ROS: negative  Musculoskeletal ROS: negative   Neurological ROS: no TIA or stroke symptoms    Psychological ROS: negative  Endocrine ROS: negative    History  Past Medical History:   Diagnosis Date    Abnormal PET scan of lung     Nov 2022    Allergic rhinitis     Arthritis     Atrial fibrillation     Bronchiectasis     Bronchitis     Cancer     prostate    Chronic bronchitis     Chronic pain     COPD (chronic obstructive pulmonary disease)     GERD (gastroesophageal reflux disease)     Lung cancer     Pneumonia     Prostatitis, acute     S/P ACL reconstruction     Septic shock due to urinary tract infection     Sinusitis     Strep throat     UTI (urinary tract infection)    ,   Past Surgical History:   Procedure Laterality Date    ANKLE SURGERY Right     BACK SURGERY      X2    ENDOSCOPY N/A 01/10/2022    Procedure: ESOPHAGOGASTRODUODENOSCOPY WITH ANESTHESIA;  Surgeon: Tucker Bright MD;   Location:  [facility] OR;  Service: Gastroenterology;  Laterality: N/A;  pre food bolus  post food bolus  Dr. PAULINO ACL RECONSTRUCTION Left     NASAL SEPTUM SURGERY      PENILE PROSTHESIS IMPLANT N/A 03/15/2022    Procedure: 3-PIECE INFLATABLE PENILE PROSTHESIS PLACEMENT;  Surgeon: Trae Clark MD;  Location:  [facility] OR;  Service: Urology;  Laterality: N/A;    PROSTATE ULTRASOUND BIOPSY N/A 2020    Procedure: PROSTATE  BIOPSY;  Surgeon: Trae Clark MD;  Location:  [facility];  Service: Urology;  Laterality: N/A;    PROSTATECTOMY N/A 2020    Procedure: RADICAL PROSTATECTOMY LAPAROSCOPIC WITH DAVINCI ROBOT;  Surgeon: Nuno Jj MD;  Location:  [facility];  Service: DaVinci;  Laterality: N/A;    SHOULDER SURGERY Left     X 4    TENNIS ELBOW RELEASE Bilateral 2011    THORACOTOMY Right 2023    Procedure: RIGHT THORACOTOMY WITH CHEST WALL RESECTION, MEDIASTINAL LYMPH NODE DISSECTION, RIGHT UPPER LOBECTOMY;  Surgeon: Goldy Carlin MD;  Location:  [facility] OR;  Service: Cardiothoracic;  Laterality: Right;    VENOUS ACCESS DEVICE (PORT) INSERTION N/A 2022    Procedure: Single Lumen Port-a-cath insertion with flouroscopy;  Surgeon: Kim Bonilla MD;  Location:  [facility];  Service: General;  Laterality: N/A;   ,   Family History   Problem Relation Age of Onset    Cancer Mother    ,   Social History     Tobacco Use    Smoking status: Former     Current packs/day: 0.00     Average packs/day: 1 pack/day for 35.8 years (35.8 ttl pk-yrs)     Types: Cigarettes     Start date: 1987     Quit date: 2022     Years since quittin.6     Passive exposure: Past    Smokeless tobacco: Former     Types: Snuff     Quit date:    Vaping Use    Vaping status: Former   Substance Use Topics    Alcohol use: No     Comment: 0    Drug use: Not Currently     Frequency: 7.0 times per week     Types: Hydrocodone, Marijuana, Oxycodone     Comment: Edible gummies - 1 per day   , (Not in a  "hospital admission)   and Allergies:  Adhesive tape    Current Outpatient Medications:     Azelastine HCl 137 MCG/SPRAY solution, 2 sprays into the nostril(s) as directed by provider 2 (Two) Times a Day., Disp: 30 mL, Rfl: 5    cetirizine (zyrTEC) 10 MG tablet, Take 1 tablet by mouth Daily., Disp: 90 tablet, Rfl: 3    esomeprazole (nexIUM) 40 MG capsule, Take 1 capsule by mouth Every Morning Before Breakfast., Disp: , Rfl:     Fluticasone-Salmeterol (ADVAIR/WIXELA) 250-50 MCG/ACT DISKUS, Inhale 1 puff 2 (Two) Times a Day., Disp: 1 each, Rfl: 5    gabapentin (NEURONTIN) 800 MG tablet, Take 1 tablet by mouth 3 (Three) Times a Day., Disp: , Rfl:     hydrOXYzine pamoate (VISTARIL) 50 MG capsule, Take 1 capsule by mouth Daily., Disp: , Rfl:     montelukast (SINGULAIR) 10 MG tablet, Take 1 tablet by mouth Every Night., Disp: 90 tablet, Rfl: 3    multivitamin with minerals tablet tablet, Take 1 tablet by mouth Daily., Disp: , Rfl:     NON FORMULARY, Take 1 tablet by mouth Daily. Marijuana Gummies, Disp: , Rfl:     Ventolin  (90 Base) MCG/ACT inhaler, INHALE TWO PUFFS BY MOUTH EVERY FOUR HOURS AS NEEDED FOR WHEEZING AND FOR SHORTNESS OF BREATH, Disp: 18 g, Rfl: 5  No current facility-administered medications for this visit.    Objective     Vital Signs   /97   Pulse 74   Ht 188 cm (74\")   Wt 97.5 kg (215 lb)   SpO2 98%   BMI 27.60 kg/m²      Physical Exam:  General appearance - alert, well appearing, and in no distress  Mental status - normal mood, behavior, speech, dress, motor activity, and thought processes  Eyes - sclera anicteric  Neck - supple, no significant adenopathy  Chest - no tachypnea, retractions or cyanosis; port-a-cath in place in R cephalic vein  Heart - normal rate and regular rhythm  Neurological - alert, oriented, normal speech, no focal findings or movement disorder noted  Extremities - no pedal edema noted  Skin - normal coloration and turgor, no rashes, no suspicious skin lesions " noted    Results Review:  Result Review :            Assessment & Plan       Diagnoses and all orders for this visit:    1. Port-A-Cath in place (Primary)  -     Case Request; Standing  -     ceFAZolin (ANCEF) 2,000 mg in sodium chloride 0.9 % 100 mL IVPB  -     Case Request    Other orders  -     Follow Anesthesia Guidelines / Protocol; Future  -     Follow Anesthesia Guidelines / Protocol; Standing  -     Verify / Perform Chlorhexidine Skin Prep; Standing  -     Obtain Informed Consent; Future  -     Provide NPO Instructions to Patient; Future  -     Chlorhexidine Skin Prep; Future  -     Notify physician (specify); Standing  -     Instructions on coughing, deep breathing, and incentive spirometry.; Standing  -     Oxygen Therapy-; Standing  -     Place Sequential Compression Device; Standing  -     Maintain Sequential Compression Device; Standing         Lalo Jones Sr. is a 58 y.o. male with a need for port removal. After a discussion of risks (including bleeding, damage to surrounding structures including the arteries) and benefits, the patient wishes to proceed with port removal. The patient is currently scheduled for this procedure on 8/30/24 at 12:00pm, with arrival at 10:00am that morning.     I also discussed with the patient post-operative pain management including multimodal pain control utilizing Tylenol, ibuprofen, and tramadol for breakthrough pain. I will plan to given the patient 5 tabs of 50mg Ultram post-operatively for break through pain.     Follow up:     BMI is >= 25 and <30. (Overweight) The following options were offered after discussion;: weight loss educational material (shared in after visit summary)    Return if symptoms worsen or fail to improve.        Denae Garcia PA-C  08/12/24  11:01 CDT

## 2024-08-15 ENCOUNTER — TELEPHONE (OUTPATIENT)
Dept: PULMONOLOGY | Facility: CLINIC | Age: 59
End: 2024-08-15
Payer: MEDICAID

## 2024-08-15 NOTE — TELEPHONE ENCOUNTER
Called patient to reschedule their no show appointment that was originally scheduled on 08/06/2024 .      Patient's appointment has been rescheduled.   [facility] No Show Policy was discussed and understood per patient.

## 2024-08-23 ENCOUNTER — PRE-ADMISSION TESTING (OUTPATIENT)
Dept: PREADMISSION TESTING | Facility: HOSPITAL | Age: 59
End: 2024-08-23
Payer: MEDICAID

## 2024-08-23 VITALS
DIASTOLIC BLOOD PRESSURE: 78 MMHG | BODY MASS INDEX: 27.44 KG/M2 | OXYGEN SATURATION: 96 % | HEIGHT: 74 IN | SYSTOLIC BLOOD PRESSURE: 113 MMHG | RESPIRATION RATE: 18 BRPM | HEART RATE: 91 BPM | WEIGHT: 213.85 LBS

## 2024-08-23 LAB
ANION GAP SERPL CALCULATED.3IONS-SCNC: 9 MMOL/L (ref 5–15)
BUN SERPL-MCNC: 15 MG/DL (ref 6–20)
BUN/CREAT SERPL: 13.3 (ref 7–25)
CALCIUM SPEC-SCNC: 9.9 MG/DL (ref 8.6–10.5)
CHLORIDE SERPL-SCNC: 104 MMOL/L (ref 98–107)
CO2 SERPL-SCNC: 28 MMOL/L (ref 22–29)
CREAT SERPL-MCNC: 1.13 MG/DL (ref 0.76–1.27)
DEPRECATED RDW RBC AUTO: 42.3 FL (ref 37–54)
EGFRCR SERPLBLD CKD-EPI 2021: 76.3 ML/MIN/1.73
ERYTHROCYTE [DISTWIDTH] IN BLOOD BY AUTOMATED COUNT: 13.5 % (ref 12.3–15.4)
GLUCOSE SERPL-MCNC: 95 MG/DL (ref 65–99)
HCT VFR BLD AUTO: 43.4 % (ref 37.5–51)
HGB BLD-MCNC: 15.1 G/DL (ref 13–17.7)
MCH RBC QN AUTO: 29.9 PG (ref 26.6–33)
MCHC RBC AUTO-ENTMCNC: 34.8 G/DL (ref 31.5–35.7)
MCV RBC AUTO: 85.9 FL (ref 79–97)
PLATELET # BLD AUTO: 176 10*3/MM3 (ref 140–450)
PMV BLD AUTO: 9.1 FL (ref 6–12)
POTASSIUM SERPL-SCNC: 4.3 MMOL/L (ref 3.5–5.2)
RBC # BLD AUTO: 5.05 10*6/MM3 (ref 4.14–5.8)
SODIUM SERPL-SCNC: 141 MMOL/L (ref 136–145)
WBC NRBC COR # BLD AUTO: 4.72 10*3/MM3 (ref 3.4–10.8)

## 2024-08-23 PROCEDURE — 36415 COLL VENOUS BLD VENIPUNCTURE: CPT

## 2024-08-23 PROCEDURE — 85027 COMPLETE CBC AUTOMATED: CPT

## 2024-08-23 PROCEDURE — 80048 BASIC METABOLIC PNL TOTAL CA: CPT

## 2024-08-23 RX ORDER — IBUPROFEN 800 MG/1
TABLET, FILM COATED ORAL
COMMUNITY

## 2024-08-23 RX ORDER — GINSENG 100 MG
CAPSULE ORAL DAILY
COMMUNITY

## 2024-08-23 NOTE — DISCHARGE INSTRUCTIONS
Preparing for Surgery  Follow these instructions before the procedure:  Several days or weeks before your procedure    Ask your health care provider about:  Changing or stopping your regular medicines. This is especially important if you are taking diabetes medicines or blood thinners.  Taking medicines such as aspirin and ibuprofen. These medicines can thin your blood. Do not take these medicines unless your health care provider tells you to take them.  Taking over-the-counter medicines, vitamins, herbs, and supplements.    Contact your surgeon if you:  Develop a fever of more than 100.4°F (38°C) or other feelings of illness during the 48 hours before your surgery.  Have symptoms that get worse.  Have questions or concerns about your surgery.  If you are going home the same day of your surgery you will need to arrange for a responsible adult, age 18 years old or older, to drive you home from the hospital and stay with you for 24 hours. Verification of the  will be made prior to any procedure requiring sedation. You may not go home in a taxi or any form of public transportation by yourself.     Day before your procedure  24 hours before your procedure DO NOT drink alcoholic beverages or smoke.  24 hours before your procedure STOP taking Erectile Dysfunction medication (i.e.,Cialis, Viagra)   You may be asked to shower with a germ-killing soap.  Day of your procedure   You may take the following medication(s) the morning of surgery with a sip of water: ESOMEPRAZOLE, GABAPENTIN      8 hours before your scheduled arrival time, STOP all food, any dairy products, and full liquids. This includes hard candy, chewing gum or mints. This is extremely important to prevent serious complications.     Up to 2 hours before your scheduled arrival time, you may have clear liquids no cream, powder, or pulp of any kind. Safe options are water, black coffee, plain tea, soda, Gatorade/Powerade, clear broth, apple juice.    2 hours  before your scheduled arrival time, STOP drinking clear liquids.    You may need to take another shower with a germ-killing soap before you leave home in the morning. Do not use perfumes, colognes, or body lotions.  Wear comfortable loose-fitting clothing.  Remove all jewelry including body piercing and rings, dark colored nail polish, and make up prior to arrival at the hospital. Leave all valuables at home.   Bring your hearing aids if you rely on them.  Do not wear contact lenses. If you wear eyeglasses remember to bring a case to store them in while you are in surgery.  Do not use denture adhesives since you will be asked to remove them during your surgery.    You do not need to bring your home medications into the hospital.   Bring your sleep apnea device with you on the day of your surgery (if this applies to you).  If you wear portable oxygen, bring it with you.   If you are staying overnight, you may bring a bag of items you may need such as slippers, robe and a change of clothes for your discharge. You may want to leave these items in the car until you are ready for them since your family will take your belongings when you leave the pre-operative area.  Arrive at the hospital as scheduled by the office. You will be asked to arrive 2 hours prior to your surgery time in order to prepare for your procedure.  When you arrive at the hospital  Go to the registration desk located at the main entrance of the hospital.  After registration is completed, you will be given a beeper and a sticker sheet. Take the stickers to Outpatient Surgery and place in the tray at the end of the desk to notify the staff that you have arrived and registered.   Return to the lobby to wait. You are not always called back according to the time of arrival but rather the time your doctor will be ready.  When your beeper lights up and vibrates proceed through the double doors, under the stairs, and a member of the Outpatient Surgery staff  will escort you to your preoperative room.     How to Use Chlorhexidine Before Surgery  Chlorhexidine gluconate (CHG) is a germ-killing (antiseptic) solution that is used to clean the skin. It can get rid of the bacteria that normally live on the skin and can keep them away for about 24 hours. To clean your skin with CHG, you may be given:  A CHG solution to use in the shower or as part of a sponge bath.  A prepackaged cloth that contains CHG.  Cleaning your skin with CHG may help lower the risk for infection:  While you are staying in the intensive care unit of the hospital.  If you have a vascular access, such as a central line, to provide short-term or long-term access to your veins.  If you have a catheter to drain urine from your bladder.  If you are on a ventilator. A ventilator is a machine that helps you breathe by moving air in and out of your lungs.  After surgery.  What are the risks?  Risks of using CHG include:  A skin reaction.  Hearing loss, if CHG gets in your ears and you have a perforated eardrum.  Eye injury, if CHG gets in your eyes and is not rinsed out.  The CHG product catching fire.  Make sure that you avoid smoking and flames after applying CHG to your skin.  Do not use CHG:  If you have a chlorhexidine allergy or have previously reacted to chlorhexidine.  On babies younger than 2 months of age.  How to use CHG solution  Use CHG only as told by your health care provider, and follow the instructions on the label.  Use the full amount of CHG as directed. Usually, this is one bottle.  During a shower    Follow these steps when using CHG solution during a shower (unless your health care provider gives you different instructions):  Start the shower.  Use your normal soap and shampoo to wash your face and hair.  Turn off the shower or move out of the shower stream.  Pour the CHG onto a clean washcloth. Do not use any type of brush or rough-edged sponge.  Starting at your neck, lather your body  down to your toes. Make sure you follow these instructions:  If you will be having surgery, pay special attention to the part of your body where you will be having surgery. Scrub this area for at least 1 minute.  Do not use CHG on your head or face. If the solution gets into your ears or eyes, rinse them well with water.  Avoid your genital area.  Avoid any areas of skin that have broken skin, cuts, or scrapes.  Scrub your back and under your arms. Make sure to wash skin folds.  Let the lather sit on your skin for 1-2 minutes or as long as told by your health care provider.  Thoroughly rinse your entire body in the shower. Make sure that all body creases and crevices are rinsed well.  Dry off with a clean towel. Do not put any substances on your body afterward--such as powder, lotion, or perfume--unless you are told to do so by your health care provider. Only use lotions that are recommended by the .  Put on clean clothes or pajamas.  If it is the night before your surgery, sleep in clean sheets.     During a sponge bath  Follow these steps when using CHG solution during a sponge bath (unless your health care provider gives you different instructions):  Use your normal soap and shampoo to wash your face and hair.  Pour the CHG onto a clean washcloth.  Starting at your neck, lather your body down to your toes. Make sure you follow these instructions:  If you will be having surgery, pay special attention to the part of your body where you will be having surgery. Scrub this area for at least 1 minute.  Do not use CHG on your head or face. If the solution gets into your ears or eyes, rinse them well with water.  Avoid your genital area.  Avoid any areas of skin that have broken skin, cuts, or scrapes.  Scrub your back and under your arms. Make sure to wash skin folds.  Let the lather sit on your skin for 1-2 minutes or as long as told by your health care provider.  Using a different clean, wet washcloth,  thoroughly rinse your entire body. Make sure that all body creases and crevices are rinsed well.  Dry off with a clean towel. Do not put any substances on your body afterward--such as powder, lotion, or perfume--unless you are told to do so by your health care provider. Only use lotions that are recommended by the .  Put on clean clothes or pajamas.  If it is the night before your surgery, sleep in clean sheets.  How to use CHG prepackaged cloths  Only use CHG cloths as told by your health care provider, and follow the instructions on the label.  Use the CHG cloth on clean, dry skin.  Do not use the CHG cloth on your head or face unless your health care provider tells you to.  When washing with the CHG cloth:  Avoid your genital area.  Avoid any areas of skin that have broken skin, cuts, or scrapes.  Before surgery    Follow these steps when using a CHG cloth to clean before surgery (unless your health care provider gives you different instructions):  Using the CHG cloth, vigorously scrub the part of your body where you will be having surgery. Scrub using a back-and-forth motion for 3 minutes. The area on your body should be completely wet with CHG when you are done scrubbing.  Do not rinse. Discard the cloth and let the area air-dry. Do not put any substances on the area afterward, such as powder, lotion, or perfume.  Put on clean clothes or pajamas.  If it is the night before your surgery, sleep in clean sheets.     For general bathing  Follow these steps when using CHG cloths for general bathing (unless your health care provider gives you different instructions).  Use a separate CHG cloth for each area of your body. Make sure you wash between any folds of skin and between your fingers and toes. Wash your body in the following order, switching to a new cloth after each step:  The front of your neck, shoulders, and chest.  Both of your arms, under your arms, and your hands.  Your stomach and groin area,  avoiding the genitals.  Your right leg and foot.  Your left leg and foot.  The back of your neck, your back, and your buttocks.  Do not rinse. Discard the cloth and let the area air-dry. Do not put any substances on your body afterward--such as powder, lotion, or perfume--unless you are told to do so by your health care provider. Only use lotions that are recommended by the .  Put on clean clothes or pajamas.  Contact a health care provider if:  Your skin gets irritated after scrubbing.  You have questions about using your solution or cloth.  You swallow any chlorhexidine. Call your local poison control center (1-919.174.6850 in the [place]  Get help right away if:  Your eyes itch badly, or they become very red or swollen.  Your skin itches badly and is red or swollen.  Your hearing changes.  You have trouble seeing.  You have swelling or tingling in your mouth or throat.  You have trouble breathing.  These symptoms may represent a serious problem that is an emergency. Do not wait to see if the symptoms will go away. Get medical help right away. Call your local emergency services (432 in the [place] Do not drive yourself to the hospital.  Summary  Chlorhexidine gluconate (CHG) is a germ-killing (antiseptic) solution that is used to clean the skin. Cleaning your skin with CHG may help to lower your risk for infection.  You may be given CHG to use for bathing. It may be in a bottle or in a prepackaged cloth to use on your skin. Carefully follow your health care provider's instructions and the instructions on the product label.  Do not use CHG if you have a chlorhexidine allergy.  Contact your health care provider if your skin gets irritated after scrubbing.  This information is not intended to replace advice given to you by your health care provider. Make sure you discuss any questions you have with your health care provider.  Document Revised: 04/17/2023 Document Reviewed: 02/28/2022  Elsevier Patient  Education © 2023 [employer]        How to Use Chlorhexidine Before Surgery  Chlorhexidine gluconate (CHG) is a germ-killing (antiseptic) solution that is used to clean the skin. It can get rid of the bacteria that normally live on the skin and can keep them away for about 24 hours. To clean your skin with CHG, you may be given:  A CHG solution to use in the shower or as part of a sponge bath.  A prepackaged cloth that contains CHG.  Cleaning your skin with CHG may help lower the risk for infection:  While you are staying in the intensive care unit of the hospital.  If you have a vascular access, such as a central line, to provide short-term or long-term access to your veins.  If you have a catheter to drain urine from your bladder.  If you are on a ventilator. A ventilator is a machine that helps you breathe by moving air in and out of your lungs.  After surgery.  What are the risks?  Risks of using CHG include:  A skin reaction.  Hearing loss, if CHG gets in your ears and you have a perforated eardrum.  Eye injury, if CHG gets in your eyes and is not rinsed out.  The CHG product catching fire.  Make sure that you avoid smoking and flames after applying CHG to your skin.  Do not use CHG:  If you have a chlorhexidine allergy or have previously reacted to chlorhexidine.  On babies younger than 2 months of age.  How to use CHG solution  Use CHG only as told by your health care provider, and follow the instructions on the label.  Use the full amount of CHG as directed. Usually, this is one bottle.  During a shower    Follow these steps when using CHG solution during a shower (unless your health care provider gives you different instructions):  Start the shower.  Use your normal soap and shampoo to wash your face and hair.  Turn off the shower or move out of the shower stream.  Pour the CHG onto a clean washcloth. Do not use any type of brush or rough-edged sponge.  Starting at your neck, lather your body down to your  toes. Make sure you follow these instructions:  If you will be having surgery, pay special attention to the part of your body where you will be having surgery. Scrub this area for at least 1 minute.  Do not use CHG on your head or face. If the solution gets into your ears or eyes, rinse them well with water.  Avoid your genital area.  Avoid any areas of skin that have broken skin, cuts, or scrapes.  Scrub your back and under your arms. Make sure to wash skin folds.  Let the lather sit on your skin for 1-2 minutes or as long as told by your health care provider.  Thoroughly rinse your entire body in the shower. Make sure that all body creases and crevices are rinsed well.  Dry off with a clean towel. Do not put any substances on your body afterward--such as powder, lotion, or perfume--unless you are told to do so by your health care provider. Only use lotions that are recommended by the .  Put on clean clothes or pajamas.  If it is the night before your surgery, sleep in clean sheets.     During a sponge bath  Follow these steps when using CHG solution during a sponge bath (unless your health care provider gives you different instructions):  Use your normal soap and shampoo to wash your face and hair.  Pour the CHG onto a clean washcloth.  Starting at your neck, lather your body down to your toes. Make sure you follow these instructions:  If you will be having surgery, pay special attention to the part of your body where you will be having surgery. Scrub this area for at least 1 minute.  Do not use CHG on your head or face. If the solution gets into your ears or eyes, rinse them well with water.  Avoid your genital area.  Avoid any areas of skin that have broken skin, cuts, or scrapes.  Scrub your back and under your arms. Make sure to wash skin folds.  Let the lather sit on your skin for 1-2 minutes or as long as told by your health care provider.  Using a different clean, wet washcloth, thoroughly rinse  your entire body. Make sure that all body creases and crevices are rinsed well.  Dry off with a clean towel. Do not put any substances on your body afterward--such as powder, lotion, or perfume--unless you are told to do so by your health care provider. Only use lotions that are recommended by the .  Put on clean clothes or pajamas.  If it is the night before your surgery, sleep in clean sheets.  How to use CHG prepackaged cloths  Only use CHG cloths as told by your health care provider, and follow the instructions on the label.  Use the CHG cloth on clean, dry skin.  Do not use the CHG cloth on your head or face unless your health care provider tells you to.  When washing with the CHG cloth:  Avoid your genital area.  Avoid any areas of skin that have broken skin, cuts, or scrapes.  Before surgery    Follow these steps when using a CHG cloth to clean before surgery (unless your health care provider gives you different instructions):  Using the CHG cloth, vigorously scrub the part of your body where you will be having surgery. Scrub using a back-and-forth motion for 3 minutes. The area on your body should be completely wet with CHG when you are done scrubbing.  Do not rinse. Discard the cloth and let the area air-dry. Do not put any substances on the area afterward, such as powder, lotion, or perfume.  Put on clean clothes or pajamas.  If it is the night before your surgery, sleep in clean sheets.     For general bathing  Follow these steps when using CHG cloths for general bathing (unless your health care provider gives you different instructions).  Use a separate CHG cloth for each area of your body. Make sure you wash between any folds of skin and between your fingers and toes. Wash your body in the following order, switching to a new cloth after each step:  The front of your neck, shoulders, and chest.  Both of your arms, under your arms, and your hands.  Your stomach and groin area, avoiding the  genitals.  Your right leg and foot.  Your left leg and foot.  The back of your neck, your back, and your buttocks.  Do not rinse. Discard the cloth and let the area air-dry. Do not put any substances on your body afterward--such as powder, lotion, or perfume--unless you are told to do so by your health care provider. Only use lotions that are recommended by the .  Put on clean clothes or pajamas.  Contact a health care provider if:  Your skin gets irritated after scrubbing.  You have questions about using your solution or cloth.  You swallow any chlorhexidine. Call your local poison control center (1-507.924.2862 in the [place]  Get help right away if:  Your eyes itch badly, or they become very red or swollen.  Your skin itches badly and is red or swollen.  Your hearing changes.  You have trouble seeing.  You have swelling or tingling in your mouth or throat.  You have trouble breathing.  These symptoms may represent a serious problem that is an emergency. Do not wait to see if the symptoms will go away. Get medical help right away. Call your local emergency services (541 in the [place] Do not drive yourself to the hospital.  Summary  Chlorhexidine gluconate (CHG) is a germ-killing (antiseptic) solution that is used to clean the skin. Cleaning your skin with CHG may help to lower your risk for infection.  You may be given CHG to use for bathing. It may be in a bottle or in a prepackaged cloth to use on your skin. Carefully follow your health care provider's instructions and the instructions on the product label.  Do not use CHG if you have a chlorhexidine allergy.  Contact your health care provider if your skin gets irritated after scrubbing.  This information is not intended to replace advice given to you by your health care provider. Make sure you discuss any questions you have with your health care provider.  Document Revised: 04/17/2023 Document Reviewed: 02/28/2022  Elsevier Patient Education © 2023  [employer]

## 2024-08-26 ENCOUNTER — OFFICE VISIT (OUTPATIENT)
Dept: PULMONOLOGY | Facility: CLINIC | Age: 59
End: 2024-08-26
Payer: MEDICAID

## 2024-08-26 VITALS
SYSTOLIC BLOOD PRESSURE: 115 MMHG | OXYGEN SATURATION: 98 % | HEART RATE: 84 BPM | WEIGHT: 216.2 LBS | HEIGHT: 74 IN | BODY MASS INDEX: 27.75 KG/M2 | DIASTOLIC BLOOD PRESSURE: 78 MMHG

## 2024-08-26 DIAGNOSIS — Z92.3 HISTORY OF RADIATION THERAPY: ICD-10-CM

## 2024-08-26 DIAGNOSIS — C34.11 MALIGNANT NEOPLASM OF UPPER LOBE OF RIGHT LUNG: ICD-10-CM

## 2024-08-26 DIAGNOSIS — J44.9 CHRONIC OBSTRUCTIVE PULMONARY DISEASE, UNSPECIFIED COPD TYPE: Primary | ICD-10-CM

## 2024-08-26 DIAGNOSIS — J30.89 NON-SEASONAL ALLERGIC RHINITIS, UNSPECIFIED TRIGGER: ICD-10-CM

## 2024-08-26 DIAGNOSIS — Z92.21 HISTORY OF CHEMOTHERAPY: ICD-10-CM

## 2024-08-26 DIAGNOSIS — Z90.2 S/P LOBECTOMY OF LUNG: ICD-10-CM

## 2024-08-26 PROCEDURE — 94010 BREATHING CAPACITY TEST: CPT | Performed by: INTERNAL MEDICINE

## 2024-08-26 PROCEDURE — 94729 DIFFUSING CAPACITY: CPT | Performed by: INTERNAL MEDICINE

## 2024-08-26 PROCEDURE — 94727 GAS DIL/WSHOT DETER LNG VOL: CPT | Performed by: INTERNAL MEDICINE

## 2024-08-26 PROCEDURE — 99214 OFFICE O/P EST MOD 30 MIN: CPT | Performed by: INTERNAL MEDICINE

## 2024-08-26 RX ORDER — FLUTICASONE PROPIONATE AND SALMETEROL 250; 50 UG/1; UG/1
1 POWDER RESPIRATORY (INHALATION)
Qty: 1 EACH | Refills: 5 | Status: SHIPPED | OUTPATIENT
Start: 2024-08-26

## 2024-08-26 RX ORDER — ALBUTEROL SULFATE 90 UG/1
2 AEROSOL, METERED RESPIRATORY (INHALATION) EVERY 4 HOURS PRN
Qty: 18 G | Refills: 5 | Status: SHIPPED | OUTPATIENT
Start: 2024-08-26

## 2024-08-26 NOTE — PROGRESS NOTES
[facility] OUTPATIENT PROGRESS NOTE    Patient: Lalo Jones Sr.  : 1965  Age: 58 y.o.  Date of Service: 2024    REASON FOR CLINIC VISIT:  Chief Complaint   Patient presents with    Chronic obstructive pulmonary disease, unspecified COPD       Subjective:    History of Present Illness:  Lalo Jones Sr. is a 58 y.o. male who presents to the office today to be seen for    Diagnosis Plan   1. Chronic obstructive pulmonary disease, unspecified COPD type  Spirometry with Diffusion Capacity & Lung Volumes      2. S/P lobectomy of lung        3. History of chemotherapy        4. History of radiation therapy        5. Malignant neoplasm of upper lobe of right lung        6. Non-seasonal allergic rhinitis, unspecified trigger        .  Other problems per record.    History:    Patient is a very pleasant middle-aged  gentleman was seen in the pulmonary clinic for follow-up visit.    Patient is a former smoker and did quit smoking.  He had moderate chronic obstructive pulmonary disease.  He is currently using Advair or Wixela with albuterol nebulizer and rescue inhaler. He had a right upper lobe lung mass which had been removed by Dr. Carlin by a partial lobectomy last year.  He was diagnosed with a non-small cell lung cancer and he received chemotherapy and radiation treatment.  He is following up with Dr. Tadeo in [facility].  He had also been seen by Dr. Kinney and radiation oncology.  Patient completed his chemotherapy, radiation therapy and also completed immunotherapy a few months ago.  He had no recent CT scan of the chest done which has been ordered during this visit.  Patient told me he is currently starting immunotherapy with Dr. Tadeo.     After stopping smoking in  he is doing much better and he is breathing better and he feels more energetic and less tired.  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  He had history of pneumonia and COVID-19 in the past and is up-to-date on his vaccinations.He had sleep disturbances in the past and likely have obstructive sleep apnea but currently he did not want to do any sleep study and does not use any CPAP or oxygen at home. He lives with his significant other he did not have any recent hospital admissions and ER visit and urgent care visit any other new complaints..      He is currently using fluticasone nasal spray, Astelin nasal spray, Zyrtec and montelukast for his nasal allergy and his allergy symptoms are under good control.  He told me he is waiting to get his port removed soon as he completed chemotherapy and immunotherapy.  He had a pulmonary function test done today which showed no significant change in his moderate obstructive airway dysfunction and overall unremarkable but the hyperinflation and air trapping has improved.  He is requesting refill of his Advair and albuterol rescue inhaler.  He has all of the medications in stock.  His CT scan of the chest is getting ordered by Dr. Tadeo who is regularly following him    PFT done today:  Not done today    PFT Values          2024    15:45   Pre Drug PFT Results      FEV1 68   FEF 25-75% 35   FEV1/FVC 52.53   Other Tests PFT Results         DLCO 107   D/VAsb 98     Results for orders placed in visit on 24    Spirometry with Diffusion Capacity & Lung Volumes    Narrative  Spirometry with Diffusion Capacity & Lung Volumes    Performed by: Roman Cazares CMA  Authorized by: Russel Santana MD  Pre Drug % Predicted  FVC: 105%  FEV1: 68%  FEF 25-75%: 35%  FEV1/FVC: 52.53%  T%  RV: 112%  DLCO: 107%  D/VAsb: 98%    Interpretation  Overall comments:  Above test results are acceptable and reproducible by test criteria  From analysis of the above test results the patient showed evidence of moderate obstructive airway dysfunction.  No bronchodilator challenge was done.   The lung volumes showed normal total lung capacity and slight elevated residual volume suggesting mild air trapping.  Diffusion capacity that for alveolar volume is within normal limits.    In comparison with the prior test done in 2023 the spirometry did not change much and the lung volume shows improving hyperinflation and air trapping and the diffusion capacity corrected for alveolar volume is also within normal limits.  Clinical correlation was indicated.    Russel Santana MD  Pulmonologist/Intensivist  2024 16:48 CDT      Results for orders placed during the hospital encounter of 23    Full Pulmonary Function Test With Bronchodilator & ABG    Narrative  [facility] - Pulmonary Function Test    [address]  [place]  [address]  [phone]    Patient : Lalo Jones  MRN : [id number]  CSN : [id number]  Pulmonologist : Anam Brown MD  Date : 3/21/2023    ______________________________________________________________________    Interpretation :  1.  Spirometry is consistent with a moderate obstructive ventilatory defect although the decrease in the patient's FEV1 is just into the moderate range at 79% of predicted.  2.  There is improvement in spirometry postbronchodilator so that postbronchodilator spirometry just reveals a mild obstructive ventilatory defect manifested by a decrease in midflows and peak expiratory flow.  3.  Lung volumes reveal hyperinflation.  4.  There is a mild diffusion impairment particularly when corrected for alveolar volume.      Anam Brown MD         Bronchodilator therapy: Advair , Albuterol nebulizer and rescue inhaler    Smoking Status:   Social History     Tobacco Use   Smoking Status Former    Current packs/day: 0.00    Average packs/day: 1 pack/day for 35.8 years (35.8 ttl pk-yrs)    Types: Cigarettes    Start date: 1987    Quit date: 2022    Years since quittin.7    Passive exposure: Past   Smokeless  Tobacco Former    Types: Snuff    Quit date: 1985     Pulm Rehab: no  Sleep: yes    Support System: lives with their partner    Code Status:   There are no questions and answers to display.        Review of Systems:  A complete review of systems is performed and all other systems were reviewed and negative as note above in the HPI.  Review of Systems   Constitutional:  Positive for fatigue. Negative for unexpected weight gain and unexpected weight loss.   HENT:  Positive for congestion, postnasal drip and sinus pressure.    Eyes: Negative.    Respiratory:  Positive for cough, chest tightness and shortness of breath.    Cardiovascular: Negative.    Gastrointestinal: Negative.    Endocrine: Negative.    Genitourinary:  Positive for urgency.   Musculoskeletal:  Positive for arthralgias and back pain.   Skin: Negative.    Allergic/Immunologic: Positive for environmental allergies.   Neurological: Negative.    Hematological: Negative.    Psychiatric/Behavioral: Negative.         CAT/ACT Score:  Not done today    Medications:  Outpatient Encounter Medications as of 8/26/2024   Medication Sig Dispense Refill    Ascorbic Acid (VITAMIN C PO) Take  by mouth Daily.      Azelastine HCl 137 MCG/SPRAY solution 2 sprays into the nostril(s) as directed by provider 2 (Two) Times a Day. 30 mL 5    Calcium Citrate 150 MG capsule Take  by mouth Daily.      cetirizine (zyrTEC) 10 MG tablet Take 1 tablet by mouth Daily. 90 tablet 3    Cholecalciferol (VITAMIN D-3 PO) Take  by mouth Daily.      Cyanocobalamin (VITAMIN B 12 PO) Take  by mouth Daily.      esomeprazole (nexIUM) 40 MG capsule Take 1 capsule by mouth Every Morning Before Breakfast.      Fluticasone-Salmeterol (ADVAIR/WIXELA) 250-50 MCG/ACT DISKUS Inhale 1 puff 2 (Two) Times a Day. 1 each 5    gabapentin (NEURONTIN) 800 MG tablet Take 1 tablet by mouth 3 (Three) Times a Day.      hydrOXYzine pamoate (VISTARIL) 50 MG capsule Take 1 capsule by mouth Daily.      ibuprofen  "(ADVIL,MOTRIN) 800 MG tablet 1 tablet with food or milk as needed Orally prn      montelukast (SINGULAIR) 10 MG tablet Take 1 tablet by mouth Every Night. 90 tablet 3    multivitamin with minerals tablet tablet Take 1 tablet by mouth Daily.      NON FORMULARY Take 1 tablet by mouth Daily. Marijuana Gummies      Ventolin  (90 Base) MCG/ACT inhaler Inhale 2 puffs Every 4 (Four) Hours As Needed for Wheezing. 18 g 5    Zinc 50 MG tablet Take  by mouth Daily.      [DISCONTINUED] Fluticasone-Salmeterol (ADVAIR/WIXELA) 250-50 MCG/ACT DISKUS Inhale 1 puff 2 (Two) Times a Day. 1 each 5    [DISCONTINUED] Ventolin  (90 Base) MCG/ACT inhaler INHALE TWO PUFFS BY MOUTH EVERY FOUR HOURS AS NEEDED FOR WHEEZING AND FOR SHORTNESS OF BREATH 18 g 5     No facility-administered encounter medications on file as of 8/26/2024.       Allergies:  Allergies   Allergen Reactions    Adhesive Tape Other (See Comments)     Leaves blisters, whelps, rash       Immunizations:  Immunization History   Administered Date(s) Administered    COVID-19 (JOSE) 03/19/2021    COVID-19 (MODERNA) 1st,2nd,3rd Dose Monovalent 04/19/2022    Hepatitis A 06/12/2019, 01/30/2020       Objective:    Vitals:  /78   Pulse 84   Ht 187 cm (73.62\")   Wt 98.1 kg (216 lb 3.2 oz)   SpO2 98% Comment: RA  BMI 28.05 kg/m²     Physical Exam:  General: Patient is a 58 y.o. pleasant middle aged  male. Looks stated age. Appears to be in no acute distress.  Eyes: EOMI. PERRLA. Vision intact. No scleral icterus.  Ear, Nose, Mouth and Throat: Hearing is grossly intact. No Leukoplakia, pharyngitis, stomatitis or thrush. Swollen nasal mucosa with post nasal drop.  Neck: Range of motion of neck normal. No thyromegaly or masses. Mallampati Class 3  Respiratory: Clear to auscultation bilaterally. No use of accessory muscles. Decreased breath sounds.  Cardiovascular: Normal heart sounds. Regularly regular rhythm without murmur.  Gastrointestinal: Non " tender, non distended, soft. Bowel sounds positive in all four quadrants. No organomegaly.  Skin: No obvious rashes, lesions, ulcers or large amount of bruising. No edema.   Neurological: No new motor deficits. Cranial nerves appear intact.  Psychiatric: Patient is alert and oriented to person, place and time.    Chest Imaging:      EXAM:  CT CHEST WITH CONTRAST.    HISTORY:  Right lung adenocarcinoma.  Bone lesion.  Enlarged lymph nodes.  Assess treatment response.    COMPARISON:  Chest CT 02/21/2024, 06/19/2024.  PET CT 03/27/2023, 11/01/2022.    TECHNIQUE:  Multiple axial images of the chest were obtained following intravenous administration of iodinated contrast, low osmolar.  Images were reformatted in the sagittal and coronal planes.    FINDINGS:  Right-sided chest port present.    Nonenlarged mediastinal, hilar, or axillary lymph nodes are seen.    Heart size is normal.  Coronary artery and aortic calcifications present.  There is no pericardial effusion.    Right upper lobectomy noted.  Emphysema.  Scattered bullae in the left lung.  No suspicious pulmonary nodule identified.  There is no consolidation, pleural effusion or pneumothorax.    No acute abnormality identified in the upper abdomen    Internal fixation hardware right fifth rib.  No discrete osseous lesion detected.    ---------------------------  Exam End: 06/27/24 14:47 Last Resulted: 06/28/24 11:01   Received From: [facility]  Result Received: 08/12/24 11:13         Assessment:  1. Chronic obstructive pulmonary disease, unspecified COPD type    2. S/P lobectomy of lung    3. History of chemotherapy    4. History of radiation therapy    5. Malignant neoplasm of upper lobe of right lung    6. Non-seasonal allergic rhinitis, unspecified trigger        Plan/Recommendations:    1.  I reviewed the last CT scan of the chest which showed stable findings.  He is getting regular CT scan of the chest ordered by Dr. Tadeo so no  further CT is ordered from my office today.  2.  He is a former smoker and did quit smoking and his PFT is stable showing moderate obstructive airway dysfunction.  Explained the PFT results to the patient and he will continue using Advair and albuterol rescue inhaler as before.  He needed prescription refills for Advair and albuterol rescue inhaler which is done and he also has albuterol nebulizer which he is using occasionally.  He is not on any oxygen or CPAP.  3.  For his nasal allergies he will continue using fluticasone nasal spray, Astelin nasal spray, loratadine and Singulair which will be continued.  4.  He will continue follow-up with Dr. Tadeo for his cancer and he will continue follow-up with the primary care provider and is up-to-date on his vaccinations.  He will return to pulmonary clinic for a follow-up visit in 6 months time or earlier if needed.    Follow up:  6 Months    Time Spent:  30 minutes    I appreciate the opportunity of participating in this patient's care. I would like to thank the PCP for the referral.  Please feel free to contact me with any other questions.    Russel Santana MD   Pulmonologist/Intensivist     Electronically signed by: Russel Santana MD, 8/26/2024 16:48 CDT

## 2024-08-26 NOTE — PROCEDURES
Spirometry with Diffusion Capacity & Lung Volumes    Performed by: Roman Cazares CMA  Authorized by: Russel Santana MD     Pre Drug % Predicted    FVC: 105%   FEV1: 68%   FEF 25-75%: 35%   FEV1/FVC: 52.53%   T%   RV: 112%   DLCO: 107%   D/VAsb: 98%    Interpretation   Overall comments:   Above test results are acceptable and reproducible by test criteria  From analysis of the above test results the patient showed evidence of moderate obstructive airway dysfunction.  No bronchodilator challenge was done.  The lung volumes showed normal total lung capacity and slight elevated residual volume suggesting mild air trapping.  Diffusion capacity that for alveolar volume is within normal limits.    In comparison with the prior test done in 2023 the spirometry did not change much and the lung volume shows improving hyperinflation and air trapping and the diffusion capacity corrected for alveolar volume is also within normal limits.  Clinical correlation was indicated.    Russel Santana MD  Pulmonologist/Intensivist  2024 16:48 CDT

## 2024-08-29 ENCOUNTER — TELEPHONE (OUTPATIENT)
Dept: SURGERY | Facility: CLINIC | Age: 59
End: 2024-08-29
Payer: MEDICAID

## 2024-08-29 NOTE — TELEPHONE ENCOUNTER
Called Lalo to confirm his surgery date 08/30/2024 with an arrival time of 6:00AM.   Reminded him not to eat or drink after midnight.   Let him know to come through the main entrance of the hospital and check in at main registration.      Left message with patient.

## 2024-08-30 ENCOUNTER — HOSPITAL ENCOUNTER (OUTPATIENT)
Facility: HOSPITAL | Age: 59
Setting detail: HOSPITAL OUTPATIENT SURGERY
Discharge: HOME | End: 2024-08-30
Attending: STUDENT IN AN ORGANIZED HEALTH CARE EDUCATION/TRAINING PROGRAM | Admitting: STUDENT IN AN ORGANIZED HEALTH CARE EDUCATION/TRAINING PROGRAM
Payer: MEDICAID

## 2024-08-30 ENCOUNTER — ANESTHESIA EVENT (OUTPATIENT)
Dept: PERIOP | Facility: HOSPITAL | Age: 59
End: 2024-08-30
Payer: MEDICAID

## 2024-08-30 ENCOUNTER — ANESTHESIA (OUTPATIENT)
Dept: PERIOP | Facility: HOSPITAL | Age: 59
End: 2024-08-30
Payer: MEDICAID

## 2024-08-30 VITALS
HEART RATE: 65 BPM | SYSTOLIC BLOOD PRESSURE: 104 MMHG | OXYGEN SATURATION: 96 % | DIASTOLIC BLOOD PRESSURE: 84 MMHG | TEMPERATURE: 97.1 F | RESPIRATION RATE: 12 BRPM

## 2024-08-30 DIAGNOSIS — Z95.828 PORT-A-CATH IN PLACE: ICD-10-CM

## 2024-08-30 PROCEDURE — 25810000003 LACTATED RINGERS PER 1000 ML: Performed by: STUDENT IN AN ORGANIZED HEALTH CARE EDUCATION/TRAINING PROGRAM

## 2024-08-30 PROCEDURE — 88300 SURGICAL PATH GROSS: CPT | Performed by: STUDENT IN AN ORGANIZED HEALTH CARE EDUCATION/TRAINING PROGRAM

## 2024-08-30 PROCEDURE — 36590 REMOVAL TUNNELED CV CATH: CPT | Performed by: STUDENT IN AN ORGANIZED HEALTH CARE EDUCATION/TRAINING PROGRAM

## 2024-08-30 PROCEDURE — 25010000002 PROPOFOL 1000 MG/100ML EMULSION: Performed by: NURSE ANESTHETIST, CERTIFIED REGISTERED

## 2024-08-30 PROCEDURE — 25010000002 CEFAZOLIN PER 500 MG

## 2024-08-30 PROCEDURE — 25010000002 FENTANYL CITRATE (PF) 100 MCG/2ML SOLUTION: Performed by: NURSE ANESTHETIST, CERTIFIED REGISTERED

## 2024-08-30 RX ORDER — FENTANYL CITRATE 50 UG/ML
INJECTION, SOLUTION INTRAMUSCULAR; INTRAVENOUS AS NEEDED
Status: DISCONTINUED | OUTPATIENT
Start: 2024-08-30 | End: 2024-08-30 | Stop reason: SURG

## 2024-08-30 RX ORDER — DROPERIDOL 2.5 MG/ML
0.62 INJECTION, SOLUTION INTRAMUSCULAR; INTRAVENOUS ONCE AS NEEDED
Status: DISCONTINUED | OUTPATIENT
Start: 2024-08-30 | End: 2024-08-30 | Stop reason: HOSPADM

## 2024-08-30 RX ORDER — MAGNESIUM HYDROXIDE 1200 MG/15ML
LIQUID ORAL AS NEEDED
Status: DISCONTINUED | OUTPATIENT
Start: 2024-08-30 | End: 2024-08-30 | Stop reason: HOSPADM

## 2024-08-30 RX ORDER — FENTANYL CITRATE 50 UG/ML
50 INJECTION, SOLUTION INTRAMUSCULAR; INTRAVENOUS
Status: DISCONTINUED | OUTPATIENT
Start: 2024-08-30 | End: 2024-08-30 | Stop reason: HOSPADM

## 2024-08-30 RX ORDER — NALOXONE HCL 0.4 MG/ML
0.4 VIAL (ML) INJECTION AS NEEDED
Status: DISCONTINUED | OUTPATIENT
Start: 2024-08-30 | End: 2024-08-30 | Stop reason: HOSPADM

## 2024-08-30 RX ORDER — ACETAMINOPHEN 500 MG
1000 TABLET ORAL EVERY 6 HOURS PRN
COMMUNITY

## 2024-08-30 RX ORDER — HYDROCODONE BITARTRATE AND ACETAMINOPHEN 5; 325 MG/1; MG/1
1 TABLET ORAL EVERY 4 HOURS PRN
Status: DISCONTINUED | OUTPATIENT
Start: 2024-08-30 | End: 2024-08-30 | Stop reason: HOSPADM

## 2024-08-30 RX ORDER — ACETAMINOPHEN 325 MG/1
975 TABLET ORAL EVERY 8 HOURS
Start: 2024-08-30 | End: 2025-08-30

## 2024-08-30 RX ORDER — LABETALOL HYDROCHLORIDE 5 MG/ML
5 INJECTION, SOLUTION INTRAVENOUS
Status: DISCONTINUED | OUTPATIENT
Start: 2024-08-30 | End: 2024-08-30 | Stop reason: HOSPADM

## 2024-08-30 RX ORDER — SODIUM CHLORIDE, SODIUM LACTATE, POTASSIUM CHLORIDE, CALCIUM CHLORIDE 600; 310; 30; 20 MG/100ML; MG/100ML; MG/100ML; MG/100ML
1000 INJECTION, SOLUTION INTRAVENOUS CONTINUOUS
Status: DISCONTINUED | OUTPATIENT
Start: 2024-08-30 | End: 2024-08-30 | Stop reason: HOSPADM

## 2024-08-30 RX ORDER — LIDOCAINE HYDROCHLORIDE AND EPINEPHRINE 10; 10 MG/ML; UG/ML
INJECTION, SOLUTION INFILTRATION; PERINEURAL AS NEEDED
Status: DISCONTINUED | OUTPATIENT
Start: 2024-08-30 | End: 2024-08-30 | Stop reason: HOSPADM

## 2024-08-30 RX ORDER — FLUMAZENIL 0.1 MG/ML
0.2 INJECTION INTRAVENOUS AS NEEDED
Status: DISCONTINUED | OUTPATIENT
Start: 2024-08-30 | End: 2024-08-30 | Stop reason: HOSPADM

## 2024-08-30 RX ORDER — PROPOFOL 10 MG/ML
INJECTION, EMULSION INTRAVENOUS AS NEEDED
Status: DISCONTINUED | OUTPATIENT
Start: 2024-08-30 | End: 2024-08-30 | Stop reason: SURG

## 2024-08-30 RX ORDER — IBUPROFEN 600 MG/1
600 TABLET, FILM COATED ORAL EVERY 6 HOURS PRN
Status: DISCONTINUED | OUTPATIENT
Start: 2024-08-30 | End: 2024-08-30 | Stop reason: HOSPADM

## 2024-08-30 RX ORDER — ONDANSETRON 2 MG/ML
4 INJECTION INTRAMUSCULAR; INTRAVENOUS ONCE AS NEEDED
Status: DISCONTINUED | OUTPATIENT
Start: 2024-08-30 | End: 2024-08-30 | Stop reason: HOSPADM

## 2024-08-30 RX ORDER — LIDOCAINE HYDROCHLORIDE 10 MG/ML
0.5 INJECTION, SOLUTION EPIDURAL; INFILTRATION; INTRACAUDAL; PERINEURAL ONCE AS NEEDED
Status: DISCONTINUED | OUTPATIENT
Start: 2024-08-30 | End: 2024-08-30 | Stop reason: HOSPADM

## 2024-08-30 RX ORDER — HYDROCODONE BITARTRATE AND ACETAMINOPHEN 10; 325 MG/1; MG/1
1 TABLET ORAL EVERY 4 HOURS PRN
Status: DISCONTINUED | OUTPATIENT
Start: 2024-08-30 | End: 2024-08-30 | Stop reason: HOSPADM

## 2024-08-30 RX ORDER — SODIUM CHLORIDE 0.9 % (FLUSH) 0.9 %
3 SYRINGE (ML) INJECTION AS NEEDED
Status: DISCONTINUED | OUTPATIENT
Start: 2024-08-30 | End: 2024-08-30 | Stop reason: HOSPADM

## 2024-08-30 RX ORDER — IBUPROFEN 200 MG
600 TABLET ORAL EVERY 8 HOURS
Start: 2024-08-30 | End: 2025-08-30

## 2024-08-30 RX ADMIN — FENTANYL CITRATE 100 MCG: 50 INJECTION, SOLUTION INTRAMUSCULAR; INTRAVENOUS at 11:12

## 2024-08-30 RX ADMIN — SODIUM CHLORIDE, POTASSIUM CHLORIDE, SODIUM LACTATE AND CALCIUM CHLORIDE 1000 ML: 600; 310; 30; 20 INJECTION, SOLUTION INTRAVENOUS at 09:57

## 2024-08-30 RX ADMIN — CEFAZOLIN 2 G: 2 INJECTION, POWDER, FOR SOLUTION INTRAMUSCULAR; INTRAVENOUS at 11:14

## 2024-08-30 RX ADMIN — PROPOFOL INJECTABLE EMULSION 100 MG: 10 INJECTION, EMULSION INTRAVENOUS at 11:12

## 2024-08-30 RX ADMIN — PROPOFOL INJECTABLE EMULSION 100 MCG/KG/MIN: 10 INJECTION, EMULSION INTRAVENOUS at 11:13

## 2024-08-30 NOTE — ANESTHESIA PREPROCEDURE EVALUATION
Anesthesia Evaluation     no history of anesthetic complications:   NPO Solid Status: > 8 hours  NPO Liquid Status: > 8 hours           Airway   Mallampati: II  TM distance: >3 FB  Neck ROM: full  No difficulty expected  Dental      Pulmonary    (+) a smoker (quit 12/2022) Former, lung cancer, COPD,sleep apnea  Cardiovascular   Exercise tolerance: good (4-7 METS)    (-) hypertension, CAD      Neuro/Psych  (-) seizures, TIA, CVA  GI/Hepatic/Renal/Endo    (+) GERD  (-) liver disease, no renal disease, diabetes    Musculoskeletal     Abdominal    Substance History      OB/GYN          Other   arthritis,   history of cancer      Other Comment: Prostate cancer s/p resection  Lung cancer, ongoing chemo/radiation, presents for surgery                    Anesthesia Plan    ASA 3     MAC     intravenous induction     Anesthetic plan, risks, benefits, and alternatives have been provided, discussed and informed consent has been obtained with: patient.        CODE STATUS:

## 2024-08-30 NOTE — OP NOTE
Port-A-Cath Removal Operative Report:     Patient: Lalo Jones Sr.  MRN: [id number]    YOB: 1965  Age: 58 y.o.  Sex: male  Unit:  PAD OR Room/Bed: PAD OR/MAIN OR Location: [facility]      Admitting Physician: VERENA THAO    Primary Care Physician: Flex Sheikh MD             INDICATIONS: This is a 58 y.o. male who presents for port removal     DATE OF OPERATION: 8/30/2024     Surgeons and Role:     * Verena Thao MD - Primary  Denae Garcia PA-C - assist     ANESTHESIA: Monitored Anesthesia Care     PREOPERATIVE DIAGNOSIS: Port-A-Cath in place [Z95.828]    POSTOPERATIVE DIAGNOSIS: Same    PROCEDURES PERFORMED:  Port-A-Cath removal     PROCEDURE DETAILS:     After patient was placed on the table in a supine position the bilateral chest and neck were prepped with ChloraPrep and draped in the usual fashion.  Preoperative antibiotics were given.  A timeout was performed.    On the right, the skin overlying the prior incision was infiltrated with 1% lidocaine with epinephrine. An incision was made, and I dissected down to the deltopectoral groove with a combination of cautery and sharp dissection. The port capsule was incised and the port was dissected free with cautery. A 2-0 silk figure of eight was placed around the catheter insertion site. The port was removed intact and sent for gross examination. The silk was tied down. Hemostasis confirmed. The incision was closed with interrupted 3-0 vicryl dermal sutures followed by a running 4-0 monocryl subcuticular. Skin glue was placed over the incision.  The patient tolerated procedure well.  There were no complications.     Denae Garcia PA-C was responsible for performing the following activities: Retraction, Suction, Irrigation, Suturing, Closing, and Placing Dressing and their skilled assistance was necessary for the success of this case.    Findings: Port-a-cath removed   Estimated Blood Loss:  15 mL   Complications:  none apparent            Specimens: port for gross identification     Disposition: PACU - hemodynamically stable.           Condition: stable    Kim Bonilla MD  08/12/2024

## 2024-08-30 NOTE — DISCHARGE INSTRUCTIONS
Wound:   - you have skin glue on your incisions. Okay to shower tomorrow.   - Leave skin glue in place, it should slowly fall off over 2 weeks   - No swimming/soaking/bathing x 2 weeks to allow incisions to heal.     Activity:   - Activity as tolerated.   - No driving or operating machinery on narcotic pain medication.     Pain medication:   - Take 1000mg of tylenol every 8 hours for 3 days. After three days, take it prn.   - Take 600mg of ibuprofen (motrin) every 8 hours for 3 days. After three days, take it prn.     Follow up:   - make an appointment to see Denae Garcia PA-C  in 2 weeks  - If you have any concerns before then, call me office at [phone]

## 2024-08-30 NOTE — ANESTHESIA POSTPROCEDURE EVALUATION
Patient: Lalo Jones Sr.    Procedure Summary       Date: 08/30/24 Room / Location:  PAD OR 09 /  PAD OR    Anesthesia Start: 1110 Anesthesia Stop: 1134    Procedure: REMOVAL VENOUS ACCESS DEVICE (Chin to Nipples) Diagnosis:       Port-A-Cath in place      (Port-A-Cath in place [Z95.828])    Surgeons: Kim Bonilla MD Provider: Freddie Marie CRNA    Anesthesia Type: MAC ASA Status: 3            Anesthesia Type: MAC    Vitals  Vitals Value Taken Time   /84 08/30/24 1201   Temp 97.1 °F (36.2 °C) 08/30/24 1131   Pulse 58 08/30/24 1204   Resp 12 08/30/24 1131   SpO2 94 % 08/30/24 1204   Vitals shown include unfiled device data.        Post Anesthesia Care and Evaluation    Patient location during evaluation: PACU  Patient participation: complete - patient participated  Level of consciousness: awake and alert  Pain management: adequate    Airway patency: patent  Anesthetic complications: No anesthetic complications    Cardiovascular status: acceptable  Respiratory status: acceptable  Hydration status: acceptable    Comments: Blood pressure 108/76, pulse 68, temperature 97.1 °F (36.2 °C), temperature source Temporal, resp. rate 12, SpO2 96%.    Pt discharged from PACU based on brien score >8

## 2024-09-03 ENCOUNTER — TELEPHONE (OUTPATIENT)
Dept: SURGERY | Facility: CLINIC | Age: 59
End: 2024-09-03
Payer: MEDICAID

## 2024-09-03 LAB
LAB AP CASE REPORT: NORMAL
Lab: NORMAL
PATH REPORT.FINAL DX SPEC: NORMAL
PATH REPORT.GROSS SPEC: NORMAL

## 2024-09-03 NOTE — TELEPHONE ENCOUNTER
Post OP phone call visit:    Type of surgery: Removal venous access device.   How are you feeling? Doing fine.   Are you having any pain or Nausea? A little sore. Mari been taking Ibuprofen.   Do you have a normal appetite? Good.  Are you passing gas and having any BM? Having BM's.   How is your activity? Good.  Any drainage or fever? No redness. No drainage. No fever.

## 2024-09-18 ENCOUNTER — TELEPHONE (OUTPATIENT)
Dept: SURGERY | Facility: CLINIC | Age: 59
End: 2024-09-18
Payer: MEDICAID

## 2024-10-08 RX ORDER — FLUTICASONE PROPIONATE AND SALMETEROL 250; 50 UG/1; UG/1
1 POWDER RESPIRATORY (INHALATION) 2 TIMES DAILY
Qty: 60 EACH | Refills: 5 | Status: SHIPPED | OUTPATIENT
Start: 2024-10-08

## 2024-10-08 NOTE — TELEPHONE ENCOUNTER
Rx Refill Note  Requested Prescriptions     Pending Prescriptions Disp Refills    Fluticasone-Salmeterol (ADVAIR/WIXELA) 250-50 MCG/ACT DISKUS [Pharmacy Med Name: FLUTICASONE-SALMETEROL 250- 250-50 Aerosol] 60 each 5     Sig: INHALE ONE PUFF BY MOUTH TWICE DAILY      Last office visit with prescribing clinician: 8/26/2024   Last telemedicine visit with prescribing clinician: Visit date not found   Next office visit with prescribing clinician: 2/28/2025                         Would you like a call back once the refill request has been completed: [] Yes [] No    If the office needs to give you a call back, can they leave a voicemail: [] Yes [] No    Maegan Mansfield MA  10/08/24, 14:05 CDT

## 2024-11-01 ENCOUNTER — TELEPHONE (OUTPATIENT)
Dept: HEMATOLOGY | Age: 59
End: 2024-11-01

## 2024-11-01 DIAGNOSIS — C34.91 ADENOCARCINOMA OF RIGHT LUNG (HCC): Primary | ICD-10-CM

## 2024-11-01 NOTE — PROGRESS NOTES
PROGRESS NOTE  Patient:  Andrew Jessica  YOB: 1965  Date of Service: 11/5/2024  MRN: [id number]    Primary Care Physician: Nemesio Schreiber MD    Chief Complaint   Patient presents with    Follow-up     Adenocarcinoma of right lung (HCC)       Patient Seen, Chart, Consults notes, Labs, Radiology studies reviewed.    Mr. 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   Robotic assisted laparoscopic prostatectomy by Dr. Choudhary September 2020 for PT2N0 Wyatt 7 disease with tumor approaching the right posterior margin.  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 5040 cGy over 28 treatment fractions.   Neoadjuvant cisplatin 75 mg/m2, Alimta 500 mg/m2 and Opdivo 360 mg, cycle #1 initiated on 1/5/2023. Cycle #3 delivered on 2/16/2023.    Right thoracotomy with right upper lobectomy and mediastinal lymph node dissection by Dr. Valentin Altamirano on 5/4/2023.   Final pathology was negative.    The primary tumor and all mediastinal lymph nodes were also negative documenting complete response to neoadjuvant therapy.    Andrew received cycle #1 of adjuvant immunotherapy with Keytruda on 6/12/2023     Andrew completed the final cycle #18 planned adjuvant immunotherapy treatments with Keytruda on 6/11/2024.    Andrew developed right shoulder/scapular pain 2 months ago. When lifting his arm, swwinging a golf club, when he is active, not at rest.              TARGET LUNG CANCER SITES:   4.7 cm x 4 cm x 1.6 cm mass in the RUL of the lung SUV of 12  9 mm retroaortic upper abdominal lymph node is mildly

## 2024-11-01 NOTE — TELEPHONE ENCOUNTER
I called and left patient a detailed voicemail with their appointment date and time for 11/5/24 and to come at the follow up appointment time and not the lab appointment time. I also made them aware of what that time was.  I made patient aware that we are in the [facility] and where it is located and gave the address in case, they use GPS. Left message for patient to call our office if they could not keep this appointment or if they did not know where our new building is located.

## 2024-11-05 ENCOUNTER — HOSPITAL ENCOUNTER (OUTPATIENT)
Dept: INFUSION THERAPY | Age: 59
Discharge: HOME | End: 2024-11-05
Payer: COMMERCIAL

## 2024-11-05 ENCOUNTER — OFFICE VISIT (OUTPATIENT)
Dept: HEMATOLOGY | Age: 59
End: 2024-11-05
Payer: COMMERCIAL

## 2024-11-05 VITALS
HEIGHT: 74 IN | HEART RATE: 94 BPM | WEIGHT: 217.9 LBS | BODY MASS INDEX: 27.97 KG/M2 | OXYGEN SATURATION: 98 % | DIASTOLIC BLOOD PRESSURE: 78 MMHG | TEMPERATURE: 98.7 F | SYSTOLIC BLOOD PRESSURE: 132 MMHG

## 2024-11-05 DIAGNOSIS — C34.91 ADENOCARCINOMA OF RIGHT LUNG (HCC): Primary | ICD-10-CM

## 2024-11-05 DIAGNOSIS — C34.91 ADENOCARCINOMA OF RIGHT LUNG (HCC): ICD-10-CM

## 2024-11-05 LAB
ALBUMIN SERPL-MCNC: 4.2 G/DL (ref 3.5–5.2)
ALP SERPL-CCNC: 129 U/L (ref 40–129)
ALT SERPL-CCNC: 32 U/L (ref 5–41)
ANION GAP SERPL CALCULATED.3IONS-SCNC: 11 MMOL/L (ref 7–19)
AST SERPL-CCNC: 27 U/L (ref 5–40)
BASOPHILS # BLD: 0.02 K/UL (ref 0.01–0.08)
BASOPHILS NFR BLD: 0.5 % (ref 0.1–1.2)
BILIRUB SERPL-MCNC: 0.5 MG/DL (ref 0–1.2)
BUN SERPL-MCNC: 21 MG/DL (ref 6–20)
CALCIUM SERPL-MCNC: 9.5 MG/DL (ref 8.6–10)
CHLORIDE SERPL-SCNC: 105 MMOL/L (ref 98–107)
CO2 SERPL-SCNC: 25 MMOL/L (ref 22–29)
CORTIS AM PEAK SERPL-MCNC: 10.9 UG/DL (ref 4.8–19.5)
CREAT SERPL-MCNC: 1.2 MG/DL (ref 0.7–1.2)
EOSINOPHIL # BLD: 0.18 K/UL (ref 0.04–0.54)
EOSINOPHIL NFR BLD: 4.3 % (ref 0.7–7)
ERYTHROCYTE [DISTWIDTH] IN BLOOD BY AUTOMATED COUNT: 13 % (ref 11.6–14.4)
GLUCOSE SERPL-MCNC: 105 MG/DL (ref 70–99)
HCT VFR BLD AUTO: 45 % (ref 40.1–51)
HGB BLD-MCNC: 15.4 G/DL (ref 13.7–17.5)
LYMPHOCYTES # BLD: 0.46 K/UL (ref 1.18–3.74)
LYMPHOCYTES NFR BLD: 11 % (ref 19.3–53.1)
MCH RBC QN AUTO: 29.7 PG (ref 25.7–32.2)
MCHC RBC AUTO-ENTMCNC: 34.2 G/DL (ref 32.3–36.5)
MCV RBC AUTO: 86.7 FL (ref 79–92.2)
MONOCYTES # BLD: 0.39 K/UL (ref 0.24–0.82)
MONOCYTES NFR BLD: 9.3 % (ref 4.7–12.5)
NEUTROPHILS # BLD: 3.13 K/UL (ref 1.56–6.13)
NEUTS SEG NFR BLD: 74.4 % (ref 34–71.1)
PLATELET # BLD AUTO: 159 K/UL (ref 163–337)
PMV BLD AUTO: 9 FL (ref 7.4–10.4)
POTASSIUM SERPL-SCNC: 4 MMOL/L (ref 3.5–5.1)
PROT SERPL-MCNC: 6.8 G/DL (ref 6.4–8.3)
RBC # BLD AUTO: 5.19 M/UL (ref 4.63–6.08)
SODIUM SERPL-SCNC: 141 MMOL/L (ref 136–145)
TSH SERPL DL<=0.005 MIU/L-ACNC: 4.62 UIU/ML (ref 0.27–4.2)
WBC # BLD AUTO: 4.2 K/UL (ref 4.23–9.07)

## 2024-11-05 PROCEDURE — 80053 COMPREHEN METABOLIC PANEL: CPT

## 2024-11-05 PROCEDURE — 99213 OFFICE O/P EST LOW 20 MIN: CPT

## 2024-11-05 PROCEDURE — 36415 COLL VENOUS BLD VENIPUNCTURE: CPT

## 2024-11-05 PROCEDURE — 85025 COMPLETE CBC W/AUTO DIFF WBC: CPT

## 2024-11-05 PROCEDURE — 99214 OFFICE O/P EST MOD 30 MIN: CPT | Performed by: INTERNAL MEDICINE

## 2024-11-08 ENCOUNTER — HOSPITAL ENCOUNTER (OUTPATIENT)
Dept: CT IMAGING | Age: 59
Discharge: HOME | End: 2024-11-08
Payer: COMMERCIAL

## 2024-11-08 DIAGNOSIS — C34.91 ADENOCARCINOMA OF RIGHT LUNG (HCC): ICD-10-CM

## 2024-11-08 PROCEDURE — 6360000004 HC RX CONTRAST MEDICATION: Performed by: INTERNAL MEDICINE

## 2024-11-08 PROCEDURE — 71260 CT THORAX DX C+: CPT

## 2024-11-08 RX ORDER — IOPAMIDOL 755 MG/ML
75 INJECTION, SOLUTION INTRAVASCULAR
Status: COMPLETED | OUTPATIENT
Start: 2024-11-08 | End: 2024-11-08

## 2024-11-08 RX ADMIN — IOPAMIDOL 75 ML: 755 INJECTION, SOLUTION INTRAVENOUS at 12:39

## 2024-11-11 NOTE — PROGRESS NOTES
[facility]  Radiation Oncology Clinic   Anton Sapp MD, FACR  Naif Jones APRN  _______________________________________________  [facility]  Department of Radiation Oncology  [address] [place] [id number]  Office: [phone]  Fax: [phone]    DATE: 11/13/2024  PATIENT: Lalo Jones Sr.  1965                         MEDICAL RECORD #: [id number]    1. Malignant neoplasm of right lung, unspecified part of lung    2. Chronic obstructive pulmonary disease, unspecified COPD type    3. History of radiation therapy    4. Former smoker                                           REASON FOR VISIT:    No chief complaint on file.    Reason for Follow up Visit:  Lalo Jones Sr. is a very pleasant 58 y.o. patient that completed radiation to the lung and returns to the clinic today for routine follow up exam.     History of Present Illness:  Diagnosed with right upper lobe of the lung. He completed 5040 cGy in 28 fractions to the right lung on 02/14/2023.     05/19/2022 - CT Chest Low Dose:  4 mm LEFT lower lobe pulmonary nodule.  Moderate emphysema.  3.3 x 1.3 cm masslike area of pleural thickening in the RIGHT anterior upper chest. Recommend a follow-up chest CT in 3 months to ensure stability.    10/17/2022 - CT chest without contrast, low-dose protocol at [facility]:  4.7 cm x 4 cm x 1.6 cm abnormal area of pleural thickening in the periphery of the RUL with slightly irregular margins  Moderate paraseptal emphysematous changes with scattered bullae in both lungs  Lung-RADS - 4B    10/24/2022 - Appointment with Russel Santana MD:   I explained the abnormal CT scan results from May which is done in the [facility] to the patient and also discussed with the radiologist from [facility] in [place] who reviewed the current CT scan of the chest done earlier this month.  This shows the right upper lobe pleural-based  thickening or mass and left upper lobe nodule.  Due to his smoking history the possibility of malignancy cannot be ruled out.  Discussing different options I ordered a PET scan and a follow-up CT scan of the chest in 3 months time.  If the PET scan is positive a CT-guided needle biopsy would be best option for the right upper lobe pleural-based lung mass which is not reachable by bronchoscopy.  If PET scan is negative will wait for the neck CT scan of the chest and make further recommendations.  Patient is agreeable with the plan.  In the meantime I will try to get the CD from from the outside hospital with the latest CT scan of the chest for further comparison.  Patient definitely has chronic obstructive pulmonary disease and he was started on Wixela 250/50 1 puff twice a day and albuterol rescue inhaler will be continued.  His nasal allergy he was started on fluticasone nasal spray and Singulair.  He told me he is already using Zyrtec mostly over-the-counter which will be continued.  All medications were sent to the pharmacy.  Lalo Nunez Robert  reports that he has been smoking cigarettes. He started smoking about 35 years ago. He has a 35.00 pack-year smoking history. He has never used smokeless tobacco.. I have educated him on the risk of diseases from using tobacco products such as cancer, COPD and heart disease.   I advised him to quit and he is willing to quit. We have discussed the following method/s for tobacco cessation:  Counseling.  Together we have set a quit date for 2 weeks from today.  He will follow up with me in 3 months or sooner to check on his progress.I spent 7 minutes counseling the patient  Patient is advised to have a sleep study due to sleep disturbances and most likely have sleep apnea and may need his CPAP.  He is also advised to get a pulmonary function test before the next clinic visit for his underlying COPD in addition to the PET scan and CT scan which are already ordered.  He  will return to the pulm clinic for follow-up visit in 3 months time or earlier if needed     11/01/2022 - PET Scan:  Slight increase in size of 4.3 x 1.9 cm RIGHT anterior upper chest masslike pleural thickening which is markedly hypermetabolic. Favor this to represent a neoplastic process with differential including lymphoma. Primary pleural neoplasm and metastatic disease are also in the differential.  Hypermetabolic upper abdominal retroperitoneal lymphadenopathy. Suspect these lymph nodes related to the same process as the RIGHT anterior pleural lesion.  No other abnormal hypermetabolic activity.    11/16/2022 - Right upper chest/pleural mass, core biopsies:  Adenocarcinoma of pulmonary origin.    12/08/2022 - Appointment with :  [facility]  CT scan of the abdomen and pelvis  Bone scan  MRI of the abdomen  Thoracic surgery consultation with Dr. Goldy Carlin  Call placed and spoke to Dr. Goldy Carlin at length  Radiation therapy consult for intradepartmental consultation  Consult surgical group at [facility] for port placement and consideration of a diagnostic laparoscopy for lymph node sampling.  Follow-up with me upon completion of scans and for further patient and family conference  Return for Follow Up with [facility] scans prior.    12/15/2022 - MRI Abdomen with and without contrast:  No change in borderline prominent 9 mm short axis dimension upper abdominal retroperitoneal lymph node, similar to prior PET/CT were it was hypermetabolic. No new or enlarging lymph nodes in the abdomen.  Splenomegaly.    12/15/2022 - Bone Scan:  No evidence of bone metastasis.    12/15/2022 - CT Abdomen/Pelvis without contrast:  There is abnormal wall thickening of the distal sigmoid colon which does contain multiple diverticuli. Findings may be seen with diverticulitis, however a regional neoplastic process also considered. Follow-up sigmoidoscopy might be considered for further evaluation.  No morphologically pathologic  lymphadenopathy identified. The two hypermetabolic retroperitoneal lymph nodes within the upper abdomen on the PET exam of 11/01/2022 remain similar in size measuring only 7 mm in short axis.    12/22/2022 - Appointment with :  Note pending    12/22/2022 - Documentation per :  I spoke with Dr. Tadeo regarding this patient today.  He has a very unusual pattern of malignancy with a right upper lobe peripheral adenocarcinoma which was biopsied with CT-guided biopsy.  He also has intra-abdominal hypermetabolic lymphadenopathy which is clinically suspicious for neoplasm.  This would be an unusual metastatic site from a primary lung carcinoma.  The patient also has previous history of prostate carcinoma.  I have discussed this with Dr. Tadeo and I believe if it is possible perhaps robotic biopsy of the periaortic lymphadenopathy could be considered.  We will also discussed with Dr. Goldy Carlin for consideration of definitive treatment of the primary right upper lobe lung tumor.  We will see the patient in consultation today and coordinate with Dr. Tadeo and other physicians to develop a coordinated treatment plan.    12/22/2022 - Consult with :  Following this discussion and in consideration of the diagnostic data/evaluation of the patient, I recommended referral to CT surgery for surgical considerations of the lung nodule. He has been referred to Dr. Carlin. He has also been referred to general surgery for biopsy considerations of the intra-abdominal lymph nodes.      12/22/2022 - Documentation per :  I spoke with Dr. Carlin this afternoon and he has had a chance to review the radiographs on this patient.  He believes that there is likely chest wall invasion and recommends neoadjuvant chemoradiation prior to definitive surgery of the right upper lobe tumor.  I believe we can treat this lesion with tangential portals and avoid any significant dose to the bronchus, thereby avoiding  any potential issues with healing.  I would anticipate a dose of 5040 cGy in 28 treatment fractions to the lung lesion as neoadjuvant treatment along with concomitant chemotherapy and/or immunotherapy.  In addition, we will determine if Dr. Kim Bonilla is able to sample the upper abdominal lymph nodes to determine the etiology of that finding on the PET scan.    12/23/2022 - Telephone encounter with :  I spoke with Dr. Tadeo today and reviewed my conversation with Dr. Carlin from last night.  We will plan to proceed with neoadjuvant chemoradiation to the lung lesion.  This will be somewhat dependent upon the potential for Dr. Bonilla to biopsy the upper abdominal lymph nodes.  However, I do believe we will be able to treat the chest wall lesion for surgical resection and if necessary we can come back and treat mediastinal and upper abdominal lymph nodes if we have evidence of metastatic disease.  We will plan to see the patient back on Tuesday, December 27 at 11:00 for simulation.    12/27/2022 - Appointment with :  He met with Dr. Carlin for surgical consirations of the right upper lobe lesion. He believes that there is likely chest wall invasion and recommends neoadjuvant chemoradiation prior to definitive surgery of the right upper lobe tumor. I anticipate a dose of 5040 cGy in 28 treatment fractions to the lung lesion as neoadjuvant treatment along with concomitant chemotherapy and/or immunotherapy.  In addition, we will determine if Dr. Bonilla is able to sample the upper abdominal lymph nodes to determine the etiology of that finding on the PET scan. He is scheduled to see her later this week.    01/01/2023 - Documentation per :  I presented this patient at tumor conference.  Dr. Kim Bonilla was in attendance unfortunately she is not able to biopsy the upper abdominal lymph nodes due to the proximity to adjacent aorta and superior mesenteric artery.  I also reviewed the  recommendation of Dr. Goldy Carlin for neoadjuvant chemoradiation followed by definitive surgery for the right upper lobe lung tumor.  Discussion at tumor conference surrounded management of the upper abdominal lymph nodes.  The consensus was at this time to proceed with treatment of the primary known adenocarcinoma lung and base further treatment recommendations upon findings at definitive surgery.  Abdominal lymph nodes serial radiographs is recommended for follow-up.  It was agreed that this is an unlikely metastatic distribution for right upper lobe lung carcinoma in the lymph nodes may represent another etiology.  I will coordinate with Dr. Tadeo for concomitant neoadjuvant chemoradiation of the right upper lobe lung tumor followed by definitive surgery per Dr. Goldy Carlin.    01/04/2023 - 02/14/2023 - Completed radiation course:  Received 5040 cGy in 28 fractions to the right lung via external beam radiation therapy.    03/20/2023 - CT Abdomen/Pelvis with contrast:  Interval resolution of retroperitoneal lymphadenopathy. No evidence of metastatic disease in the abdomen/pelvis.  Heavy sigmoid diverticulosis, without definite evidence of diverticulitis. There is some residual sigmoid colon wall thickening, the degree of which has significantly decreased from the prior exam.    03/20/2023 - CT Chest with contrast:  Interval significant decrease in size and volume of the biopsy proven pleural malignancy in the right upper lobe, the residual soft tissue component has a maximum diameter of 8.5 mm, compared with 2.8 cm on previous PET/CT. However, there are new spiculated nodules in the right upper lobe, the largest measuring 1.6 cm, some of these areas of nodularity are ill-defined and seen at the branch points of the bronchial tree, could potentially be infectious or inflammatory. The main concern with this appearance is for intrapulmonary metastases however. Repeat chest CT is recommended in 3 months, follow-up  PET/CT would be appropriate if these new spiculated areas persist or increase in size.  No evidence of intrathoracic lymphadenopathy, no left-sided pulmonary nodule or lung mass. Stable changes of paraseptal emphysema in both lungs.    03/27/2023 - PET Scan:  Previously irradiated right chest wall mass demonstrates much less tracer avidity on today's exam, SUV max 3.1 as compared to 11.4 on the earlier PET scan from November 2022, indicating a positive treatment response.  There are new patchy airspace opacities identified more centrally in the right upper lobe, some of which demonstrates PET uptake (SUV max 3.2), suspected radiation pneumonitis.  No scintigraphic evidence of sav or distant metastatic disease.    04/14/2023 - CT Chest without contrast:  Mildly decreased size of RIGHT anterior pleural thickening now 6 mm, previously 8.5 cm.  Decreased size of RIGHT upper lobe pulmonary nodule now 12 mm, previously 16 mm on 03/20/2023.  Similar patchy consolidative opacities at the RIGHT upper lobe, which may represent postradiation treatment changes.  Similar emphysematous changes.    05/04/2023 - Right thoracotomy with right upper lobectomy and mediastinal lymph node dissection by Dr. Winslow Carlin:  Right chest wall margin, excision:  Benign adipose tissue and skeletal muscle.  No malignancy is identified.  Right chest wall margin #2, excision:  Benign skeletal muscle and fibroconnective tissue.  No malignancy is identified.  Right chest wall margin #3, excision:  Benign skeletal muscle and fibroconnective tissue.  No malignancy identified.  Right upper lobe of lung, lobectomy:  Previous invasive adenocarcinoma of pulmonary origin ([id number]).  Inflammatory pseudotumor.  Residual tumor is not present.  The surgical margins are free of tumor.  7 benign perihilar and intraparenchymal lymph nodes.  Level 10 lymph node, excision:  1 benign lymph node with anthracotic pigment.  No malignancy is identified.  Level 10  lymph node, excision:  1 benign lymph node with anthracotic pigment  No malignancy identified.  Level 4R lymph node, excision:  No malignancy identified.  1 benign lymph node.  Level 7 lymph node, excision:  No malignancy identified.  1 benign lymph node with anthracotic pigment.  Level 10 lymph node, excision:  No malignancy identified.  1 benign lymph node with anthracotic pigment.  Level 2R lymph node, excision:  No malignancy identified.  1 benign lymph node    06/19/2023 - Appointment with :  Mr. Jones is a 57-year-old male who is now 6 weeks status post right upper lobectomy for post neoadjuvant treatment treated right upper lobe non-small cell lung cancer.   He underwent neoadjuvant therapy with chemo and immunotherapy due to chest wall invasion, his pathology was consistent with complete response without residual tumor or residual disease in his lymph nodes.   He has done very well from surgery, and I am happy with his progress. He has already seen Dr. Tadeo in follow-up and is going to get adjuvant Keytruda and he will follow with continued surveillance scans.   He does have some shortness of breath; I will discuss this with Dr. Santana, his pulmonologist, but I suspect this will continue to improve.   He is able to do the things he wants to now, just gets a little winded with strenuous work. He has healed well. Without any evidence of problems with his wounds. His pain is well controlled.   We will continue with surveillance with Dr. Tadeo's group and pulmonary follow-up with Dr. Santana.    07/20/2023 - Appointment with Russel Santana MD:  Plan/Recommendations:  Patient is doing well from the pulmonary standpoint and advised him to continue doing Advair and albuterol rescue inhaler for COPD and he is probably having a mild flareup so I prescribed him to take a course of Medrol Dosepak which is a low-dose steroid.  He had right upper lobe lung cancer which is treated by surgery and he  follows up with Dr. Carlin.  He also gets followed up with Dr. Tadeo and is currently getting Keytruda he already had radiation treatment.  He is currently cancer free and doing well.  Continue follow-up with oncology and radiation oncology and Dr. Carlin.  Patient should continues in fluticasone nasal spray, Astelin nasal spray, Zyrtec and Singulair.  All prescription refills were sent to the pharmacy as requested by the patient.  Patient also mentioned he will probably benefit more from a nebulizer so I advised him to start using albuterol nebulizer as needed at least twice a day and a new prescription for albuterol nebulizer machine and solution was sent to the pharmacy.  Patient had mild sleep problems and was advised to have a sleep study for possible sleep apnea but patient is doing much better after his cancer treatment and he did not want to do a sleep study somewhat and I told him to hold off for now.  Patient is already vaccinated for COVID and influenza and pneumonia vaccine is currently not given during this clinic visit and I told him to check with Dr. Tadeo about this vaccination before he takes it.  He will return to pulmonary clinic for follow-up visit in 6 months time or earlier if needed.  We will check on the CT scan which has been ordered by Dr. Tadeo during his next visit      10/17/2023 - Appointment with :  Proceed with cycle #7 of 18 planned treatments with Keytruda today, 9/5/2023, and continue every 3 weeks  Serology ordered includes CBC, CMP, TSH and cortisol for continued monitoring of toxicity associated with immunotherapy that he is receiving  He is to be seen this afternoon at cardiology for loop recorder, cardiac monitor placement and monitoring of previous history of atrial fibrillation, on medication  Follow-up with me in 6 weeks  Return in about 6 weeks (around 11/28/2023) for treatment and see Dr. Tadeo.    11/13/2023 - Appointment with :  On exam, I do not  see evidence for recurrent or metastatic disease at this time. He continues immunotherapy per medical oncology.   We will continue routine follow-up/surveillance as discussed in 6 months with follow up CT scan before visit and I have instructed him to continue to see the other health care providers as per their scheduling.    02/06/2024 - Appointment with Russel Santana MD:  Plan/Recommendations:  Patient is doing well from the pulmonary standpoint.  Advised him to continue using Advair instead of using albuterol as rescue inhaler more frequently and he can use albuterol nebulizer and rescue inhaler as needed.  Prescription refill was sent to the pharmacy.  He had non-small cell lung cancer treated by oncology and Dr. Tadeo is going to see him back in the office this afternoon he is getting immune treatment after getting chemotherapy and radiation treatment and also had a partial lobectomy done by Dr. Carlin.  He is doing well but did not have any recent imaging studies on his CT scan of the chest done by next week for further evaluation of his malignancy.  For his nasal allergy will continue using fluticasone nasal spray, Zyrtec, Astelin nasal spray and montelukast and prescription refills were sent to the pharmacy.  Patient did quit smoking.  Continue smoking abstinence.  He will continue other medications and will continue follow-up with the primary care provider and oncology.  He will return to pulmonary clinic for a follow-up visit in 6 months time or earlier if needed.  Follow up:  6 Months     02/06/2024 - Appointment with :  F/U 11/13/2023 with Naif CASTAÑEDA with Dr Anton Kinney [facility] RAD ONC  status post completion of radiation therapy to the lung and presents to our clinic today for surveillance exam and to review imaging. Diagnosed with right upper lobe of the lung. He completed 5040 cGy in 28 fractions to the right lung on 02/14/2023.   On exam, I do not see evidence for recurrent or  metastatic disease at this time. He continues immunotherapy per medical oncology. We will continue routine follow-up/surveillance as discussed in 6 months with follow up CT scan before visit and I have instructed him to continue to see the other health care providers as per their scheduling  Physical examination is without new developments or findings. His heart rhythm is regular and normal.  He is due to have CT scan of the chest. This is ordered today.  PLAN:  Proceed with cycle #12 of 18 planned treatments with Keytruda today, 2/6/2024, and continue every 3 weeks   CT scan of the chest is ordered today  Serology ordered includes CBC, CMP, TSH and cortisol for continued monitoring of toxicity associated with immunotherapy that he is receiving to monitor for toxicity  Follow-up with me in 6 weeks    02/21/2024 - CT Chest without contrast:  Chronic lung changes.  Posttreatment changes with no sign of residual or recurrent neoplasm.    05/13/2024 - Appointment with :  Follow up in 6 months    06/19/2024 - CT Angiogram Chest:  No pulmonary embolism identified.  Chronic changes with emphysema and partial pneumonectomy on the right.  Bronchial wall thickening could indicate mild bronchitis.    06/27/2024 - CT Chest with contrast:  No evidence for recurrent disease in the chest.     06/27/2024 - CT Abdomen/Pelvis with contrast:  No lymphadenopathy.   1.4 cm fat containing lesion in the left lower quadrant favored to represent fat necrosis.   Diffuse hepatic steatosis.  Colonic diverticulosis.     06/27/2024 - PET Scan:  The PET CT examination demonstrates a generally physiologic distribution of activity in the thorax, as described in the report.  The patchy airspace disease seen in the right upper lobe on the prior exam is no longer evident and the right pleural mass    seen on the prior exam has resolved.   The examination demonstrates a generally physiologic distribution of activity in the abdomen and pelvis,  as described in the report.   The examination demonstrates a generally physiologic distribution of activity in the included portions of the head and neck, as described in the report.   The examination demonstrates a generally physiologic distribution of activity in the included portions of the skeleton and extremeties, as described in the report.     08/30/2024 - Port removal by Dr. Kim Bonlila.     11/05/2024 - Appointment with :  ASSESSMENT AND PLAN:  Mr. 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   Robotic assisted laparoscopic prostatectomy by Dr. Jj September 2020 for PT2N0 [place] disease with tumor approaching the right posterior margin.  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 5040 cGy over 28 treatment fractions.   Neoadjuvant cisplatin 75 mg/m2, Alimta 500 mg/m2 and Opdivo 360 mg, cycle #1 initiated on 1/5/2023. Cycle #3 delivered on 2/16/2023.  Right thoracotomy with right upper lobectomy and mediastinal lymph node dissection by Dr. Goldy Carlin on 5/4/2023.   Final pathology was negative.   The primary tumor and all mediastinal lymph nodes were also negative documenting complete response to neoadjuvant therapy.  Lalo received cycle #1 of adjuvant immunotherapy with Keytruda on 6/12/2023   Lalo completed the final cycle #18 planned adjuvant immunotherapy treatments with Keytruda on 6/11/2024.  Lalo developed right shoulder/scapular pain 2 months ago. When lifting his arm, swwinging a golf club, when he is active, not at rest.   Physical exam today, 11/5/2024:  No  evidence of palpable supraclavicular or infraclavicular lymphadenopathy.  The right chest wall documents the right upper posterior chest incision that is healing well and chest tube sites etc., all healed. On auscultation both lungs are clear with good breath sounds even in the right upper lung field.  Heart regular rate rhythm normal S1-S2, no S3  Abdomen soft and benign  Extremities without edema  Skin: Rash primarily over the back area. It is semidiffuse with punctate lesions everywhere.   CBC today 11/5/2024 reveals a WBC of 4.20 Hgb is 15.4 with an MCV of 86.7 and platelet count of 159,000.   OV 5/13/24 with Naif CASTAÑEDA [facility] RAD ONC  status post completion of radiation therapy to the lung and presents to our clinic today for surveillance exam and to review imaging. Diagnosed with right upper lobe of the lung. He completed 5040 cGy in 28 fractions to the right lung on 02/14/2023.   CT-scan of the chest on 02/21/2024 revealed chronic lung changes. Posttreatment changes with no sign of residual or recurrent neoplasm.  On exam, I do not see evidence for recurrent or metastatic disease at this time. He has 2 cycles of keytruda remaining. Will defer surveillance imaging to medical oncology. We will continue routine follow-up/surveillance as discussed in 6 months. I have instructed him to continue to see the other health care providers as per their scheduling.  Return in about 6 months (around 11/13/2024).   Presented to [facility] ER 6/19/24; treated for rhinovirus.  CTAngiogram Chest 6/19/24 at [facility], compared to 2/21/24  No pulmonary embolism identified.   Chronic changes with emphysema and partial pneumonectomy on the right.   Bronchial wall thickening could indicate mild bronchitis.   CT CHEST W CONTRAST 6/27/24 at [facility], compared to Chest CT 02/21/2024, 06/19/2024. PET CT 03/27/2023, 11/01/2022.   Right-sided chest port present   Right upper lobectomy noted. Emphysema. Scattered bullae in the left lung. No suspicious  pulmonary nodule identified.   Internal fixation hardware right fifth rib. No discrete osseous lesion detected.   No evidence for recurrent disease in the chest   CT ABDOMEN AND PELVIS WITH CONTRAST on 6/27/24 at [facility], compared to CT chest 06/27/2024   No lymphadenopathy.  1.4 cm fat containing lesion in the left lower quadrant favored to represent fat necrosis.  Diffuse hepatic steatosis.  Colonic diverticulosis.  Postsurgical changes of L4-L5 fusion. Mild degenerative changes of the spine   PET CT SKULL BASE TO MID THIGH on 6/27/24 at [facility] compared to 03/27/2023. CT 06/27/2024   The right pleural mass seen on the prior examination with SUV max of about 3.1 (2.76 with current software) has essentially resolved  The patchy airspace disease with SUV max of up to 3.2 has similarly resolved.   No new significantly FDG avid nodules are seen within the lung fields.   No new significantly FDG avid lymph nodes are seen within the niels or mediastinum.   The examination demonstrates a generally physiologic distribution of activity in the abdomen and pelvis, included portions of the head and neck, and included portions of the skeleton and extremeties.  Port removal by Dr. Kim Bonilla on 8/30/2024 at [facility]   Lalo developed right shoulder/scapular pain 2 months ago. When lifting his arm, swwinging a golf club, when he is active, not at rest.   Exam of right scapular area has a mild degree of muscle fullness on the right compared to the left. Some discomfort with pressure?  PLAN:  Serology ordered includes CBC, CMP, TSH and cortisol for continued monitoring of toxicity associated with immunotherapy that he received to monitor for toxicity, specifically toxicity related to the thyroid.  CT chest to evaluate pain  Follow-up with me in 4 months with a chest x-ray prior to return.         History obtained from  {Blank multiple:85053}    PAST MEDICAL HISTORY  Past Medical History:   Diagnosis Date    Abnormal PET scan of lung      Nov 2022    Allergic rhinitis     Arthritis     Atrial fibrillation     Bronchiectasis     Bronchitis     Cancer     prostate    Chronic bronchitis     Chronic pain     COPD (chronic obstructive pulmonary disease)     GERD (gastroesophageal reflux disease)     Implantable loop recorder present     Lung cancer     Pneumonia     Prostatitis, acute     S/P ACL reconstruction     Septic shock due to urinary tract infection     Sinusitis     Strep throat     UTI (urinary tract infection)       PAST SURGICAL HISTORY  Past Surgical History:   Procedure Laterality Date    ANKLE SURGERY Right     BACK SURGERY      X2    ENDOSCOPY N/A 01/10/2022    Procedure: ESOPHAGOGASTRODUODENOSCOPY WITH ANESTHESIA;  Surgeon: Tucker Bright MD;  Location:  [facility] OR;  Service: Gastroenterology;  Laterality: N/A;  pre food bolus  post food bolus      KNEE ACL RECONSTRUCTION Left     NASAL SEPTUM SURGERY      PENILE PROSTHESIS IMPLANT N/A 03/15/2022    Procedure: 3-PIECE INFLATABLE PENILE PROSTHESIS PLACEMENT;  Surgeon: Trae Clark MD;  Location:  [facility] OR;  Service: Urology;  Laterality: N/A;    PROSTATE ULTRASOUND BIOPSY N/A 02/18/2020    Procedure: PROSTATE  BIOPSY;  Surgeon: Trae Clark MD;  Location:  [facility];  Service: Urology;  Laterality: N/A;    PROSTATECTOMY N/A 09/22/2020    Procedure: RADICAL PROSTATECTOMY LAPAROSCOPIC WITH DAVINCI ROBOT;  Surgeon: Nuno Jj MD;  Location:  [facility] OR;  Service: DaVinci;  Laterality: N/A;    SHOULDER SURGERY Left     X 4    TENNIS ELBOW RELEASE Bilateral 04/14/2011    THORACOTOMY Right 05/04/2023    Procedure: RIGHT THORACOTOMY WITH CHEST WALL RESECTION, MEDIASTINAL LYMPH NODE DISSECTION, RIGHT UPPER LOBECTOMY;  Surgeon: Goldy Carlin MD;  Location:  [facility] OR;  Service: Cardiothoracic;  Laterality: Right;    VENOUS ACCESS DEVICE (PORT) INSERTION N/A 12/30/2022    Procedure: Single Lumen Port-a-cath insertion with flouroscopy;  Surgeon: Kim Bonilla MD;   Location:  [facility];  Service: General;  Laterality: N/A;    VENOUS ACCESS DEVICE (PORT) REMOVAL N/A 2024    Procedure: REMOVAL VENOUS ACCESS DEVICE;  Surgeon: Kim Bonilla MD;  Location:  [facility] OR;  Service: General;  Laterality: N/A;      FAMILY HISTORY  family history includes Cancer in his mother.    SOCIAL HISTORY  Social History     Tobacco Use    Smoking status: Former     Current packs/day: 0.00     Average packs/day: 1 pack/day for 35.8 years (35.8 ttl pk-yrs)     Types: Cigarettes     Start date: 1987     Quit date: 2022     Years since quittin.9     Passive exposure: Past    Smokeless tobacco: Former     Types: Snuff     Quit date:    Vaping Use    Vaping status: Former   Substance Use Topics    Alcohol use: No     Comment: 0    Drug use: Not Currently     Frequency: 7.0 times per week     Types: Hydrocodone, Marijuana, Oxycodone     Comment: Edible gummies - 1 per day     ALLERGIES  Adhesive tape     MEDICATIONS    Current Outpatient Medications:     acetaminophen (Tylenol) 325 MG tablet, Take 3 tablets by mouth Every 8 (Eight) Hours. Take every 8 hours for 3 days then take prn as needed., Disp: , Rfl:     acetaminophen (TYLENOL) 500 MG tablet, Take 2 tablets by mouth Every 6 (Six) Hours As Needed for Mild Pain., Disp: , Rfl:     Ascorbic Acid (VITAMIN C PO), Take  by mouth Daily., Disp: , Rfl:     Azelastine HCl 137 MCG/SPRAY solution, 2 sprays into the nostril(s) as directed by provider 2 (Two) Times a Day., Disp: 30 mL, Rfl: 5    Calcium Citrate 150 MG capsule, Take  by mouth Daily., Disp: , Rfl:     cetirizine (zyrTEC) 10 MG tablet, Take 1 tablet by mouth Daily., Disp: 90 tablet, Rfl: 3    Cholecalciferol (VITAMIN D-3 PO), Take  by mouth Daily., Disp: , Rfl:     Cyanocobalamin (VITAMIN B 12 PO), Take  by mouth Daily., Disp: , Rfl:     esomeprazole (nexIUM) 40 MG capsule, Take 1 capsule by mouth Every Morning Before Breakfast., Disp: , Rfl:     Fluticasone-Salmeterol  (ADVAIR/WIXELA) 250-50 MCG/ACT DISKUS, INHALE ONE PUFF BY MOUTH TWICE DAILY, Disp: 60 each, Rfl: 5    gabapentin (NEURONTIN) 800 MG tablet, Take 1 tablet by mouth 3 (Three) Times a Day., Disp: , Rfl:     hydrOXYzine pamoate (VISTARIL) 50 MG capsule, Take 1 capsule by mouth Daily., Disp: , Rfl:     ibuprofen (ADVIL,MOTRIN) 800 MG tablet, 1 tablet with food or milk as needed Orally prn, Disp: , Rfl:     ibuprofen (Motrin IB) 200 MG tablet, Take 3 tablets by mouth Every 8 (Eight) Hours. Take every 8 hours for three days then take as needed., Disp: , Rfl:     montelukast (SINGULAIR) 10 MG tablet, Take 1 tablet by mouth Every Night., Disp: 90 tablet, Rfl: 3    multivitamin with minerals tablet tablet, Take 1 tablet by mouth Daily., Disp: , Rfl:     NON FORMULARY, Take 1 tablet by mouth Daily. Marijuana Gummies, Disp: , Rfl:     Ventolin  (90 Base) MCG/ACT inhaler, Inhale 2 puffs Every 4 (Four) Hours As Needed for Wheezing., Disp: 18 g, Rfl: 5    Zinc 50 MG tablet, Take  by mouth Daily., Disp: , Rfl:     Current outpatient and discharge medications have been reconciled for the patient.  Reviewed by: MADDY Donnelly    The following portions of the patient's history were reviewed and updated as appropriate: allergies, current medications, past family history, past medical history, past social history, past surgical history and problem list.    REVIEW OF SYSTEMS  Review of Systems    PHYSICAL EXAM  VITAL SIGNS: There were no vitals filed for this visit.    Physical Exam     Performance Status: ECOG {[facility] ECOG Status:00689}    Clinical Quality Measures  - Pain Documented by Standardized Tool, FPS Lalo Jones Sr. reports a pain score of .  Given his pain assessment as noted, treatment options were discussed and the following options were decided upon as a follow-up plan to address the patient's pain: {[facility] PAIN ASSESSMENT FOLLOW-UP PLAN:60147}.  Pain Medications               acetaminophen  (Tylenol) 325 MG tablet Take 3 tablets by mouth Every 8 (Eight) Hours. Take every 8 hours for 3 days then take prn as needed.    acetaminophen (TYLENOL) 500 MG tablet Take 2 tablets by mouth Every 6 (Six) Hours As Needed for Mild Pain.    gabapentin (NEURONTIN) 800 MG tablet Take 1 tablet by mouth 3 (Three) Times a Day.    ibuprofen (ADVIL,MOTRIN) 800 MG tablet 1 tablet with food or milk as needed Orally prn    ibuprofen (Motrin IB) 200 MG tablet Take 3 tablets by mouth Every 8 (Eight) Hours. Take every 8 hours for three days then take as needed.          - Body Mass Index Screening and Follow-Up Plan  {BMI Follow up (Optional):50771}    - Tobacco Use: Screening and Cessation Intervention  Social History    Tobacco Use      Smoking status: Former        Packs/day: 0.00        Years: 1 pack/day for 35.8 years (35.8 ttl pk-yrs)        Types: Cigarettes        Start date: 1987        Quit date: 2022        Years since quittin.9        Passive exposure: Past      Smokeless tobacco: Former        Types: Snuff        Quit date:     - Advanced Care Planning Advance Care Planning   ACP discussion was held with the patient during this visit. Patient does not have an advance directive, information provided.    - PHQ-2 Depression Screening  Little interest or pleasure in doing things?     Feeling down, depressed, or hopeless?     PHQ-2 Total Score       ASSESSMENT AND PLAN  1. Malignant neoplasm of right lung, unspecified part of lung    2. Chronic obstructive pulmonary disease, unspecified COPD type    3. History of radiation therapy    4. Former smoker      No orders of the defined types were placed in this encounter.    RECOMMENDATIONS: Lalo Jones Sr. is status post completion of radiation therapy to the lung and presents to our clinic today for surveillance exam and to review imaging. Diagnosed with right upper lobe of the lung. He completed 5040 cGy in 28 fractions to the right lung on 2023.      CT-scan of the chest on 06/27/2024 revealed no evidence for recurrent disease in the chest.     CT-Scan of the abdomen/pelvis on 06/27/2024 revealed no lymphadenopathy.  1.4 cm fat containing lesion in the left lower quadrant favored to represent fat necrosis. Diffuse hepatic steatosis. Colonic diverticulosis.     PET Scan on 06/27/2024 revealed the PET CT examination demonstrates a generally physiologic distribution of activity in the thorax, as described in the report.  The patchy airspace disease seen in the right upper lobe on the prior exam is no longer evident and the right pleural mass seen on the prior exam has resolved. The examination demonstrates a generally physiologic distribution of activity in the abdomen and pelvis, as described in the report. The examination demonstrates a generally physiologic distribution of activity in the included portions of the head and neck, as described in the report. The examination demonstrates a generally physiologic distribution of activity in the included portions of the skeleton and extremeties, as described in the report.     On exam, I do not see evidence for recurrent or metastatic disease at this time. We will continue routine follow-up/surveillance as discussed in *** with follow up CT scan before visit and I have instructed him to continue to see the other health care providers as per their scheduling.    There are no Patient Instructions on file for this visit.    No follow-ups on file.    {Time Spent (Optional):89944}  Naif Jones, APRN  11/13/2024

## 2024-11-12 ENCOUNTER — HOSPITAL ENCOUNTER (OUTPATIENT)
Dept: RADIATION ONCOLOGY | Facility: HOSPITAL | Age: 59
Setting detail: RADIATION/ONCOLOGY SERIES
End: 2024-11-12
Payer: COMMERCIAL

## 2024-11-13 ENCOUNTER — APPOINTMENT (OUTPATIENT)
Age: 59
End: 2024-11-13
Payer: COMMERCIAL

## 2024-11-13 DIAGNOSIS — Z87.891 FORMER SMOKER: ICD-10-CM

## 2024-11-13 DIAGNOSIS — Z92.3 HISTORY OF RADIATION THERAPY: ICD-10-CM

## 2024-11-13 DIAGNOSIS — C34.91 MALIGNANT NEOPLASM OF RIGHT LUNG, UNSPECIFIED PART OF LUNG: Primary | ICD-10-CM

## 2024-11-13 DIAGNOSIS — J44.9 CHRONIC OBSTRUCTIVE PULMONARY DISEASE, UNSPECIFIED COPD TYPE: ICD-10-CM

## 2024-11-19 NOTE — PROGRESS NOTES
[facility]  Radiation Oncology Clinic   Anton Sapp MD, FACR  Naif Jones APRN  _______________________________________________  [facility]  Department of Radiation Oncology  [address] [place] [id number]  Office: [phone]  Fax: [phone]    DATE: 11/21/2024  PATIENT: Lalo Jones Sr.  1965                         MEDICAL RECORD #: [id number]    1. History of primary non-small cell carcinoma of right lung    2. Chronic obstructive pulmonary disease, unspecified COPD type    3. History of radiation therapy     4. Former smoker                                           REASON FOR VISIT:    Chief Complaint   Patient presents with    Lung Cancer     Reason for Follow up Visit:  Lalo Jones Sr. is a very pleasant 59 y.o. patient that completed radiation to the lung and returns to the clinic today for routine follow up exam. Denies appetite change, unexpected weight change, nasuea/vomiting, diarrhea, light-headedness, weakness, and headaches. He continues to follow with .     History of Present Illness:  Diagnosed with right upper lobe of the lung. He completed 5040 cGy in 28 fractions to the right lung on 02/14/2023.     05/19/2022 - CT Chest Low Dose:  4 mm LEFT lower lobe pulmonary nodule.  Moderate emphysema.  3.3 x 1.3 cm masslike area of pleural thickening in the RIGHT anterior upper chest. Recommend a follow-up chest CT in 3 months to ensure stability.    10/17/2022 - CT chest without contrast, low-dose protocol at [facility]:  4.7 cm x 4 cm x 1.6 cm abnormal area of pleural thickening in the periphery of the RUL with slightly irregular margins  Moderate paraseptal emphysematous changes with scattered bullae in both lungs  Lung-RADS - 4B    10/24/2022 - Appointment with Russel Santana MD:   I explained the abnormal CT scan results from May which is done in the [facility] to the patient and also discussed with the radiologist from [facility] in [place] who reviewed the current CT scan of the chest done earlier this month.  This shows the right upper lobe pleural-based thickening or mass and left upper lobe nodule.  Due to his smoking history the possibility of malignancy cannot be ruled out.  Discussing different options I ordered a PET scan and a follow-up CT scan of the chest in 3 months time.  If the PET scan is positive a CT-guided needle biopsy would be best option for the right upper lobe pleural-based lung mass which is not reachable by bronchoscopy.  If PET scan is  negative will wait for the neck CT scan of the chest and make further recommendations.  Patient is agreeable with the plan.  In the meantime I will try to get the CD from from the outside hospital with the latest CT scan of the chest for further comparison.  Patient definitely has chronic obstructive pulmonary disease and he was started on Wixela 250/50 1 puff twice a day and albuterol rescue inhaler will be continued.  His nasal allergy he was started on fluticasone nasal spray and Singulair.  He told me he is already using Zyrtec mostly over-the-counter which will be continued.  All medications were sent to the pharmacy.  Lalo Jones  reports that he has been smoking cigarettes. He started smoking about 35 years ago. He has a 35.00 pack-year smoking history. He has never used smokeless tobacco.. I have educated him on the risk of diseases from using tobacco products such as cancer, COPD and heart disease.   I advised him to quit and he is willing to quit. We have discussed the following method/s for tobacco cessation:  Counseling.  Together we have set a quit date for 2 weeks from today.  He will follow up with me in 3 months or sooner to check on his progress.I spent 7 minutes counseling the patient  Patient is advised to have a sleep study due to sleep disturbances and most likely have sleep apnea and may need his CPAP.  He is also advised to get a pulmonary function test before the next clinic visit for his underlying COPD in addition to the PET scan and CT scan which are already ordered.  He will return to the pulm clinic for follow-up visit in 3 months time or earlier if needed     11/01/2022 - PET Scan:  Slight increase in size of 4.3 x 1.9 cm RIGHT anterior upper chest masslike pleural thickening which is markedly hypermetabolic. Favor this to represent a neoplastic process with differential including lymphoma. Primary pleural neoplasm and metastatic disease are also in the  differential.  Hypermetabolic upper abdominal retroperitoneal lymphadenopathy. Suspect these lymph nodes related to the same process as the RIGHT anterior pleural lesion.  No other abnormal hypermetabolic activity.    11/16/2022 - Right upper chest/pleural mass, core biopsies:  Adenocarcinoma of pulmonary origin.    12/08/2022 - Appointment with :  [facility]  CT scan of the abdomen and pelvis  Bone scan  MRI of the abdomen  Thoracic surgery consultation with Dr. Goldy Carlin  Call placed and spoke to Dr. Goldy Carlin at length  Radiation therapy consult for intradepartmental consultation  Consult surgical group at [facility] for port placement and consideration of a diagnostic laparoscopy for lymph node sampling.  Follow-up with me upon completion of scans and for further patient and family conference  Return for Follow Up with [facility] scans prior.    12/15/2022 - MRI Abdomen with and without contrast:  No change in borderline prominent 9 mm short axis dimension upper abdominal retroperitoneal lymph node, similar to prior PET/CT were it was hypermetabolic. No new or enlarging lymph nodes in the abdomen.  Splenomegaly.    12/15/2022 - Bone Scan:  No evidence of bone metastasis.    12/15/2022 - CT Abdomen/Pelvis without contrast:  There is abnormal wall thickening of the distal sigmoid colon which does contain multiple diverticuli. Findings may be seen with diverticulitis, however a regional neoplastic process also considered. Follow-up sigmoidoscopy might be considered for further evaluation.  No morphologically pathologic lymphadenopathy identified. The two hypermetabolic retroperitoneal lymph nodes within the upper abdomen on the PET exam of 11/01/2022 remain similar in size measuring only 7 mm in short axis.    12/22/2022 - Appointment with :  Note pending    12/22/2022 - Documentation per :  I spoke with Dr. Tadeo regarding this patient today.  He has a very unusual pattern of malignancy  with a right upper lobe peripheral adenocarcinoma which was biopsied with CT-guided biopsy.  He also has intra-abdominal hypermetabolic lymphadenopathy which is clinically suspicious for neoplasm.  This would be an unusual metastatic site from a primary lung carcinoma.  The patient also has previous history of prostate carcinoma.  I have discussed this with Dr. Tadeo and I believe if it is possible perhaps robotic biopsy of the periaortic lymphadenopathy could be considered.  We will also discussed with Dr. Goldy Carlin for consideration of definitive treatment of the primary right upper lobe lung tumor.  We will see the patient in consultation today and coordinate with Dr. Tadeo and other physicians to develop a coordinated treatment plan.    12/22/2022 - Consult with :  Following this discussion and in consideration of the diagnostic data/evaluation of the patient, I recommended referral to CT surgery for surgical considerations of the lung nodule. He has been referred to Dr. Carlin. He has also been referred to general surgery for biopsy considerations of the intra-abdominal lymph nodes.      12/22/2022 - Documentation per :  I spoke with Dr. Carlin this afternoon and he has had a chance to review the radiographs on this patient.  He believes that there is likely chest wall invasion and recommends neoadjuvant chemoradiation prior to definitive surgery of the right upper lobe tumor.  I believe we can treat this lesion with tangential portals and avoid any significant dose to the bronchus, thereby avoiding any potential issues with healing.  I would anticipate a dose of 5040 cGy in 28 treatment fractions to the lung lesion as neoadjuvant treatment along with concomitant chemotherapy and/or immunotherapy.  In addition, we will determine if Dr. Kim Bonilla is able to sample the upper abdominal lymph nodes to determine the etiology of that finding on the PET scan.    12/23/2022 - Telephone  encounter with :  I spoke with Dr. Tadeo today and reviewed my conversation with Dr. Carlin from last night.  We will plan to proceed with neoadjuvant chemoradiation to the lung lesion.  This will be somewhat dependent upon the potential for Dr. Bonilla to biopsy the upper abdominal lymph nodes.  However, I do believe we will be able to treat the chest wall lesion for surgical resection and if necessary we can come back and treat mediastinal and upper abdominal lymph nodes if we have evidence of metastatic disease.  We will plan to see the patient back on Tuesday, December 27 at 11:00 for simulation.    12/27/2022 - Appointment with :  He met with Dr. Carlin for surgical consirations of the right upper lobe lesion. He believes that there is likely chest wall invasion and recommends neoadjuvant chemoradiation prior to definitive surgery of the right upper lobe tumor. I anticipate a dose of 5040 cGy in 28 treatment fractions to the lung lesion as neoadjuvant treatment along with concomitant chemotherapy and/or immunotherapy.  In addition, we will determine if Dr. Bonilla is able to sample the upper abdominal lymph nodes to determine the etiology of that finding on the PET scan. He is scheduled to see her later this week.    01/01/2023 - Documentation per :  I presented this patient at tumor conference.  Dr. Kim Bonilla was in attendance unfortunately she is not able to biopsy the upper abdominal lymph nodes due to the proximity to adjacent aorta and superior mesenteric artery.  I also reviewed the recommendation of Dr. Goldy Carlin for neoadjuvant chemoradiation followed by definitive surgery for the right upper lobe lung tumor.  Discussion at tumor conference surrounded management of the upper abdominal lymph nodes.  The consensus was at this time to proceed with treatment of the primary known adenocarcinoma lung and base further treatment recommendations upon findings at definitive  surgery.  Abdominal lymph nodes serial radiographs is recommended for follow-up.  It was agreed that this is an unlikely metastatic distribution for right upper lobe lung carcinoma in the lymph nodes may represent another etiology.  I will coordinate with Dr. Tadeo for concomitant neoadjuvant chemoradiation of the right upper lobe lung tumor followed by definitive surgery per Dr. Goldy Carlin.    01/04/2023 - 02/14/2023 - Completed radiation course:  Received 5040 cGy in 28 fractions to the right lung via external beam radiation therapy.    03/20/2023 - CT Abdomen/Pelvis with contrast:  Interval resolution of retroperitoneal lymphadenopathy. No evidence of metastatic disease in the abdomen/pelvis.  Heavy sigmoid diverticulosis, without definite evidence of diverticulitis. There is some residual sigmoid colon wall thickening, the degree of which has significantly decreased from the prior exam.    03/20/2023 - CT Chest with contrast:  Interval significant decrease in size and volume of the biopsy proven pleural malignancy in the right upper lobe, the residual soft tissue component has a maximum diameter of 8.5 mm, compared with 2.8 cm on previous PET/CT. However, there are new spiculated nodules in the right upper lobe, the largest measuring 1.6 cm, some of these areas of nodularity are ill-defined and seen at the branch points of the bronchial tree, could potentially be infectious or inflammatory. The main concern with this appearance is for intrapulmonary metastases however. Repeat chest CT is recommended in 3 months, follow-up PET/CT would be appropriate if these new spiculated areas persist or increase in size.  No evidence of intrathoracic lymphadenopathy, no left-sided pulmonary nodule or lung mass. Stable changes of paraseptal emphysema in both lungs.    03/27/2023 - PET Scan:  Previously irradiated right chest wall mass demonstrates much less tracer avidity on today's exam, SUV max 3.1 as compared to 11.4 on  the earlier PET scan from November 2022, indicating a positive treatment response.  There are new patchy airspace opacities identified more centrally in the right upper lobe, some of which demonstrates PET uptake (SUV max 3.2), suspected radiation pneumonitis.  No scintigraphic evidence of sav or distant metastatic disease.    04/14/2023 - CT Chest without contrast:  Mildly decreased size of RIGHT anterior pleural thickening now 6 mm, previously 8.5 cm.  Decreased size of RIGHT upper lobe pulmonary nodule now 12 mm, previously 16 mm on 03/20/2023.  Similar patchy consolidative opacities at the RIGHT upper lobe, which may represent postradiation treatment changes.  Similar emphysematous changes.    05/04/2023 - Right thoracotomy with right upper lobectomy and mediastinal lymph node dissection by Dr. Goldy Carlin:  Right chest wall margin, excision:  Benign adipose tissue and skeletal muscle.  No malignancy is identified.  Right chest wall margin #2, excision:  Benign skeletal muscle and fibroconnective tissue.  No malignancy is identified.  Right chest wall margin #3, excision:  Benign skeletal muscle and fibroconnective tissue.  No malignancy identified.  Right upper lobe of lung, lobectomy:  Previous invasive adenocarcinoma of pulmonary origin ([id number]).  Inflammatory pseudotumor.  Residual tumor is not present.  The surgical margins are free of tumor.  7 benign perihilar and intraparenchymal lymph nodes.  Level 10 lymph node, excision:  1 benign lymph node with anthracotic pigment.  No malignancy is identified.  Level 10 lymph node, excision:  1 benign lymph node with anthracotic pigment  No malignancy identified.  Level 4R lymph node, excision:  No malignancy identified.  1 benign lymph node.  Level 7 lymph node, excision:  No malignancy identified.  1 benign lymph node with anthracotic pigment.  Level 10 lymph node, excision:  No malignancy identified.  1 benign lymph node with anthracotic pigment.  Level 2R  lymph node, excision:  No malignancy identified.  1 benign lymph node    06/19/2023 - Appointment with :  Mr. Jones is a 57-year-old male who is now 6 weeks status post right upper lobectomy for post neoadjuvant treatment treated right upper lobe non-small cell lung cancer.   He underwent neoadjuvant therapy with chemo and immunotherapy due to chest wall invasion, his pathology was consistent with complete response without residual tumor or residual disease in his lymph nodes.   He has done very well from surgery, and I am happy with his progress. He has already seen Dr. Tadeo in follow-up and is going to get adjuvant Keytruda and he will follow with continued surveillance scans.   He does have some shortness of breath; I will discuss this with Dr. Santana, his pulmonologist, but I suspect this will continue to improve.   He is able to do the things he wants to now, just gets a little winded with strenuous work. He has healed well. Without any evidence of problems with his wounds. His pain is well controlled.   We will continue with surveillance with Dr. aTdeo's group and pulmonary follow-up with Dr. Santana.    07/20/2023 - Appointment with Russel Santana MD:  Plan/Recommendations:  Patient is doing well from the pulmonary standpoint and advised him to continue doing Advair and albuterol rescue inhaler for COPD and he is probably having a mild flareup so I prescribed him to take a course of Medrol Dosepak which is a low-dose steroid.  He had right upper lobe lung cancer which is treated by surgery and he follows up with Dr. Carlin.  He also gets followed up with Dr. Tadeo and is currently getting Keytruda he already had radiation treatment.  He is currently cancer free and doing well.  Continue follow-up with oncology and radiation oncology and Dr. Carlin.  Patient should continues in fluticasone nasal spray, Astelin nasal spray, Zyrtec and Singulair.  All prescription refills were sent to the  pharmacy as requested by the patient.  Patient also mentioned he will probably benefit more from a nebulizer so I advised him to start using albuterol nebulizer as needed at least twice a day and a new prescription for albuterol nebulizer machine and solution was sent to the pharmacy.  Patient had mild sleep problems and was advised to have a sleep study for possible sleep apnea but patient is doing much better after his cancer treatment and he did not want to do a sleep study somewhat and I told him to hold off for now.  Patient is already vaccinated for COVID and influenza and pneumonia vaccine is currently not given during this clinic visit and I told him to check with Dr. Tadeo about this vaccination before he takes it.  He will return to pulmonary clinic for follow-up visit in 6 months time or earlier if needed.  We will check on the CT scan which has been ordered by Dr. Tadeo during his next visit      10/17/2023 - Appointment with :  Proceed with cycle #7 of 18 planned treatments with Keytruda today, 9/5/2023, and continue every 3 weeks  Serology ordered includes CBC, CMP, TSH and cortisol for continued monitoring of toxicity associated with immunotherapy that he is receiving  He is to be seen this afternoon at cardiology for loop recorder, cardiac monitor placement and monitoring of previous history of atrial fibrillation, on medication  Follow-up with me in 6 weeks  Return in about 6 weeks (around 11/28/2023) for treatment and see Dr. Tadeo.    11/13/2023 - Appointment with :  On exam, I do not see evidence for recurrent or metastatic disease at this time. He continues immunotherapy per medical oncology.   We will continue routine follow-up/surveillance as discussed in 6 months with follow up CT scan before visit and I have instructed him to continue to see the other health care providers as per their scheduling.    02/06/2024 - Appointment with Russel Santana,  MD:  Plan/Recommendations:  Patient is doing well from the pulmonary standpoint.  Advised him to continue using Advair instead of using albuterol as rescue inhaler more frequently and he can use albuterol nebulizer and rescue inhaler as needed.  Prescription refill was sent to the pharmacy.  He had non-small cell lung cancer treated by oncology and Dr. Tadeo is going to see him back in the office this afternoon he is getting immune treatment after getting chemotherapy and radiation treatment and also had a partial lobectomy done by Dr. Carlin.  He is doing well but did not have any recent imaging studies on his CT scan of the chest done by next week for further evaluation of his malignancy.  For his nasal allergy will continue using fluticasone nasal spray, Zyrtec, Astelin nasal spray and montelukast and prescription refills were sent to the pharmacy.  Patient did quit smoking.  Continue smoking abstinence.  He will continue other medications and will continue follow-up with the primary care provider and oncology.  He will return to pulmonary clinic for a follow-up visit in 6 months time or earlier if needed.  Follow up:  6 Months     02/06/2024 - Appointment with :  F/U 11/13/2023 with Naif CASTAÑEDA with Dr Anton Kinney [facility] RAD ONC  status post completion of radiation therapy to the lung and presents to our clinic today for surveillance exam and to review imaging. Diagnosed with right upper lobe of the lung. He completed 5040 cGy in 28 fractions to the right lung on 02/14/2023.   On exam, I do not see evidence for recurrent or metastatic disease at this time. He continues immunotherapy per medical oncology. We will continue routine follow-up/surveillance as discussed in 6 months with follow up CT scan before visit and I have instructed him to continue to see the other health care providers as per their scheduling  Physical examination is without new developments or findings. His heart rhythm is  regular and normal.  He is due to have CT scan of the chest. This is ordered today.  PLAN:  Proceed with cycle #12 of 18 planned treatments with Keytruda today, 2/6/2024, and continue every 3 weeks   CT scan of the chest is ordered today  Serology ordered includes CBC, CMP, TSH and cortisol for continued monitoring of toxicity associated with immunotherapy that he is receiving to monitor for toxicity  Follow-up with me in 6 weeks    02/21/2024 - CT Chest without contrast:  Chronic lung changes.  Posttreatment changes with no sign of residual or recurrent neoplasm.    05/13/2024 - Appointment with :  Follow up in 6 months    06/19/2024 - CT Angiogram Chest:  No pulmonary embolism identified.  Chronic changes with emphysema and partial pneumonectomy on the right.  Bronchial wall thickening could indicate mild bronchitis.    06/27/2024 - CT Chest with contrast:  No evidence for recurrent disease in the chest.     06/27/2024 - CT Abdomen/Pelvis with contrast:  No lymphadenopathy.   1.4 cm fat containing lesion in the left lower quadrant favored to represent fat necrosis.   Diffuse hepatic steatosis.  Colonic diverticulosis.     06/27/2024 - PET Scan:  The PET CT examination demonstrates a generally physiologic distribution of activity in the thorax, as described in the report.  The patchy airspace disease seen in the right upper lobe on the prior exam is no longer evident and the right pleural mass    seen on the prior exam has resolved.   The examination demonstrates a generally physiologic distribution of activity in the abdomen and pelvis, as described in the report.   The examination demonstrates a generally physiologic distribution of activity in the included portions of the head and neck, as described in the report.   The examination demonstrates a generally physiologic distribution of activity in the included portions of the skeleton and extremeties, as described in the report.     08/30/2024 - Port  removal by Dr. Kim Bonilla.     11/05/2024 - Appointment with :  ASSESSMENT AND PLAN:  Mr. 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   Robotic assisted laparoscopic prostatectomy by Dr. Jj September 2020 for PT2N0 Consuelo 7 disease with tumor approaching the right posterior margin.  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 5040 cGy over 28 treatment fractions.   Neoadjuvant cisplatin 75 mg/m2, Alimta 500 mg/m2 and Opdivo 360 mg, cycle #1 initiated on 1/5/2023. Cycle #3 delivered on 2/16/2023.  Right thoracotomy with right upper lobectomy and mediastinal lymph node dissection by Dr. Goldy Carlin on 5/4/2023.   Final pathology was negative.   The primary tumor and all mediastinal lymph nodes were also negative documenting complete response to neoadjuvant therapy.  Lalo received cycle #1 of adjuvant immunotherapy with Keytruda on 6/12/2023   Lalo completed the final cycle #18 planned adjuvant immunotherapy treatments with Keytruda on 6/11/2024.  Lalo developed right shoulder/scapular pain 2 months ago. When lifting his arm, swwinging a golf club, when he is active, not at rest.   Physical exam today, 11/5/2024:  No evidence of palpable supraclavicular or infraclavicular lymphadenopathy.  The right chest wall documents the right upper posterior chest incision that is healing well and chest tube sites etc., all healed. On auscultation both lungs are clear with good breath sounds even in the right upper lung field.  Heart regular rate rhythm normal S1-S2, no S3  Abdomen soft and  benign  Extremities without edema  Skin: Rash primarily over the back area. It is semidiffuse with punctate lesions everywhere.   CBC today 11/5/2024 reveals a WBC of 4.20 Hgb is 15.4 with an MCV of 86.7 and platelet count of 159,000.   OV 5/13/24 with Naif Jones MADDY [facility] RAD ONC  status post completion of radiation therapy to the lung and presents to our clinic today for surveillance exam and to review imaging. Diagnosed with right upper lobe of the lung. He completed 5040 cGy in 28 fractions to the right lung on 02/14/2023.   CT-scan of the chest on 02/21/2024 revealed chronic lung changes. Posttreatment changes with no sign of residual or recurrent neoplasm.  On exam, I do not see evidence for recurrent or metastatic disease at this time. He has 2 cycles of keytruda remaining. Will defer surveillance imaging to medical oncology. We will continue routine follow-up/surveillance as discussed in 6 months. I have instructed him to continue to see the other health care providers as per their scheduling.  Return in about 6 months (around 11/13/2024).   Presented to [facility] ER 6/19/24; treated for rhinovirus.  CTAngiogram Chest 6/19/24 at [facility], compared to 2/21/24  No pulmonary embolism identified.   Chronic changes with emphysema and partial pneumonectomy on the right.   Bronchial wall thickening could indicate mild bronchitis.   CT CHEST W CONTRAST 6/27/24 at [facility], compared to Chest CT 02/21/2024, 06/19/2024. PET CT 03/27/2023, 11/01/2022.   Right-sided chest port present   Right upper lobectomy noted. Emphysema. Scattered bullae in the left lung. No suspicious pulmonary nodule identified.   Internal fixation hardware right fifth rib. No discrete osseous lesion detected.   No evidence for recurrent disease in the chest   CT ABDOMEN AND PELVIS WITH CONTRAST on 6/27/24 at [facility], compared to CT chest 06/27/2024   No lymphadenopathy.  1.4 cm fat containing lesion in the left lower quadrant favored to represent fat  necrosis.  Diffuse hepatic steatosis.  Colonic diverticulosis.  Postsurgical changes of L4-L5 fusion. Mild degenerative changes of the spine   PET CT SKULL BASE TO MID THIGH on 6/27/24 at [facility] compared to 03/27/2023. CT 06/27/2024   The right pleural mass seen on the prior examination with SUV max of about 3.1 (2.76 with current software) has essentially resolved  The patchy airspace disease with SUV max of up to 3.2 has similarly resolved.   No new significantly FDG avid nodules are seen within the lung fields.   No new significantly FDG avid lymph nodes are seen within the niels or mediastinum.   The examination demonstrates a generally physiologic distribution of activity in the abdomen and pelvis, included portions of the head and neck, and included portions of the skeleton and extremeties.  Port removal by Dr. Kim Bonilla on 8/30/2024 at [facility]   Lalo developed right shoulder/scapular pain 2 months ago. When lifting his arm, swwinging a golf club, when he is active, not at rest.   Exam of right scapular area has a mild degree of muscle fullness on the right compared to the left. Some discomfort with pressure?  PLAN:  Serology ordered includes CBC, CMP, TSH and cortisol for continued monitoring of toxicity associated with immunotherapy that he received to monitor for toxicity, specifically toxicity related to the thyroid.  CT chest to evaluate pain  Follow-up with me in 4 months with a chest x-ray prior to return.     11/18/2024 CT chest:  Mild aortic atherosclerotic disease  No mediastinal lymphadenopathy  Lung bases reveal mild left base atelectasis or scarring. Stable paraseptal emphysema versus pneumatoceles seen in the upper left lung. No suspicious pulmonary opacities or nodules are identified currently.     History obtained from  PATIENT and CHART    PAST MEDICAL HISTORY  Past Medical History:   Diagnosis Date    Abnormal PET scan of lung     Nov 2022    Allergic rhinitis     Arthritis     Atrial  fibrillation     Bronchiectasis     Bronchitis     Cancer     prostate    Chronic bronchitis     Chronic pain     COPD (chronic obstructive pulmonary disease)     GERD (gastroesophageal reflux disease)     Implantable loop recorder present     Lung cancer     Pneumonia     Prostatitis, acute     S/P ACL reconstruction     Septic shock due to urinary tract infection     Sinusitis     Strep throat     UTI (urinary tract infection)       PAST SURGICAL HISTORY  Past Surgical History:   Procedure Laterality Date    ANKLE SURGERY Right     BACK SURGERY      X2    ENDOSCOPY N/A 01/10/2022    Procedure: ESOPHAGOGASTRODUODENOSCOPY WITH ANESTHESIA;  Surgeon: Tucker Bright MD;  Location:  [facility] OR;  Service: Gastroenterology;  Laterality: N/A;  pre food bolus  post food bolus      KNEE ACL RECONSTRUCTION Left     NASAL SEPTUM SURGERY      PENILE PROSTHESIS IMPLANT N/A 03/15/2022    Procedure: 3-PIECE INFLATABLE PENILE PROSTHESIS PLACEMENT;  Surgeon: Trae Clark MD;  Location:  [facility] OR;  Service: Urology;  Laterality: N/A;    PROSTATE ULTRASOUND BIOPSY N/A 02/18/2020    Procedure: PROSTATE  BIOPSY;  Surgeon: Trae Clark MD;  Location:  [facility] OR;  Service: Urology;  Laterality: N/A;    PROSTATECTOMY N/A 09/22/2020    Procedure: RADICAL PROSTATECTOMY LAPAROSCOPIC WITH DAVINCI ROBOT;  Surgeon: Nuno Jj MD;  Location:  [facility] OR;  Service: DaVinci;  Laterality: N/A;    SHOULDER SURGERY Left     X 4    TENNIS ELBOW RELEASE Bilateral 04/14/2011    THORACOTOMY Right 05/04/2023    Procedure: RIGHT THORACOTOMY WITH CHEST WALL RESECTION, MEDIASTINAL LYMPH NODE DISSECTION, RIGHT UPPER LOBECTOMY;  Surgeon: Goldy Carlin MD;  Location:  [facility] OR;  Service: Cardiothoracic;  Laterality: Right;    VENOUS ACCESS DEVICE (PORT) INSERTION N/A 12/30/2022    Procedure: Single Lumen Port-a-cath insertion with flouroscopy;  Surgeon: Kim Bonilla MD;  Location:  [facility] OR;  Service: General;  Laterality: N/A;     VENOUS ACCESS DEVICE (PORT) REMOVAL N/A 2024    Procedure: REMOVAL VENOUS ACCESS DEVICE;  Surgeon: Kim Bonilla MD;  Location: [facility] OR;  Service: General;  Laterality: N/A;      FAMILY HISTORY  family history includes Cancer in his mother.    SOCIAL HISTORY  Social History     Tobacco Use    Smoking status: Former     Current packs/day: 0.00     Average packs/day: 1 pack/day for 35.8 years (35.8 ttl pk-yrs)     Types: Cigarettes     Start date: 1987     Quit date: 2022     Years since quittin.9     Passive exposure: Past    Smokeless tobacco: Former     Types: Snuff     Quit date:    Vaping Use    Vaping status: Former   Substance Use Topics    Alcohol use: No     Comment: 0    Drug use: Not Currently     Frequency: 7.0 times per week     Types: Hydrocodone, Marijuana, Oxycodone     Comment: Edible gummies - 1 per day     ALLERGIES  Adhesive tape     MEDICATIONS    Current Outpatient Medications:     acetaminophen (Tylenol) 325 MG tablet, Take 3 tablets by mouth Every 8 (Eight) Hours. Take every 8 hours for 3 days then take prn as needed., Disp: , Rfl:     acetaminophen (TYLENOL) 500 MG tablet, Take 2 tablets by mouth Every 6 (Six) Hours As Needed for Mild Pain., Disp: , Rfl:     Ascorbic Acid (VITAMIN C PO), Take  by mouth Daily., Disp: , Rfl:     Azelastine HCl 137 MCG/SPRAY solution, 2 sprays into the nostril(s) as directed by provider 2 (Two) Times a Day., Disp: 30 mL, Rfl: 5    Calcium Citrate 150 MG capsule, Take  by mouth Daily., Disp: , Rfl:     cetirizine (zyrTEC) 10 MG tablet, Take 1 tablet by mouth Daily., Disp: 90 tablet, Rfl: 3    Cholecalciferol (VITAMIN D-3 PO), Take  by mouth Daily., Disp: , Rfl:     Cyanocobalamin (VITAMIN B 12 PO), Take  by mouth Daily., Disp: , Rfl:     esomeprazole (nexIUM) 40 MG capsule, Take 1 capsule by mouth Every Morning Before Breakfast., Disp: , Rfl:     Fluticasone-Salmeterol (ADVAIR/WIXELA) 250-50 MCG/ACT DISKUS, INHALE ONE PUFF BY  "MOUTH TWICE DAILY, Disp: 60 each, Rfl: 5    gabapentin (NEURONTIN) 800 MG tablet, Take 1 tablet by mouth 3 (Three) Times a Day., Disp: , Rfl:     hydrOXYzine pamoate (VISTARIL) 50 MG capsule, Take 1 capsule by mouth Daily., Disp: , Rfl:     ibuprofen (ADVIL,MOTRIN) 800 MG tablet, 1 tablet with food or milk as needed Orally prn, Disp: , Rfl:     ibuprofen (Motrin IB) 200 MG tablet, Take 3 tablets by mouth Every 8 (Eight) Hours. Take every 8 hours for three days then take as needed., Disp: , Rfl:     montelukast (SINGULAIR) 10 MG tablet, Take 1 tablet by mouth Every Night., Disp: 90 tablet, Rfl: 3    multivitamin with minerals tablet tablet, Take 1 tablet by mouth Daily., Disp: , Rfl:     NON FORMULARY, Take 1 tablet by mouth Daily. Marijuana Gummies, Disp: , Rfl:     Ventolin  (90 Base) MCG/ACT inhaler, Inhale 2 puffs Every 4 (Four) Hours As Needed for Wheezing., Disp: 18 g, Rfl: 5    Zinc 50 MG tablet, Take  by mouth Daily., Disp: , Rfl:     Current outpatient and discharge medications have been reconciled for the patient.  Reviewed by: MADDY Donnelly    The following portions of the patient's history were reviewed and updated as appropriate: allergies, current medications, past family history, past medical history, past social history, past surgical history and problem list.    REVIEW OF SYSTEMS  Review of Systems   Constitutional: Negative.    HENT: Negative.     Eyes:         Glasses   Cardiovascular:         Loop recorder in place   Gastrointestinal: Negative.    Endocrine: Negative.    Genitourinary: Negative.    Musculoskeletal: Negative.         Right shoulder pain \"where they cut me\"  Feels like \"stabbing pain\"   Skin: Negative.    Allergic/Immunologic: Negative.    Neurological: Negative.    Hematological: Negative.    Psychiatric/Behavioral: Negative.       PHYSICAL EXAM  VITAL SIGNS:   Vitals:    11/21/24 1421   BP: 136/88   SpO2: 97%  Comment: room air   Weight: 100 kg (221 lb)   Height: 187 " "cm (73.62\")   PainSc: 10-Worst pain ever  Comment: \"stabbing\" pain, worse with movement   PainLoc: Shoulder  Comment: right, radiating do3wn     Physical Exam  Vitals reviewed.   Constitutional:       Appearance: Normal appearance.   HENT:      Head: Normocephalic.      Nose: Nose normal.   Eyes:      Pupils: Pupils are equal, round, and reactive to light.   Cardiovascular:      Rate and Rhythm: Normal rate and regular rhythm.      Pulses: Normal pulses.      Heart sounds: Normal heart sounds.   Pulmonary:      Effort: Pulmonary effort is normal. No respiratory distress.      Breath sounds: Normal breath sounds. No wheezing.   Abdominal:      General: Bowel sounds are normal.      Palpations: There is no mass.   Musculoskeletal:         General: Normal range of motion.      Cervical back: Normal range of motion and neck supple. No tenderness.   Lymphadenopathy:      Cervical: No cervical adenopathy.   Skin:     General: Skin is warm and dry.      Capillary Refill: Capillary refill takes less than 2 seconds.   Neurological:      General: No focal deficit present.      Mental Status: He is alert and oriented to person, place, and time.      Motor: No weakness.   Psychiatric:         Mood and Affect: Mood normal.         Behavior: Behavior normal.          Performance Status: ECOG (0) Fully active, able to carry on all predisease performance without restriction    Clinical Quality Measures  - Pain Documented by Standardized Tool, FPS Lalo Jones Sr. reports a pain score of 10.  Given his pain assessment as noted, treatment options were discussed and the following options were decided upon as a follow-up plan to address the patient's pain: continuation of current treatment plan for pain and use of non-medical modalities (ice, heat, stretching and/or behavior modifications).  Pain Medications               acetaminophen (Tylenol) 325 MG tablet Take 3 tablets by mouth Every 8 (Eight) Hours. Take every 8 hours for 3 " days then take prn as needed.    acetaminophen (TYLENOL) 500 MG tablet Take 2 tablets by mouth Every 6 (Six) Hours As Needed for Mild Pain.    gabapentin (NEURONTIN) 800 MG tablet Take 1 tablet by mouth 3 (Three) Times a Day.    ibuprofen (ADVIL,MOTRIN) 800 MG tablet 1 tablet with food or milk as needed Orally prn    ibuprofen (Motrin IB) 200 MG tablet Take 3 tablets by mouth Every 8 (Eight) Hours. Take every 8 hours for three days then take as needed.          - Body Mass Index Screening and Follow-Up Plan Body mass index is 28.67 kg/m².     - Tobacco Use: Screening and Cessation Intervention  Social History    Tobacco Use      Smoking status: Former        Packs/day: 0.00        Years: 1 pack/day for 35.8 years (35.8 ttl pk-yrs)        Types: Cigarettes        Start date: 1987        Quit date: 2022        Years since quittin.9        Passive exposure: Past      Smokeless tobacco: Former        Types: Snuff        Quit date:     - Advanced Care Planning Advance Care Planning   ACP discussion was held with the patient during this visit. Patient does not have an advance directive, information provided.    - PHQ-2 Depression Screening  Little interest or pleasure in doing things? Not at all   Feeling down, depressed, or hopeless? Not at all   PHQ-2 Total Score 0     ASSESSMENT AND PLAN  1. History of primary non-small cell carcinoma of right lung    2. Chronic obstructive pulmonary disease, unspecified COPD type    3. History of radiation therapy    4. Former smoker      No orders of the defined types were placed in this encounter.    RECOMMENDATIONS: Lalo Jones Sr. is status post completion of radiation therapy to the lung and presents to our clinic today for surveillance exam and to review imaging. Diagnosed with right upper lobe of the lung. He completed 5040 cGy in 28 fractions to the right lung on 2023.     CT-scan of the chest on 2024 revealed mild aortic atherosclerotic  disease. No mediastinal lymphadenopathy. Lung bases reveal mild left base atelectasis or scarring. Stable paraseptal emphysema versus pneumatoceles seen in the upper left lung. No suspicious pulmonary opacities or nodules are identified currently.     On exam, I do not see evidence for recurrent or metastatic disease at this time. We will continue routine follow-up/surveillance as discussed in 4 months with follow up CT scan before visit and I have instructed him to continue to see the other health care providers as per their scheduling.    Patient Instructions   1) return in 4 months with a CT chest before.    Return in about 4 months (around 3/21/2025).    Time Spent: I spent 46 minutes caring for Lalo on this date of service. This time includes time spent by me in the following activities: preparing for the visit, reviewing tests, obtaining and/or reviewing a separately obtained history, performing a medically appropriate examination and/or evaluation, counseling and educating the patient/family/caregiver, ordering medications, tests, or procedures, referring and communicating with other health care professionals, documenting information in the medical record, independently interpreting results and communicating that information with the patient/family/caregiver, and care coordination.   Naif Jones, APRN  11/21/2024

## 2024-11-21 ENCOUNTER — OFFICE VISIT (OUTPATIENT)
Age: 59
End: 2024-11-21
Payer: COMMERCIAL

## 2024-11-21 VITALS
HEIGHT: 74 IN | OXYGEN SATURATION: 97 % | BODY MASS INDEX: 28.36 KG/M2 | DIASTOLIC BLOOD PRESSURE: 88 MMHG | SYSTOLIC BLOOD PRESSURE: 136 MMHG | WEIGHT: 221 LBS

## 2024-11-21 DIAGNOSIS — J44.9 CHRONIC OBSTRUCTIVE PULMONARY DISEASE, UNSPECIFIED COPD TYPE: ICD-10-CM

## 2024-11-21 DIAGNOSIS — Z87.891 FORMER SMOKER: ICD-10-CM

## 2024-11-21 DIAGNOSIS — Z92.3 HISTORY OF RADIATION THERAPY: ICD-10-CM

## 2024-11-21 DIAGNOSIS — Z85.118 HISTORY OF PRIMARY NON-SMALL CELL CARCINOMA OF RIGHT LUNG: Primary | ICD-10-CM

## 2024-11-21 PROCEDURE — G0463 HOSPITAL OUTPT CLINIC VISIT: HCPCS | Performed by: RADIOLOGY

## 2025-02-28 ENCOUNTER — TELEPHONE (OUTPATIENT)
Dept: HEMATOLOGY | Age: 60
End: 2025-02-28

## 2025-02-28 NOTE — TELEPHONE ENCOUNTER

## 2025-02-28 NOTE — PROGRESS NOTES
PROGRESS NOTE  Patient:  Andrew Jessica  YOB: 1965  Date of Service: 3/25/2025  MRN: [id number]    Primary Care Physician: Nemesio Schreiber MD    Chief Complaint   Patient presents with    Follow-up    Cancer     Adenocarcinoma of lung 11/2022     Results     CT Chest        Patient Seen, Chart, Consults notes, Labs, Radiology studies reviewed.    Mr. 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   Robotic assisted laparoscopic prostatectomy by Dr. Cohudhary September 2020 for PT2N0 [place] 7 disease with tumor approaching the right posterior margin.  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 5040 cGy over 28 treatment fractions.   Neoadjuvant cisplatin 75 mg/m2, Alimta 500 mg/m2 and Opdivo 360 mg, cycle #1 initiated on 1/5/2023. Cycle #3 delivered on 2/16/2023.    Right thoracotomy with right upper lobectomy and mediastinal lymph node dissection by Dr. Valentin Altamirano on 5/4/2023.   Final pathology was negative.    The primary tumor and all mediastinal lymph nodes were also negative documenting complete response to neoadjuvant therapy.    Andrew received cycle #1 of adjuvant immunotherapy with Keytruda on 6/12/2023     Andrew completed the final cycle #18 planned adjuvant immunotherapy treatments with Keytruda on 6/11/2024.    Andrew developed right shoulder/scapular pain in about September 2024.  He gets a stabbing pain in the right scapular/shoulder area when lifting his arm, swwinging a golf club, when he is active, not at rest.  He has had 4 surgeries on his left shoulder and states he is

## 2025-03-10 ENCOUNTER — TELEPHONE (OUTPATIENT)
Dept: HEMATOLOGY | Age: 60
End: 2025-03-10

## 2025-03-10 NOTE — TELEPHONE ENCOUNTER

## 2025-03-17 DIAGNOSIS — Z85.118 HISTORY OF PRIMARY NON-SMALL CELL CARCINOMA OF RIGHT LUNG: Primary | ICD-10-CM

## 2025-03-17 DIAGNOSIS — Z92.3 HISTORY OF RADIATION THERAPY: ICD-10-CM

## 2025-03-17 DIAGNOSIS — J44.9 CHRONIC OBSTRUCTIVE PULMONARY DISEASE, UNSPECIFIED COPD TYPE: ICD-10-CM

## 2025-03-17 DIAGNOSIS — Z87.891 FORMER SMOKER: ICD-10-CM

## 2025-03-19 ENCOUNTER — TELEPHONE (OUTPATIENT)
Dept: HEMATOLOGY | Age: 60
End: 2025-03-19

## 2025-03-19 NOTE — TELEPHONE ENCOUNTER

## 2025-03-25 ENCOUNTER — HOSPITAL ENCOUNTER (OUTPATIENT)
Dept: INFUSION THERAPY | Age: 60
Discharge: HOME | End: 2025-03-25

## 2025-03-25 ENCOUNTER — OFFICE VISIT (OUTPATIENT)
Dept: HEMATOLOGY | Age: 60
End: 2025-03-25
Payer: COMMERCIAL

## 2025-03-25 VITALS
TEMPERATURE: 97.9 F | BODY MASS INDEX: 28.3 KG/M2 | DIASTOLIC BLOOD PRESSURE: 72 MMHG | WEIGHT: 220.5 LBS | HEART RATE: 68 BPM | OXYGEN SATURATION: 98 % | SYSTOLIC BLOOD PRESSURE: 122 MMHG | HEIGHT: 74 IN

## 2025-03-25 DIAGNOSIS — C34.91 ADENOCARCINOMA OF RIGHT LUNG (HCC): ICD-10-CM

## 2025-03-25 DIAGNOSIS — C34.91 ADENOCARCINOMA OF RIGHT LUNG (HCC): Primary | ICD-10-CM

## 2025-03-25 DIAGNOSIS — R53.83 FATIGUE DUE TO TREATMENT: ICD-10-CM

## 2025-03-25 DIAGNOSIS — R52 PAIN: ICD-10-CM

## 2025-03-25 LAB
ALBUMIN SERPL-MCNC: 4.1 G/DL (ref 3.5–5.2)
ALP SERPL-CCNC: 100 U/L (ref 40–129)
ALT SERPL-CCNC: 38 U/L (ref 5–41)
ANION GAP SERPL CALCULATED.3IONS-SCNC: 11 MMOL/L (ref 7–19)
AST SERPL-CCNC: 29 U/L (ref 5–40)
BASOPHILS # BLD: 0.02 K/UL (ref 0–0.2)
BASOPHILS NFR BLD: 0.3 % (ref 0–1)
BILIRUB SERPL-MCNC: 0.4 MG/DL (ref 0–1.2)
BUN SERPL-MCNC: 13 MG/DL (ref 6–20)
CALCIUM SERPL-MCNC: 9.5 MG/DL (ref 8.6–10)
CHLORIDE SERPL-SCNC: 104 MMOL/L (ref 98–107)
CO2 SERPL-SCNC: 29 MMOL/L (ref 22–29)
CORTIS AM PEAK SERPL-MCNC: 9 UG/DL (ref 4.8–19.5)
CREAT SERPL-MCNC: 1 MG/DL (ref 0.7–1.2)
EOSINOPHIL # BLD: 0.18 K/UL (ref 0–0.6)
EOSINOPHIL NFR BLD: 2.9 % (ref 0–5)
ERYTHROCYTE [DISTWIDTH] IN BLOOD BY AUTOMATED COUNT: 13.6 % (ref 11.5–14.5)
GLUCOSE SERPL-MCNC: 85 MG/DL (ref 70–99)
HCT VFR BLD AUTO: 43 % (ref 42–52)
HGB BLD-MCNC: 14.4 G/DL (ref 14–18)
LYMPHOCYTES # BLD: 0.96 K/UL (ref 1.1–4.5)
LYMPHOCYTES NFR BLD: 15.3 % (ref 20–40)
MCH RBC QN AUTO: 29.4 PG (ref 27–31)
MCHC RBC AUTO-ENTMCNC: 33.5 G/DL (ref 33–37)
MCV RBC AUTO: 87.8 FL (ref 80–94)
MONOCYTES # BLD: 0.56 K/UL (ref 0–0.9)
MONOCYTES NFR BLD: 8.9 % (ref 1–10)
NEUTROPHILS # BLD: 4.5 K/UL (ref 1.5–7.5)
NEUTS SEG NFR BLD: 71.5 % (ref 50–65)
PLATELET # BLD AUTO: 271 K/UL (ref 130–400)
PMV BLD AUTO: 8.8 FL (ref 9.4–12.4)
POTASSIUM SERPL-SCNC: 4.3 MMOL/L (ref 3.5–5.1)
PROT SERPL-MCNC: 7 G/DL (ref 6.4–8.3)
RBC # BLD AUTO: 4.9 M/UL (ref 4.7–6.1)
SODIUM SERPL-SCNC: 144 MMOL/L (ref 136–145)
TSH SERPL DL<=0.005 MIU/L-ACNC: 5.01 UIU/ML (ref 0.27–4.2)
WBC # BLD AUTO: 6.29 K/UL (ref 4.8–10.8)

## 2025-03-25 PROCEDURE — 85025 COMPLETE CBC W/AUTO DIFF WBC: CPT

## 2025-03-25 PROCEDURE — 84443 ASSAY THYROID STIM HORMONE: CPT

## 2025-03-25 PROCEDURE — 99215 OFFICE O/P EST HI 40 MIN: CPT | Performed by: INTERNAL MEDICINE

## 2025-03-25 PROCEDURE — 99213 OFFICE O/P EST LOW 20 MIN: CPT

## 2025-03-25 PROCEDURE — 80053 COMPREHEN METABOLIC PANEL: CPT

## 2025-03-25 PROCEDURE — 82533 TOTAL CORTISOL: CPT

## 2025-03-25 PROCEDURE — 36415 COLL VENOUS BLD VENIPUNCTURE: CPT

## 2025-03-25 RX ORDER — CETIRIZINE HYDROCHLORIDE 10 MG/1
10 TABLET ORAL DAILY
COMMUNITY

## 2025-04-08 ENCOUNTER — HOSPITAL ENCOUNTER (OUTPATIENT)
Dept: CT IMAGING | Facility: HOSPITAL | Age: 60
Discharge: HOME | End: 2025-04-08
Payer: COMMERCIAL

## 2025-04-08 DIAGNOSIS — Z85.118 HISTORY OF PRIMARY NON-SMALL CELL CARCINOMA OF RIGHT LUNG: ICD-10-CM

## 2025-04-08 DIAGNOSIS — Z92.3 HISTORY OF RADIATION THERAPY: ICD-10-CM

## 2025-04-08 DIAGNOSIS — Z87.891 FORMER SMOKER: ICD-10-CM

## 2025-04-08 DIAGNOSIS — J44.9 CHRONIC OBSTRUCTIVE PULMONARY DISEASE, UNSPECIFIED COPD TYPE: ICD-10-CM

## 2025-04-08 PROCEDURE — 25510000001 IOPAMIDOL 61 % SOLUTION

## 2025-04-08 PROCEDURE — 71260 CT THORAX DX C+: CPT

## 2025-04-08 RX ORDER — IOPAMIDOL 612 MG/ML
100 INJECTION, SOLUTION INTRAVASCULAR
Status: COMPLETED | OUTPATIENT
Start: 2025-04-08 | End: 2025-04-08

## 2025-04-08 RX ADMIN — IOPAMIDOL 100 ML: 612 INJECTION, SOLUTION INTRAVENOUS at 08:51

## 2025-04-08 NOTE — PROGRESS NOTES
[facility]  Radiation Oncology Clinic   Anton Sapp MD, FACR  Naif Jones APRN  _______________________________________________  [facility]  Department of Radiation Oncology  [address] [place] [id number]  Office: [phone]  Fax: [phone]    DATE: 04/10/2025  PATIENT: Lalo Jones Sr.  1965                         MEDICAL RECORD #: [id number]    1. History of primary non-small cell carcinoma of right lung    2. History of radiation therapy    3. Former smoker                                               REASON FOR VISIT:    Chief Complaint   Patient presents with    Lung Cancer     Reason for Follow up Visit:  Lalo Jones Sr. is a very pleasant 59 y.o. patient that completed radiation to the lung and returns to the clinic today for routine follow up exam.     History of Present Illness:  Diagnosed with right upper lobe of the lung. He completed 5040 cGy in 28 fractions to the right lung on 02/14/2023.     05/19/2022 - CT Chest Low Dose:  4 mm LEFT lower lobe pulmonary nodule.  Moderate emphysema.  3.3 x 1.3 cm masslike area of pleural thickening in the RIGHT anterior upper chest. Recommend a follow-up chest CT in 3 months to ensure stability.    10/17/2022 - CT chest without contrast, low-dose protocol at [facility]:  4.7 cm x 4 cm x 1.6 cm abnormal area of pleural thickening in the periphery of the RUL with slightly irregular margins  Moderate paraseptal emphysematous changes with scattered bullae in both lungs  Lung-RADS - 4B    10/24/2022 - Appointment with Russel Santana MD:   I explained the abnormal CT scan results from May which is done in the [facility] to the patient and also discussed with the radiologist from [facility] in [place] who reviewed the current CT scan of the chest done earlier this month.  This shows the right upper lobe pleural-based thickening or mass and left upper lobe  nodule.  Due to his smoking history the possibility of malignancy cannot be ruled out.  Discussing different options I ordered a PET scan and a follow-up CT scan of the chest in 3 months time.  If the PET scan is positive a CT-guided needle biopsy would be best option for the right upper lobe pleural-based lung mass which is not reachable by bronchoscopy.  If PET scan is negative will wait for the neck CT scan of the chest and make further recommendations.  Patient is agreeable with the plan.  In the meantime I will try to get the CD from from the outside hospital with the latest CT scan of the chest for further comparison.  Patient definitely has chronic obstructive pulmonary disease and he was started on Wixela 250/50 1 puff twice a day and albuterol rescue inhaler will be continued.  His nasal allergy he was started on fluticasone nasal spray and Singulair.  He told me he is already using Zyrtec mostly over-the-counter which will be continued.  All medications were sent to the pharmacy.  Lalo Jones  reports that he has been smoking cigarettes. He started smoking about 35 years ago. He has a 35.00 pack-year smoking history. He has never used smokeless tobacco.. I have educated him on the risk of diseases from using tobacco products such as cancer, COPD and heart disease.   I advised him to quit and he is willing to quit. We have discussed the following method/s for tobacco cessation:  Counseling.  Together we have set a quit date for 2 weeks from today.  He will follow up with me in 3 months or sooner to check on his progress.I spent 7 minutes counseling the patient  Patient is advised to have a sleep study due to sleep disturbances and most likely have sleep apnea and may need his CPAP.  He is also advised to get a pulmonary function test before the next clinic visit for his underlying COPD in addition to the PET scan and CT scan which are already ordered.  He will return to the pulm clinic for  follow-up visit in 3 months time or earlier if needed  Follow up:  3 months     11/01/2022 - PET Scan:  Slight increase in size of 4.3 x 1.9 cm RIGHT anterior upper chest masslike pleural thickening which is markedly hypermetabolic. Favor this to represent a neoplastic process with differential including lymphoma. Primary pleural neoplasm and metastatic disease are also in the differential.  Hypermetabolic upper abdominal retroperitoneal lymphadenopathy. Suspect these lymph nodes related to the same process as the RIGHT anterior pleural lesion.  No other abnormal hypermetabolic activity.    11/16/2022 - Right upper chest/pleural mass, core biopsies:  Adenocarcinoma of pulmonary origin.    12/08/2022 - Appointment with :  Adenocarcinoma of Pulmonary Origin-11/16/2022  Lalo Jones is a 57-year-old  gentleman with primary and secondary diagnoses as follows:  Robotic assisted laparoscopic prostatectomy by Dr. Jj September 2020 for PT2N0 [place] 7 disease with tumor approaching the right posterior margin  New diagnosis of 4.7 cm x 4 cm x 1.6 cm adenocarcinoma of the RUL of the lung  Nino was last seen on 7/15/2020 for opinion regarding a diagnosis of resected prostate cancer. He was lost to follow-up after that visit. He lives in [place], and his prostate cancer is managed by Dr. Jj with urology at [facility].  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 Russel Santana with a new diagnosis of a 4.7 x 4 x 1.6 cm adenocarcinoma of the RUL of the lung for management.  Nino is a longtime cigarette smoker of 1 pack/day since age 20. He quit smoking on 12/1/2022 after the diagnosis of lung cancer.  Nino's PCP, Dr. Flex Sheikh ordered a low-dose screening CT scan of the chest for monitoring because he is a pack a day smoker since 1987  CT chest without contrast, low-dose protocol at [facility] on 5/19/2022:  4 mm LEFT lower  lobe pulmonary nodule in superior segment   3.3 x 1.3 cm mass like area of pleural thickening in the RIGHT anterior upper chest   Moderate centrilobular and paraseptal emphysema.   Lung-RADS 2S-- Nodules with a very low likelihood of becoming a clinically active cancer due to size or lack of growth.   Continue annual screening with low dose CT in 12 months.   3 month follow-up chest CT recommended to evaluate for stability of RIGHT anterior upper chest masslike pleural thickening  Dr. Flex French repeated the low-dose CT scan because of the findings in the right anterior upper chest  CT chest without contrast, low-dose protocol at [facility] on 10/17/2022:  4.7 cm x 4 cm x 1.6 cm abnormal area of pleural thickening in the periphery of the RUL with slightly irregular margins  Moderate paraseptal emphysematous changes with scattered bullae in both lungs  Lung-RADS - 4B  Initial consult with Dr. Russel Santana at [facility] on 10/24/2022:  Explained the abnormal CT scan results from May which is done in the [facility] to the patient and also discussed with the radiologist from [facility] in [place] who reviewed the current CT scan of the chest done earlier this month. This shows the right upper lobe pleural-based thickening or mass and left upper lobe nodule. Due to his smoking history the possibility of malignancy cannot be ruled out.  Discussing different options I ordered a PET scan and a follow-up CT scan of the chest in 3 months time. If the PET scan is positive a CT-guided needle biopsy would be best option for the right upper lobe pleural-based lung mass which is not reachable by bronchoscopy. If PET scan is negative will wait for the neck CT scan of the chest and make further recommendations.   NM PET/CT SKULL BASE TO MID THIGH at [facility] on 11/1/2022:Comparison: Chest CT 5/19/2022  Background right hepatic lobe metabolic activity measures max SUV 2.7.  4.3 x 1.9 cm RIGHT anterior upper chest  pleural thickening which is hypermetabolic with a maximum SUV of 12.0  1.7 cm LEFT upper abdomen retroperitoneal lymph node hypermetabolicactivity, max SUV 5.3. This nodule/node closely approximates the LEFT adrenal gland.   9 mm retroaortic upper abdominal lymph node is mildly hypermetabolic with a max SUV of 4.0.   7 mm aortocaval lymph node is mildly hypermetabolic with a max SUV of 3.0.  CT Needle Biopsy Lung at [facility] on 11/16/2022:  Successful CT guided biopsy of the right anterior pleural mass  Final Diagnosis  Right upper chest/pleural mass, core biopsies:  Adenocarcinoma of pulmonary origin.  [employer] on 11/16/2022:  ALK(D5F3)- NEGATIVE  FISH Analysis ROS1- NEGATIVE  EGFR Exon 18-Not Detected  EGFR Exon 19-Not Detected  EGFR Exon 20 [id number] Detected  EGFR Exon 20 (other Mutations)-Not Detected  EGFR Exon 21- Not Detected  BRAF Mutation-Not Detected  FISH MET-No evidence of a MET amplification  Medical oncology consultation requested  Concern on the work-up is that in addition to the 4.7 cm primary RUL adenocarcinoma of the lung there are upper abdominal retroperitoneal and aortocaval lymph nodes positive on the PET scan.  If the upper abdominal lymph nodes were not a concern, Lalo could be treated with neoadjuvant Opdivo Alimta Keytruda followed by resection.  Intense interdepartmental consultations are being undertaken to give Lalo the most aggressive approach possible.  All of the plans outlined below were discussed at length with Lalo and his wife Joann.  RECOMMEND:  Guardant 360  CT scan of the abdomen and pelvis  Bone scan  MRI of the abdomen  Thoracic surgery consultation with Dr. Goldy Carlin  Call placed and spoke to Dr. Goldy Carlin at length  Radiation therapy consult for intradepartmental consultation  Consult surgical group at [facility] for port placement and consideration of a diagnostic laparoscopy for lymph node sampling.  Follow-up with me upon completion of scans and for further patient and  family conference  Return for Follow Up with [place] scans prior.    12/15/2022 - MRI Abdomen with and without contrast:  No change in borderline prominent 9 mm short axis dimension upper abdominal retroperitoneal lymph node, similar to prior PET/CT were it was hypermetabolic. No new or enlarging lymph nodes in the abdomen.  Splenomegaly.    12/15/2022 - Bone Scan:  No evidence of bone metastasis.    12/15/2022 - CT Abdomen/Pelvis without contrast:  There is abnormal wall thickening of the distal sigmoid colon which does contain multiple diverticuli. Findings may be seen with diverticulitis, however a regional neoplastic process also considered. Follow-up sigmoidoscopy might be considered for further evaluation.  No morphologically pathologic lymphadenopathy identified. The two hypermetabolic retroperitoneal lymph nodes within the upper abdomen on the PET exam of 11/01/2022 remain similar in size measuring only 7 mm in short axis.    12/22/2022 - Appointment with :  Note pending    12/22/2022 - Documentation per :  I spoke with Dr. Tadeo regarding this patient today.  He has a very unusual pattern of malignancy with a right upper lobe peripheral adenocarcinoma which was biopsied with CT-guided biopsy.  He also has intra-abdominal hypermetabolic lymphadenopathy which is clinically suspicious for neoplasm.  This would be an unusual metastatic site from a primary lung carcinoma.  The patient also has previous history of prostate carcinoma.  I have discussed this with Dr. Tadeo and I believe if it is possible perhaps robotic biopsy of the periaortic lymphadenopathy could be considered.  We will also discussed with Dr. Goldy Carlin for consideration of definitive treatment of the primary right upper lobe lung tumor.  We will see the patient in consultation today and coordinate with Dr. Tadeo and other physicians to develop a coordinated treatment plan.    12/22/2022 - Consult with  :  Following this discussion and in consideration of the diagnostic data/evaluation of the patient, I recommended referral to CT surgery for surgical considerations of the lung nodule. He has been referred to Dr. Carlin. He has also been referred to general surgery for biopsy considerations of the intra-abdominal lymph nodes.    Continue ongoing management per primary care physician and other specialists. Thank you for allowing me to assist in this patients care.   Plan:  See Dr Carlin and Dr. Bonilla    12/22/2022 - Documentation per :  I spoke with Dr. Carlin this afternoon and he has had a chance to review the radiographs on this patient.  He believes that there is likely chest wall invasion and recommends neoadjuvant chemoradiation prior to definitive surgery of the right upper lobe tumor.  I believe we can treat this lesion with tangential portals and avoid any significant dose to the bronchus, thereby avoiding any potential issues with healing.  I would anticipate a dose of 5040 cGy in 28 treatment fractions to the lung lesion as neoadjuvant treatment along with concomitant chemotherapy and/or immunotherapy.  In addition, we will determine if Dr. Kim Bonilla is able to sample the upper abdominal lymph nodes to determine the etiology of that finding on the PET scan.    12/23/2022 - Telephone encounter with :  I spoke with Dr. Tadeo today and reviewed my conversation with Dr. Carlin from last night.  We will plan to proceed with neoadjuvant chemoradiation to the lung lesion.  This will be somewhat dependent upon the potential for Dr. Bonilla to biopsy the upper abdominal lymph nodes.  However, I do believe we will be able to treat the chest wall lesion for surgical resection and if necessary we can come back and treat mediastinal and upper abdominal lymph nodes if we have evidence of metastatic disease.  We will plan to see the patient back on Tuesday, December 27 at 11:00 for  simulation.    12/27/2022 - Appointment with :  He met with Dr. Carlin for surgical consirations of the right upper lobe lesion. He believes that there is likely chest wall invasion and recommends neoadjuvant chemoradiation prior to definitive surgery of the right upper lobe tumor. I anticipate a dose of 5040 cGy in 28 treatment fractions to the lung lesion as neoadjuvant treatment along with concomitant chemotherapy and/or immunotherapy.  In addition, we will determine if Dr. Bonilla is able to sample the upper abdominal lymph nodes to determine the etiology of that finding on the PET scan. He is scheduled to see her later this week.    01/01/2023 - Documentation per :  I presented this patient at tumor conference.  Dr. Kim Bonilla was in attendance unfortunately she is not able to biopsy the upper abdominal lymph nodes due to the proximity to adjacent aorta and superior mesenteric artery.  I also reviewed the recommendation of Dr. Goldy Carlin for neoadjuvant chemoradiation followed by definitive surgery for the right upper lobe lung tumor.  Discussion at tumor conference surrounded management of the upper abdominal lymph nodes.  The consensus was at this time to proceed with treatment of the primary known adenocarcinoma lung and base further treatment recommendations upon findings at definitive surgery.  Abdominal lymph nodes serial radiographs is recommended for follow-up.  It was agreed that this is an unlikely metastatic distribution for right upper lobe lung carcinoma in the lymph nodes may represent another etiology.  I will coordinate with Dr. Tadeo for concomitant neoadjuvant chemoradiation of the right upper lobe lung tumor followed by definitive surgery per Dr. Goldy Carlin.    01/04/2023 - 02/14/2023 - Completed radiation course:  Received 5040 cGy in 28 fractions to the right lung via external beam radiation therapy.    03/20/2023 - CT Abdomen/Pelvis with contrast:  Interval  resolution of retroperitoneal lymphadenopathy. No evidence of metastatic disease in the abdomen/pelvis.  Heavy sigmoid diverticulosis, without definite evidence of diverticulitis. There is some residual sigmoid colon wall thickening, the degree of which has significantly decreased from the prior exam.    03/20/2023 - CT Chest with contrast:  Interval significant decrease in size and volume of the biopsy proven pleural malignancy in the right upper lobe, the residual soft tissue component has a maximum diameter of 8.5 mm, compared with 2.8 cm on previous PET/CT. However, there are new spiculated nodules in the right upper lobe, the largest measuring 1.6 cm, some of these areas of nodularity are ill-defined and seen at the branch points of the bronchial tree, could potentially be infectious or inflammatory. The main concern with this appearance is for intrapulmonary metastases however. Repeat chest CT is recommended in 3 months, follow-up PET/CT would be appropriate if these new spiculated areas persist or increase in size.  No evidence of intrathoracic lymphadenopathy, no left-sided pulmonary nodule or lung mass. Stable changes of paraseptal emphysema in both lungs.    03/27/2023 - PET Scan:  Previously irradiated right chest wall mass demonstrates much less tracer avidity on today's exam, SUV max 3.1 as compared to 11.4 on the earlier PET scan from November 2022, indicating a positive treatment response.  There are new patchy airspace opacities identified more centrally in the right upper lobe, some of which demonstrates PET uptake (SUV max 3.2), suspected radiation pneumonitis.  No scintigraphic evidence of sav or distant metastatic disease.    04/14/2023 - CT Chest without contrast:  Mildly decreased size of RIGHT anterior pleural thickening now 6 mm, previously 8.5 cm.  Decreased size of RIGHT upper lobe pulmonary nodule now 12 mm, previously 16 mm on 03/20/2023.  Similar patchy consolidative opacities at the  RIGHT upper lobe, which may represent postradiation treatment changes.  Similar emphysematous changes.    05/04/2023 - Right thoracotomy with right upper lobectomy and mediastinal lymph node dissection by Dr. Goldy Carlin:  Right chest wall margin, excision:  Benign adipose tissue and skeletal muscle.  No malignancy is identified.  Right chest wall margin #2, excision:  Benign skeletal muscle and fibroconnective tissue.  No malignancy is identified.  Right chest wall margin #3, excision:  Benign skeletal muscle and fibroconnective tissue.  No malignancy identified.  Right upper lobe of lung, lobectomy:  Previous invasive adenocarcinoma of pulmonary origin ([id number]).  Inflammatory pseudotumor.  Residual tumor is not present.  The surgical margins are free of tumor.  7 benign perihilar and intraparenchymal lymph nodes.  Level 10 lymph node, excision:  1 benign lymph node with anthracotic pigment.  No malignancy is identified.  Level 10 lymph node, excision:  1 benign lymph node with anthracotic pigment  No malignancy identified.  Level 4R lymph node, excision:  No malignancy identified.  1 benign lymph node.  Level 7 lymph node, excision:  No malignancy identified.  1 benign lymph node with anthracotic pigment.  Level 10 lymph node, excision:  No malignancy identified.  1 benign lymph node with anthracotic pigment.  Level 2R lymph node, excision:  No malignancy identified.  1 benign lymph node    06/19/2023 - Appointment with :  Mr. Jones is a 57-year-old male who is now 6 weeks status post right upper lobectomy for post neoadjuvant treatment treated right upper lobe non-small cell lung cancer.   He underwent neoadjuvant therapy with chemo and immunotherapy due to chest wall invasion, his pathology was consistent with complete response without residual tumor or residual disease in his lymph nodes.   He has done very well from surgery, and I am happy with his progress. He has already seen Dr. Tadeo in  follow-up and is going to get adjuvant Keytruda and he will follow with continued surveillance scans.   He does have some shortness of breath; I will discuss this with Dr. Santana, his pulmonologist, but I suspect this will continue to improve.   He is able to do the things he wants to now, just gets a little winded with strenuous work. He has healed well. Without any evidence of problems with his wounds. His pain is well controlled.   We will continue with surveillance with Dr. Tadeo's group and pulmonary follow-up with Dr. Santana.    07/20/2023 - Appointment with Russel Santana MD:  Plan/Recommendations:  Patient is doing well from the pulmonary standpoint and advised him to continue doing Advair and albuterol rescue inhaler for COPD and he is probably having a mild flareup so I prescribed him to take a course of Medrol Dosepak which is a low-dose steroid.  He had right upper lobe lung cancer which is treated by surgery and he follows up with Dr. Carlin.  He also gets followed up with Dr. Tadeo and is currently getting Keytruda he already had radiation treatment.  He is currently cancer free and doing well.  Continue follow-up with oncology and radiation oncology and Dr. Carlin.  Patient should continues in fluticasone nasal spray, Astelin nasal spray, Zyrtec and Singulair.  All prescription refills were sent to the pharmacy as requested by the patient.  Patient also mentioned he will probably benefit more from a nebulizer so I advised him to start using albuterol nebulizer as needed at least twice a day and a new prescription for albuterol nebulizer machine and solution was sent to the pharmacy.  Patient had mild sleep problems and was advised to have a sleep study for possible sleep apnea but patient is doing much better after his cancer treatment and he did not want to do a sleep study somewhat and I told him to hold off for now.  Patient is already vaccinated for COVID and influenza and pneumonia vaccine  is currently not given during this clinic visit and I told him to check with Dr. Tadeo about this vaccination before he takes it.  He will return to pulmonary clinic for follow-up visit in 6 months time or earlier if needed.  We will check on the CT scan which has been ordered by Dr. Tadeo during his next visit  Follow up:  6 Months      10/17/2023 - Appointment with :  ASSESSMENT AND PLAN:  Mr. Lalo Jones is a very pleasant 57-year-old  gentleman with primary and secondary diagnoses as follows:  Stage IIIB (T2b, N3, M0) adenocarcinoma of the RUL of the lung made by CT-guided needle biopsy on 11/16/2022.  Robotic assisted laparoscopic prostatectomy by Dr. Jj September 2020 for PT2N0 Point 7 disease with tumor approaching the right posterior margin  Neoadjuvant chemotherapy and radiation therapy as follows:  Neoadjuvant concurrent XRT (with chemoimmunotherapy) to RUL of lung initiated on 1/4/2023 completed on 2/14/2023. 5040 cGy over 28 treatment fractions.  Neoadjuvant cisplatin 75 mg/m2, Alimta 500 mg/m2 and Opdivo 360 mg, cycle #1 initiated on 1/5/2023. Cycle #3 delivered on 2/16/2023.  Right thoracotomy with right upper lobectomy and mediastinal lymph node dissection by Dr. Goldy Carlin on 5/4/2023.  Final pathology was negative.  The primary tumor and all mediastinal lymph nodes were also negative documenting complete response to neoadjuvant therapy.  Lalo received cycle #1 of adjuvant immunotherapy with Keytruda on 6/12/2023  He is tolerating immunotherapy without new symptoms other than persistent fatigue.  I conferenced with Dr. Santana, his pulmonologist, regarding pneumococcal vaccinations and suggested okay to proceed.  Lalo is here today, 9/5/2023 to continue cycle #7 of 18 planned adjuvant immunotherapy treatments with Keytruda.  PLAN:  Proceed with cycle #7 of 18 planned treatments with Keytruda today, 9/5/2023, and continue every 3 weeks  Serology ordered includes CBC,  CMP, TSH and cortisol for continued monitoring of toxicity associated with immunotherapy that he is receiving  He is to be seen this afternoon at cardiology for loop recorder, cardiac monitor placement and monitoring of previous history of atrial fibrillation, on medication  Follow-up with me in 6 weeks  Return in about 6 weeks (around 11/28/2023) for treatment and see Dr. Tadeo.    11/13/2023 - Appointment with :  On exam, I do not see evidence for recurrent or metastatic disease at this time. He continues immunotherapy per medical oncology.   We will continue routine follow-up/surveillance as discussed in 6 months with follow up CT scan before visit and I have instructed him to continue to see the other health care providers as per their scheduling.    02/06/2024 - Appointment with Russel Santana MD:  Plan/Recommendations:  Patient is doing well from the pulmonary standpoint.  Advised him to continue using Advair instead of using albuterol as rescue inhaler more frequently and he can use albuterol nebulizer and rescue inhaler as needed.  Prescription refill was sent to the pharmacy.  He had non-small cell lung cancer treated by oncology and Dr. Tadeo is going to see him back in the office this afternoon he is getting immune treatment after getting chemotherapy and radiation treatment and also had a partial lobectomy done by Dr. Carlin.  He is doing well but did not have any recent imaging studies on his CT scan of the chest done by next week for further evaluation of his malignancy.  For his nasal allergy will continue using fluticasone nasal spray, Zyrtec, Astelin nasal spray and montelukast and prescription refills were sent to the pharmacy.  Patient did quit smoking.  Continue smoking abstinence.  He will continue other medications and will continue follow-up with the primary care provider and oncology.  He will return to pulmonary clinic for a follow-up visit in 6 months time or earlier if  needed.  Follow up:  6 Months     02/06/2024 - Appointment with :  F/U 11/13/2023 with Naif CASTAÑEDA with Dr Anton Kinney [facility] RAD ONC  status post completion of radiation therapy to the lung and presents to our clinic today for surveillance exam and to review imaging. Diagnosed with right upper lobe of the lung. He completed 5040 cGy in 28 fractions to the right lung on 02/14/2023.   On exam, I do not see evidence for recurrent or metastatic disease at this time. He continues immunotherapy per medical oncology. We will continue routine follow-up/surveillance as discussed in 6 months with follow up CT scan before visit and I have instructed him to continue to see the other health care providers as per their scheduling  Physical examination is without new developments or findings. His heart rhythm is regular and normal.  He is due to have CT scan of the chest. This is ordered today.  PLAN:  Proceed with cycle #12 of 18 planned treatments with Keytruda today, 2/6/2024, and continue every 3 weeks   CT scan of the chest is ordered today  Serology ordered includes CBC, CMP, TSH and cortisol for continued monitoring of toxicity associated with immunotherapy that he is receiving to monitor for toxicity  Follow-up with me in 6 weeks    02/21/2024 - CT Chest without contrast:  Chronic lung changes.  Posttreatment changes with no sign of residual or recurrent neoplasm.    05/13/2024 - Appointment with :  Follow up in 6 months    06/19/2024 - CT Angiogram Chest:  No pulmonary embolism identified.  Chronic changes with emphysema and partial pneumonectomy on the right.  Bronchial wall thickening could indicate mild bronchitis.    06/27/2024 - CT Chest with contrast:  No evidence for recurrent disease in the chest.     06/27/2024 - CT Abdomen/Pelvis with contrast:  No lymphadenopathy.   1.4 cm fat containing lesion in the left lower quadrant favored to represent fat necrosis.   Diffuse hepatic  steatosis.  Colonic diverticulosis.     06/27/2024 - PET Scan:  The PET CT examination demonstrates a generally physiologic distribution of activity in the thorax, as described in the report.  The patchy airspace disease seen in the right upper lobe on the prior exam is no longer evident and the right pleural mass    seen on the prior exam has resolved.   The examination demonstrates a generally physiologic distribution of activity in the abdomen and pelvis, as described in the report.   The examination demonstrates a generally physiologic distribution of activity in the included portions of the head and neck, as described in the report.   The examination demonstrates a generally physiologic distribution of activity in the included portions of the skeleton and extremeties, as described in the report.     08/30/2024 - Port removal by Dr. Kim Bonilla.     11/05/2024 - Appointment with :  ASSESSMENT AND PLAN:  Mr. 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   Robotic assisted laparoscopic prostatectomy by Dr. Jj September 2020 for PT2N0 Consuelo 7 disease with tumor approaching the right posterior margin.  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 5040 cGy over 28 treatment fractions.   Neoadjuvant cisplatin 75 mg/m2, Alimta 500 mg/m2 and Opdivo 360 mg, cycle #1 initiated on 1/5/2023. Cycle #3 delivered on 2/16/2023.  Right thoracotomy with right upper lobectomy and mediastinal lymph node dissection by Dr. Goldy Carlin on 5/4/2023.   Final pathology was negative.   The primary tumor and all  mediastinal lymph nodes were also negative documenting complete response to neoadjuvant therapy.  Lalo received cycle #1 of adjuvant immunotherapy with Keytruda on 6/12/2023   Lalo completed the final cycle #18 planned adjuvant immunotherapy treatments with Keytruda on 6/11/2024.  Lalo developed right shoulder/scapular pain 2 months ago. When lifting his arm, swwinging a golf club, when he is active, not at rest.   Physical exam today, 11/5/2024:  No evidence of palpable supraclavicular or infraclavicular lymphadenopathy.  The right chest wall documents the right upper posterior chest incision that is healing well and chest tube sites etc., all healed. On auscultation both lungs are clear with good breath sounds even in the right upper lung field.  Heart regular rate rhythm normal S1-S2, no S3  Abdomen soft and benign  Extremities without edema  Skin: Rash primarily over the back area. It is semidiffuse with punctate lesions everywhere.   CBC today 11/5/2024 reveals a WBC of 4.20 Hgb is 15.4 with an MCV of 86.7 and platelet count of 159,000.   OV 5/13/24 with Naif CASTAÑEDA [facility] RAD ONC  status post completion of radiation therapy to the lung and presents to our clinic today for surveillance exam and to review imaging. Diagnosed with right upper lobe of the lung. He completed 5040 cGy in 28 fractions to the right lung on 02/14/2023.   CT-scan of the chest on 02/21/2024 revealed chronic lung changes. Posttreatment changes with no sign of residual or recurrent neoplasm.  On exam, I do not see evidence for recurrent or metastatic disease at this time. He has 2 cycles of keytruda remaining. Will defer surveillance imaging to medical oncology. We will continue routine follow-up/surveillance as discussed in 6 months. I have instructed him to continue to see the other health care providers as per their scheduling.  Return in about 6 months (around 11/13/2024).   Presented to [facility] ER 6/19/24; treated for  rhinovirus.  CTAngiogram Chest 6/19/24 at [facility], compared to 2/21/24  No pulmonary embolism identified.   Chronic changes with emphysema and partial pneumonectomy on the right.   Bronchial wall thickening could indicate mild bronchitis.   CT CHEST W CONTRAST 6/27/24 at [facility], compared to Chest CT 02/21/2024, 06/19/2024. PET CT 03/27/2023, 11/01/2022.   Right-sided chest port present   Right upper lobectomy noted. Emphysema. Scattered bullae in the left lung. No suspicious pulmonary nodule identified.   Internal fixation hardware right fifth rib. No discrete osseous lesion detected.   No evidence for recurrent disease in the chest   CT ABDOMEN AND PELVIS WITH CONTRAST on 6/27/24 at [facility], compared to CT chest 06/27/2024   No lymphadenopathy.  1.4 cm fat containing lesion in the left lower quadrant favored to represent fat necrosis.  Diffuse hepatic steatosis.  Colonic diverticulosis.  Postsurgical changes of L4-L5 fusion. Mild degenerative changes of the spine   PET CT SKULL BASE TO MID THIGH on 6/27/24 at [facility] compared to 03/27/2023. CT 06/27/2024   The right pleural mass seen on the prior examination with SUV max of about 3.1 (2.76 with current software) has essentially resolved  The patchy airspace disease with SUV max of up to 3.2 has similarly resolved.   No new significantly FDG avid nodules are seen within the lung fields.   No new significantly FDG avid lymph nodes are seen within the niels or mediastinum.   The examination demonstrates a generally physiologic distribution of activity in the abdomen and pelvis, included portions of the head and neck, and included portions of the skeleton and extremeties.  Port removal by Dr. Kim Bonilla on 8/30/2024 at [facility]   Lalo developed right shoulder/scapular pain 2 months ago. When lifting his arm, swwinging a golf club, when he is active, not at rest.   Exam of right scapular area has a mild degree of muscle fullness on the right compared to the left. Some discomfort  with pressure?  PLAN:  Serology ordered includes CBC, CMP, TSH and cortisol for continued monitoring of toxicity associated with immunotherapy that he received to monitor for toxicity, specifically toxicity related to the thyroid.  CT chest to evaluate pain  Follow-up with me in 4 months with a chest x-ray prior to return.     11/08/2024 - CT Chest without contrast:  Mild aortic atherosclerotic disease.   No mediastinal lymphadenopathy.   Lung bases reveal mild left base atelectasis or scarring.  Stable paraseptal emphysema versus pneumatoceles seen in the upper left lung.  No suspicious pulmonary opacities or nodules are identified currently.     11/21/2024 - Appointment with MADDY Donnelly:  Return in 4 months with a CT chest before.     03/25/2025 - Appointment with :  PLAN:  Referral is made to Dr. Lomas pain management at [facility] for trigger point injections see if we can get this pain under control.  Repeat CT scan of the chest is scheduled for April 2025 prior to his visit with Dr. Anton Kinney with radiation therapy at [facility]    04/08/2025 - CT Chest with contrast:  Stable chest CT with no acute abnormality and no sign of recurrent neoplastic disease.    History obtained from  PATIENT and CHART    PAST MEDICAL HISTORY  Past Medical History:   Diagnosis Date    Abnormal PET scan of lung     Nov 2022    Allergic rhinitis     Arthritis     Atrial fibrillation     Bronchiectasis     Bronchitis     Cancer     prostate    Chronic bronchitis     Chronic pain     COPD (chronic obstructive pulmonary disease)     GERD (gastroesophageal reflux disease)     Implantable loop recorder present     Lung cancer     Pneumonia     Prostatitis, acute     S/P ACL reconstruction     Septic shock due to urinary tract infection     Sinusitis     Strep throat     UTI (urinary tract infection)       PAST SURGICAL HISTORY  Past Surgical History:   Procedure Laterality Date    ANKLE SURGERY Right     BACK SURGERY      X2     ENDOSCOPY N/A 01/10/2022    Procedure: ESOPHAGOGASTRODUODENOSCOPY WITH ANESTHESIA;  Surgeon: Tucker Bright MD;  Location:  [facility] OR;  Service: Gastroenterology;  Laterality: N/A;  pre food bolus  post food bolus  Dr. PAULINO ACL RECONSTRUCTION Left     NASAL SEPTUM SURGERY      PENILE PROSTHESIS IMPLANT N/A 03/15/2022    Procedure: 3-PIECE INFLATABLE PENILE PROSTHESIS PLACEMENT;  Surgeon: Trae Clark MD;  Location:  [facility] OR;  Service: Urology;  Laterality: N/A;    PROSTATE ULTRASOUND BIOPSY N/A 2020    Procedure: PROSTATE  BIOPSY;  Surgeon: Trae Clark MD;  Location:  [facility] OR;  Service: Urology;  Laterality: N/A;    PROSTATECTOMY N/A 2020    Procedure: RADICAL PROSTATECTOMY LAPAROSCOPIC WITH DAVINCI ROBOT;  Surgeon: Nuno Jj MD;  Location:  [facility] OR;  Service: DaVinci;  Laterality: N/A;    SHOULDER SURGERY Left     X 4    TENNIS ELBOW RELEASE Bilateral 2011    THORACOTOMY Right 2023    Procedure: RIGHT THORACOTOMY WITH CHEST WALL RESECTION, MEDIASTINAL LYMPH NODE DISSECTION, RIGHT UPPER LOBECTOMY;  Surgeon: Goldy Carlin MD;  Location:  [facility] OR;  Service: Cardiothoracic;  Laterality: Right;    VENOUS ACCESS DEVICE (PORT) INSERTION N/A 2022    Procedure: Single Lumen Port-a-cath insertion with flouroscopy;  Surgeon: Kim Bonilla MD;  Location:  [facility] OR;  Service: General;  Laterality: N/A;    VENOUS ACCESS DEVICE (PORT) REMOVAL N/A 2024    Procedure: REMOVAL VENOUS ACCESS DEVICE;  Surgeon: Kim Bonilla MD;  Location:  [facility] OR;  Service: General;  Laterality: N/A;      FAMILY HISTORY  family history includes Cancer in his mother.    SOCIAL HISTORY  Social History     Tobacco Use    Smoking status: Former     Current packs/day: 0.00     Average packs/day: 1 pack/day for 35.8 years (35.8 ttl pk-yrs)     Types: Cigarettes     Start date: 1987     Quit date: 2022     Years since quittin.3     Passive exposure: Past     Smokeless tobacco: Former     Types: Snuff     Quit date: 1985   Vaping Use    Vaping status: Former   Substance Use Topics    Alcohol use: No     Comment: 0    Drug use: Not Currently     Frequency: 7.0 times per week     Types: Hydrocodone, Marijuana, Oxycodone     Comment: Edible gummies - 1 per day     ALLERGIES  Adhesive tape     MEDICATIONS    Current Outpatient Medications:     acetaminophen (Tylenol) 325 MG tablet, Take 3 tablets by mouth Every 8 (Eight) Hours. Take every 8 hours for 3 days then take prn as needed., Disp: , Rfl:     acetaminophen (TYLENOL) 500 MG tablet, Take 2 tablets by mouth Every 6 (Six) Hours As Needed for Mild Pain., Disp: , Rfl:     Ascorbic Acid (VITAMIN C PO), Take  by mouth Daily., Disp: , Rfl:     Azelastine HCl 137 MCG/SPRAY solution, 2 sprays into the nostril(s) as directed by provider 2 (Two) Times a Day., Disp: 30 mL, Rfl: 5    Calcium Citrate 150 MG capsule, Take  by mouth Daily., Disp: , Rfl:     cetirizine (zyrTEC) 10 MG tablet, Take 1 tablet by mouth Daily., Disp: 90 tablet, Rfl: 3    Cholecalciferol (VITAMIN D-3 PO), Take  by mouth Daily., Disp: , Rfl:     Cyanocobalamin (VITAMIN B 12 PO), Take  by mouth Daily., Disp: , Rfl:     esomeprazole (nexIUM) 40 MG capsule, Take 1 capsule by mouth Every Morning Before Breakfast., Disp: , Rfl:     Fluticasone-Salmeterol (ADVAIR/WIXELA) 250-50 MCG/ACT DISKUS, INHALE ONE PUFF BY MOUTH TWICE DAILY, Disp: 60 each, Rfl: 5    gabapentin (NEURONTIN) 800 MG tablet, Take 1 tablet by mouth 3 (Three) Times a Day., Disp: , Rfl:     hydrOXYzine pamoate (VISTARIL) 50 MG capsule, Take 1 capsule by mouth Daily., Disp: , Rfl:     ibuprofen (ADVIL,MOTRIN) 800 MG tablet, 1 tablet with food or milk as needed Orally prn, Disp: , Rfl:     ibuprofen (Motrin IB) 200 MG tablet, Take 3 tablets by mouth Every 8 (Eight) Hours. Take every 8 hours for three days then take as needed., Disp: , Rfl:     montelukast (SINGULAIR) 10 MG tablet, Take 1 tablet by mouth  "Every Night., Disp: 90 tablet, Rfl: 3    multivitamin with minerals tablet tablet, Take 1 tablet by mouth Daily., Disp: , Rfl:     NON FORMULARY, Take 1 tablet by mouth Daily. Marijuana Gummies, Disp: , Rfl:     Ventolin  (90 Base) MCG/ACT inhaler, Inhale 2 puffs Every 4 (Four) Hours As Needed for Wheezing., Disp: 18 g, Rfl: 5    Zinc 50 MG tablet, Take  by mouth Daily., Disp: , Rfl:     Current outpatient and discharge medications have been reconciled for the patient.  Reviewed by: MADDY Donnelly    The following portions of the patient's history were reviewed and updated as appropriate: allergies, current medications, past family history, past medical history, past social history, past surgical history and problem list.    REVIEW OF SYSTEMS  Review of Systems   Constitutional:  Positive for fatigue.   HENT: Negative.     Eyes:         Glasses    Respiratory:  Positive for shortness of breath (with exeration).    Cardiovascular: Negative.    Gastrointestinal:  Positive for nausea (occasionally).   Endocrine: Negative.    Genitourinary: Negative.    Musculoskeletal: Negative.    Skin: Negative.    Allergic/Immunologic: Negative.    Neurological: Negative.    Hematological: Negative.    Psychiatric/Behavioral: Negative.       PHYSICAL EXAM  VITAL SIGNS:   Vitals:    04/10/25 1405   BP: 125/89   Pulse: 78   SpO2: 96%  Comment: room air   Weight: 99.3 kg (219 lb)   Height: 185.4 cm (73\")   PainSc: 0-No pain     Physical Exam     Performance Status: ECOG (0) Fully active, able to carry on all predisease performance without restriction    Clinical Quality Measures  - Pain Documented by Standardized Tool, FPS Lalo Jones Sr. reports a pain score of 0.  Given his pain assessment as noted, treatment options were discussed and the following options were decided upon as a follow-up plan to address the patient's pain: continuation of current treatment plan for pain.  Pain Medications              acetaminophen " (Tylenol) 325 MG tablet Take 3 tablets by mouth Every 8 (Eight) Hours. Take every 8 hours for 3 days then take prn as needed.    acetaminophen (TYLENOL) 500 MG tablet Take 2 tablets by mouth Every 6 (Six) Hours As Needed for Mild Pain.    gabapentin (NEURONTIN) 800 MG tablet Take 1 tablet by mouth 3 (Three) Times a Day.    ibuprofen (ADVIL,MOTRIN) 800 MG tablet 1 tablet with food or milk as needed Orally prn    ibuprofen (Motrin IB) 200 MG tablet Take 3 tablets by mouth Every 8 (Eight) Hours. Take every 8 hours for three days then take as needed.          - Body Mass Index Screening and Follow-Up Plan   Body mass index is 28.89 kg/m². Defer to PCP.     - Tobacco Use: Screening and Cessation Intervention  Social History    Tobacco Use      Smoking status: Former        Packs/day: 0.00        Years: 1 pack/day for 35.8 years (35.8 ttl pk-yrs)        Types: Cigarettes        Start date: 1987        Quit date: 2022        Years since quittin.3        Passive exposure: Past      Smokeless tobacco: Former        Types: Snuff        Quit date:     - Advanced Care Planning Advance Care Planning   ACP discussion was held with the patient during this visit. Patient does not have an advance directive, information provided.    - PHQ-2 Depression Screening  Little interest or pleasure in doing things? Not at all   Feeling down, depressed, or hopeless? Not at all   PHQ-2 Total Score 0     ASSESSMENT AND PLAN  1. History of primary non-small cell carcinoma of right lung    2. History of radiation therapy    3. Former smoker      Orders Placed This Encounter   Procedures    CT Chest With Contrast     Standing Status:   Future     Expected Date:   10/10/2025     Expiration Date:   7/10/2026     Does this exam require Blake Bronch Protocol?:   No     Reason for Exam::   history of lung cancer     Release to patient:   Routine Release [[id number]]     RECOMMENDATIONS: Lalo Jones Sr. is status post completion of  radiation therapy to the lung and presents to our clinic today for surveillance exam and to review imaging. Diagnosed with right upper lobe of the lung. He completed 5040 cGy in 28 fractions to the right lung on 02/14/2023.     CT-scan of the chest on 04/08/2025 revealed a stable chest CT with no acute abnormality and no sign of recurrent neoplastic disease.    On exam, I do not see evidence for recurrent or metastatic disease at this time. We will continue routine follow-up/surveillance as discussed in 6 months with follow up CT scan before visit and I have instructed him to continue to see the other health care providers as per their scheduling.    Patient Instructions   1) return in 6 months with a CT chest before     Return in about 6 months (around 10/10/2025).    Time Spent: I spent 42 minutes caring for Lalo on this date of service. This time includes time spent by me in the following activities: preparing for the visit, reviewing tests, obtaining and/or reviewing a separately obtained history, performing a medically appropriate examination and/or evaluation, counseling and educating the patient/family/caregiver, ordering medications, tests, or procedures, referring and communicating with other health care professionals, documenting information in the medical record, independently interpreting results and communicating that information with the patient/family/caregiver, and care coordination.   Naif Jones, MADDY  04/10/2025

## 2025-04-09 ENCOUNTER — HOSPITAL ENCOUNTER (OUTPATIENT)
Dept: RADIATION ONCOLOGY | Facility: HOSPITAL | Age: 60
Setting detail: RADIATION/ONCOLOGY SERIES
End: 2025-04-09
Payer: COMMERCIAL

## 2025-04-10 ENCOUNTER — OFFICE VISIT (OUTPATIENT)
Age: 60
End: 2025-04-10
Payer: COMMERCIAL

## 2025-04-10 VITALS
SYSTOLIC BLOOD PRESSURE: 125 MMHG | WEIGHT: 219 LBS | DIASTOLIC BLOOD PRESSURE: 89 MMHG | HEART RATE: 78 BPM | BODY MASS INDEX: 29.03 KG/M2 | OXYGEN SATURATION: 96 % | HEIGHT: 73 IN

## 2025-04-10 DIAGNOSIS — Z85.118 HISTORY OF PRIMARY NON-SMALL CELL CARCINOMA OF RIGHT LUNG: Primary | ICD-10-CM

## 2025-04-10 DIAGNOSIS — Z92.3 HISTORY OF RADIATION THERAPY: ICD-10-CM

## 2025-04-10 DIAGNOSIS — Z87.891 FORMER SMOKER: ICD-10-CM

## 2025-04-10 PROCEDURE — G0463 HOSPITAL OUTPT CLINIC VISIT: HCPCS | Performed by: RADIOLOGY

## 2025-05-17 NOTE — PROGRESS NOTES
Primary Physician: Nemesio Schreiber MD    CHIEF COMPLAINT:right periscapular pain    HISTORY OF PRESENT ILLNESS:  Mr. Andrew Jessica is 59 y.o. male with hx of adenocarcinoma of the lung s/p right upper lobectomy and immunotherapy who has developed right scapular pain. He was referred by Dr. Dolan for further evaluation and consideration of a trigger point injection. Pain increases when lifting his arm or swinging a golf club. The available medical record is reviewed (along with outside notes when available), the patient is interviewed and examined with the plan formulated by myself.        Patient presents today with right periscapular pain over his scar from his previous lobectomy site. Pain is stabbing and waxes and wanes. Pain is currently 3/10 but worsens with use of his right shoulder. Pain is local and does not radiate past the mid axillary line to the anterior chest. He does have some numbness over his lobectomy scar site. Patient received a massage that helped somewhat. He is doing exercises/stretches consistently and reports he has done so for years.    Pain History:      Location: right periscapular area.      Quality: stabbing      Severity: 3/10      Onset: > 2 years, occurred after the surgery      Duration: waxing and waning      Relieving Factors: activity modification, massage      Exacerbating Factors: shoulder movement    Red Flags:  none      Current Pain/Psych/Sleep Medications:  - gabapentin 800mg BID (not helping)  -ibuprofen 800mg q 6 hours prn    Prior PharmacotherapyName, dose, response  Acetaminophen  NSAIDS/steroids  Muscle RelaxantsFlexeril (helpful)  Anti-neuropathic (simón, AEDs)   Antidepressants (TCA, SNRI, SSRI)NT  Opioids  Topicals (lido, diclofenac)NT  Supplements (ALA, turmeric, LDN)  *NT = not tried, NE = not effective, AE = adverse effects        Opioid Risk Assessment:      Taking opioids? Not currently      Providing Analgesia?      Improved Activity?      Aberrant behavior?

## 2025-05-20 ENCOUNTER — OFFICE VISIT (OUTPATIENT)
Dept: PAIN MANAGEMENT | Age: 60
End: 2025-05-20
Payer: COMMERCIAL

## 2025-05-20 VITALS
HEART RATE: 77 BPM | WEIGHT: 224.8 LBS | BODY MASS INDEX: 28.85 KG/M2 | OXYGEN SATURATION: 100 % | RESPIRATION RATE: 16 BRPM | DIASTOLIC BLOOD PRESSURE: 88 MMHG | SYSTOLIC BLOOD PRESSURE: 116 MMHG | HEIGHT: 74 IN | TEMPERATURE: 96.9 F

## 2025-05-20 DIAGNOSIS — M79.18 MYOFASCIAL PAIN ON RIGHT SIDE: Primary | ICD-10-CM

## 2025-05-20 PROCEDURE — 99203 OFFICE O/P NEW LOW 30 MIN: CPT | Performed by: STUDENT IN AN ORGANIZED HEALTH CARE EDUCATION/TRAINING PROGRAM

## 2025-05-20 RX ORDER — CYCLOBENZAPRINE HCL 10 MG
10 TABLET ORAL 3 TIMES DAILY PRN
Qty: 90 TABLET | Refills: 0 | Status: SHIPPED | OUTPATIENT
Start: 2025-05-20 | End: 2025-06-19

## 2025-05-20 ASSESSMENT — PATIENT HEALTH QUESTIONNAIRE - PHQ9
10. IF YOU CHECKED OFF ANY PROBLEMS, HOW DIFFICULT HAVE THESE PROBLEMS MADE IT FOR YOU TO DO YOUR WORK, TAKE CARE OF THINGS AT HOME, OR GET ALONG WITH OTHER PEOPLE: SOMEWHAT DIFFICULT
SUM OF ALL RESPONSES TO PHQ QUESTIONS 1-9: 6
4. FEELING TIRED OR HAVING LITTLE ENERGY: NEARLY EVERY DAY
6. FEELING BAD ABOUT YOURSELF - OR THAT YOU ARE A FAILURE OR HAVE LET YOURSELF OR YOUR FAMILY DOWN: NOT AT ALL
SUM OF ALL RESPONSES TO PHQ QUESTIONS 1-9: 6
7. TROUBLE CONCENTRATING ON THINGS, SUCH AS READING THE NEWSPAPER OR WATCHING TELEVISION: NOT AT ALL
SUM OF ALL RESPONSES TO PHQ QUESTIONS 1-9: 6
3. TROUBLE FALLING OR STAYING ASLEEP: NOT AT ALL
SUM OF ALL RESPONSES TO PHQ QUESTIONS 1-9: 6
5. POOR APPETITE OR OVEREATING: NOT AT ALL
1. LITTLE INTEREST OR PLEASURE IN DOING THINGS: NEARLY EVERY DAY
8. MOVING OR SPEAKING SO SLOWLY THAT OTHER PEOPLE COULD HAVE NOTICED. OR THE OPPOSITE, BEING SO FIGETY OR RESTLESS THAT YOU HAVE BEEN MOVING AROUND A LOT MORE THAN USUAL: NOT AT ALL
2. FEELING DOWN, DEPRESSED OR HOPELESS: NOT AT ALL
9. THOUGHTS THAT YOU WOULD BE BETTER OFF DEAD, OR OF HURTING YOURSELF: NOT AT ALL

## 2025-05-29 ENCOUNTER — TELEPHONE (OUTPATIENT)
Dept: CARDIAC SURGERY | Facility: CLINIC | Age: 60
End: 2025-05-29
Payer: COMMERCIAL

## 2025-05-29 NOTE — TELEPHONE ENCOUNTER
Dr. Carlin is out of the office until June 2, 2025.  It does not appear that he has been seen in our office since June 19, 2023.  I will forward message for his review.      If he is having an acute problem then I recommend he contact his primary care provider or go to the nearest emergency room.

## 2025-06-03 ENCOUNTER — PATIENT MESSAGE (OUTPATIENT)
Dept: CARDIAC SURGERY | Facility: CLINIC | Age: 60
End: 2025-06-03
Payer: COMMERCIAL

## 2025-06-04 NOTE — TELEPHONE ENCOUNTER
See other patient message regarding concern that shoulder pain is due to his rib plating.  Let me know if you want to see him in the office or call.  Can also respond to the patient in this message.

## 2025-06-12 ENCOUNTER — TELEPHONE (OUTPATIENT)
Dept: HEMATOLOGY | Age: 60
End: 2025-06-12

## 2025-06-12 ENCOUNTER — TELEPHONE (OUTPATIENT)
Dept: CARDIOLOGY | Facility: CLINIC | Age: 60
End: 2025-06-12
Payer: COMMERCIAL

## 2025-06-12 NOTE — TELEPHONE ENCOUNTER
Called patient to reschedule appointment from 7/15 to 7/29 as Dr. Dolan is out of the office. Patient accepted date and time.

## 2025-06-12 NOTE — TELEPHONE ENCOUNTER
Patient notified that they need to send a transmission using their home monitor for their implanted cardiac device.  Contact information for [employer] provided for difficulty transmitting or for any error codes encountered when using monitor.     [employer]  [phone]

## 2025-06-16 ENCOUNTER — TELEPHONE (OUTPATIENT)
Dept: CARDIOLOGY | Facility: CLINIC | Age: 60
End: 2025-06-16
Payer: COMMERCIAL

## 2025-06-16 NOTE — TELEPHONE ENCOUNTER
Patient notified that [employer] is disconnected and they need to send a transmission using their home monitor for their implanted cardiac device.  Contact information for [employer] provided for difficulty transmitting or for any error codes encountered when using monitor.     [employer]  [phone]

## 2025-07-17 NOTE — PROGRESS NOTES
Psychological: Cognitive behavioral therapy discussed.   4. Complementary and alternative (CAM) Therapies: Mindfulness recommended. Acupuncture to be considered  5. Labs: Reviewed from 3/25/25. Creatinine1.0  6. Imaging: Reviewed  7. Interventions: Schedule TPI to the right periscapular thoracic muscles. Can consider intercostal nerve block in the future if pain not relieved by TPI.  8. Referrals: None indicated  9. Records requested: None indicated  10. Lifestyle goal (weight, smoking, etc): Improved functioning, pain reduction, minimize medications  Follow up - for procedure      Andrew was scheduled 6/5/2025  for right thoracic trigger point injection and canceled due to illness. At this time has not reschedule appointment      PLAN:  Referral is made to Dr. Bustos pain management at [facility] for trigger point injections see if we can get this pain under control.  Repeat CT scan of the chest is scheduled for April 2025 prior to his visit with Dr. Bobby Acosta with radiation therapy at [facility]    #3    Syncope and collapse, symptoms of orthostasis    Referred to Dr Sanders, [facility] by Nemesio Schreiber MD    Initial visit 8/17/23- cardiac workup as follows:    Holter Monitor worn  8/21/23- 9/17/23 per [facility]  Study Findings   Patient diary submitted. Dizziness and lightheaded nausea were reported during the monitoring period. No complications noted. The predominant rhythm noted during the testing period was sinus rhythm. Premature atrial contractions occured rarely. There was one episodes of supraventricular tachycardia. Premature ventricular contractions occured rarely.    TILT TABLE TEST 9/7/23 at [facility]  Tilt table test was negative for neurocardiogenic syncope, postural   orthostatic tachycardia syndrome and orthostatic syncope even when   provoked with sublingual nitroglycerin.     ADULT STRESS ECHOCARDIOGRAM at [facility] on 9/12/2023    Left ventricular ejection fraction appears to be 56 - 60%.     The

## 2025-07-24 ENCOUNTER — TELEPHONE (OUTPATIENT)
Dept: HEMATOLOGY | Age: 60
End: 2025-07-24

## 2025-07-24 NOTE — TELEPHONE ENCOUNTER
I called patient and reminded patient of their appt on 07/29/2025 and patient confirmed they would be here. I also let patient know that we have moved into our new cancer facility and asked patient if they were aware of where we were now located, and patient voiced understanding of our new location. Patient knows not to arrive any earlier their appointment because we are unable to check patients in early and to come in well hydrated incase labs are needed at any time throughout their visit.

## 2025-07-29 ENCOUNTER — HOSPITAL ENCOUNTER (OUTPATIENT)
Dept: INFUSION THERAPY | Age: 60
Discharge: HOME | End: 2025-07-29

## 2025-07-29 ENCOUNTER — OFFICE VISIT (OUTPATIENT)
Dept: HEMATOLOGY | Age: 60
End: 2025-07-29

## 2025-07-29 VITALS
DIASTOLIC BLOOD PRESSURE: 74 MMHG | SYSTOLIC BLOOD PRESSURE: 130 MMHG | WEIGHT: 223.9 LBS | OXYGEN SATURATION: 99 % | HEIGHT: 74 IN | BODY MASS INDEX: 28.73 KG/M2 | HEART RATE: 75 BPM | TEMPERATURE: 97.9 F

## 2025-07-29 DIAGNOSIS — C34.91 ADENOCARCINOMA OF RIGHT LUNG (HCC): ICD-10-CM

## 2025-07-29 DIAGNOSIS — Z71.2 ENCOUNTER TO DISCUSS TEST RESULTS: ICD-10-CM

## 2025-07-29 DIAGNOSIS — R53.83 FATIGUE DUE TO TREATMENT: ICD-10-CM

## 2025-07-29 DIAGNOSIS — C34.91 ADENOCARCINOMA OF RIGHT LUNG (HCC): Primary | ICD-10-CM

## 2025-07-29 DIAGNOSIS — Z00.00 HEALTH CARE MAINTENANCE: ICD-10-CM

## 2025-07-29 DIAGNOSIS — R52 PAIN: ICD-10-CM

## 2025-07-29 LAB
ALBUMIN SERPL-MCNC: 4.5 G/DL (ref 3.5–5.2)
ALP SERPL-CCNC: 105 U/L (ref 40–129)
ALT SERPL-CCNC: 32 U/L (ref 5–41)
ANION GAP SERPL CALCULATED.3IONS-SCNC: 10 MMOL/L (ref 7–19)
AST SERPL-CCNC: 26 U/L (ref 5–40)
BASOPHILS # BLD: 0.04 K/UL (ref 0–0.2)
BASOPHILS NFR BLD: 0.8 % (ref 0–1)
BILIRUB SERPL-MCNC: 0.3 MG/DL (ref 0–1.2)
BUN SERPL-MCNC: 14 MG/DL (ref 6–20)
CALCIUM SERPL-MCNC: 9.5 MG/DL (ref 8.6–10)
CHLORIDE SERPL-SCNC: 105 MMOL/L (ref 98–107)
CO2 SERPL-SCNC: 28 MMOL/L (ref 22–29)
CORTIS AM PEAK SERPL-MCNC: 8.7 UG/DL (ref 4.8–19.5)
CREAT SERPL-MCNC: 1.1 MG/DL (ref 0.7–1.2)
EOSINOPHIL # BLD: 0.27 K/UL (ref 0–0.6)
EOSINOPHIL NFR BLD: 5.4 % (ref 0–5)
ERYTHROCYTE [DISTWIDTH] IN BLOOD BY AUTOMATED COUNT: 13.2 % (ref 11.5–14.5)
GLUCOSE SERPL-MCNC: 80 MG/DL (ref 70–99)
HCT VFR BLD AUTO: 44.9 % (ref 42–52)
HGB BLD-MCNC: 15.7 G/DL (ref 14–18)
LYMPHOCYTES # BLD: 0.86 K/UL (ref 1.1–4.5)
LYMPHOCYTES NFR BLD: 17.3 % (ref 20–40)
MCH RBC QN AUTO: 30.4 PG (ref 27–31)
MCHC RBC AUTO-ENTMCNC: 35 G/DL (ref 33–37)
MCV RBC AUTO: 86.8 FL (ref 80–94)
MONOCYTES # BLD: 0.52 K/UL (ref 0–0.9)
MONOCYTES NFR BLD: 10.5 % (ref 1–10)
NEUTROPHILS # BLD: 3.26 K/UL (ref 1.5–7.5)
NEUTS SEG NFR BLD: 65.6 % (ref 50–65)
PLATELET # BLD AUTO: 157 K/UL (ref 130–400)
PMV BLD AUTO: 9.1 FL (ref 9.4–12.4)
POTASSIUM SERPL-SCNC: 4 MMOL/L (ref 3.5–5.1)
PROT SERPL-MCNC: 6.9 G/DL (ref 6.4–8.3)
RBC # BLD AUTO: 5.17 M/UL (ref 4.7–6.1)
SODIUM SERPL-SCNC: 143 MMOL/L (ref 136–145)
TSH SERPL DL<=0.005 MIU/L-ACNC: 4.67 UIU/ML (ref 0.27–4.2)
WBC # BLD AUTO: 4.97 K/UL (ref 4.8–10.8)

## 2025-07-29 PROCEDURE — 99214 OFFICE O/P EST MOD 30 MIN: CPT | Performed by: INTERNAL MEDICINE

## 2025-07-29 PROCEDURE — 85025 COMPLETE CBC W/AUTO DIFF WBC: CPT

## 2025-07-29 PROCEDURE — 36415 COLL VENOUS BLD VENIPUNCTURE: CPT

## 2025-07-29 PROCEDURE — 82533 TOTAL CORTISOL: CPT

## 2025-07-29 PROCEDURE — 84443 ASSAY THYROID STIM HORMONE: CPT

## 2025-07-29 PROCEDURE — 80053 COMPREHEN METABOLIC PANEL: CPT

## 2025-08-15 ENCOUNTER — TELEPHONE (OUTPATIENT)
Dept: CARDIOLOGY | Facility: CLINIC | Age: 60
End: 2025-08-15
Payer: COMMERCIAL

## 2025-09-02 ENCOUNTER — TELEPHONE (OUTPATIENT)
Dept: HEMATOLOGY | Age: 60
End: 2025-09-02

## (undated) DEVICE — PACK,UNIVERSAL,NO GOWNS: Brand: MEDLINE

## (undated) DEVICE — TLC MALE UROLOGY RETRACTOR SYSTEM WITH FRAME (BOXED): Brand: TLC SELF-RETAINING RETRACTOR SYSTEM

## (undated) DEVICE — CONTAINER,SPECIMEN,OR STERILE,4OZ: Brand: MEDLINE

## (undated) DEVICE — PK POSTN TRENGUARD450 PROC

## (undated) DEVICE — TRAP FLD MINIVAC MEGADYNE 100ML

## (undated) DEVICE — ADHS SKIN PREMIERPRO EXOFIN TOPICAL HI/VISC .5ML

## (undated) DEVICE — GLV SURG SENSICARE W/ALOE PF LF 6.5 STRL

## (undated) DEVICE — 3M™ IOBAN™ 2 ANTIMICROBIAL INCISE DRAPE 6650EZ: Brand: IOBAN™ 2

## (undated) DEVICE — SYR LUERLOK 20CC BX/50

## (undated) DEVICE — UTILITY MARKER W/MED LABELS: Brand: MEDLINE

## (undated) DEVICE — SUT GUT CHRM 3/0 SH 27IN G122H

## (undated) DEVICE — SUT SILK 2/0 SUTUPAK TIES 24IN SA75H

## (undated) DEVICE — CVR UNIV C/ARM

## (undated) DEVICE — ELECTRD BLD EZ CLN MOD XLNG 2.75IN

## (undated) DEVICE — Device: Brand: DEFENDO AIR/WATER/SUCTION AND BIOPSY VALVE

## (undated) DEVICE — PK TURNOVER RM ADV

## (undated) DEVICE — PK TURNOVER CYSTO RM

## (undated) DEVICE — DRAPE,UTILITY,TAPE,15X26,STERILE: Brand: MEDLINE

## (undated) DEVICE — CLTH CLENS READYCLEANSE PERI CARE PK/5

## (undated) DEVICE — ELECTRD BLD MEGADYNE EZCLEAN STD 2.75IN XLNG

## (undated) DEVICE — SNAR POLYP CAPTIVATOR MICROHEX 13 240CM

## (undated) DEVICE — NDL HYPO PRECISIONGLIDE REG 25G 1 1/2

## (undated) DEVICE — TBG PENCL TELESCP MEGADYNE SMOKE EVAC 10FT

## (undated) DEVICE — 30977 SEE SHARP - ENHANCED INTRAOPERATIVE LAPAROSCOPE CLEANING & DEFOGGING: Brand: 30977 SEE SHARP - ENHANCED INTRAOPERATIVE LAPAROSCOPE CLEANING & DEFOGGING

## (undated) DEVICE — BAPTIST TURNOVER KIT: Brand: MEDLINE INDUSTRIES, INC.

## (undated) DEVICE — SYR LL TP 10ML STRL

## (undated) DEVICE — GLV SURG TRIUMPH MICRO PF LTX 7.5 STRL

## (undated) DEVICE — GLV SURG SENSICARE W/ALOE PF LF SZ6 STRL

## (undated) DEVICE — SUT PROLN 4/0 RB1 D/A 36IN 8557H

## (undated) DEVICE — 4-PORT MANIFOLD: Brand: NEPTUNE 2

## (undated) DEVICE — GLV SURG DERMASSURE GRN LF PF 7.0

## (undated) DEVICE — SPNG GZ WOVN 4X4IN 12PLY 10/BX STRL

## (undated) DEVICE — Device: Brand: ZDRIVE™

## (undated) DEVICE — CONN REDUCING CANN/PUMP 1/2X3/8X3/8

## (undated) DEVICE — RESERVOIR,SUCTION,100CC,SILICONE: Brand: MEDLINE

## (undated) DEVICE — ANTIBACTERIAL VIOLET BRAIDED (POLYGLACTIN 910), SYNTHETIC ABSORBABLE SUTURE: Brand: COATED VICRYL

## (undated) DEVICE — ANTIBACTERIAL UNDYED BRAIDED (POLYGLACTIN 910), SYNTHETIC ABSORBABLE SUTURE: Brand: COATED VICRYL

## (undated) DEVICE — TIP COVER ACCESSORY

## (undated) DEVICE — 28 FR RIGHT ANGLE – SOFT PVC CATHETER: Brand: PVC THORACIC CATHETERS

## (undated) DEVICE — MAX-CORE® DISPOSABLE CORE BIOPSY INSTRUMENT, 18G X 20CM: Brand: MAX-CORE

## (undated) DEVICE — SYS CLOSE PORTII CARTR/THOMASN XL

## (undated) DEVICE — SYR LUERLOK 50ML

## (undated) DEVICE — SKIN AFFIX SURG ADHESIVE 72/CS 0.55ML: Brand: MEDLINE

## (undated) DEVICE — PAD MAJOR: Brand: MEDLINE INDUSTRIES, INC.

## (undated) DEVICE — BLD SAW SAG CRSE 19.5X41.0MM

## (undated) DEVICE — GLV SURG DERMASSURE GRN LF PF 8.0

## (undated) DEVICE — SUT SILK 4/0 SUTUPAK TIES 24IN SA73H

## (undated) DEVICE — SPONGE,LAP,12"X12",XR,ST,5/PK,40PK/CS: Brand: MEDLINE

## (undated) DEVICE — 24 FR STRAIGHT – SOFT PVC CATHETER: Brand: PVC THORACIC CATHETERS

## (undated) DEVICE — DAVINCI: Brand: MEDLINE INDUSTRIES, INC.

## (undated) DEVICE — ECHELON FLEX  POWERED VASCULAR STAPLER WITH ADVANCED PLACEMENT TIP, 35MM: Brand: ECHELON FLEX

## (undated) DEVICE — MAJOR DOUBLE BASIN W/GOWNS II: Brand: MEDLINE INDUSTRIES, INC.

## (undated) DEVICE — SUT SILK 2/0 SH 30IN K833H

## (undated) DEVICE — VIOLET POLYDIOXANONE POLYMER, SYNTHETIC ABSORBABLE SUTURE CLIPS: Brand: LAPRA-TY

## (undated) DEVICE — TROC ANCHORPORT BLADELES W/GRIP 12X120MM

## (undated) DEVICE — ARM DRAPE

## (undated) DEVICE — PROXIMATE RH ROTATING HEAD SKIN STAPLERS (35 WIDE) CONTAINS 35 STAINLESS STEEL STAPLES: Brand: PROXIMATE

## (undated) DEVICE — SUT SILK 2/0 FS BLK 18IN 685G

## (undated) DEVICE — COVER,MAYO STAND,STERILE: Brand: MEDLINE

## (undated) DEVICE — GLV SURG BIOGEL M LTX PF 7 1/2

## (undated) DEVICE — DECANTER: Brand: UNBRANDED

## (undated) DEVICE — SUT PDS 2/0 CT2 27IN VIL PDP333H

## (undated) DEVICE — SUT VIC 1 TIES 54IN DYED J617H

## (undated) DEVICE — SUT VIC 0 CT1 CR8 27IN UD VCPP41D

## (undated) DEVICE — ENDOPATH XCEL WITH OPTIVIEW TECHNOLOGY BLADELESS TROCARS WITH STABILITY SLEEVES: Brand: ENDOPATH XCEL OPTIVIEW

## (undated) DEVICE — INTENDED FOR TISSUE SEPARATION, AND OTHER PROCEDURES THAT REQUIRE A SHARP SURGICAL BLADE TO PUNCTURE OR CUT.: Brand: BARD-PARKER ® STAINLESS STEEL BLADES

## (undated) DEVICE — SUT VIC 1 XLH 27IN VCP583G

## (undated) DEVICE — CATH IV ANGIO FEP 14GA 5.25IN ORNG 10PK

## (undated) DEVICE — SPONGE,DISSECTOR,ROUND CHERRY,XR,ST,5/PK: Brand: MEDLINE

## (undated) DEVICE — CANNULA SEAL

## (undated) DEVICE — ELECTRD BLD EZ CLN MOD 6.5IN

## (undated) DEVICE — YANKAUER,BULB TIP WITH VENT: Brand: ARGYLE

## (undated) DEVICE — DRN JP RND NO TROC SIL 15F 3/16IN

## (undated) DEVICE — CVR HNDL LIGHT RIGID

## (undated) DEVICE — CONMED SCOPE SAVER BITE BLOCK, 20X27 MM: Brand: SCOPE SAVER

## (undated) DEVICE — SUT SILK 3/0 SUTUPAK TIES 24IN SA74H

## (undated) DEVICE — MONOPOLAR METZENBAUM SCISSOR, MINI BLADE TIP, DISPOSABLE: Brand: MONOPOLAR METZENBAUM SCISSOR, MINI BLADE TIP, DISPOSABLE

## (undated) DEVICE — APPL CHLORAPREP HI/LITE 26ML ORNG

## (undated) DEVICE — CUFF,BP,DISP,1 TUBE,ADULT,HP: Brand: MEDLINE

## (undated) DEVICE — TBG SMPL FLTR LINE NASL 02/C02 A/ BX/100

## (undated) DEVICE — APPL HEMO SURG ARISTA/AH/FLEXITIP XL 38CM

## (undated) DEVICE — GLV SURG BIOGEL M LTX PF 8

## (undated) DEVICE — SUT MNCRYL 4/0 PS2 27IN UD MCP426H

## (undated) DEVICE — GLOVE,SURG,SENSICARE,ALOE,LF,PF,6: Brand: MEDLINE

## (undated) DEVICE — BANDAGE,GAUZE,BULKEE II,4.5"X4.1YD,STRL: Brand: MEDLINE

## (undated) DEVICE — SUT SILK 0 SUTUPAK TIES 24IN SA76G

## (undated) DEVICE — THE ECHELON FLEX POWERED PLUS ARTICULATING ENDOSCOPIC LINEAR CUTTERS ARE STERILE, SINGLE PATIENT USE INSTRUMENTS THAT SIMULTANEOUSLYCUT AND STAPLE TISSUE. THERE ARE SIX STAGGERED ROWS OF STAPLES, THREE ON EITHER SIDE OF THE CUT LINE. THE ECHELON FLEX 45 POWERED PLUSINSTRUMENTS HAVE A STAPLE LINE THAT IS APPROXIMATELY 45 MM LONG AND A CUT LINE THAT IS APPROXIMATELY 42 MM LONG. THE SHAFT CAN ROTATE FREELYIN BOTH DIRECTIONS AND AN ARTICULATION MECHANISM ENABLES THE DISTAL PORTION OF THE SHAFT TO PIVOT TO FACILITATE LATERAL ACCESS TO THE OPERATIVESITE.THE INSTRUMENTS ARE PACKAGED WITH A PRIMARY LITHIUM BATTERY PACK THAT MUST BE INSTALLED PRIOR TO USE. THERE ARE SPECIFIC REQUIREMENTS FORDISPOSING OF THE BATTERY PACK. REFER TO THE BATTERY PACK DISPOSAL SECTION.THE INSTRUMENTS ARE PACKAGED WITHOUT A RELOAD AND MUST BE LOADED PRIOR TO USE. A STAPLE RETAINING CAP ON THE RELOAD PROTECTS THE STAPLE LEGPOINTS DURING SHIPPING AND TRANSPORTATION. THE INSTRUMENTS’ LOCK-OUT FEATURE IS DESIGNED TO PREVENT A USED OR IMPROPERLY INSTALLED RELOADFROM BEING REFIRED OR AN INSTRUMENT FROM BEING FIRED WITHOUT A RELOAD.: Brand: ECHELON FLEX

## (undated) DEVICE — PREP RAZOR: Brand: DEROYAL

## (undated) DEVICE — PAD MINOR UNIVERSAL: Brand: MEDLINE INDUSTRIES, INC.

## (undated) DEVICE — ENDOPOUCH RETRIEVER SPECIMEN RETRIEVAL BAGS: Brand: ENDOPOUCH RETRIEVER

## (undated) DEVICE — SUT VIC 0 UR6 27IN VCP603H

## (undated) DEVICE — CATHETER,FOLEY,SILI-ELAST,LTX,20FR,10ML: Brand: MEDLINE

## (undated) DEVICE — GUIDE NDL BIOP BIPLANE 17G STRL 1P/U

## (undated) DEVICE — ELECTRODE,ECG,STRESS,FOAM,50PK: Brand: MEDLINE

## (undated) DEVICE — SUT POLY BR TP 2STRND 1/8X30IN

## (undated) DEVICE — ST TBG AIRSEAL FLTR TRI LUM

## (undated) DEVICE — GLV SURG BIOGEL LTX PF 6 1/2

## (undated) DEVICE — NEEDLE, QUINCKE 22GX7": Brand: MEDLINE

## (undated) DEVICE — TOTAL TRAY, 16FR 10ML SIL FOLEY, URN: Brand: MEDLINE

## (undated) DEVICE — THE CHANNEL CLEANING BRUSH IS A NYLON FLEXI BRUSH ATTACHED TO A FLEXIBLE PLASTIC SHEATH DESIGNED TO SAFELY REMOVE DEBRIS FROM FLEXIBLE ENDOSCOPES.

## (undated) DEVICE — TOWEL,OR,DSP,ST,BLUE,STD,4/PK,20PK/CS: Brand: MEDLINE

## (undated) DEVICE — NDL BIOP CHIBA 22G 8IN

## (undated) DEVICE — DRSNG TELFA PAD NONADH STR 1S 3X8IN

## (undated) DEVICE — PLUG,CATHETER,DRAINAGE PROTECTOR,TUBE: Brand: MEDLINE

## (undated) DEVICE — SUT MNCRYL 3/0 PS2 18IN MCP497G

## (undated) DEVICE — SENSR O2 OXIMAX FNGR A/ 18IN NONSTR

## (undated) DEVICE — BLADELESS OBTURATOR: Brand: WECK VISTA